# Patient Record
Sex: FEMALE | Race: WHITE | NOT HISPANIC OR LATINO | Employment: OTHER | URBAN - METROPOLITAN AREA
[De-identification: names, ages, dates, MRNs, and addresses within clinical notes are randomized per-mention and may not be internally consistent; named-entity substitution may affect disease eponyms.]

---

## 2017-01-05 ENCOUNTER — OFFICE VISIT (OUTPATIENT)
Dept: PULMONOLOGY | Facility: CLINIC | Age: 67
End: 2017-01-05
Payer: MEDICARE

## 2017-01-05 VITALS
DIASTOLIC BLOOD PRESSURE: 62 MMHG | OXYGEN SATURATION: 96 % | BODY MASS INDEX: 27.97 KG/M2 | WEIGHT: 148.13 LBS | HEIGHT: 61 IN | HEART RATE: 78 BPM | SYSTOLIC BLOOD PRESSURE: 130 MMHG

## 2017-01-05 DIAGNOSIS — J47.9 BRONCHIECTASIS WITHOUT COMPLICATION: Primary | ICD-10-CM

## 2017-01-05 PROCEDURE — 99999 PR PBB SHADOW E&M-EST. PATIENT-LVL III: CPT | Mod: PBBFAC,,, | Performed by: INTERNAL MEDICINE

## 2017-01-05 PROCEDURE — 1159F MED LIST DOCD IN RCRD: CPT | Mod: S$GLB,,, | Performed by: INTERNAL MEDICINE

## 2017-01-05 PROCEDURE — 99499 UNLISTED E&M SERVICE: CPT | Mod: S$PBB,,, | Performed by: INTERNAL MEDICINE

## 2017-01-05 PROCEDURE — 99214 OFFICE O/P EST MOD 30 MIN: CPT | Mod: S$GLB,,, | Performed by: INTERNAL MEDICINE

## 2017-01-05 PROCEDURE — 3078F DIAST BP <80 MM HG: CPT | Mod: S$GLB,,, | Performed by: INTERNAL MEDICINE

## 2017-01-05 PROCEDURE — 1160F RVW MEDS BY RX/DR IN RCRD: CPT | Mod: S$GLB,,, | Performed by: INTERNAL MEDICINE

## 2017-01-05 PROCEDURE — 1157F ADVNC CARE PLAN IN RCRD: CPT | Mod: S$GLB,,, | Performed by: INTERNAL MEDICINE

## 2017-01-05 PROCEDURE — 3075F SYST BP GE 130 - 139MM HG: CPT | Mod: S$GLB,,, | Performed by: INTERNAL MEDICINE

## 2017-01-05 RX ORDER — ALBUTEROL SULFATE 90 UG/1
2 AEROSOL, METERED RESPIRATORY (INHALATION) EVERY 6 HOURS PRN
Qty: 1 INHALER | Refills: 6 | Status: SHIPPED | OUTPATIENT
Start: 2017-01-05 | End: 2018-04-02 | Stop reason: ALTCHOICE

## 2017-01-05 NOTE — PROGRESS NOTES
"Subjective:       Patient ID: Jolly Matias is a 66 y.o. female.    Chief Complaint: Bronchiectasis; Cough; and Pulmonary Nodules    HPI Comments: 66 year old with a history of bronchiectasis (mild and only symptomatic once when had hemoptysis in August).  Doing well since.      Bronchiectasis   Associated symptoms include coughing. Pertinent negatives include no fatigue or fever.   Cough   Associated symptoms include wheezing (does not know where inhaler is.). Pertinent negatives include no fever, hemoptysis or weight loss.   Pulmonary Nodules   Associated symptoms include coughing. Pertinent negatives include no fatigue or fever.     Review of Systems   Constitutional: Negative for fever, weight loss, fatigue and night sweats.   HENT: Negative for trouble swallowing.    Respiratory: Positive for cough and wheezing (does not know where inhaler is.). Negative for hemoptysis, sputum production, choking, chest tightness, pleurisy, dyspnea on extertion and use of rescue inhaler.    Gastrointestinal: Positive for acid reflux.     Sinus congestion    Objective:       Vitals:    01/05/17 1053   BP: 130/62   Pulse: 78   SpO2: 96%   Weight: 67.2 kg (148 lb 2.4 oz)   Height: 5' 1" (1.549 m)     Physical Exam   Constitutional: She is oriented to person, place, and time. She appears well-developed and well-nourished.   Cardiovascular: Normal rate and regular rhythm.    Pulmonary/Chest: Normal expansion. She has no wheezes. She has no rales.   Musculoskeletal: Normal range of motion. She exhibits no edema.   Lymphadenopathy: No supraclavicular adenopathy is present.     She has no cervical adenopathy.   Neurological: She is alert and oriented to person, place, and time.   Psychiatric: She has a normal mood and affect.     Personal Diagnostic Review  CT of chest performed on 9/2016 without contrast revealed stable nodules (not concerned about increased conspicuity)..  No flowsheet data found.      Assessment:       1. " Bronchiectasis without complication        Outpatient Encounter Prescriptions as of 1/5/2017   Medication Sig Dispense Refill    albuterol (VENTOLIN HFA) 90 mcg/actuation inhaler Inhale 2 puffs into the lungs every 6 (six) hours as needed for Wheezing. 1 Inhaler 6    atorvastatin (LIPITOR) 20 MG tablet Take 1 tablet (20 mg total) by mouth once daily. 90 tablet 0    cyanocobalamin (VITAMIN B-12) 1000 MCG tablet Take 100 mcg by mouth once daily.      flaxseed oil 1,000 mg Cap Take 1 capsule by mouth once daily.        losartan (COZAAR) 50 MG tablet Take 1 tablet (50 mg total) by mouth once daily. 90 tablet 2    metformin (GLUCOPHAGE) 500 MG tablet Take 1 tablet (500 mg total) by mouth 2 (two) times daily with meals. 180 tablet 2    sertraline (ZOLOFT) 50 MG tablet Take 1 tablet (50 mg total) by mouth once daily. 90 tablet 2    spironolactone (ALDACTONE) 25 MG tablet Take 1 tablet (25 mg total) by mouth once daily. 90 tablet 2    vitamin D 1000 units Tab Take 185 mg by mouth once daily.      [DISCONTINUED] albuterol (VENTOLIN HFA) 90 mcg/actuation inhaler Inhale 2 puffs into the lungs every 6 (six) hours as needed for Wheezing. 1 Inhaler 6    beclomethasone (QVAR) 40 mcg/actuation Aero Inhale 1 puff into the lungs 2 (two) times daily. 1 each 11    GUAIFENESIN/PHENYLEPHRINE HCL (MUCINEX COLD ORAL) Take by mouth as needed.      [DISCONTINUED] spironolactone (ALDACTONE) 25 MG tablet TAKE 1 TABLET BY MOUTH EVERY DAY 90 tablet 0     No facility-administered encounter medications on file as of 1/5/2017.      No orders of the defined types were placed in this encounter.    Plan:       I have advised that with cough and wheezing we begin treating bronchospasm with inhaled corticosteroids.  Start qvar one puff twice daily for control.  Albuterol for rescue and prevention.  Plan of care reviewed with patient.  May follow up in 6 months.  No further follow up for pulmonary nodules.

## 2017-01-05 NOTE — MR AVS SNAPSHOT
[facility] - Pulmonary Services  [facility]  [place] [address]  Phone: [phone]                  Jolly HUBERT Matias   2017 10:30 AM   Office Visit    Description:  Female : 1950   Provider:  Daphne Jacinto MD   Department:  [facility] - Pulmonary Services           Reason for Visit     Bronchiectasis     Cough     Pulmonary Nodules           Diagnoses this Visit        Comments    Bronchiectasis without complication    -  Primary            To Do List           Future Appointments        Provider Department Dept Phone    2017 11:15 AM Jorgito Juarez MD [facility]-Colon [facility] Rectal Surg [phone]    1/10/2017 8:30 AM LAB, APPOINTMENT [place] [facility] [phone]    1/10/2017 9:00 AM Jolly Saavedra RN [facility] [phone]    2017 1:00 PM Angle Mohr MD [place] - Dermatology [phone]      Your Future Surgeries/Procedures     2017   Surgery with IBRAHIMA Philip MD   [facility]    [facility] [address] [phone]            2017   Surgery with Jorgito Juarez MD   [facility]    [facility] [id number] [phone]              Goals (5 Years of Data)     None       These Medications        Disp Refills Start End    beclomethasone (QVAR) 40 mcg/actuation Aero 1 each 11 2017     Inhale 1 puff into the lungs 2 (two) times daily. - Inhalation    Pharmacy: [facility] [id number] AT [facility] [address]  [place]  #: [phone]       albuterol (VENTOLIN HFA) 90 mcg/actuation inhaler 1 Inhaler 6 2017     Inhale 2 puffs into the lungs every 6 (six) hours as needed for Wheezing. - Inhalation    Pharmacy: [facility] - [address] AT [facility] [place] [address] [place] Ph #:  [phone]       Notes to Pharmacy: Hold until patient request to fill it.      [facility]     [facility] - 24/7 Assistance  Registered nurses in the [facility] provide clinical advisement, health education, appointment booking, and other advisory services.  Call for this free service at [phone].             Medications           Message regarding Medications     Verify the changes and/or additions to your medication regime listed below are the same as discussed with your clinician today.  If any of these changes or additions are incorrect, please notify your healthcare provider.        START taking these NEW medications        Refills    beclomethasone (QVAR) 40 mcg/actuation Aero 11    Sig: Inhale 1 puff into the lungs 2 (two) times daily.    Class: Normal    Route: Inhalation           Verify that the below list of medications is an accurate representation of the medications you are currently taking.  If none reported, the list may be blank. If incorrect, please contact your healthcare provider. Carry this list with you in case of emergency.           Current Medications     albuterol (VENTOLIN HFA) 90 mcg/actuation inhaler Inhale 2 puffs into the lungs every 6 (six) hours as needed for Wheezing.    atorvastatin (LIPITOR) 20 MG tablet Take 1 tablet (20 mg total) by mouth once daily.    cyanocobalamin (VITAMIN B-12) 1000 MCG tablet Take 100 mcg by mouth once daily.    flaxseed oil 1,000 mg Cap Take 1 capsule by mouth once daily.      losartan (COZAAR) 50 MG tablet Take 1 tablet (50 mg total) by mouth once daily.    metformin (GLUCOPHAGE) 500 MG tablet Take 1 tablet (500 mg total) by mouth 2 (two) times daily with meals.    sertraline (ZOLOFT) 50 MG tablet Take 1 tablet (50 mg total) by mouth once daily.    spironolactone (ALDACTONE) 25 MG tablet Take 1 tablet (25 mg total) by mouth once daily.    vitamin D 1000 units Tab Take 185 mg by mouth once daily.     "beclomethasone (QVAR) 40 mcg/actuation Aero Inhale 1 puff into the lungs 2 (two) times daily.    GUAIFENESIN/PHENYLEPHRINE HCL (MUCINEX COLD ORAL) Take by mouth as needed.           Clinical Reference Information           Vital Signs - Last Recorded  Most recent update: 1/5/2017 10:59 AM by Francia Worley LPN    BP Pulse Ht Wt LMP SpO2    130/62 78 5' 1" (1.549 m) 67.2 kg (148 lb 2.4 oz) (Within Years) 96%    BMI                27.99 kg/m2          Blood Pressure          Most Recent Value    BP  130/62      Allergies as of 1/5/2017     Hydrocodone      Immunizations Administered on Date of Encounter - 1/5/2017     None      "

## 2017-01-06 ENCOUNTER — OFFICE VISIT (OUTPATIENT)
Dept: SURGERY | Facility: CLINIC | Age: 67
End: 2017-01-06
Payer: MEDICARE

## 2017-01-06 VITALS
HEIGHT: 61 IN | HEART RATE: 75 BPM | SYSTOLIC BLOOD PRESSURE: 149 MMHG | DIASTOLIC BLOOD PRESSURE: 81 MMHG | WEIGHT: 149.5 LBS | BODY MASS INDEX: 28.22 KG/M2

## 2017-01-06 DIAGNOSIS — K57.32 DIVERTICULITIS OF LARGE INTESTINE WITHOUT PERFORATION OR ABSCESS WITHOUT BLEEDING: Primary | ICD-10-CM

## 2017-01-06 PROCEDURE — 1159F MED LIST DOCD IN RCRD: CPT | Mod: S$GLB,,, | Performed by: COLON & RECTAL SURGERY

## 2017-01-06 PROCEDURE — 1160F RVW MEDS BY RX/DR IN RCRD: CPT | Mod: S$GLB,,, | Performed by: COLON & RECTAL SURGERY

## 2017-01-06 PROCEDURE — 1157F ADVNC CARE PLAN IN RCRD: CPT | Mod: S$GLB,,, | Performed by: COLON & RECTAL SURGERY

## 2017-01-06 PROCEDURE — 99213 OFFICE O/P EST LOW 20 MIN: CPT | Mod: S$GLB,,, | Performed by: COLON & RECTAL SURGERY

## 2017-01-06 PROCEDURE — 1126F AMNT PAIN NOTED NONE PRSNT: CPT | Mod: S$GLB,,, | Performed by: COLON & RECTAL SURGERY

## 2017-01-06 PROCEDURE — 3079F DIAST BP 80-89 MM HG: CPT | Mod: S$GLB,,, | Performed by: COLON & RECTAL SURGERY

## 2017-01-06 PROCEDURE — 3077F SYST BP >= 140 MM HG: CPT | Mod: S$GLB,,, | Performed by: COLON & RECTAL SURGERY

## 2017-01-06 PROCEDURE — 99999 PR PBB SHADOW E&M-EST. PATIENT-LVL III: CPT | Mod: PBBFAC,,, | Performed by: COLON & RECTAL SURGERY

## 2017-01-06 RX ORDER — NEOMYCIN SULFATE 500 MG/1
TABLET ORAL
Qty: 6 TABLET | Refills: 0 | Status: ON HOLD | OUTPATIENT
Start: 2017-01-06 | End: 2017-01-20 | Stop reason: HOSPADM

## 2017-01-06 RX ORDER — NEOMYCIN SULFATE 500 MG/1
TABLET ORAL
Qty: 6 TABLET | Refills: 0 | Status: SHIPPED | OUTPATIENT
Start: 2017-01-06 | End: 2017-01-06 | Stop reason: CLARIF

## 2017-01-06 RX ORDER — METRONIDAZOLE 500 MG/1
TABLET ORAL
Qty: 3 TABLET | Refills: 0 | Status: ON HOLD | OUTPATIENT
Start: 2017-01-06 | End: 2017-01-20 | Stop reason: HOSPADM

## 2017-01-06 RX ORDER — METRONIDAZOLE 500 MG/1
TABLET ORAL
Qty: 3 TABLET | Refills: 0 | Status: SHIPPED | OUTPATIENT
Start: 2017-01-06 | End: 2017-01-06 | Stop reason: CLARIF

## 2017-01-06 NOTE — PROGRESS NOTES
Subjective:       Patient ID: Jolly Matias is a 66 y.o. female.    Chief Complaint: Pre-op Exam    HPI   Jolly Matias is a pleasant 66F who is here to schedule a sigmoid colectomy for her complicated diverticulitis.  She has had six episodes of diverticulitis since 2013, and her second this year (last admission in September 2016).  Her last colonoscopy was in 2010 which was normal. She desires discussion of surgical options for definitive management of her diverticulitis. The patient denies f/c/n/v, abdominal pain or problems with urination.       Review of Systems   Constitutional: Negative for fatigue, fever and unexpected weight change.   Respiratory: Negative for shortness of breath.    Cardiovascular: Negative for chest pain.   Gastrointestinal: Negative for abdominal pain, blood in stool, constipation, diarrhea, nausea and vomiting.       Objective:      Physical Exam   Constitutional: She is oriented to person, place, and time. She appears well-developed and well-nourished. No distress.   Eyes: Conjunctivae and EOM are normal.   Pulmonary/Chest: Effort normal. No respiratory distress.   Abdominal: Soft. She exhibits no distension. There is no tenderness.   Genitourinary: Rectum normal.   Musculoskeletal: Normal range of motion.   Neurological: She is alert and oriented to person, place, and time.   Skin: Skin is warm and dry.   Psychiatric: She has a normal mood and affect. Her behavior is normal.       Assessment:       1. Diverticulitis of large intestine without perforation or abscess without bleeding        Plan:       C-scope scheduled for 01/16/17  Lap Sigmoidectomy 01/17/16  Preop teaching done to include:    Mechanical bowel prep instruction sheet provided which includes instructions for prep and pre-op abx schedule, NPO after MN, and Hibiclens baths prior to surgery.     Details of planned surgery reviewed:  · Review of procedure  · Expected LOS.   · Preop process- application of RUSS hose and  SCD's, IV and IVF  · Different methods of post op pain control (IV and oral)   · Use of and importance of IS and other prophylaxis  · Expected early ambulation  · Slow advancement of diet  · Ostomy and ostomy care if indicated   · Pathology report generally takes 5 or more working days    Goals that need to be met before discharge:  · No fever  · Functional status at baseline   · Tolerating low fiber diet   · Positive bowel function  · Adequate pain control with oral meds  · Vital signs and labs stable    Home instructions begun during this preop visit:   · May shower (no tub bath),   · No heavy lifting, nothing greater then 5 pounds,  · Small frequent meals, low residue,   · Ostomy care if needed,   · Call for fever above 101, nausea/vomiting, no bm and any increase in pain     All questions answered to pt and family satisfaction.       I have personally taken the history and examined this patient and agree with the nurse practioner's note as stated above.

## 2017-01-10 ENCOUNTER — HOSPITAL ENCOUNTER (OUTPATIENT)
Dept: PREADMISSION TESTING | Facility: HOSPITAL | Age: 67
Discharge: HOME | End: 2017-01-10
Attending: ANESTHESIOLOGY
Payer: MEDICARE

## 2017-01-10 VITALS
OXYGEN SATURATION: 97 % | HEIGHT: 61 IN | TEMPERATURE: 98 F | RESPIRATION RATE: 18 BRPM | DIASTOLIC BLOOD PRESSURE: 77 MMHG | SYSTOLIC BLOOD PRESSURE: 131 MMHG | WEIGHT: 149.06 LBS | HEART RATE: 72 BPM | BODY MASS INDEX: 28.14 KG/M2

## 2017-01-10 NOTE — DISCHARGE INSTRUCTIONS
Your surgery has been scheduled for:__________________________________________    You should report to:  ____[facility], located on the [place] side of the first floor of the           [facility] ([phone])  ____[facility], located on the [facility] side of the            Second floor of the [facility] ([phone])  ____3rd Floor [facility] located on the [facility] side of the [facility] [phone]  Please Note   - Tell your doctor if you take Aspirin, products containing Aspirin, herbal medications  or blood thinners, such as Coumadin, Ticlid, or Plavix.  (Consult your provider regarding holding or stopping before surgery).  - Arrange for someone to drive you home following surgery.  You will not be allowed to leave the surgical facility alone or drive yourself home following sedation and anesthesia.  Before Surgery  - Stop taking all herbal medications 14days prior to surgery  - No Motrin/Advil (Ibuprofen) 7 days before surgery  - No Aleve (Naproxen) 7 days before surgery  - Stop Taking Asprin, products containing Asprin _____days before surgery  - Stop taking blood thinners_______days before surgery  - Refrain from drinking alcoholic beverages for 24hours before and after surgery  - Stop or limit smoking _________days before surgery  Night before Surgery  - DO NOT EAT OR DRINK ANYTHING AFTER MIDNIGHT, INCLUDING GUM, HARD CANDY, MINTS, OR CHEWING TOBACCO.  - Take a shower or bath (shower is recommended).  Bathe with Hibiclens soap or an antibacterial soap from the neck down.  If not supplied by your surgeon, hibiclens soap will need to be purchased over the counter in pharmacy.  Rinse soap off thoroughly.  - Shampoo your hair with your regular shampoo  The Day of Surgery  - Take another bath or shower with hibiclens or any antibacterial soap, to reduce the chance of infection.  - Take heart and blood  pressure medications with a small sip of water, as advised by the perioperative team.  - Do not take fluid pills  - You may brush your teeth and rinse your mouth, but do not swall any additional water.   - Do not apply perfumes, powder, body lotions or deodorant on the day of surgery.  - Nail polish should be removed.  - Do not wear makeup or moisturizer  - Wear comfortable clothes, such as a button front shirt and loose fitting pants.  - Leave all jewelry, including body piercings, and valuables at home.    - Bring any devices you will neeed after surgery such as crutches or canes.  - If you have sleep apnea, please bring your CPAP machine  In the event that your physical condition changes including the onset of a cold or respiratory illness, or if you have to delay or cancel your surgery, please notify your surgeon.Anesthesia: General Anesthesia  Youre due to have surgery. During surgery, youll be given medication called anesthesia. (It is also called anesthetic.) This will keep you comfortable and pain-free. Your surgeon will use general anesthesia. This sheet tells you more about it.    What Is General Anesthesia?  General anesthesia puts you into a state like deep sleep. It goes into the bloodstream (IV anesthetics), into the lungs (gas anesthetics), or both. You feel nothing during the procedure. You will not remember it. During the procedure, the anesthesia provider monitors you. He or she checks your heart rate and rhythm, blood pressure, and blood oxygen.  · IV Anesthetics  IV anesthetics are given through an IV line in your arm. Theyre often given first. This is so you are asleep before a gas anesthetic is started. Some kinds of IV anesthetics relieve pain. Others relax you. Your doctor will decide which kind is best in your case.  · Gas Anesthetics  Gas anesthetics are breathed into the lungs. They are often used to keep you asleep. They can be given through a facemask, an endotracheal tube, or a  laryngeal mask airway.  ¨ If you have a facemask, your anesthesia provider will most likely place it over your nose and mouth while youre still awake. Youll breathe oxygen through the mask as your IV anesthetic is started. Gas anesthetic may be added through the mask.  ¨ If you have an endotracheal tube or a laryngeal mask airway, it will be inserted into your throat after youre asleep.  Anesthesia Tools and Medications  You will likely have:  · IV anesthetics sent through an IV line into your bloodstream.  · Gas anesthetics breathed into your lungs, where they pass into your bloodstream.  · A pulse oximeter on the end of your finger. This measures your blood oxygen level.  · Electrocardiography leads (electrodes) on your chest. These record your heart rate and rhythm.  · A blood pressure cuff. This reads your blood pressure.  Risks and Possible Complications  General anesthesia has some risks. These include:  · Breathing problems  · Nausea and vomiting  · Sore throat or hoarseness  · Allergic reaction to the anesthetic  · Ongoing numbness (rare)  · Irregular heartbeat (rare)  · Cardiac arrest (rare)   Anesthesia Safety  · Follow all instructions you are given for how long not to eat or drink before your procedure.  · Be sure your doctor knows what medications you take. This includes over-the-counter medications, herbs, and supplements.  · Have an adult family member or friend drive you home after the procedure.  · For the first 24 hours after your surgery:  ¨ Do not drive or use heavy equipment.  ¨ Do not make important decisions or sign documents.  ¨ Avoid alcohol.  ¨ Have someone stay with you, if possible. They can watch for problems and help keep you safe.  © 0546-1113 [employer], [address] [place] [address], [place] [address]. All rights reserved. This information is not intended as a substitute for professional medical care. Always follow your healthcare professional's instructions.

## 2017-01-10 NOTE — IP AVS SNAPSHOT
[facility]  [address]  [facility] [address]  Phone: [phone]           Patient Discharge Instructions    Our goal is to set you up for success. This packet includes information on your condition, medications, and your home care. It will help you to care for yourself so you don't get sicker.     Please ask your nurse if you have any questions.        There are many details to remember when preparing for your surgery. Here is what you will need to do, please ask your nurse if there are more specific instructions and if you have any questions:    1. 24 hours before procedure Do not smoke or drink alcoholic beverages 24 hours prior to your procedure    2. Eating before procedure Do not eat or drink anything 8 hours before your procedure - this includes gum, mints, and candy.     3. Day of procedure Please remove all jewelry for the procedure. If you wear contact lenses, dentures, hearing aids or glasses, bring a container to put them in during your surgery and give to a family member for safekeeping.  If your doctor has scheduled you for an overnight stay, bring a small overnight bag with any personal items that you need.    4. After procedure Make arrangements in advance for transportation home by a responsible adult. It is not safe to drive a vehicle during the 24 hours following surgery.     PLEASE NOTE: You may be contacted the day before your surgery to confirm your surgery date and arrival time. The Surgery schedule has many variables which may affect the time of your surgery case. Family members should be available if your surgery time changes.                [facility]  Unless otherwise directed by your provider, please contact [facility], our nurse care line that is available for 24/7 assistance.     [phone] (toll-free)    Registered nurses in the [facility] provide clinical advisement, health education, appointment booking, and other  advisory services.                    ** Verify the list of medication(s) below is accurate and up to date. Carry this with you in case of emergency. If your medications have changed, please notify your healthcare provider.             Medication List      TAKE these medications        Additional Info                      albuterol 90 mcg/actuation inhaler   Commonly known as:  VENTOLIN HFA   Quantity:  1 Inhaler   Refills:  6   Dose:  2 puff   Comments:  Hold until patient request to fill it.    Instructions:  Inhale 2 puffs into the lungs every 6 (six) hours as needed for Wheezing.     Begin Date    AM    Noon    PM    Bedtime       atorvastatin 20 MG tablet   Commonly known as:  LIPITOR   Quantity:  90 tablet   Refills:  0   Dose:  20 mg    Instructions:  Take 1 tablet (20 mg total) by mouth once daily.     Begin Date    AM    Noon    PM    Bedtime       beclomethasone 40 mcg/actuation Aero   Commonly known as:  QVAR   Quantity:  1 each   Refills:  11   Dose:  1 puff    Instructions:  Inhale 1 puff into the lungs 2 (two) times daily.     Begin Date    AM    Noon    PM    Bedtime       flaxseed oil 1,000 mg Cap   Refills:  0   Dose:  1 capsule    Instructions:  Take 1 capsule by mouth once daily.     Begin Date    AM    Noon    PM    Bedtime       losartan 50 MG tablet   Commonly known as:  COZAAR   Quantity:  90 tablet   Refills:  2   Dose:  50 mg    Instructions:  Take 1 tablet (50 mg total) by mouth once daily.     Begin Date    AM    Noon    PM    Bedtime       metformin 500 MG tablet   Commonly known as:  GLUCOPHAGE   Quantity:  180 tablet   Refills:  2   Dose:  500 mg    Instructions:  Take 1 tablet (500 mg total) by mouth 2 (two) times daily with meals.     Begin Date    AM    Noon    PM    Bedtime       metronidazole 500 MG tablet   Commonly known as:  FLAGYL   Quantity:  3 tablet   Refills:  0    Instructions:  On the day before your surgery, take 1 tablet (500 mg) by mouth at 1pm, 2pm and 11pm.     Begin  Date    AM    Noon    PM    Bedtime       MUCINEX COLD ORAL   Refills:  0    Instructions:  Take by mouth as needed.     Begin Date    AM    Noon    PM    Bedtime       neomycin 500 mg Tab   Commonly known as:  MYCIFRADIN   Quantity:  6 tablet   Refills:  0    Instructions:  On the day before your surgery, take 2 tablets (1,000 mg) by mouth at 1pm, 2pm and 11pm.     Begin Date    AM    Noon    PM    Bedtime       sertraline 50 MG tablet   Commonly known as:  ZOLOFT   Quantity:  90 tablet   Refills:  2   Dose:  50 mg    Instructions:  Take 1 tablet (50 mg total) by mouth once daily.     Begin Date    AM    Noon    PM    Bedtime       spironolactone 25 MG tablet   Commonly known as:  ALDACTONE   Quantity:  90 tablet   Refills:  2   Dose:  25 mg    Instructions:  Take 1 tablet (25 mg total) by mouth once daily.     Begin Date    AM    Noon    PM    Bedtime       VITAMIN B-12 1000 MCG tablet   Refills:  0   Dose:  100 mcg   Generic drug:  cyanocobalamin    Instructions:  Take 100 mcg by mouth once daily.     Begin Date    AM    Noon    PM    Bedtime       vitamin D 1000 units Tab   Refills:  0   Dose:  185 mg    Instructions:  Take 185 mg by mouth once daily.     Begin Date    AM    Noon    PM    Bedtime                  Please bring to all follow up appointments:    1. A copy of your discharge instructions.  2. All medicines you are currently taking in their original bottles.  3. Identification and insurance card.    Please arrive 15 minutes ahead of scheduled appointment time.    Please call 24 hours in advance if you must reschedule your appointment and/or time.        Your Scheduled Appointments     Anurag 10, 2017  9:00 AM CST   Pre-Admit Testing Visit with Jolly Saavedra RN   [facility] ([facility])    [address] [facility] [address]   [phone]            Jan 18, 2017  1:00 PM CST   Procedure with MD [place] Jolly - Dermatology ([place])    [address]  [facility]  [place] [id number] [phone]              Your Future Surgeries/Procedures     Jan 16, 2017   Surgery with IBRAHIMA Philip MD   [facility] ([facility])    [address] [facility] [address] [phone]            Jan 17, 2017   Surgery with Jorgito Juarez MD   [facility] ([facility])    [address] [facility] [address] [phone]                  Discharge Instructions       Your surgery has been scheduled for:__________________________________________    You should report to:  ____[facility], located on the [place] side of the first floor of the           [facility] ([phone])  ____[facility], located on the [facility] side of the            Second floor of the [facility] ([phone])  ____3rd Floor [facility] located on the [facility] side of the [facility] [phone]  Please Note   - Tell your doctor if you take Aspirin, products containing Aspirin, herbal medications  or blood thinners, such as Coumadin, Ticlid, or Plavix.  (Consult your provider regarding holding or stopping before surgery).  - Arrange for someone to drive you home following surgery.  You will not be allowed to leave the surgical facility alone or drive yourself home following sedation and anesthesia.  Before Surgery  - Stop taking all herbal medications 14days prior to surgery  - No Motrin/Advil (Ibuprofen) 7 days before surgery  - No Aleve (Naproxen) 7 days before surgery  - Stop Taking Asprin, products containing Asprin _____days before surgery  - Stop taking blood thinners_______days before surgery  - Refrain from drinking alcoholic beverages for 24hours before and after surgery  - Stop or limit smoking _________days before surgery  Night before Surgery  - DO NOT EAT OR DRINK ANYTHING AFTER MIDNIGHT,  INCLUDING GUM, HARD CANDY, MINTS, OR CHEWING TOBACCO.  - Take a shower or bath (shower is recommended).  Bathe with Hibiclens soap or an antibacterial soap from the neck down.  If not supplied by your surgeon, hibiclens soap will need to be purchased over the counter in pharmacy.  Rinse soap off thoroughly.  - Shampoo your hair with your regular shampoo  The Day of Surgery  - Take another bath or shower with hibiclens or any antibacterial soap, to reduce the chance of infection.  - Take heart and blood pressure medications with a small sip of water, as advised by the perioperative team.  - Do not take fluid pills  - You may brush your teeth and rinse your mouth, but do not swall any additional water.   - Do not apply perfumes, powder, body lotions or deodorant on the day of surgery.  - Nail polish should be removed.  - Do not wear makeup or moisturizer  - Wear comfortable clothes, such as a button front shirt and loose fitting pants.  - Leave all jewelry, including body piercings, and valuables at home.    - Bring any devices you will neeed after surgery such as crutches or canes.  - If you have sleep apnea, please bring your CPAP machine  In the event that your physical condition changes including the onset of a cold or respiratory illness, or if you have to delay or cancel your surgery, please notify your surgeon.Anesthesia: General Anesthesia  Youre due to have surgery. During surgery, youll be given medication called anesthesia. (It is also called anesthetic.) This will keep you comfortable and pain-free. Your surgeon will use general anesthesia. This sheet tells you more about it.    What Is General Anesthesia?  General anesthesia puts you into a state like deep sleep. It goes into the bloodstream (IV anesthetics), into the lungs (gas anesthetics), or both. You feel nothing during the procedure. You will not remember it. During the procedure, the anesthesia provider monitors you. He or she checks your heart  rate and rhythm, blood pressure, and blood oxygen.  · IV Anesthetics  IV anesthetics are given through an IV line in your arm. Theyre often given first. This is so you are asleep before a gas anesthetic is started. Some kinds of IV anesthetics relieve pain. Others relax you. Your doctor will decide which kind is best in your case.  · Gas Anesthetics  Gas anesthetics are breathed into the lungs. They are often used to keep you asleep. They can be given through a facemask, an endotracheal tube, or a laryngeal mask airway.  ¨ If you have a facemask, your anesthesia provider will most likely place it over your nose and mouth while youre still awake. Youll breathe oxygen through the mask as your IV anesthetic is started. Gas anesthetic may be added through the mask.  ¨ If you have an endotracheal tube or a laryngeal mask airway, it will be inserted into your throat after youre asleep.  Anesthesia Tools and Medications  You will likely have:  · IV anesthetics sent through an IV line into your bloodstream.  · Gas anesthetics breathed into your lungs, where they pass into your bloodstream.  · A pulse oximeter on the end of your finger. This measures your blood oxygen level.  · Electrocardiography leads (electrodes) on your chest. These record your heart rate and rhythm.  · A blood pressure cuff. This reads your blood pressure.  Risks and Possible Complications  General anesthesia has some risks. These include:  · Breathing problems  · Nausea and vomiting  · Sore throat or hoarseness  · Allergic reaction to the anesthetic  · Ongoing numbness (rare)  · Irregular heartbeat (rare)  · Cardiac arrest (rare)   Anesthesia Safety  · Follow all instructions you are given for how long not to eat or drink before your procedure.  · Be sure your doctor knows what medications you take. This includes over-the-counter medications, herbs, and supplements.  · Have an adult family member or friend drive you home after the procedure.  · For  the first 24 hours after your surgery:  ¨ Do not drive or use heavy equipment.  ¨ Do not make important decisions or sign documents.  ¨ Avoid alcohol.  ¨ Have someone stay with you, if possible. They can watch for problems and help keep you safe.  © 6649-3430 [employer], [address] [place] [address]. All rights reserved. This information is not intended as a substitute for professional medical care. Always follow your healthcare professional's instructions.      Admission Information     Date & Time Provider Department CSN    1/10/2017  9:00 AM Dacia Morgan MD [facility] [id number]      Care Providers     Provider Role Specialty Primary office phone    Dacia Morgan MD Attending Provider Anesthesiology [phone]      Your Vitals Were     Last Period                   (Within Years)           Recent Lab Values        9/22/2010 10/2/2013 12/15/2014 11/25/2015 9/7/2016 11/22/2016            8:09 AM  8:20 AM  2:57 PM  8:40 AM  4:24 AM  8:55 AM      A1C 5.8 6.0 6.2 5.9 6.0 5.7      Comment for A1C at  4:24 AM on 9/7/2016:  According to ADA guidelines, hemoglobin A1C <7.0% represents  optimal control in non-pregnant diabetic patients.  Different  metrics may apply to specific populations.   Standards of Medical Care in Diabetes - 2016.  For the purpose of screening for the presence of diabetes:  <5.7%     Consistent with the absence of diabetes  5.7-6.4%  Consistent with increasing risk for diabetes   (prediabetes)  >or=6.5%  Consistent with diabetes  Currently no consensus exists for use of hemoglobin A1C  for diagnosis of diabetes for children.      Comment for A1C at  8:55 AM on 11/22/2016:  According to ADA guidelines, hemoglobin A1C <7.0% represents  optimal control in non-pregnant diabetic patients.  Different  metrics may apply to specific populations.   Standards of Medical Care in Diabetes - 2016.  For the purpose of screening for the presence of diabetes:  <5.7%      "Consistent with the absence of diabetes  5.7-6.4%  Consistent with increasing risk for diabetes   (prediabetes)  >or=6.5%  Consistent with diabetes  Currently no consensus exists for use of hemoglobin A1C  for diagnosis of diabetes for children.        Allergies as of 1/10/2017        Reactions    Hydrocodone Nausea Only    Also makes pt "very sleepy"      Advance Directives     An advance directive is a document which, in the event you are no longer able to make decisions for yourself, tells your healthcare team what kind of treatment you do or do not want to receive, or who you would like to make those decisions for you.  If you do not currently have an advance directive, [facility] encourages you to create one.  For more information call:  [phone] ([phone]), [phone] ([phone]),  or log on to [email or web address].        Language Assistance Services     ATTENTION: Language assistance services are available, free of charge. Please call [phone].      ATENCIÓN: Si habla español, tiene a lugo disposición servicios gratuitos de asistencia lingüística. Llame al [phone].     SUN Ý: N?u b?n nói Ti?ng Vi?t, có các d?ch v? h? tr? ngôn ng? mi?n phí dành cho b?n. G?i s? [phone].         [facility] complies with applicable Federal civil rights laws and does not discriminate on the basis of race, color, national origin, age, disability, or sex.        "

## 2017-01-16 ENCOUNTER — TELEPHONE (OUTPATIENT)
Dept: SURGERY | Facility: CLINIC | Age: 67
End: 2017-01-16

## 2017-01-16 ENCOUNTER — TELEPHONE (OUTPATIENT)
Dept: PULMONOLOGY | Facility: CLINIC | Age: 67
End: 2017-01-16

## 2017-01-16 ENCOUNTER — SURGERY (OUTPATIENT)
Age: 67
End: 2017-01-16

## 2017-01-16 ENCOUNTER — ANESTHESIA (OUTPATIENT)
Dept: ENDOSCOPY | Facility: HOSPITAL | Age: 67
DRG: 330 | End: 2017-01-16
Payer: MEDICARE

## 2017-01-16 ENCOUNTER — ANESTHESIA EVENT (OUTPATIENT)
Dept: ENDOSCOPY | Facility: HOSPITAL | Age: 67
DRG: 330 | End: 2017-01-16
Payer: MEDICARE

## 2017-01-16 VITALS — RESPIRATION RATE: 53 BRPM

## 2017-01-16 PROBLEM — Z13.9 SCREENING: Status: ACTIVE | Noted: 2017-01-16

## 2017-01-16 PROCEDURE — D9220A PRA ANESTHESIA: Mod: CRNA,,, | Performed by: NURSE ANESTHETIST, CERTIFIED REGISTERED

## 2017-01-16 PROCEDURE — D9220A PRA ANESTHESIA: Mod: ANES,,, | Performed by: ANESTHESIOLOGY

## 2017-01-16 RX ORDER — PROPOFOL 10 MG/ML
VIAL (ML) INTRAVENOUS CONTINUOUS PRN
Status: DISCONTINUED | OUTPATIENT
Start: 2017-01-16 | End: 2017-01-16

## 2017-01-16 RX ORDER — FLUTICASONE PROPIONATE 50 UG/1
1 POWDER, METERED RESPIRATORY (INHALATION) 2 TIMES DAILY
Qty: 1 EACH | Refills: 11 | Status: SHIPPED | OUTPATIENT
Start: 2017-01-16 | End: 2017-08-21

## 2017-01-16 RX ORDER — PROPOFOL 10 MG/ML
VIAL (ML) INTRAVENOUS
Status: DISCONTINUED | OUTPATIENT
Start: 2017-01-16 | End: 2017-01-16

## 2017-01-16 RX ORDER — LIDOCAINE HCL/PF 100 MG/5ML
SYRINGE (ML) INTRAVENOUS
Status: DISCONTINUED | OUTPATIENT
Start: 2017-01-16 | End: 2017-01-16

## 2017-01-16 RX ADMIN — PROPOFOL 150 MCG/KG/MIN: 10 INJECTION, EMULSION INTRAVENOUS at 03:01

## 2017-01-16 RX ADMIN — LIDOCAINE HYDROCHLORIDE 40 MG: 20 INJECTION, SOLUTION INTRAVENOUS at 03:01

## 2017-01-16 RX ADMIN — PROPOFOL 70 MG: 10 INJECTION, EMULSION INTRAVENOUS at 03:01

## 2017-01-16 NOTE — ANESTHESIA PREPROCEDURE EVALUATION
01/16/2017  Jolly Matias is a 66 y.o., female.    [facility] Anesthesia Evaluation         Review of Systems  Hematology/Oncology:         -- Cancer in past history: Oncology Comments: Basal cell CA    Cardiovascular:   Hypertension PVD hyperlipidemia    Pulmonary:   bronchiectasis   Hepatic/GI:   diverticulitis   Endocrine:   Diabetes    Psych:   anxiety          Physical Exam  General:  Well nourished    Airway/Jaw/Neck:  Airway Findings: Mouth Opening: Normal Tongue: Normal  General Airway Assessment: Adult  Mallampati: II  TM Distance: Normal, at least 6 cm      Dental:  Dental Findings: In tact        Mental Status:  Mental Status Findings:  Alert and Oriented, Cooperative         Anesthesia Plan  Type of Anesthesia, risks & benefits discussed:  Anesthesia Type:  general  Patient's Preference:   Intra-op Monitoring Plan:   Intra-op Monitoring Plan Comments:   Post Op Pain Control Plan:   Post Op Pain Control Plan Comments:   Induction:   IV  Beta Blocker:  Patient is not currently on a Beta-Blocker (No further documentation required).       Informed Consent: Patient understands risks and agrees with Anesthesia plan.  Questions answered. Anesthesia consent signed with patient.  ASA Score: 2     Day of Surgery Review of History & Physical:    H&P update referred to the surgeon.         Ready For Surgery From Anesthesia Perspective.

## 2017-01-16 NOTE — TELEPHONE ENCOUNTER
----- Message from Francia Worley LPN sent at 1/16/2017  3:49 PM CST -----  Contact: Jolly    tel;  [phone]   Yes, it looks like they cove brand.  ----- Message -----     From: Daphne Jacinto MD     Sent: 1/16/2017   3:12 PM       To: Francia Worley LPN    Flovent?  Daphne    ----- Message -----     From: Francia Worley LPN     Sent: 1/16/2017   3:00 PM       To: Daphne Jacinto MD    The best I can determine, Advair and Anoro are covered without a PA.  ----- Message -----     From: Sabina Laura     Sent: 1/13/2017   2:12 PM       To: [facility] Staff    You called in two perscriptions , for inhalers.    Only received the Ventolyn .    Will call the pharmacy to see  Why she only received one inhaler.   Asking you to check w/ the pharmacy why it was not given to her.   Pls call.

## 2017-01-16 NOTE — TRANSFER OF CARE
Anesthesia Transfer of Care Note    Patient: Jolly Matias    Procedure(s) Performed: Procedure(s) (LRB):  COLONOSCOPY (N/A)    Patient location: PACU    Anesthesia Type: general    Transport from OR: Transported from OR on 6-10 L/min O2 by face mask with adequate spontaneous ventilation    Post pain: adequate analgesia    Post assessment: no apparent anesthetic complications and tolerated procedure well    Post vital signs: stable    Level of consciousness: awake, alert and oriented    Nausea/Vomiting: no nausea/vomiting    Complications: none          Last vitals:   Visit Vitals    BP (!) 121/58 (BP Location: Left arm, Patient Position: Lying, BP Method: Automatic)    Pulse 69    Temp 36.4 °C (97.6 °F) (Oral)    Resp 16    LMP  (Within Years)    SpO2 100%    Breastfeeding No

## 2017-01-17 ENCOUNTER — ANESTHESIA (OUTPATIENT)
Dept: SURGERY | Facility: HOSPITAL | Age: 67
DRG: 330 | End: 2017-01-17
Payer: MEDICARE

## 2017-01-17 PROBLEM — K57.33 DIVERTICULITIS LARGE INTESTINE W/O PERFORATION OR ABSCESS W/BLEEDING: Status: ACTIVE | Noted: 2017-01-17

## 2017-01-17 PROCEDURE — 63600175 PHARM REV CODE 636 W HCPCS: Performed by: NURSE ANESTHETIST, CERTIFIED REGISTERED

## 2017-01-17 PROCEDURE — 25000003 PHARM REV CODE 250: Performed by: NURSE ANESTHETIST, CERTIFIED REGISTERED

## 2017-01-17 PROCEDURE — D9220A PRA ANESTHESIA: Mod: CRNA,,, | Performed by: NURSE ANESTHETIST, CERTIFIED REGISTERED

## 2017-01-17 PROCEDURE — 25000003 PHARM REV CODE 250: Performed by: NURSE PRACTITIONER

## 2017-01-17 PROCEDURE — 63600175 PHARM REV CODE 636 W HCPCS: Performed by: NURSE PRACTITIONER

## 2017-01-17 PROCEDURE — D9220A PRA ANESTHESIA: Mod: ANES,,, | Performed by: ANESTHESIOLOGY

## 2017-01-17 RX ORDER — LIDOCAINE HYDROCHLORIDE ANHYDROUS AND DEXTROSE MONOHYDRATE .8; 5 G/100ML; G/100ML
INJECTION, SOLUTION INTRAVENOUS CONTINUOUS PRN
Status: DISCONTINUED | OUTPATIENT
Start: 2017-01-17 | End: 2017-01-17

## 2017-01-17 RX ORDER — LIDOCAINE HCL/PF 100 MG/5ML
SYRINGE (ML) INTRAVENOUS
Status: DISCONTINUED | OUTPATIENT
Start: 2017-01-17 | End: 2017-01-17

## 2017-01-17 RX ORDER — MIDAZOLAM HYDROCHLORIDE 1 MG/ML
INJECTION, SOLUTION INTRAMUSCULAR; INTRAVENOUS
Status: DISCONTINUED | OUTPATIENT
Start: 2017-01-17 | End: 2017-01-17

## 2017-01-17 RX ORDER — FENTANYL CITRATE 50 UG/ML
INJECTION, SOLUTION INTRAMUSCULAR; INTRAVENOUS
Status: DISCONTINUED | OUTPATIENT
Start: 2017-01-17 | End: 2017-01-17

## 2017-01-17 RX ORDER — NEOSTIGMINE METHYLSULFATE 1 MG/ML
INJECTION, SOLUTION INTRAVENOUS
Status: DISCONTINUED | OUTPATIENT
Start: 2017-01-17 | End: 2017-01-17

## 2017-01-17 RX ORDER — ATROPINE SULFATE 0.4 MG/ML
INJECTION, SOLUTION ENDOTRACHEAL; INTRAMEDULLARY; INTRAMUSCULAR; INTRAVENOUS; SUBCUTANEOUS
Status: DISCONTINUED | OUTPATIENT
Start: 2017-01-17 | End: 2017-01-17

## 2017-01-17 RX ORDER — ROCURONIUM BROMIDE 10 MG/ML
INJECTION, SOLUTION INTRAVENOUS
Status: DISCONTINUED | OUTPATIENT
Start: 2017-01-17 | End: 2017-01-17

## 2017-01-17 RX ORDER — GLYCOPYRROLATE 0.2 MG/ML
INJECTION INTRAMUSCULAR; INTRAVENOUS
Status: DISCONTINUED | OUTPATIENT
Start: 2017-01-17 | End: 2017-01-17

## 2017-01-17 RX ORDER — DEXAMETHASONE SODIUM PHOSPHATE 4 MG/ML
INJECTION, SOLUTION INTRA-ARTICULAR; INTRALESIONAL; INTRAMUSCULAR; INTRAVENOUS; SOFT TISSUE
Status: DISCONTINUED | OUTPATIENT
Start: 2017-01-17 | End: 2017-01-17

## 2017-01-17 RX ORDER — EPINEPHRINE 0.1 MG/ML
INJECTION INTRAVENOUS
Status: DISCONTINUED | OUTPATIENT
Start: 2017-01-17 | End: 2017-01-17

## 2017-01-17 RX ORDER — PROPOFOL 10 MG/ML
VIAL (ML) INTRAVENOUS
Status: DISCONTINUED | OUTPATIENT
Start: 2017-01-17 | End: 2017-01-17

## 2017-01-17 RX ORDER — ESMOLOL HYDROCHLORIDE 10 MG/ML
INJECTION INTRAVENOUS
Status: DISCONTINUED | OUTPATIENT
Start: 2017-01-17 | End: 2017-01-17

## 2017-01-17 RX ORDER — ONDANSETRON 2 MG/ML
INJECTION INTRAMUSCULAR; INTRAVENOUS
Status: DISCONTINUED | OUTPATIENT
Start: 2017-01-17 | End: 2017-01-17

## 2017-01-17 RX ORDER — HYDROMORPHONE HYDROCHLORIDE 2 MG/ML
INJECTION, SOLUTION INTRAMUSCULAR; INTRAVENOUS; SUBCUTANEOUS
Status: DISCONTINUED | OUTPATIENT
Start: 2017-01-17 | End: 2017-01-17

## 2017-01-17 RX ADMIN — SODIUM CHLORIDE, SODIUM ACETATE ANHYDROUS, SODIUM GLUCONATE, POTASSIUM CHLORIDE, AND MAGNESIUM CHLORIDE: 526; 222; 502; 37; 30 INJECTION, SOLUTION INTRAVENOUS at 09:01

## 2017-01-17 RX ADMIN — METRONIDAZOLE 500 MG: 500 SOLUTION INTRAVENOUS at 07:01

## 2017-01-17 RX ADMIN — EPHEDRINE SULFATE 5 MG: 50 INJECTION, SOLUTION INTRAMUSCULAR; INTRAVENOUS; SUBCUTANEOUS at 10:01

## 2017-01-17 RX ADMIN — LIDOCAINE HYDROCHLORIDE 0.03 MG/KG/MIN: 8 INJECTION, SOLUTION INTRAVENOUS at 07:01

## 2017-01-17 RX ADMIN — ONDANSETRON 4 MG: 2 INJECTION INTRAMUSCULAR; INTRAVENOUS at 10:01

## 2017-01-17 RX ADMIN — IBUPROFEN 400 MG: 800 INJECTION INTRAVENOUS at 07:01

## 2017-01-17 RX ADMIN — ESMOLOL HYDROCHLORIDE 30 MG: 10 INJECTION INTRAVENOUS at 07:01

## 2017-01-17 RX ADMIN — CEFTRIAXONE 2 G: 2 INJECTION, SOLUTION INTRAVENOUS at 07:01

## 2017-01-17 RX ADMIN — HYDROMORPHONE HYDROCHLORIDE 0.6 MG: 2 INJECTION INTRAMUSCULAR; INTRAVENOUS; SUBCUTANEOUS at 10:01

## 2017-01-17 RX ADMIN — GLYCOPYRROLATE 0.4 MG: 0.2 INJECTION, SOLUTION INTRAMUSCULAR; INTRAVENOUS at 10:01

## 2017-01-17 RX ADMIN — ESMOLOL HYDROCHLORIDE 50 MG: 10 INJECTION INTRAVENOUS at 07:01

## 2017-01-17 RX ADMIN — DEXAMETHASONE SODIUM PHOSPHATE 8 MG: 4 INJECTION, SOLUTION INTRAMUSCULAR; INTRAVENOUS at 07:01

## 2017-01-17 RX ADMIN — EPHEDRINE SULFATE 10 MG: 50 INJECTION, SOLUTION INTRAMUSCULAR; INTRAVENOUS; SUBCUTANEOUS at 09:01

## 2017-01-17 RX ADMIN — PROPOFOL 150 MG: 10 INJECTION, EMULSION INTRAVENOUS at 07:01

## 2017-01-17 RX ADMIN — ATROPINE SULFATE 0.4 MG: 0.4 INJECTION, SOLUTION INTRAMUSCULAR; INTRAVENOUS; SUBCUTANEOUS at 08:01

## 2017-01-17 RX ADMIN — LIDOCAINE HYDROCHLORIDE 100 MG: 20 INJECTION, SOLUTION INTRAVENOUS at 07:01

## 2017-01-17 RX ADMIN — FENTANYL CITRATE 50 MCG: 50 INJECTION, SOLUTION INTRAMUSCULAR; INTRAVENOUS at 08:01

## 2017-01-17 RX ADMIN — GLYCOPYRROLATE 0.2 MG: 0.2 INJECTION, SOLUTION INTRAMUSCULAR; INTRAVENOUS at 08:01

## 2017-01-17 RX ADMIN — DEXMEDETOMIDINE HYDROCHLORIDE 0.5 MCG/KG/HR: 100 INJECTION, SOLUTION, CONCENTRATE INTRAVENOUS at 07:01

## 2017-01-17 RX ADMIN — FENTANYL CITRATE 50 MCG: 50 INJECTION, SOLUTION INTRAMUSCULAR; INTRAVENOUS at 09:01

## 2017-01-17 RX ADMIN — NEOSTIGMINE METHYLSULFATE 3 MG: 1 INJECTION INTRAVENOUS at 10:01

## 2017-01-17 RX ADMIN — SODIUM CHLORIDE, SODIUM ACETATE ANHYDROUS, SODIUM GLUCONATE, POTASSIUM CHLORIDE, AND MAGNESIUM CHLORIDE: 526; 222; 502; 37; 30 INJECTION, SOLUTION INTRAVENOUS at 07:01

## 2017-01-17 RX ADMIN — ROCURONIUM BROMIDE 50 MG: 10 INJECTION, SOLUTION INTRAVENOUS at 07:01

## 2017-01-17 RX ADMIN — EPINEPHRINE 25 MCG: 0.1 INJECTION, SOLUTION ENDOTRACHEAL; INTRACARDIAC; INTRAVENOUS at 08:01

## 2017-01-17 RX ADMIN — MIDAZOLAM HYDROCHLORIDE 2 MG: 1 INJECTION, SOLUTION INTRAMUSCULAR; INTRAVENOUS at 06:01

## 2017-01-17 RX ADMIN — SODIUM CHLORIDE, SODIUM ACETATE ANHYDROUS, SODIUM GLUCONATE, POTASSIUM CHLORIDE, AND MAGNESIUM CHLORIDE: 526; 222; 502; 37; 30 INJECTION, SOLUTION INTRAVENOUS at 08:01

## 2017-01-17 RX ADMIN — PROPOFOL 50 MG: 10 INJECTION, EMULSION INTRAVENOUS at 07:01

## 2017-01-17 RX ADMIN — HYDROMORPHONE HYDROCHLORIDE 0.4 MG: 2 INJECTION INTRAMUSCULAR; INTRAVENOUS; SUBCUTANEOUS at 11:01

## 2017-01-17 NOTE — ANESTHESIA POSTPROCEDURE EVALUATION
Anesthesia Post Evaluation    Patient: Jolly Matias    Procedure(s) Performed: Procedure(s) (LRB):  COLONOSCOPY (N/A)    Final Anesthesia Type: general  Patient location during evaluation: GI PACU  Patient participation: Yes- Able to Participate  Level of consciousness: awake and alert  Post-procedure vital signs: reviewed and stable  Pain management: adequate  Airway patency: patent  PONV status at discharge: No PONV  Anesthetic complications: no      Cardiovascular status: blood pressure returned to baseline  Respiratory status: unassisted  Hydration status: euvolemic  Follow-up not needed.        Visit Vitals    BP (!) 140/63    Pulse 67    Temp 36.4 °C (97.6 °F) (Oral)    Resp 18    LMP  (Within Years)    SpO2 99%    Breastfeeding No       Pain/Hyun Score: Pain Assessment Performed: Yes (1/17/2017  6:12 AM)  Presence of Pain: denies (1/17/2017  6:12 AM)  Pain Rating Prior to Med Admin: 0 (1/17/2017  6:35 AM)  Hyun Score: 10 (1/16/2017  4:29 PM)

## 2017-01-23 ENCOUNTER — TELEPHONE (OUTPATIENT)
Dept: ENDOSCOPY | Facility: HOSPITAL | Age: 67
End: 2017-01-23

## 2017-02-03 ENCOUNTER — OFFICE VISIT (OUTPATIENT)
Dept: SURGERY | Facility: CLINIC | Age: 67
End: 2017-02-03
Payer: MEDICARE

## 2017-02-03 VITALS
DIASTOLIC BLOOD PRESSURE: 73 MMHG | WEIGHT: 142.19 LBS | SYSTOLIC BLOOD PRESSURE: 143 MMHG | BODY MASS INDEX: 26.85 KG/M2 | HEIGHT: 61 IN | HEART RATE: 70 BPM

## 2017-02-03 DIAGNOSIS — Z09 POSTOP CHECK: Primary | ICD-10-CM

## 2017-02-03 PROCEDURE — 99999 PR PBB SHADOW E&M-EST. PATIENT-LVL III: CPT | Mod: PBBFAC,,, | Performed by: COLON & RECTAL SURGERY

## 2017-02-03 PROCEDURE — 99024 POSTOP FOLLOW-UP VISIT: CPT | Mod: S$GLB,,, | Performed by: COLON & RECTAL SURGERY

## 2017-02-07 NOTE — PROGRESS NOTES
"2 weeks s/p lap assisted sigmoid colectomy for diverticulosis - doing well - no complaints of abd pain, diarrhea or wound problems    Exam  Visit Vitals    BP (!) 143/73    Pulse 70    Ht 5' 1" (1.549 m)    Wt 64.5 kg (142 lb 3.2 oz)    LMP  (Within Years)    BMI 26.87 kg/m2     Wounds without infection.    A/P Advance diet and activity ad ricki.    F/u 4weeks if needed o/w prn.    "

## 2017-03-06 ENCOUNTER — LAB VISIT (OUTPATIENT)
Dept: LAB | Facility: HOSPITAL | Age: 67
End: 2017-03-06
Attending: FAMILY MEDICINE
Payer: MEDICARE

## 2017-03-06 ENCOUNTER — TELEPHONE (OUTPATIENT)
Dept: FAMILY MEDICINE | Facility: CLINIC | Age: 67
End: 2017-03-06

## 2017-03-06 DIAGNOSIS — R35.0 URINARY FREQUENCY: ICD-10-CM

## 2017-03-06 DIAGNOSIS — R35.0 URINARY FREQUENCY: Primary | ICD-10-CM

## 2017-03-06 LAB
BACTERIA #/AREA URNS AUTO: ABNORMAL /HPF
BILIRUB UR QL STRIP: ABNORMAL
CLARITY UR REFRACT.AUTO: ABNORMAL
COLOR UR AUTO: ABNORMAL
GLUCOSE UR QL STRIP: NEGATIVE
HGB UR QL STRIP: ABNORMAL
HYALINE CASTS UR QL AUTO: 0 /LPF
KETONES UR QL STRIP: NEGATIVE
LEUKOCYTE ESTERASE UR QL STRIP: ABNORMAL
MICROSCOPIC COMMENT: ABNORMAL
NITRITE UR QL STRIP: POSITIVE
PH UR STRIP: 7 [PH] (ref 5–8)
PROT UR QL STRIP: ABNORMAL
RBC #/AREA URNS AUTO: 34 /HPF (ref 0–4)
SP GR UR STRIP: 1.01 (ref 1–1.03)
SQUAMOUS #/AREA URNS AUTO: 1 /HPF
URN SPEC COLLECT METH UR: ABNORMAL
UROBILINOGEN UR STRIP-ACNC: 1 EU/DL
WBC #/AREA URNS AUTO: >100 /HPF (ref 0–5)
WBC CLUMPS UR QL AUTO: ABNORMAL

## 2017-03-06 PROCEDURE — 87077 CULTURE AEROBIC IDENTIFY: CPT

## 2017-03-06 PROCEDURE — 87088 URINE BACTERIA CULTURE: CPT

## 2017-03-06 PROCEDURE — 87186 SC STD MICRODIL/AGAR DIL: CPT

## 2017-03-06 PROCEDURE — 87086 URINE CULTURE/COLONY COUNT: CPT

## 2017-03-06 PROCEDURE — 81001 URINALYSIS AUTO W/SCOPE: CPT

## 2017-03-06 RX ORDER — NITROFURANTOIN (MACROCRYSTALS) 100 MG/1
100 CAPSULE ORAL EVERY 12 HOURS
Qty: 14 CAPSULE | Refills: 0 | Status: SHIPPED | OUTPATIENT
Start: 2017-03-06 | End: 2017-03-10 | Stop reason: ALTCHOICE

## 2017-03-06 NOTE — TELEPHONE ENCOUNTER
Patient denies low back pain, fever, or recent UTI. She is complaining of urinary frequency & pressure with urination. Patient is going to drop off a urine specimen at the [facility] today for urinalysis & culture. Please advise.

## 2017-03-06 NOTE — TELEPHONE ENCOUNTER
Ok -I will call in an antibiotic and we will be in touch with the results once the culture is back

## 2017-03-06 NOTE — TELEPHONE ENCOUNTER
----- Message from Cary Hedrick sent at 3/6/2017  2:19 PM CST -----  Contact: patient [phone]  Pt has a uti and is calling to ask if you will prescribe meds or if she needs to have a u/a done. Please call pt.

## 2017-03-09 ENCOUNTER — TELEPHONE (OUTPATIENT)
Dept: FAMILY MEDICINE | Facility: CLINIC | Age: 67
End: 2017-03-09

## 2017-03-09 LAB — BACTERIA UR CULT: NORMAL

## 2017-03-10 RX ORDER — SULFAMETHOXAZOLE AND TRIMETHOPRIM 800; 160 MG/1; MG/1
1 TABLET ORAL 2 TIMES DAILY
Qty: 20 TABLET | Refills: 0 | Status: SHIPPED | OUTPATIENT
Start: 2017-03-10 | End: 2017-03-20

## 2017-03-11 ENCOUNTER — PATIENT MESSAGE (OUTPATIENT)
Dept: FAMILY MEDICINE | Facility: CLINIC | Age: 67
End: 2017-03-11

## 2017-03-27 RX ORDER — LOSARTAN POTASSIUM 100 MG/1
TABLET ORAL
Qty: 90 TABLET | Refills: 1 | Status: SHIPPED | OUTPATIENT
Start: 2017-03-27 | End: 2017-08-21 | Stop reason: DRUGHIGH

## 2017-04-11 ENCOUNTER — PATIENT MESSAGE (OUTPATIENT)
Dept: RESEARCH | Facility: HOSPITAL | Age: 67
End: 2017-04-11

## 2017-05-11 ENCOUNTER — LAB VISIT (OUTPATIENT)
Dept: LAB | Facility: HOSPITAL | Age: 67
End: 2017-05-11
Attending: INTERNAL MEDICINE
Payer: MEDICARE

## 2017-05-11 DIAGNOSIS — I10 ESSENTIAL HYPERTENSION, MALIGNANT: Primary | ICD-10-CM

## 2017-05-11 DIAGNOSIS — E88.810 DYSMETABOLIC SYNDROME X: ICD-10-CM

## 2017-05-11 DIAGNOSIS — E78.2 MIXED HYPERLIPIDEMIA: ICD-10-CM

## 2017-05-11 DIAGNOSIS — E55.9 UNSPECIFIED VITAMIN D DEFICIENCY: ICD-10-CM

## 2017-05-11 LAB
25(OH)D3+25(OH)D2 SERPL-MCNC: 38 NG/ML
ALBUMIN SERPL BCP-MCNC: 4 G/DL
ALP SERPL-CCNC: 103 U/L
ALT SERPL W/O P-5'-P-CCNC: 18 U/L
ANION GAP SERPL CALC-SCNC: 9 MMOL/L
AST SERPL-CCNC: 17 U/L
BILIRUB SERPL-MCNC: 0.4 MG/DL
BUN SERPL-MCNC: 22 MG/DL
CALCIUM SERPL-MCNC: 9.8 MG/DL
CHLORIDE SERPL-SCNC: 107 MMOL/L
CHOLEST/HDLC SERPL: 3.3 {RATIO}
CO2 SERPL-SCNC: 25 MMOL/L
CREAT SERPL-MCNC: 0.9 MG/DL
EST. GFR  (AFRICAN AMERICAN): >60 ML/MIN/1.73 M^2
EST. GFR  (NON AFRICAN AMERICAN): >60 ML/MIN/1.73 M^2
ESTIMATED AVG GLUCOSE: 128 MG/DL
GLUCOSE SERPL-MCNC: 106 MG/DL
HBA1C MFR BLD HPLC: 6.1 %
HDL/CHOLESTEROL RATIO: 30.2 %
HDLC SERPL-MCNC: 162 MG/DL
HDLC SERPL-MCNC: 49 MG/DL
LDLC SERPL CALC-MCNC: 90 MG/DL
NONHDLC SERPL-MCNC: 113 MG/DL
POTASSIUM SERPL-SCNC: 4.5 MMOL/L
PROT SERPL-MCNC: 7.2 G/DL
SODIUM SERPL-SCNC: 141 MMOL/L
TRIGL SERPL-MCNC: 115 MG/DL
TSH SERPL DL<=0.005 MIU/L-ACNC: 2.07 UIU/ML

## 2017-05-11 PROCEDURE — 80053 COMPREHEN METABOLIC PANEL: CPT

## 2017-05-11 PROCEDURE — 83036 HEMOGLOBIN GLYCOSYLATED A1C: CPT

## 2017-05-11 PROCEDURE — 84443 ASSAY THYROID STIM HORMONE: CPT

## 2017-05-11 PROCEDURE — 82306 VITAMIN D 25 HYDROXY: CPT

## 2017-05-11 PROCEDURE — 80061 LIPID PANEL: CPT

## 2017-05-11 PROCEDURE — 36415 COLL VENOUS BLD VENIPUNCTURE: CPT | Mod: PO

## 2017-06-07 ENCOUNTER — PROCEDURE VISIT (OUTPATIENT)
Dept: DERMATOLOGY | Facility: CLINIC | Age: 67
End: 2017-06-07
Payer: MEDICARE

## 2017-06-07 DIAGNOSIS — C44.612 BCC (BASAL CELL CARCINOMA), SHOULDER, RIGHT: Primary | ICD-10-CM

## 2017-06-07 PROCEDURE — 12032 INTMD RPR S/A/T/EXT 2.6-7.5: CPT | Mod: S$GLB,,, | Performed by: DERMATOLOGY

## 2017-06-07 PROCEDURE — 11602 EXC TR-EXT MAL+MARG 1.1-2 CM: CPT | Mod: 51,S$GLB,, | Performed by: DERMATOLOGY

## 2017-06-07 PROCEDURE — 88305 TISSUE EXAM BY PATHOLOGIST: CPT | Performed by: PATHOLOGY

## 2017-06-07 PROCEDURE — 99499 UNLISTED E&M SERVICE: CPT | Mod: S$GLB,,, | Performed by: DERMATOLOGY

## 2017-06-07 NOTE — PROGRESS NOTES
PROCEDURE: Elliptical excision with intermediate layered repair in order to decrease dead space.    ANESTHETIC: 9.0 cc 1% Xylocaine with Epinephrine 1:100,000, buffered    SURGEON: Angle Mohr M.D.    ASSISTANTS: Rayna Morillo MA    PREOPERATIVE DIAGNOSIS:  Biopsy-proven Basal Cell Carcinoma    POSTOPERATIVE DIAGNOSIS:  Same as preoperative diagnosis    PATHOLOGIC DIAGNOSIS: Pending    LOCATION: right shoulder    INITIAL LESION SIZE: 1.0 cm    EXCISED DIAMETER: 1.8 cm    PREPARATION: The diagnosis, procedure, alternatives, benefits and risks, including but not limited to: infection, bleeding/bruising, drug reactions, pain, scar or cosmetic defect, local sensation disturbances, wound dehiscence (separation of wound edges after sutures removed) and/or recurrence of present condition were explained to the patient. The patient elected to proceed.  Patient's identity was verified using 2 patient identifiers and the side and site was verified.  Time out period with surgeon, assistant and patient in surgical suite was taken.    PROCEDURE: The location noted above was prepped, draped, and anesthetized in the usual sterile fashion per Felicity Sapp LPN. Lesional tissue was carefully marked with at least 4 mm margins of clinically normal skin in all directions. A fusiform elliptical excision was done with #15 blade carried down completely through the dermis into the deep subcutaneous tissues to the level of the non-muscle fascia, and dissection was carried out in that plane.  Electrocoagulation was used to obtain hemostasis. Blood loss was minimal. The wound was then approximated in a layered fashion with subcutaneous and intradermal sutures of 4.0 Monocryl, approximately 3 in number, and the wound was then superficially closed with simple interrupted sutures of 4.0 Prolene.    The patient tolerated the procedure well.    The area was cleaned and dressed appropriately and the patient was given wound care instructions,  as well as an appointment for follow-up evaluation.    LENGTH OF REPAIR: 5.0 cm

## 2017-06-13 ENCOUNTER — PATIENT MESSAGE (OUTPATIENT)
Dept: DERMATOLOGY | Facility: CLINIC | Age: 67
End: 2017-06-13

## 2017-07-11 ENCOUNTER — TELEPHONE (OUTPATIENT)
Dept: SURGERY | Facility: CLINIC | Age: 67
End: 2017-07-11

## 2017-07-11 NOTE — TELEPHONE ENCOUNTER
Returned call. States abdominal incision site from January 2017 has developed bruising and swelling. Denies pain, fever, nausea, vomiting. States umbilicus   Is no longer visible. I

## 2017-07-11 NOTE — TELEPHONE ENCOUNTER
----- Message from Sabina Jones sent at 7/11/2017  8:49 AM CDT -----  Contact: pt [phone]  Pt states that she has discoloration and swallow in the incision area. Please call

## 2017-07-28 ENCOUNTER — OFFICE VISIT (OUTPATIENT)
Dept: SURGERY | Facility: CLINIC | Age: 67
End: 2017-07-28
Payer: MEDICARE

## 2017-07-28 VITALS
HEART RATE: 75 BPM | HEIGHT: 61 IN | SYSTOLIC BLOOD PRESSURE: 140 MMHG | WEIGHT: 147.69 LBS | DIASTOLIC BLOOD PRESSURE: 73 MMHG | BODY MASS INDEX: 27.88 KG/M2

## 2017-07-28 DIAGNOSIS — K43.2 INCISIONAL HERNIA, WITHOUT OBSTRUCTION OR GANGRENE: Primary | ICD-10-CM

## 2017-07-28 DIAGNOSIS — K43.9 HERNIA OF ABDOMINAL WALL: Primary | ICD-10-CM

## 2017-07-28 PROCEDURE — 99999 PR PBB SHADOW E&M-EST. PATIENT-LVL III: CPT | Mod: PBBFAC,,, | Performed by: COLON & RECTAL SURGERY

## 2017-07-28 PROCEDURE — 1157F ADVNC CARE PLAN IN RCRD: CPT | Mod: S$GLB,,, | Performed by: COLON & RECTAL SURGERY

## 2017-07-28 PROCEDURE — 99212 OFFICE O/P EST SF 10 MIN: CPT | Mod: S$GLB,,, | Performed by: COLON & RECTAL SURGERY

## 2017-07-28 PROCEDURE — 1159F MED LIST DOCD IN RCRD: CPT | Mod: S$GLB,,, | Performed by: COLON & RECTAL SURGERY

## 2017-07-28 PROCEDURE — 1126F AMNT PAIN NOTED NONE PRSNT: CPT | Mod: S$GLB,,, | Performed by: COLON & RECTAL SURGERY

## 2017-07-29 NOTE — PROGRESS NOTES
Subjective:       Patient ID: Jolly Matias is a 67 y.o. female.    Chief Complaint: Follow-up    HPI   Jolly Matias is a pleasant 66F who presents s/p lap sigmoid colectomy for complicated diverticulitis on 1/17/17.  The patient recovered well post-operatively until approximately 1 month ago she noted periumbilical abdominal pain. The patient states that she was helping lift her elderly mother over the last several months and noted some initiation of the discomfort then. She denies f/c/n/v, generalized abdominal pain, obstructive symptoms or episodes of incarceration.       Review of Systems   Constitutional: Negative for fatigue, fever and unexpected weight change.   Respiratory: Negative for shortness of breath.    Cardiovascular: Negative for chest pain.   Gastrointestinal: Negative for abdominal pain, blood in stool, constipation, diarrhea, nausea and vomiting.       Objective:      Physical Exam   Constitutional: She is oriented to person, place, and time. She appears well-developed and well-nourished. No distress.   Eyes: Conjunctivae and EOM are normal.   Pulmonary/Chest: Effort normal. No respiratory distress.   Abdominal:   Soft, mild discomfort at periumbilical incision site, 2cm incisional hernia palpated, nd, no rebound or guarding   Genitourinary: Rectum normal.   Musculoskeletal: Normal range of motion.   Neurological: She is alert and oriented to person, place, and time.   Skin: Skin is warm and dry.   Psychiatric: She has a normal mood and affect. Her behavior is normal.       Assessment:   Incisional hernia  -patient desires to proceed with operative intervention given discomfort and possibility of worsening symptoms/increase in size of hernia    Plan:       -Referral placed to general surgery for incisional hernia repair    I have personally taken the history and examined this patient and agree with the resident's note as stated above.

## 2017-08-02 ENCOUNTER — OFFICE VISIT (OUTPATIENT)
Dept: SURGERY | Facility: CLINIC | Age: 67
End: 2017-08-02
Payer: MEDICARE

## 2017-08-02 VITALS
BODY MASS INDEX: 27.56 KG/M2 | TEMPERATURE: 98 F | SYSTOLIC BLOOD PRESSURE: 163 MMHG | HEIGHT: 61 IN | DIASTOLIC BLOOD PRESSURE: 84 MMHG | WEIGHT: 146 LBS | HEART RATE: 81 BPM

## 2017-08-02 DIAGNOSIS — Z01.818 PREOP EXAMINATION: ICD-10-CM

## 2017-08-02 DIAGNOSIS — I10 ESSENTIAL HYPERTENSION: ICD-10-CM

## 2017-08-02 DIAGNOSIS — K43.2 INCISIONAL HERNIA, WITHOUT OBSTRUCTION OR GANGRENE: Primary | ICD-10-CM

## 2017-08-02 PROCEDURE — 99999 PR PBB SHADOW E&M-EST. PATIENT-LVL III: CPT | Mod: PBBFAC,,, | Performed by: SURGERY

## 2017-08-02 PROCEDURE — 99214 OFFICE O/P EST MOD 30 MIN: CPT | Mod: S$GLB,,, | Performed by: SURGERY

## 2017-08-02 PROCEDURE — 1125F AMNT PAIN NOTED PAIN PRSNT: CPT | Mod: S$GLB,,, | Performed by: SURGERY

## 2017-08-02 PROCEDURE — 1159F MED LIST DOCD IN RCRD: CPT | Mod: S$GLB,,, | Performed by: SURGERY

## 2017-08-02 PROCEDURE — 1157F ADVNC CARE PLAN IN RCRD: CPT | Mod: S$GLB,,, | Performed by: SURGERY

## 2017-08-02 RX ORDER — LIDOCAINE HYDROCHLORIDE 10 MG/ML
1 INJECTION, SOLUTION EPIDURAL; INFILTRATION; INTRACAUDAL; PERINEURAL ONCE
Status: CANCELLED | OUTPATIENT
Start: 2017-08-02 | End: 2017-08-02

## 2017-08-02 NOTE — PROGRESS NOTES
"History & Physical    SUBJECTIVE:     History of Present Illness:  Jolly Matias is a pleasant 66F who presents s/p lap sigmoid colectomy for complicated diverticulitis on 1/17/17.  The patient recovered well post-operatively until approximately 1 month ago she noted periumbilical abdominal pain. The patient states that she was helping lift her elderly mother over the last several months and noted some initiation of the discomfort then. She denies f/c/n/v, generalized abdominal pain, obstructive symptoms or episodes of incarceration.  Interested in repair.      Chief Complaint   Patient presents with    Hernia     umbilical hernia       Review of patient's allergies indicates:   Allergen Reactions    Hydrocodone Nausea Only     Also makes pt "very sleepy"    Kiwi (actinidia chinensis)        Current Outpatient Prescriptions   Medication Sig Dispense Refill    albuterol (VENTOLIN HFA) 90 mcg/actuation inhaler Inhale 2 puffs into the lungs every 6 (six) hours as needed for Wheezing. 1 Inhaler 6    atorvastatin (LIPITOR) 20 MG tablet Take 1 tablet (20 mg total) by mouth once daily. (Patient taking differently: Take 20 mg by mouth every morning. ) 90 tablet 0    cyanocobalamin (VITAMIN B-12) 1000 MCG tablet Take 100 mcg by mouth once daily.      flaxseed oil 1,000 mg Cap Take 1 capsule by mouth once daily.        fluticasone (FLOVENT DISKUS) 50 mcg/actuation DsDv Inhale 1 Inch/kg/day into the lungs 2 (two) times daily. Rinse mouth after each use. 1 each 11    losartan (COZAAR) 100 MG tablet TAKE 1 TABLET BY MOUTH ONCE DAILY 90 tablet 1    losartan (COZAAR) 50 MG tablet Take 1 tablet (50 mg total) by mouth once daily. (Patient taking differently: Take 50 mg by mouth every morning. ) 90 tablet 2    metformin (GLUCOPHAGE) 500 MG tablet Take 1 tablet (500 mg total) by mouth 2 (two) times daily with meals. 180 tablet 2    oxycodone-acetaminophen (PERCOCET) 5-325 mg per tablet Take 1 tablet by mouth every 4 (four) " hours as needed. 90 tablet 0    sertraline (ZOLOFT) 50 MG tablet Take 1 tablet (50 mg total) by mouth once daily. (Patient taking differently: Take 50 mg by mouth every morning. ) 90 tablet 2    spironolactone (ALDACTONE) 25 MG tablet Take 1 tablet (25 mg total) by mouth once daily. (Patient taking differently: Take 25 mg by mouth every morning. ) 90 tablet 2    vitamin D 1000 units Tab Take 185 mg by mouth once daily.       No current facility-administered medications for this visit.        Past Medical History:   Diagnosis Date    Basal cell carcinoma 2011    left forehead    Basal cell carcinoma 2015    R temporal scalp, R upper forehead, L upper forehead    Basal cell carcinoma 2015    right mid ala    BCC (basal cell carcinoma) excised     right shoulder    Diabetes mellitus     Diverticulitis     HLD (hyperlipidemia)     Hypertension     Prediabetes 10/16/2013    Skin cancer      Past Surgical History:   Procedure Laterality Date    APPENDECTOMY      bilateral breast duct excision       SECTION      x2    COLONOSCOPY N/A 2017    Procedure: COLONOSCOPY;  Surgeon: IBRAHIMA Philip MD;  Location: [facility] ([address] [place] [address]);  Service: Endoscopy;  Laterality: N/A;    fulgaration of endometriosis x 2      HYSTERECTOMY      For endometriosis and DUB--age 42, ovaries in?    SKIN CANCER EXCISION      TONSILLECTOMY       Family History   Problem Relation Age of Onset    Skin cancer Father     Hypertension Father     Heart disease Father     Diabetes Father     Melanoma Father     Cancer Father     Skin cancer Mother     Hypertension Mother     Thyroid disease Mother     Dementia Mother     Hypertension Sister     Anxiety disorder Daughter     Breast cancer Maternal Aunt     Diabetes Maternal Uncle      Social History   Substance Use Topics    Smoking status: Never Smoker    Smokeless tobacco: Never Used    Alcohol use 0.0 oz/week     1 - 2 Glasses of wine per week  "     Comment: 3-4x /week        Review of Systems:  Review of Systems   Constitutional: Negative for activity change, appetite change, chills, diaphoresis, fatigue and fever.   HENT: Negative for congestion, postnasal drip, rhinorrhea, sinus pressure, sneezing, sore throat and tinnitus.    Eyes: Negative for photophobia, pain, discharge, redness, itching and visual disturbance.   Respiratory: Negative for apnea, cough, choking, chest tightness, shortness of breath, wheezing and stridor.    Cardiovascular: Negative for chest pain, palpitations and leg swelling.   Gastrointestinal: Positive for abdominal pain. Negative for abdominal distention, constipation, diarrhea, nausea and vomiting.   Musculoskeletal: Negative for arthralgias, back pain and joint swelling.   Skin: Negative for color change, pallor and rash.   Neurological: Negative for dizziness, facial asymmetry, light-headedness, numbness and headaches.   Hematological: Negative for adenopathy.   Psychiatric/Behavioral: Negative for agitation, behavioral problems, confusion and decreased concentration.       OBJECTIVE:     Vital Signs (Most Recent)  Temp: 98.3 °F (36.8 °C) (08/02/17 1517)  Pulse: 81 (08/02/17 1517)  BP: (!) 163/84 (08/02/17 1517)  5' 1" (1.549 m)  66.2 kg (146 lb)     Physical Exam:  Physical Exam   Constitutional: She is oriented to person, place, and time. She appears well-developed and well-nourished.   HENT:   Head: Normocephalic and atraumatic.   Right Ear: External ear normal.   Left Ear: External ear normal.   Eyes: Conjunctivae and EOM are normal. Right eye exhibits no discharge. Left eye exhibits no discharge. No scleral icterus.   Neck: Normal range of motion. Neck supple. No thyromegaly present.   Cardiovascular: Normal rate, regular rhythm and normal heart sounds.  Exam reveals no gallop and no friction rub.    No murmur heard.  Pulmonary/Chest: Effort normal and breath sounds normal. No respiratory distress. She has no wheezes. " She has no rales.   Abdominal: Soft. Bowel sounds are normal. She exhibits no distension and no mass. There is no tenderness. There is no rebound and no guarding. A hernia (small reducible incisional hernia at umbilicus.) is present.   Musculoskeletal: Normal range of motion. She exhibits no edema or tenderness.   Neurological: She is alert and oriented to person, place, and time.   Skin: Skin is warm and dry. No rash noted. No erythema. No pallor.   Psychiatric: She has a normal mood and affect. Her behavior is normal. Judgment and thought content normal.       ASSESSMENT/PLAN:     Incisional hernia    PLAN:Plan     To OR for robotic vs lap incisional hernia repair.  Consent obtained         Yg Haile MD

## 2017-08-02 NOTE — LETTER
August 2, 2017      Jorgito Juarez MD  [address] [facility] [address]           [facility] - General Surgery  [address]  [facility] [address]  Phone: [phone]          Patient: Jolly Matias   MR Number: [id number]   YOB: 1950   Date of Visit: 8/2/2017       Dear Dr. Jorgito Juarez:    Thank you for referring Jolly Matias to me for evaluation. Attached you will find relevant portions of my assessment and plan of care.    If you have questions, please do not hesitate to call me. I look forward to following Jolly Matias along with you.    Sincerely,    Yg Haile Jr., MD    Enclosure  CC:  No Recipients    If you would like to receive this communication electronically, please contact [email or web address] or [phone] to request more information on [employer] Link access.    For providers and/or their staff who would like to refer a patient to [facility], please contact us through our one-stop-shop provider referral line, [facility] , at [phone].    If you feel you have received this communication in error or would no longer like to receive these types of communications, please e-mail [email or web address]

## 2017-08-09 ENCOUNTER — HOSPITAL ENCOUNTER (OUTPATIENT)
Dept: CARDIOLOGY | Facility: CLINIC | Age: 67
Discharge: HOME | End: 2017-08-09
Payer: MEDICARE

## 2017-08-09 DIAGNOSIS — Z01.818 PREOP EXAMINATION: ICD-10-CM

## 2017-08-09 PROCEDURE — 93000 ELECTROCARDIOGRAM COMPLETE: CPT | Mod: S$GLB,,, | Performed by: INTERNAL MEDICINE

## 2017-08-21 ENCOUNTER — TELEPHONE (OUTPATIENT)
Dept: SURGERY | Facility: CLINIC | Age: 67
End: 2017-08-21

## 2017-08-21 ENCOUNTER — ANESTHESIA EVENT (OUTPATIENT)
Dept: SURGERY | Facility: HOSPITAL | Age: 67
DRG: 941 | End: 2017-08-21
Payer: MEDICARE

## 2017-08-21 NOTE — ANESTHESIA PREPROCEDURE EVALUATION
[facility]  Anesthesia Pre-Operative Evaluation         Patient Name: Jolly Matias  YOB: 1950  MRN: [id number]    SUBJECTIVE:     Pre-operative evaluation for Procedure(s) (LRB):  REPAIR-HERNIA-INCISIONAL-ROBOTIC-XI vs Lap (N/A)     08/21/2017    Jolly Matias is a 67 y.o. female w/ a significant PMHx of incisional hernia s/p lap sigmoid colectomy for complicated diverticulitis on 1/17/17, HLD, HTN (on arb and aldactone), DM II (not on insulin) and BCC who presents for the above procedure.      LDA: None documented.    Prev airway:   Present Prior to Hospital Arrival?: No; Placement Date: 01/17/17; Placement Time: 0713; Method of Intubation: Direct laryngoscopy; Inserted by: CRNA; Airway Device: Endotracheal Tube; Mask Ventilation: Easy; Intubated: Postinduction; Blade: [place]o #2; Airway Device Size: 7.0; Style: Cuffed; Cuff Inflation: Minimal occlusive pressure; Inflation Amount: 5; Placement Verified By: Auscultation, Capnometry; Grade: Grade II; Complicating Factors: Anterior larynx (SHORT EPIGLOTTIS); Intubation Findings: Positive EtCO2, Bilateral breath sounds, Atraumatic/Condition of teeth unchanged;  Depth of Insertion: 21; Securment: Lips; Complications: Soft tissue; Breath Sounds: Equal Bilateral; Insertion Attempts: 1; Removal Date: 01/17/17;  Removal Time: 1112    Drips: None documented.    Patient Active Problem List   Diagnosis    Prediabetes    HLD (hyperlipidemia)    Situational stress    Chronic bronchitis    Diverticulitis of large intestine    Anxiety    Abdominal pain    Bronchiectasis without complication    Pure hypercholesterolemia    Essential hypertension    Abnormal CT of the abdomen    Splenic artery aneurysm    Abdominal aortic atherosclerosis    History of basal cell carcinoma    Screening    Diverticulitis large intestine w/o perforation or abscess w/bleeding       Review of patient's allergies indicates:   Allergen Reactions     "Hydrocodone      Also makes pt "very sleepy"    Kiwi (actinidia chinensis) Swelling       Current Inpatient Medications:      No current facility-administered medications on file prior to encounter.      Current Outpatient Prescriptions on File Prior to Encounter   Medication Sig Dispense Refill    atorvastatin (LIPITOR) 20 MG tablet Take 1 tablet (20 mg total) by mouth once daily. 90 tablet 0    cyanocobalamin (VITAMIN B-12) 1000 MCG tablet Take 100 mcg by mouth once daily.      flaxseed oil 1,000 mg Cap Take 1 capsule by mouth once daily.        metformin (GLUCOPHAGE) 500 MG tablet Take 1 tablet (500 mg total) by mouth 2 (two) times daily with meals. 180 tablet 2    sertraline (ZOLOFT) 50 MG tablet Take 1 tablet (50 mg total) by mouth once daily. 90 tablet 2    spironolactone (ALDACTONE) 25 MG tablet Take 1 tablet (25 mg total) by mouth once daily. 90 tablet 2    vitamin D 1000 units Tab Take 185 mg by mouth once daily.      albuterol (VENTOLIN HFA) 90 mcg/actuation inhaler Inhale 2 puffs into the lungs every 6 (six) hours as needed for Wheezing. 1 Inhaler 6    losartan (COZAAR) 50 MG tablet Take 1 tablet (50 mg total) by mouth once daily. 90 tablet 2       Past Surgical History:   Procedure Laterality Date    APPENDECTOMY      bilateral breast duct excision       SECTION      x2    COLONOSCOPY N/A 2017    Procedure: COLONOSCOPY;  Surgeon: IBRAHIMA Philip MD;  Location: [address];  Service: Endoscopy;  Laterality: N/A;    fulgaration of endometriosis x 2      HYSTERECTOMY      For endometriosis and DUB--age 42, ovaries in?    SKIN CANCER EXCISION      TONSILLECTOMY         Social History     Social History    Marital status:      Spouse name: N/A    Number of children: N/A    Years of education: N/A     Occupational History    Not on file.     Social History Main Topics    Smoking status: Never Smoker    Smokeless tobacco: Never Used    Alcohol use 0.0 " oz/week     1 - 2 Glasses of wine per week      Comment: 3-4x /week    Drug use: No    Sexual activity: Yes     Partners: Male     Birth control/ protection: Post-menopausal     Other Topics Concern    Are You Pregnant Or Think You May Be? No    Breast-Feeding No     Social History Narrative    , looking after her mother, walking some       OBJECTIVE:     17 labs   CBC H/H   CMP wnl     Diagnostic Studies: No relevant studies.    EK17  Normal sinus rhythm  Normal ECG  When compared with ECG of 15-OCT-2015 13:30,  Minimal criteria for Anterior infarct are no longer Present  QT has shortened    2D ECHO:  No results found for this or any previous visit.      ASSESSMENT/PLAN:         Anesthesia Evaluation    I have reviewed the Patient Summary Reports.    I have reviewed the Nursing Notes.   I have reviewed the Medications.     Review of Systems  Anesthesia Hx:  History of prior surgery of interest to airway management or planning: Previous anesthesia: General 2017 with general anesthesia.  Procedure performed at an [facility] Facility. Airway issues documented on chart review include mask, easy, difficult direct laryngoscopy  Denies Family Hx of Anesthesia complications.   Denies Personal Hx of Anesthesia complications.   Hematology/Oncology:         -- Cancer in past history: Oncology Comments: Basal cell CA     Cardiovascular:   Hypertension PVD hyperlipidemia    Pulmonary:   bronchiectasis   Hepatic/GI:   Diverticulitis s/p sigmoidectomy 2017   Endocrine:   Diabetes    Psych:   anxiety          Physical Exam  General:  Well nourished    Airway/Jaw/Neck:  Airway Findings: Mouth Opening: Normal Tongue: Normal  General Airway Assessment: Adult  Mallampati: III  Improves to II with phonation.  TM Distance: Normal, at least 6 cm      Dental:  Dental Findings: In tact        Mental Status:  Mental Status Findings:  Alert and Oriented, Cooperative         Anesthesia Plan  Type of Anesthesia,  risks & benefits discussed:  Anesthesia Type:  general  Patient's Preference:   Intra-op Monitoring Plan: standard ASA monitors  Intra-op Monitoring Plan Comments:   Post Op Pain Control Plan: IV/PO Opioids PRN, multimodal analgesia and per primary service following discharge from PACU  Post Op Pain Control Plan Comments:   Induction:   IV  Beta Blocker:  Patient is not currently on a Beta-Blocker (No further documentation required).       Informed Consent: Patient understands risks and agrees with Anesthesia plan.  Questions answered. Anesthesia consent signed with patient.  ASA Score: 2     Day of Surgery Review of History & Physical: I have interviewed and examined the patient. I have reviewed the patient's H&P dated:  There are no significant changes.          Ready For Surgery From Anesthesia Perspective.

## 2017-08-22 ENCOUNTER — HOSPITAL ENCOUNTER (INPATIENT)
Facility: HOSPITAL | Age: 67
LOS: 2 days | Discharge: HOME | DRG: 941 | End: 2017-08-25
Attending: SURGERY | Admitting: SURGERY
Payer: MEDICARE

## 2017-08-22 ENCOUNTER — ANESTHESIA (OUTPATIENT)
Dept: SURGERY | Facility: HOSPITAL | Age: 67
DRG: 941 | End: 2017-08-22
Payer: MEDICARE

## 2017-08-22 ENCOUNTER — SURGERY (OUTPATIENT)
Age: 67
End: 2017-08-22

## 2017-08-22 DIAGNOSIS — K43.2 INCISIONAL HERNIA, WITHOUT OBSTRUCTION OR GANGRENE: ICD-10-CM

## 2017-08-22 LAB
POCT GLUCOSE: 134 MG/DL (ref 70–110)
POCT GLUCOSE: 144 MG/DL (ref 70–110)
POCT GLUCOSE: 146 MG/DL (ref 70–110)
POCT GLUCOSE: 193 MG/DL (ref 70–110)

## 2017-08-22 PROCEDURE — 27201423 OPTIME MED/SURG SUP & DEVICES STERILE SUPPLY: Performed by: SURGERY

## 2017-08-22 PROCEDURE — D9220A PRA ANESTHESIA: Mod: ,,, | Performed by: ANESTHESIOLOGY

## 2017-08-22 PROCEDURE — 63600175 PHARM REV CODE 636 W HCPCS

## 2017-08-22 PROCEDURE — 25000003 PHARM REV CODE 250: Performed by: ANESTHESIOLOGY

## 2017-08-22 PROCEDURE — 36000710: Performed by: SURGERY

## 2017-08-22 PROCEDURE — 71000033 HC RECOVERY, INTIAL HOUR: Performed by: SURGERY

## 2017-08-22 PROCEDURE — 11000001 HC ACUTE MED/SURG PRIVATE ROOM

## 2017-08-22 PROCEDURE — 63600175 PHARM REV CODE 636 W HCPCS: Performed by: ANESTHESIOLOGY

## 2017-08-22 PROCEDURE — 82962 GLUCOSE BLOOD TEST: CPT | Performed by: SURGERY

## 2017-08-22 PROCEDURE — 71000039 HC RECOVERY, EACH ADD'L HOUR: Performed by: SURGERY

## 2017-08-22 PROCEDURE — 25000003 PHARM REV CODE 250: Performed by: SURGERY

## 2017-08-22 PROCEDURE — 0WUF4JZ SUPPLEMENT ABDOMINAL WALL WITH SYNTHETIC SUBSTITUTE, PERCUTANEOUS ENDOSCOPIC APPROACH: ICD-10-PCS | Performed by: SURGERY

## 2017-08-22 PROCEDURE — 27000221 HC OXYGEN, UP TO 24 HOURS

## 2017-08-22 PROCEDURE — 8E0W4CZ ROBOTIC ASSISTED PROCEDURE OF TRUNK REGION, PERCUTANEOUS ENDOSCOPIC APPROACH: ICD-10-PCS | Performed by: SURGERY

## 2017-08-22 PROCEDURE — C1781 MESH (IMPLANTABLE): HCPCS | Performed by: SURGERY

## 2017-08-22 PROCEDURE — 37000009 HC ANESTHESIA EA ADD 15 MINS: Performed by: SURGERY

## 2017-08-22 PROCEDURE — 37000008 HC ANESTHESIA 1ST 15 MINUTES: Performed by: SURGERY

## 2017-08-22 PROCEDURE — 49654 PR LAP, INCISIONAL HERNIA REPAIR,REDUCIBLE: CPT | Mod: ,,, | Performed by: SURGERY

## 2017-08-22 PROCEDURE — 36000711: Performed by: SURGERY

## 2017-08-22 DEVICE — MESH VENTRALIGHT ST 4X6IN: Type: IMPLANTABLE DEVICE | Site: ABDOMEN | Status: FUNCTIONAL

## 2017-08-22 RX ORDER — MIDAZOLAM HYDROCHLORIDE 1 MG/ML
INJECTION, SOLUTION INTRAMUSCULAR; INTRAVENOUS
Status: DISCONTINUED | OUTPATIENT
Start: 2017-08-22 | End: 2017-08-22

## 2017-08-22 RX ORDER — LIDOCAINE HYDROCHLORIDE 10 MG/ML
1 INJECTION, SOLUTION EPIDURAL; INFILTRATION; INTRACAUDAL; PERINEURAL ONCE
Status: COMPLETED | OUTPATIENT
Start: 2017-08-22 | End: 2017-08-22

## 2017-08-22 RX ORDER — KETAMINE HYDROCHLORIDE 100 MG/ML
INJECTION, SOLUTION INTRAMUSCULAR; INTRAVENOUS
Status: DISCONTINUED | OUTPATIENT
Start: 2017-08-22 | End: 2017-08-22

## 2017-08-22 RX ORDER — LIDOCAINE HCL/PF 100 MG/5ML
SYRINGE (ML) INTRAVENOUS
Status: DISCONTINUED | OUTPATIENT
Start: 2017-08-22 | End: 2017-08-22

## 2017-08-22 RX ORDER — NALOXONE HCL 0.4 MG/ML
0.02 VIAL (ML) INJECTION
Status: DISCONTINUED | OUTPATIENT
Start: 2017-08-22 | End: 2017-08-24

## 2017-08-22 RX ORDER — GLUCAGON 1 MG
1 KIT INJECTION
Status: DISCONTINUED | OUTPATIENT
Start: 2017-08-22 | End: 2017-08-25 | Stop reason: HOSPADM

## 2017-08-22 RX ORDER — LIDOCAINE HYDROCHLORIDE 10 MG/ML
INJECTION, SOLUTION EPIDURAL; INFILTRATION; INTRACAUDAL; PERINEURAL
Status: DISCONTINUED | OUTPATIENT
Start: 2017-08-22 | End: 2017-08-22 | Stop reason: HOSPADM

## 2017-08-22 RX ORDER — ACETAMINOPHEN 325 MG/1
650 TABLET ORAL EVERY 8 HOURS PRN
Status: DISCONTINUED | OUTPATIENT
Start: 2017-08-22 | End: 2017-08-25 | Stop reason: HOSPADM

## 2017-08-22 RX ORDER — DIPHENHYDRAMINE HYDROCHLORIDE 50 MG/ML
25 INJECTION INTRAMUSCULAR; INTRAVENOUS EVERY 4 HOURS PRN
Status: DISCONTINUED | OUTPATIENT
Start: 2017-08-22 | End: 2017-08-25 | Stop reason: HOSPADM

## 2017-08-22 RX ORDER — GLYCOPYRROLATE 0.2 MG/ML
INJECTION INTRAMUSCULAR; INTRAVENOUS
Status: DISCONTINUED | OUTPATIENT
Start: 2017-08-22 | End: 2017-08-22

## 2017-08-22 RX ORDER — FENTANYL CITRATE 50 UG/ML
25 INJECTION, SOLUTION INTRAMUSCULAR; INTRAVENOUS EVERY 5 MIN PRN
Status: DISCONTINUED | OUTPATIENT
Start: 2017-08-22 | End: 2017-08-22

## 2017-08-22 RX ORDER — BUPIVACAINE HYDROCHLORIDE AND EPINEPHRINE 2.5; 5 MG/ML; UG/ML
INJECTION, SOLUTION EPIDURAL; INFILTRATION; INTRACAUDAL; PERINEURAL
Status: DISCONTINUED | OUTPATIENT
Start: 2017-08-22 | End: 2017-08-22 | Stop reason: HOSPADM

## 2017-08-22 RX ORDER — SODIUM CHLORIDE 9 MG/ML
INJECTION, SOLUTION INTRAVENOUS CONTINUOUS
Status: DISCONTINUED | OUTPATIENT
Start: 2017-08-22 | End: 2017-08-23

## 2017-08-22 RX ORDER — SODIUM CHLORIDE 0.9 % (FLUSH) 0.9 %
3 SYRINGE (ML) INJECTION EVERY 8 HOURS
Status: DISCONTINUED | OUTPATIENT
Start: 2017-08-22 | End: 2017-08-25 | Stop reason: HOSPADM

## 2017-08-22 RX ORDER — SERTRALINE HYDROCHLORIDE 50 MG/1
50 TABLET, FILM COATED ORAL DAILY
Status: DISCONTINUED | OUTPATIENT
Start: 2017-08-23 | End: 2017-08-25 | Stop reason: HOSPADM

## 2017-08-22 RX ORDER — ATORVASTATIN CALCIUM 20 MG/1
20 TABLET, FILM COATED ORAL DAILY
Status: DISCONTINUED | OUTPATIENT
Start: 2017-08-23 | End: 2017-08-25 | Stop reason: HOSPADM

## 2017-08-22 RX ORDER — ROCURONIUM BROMIDE 10 MG/ML
INJECTION, SOLUTION INTRAVENOUS
Status: DISCONTINUED | OUTPATIENT
Start: 2017-08-22 | End: 2017-08-22

## 2017-08-22 RX ORDER — ENOXAPARIN SODIUM 100 MG/ML
40 INJECTION SUBCUTANEOUS
Status: DISCONTINUED | OUTPATIENT
Start: 2017-08-23 | End: 2017-08-25 | Stop reason: HOSPADM

## 2017-08-22 RX ORDER — FENTANYL CITRATE 50 UG/ML
INJECTION, SOLUTION INTRAMUSCULAR; INTRAVENOUS
Status: DISCONTINUED | OUTPATIENT
Start: 2017-08-22 | End: 2017-08-22

## 2017-08-22 RX ORDER — ONDANSETRON 8 MG/1
8 TABLET, ORALLY DISINTEGRATING ORAL EVERY 8 HOURS PRN
Status: DISCONTINUED | OUTPATIENT
Start: 2017-08-22 | End: 2017-08-25 | Stop reason: HOSPADM

## 2017-08-22 RX ORDER — HYDROMORPHONE HCL IN 0.9% NACL 6 MG/30 ML
PATIENT CONTROLLED ANALGESIA SYRINGE INTRAVENOUS CONTINUOUS
Status: DISCONTINUED | OUTPATIENT
Start: 2017-08-22 | End: 2017-08-24

## 2017-08-22 RX ORDER — PROPOFOL 10 MG/ML
VIAL (ML) INTRAVENOUS
Status: DISCONTINUED | OUTPATIENT
Start: 2017-08-22 | End: 2017-08-22

## 2017-08-22 RX ORDER — BACITRACIN 50000 [IU]/1
INJECTION, POWDER, FOR SOLUTION INTRAMUSCULAR
Status: DISCONTINUED | OUTPATIENT
Start: 2017-08-22 | End: 2017-08-22 | Stop reason: HOSPADM

## 2017-08-22 RX ORDER — NEOSTIGMINE METHYLSULFATE 1 MG/ML
INJECTION, SOLUTION INTRAVENOUS
Status: DISCONTINUED | OUTPATIENT
Start: 2017-08-22 | End: 2017-08-22

## 2017-08-22 RX ORDER — LOSARTAN POTASSIUM 25 MG/1
50 TABLET ORAL DAILY
Status: DISCONTINUED | OUTPATIENT
Start: 2017-08-23 | End: 2017-08-25 | Stop reason: HOSPADM

## 2017-08-22 RX ORDER — INSULIN ASPART 100 [IU]/ML
0-5 INJECTION, SOLUTION INTRAVENOUS; SUBCUTANEOUS
Status: DISCONTINUED | OUTPATIENT
Start: 2017-08-22 | End: 2017-08-25 | Stop reason: HOSPADM

## 2017-08-22 RX ORDER — HYDROMORPHONE HCL IN 0.9% NACL 6 MG/30 ML
PATIENT CONTROLLED ANALGESIA SYRINGE INTRAVENOUS
Status: COMPLETED
Start: 2017-08-22 | End: 2017-08-22

## 2017-08-22 RX ORDER — SODIUM CHLORIDE 0.9 % (FLUSH) 0.9 %
3 SYRINGE (ML) INJECTION
Status: DISCONTINUED | OUTPATIENT
Start: 2017-08-22 | End: 2017-08-22

## 2017-08-22 RX ORDER — SODIUM CHLORIDE 9 MG/ML
INJECTION, SOLUTION INTRAVENOUS CONTINUOUS PRN
Status: DISCONTINUED | OUTPATIENT
Start: 2017-08-22 | End: 2017-08-22

## 2017-08-22 RX ORDER — IBUPROFEN 200 MG
16 TABLET ORAL
Status: DISCONTINUED | OUTPATIENT
Start: 2017-08-22 | End: 2017-08-25 | Stop reason: HOSPADM

## 2017-08-22 RX ORDER — IBUPROFEN 200 MG
24 TABLET ORAL
Status: DISCONTINUED | OUTPATIENT
Start: 2017-08-22 | End: 2017-08-25 | Stop reason: HOSPADM

## 2017-08-22 RX ORDER — DEXAMETHASONE SODIUM PHOSPHATE 4 MG/ML
INJECTION, SOLUTION INTRA-ARTICULAR; INTRALESIONAL; INTRAMUSCULAR; INTRAVENOUS; SOFT TISSUE
Status: DISCONTINUED | OUTPATIENT
Start: 2017-08-22 | End: 2017-08-22

## 2017-08-22 RX ORDER — ONDANSETRON 2 MG/ML
INJECTION INTRAMUSCULAR; INTRAVENOUS
Status: DISCONTINUED | OUTPATIENT
Start: 2017-08-22 | End: 2017-08-22

## 2017-08-22 RX ADMIN — BACITRACIN 50000 UNITS: 50000 INJECTION, POWDER, LYOPHILIZED, FOR SOLUTION INTRAMUSCULAR at 08:08

## 2017-08-22 RX ADMIN — EPHEDRINE SULFATE 10 MG: 50 INJECTION, SOLUTION INTRAMUSCULAR; INTRAVENOUS; SUBCUTANEOUS at 07:08

## 2017-08-22 RX ADMIN — ROCURONIUM BROMIDE 50 MG: 10 INJECTION, SOLUTION INTRAVENOUS at 07:08

## 2017-08-22 RX ADMIN — BUPIVACAINE HYDROCHLORIDE AND EPINEPHRINE BITARTRATE 20 ML: 2.5; .0091 INJECTION, SOLUTION EPIDURAL; INFILTRATION; INTRACAUDAL; PERINEURAL at 07:08

## 2017-08-22 RX ADMIN — SODIUM CHLORIDE, SODIUM GLUCONATE, SODIUM ACETATE, POTASSIUM CHLORIDE, MAGNESIUM CHLORIDE, SODIUM PHOSPHATE, DIBASIC, AND POTASSIUM PHOSPHATE: .53; .5; .37; .037; .03; .012; .00082 INJECTION, SOLUTION INTRAVENOUS at 08:08

## 2017-08-22 RX ADMIN — MIDAZOLAM HYDROCHLORIDE 2 MG: 1 INJECTION, SOLUTION INTRAMUSCULAR; INTRAVENOUS at 07:08

## 2017-08-22 RX ADMIN — KETAMINE HYDROCHLORIDE 20 MG: 100 INJECTION, SOLUTION, CONCENTRATE INTRAMUSCULAR; INTRAVENOUS at 08:08

## 2017-08-22 RX ADMIN — KETAMINE HYDROCHLORIDE 30 MG: 100 INJECTION, SOLUTION, CONCENTRATE INTRAMUSCULAR; INTRAVENOUS at 07:08

## 2017-08-22 RX ADMIN — SODIUM CHLORIDE: 0.9 INJECTION, SOLUTION INTRAVENOUS at 10:08

## 2017-08-22 RX ADMIN — FENTANYL CITRATE 100 MCG: 50 INJECTION, SOLUTION INTRAMUSCULAR; INTRAVENOUS at 07:08

## 2017-08-22 RX ADMIN — LIDOCAINE HYDROCHLORIDE 20 ML: 10 INJECTION, SOLUTION EPIDURAL; INFILTRATION; INTRACAUDAL; PERINEURAL at 07:08

## 2017-08-22 RX ADMIN — GLYCOPYRROLATE 0.6 MG: 0.2 INJECTION, SOLUTION INTRAMUSCULAR; INTRAVENOUS at 09:08

## 2017-08-22 RX ADMIN — DEXTROSE 2 G: 50 INJECTION, SOLUTION INTRAVENOUS at 07:08

## 2017-08-22 RX ADMIN — SODIUM CHLORIDE: 0.9 INJECTION, SOLUTION INTRAVENOUS at 06:08

## 2017-08-22 RX ADMIN — PROPOFOL 150 MG: 10 INJECTION, EMULSION INTRAVENOUS at 07:08

## 2017-08-22 RX ADMIN — ONDANSETRON 4 MG: 2 INJECTION INTRAMUSCULAR; INTRAVENOUS at 09:08

## 2017-08-22 RX ADMIN — Medication: at 10:08

## 2017-08-22 RX ADMIN — GLYCOPYRROLATE 0.6 MG: 0.2 INJECTION, SOLUTION INTRAMUSCULAR; INTRAVENOUS at 07:08

## 2017-08-22 RX ADMIN — LIDOCAINE HYDROCHLORIDE 10 MG: 10 INJECTION, SOLUTION EPIDURAL; INFILTRATION; INTRACAUDAL; PERINEURAL at 06:08

## 2017-08-22 RX ADMIN — KETAMINE HYDROCHLORIDE 10 MG: 100 INJECTION, SOLUTION, CONCENTRATE INTRAMUSCULAR; INTRAVENOUS at 08:08

## 2017-08-22 RX ADMIN — FENTANYL CITRATE 50 MCG: 50 INJECTION, SOLUTION INTRAMUSCULAR; INTRAVENOUS at 08:08

## 2017-08-22 RX ADMIN — LIDOCAINE HYDROCHLORIDE 80 MG: 20 INJECTION, SOLUTION INTRAVENOUS at 07:08

## 2017-08-22 RX ADMIN — DEXAMETHASONE SODIUM PHOSPHATE 8 MG: 4 INJECTION, SOLUTION INTRAMUSCULAR; INTRAVENOUS at 07:08

## 2017-08-22 RX ADMIN — PROPOFOL 50 MG: 10 INJECTION, EMULSION INTRAVENOUS at 08:08

## 2017-08-22 RX ADMIN — NEOSTIGMINE METHYLSULFATE 5 MG: 1 INJECTION INTRAVENOUS at 09:08

## 2017-08-22 RX ADMIN — FENTANYL CITRATE 50 MCG: 50 INJECTION, SOLUTION INTRAMUSCULAR; INTRAVENOUS at 07:08

## 2017-08-22 NOTE — INTERVAL H&P NOTE
The patient has been examined and the H&P has been reviewed:    I concur with the findings and no changes have occurred since H&P was written.    Anesthesia/Surgery risks, benefits and alternative options discussed and understood by patient/family.          Active Hospital Problems    Diagnosis  POA    Incisional hernia, without obstruction or gangrene [K43.2]  Yes      Resolved Hospital Problems    Diagnosis Date Resolved POA   No resolved problems to display.

## 2017-08-22 NOTE — OP NOTE
DATE OF PROCEDURE: 08/22/2017  SERVICE: General Surgery.   Surgeon(s) and Role:     * Cameron Perry MD - Resident - Assisting     * Yg Haile Jr., MD - Primary  PREOPERATIVE DIAGNOSIS: Incisional hernia  POSTOPERATIVE DIAGNOSIS: Same  PROCEDURE: Robotic repair of incisional hernia with mesh.   ANESTHESIA: General endotracheal and local.   DESCRIPTION OF PROCEDURE: The patient was taken to the Operating Room, placed under general anesthesia and prepped and draped in sterile fashion. At this   time, an incision was made in the left upper quadrant, midclavicular subcostal   after infiltrating with local anesthetic. This was 5 mm in nature. Using   Optiview trocar under direct visualization, intra-abdominal access was obtained   without difficulty and pneumoperitoneum was obtained. Further ports were then   placed including a left lower quadrant port 8mm port, a 8mm mid axillary port at the leel of the umbilicus and a right lower quadrant 12mm port under direct visualization after   infiltrating with local anesthetic. The original 5mm port was exchanged for an 8mm robotic port.  Once the ports were placed, we noticed   there were moderate adhesions to the anterior abdominal wall. We took these   down sharply where appropriate and bluntly where able to. We noticed the   incisional hernia present.  We docked the robot.  We then used electrocautery to take down the falciform ligament and to reduce the hernia. We continued to take down the falciform ligament to allow for 5 cm coverage of the hernia defect. We measured the hernia and it was approximately 4x2cm.  We then closed the defect with a 2-0 Stratafix permanent suture after reducing the pressure to 8mmHg.   We then proceeded with placement of a ~[id number] Ventralight ST mesh with Echo positioning device. This was rolled and placed into the abdominal cavity.  We made a small skin incision in the middle of the hernia defect after   infiltrating with local  anesthetic and using a suture passer to grasp the   catheter attached to the positioning device on the mesh and pulled this   anteriorly. We then insufflated the balloon on the mesh and then it spread   nicely over the anterior abdominal wall with the hernia centered on the mesh.   The mesh was then sewn into place with a 2-0 Stratafix suture in running fashion.  Once this was complete we removed the Echo positioning device through the 12 mm port in the right lower quadrant.  We inspected the mesh and it was in very good condition, nice and taut against the anterior abdominal wall with   circumferential coverage of the hernia defect. The 12 mm port was then removed   and using 0-Vicryl suture in figure of 8 fashion, we closed the fascia of this incision  and tied these down. The air was then evacuated and the ports were removed. The   skin of all 4 ports was closed with 4-0 Monocryl suture in subcuticular   fashion. Mastisol and Steri-Strips were placed. Mastisol and Steri-Strips were   also placed over the stab incision used for placement of the mesh. At this time, the patient was allowed to awake from general anesthesia and transferred to bed for transfer to Recovery and an abdominal binder was placed.   COMPLICATIONS: None.   SPONGE COUNT: Correct.   BLOOD LOSS: 15 mL.   FLUIDS: Per Anesthesia.   BLOOD GIVEN: None.   DRAINS: None.   SPECIMENS: None.   CONDITION OF PATIENT: Good.   I was present for the entire procedure.

## 2017-08-22 NOTE — H&P (VIEW-ONLY)
"History & Physical    SUBJECTIVE:     History of Present Illness:  Jolly Matias is a pleasant 66F who presents s/p lap sigmoid colectomy for complicated diverticulitis on 1/17/17.  The patient recovered well post-operatively until approximately 1 month ago she noted periumbilical abdominal pain. The patient states that she was helping lift her elderly mother over the last several months and noted some initiation of the discomfort then. She denies f/c/n/v, generalized abdominal pain, obstructive symptoms or episodes of incarceration.  Interested in repair.      Chief Complaint   Patient presents with    Hernia     umbilical hernia       Review of patient's allergies indicates:   Allergen Reactions    Hydrocodone Nausea Only     Also makes pt "very sleepy"    Kiwi (actinidia chinensis)        Current Outpatient Prescriptions   Medication Sig Dispense Refill    albuterol (VENTOLIN HFA) 90 mcg/actuation inhaler Inhale 2 puffs into the lungs every 6 (six) hours as needed for Wheezing. 1 Inhaler 6    atorvastatin (LIPITOR) 20 MG tablet Take 1 tablet (20 mg total) by mouth once daily. (Patient taking differently: Take 20 mg by mouth every morning. ) 90 tablet 0    cyanocobalamin (VITAMIN B-12) 1000 MCG tablet Take 100 mcg by mouth once daily.      flaxseed oil 1,000 mg Cap Take 1 capsule by mouth once daily.        fluticasone (FLOVENT DISKUS) 50 mcg/actuation DsDv Inhale 1 Inch/kg/day into the lungs 2 (two) times daily. Rinse mouth after each use. 1 each 11    losartan (COZAAR) 100 MG tablet TAKE 1 TABLET BY MOUTH ONCE DAILY 90 tablet 1    losartan (COZAAR) 50 MG tablet Take 1 tablet (50 mg total) by mouth once daily. (Patient taking differently: Take 50 mg by mouth every morning. ) 90 tablet 2    metformin (GLUCOPHAGE) 500 MG tablet Take 1 tablet (500 mg total) by mouth 2 (two) times daily with meals. 180 tablet 2    oxycodone-acetaminophen (PERCOCET) 5-325 mg per tablet Take 1 tablet by mouth every 4 (four) " hours as needed. 90 tablet 0    sertraline (ZOLOFT) 50 MG tablet Take 1 tablet (50 mg total) by mouth once daily. (Patient taking differently: Take 50 mg by mouth every morning. ) 90 tablet 2    spironolactone (ALDACTONE) 25 MG tablet Take 1 tablet (25 mg total) by mouth once daily. (Patient taking differently: Take 25 mg by mouth every morning. ) 90 tablet 2    vitamin D 1000 units Tab Take 185 mg by mouth once daily.       No current facility-administered medications for this visit.        Past Medical History:   Diagnosis Date    Basal cell carcinoma 2011    left forehead    Basal cell carcinoma 2015    R temporal scalp, R upper forehead, L upper forehead    Basal cell carcinoma 2015    right mid ala    BCC (basal cell carcinoma) excised     right shoulder    Diabetes mellitus     Diverticulitis     HLD (hyperlipidemia)     Hypertension     Prediabetes 10/16/2013    Skin cancer      Past Surgical History:   Procedure Laterality Date    APPENDECTOMY      bilateral breast duct excision       SECTION      x2    COLONOSCOPY N/A 2017    Procedure: COLONOSCOPY;  Surgeon: IBRAHIMA Philip MD;  Location: [facility] ([address] [place] [address]);  Service: Endoscopy;  Laterality: N/A;    fulgaration of endometriosis x 2      HYSTERECTOMY      For endometriosis and DUB--age 42, ovaries in?    SKIN CANCER EXCISION      TONSILLECTOMY       Family History   Problem Relation Age of Onset    Skin cancer Father     Hypertension Father     Heart disease Father     Diabetes Father     Melanoma Father     Cancer Father     Skin cancer Mother     Hypertension Mother     Thyroid disease Mother     Dementia Mother     Hypertension Sister     Anxiety disorder Daughter     Breast cancer Maternal Aunt     Diabetes Maternal Uncle      Social History   Substance Use Topics    Smoking status: Never Smoker    Smokeless tobacco: Never Used    Alcohol use 0.0 oz/week     1 - 2 Glasses of wine per week  "     Comment: 3-4x /week        Review of Systems:  Review of Systems   Constitutional: Negative for activity change, appetite change, chills, diaphoresis, fatigue and fever.   HENT: Negative for congestion, postnasal drip, rhinorrhea, sinus pressure, sneezing, sore throat and tinnitus.    Eyes: Negative for photophobia, pain, discharge, redness, itching and visual disturbance.   Respiratory: Negative for apnea, cough, choking, chest tightness, shortness of breath, wheezing and stridor.    Cardiovascular: Negative for chest pain, palpitations and leg swelling.   Gastrointestinal: Positive for abdominal pain. Negative for abdominal distention, constipation, diarrhea, nausea and vomiting.   Musculoskeletal: Negative for arthralgias, back pain and joint swelling.   Skin: Negative for color change, pallor and rash.   Neurological: Negative for dizziness, facial asymmetry, light-headedness, numbness and headaches.   Hematological: Negative for adenopathy.   Psychiatric/Behavioral: Negative for agitation, behavioral problems, confusion and decreased concentration.       OBJECTIVE:     Vital Signs (Most Recent)  Temp: 98.3 °F (36.8 °C) (08/02/17 1517)  Pulse: 81 (08/02/17 1517)  BP: (!) 163/84 (08/02/17 1517)  5' 1" (1.549 m)  66.2 kg (146 lb)     Physical Exam:  Physical Exam   Constitutional: She is oriented to person, place, and time. She appears well-developed and well-nourished.   HENT:   Head: Normocephalic and atraumatic.   Right Ear: External ear normal.   Left Ear: External ear normal.   Eyes: Conjunctivae and EOM are normal. Right eye exhibits no discharge. Left eye exhibits no discharge. No scleral icterus.   Neck: Normal range of motion. Neck supple. No thyromegaly present.   Cardiovascular: Normal rate, regular rhythm and normal heart sounds.  Exam reveals no gallop and no friction rub.    No murmur heard.  Pulmonary/Chest: Effort normal and breath sounds normal. No respiratory distress. She has no wheezes. " She has no rales.   Abdominal: Soft. Bowel sounds are normal. She exhibits no distension and no mass. There is no tenderness. There is no rebound and no guarding. A hernia (small reducible incisional hernia at umbilicus.) is present.   Musculoskeletal: Normal range of motion. She exhibits no edema or tenderness.   Neurological: She is alert and oriented to person, place, and time.   Skin: Skin is warm and dry. No rash noted. No erythema. No pallor.   Psychiatric: She has a normal mood and affect. Her behavior is normal. Judgment and thought content normal.       ASSESSMENT/PLAN:     Incisional hernia    PLAN:Plan     To OR for robotic vs lap incisional hernia repair.  Consent obtained         Yg Haile MD

## 2017-08-22 NOTE — BRIEF OP NOTE
[facility]  Brief Operative Note    SUMMARY     Surgery Date: 8/22/2017     Surgeon(s) and Role:     * Cameron Perry MD - Resident - Assisting     * Yg Haile Jr., MD - Primary        Pre-op Diagnosis:  Incisional hernia, without obstruction or gangrene [K43.2]    Post-op Diagnosis:  Post-Op Diagnosis Codes:     * Incisional hernia, without obstruction or gangrene [K43.2]    Procedure(s) (LRB):  REPAIR-HERNIA-INCISIONAL-ROBOTIC-XI (N/A)    Anesthesia: General    Description of Procedure: robotic incisional hernia repair with mesh      Estimated Blood Loss: * No values recorded between 8/22/2017  7:49 AM and 8/22/2017  9:56 AM *         Specimens:   Specimen (12h ago through future)    None

## 2017-08-22 NOTE — NURSING TRANSFER
Nursing Transfer Note      8/22/2017     Transfer To: 522    Transfer via bed    Transfer with PCA and IV    Transported by pct    Medicines sent: yes    Chart send with patient: Yes    Notified: spouse

## 2017-08-22 NOTE — ANESTHESIA POSTPROCEDURE EVALUATION
"Anesthesia Post Evaluation    Patient: Jolly Matias    Procedure(s) Performed: Procedure(s) (LRB):  REPAIR-HERNIA-INCISIONAL-ROBOTIC-XI (N/A)    Final Anesthesia Type: general  Patient location during evaluation: PACU  Patient participation: Yes- Able to Participate  Level of consciousness: awake and alert  Post-procedure vital signs: reviewed and stable  Pain management: adequate  Airway patency: patent  PONV status at discharge: No PONV  Anesthetic complications: no      Cardiovascular status: blood pressure returned to baseline  Respiratory status: unassisted and spontaneous ventilation  Hydration status: euvolemic  Follow-up not needed.        Visit Vitals  /64 (BP Location: Left arm, Patient Position: Lying)   Pulse 76   Temp 36.7 °C (98 °F) (Axillary)   Resp 16   Ht 5' 1" (1.549 m)   Wt 66.2 kg (146 lb)   LMP  (Within Years)   SpO2 97%   Breastfeeding? No   BMI 27.59 kg/m²       Pain/Hyun Score: Pain Assessment Performed: Yes (8/22/2017  1:00 PM)  Presence of Pain: denies (8/22/2017  1:00 PM)  Pain Rating Prior to Med Admin: 0 (8/22/2017 10:10 AM)  Hyun Score: 10 (8/22/2017  1:00 PM)      "

## 2017-08-22 NOTE — TRANSFER OF CARE
"Anesthesia Transfer of Care Note    Patient: Jolly Matias    Procedure(s) Performed: Procedure(s) (LRB):  REPAIR-HERNIA-INCISIONAL-ROBOTIC-XI (N/A)    Patient location: PACU    Anesthesia Type: general    Transport from OR: Transported from OR on 6-10 L/min O2 by face mask with adequate spontaneous ventilation    Post pain: adequate analgesia    Post assessment: no apparent anesthetic complications    Post vital signs: stable    Level of consciousness: responds to stimulation    Nausea/Vomiting: no nausea/vomiting    Complications: none    Transfer of care protocol was followed      Last vitals:   Visit Vitals  /66 (BP Location: Left arm, Patient Position: Lying)   Pulse 61   Temp 36.5 °C (97.7 °F) (Oral)   Resp 16   Ht 5' 1" (1.549 m)   Wt 66.2 kg (146 lb)   LMP  (Within Years)   SpO2 100%   Breastfeeding? No   BMI 27.59 kg/m²     "

## 2017-08-23 LAB
ANION GAP SERPL CALC-SCNC: 5 MMOL/L
BASOPHILS # BLD AUTO: 0 K/UL
BASOPHILS NFR BLD: 0 %
BUN SERPL-MCNC: 16 MG/DL
CALCIUM SERPL-MCNC: 8.6 MG/DL
CHLORIDE SERPL-SCNC: 110 MMOL/L
CO2 SERPL-SCNC: 24 MMOL/L
CREAT SERPL-MCNC: 0.8 MG/DL
DIFFERENTIAL METHOD: ABNORMAL
EOSINOPHIL # BLD AUTO: 0 K/UL
EOSINOPHIL NFR BLD: 0 %
ERYTHROCYTE [DISTWIDTH] IN BLOOD BY AUTOMATED COUNT: 13.9 %
EST. GFR  (AFRICAN AMERICAN): >60 ML/MIN/1.73 M^2
EST. GFR  (NON AFRICAN AMERICAN): >60 ML/MIN/1.73 M^2
GLUCOSE SERPL-MCNC: 132 MG/DL
HCT VFR BLD AUTO: 33 %
HGB BLD-MCNC: 11 G/DL
LYMPHOCYTES # BLD AUTO: 0.8 K/UL
LYMPHOCYTES NFR BLD: 7.2 %
MAGNESIUM SERPL-MCNC: 2 MG/DL
MCH RBC QN AUTO: 29.6 PG
MCHC RBC AUTO-ENTMCNC: 33.3 G/DL
MCV RBC AUTO: 89 FL
MONOCYTES # BLD AUTO: 0.7 K/UL
MONOCYTES NFR BLD: 6.7 %
NEUTROPHILS # BLD AUTO: 9.5 K/UL
NEUTROPHILS NFR BLD: 85.7 %
PHOSPHATE SERPL-MCNC: 3.8 MG/DL
PLATELET # BLD AUTO: 220 K/UL
PMV BLD AUTO: 9.7 FL
POCT GLUCOSE: 100 MG/DL (ref 70–110)
POCT GLUCOSE: 126 MG/DL (ref 70–110)
POTASSIUM SERPL-SCNC: 4.4 MMOL/L
RBC # BLD AUTO: 3.71 M/UL
SODIUM SERPL-SCNC: 139 MMOL/L
WBC # BLD AUTO: 11.03 K/UL

## 2017-08-23 PROCEDURE — 25000003 PHARM REV CODE 250: Performed by: SURGERY

## 2017-08-23 PROCEDURE — A4216 STERILE WATER/SALINE, 10 ML: HCPCS | Performed by: SURGERY

## 2017-08-23 PROCEDURE — 83735 ASSAY OF MAGNESIUM: CPT

## 2017-08-23 PROCEDURE — 36415 COLL VENOUS BLD VENIPUNCTURE: CPT

## 2017-08-23 PROCEDURE — 85025 COMPLETE CBC W/AUTO DIFF WBC: CPT

## 2017-08-23 PROCEDURE — 84100 ASSAY OF PHOSPHORUS: CPT

## 2017-08-23 PROCEDURE — 11000001 HC ACUTE MED/SURG PRIVATE ROOM

## 2017-08-23 PROCEDURE — 63600175 PHARM REV CODE 636 W HCPCS: Performed by: SURGERY

## 2017-08-23 PROCEDURE — 80048 BASIC METABOLIC PNL TOTAL CA: CPT

## 2017-08-23 RX ADMIN — SODIUM CHLORIDE, PRESERVATIVE FREE 3 ML: 5 INJECTION INTRAVENOUS at 08:08

## 2017-08-23 RX ADMIN — ONDANSETRON 8 MG: 8 TABLET, ORALLY DISINTEGRATING ORAL at 03:08

## 2017-08-23 RX ADMIN — ENOXAPARIN SODIUM 40 MG: 100 INJECTION SUBCUTANEOUS at 04:08

## 2017-08-23 RX ADMIN — SERTRALINE HYDROCHLORIDE 50 MG: 50 TABLET ORAL at 08:08

## 2017-08-23 RX ADMIN — SODIUM CHLORIDE, PRESERVATIVE FREE 3 ML: 5 INJECTION INTRAVENOUS at 01:08

## 2017-08-23 RX ADMIN — Medication: at 03:08

## 2017-08-23 RX ADMIN — LOSARTAN POTASSIUM 50 MG: 25 TABLET, FILM COATED ORAL at 08:08

## 2017-08-23 RX ADMIN — ATORVASTATIN CALCIUM 20 MG: 20 TABLET, FILM COATED ORAL at 08:08

## 2017-08-23 RX ADMIN — SPIRONOLACTONE 25 MG: 25 TABLET, FILM COATED ORAL at 08:08

## 2017-08-23 NOTE — PLAN OF CARE
Patient is a 67 year old female admitted from home and underwent Laparoscopic Incisional Hernia Repair with Mesh and YU 8/22/2017.  Patient is expected to discharge home with no needs +/- 8/24/2017.    Patient lives in a one story home with her , who will drive her home, care for her and obtain anything she needs after discharge.  Patient given [facility] with understanding verbalized.  Will continue to follow for needs.    PCP  Felicity Lee, DO  [address] [facility] [address] [facility] / [facility] [id number] [phone]      [facility] [employer] [address] [place] [address] [address] [place]  [address] AT [facility]  [address] [place] [address]  Phone: [phone] Fax: [phone]      Extended Emergency Contact Information  Primary Emergency Contact: Erasmo Matias  Address: [address]           [place] [address] [facility]  Home Phone: [phone]  Mobile Phone: [phone]  Relation: Spouse  Secondary Emergency Contact: Grace Matias   [facility]  Home Phone: [phone]  Mobile Phone: [phone]  Relation: Daughter       08/23/17 1139   Discharge Assessment   Assessment Type Discharge Planning Assessment   Confirmed/corrected address and phone number on facesheet? Yes   Assessment information obtained from? Patient   Expected Length of Stay (days) 3   Communicated expected length of stay with patient/caregiver yes   Prior to hospitilization cognitive status: Alert/Oriented   Prior to hospitalization functional status: Independent   Current cognitive status: Alert/Oriented   Current Functional Status: Independent   Lives With spouse   Able to Return to Prior Arrangements yes   Is patient able to care for self after discharge? Yes   Who are your caregiver(s) and their phone number(s)?    Patient's perception of discharge disposition home or selfcare   Equipment Currently Used at Home none   Do you have any problems affording any  of your prescribed medications? No   Is the patient taking medications as prescribed? yes   Does the patient have transportation home? Yes   Transportation Available family or friend will provide   Discharge Plan A Home with family   Discharge Plan B Home with family;Home Health   Patient/Family In Agreement With Plan yes

## 2017-08-23 NOTE — HOSPITAL COURSE
underwent robotic hernia repair on 8/22/17    This patient was admitted to the hospital following the above procedure. She tolerated this procedure with no apparent complications. She was transferred to the floor where she underwent a normal post operative recovery. She was advanced to a regular diet, and she tolerated this without any difficulties. Her pain was controlled with po medications, she was tolerating ambulation, and she had regular bowel and bladder function. She was discharged home in good condition, with follow up in 2 weeks.

## 2017-08-23 NOTE — ASSESSMENT & PLAN NOTE
Feels ok  Wean PCa today  continue sips of clears   likely advance diet for lunch  encourage out of bed and ambulation   encourage IS  DVT prophylaxis  GI prophylaxis

## 2017-08-23 NOTE — SUBJECTIVE & OBJECTIVE
"Interval History: some soreness, was able to ambulate, voiding well, no hungry    Medications:  Continuous Infusions:   sodium chloride 0.9% 125 mL/hr at 08/22/17 1009    hydromorphone in 0.9 % NaCl 6 mg/30 ml       Scheduled Meds:   atorvastatin  20 mg Oral Daily    enoxaparin  40 mg Subcutaneous Q24H    losartan  50 mg Oral Daily    sertraline  50 mg Oral Daily    sodium chloride 0.9%  3 mL Intravenous Q8H    spironolactone  25 mg Oral Daily     PRN Meds:acetaminophen, dextrose 50%, dextrose 50%, diphenhydrAMINE, glucagon (human recombinant), glucose, glucose, insulin aspart, naloxone, ondansetron, promethazine (PHENERGAN) IVPB     Review of patient's allergies indicates:   Allergen Reactions    Hydrocodone      Also makes pt "very sleepy"    Kiwi (actinidia chinensis) Swelling     Objective:     Vital Signs (Most Recent):  Temp: 96.3 °F (35.7 °C) (08/23/17 0800)  Pulse: 74 (08/23/17 0800)  Resp: 14 (08/23/17 0800)  BP: (!) 151/70 (08/23/17 0800)  SpO2: 97 % (08/23/17 0800) Vital Signs (24h Range):  Temp:  [96.3 °F (35.7 °C)-98.2 °F (36.8 °C)] 96.3 °F (35.7 °C)  Pulse:  [54-96] 74  Resp:  [12-18] 14  SpO2:  [91 %-100 %] 97 %  BP: (115-161)/(56-80) 151/70     Weight: 66.2 kg (146 lb)  Body mass index is 27.59 kg/m².    Intake/Output - Last 3 Shifts       08/21 0700 - 08/22 0659 08/22 0700 - 08/23 0659 08/23 0700 - 08/24 0659    P.O.  700     I.V. (mL/kg)  3265 (49.3)     Total Intake(mL/kg)  3965 (59.9)     Net   +3965             Urine Occurrence  4 x           Physical Exam   Constitutional: She is oriented to person, place, and time. She appears well-developed and well-nourished. No distress.   HENT:   Head: Normocephalic and atraumatic.   Eyes: Conjunctivae are normal. Right eye exhibits no discharge. Left eye exhibits no discharge. No scleral icterus.   Neck: Normal range of motion. Neck supple. No JVD present. No tracheal deviation present.   Cardiovascular: Normal rate and regular rhythm.  "   Pulmonary/Chest: Effort normal. No respiratory distress.   Abdominal: Soft. She exhibits no distension. There is tenderness (expected).   Incision c/d/i   Neurological: She is alert and oriented to person, place, and time.   Skin: Skin is warm and dry. No rash noted. She is not diaphoretic. No erythema.       Significant Labs:  CBC:   Recent Labs  Lab 08/23/17 0412   WBC 11.03   RBC 3.71*   HGB 11.0*   HCT 33.0*      MCV 89   MCH 29.6   MCHC 33.3     BMP:   Recent Labs  Lab 08/23/17 0412   *      K 4.4      CO2 24   BUN 16   CREATININE 0.8   CALCIUM 8.6*   MG 2.0       Significant Diagnostics:  none

## 2017-08-23 NOTE — PROGRESS NOTES
Patient Consulted by CTTS:     The following was discussed by the Tobacco Treatment Specialist:  ? Relevance of Quitting  ? Risk to Health  ? Long Term Risk  ? Risk for Others  ? Rewards of Quitting  ? Motivation Intervention to Quit          Pt has never smoked. No education needed.

## 2017-08-23 NOTE — PROGRESS NOTES
Pt transferred to room from pacu via stretcher. Pt voided per bedpan upon arrival. Spouse at bedside. PCA intact and infusing. IVF infusing. SCD's applied. Pt aware of CL diet and tray has been ordered for pt. ETCO2 on and patient on 2 liters O2 nasal cannula. Call light within reach. Will admit pt and resume care.

## 2017-08-23 NOTE — PROGRESS NOTES
"[facility]  General Surgery  Progress Note    Subjective:     History of Present Illness:  No notes on file    Post-Op Info:  Procedure(s) (LRB):  REPAIR-HERNIA-INCISIONAL-ROBOTIC-XI (N/A)   1 Day Post-Op     Interval History: some soreness, was able to ambulate, voiding well, no hungry    Medications:  Continuous Infusions:   sodium chloride 0.9% 125 mL/hr at 08/22/17 1009    hydromorphone in 0.9 % NaCl 6 mg/30 ml       Scheduled Meds:   atorvastatin  20 mg Oral Daily    enoxaparin  40 mg Subcutaneous Q24H    losartan  50 mg Oral Daily    sertraline  50 mg Oral Daily    sodium chloride 0.9%  3 mL Intravenous Q8H    spironolactone  25 mg Oral Daily     PRN Meds:acetaminophen, dextrose 50%, dextrose 50%, diphenhydrAMINE, glucagon (human recombinant), glucose, glucose, insulin aspart, naloxone, ondansetron, promethazine (PHENERGAN) IVPB     Review of patient's allergies indicates:   Allergen Reactions    Hydrocodone      Also makes pt "very sleepy"    Kiwi (actinidia chinensis) Swelling     Objective:     Vital Signs (Most Recent):  Temp: 96.3 °F (35.7 °C) (08/23/17 0800)  Pulse: 74 (08/23/17 0800)  Resp: 14 (08/23/17 0800)  BP: (!) 151/70 (08/23/17 0800)  SpO2: 97 % (08/23/17 0800) Vital Signs (24h Range):  Temp:  [96.3 °F (35.7 °C)-98.2 °F (36.8 °C)] 96.3 °F (35.7 °C)  Pulse:  [54-96] 74  Resp:  [12-18] 14  SpO2:  [91 %-100 %] 97 %  BP: (115-161)/(56-80) 151/70     Weight: 66.2 kg (146 lb)  Body mass index is 27.59 kg/m².    Intake/Output - Last 3 Shifts       08/21 0700 - 08/22 0659 08/22 0700 - 08/23 0659 08/23 0700 - 08/24 0659    P.O.  700     I.V. (mL/kg)  3265 (49.3)     Total Intake(mL/kg)  3965 (59.9)     Net   +3965             Urine Occurrence  4 x           Physical Exam   Constitutional: She is oriented to person, place, and time. She appears well-developed and well-nourished. No distress.   HENT:   Head: Normocephalic and atraumatic.   Eyes: Conjunctivae are normal. Right " eye exhibits no discharge. Left eye exhibits no discharge. No scleral icterus.   Neck: Normal range of motion. Neck supple. No JVD present. No tracheal deviation present.   Cardiovascular: Normal rate and regular rhythm.    Pulmonary/Chest: Effort normal. No respiratory distress.   Abdominal: Soft. She exhibits no distension. There is tenderness (expected).   Incision c/d/i   Neurological: She is alert and oriented to person, place, and time.   Skin: Skin is warm and dry. No rash noted. She is not diaphoretic. No erythema.       Significant Labs:  CBC:   Recent Labs  Lab 08/23/17 0412   WBC 11.03   RBC 3.71*   HGB 11.0*   HCT 33.0*      MCV 89   MCH 29.6   MCHC 33.3     BMP:   Recent Labs  Lab 08/23/17 0412   *      K 4.4      CO2 24   BUN 16   CREATININE 0.8   CALCIUM 8.6*   MG 2.0       Significant Diagnostics:  none    Assessment/Plan:     Incisional hernia, without obstruction or gangrene    Feels ok  Wean PCa today  continue sips of clears   likely advance diet for lunch  encourage out of bed and ambulation   encourage IS  DVT prophylaxis  GI prophylaxis              Cameron Perry MD  General Surgery  [facility]

## 2017-08-24 LAB
ANION GAP SERPL CALC-SCNC: 5 MMOL/L
BASOPHILS # BLD AUTO: 0.03 K/UL
BASOPHILS NFR BLD: 0.4 %
BUN SERPL-MCNC: 9 MG/DL
CALCIUM SERPL-MCNC: 9 MG/DL
CHLORIDE SERPL-SCNC: 108 MMOL/L
CO2 SERPL-SCNC: 27 MMOL/L
CREAT SERPL-MCNC: 0.7 MG/DL
DIFFERENTIAL METHOD: ABNORMAL
EOSINOPHIL # BLD AUTO: 0.1 K/UL
EOSINOPHIL NFR BLD: 1.5 %
ERYTHROCYTE [DISTWIDTH] IN BLOOD BY AUTOMATED COUNT: 13.6 %
EST. GFR  (AFRICAN AMERICAN): >60 ML/MIN/1.73 M^2
EST. GFR  (NON AFRICAN AMERICAN): >60 ML/MIN/1.73 M^2
GLUCOSE SERPL-MCNC: 132 MG/DL
HCT VFR BLD AUTO: 33.4 %
HGB BLD-MCNC: 11.1 G/DL
LYMPHOCYTES # BLD AUTO: 0.3 K/UL
LYMPHOCYTES NFR BLD: 4.5 %
MAGNESIUM SERPL-MCNC: 1.7 MG/DL
MCH RBC QN AUTO: 30.4 PG
MCHC RBC AUTO-ENTMCNC: 33.2 G/DL
MCV RBC AUTO: 92 FL
MONOCYTES # BLD AUTO: 0.6 K/UL
MONOCYTES NFR BLD: 7.6 %
NEUTROPHILS # BLD AUTO: 6.3 K/UL
NEUTROPHILS NFR BLD: 85.6 %
PHOSPHATE SERPL-MCNC: 1.9 MG/DL
PLATELET # BLD AUTO: 199 K/UL
PMV BLD AUTO: 9.1 FL
POCT GLUCOSE: 112 MG/DL (ref 70–110)
POCT GLUCOSE: 126 MG/DL (ref 70–110)
POTASSIUM SERPL-SCNC: 3.9 MMOL/L
RBC # BLD AUTO: 3.65 M/UL
SODIUM SERPL-SCNC: 140 MMOL/L
WBC # BLD AUTO: 7.35 K/UL

## 2017-08-24 PROCEDURE — 83735 ASSAY OF MAGNESIUM: CPT

## 2017-08-24 PROCEDURE — 80048 BASIC METABOLIC PNL TOTAL CA: CPT

## 2017-08-24 PROCEDURE — 63600175 PHARM REV CODE 636 W HCPCS: Performed by: SURGERY

## 2017-08-24 PROCEDURE — 85025 COMPLETE CBC W/AUTO DIFF WBC: CPT

## 2017-08-24 PROCEDURE — 36415 COLL VENOUS BLD VENIPUNCTURE: CPT

## 2017-08-24 PROCEDURE — 25000003 PHARM REV CODE 250: Performed by: SURGERY

## 2017-08-24 PROCEDURE — 25000003 PHARM REV CODE 250: Performed by: STUDENT IN AN ORGANIZED HEALTH CARE EDUCATION/TRAINING PROGRAM

## 2017-08-24 PROCEDURE — A4216 STERILE WATER/SALINE, 10 ML: HCPCS | Performed by: SURGERY

## 2017-08-24 PROCEDURE — 84100 ASSAY OF PHOSPHORUS: CPT

## 2017-08-24 PROCEDURE — 11000001 HC ACUTE MED/SURG PRIVATE ROOM

## 2017-08-24 RX ORDER — HYDRALAZINE HYDROCHLORIDE 10 MG/1
10 TABLET, FILM COATED ORAL ONCE
Status: COMPLETED | OUTPATIENT
Start: 2017-08-24 | End: 2017-08-24

## 2017-08-24 RX ORDER — OXYCODONE AND ACETAMINOPHEN 5; 325 MG/1; MG/1
1 TABLET ORAL EVERY 4 HOURS PRN
Status: DISCONTINUED | OUTPATIENT
Start: 2017-08-24 | End: 2017-08-25 | Stop reason: HOSPADM

## 2017-08-24 RX ADMIN — OXYCODONE HYDROCHLORIDE AND ACETAMINOPHEN 1 TABLET: 5; 325 TABLET ORAL at 11:08

## 2017-08-24 RX ADMIN — SERTRALINE HYDROCHLORIDE 50 MG: 50 TABLET ORAL at 09:08

## 2017-08-24 RX ADMIN — LOSARTAN POTASSIUM 50 MG: 25 TABLET, FILM COATED ORAL at 09:08

## 2017-08-24 RX ADMIN — OXYCODONE HYDROCHLORIDE AND ACETAMINOPHEN 1 TABLET: 5; 325 TABLET ORAL at 03:08

## 2017-08-24 RX ADMIN — SPIRONOLACTONE 25 MG: 25 TABLET, FILM COATED ORAL at 09:08

## 2017-08-24 RX ADMIN — SODIUM CHLORIDE, PRESERVATIVE FREE 3 ML: 5 INJECTION INTRAVENOUS at 10:08

## 2017-08-24 RX ADMIN — ATORVASTATIN CALCIUM 20 MG: 20 TABLET, FILM COATED ORAL at 09:08

## 2017-08-24 RX ADMIN — OXYCODONE HYDROCHLORIDE AND ACETAMINOPHEN 1 TABLET: 5; 325 TABLET ORAL at 07:08

## 2017-08-24 RX ADMIN — HYDRALAZINE HYDROCHLORIDE 10 MG: 10 TABLET, FILM COATED ORAL at 02:08

## 2017-08-24 RX ADMIN — SODIUM CHLORIDE, PRESERVATIVE FREE 3 ML: 5 INJECTION INTRAVENOUS at 03:08

## 2017-08-24 RX ADMIN — ENOXAPARIN SODIUM 40 MG: 100 INJECTION SUBCUTANEOUS at 05:08

## 2017-08-24 NOTE — PLAN OF CARE
Actual visit time was 12:40 PM/Visited patient. AAOX4. Discharge to home without needs has changed to tomorrow pending pain/nausea control.

## 2017-08-24 NOTE — PROGRESS NOTES
"[facility]  General Surgery  Progress Note    Subjective:     History of Present Illness:  No notes on file    Post-Op Info:  Procedure(s) (LRB):  REPAIR-HERNIA-INCISIONAL-ROBOTIC-XI (N/A)   2 Days Post-Op     Interval History: No acute events overnight. One episode of emesis yesterday evening. Pain well controlled with PCA.    Medications:  Continuous Infusions:   hydromorphone in 0.9 % NaCl 6 mg/30 ml       Scheduled Meds:   atorvastatin  20 mg Oral Daily    enoxaparin  40 mg Subcutaneous Q24H    losartan  50 mg Oral Daily    sertraline  50 mg Oral Daily    sodium chloride 0.9%  3 mL Intravenous Q8H    spironolactone  25 mg Oral Daily     PRN Meds:acetaminophen, dextrose 50%, dextrose 50%, diphenhydrAMINE, glucagon (human recombinant), glucose, glucose, insulin aspart, naloxone, ondansetron, promethazine (PHENERGAN) IVPB     Review of patient's allergies indicates:   Allergen Reactions    Hydrocodone      Also makes pt "very sleepy"    Kiwi (actinidia chinensis) Swelling     Objective:     Vital Signs (Most Recent):  Temp: 98.6 °F (37 °C) (08/24/17 0341)  Pulse: 81 (08/24/17 0341)  Resp: 16 (08/24/17 0341)  BP: (!) 157/73 (08/24/17 0341)  SpO2: 98 % (08/24/17 0341) Vital Signs (24h Range):  Temp:  [97.6 °F (36.4 °C)-98.6 °F (37 °C)] 98.6 °F (37 °C)  Pulse:  [79-86] 81  Resp:  [16-18] 16  SpO2:  [94 %-98 %] 98 %  BP: (137-162)/(66-76) 157/73     Weight: 66.2 kg (146 lb)  Body mass index is 27.59 kg/m².    Intake/Output - Last 3 Shifts       08/22 0700 - 08/23 0659 08/23 0700 - 08/24 0659 08/24 0700 - 08/25 0659    P.O. 700      I.V. (mL/kg) 3265 (49.3)      Total Intake(mL/kg) 3965 (59.9)      Net +3965               Urine Occurrence 4 x 3 x     Stool Occurrence  0 x     Emesis Occurrence  1 x           Physical Exam   Constitutional: She is oriented to person, place, and time. She appears well-developed and well-nourished. No distress.   HENT:   Head: Normocephalic and atraumatic. "   Eyes: Conjunctivae are normal. Right eye exhibits no discharge. Left eye exhibits no discharge. No scleral icterus.   Neck: Normal range of motion. Neck supple. No JVD present. No tracheal deviation present.   Cardiovascular: Normal rate and regular rhythm.    Pulmonary/Chest: Effort normal. No respiratory distress.   Abdominal: Soft. She exhibits no distension. There is tenderness (expected).   Incision c/d/i   Neurological: She is alert and oriented to person, place, and time.   Skin: Skin is warm and dry. No rash noted. She is not diaphoretic. No erythema.       Significant Labs:  CBC:   Recent Labs  Lab 08/23/17 0412   WBC 11.03   RBC 3.71*   HGB 11.0*   HCT 33.0*      MCV 89   MCH 29.6   MCHC 33.3     BMP:   Recent Labs  Lab 08/23/17 0412   *      K 4.4      CO2 24   BUN 16   CREATININE 0.8   CALCIUM 8.6*   MG 2.0     Assessment/Plan:     Incisional hernia, without obstruction or gangrene    Feels ok  Wean PCa today  Continue clears for breakfast, if tolerates will advance for lunch  encourage out of bed and ambulation   encourage IS  DVT prophylaxis  GI prophylaxis              Theodora Saldivar MD  General Surgery  [facility]

## 2017-08-24 NOTE — SUBJECTIVE & OBJECTIVE
"Interval History: No acute events overnight. One episode of emesis yesterday evening. Pain well controlled with PCA.    Medications:  Continuous Infusions:   hydromorphone in 0.9 % NaCl 6 mg/30 ml       Scheduled Meds:   atorvastatin  20 mg Oral Daily    enoxaparin  40 mg Subcutaneous Q24H    losartan  50 mg Oral Daily    sertraline  50 mg Oral Daily    sodium chloride 0.9%  3 mL Intravenous Q8H    spironolactone  25 mg Oral Daily     PRN Meds:acetaminophen, dextrose 50%, dextrose 50%, diphenhydrAMINE, glucagon (human recombinant), glucose, glucose, insulin aspart, naloxone, ondansetron, promethazine (PHENERGAN) IVPB     Review of patient's allergies indicates:   Allergen Reactions    Hydrocodone      Also makes pt "very sleepy"    Kiwi (actinidia chinensis) Swelling     Objective:     Vital Signs (Most Recent):  Temp: 98.6 °F (37 °C) (08/24/17 0341)  Pulse: 81 (08/24/17 0341)  Resp: 16 (08/24/17 0341)  BP: (!) 157/73 (08/24/17 0341)  SpO2: 98 % (08/24/17 0341) Vital Signs (24h Range):  Temp:  [97.6 °F (36.4 °C)-98.6 °F (37 °C)] 98.6 °F (37 °C)  Pulse:  [79-86] 81  Resp:  [16-18] 16  SpO2:  [94 %-98 %] 98 %  BP: (137-162)/(66-76) 157/73     Weight: 66.2 kg (146 lb)  Body mass index is 27.59 kg/m².    Intake/Output - Last 3 Shifts       08/22 0700 - 08/23 0659 08/23 0700 - 08/24 0659 08/24 0700 - 08/25 0659    P.O. 700      I.V. (mL/kg) 3265 (49.3)      Total Intake(mL/kg) 3965 (59.9)      Net +3965               Urine Occurrence 4 x 3 x     Stool Occurrence  0 x     Emesis Occurrence  1 x           Physical Exam   Constitutional: She is oriented to person, place, and time. She appears well-developed and well-nourished. No distress.   HENT:   Head: Normocephalic and atraumatic.   Eyes: Conjunctivae are normal. Right eye exhibits no discharge. Left eye exhibits no discharge. No scleral icterus.   Neck: Normal range of motion. Neck supple. No JVD present. No tracheal deviation present.   Cardiovascular: Normal " rate and regular rhythm.    Pulmonary/Chest: Effort normal. No respiratory distress.   Abdominal: Soft. She exhibits no distension. There is tenderness (expected).   Incision c/d/i   Neurological: She is alert and oriented to person, place, and time.   Skin: Skin is warm and dry. No rash noted. She is not diaphoretic. No erythema.       Significant Labs:  CBC:   Recent Labs  Lab 08/23/17 0412   WBC 11.03   RBC 3.71*   HGB 11.0*   HCT 33.0*      MCV 89   MCH 29.6   MCHC 33.3     BMP:   Recent Labs  Lab 08/23/17 0412   *      K 4.4      CO2 24   BUN 16   CREATININE 0.8   CALCIUM 8.6*   MG 2.0

## 2017-08-24 NOTE — PROGRESS NOTES
Dr. FARAZ Macdonald notified that pt BP is 175/80 with heart rate of 89. Pt tolerated 25% of breakfast and is working on lunch with no complaints. Patient reports belching and denies N & V at this time. She verbalized understanding and stated that she will enter so orders.

## 2017-08-24 NOTE — ASSESSMENT & PLAN NOTE
Feels ok  Wean PCa today  Continue clears for breakfast, if tolerates will advance for lunch  encourage out of bed and ambulation   encourage IS  DVT prophylaxis  GI prophylaxis

## 2017-08-24 NOTE — PROGRESS NOTES
Paged Dr. Saldivar to notify her that the patient is requesting a regular diet. She verbalized understanding and said it was okay to order a regular diet.

## 2017-08-25 VITALS
RESPIRATION RATE: 16 BRPM | OXYGEN SATURATION: 100 % | WEIGHT: 146 LBS | HEIGHT: 61 IN | HEART RATE: 89 BPM | SYSTOLIC BLOOD PRESSURE: 141 MMHG | TEMPERATURE: 97 F | DIASTOLIC BLOOD PRESSURE: 64 MMHG | BODY MASS INDEX: 27.56 KG/M2

## 2017-08-25 LAB — POCT GLUCOSE: 93 MG/DL (ref 70–110)

## 2017-08-25 PROCEDURE — 25000003 PHARM REV CODE 250: Performed by: SURGERY

## 2017-08-25 PROCEDURE — 25000003 PHARM REV CODE 250: Performed by: STUDENT IN AN ORGANIZED HEALTH CARE EDUCATION/TRAINING PROGRAM

## 2017-08-25 RX ORDER — SIMETHICONE 80 MG
1 TABLET,CHEWABLE ORAL 3 TIMES DAILY PRN
Status: DISCONTINUED | OUTPATIENT
Start: 2017-08-25 | End: 2017-08-25 | Stop reason: HOSPADM

## 2017-08-25 RX ORDER — OXYCODONE AND ACETAMINOPHEN 5; 325 MG/1; MG/1
1 TABLET ORAL EVERY 4 HOURS PRN
Qty: 31 TABLET | Refills: 0 | Status: SHIPPED | OUTPATIENT
Start: 2017-08-25 | End: 2018-01-22

## 2017-08-25 RX ADMIN — OXYCODONE HYDROCHLORIDE AND ACETAMINOPHEN 1 TABLET: 5; 325 TABLET ORAL at 04:08

## 2017-08-25 RX ADMIN — SIMETHICONE CHEW TAB 80 MG 80 MG: 80 TABLET ORAL at 12:08

## 2017-08-25 RX ADMIN — LOSARTAN POTASSIUM 50 MG: 25 TABLET, FILM COATED ORAL at 08:08

## 2017-08-25 RX ADMIN — SPIRONOLACTONE 25 MG: 25 TABLET, FILM COATED ORAL at 08:08

## 2017-08-25 RX ADMIN — ATORVASTATIN CALCIUM 20 MG: 20 TABLET, FILM COATED ORAL at 08:08

## 2017-08-25 RX ADMIN — OXYCODONE HYDROCHLORIDE AND ACETAMINOPHEN 1 TABLET: 5; 325 TABLET ORAL at 08:08

## 2017-08-25 RX ADMIN — SERTRALINE HYDROCHLORIDE 50 MG: 50 TABLET ORAL at 08:08

## 2017-08-25 RX ADMIN — OXYCODONE HYDROCHLORIDE AND ACETAMINOPHEN 1 TABLET: 5; 325 TABLET ORAL at 12:08

## 2017-08-25 NOTE — SUBJECTIVE & OBJECTIVE
"Interval History: no pain, no nausea, tolerating diet, did ambulate, passing flatus    Medications:  Continuous Infusions:   Scheduled Meds:   atorvastatin  20 mg Oral Daily    enoxaparin  40 mg Subcutaneous Q24H    losartan  50 mg Oral Daily    sertraline  50 mg Oral Daily    sodium chloride 0.9%  3 mL Intravenous Q8H    spironolactone  25 mg Oral Daily     PRN Meds:acetaminophen, dextrose 50%, dextrose 50%, diphenhydrAMINE, glucagon (human recombinant), glucose, glucose, insulin aspart, ondansetron, oxycodone-acetaminophen, promethazine (PHENERGAN) IVPB, simethicone     Review of patient's allergies indicates:   Allergen Reactions    Hydrocodone      Also makes pt "very sleepy"    Kiwi (actinidia chinensis) Swelling     Objective:     Vital Signs (Most Recent):  Temp: 98.5 °F (36.9 °C) (08/25/17 0831)  Pulse: 80 (08/25/17 0831)  Resp: 16 (08/25/17 0831)  BP: (!) 172/79 (08/25/17 0831)  SpO2: 95 % (08/25/17 0831) Vital Signs (24h Range):  Temp:  [97.6 °F (36.4 °C)-99.3 °F (37.4 °C)] 98.5 °F (36.9 °C)  Pulse:  [80-96] 80  Resp:  [16-18] 16  SpO2:  [93 %-97 %] 95 %  BP: (149-185)/(67-87) 172/79     Weight: 66.2 kg (146 lb)  Body mass index is 27.59 kg/m².    Intake/Output - Last 3 Shifts       08/23 0700 - 08/24 0659 08/24 0700 - 08/25 0659 08/25 0700 - 08/26 0659    P.O.  480     I.V. (mL/kg)  0 (0)     Total Intake(mL/kg)  480 (7.3)     Net   +480             Urine Occurrence 3 x 4 x     Stool Occurrence 0 x 0 x     Emesis Occurrence 1 x 0 x           Physical Exam   Constitutional: She is oriented to person, place, and time. She appears well-developed and well-nourished. No distress.   HENT:   Head: Normocephalic and atraumatic.   Eyes: Conjunctivae are normal. Right eye exhibits no discharge. Left eye exhibits no discharge. No scleral icterus.   Neck: Normal range of motion. Neck supple. No JVD present. No tracheal deviation present.   Cardiovascular: Normal rate and regular rhythm.    Pulmonary/Chest: " Effort normal. No respiratory distress.   Abdominal: Soft. She exhibits no distension.   Neurological: She is alert and oriented to person, place, and time.   Skin: Skin is warm and dry. No rash noted. She is not diaphoretic. No erythema.       Significant Labs:  CBC:   Recent Labs  Lab 08/24/17  0928   WBC 7.35   RBC 3.65*   HGB 11.1*   HCT 33.4*      MCV 92   MCH 30.4   MCHC 33.2     BMP:   Recent Labs  Lab 08/24/17  0928   *      K 3.9      CO2 27   BUN 9   CREATININE 0.7   CALCIUM 9.0   MG 1.7       Significant Diagnostics:  I have reviewed all pertinent imaging results/findings within the past 24 hours.

## 2017-08-25 NOTE — DISCHARGE SUMMARY
[facility]  General Surgery  Discharge Summary      Patient Name: Jolly Matias  MRN: [id number]  Admission Date: 8/22/2017  Hospital Length of Stay: 2 days  Discharge Date and Time:  08/25/2017 12:24 PM  Attending Physician: Yg Haile Jr.,*   Discharging Provider: Cameron Perry MD  Primary Care Provider: Felicity Lee DO    HPI:   No notes on file    Procedure(s) (LRB):  REPAIR-HERNIA-INCISIONAL-ROBOTIC-XI (N/A)      Indwelling Lines/Drains at time of discharge:   Lines/Drains/Airways          No matching active lines, drains, or airways        Hospital Course: underwent robotic hernia repair on 8/22/17    This patient was admitted to the hospital following the above procedure. She tolerated this procedure with no apparent complications. She was transferred to the floor where she underwent a normal post operative recovery. She was advanced to a regular diet, and she tolerated this without any difficulties. Her pain was controlled with po medications, she was tolerating ambulation, and she had regular bowel and bladder function. She was discharged home in good condition, with follow up in 2 weeks.       Consults:     Significant Diagnostic Studies: see epic      Pending Diagnostic Studies:     None        Final Active Diagnoses:    Diagnosis Date Noted POA    PRINCIPAL PROBLEM:  Incisional hernia, without obstruction or gangrene [K43.2] 08/22/2017 Yes      Problems Resolved During this Admission:    Diagnosis Date Noted Date Resolved POA      Discharged Condition: good    Disposition: Home or Self Care    Follow Up:  Follow-up Information     Yg Haile Jr, MD On 9/6/2017.    Specialties:  General Surgery, Bariatrics  Why:  Wound check/Appt: 9/6/17 at 1:00 PM.   Contact information:  [address]  [facility] [address]  [phone]                 Patient Instructions:     Diet general     Activity as tolerated     Lifting restrictions   Order Comments: Not more than 10  pounds for 6 weeks     Call MD for:  temperature >100.4     Call MD for:  persistent nausea and vomiting or diarrhea     Call MD for:  severe uncontrolled pain     Call MD for:  redness, tenderness, or signs of infection (pain, swelling, redness, odor or green/yellow discharge around incision site)     Call MD for:  difficulty breathing or increased cough     No dressing needed   Order Comments: Ok to shower, wash wound with soap and water, keep incision clean and dry at all times, no swimming, no tub bathing.   Steristrips will fall off on their own       Medications:  Reconciled Home Medications:   Current Discharge Medication List      START taking these medications    Details   oxycodone-acetaminophen (PERCOCET) 5-325 mg per tablet Take 1 tablet by mouth every 4 (four) hours as needed.  Qty: 31 tablet, Refills: 0         CONTINUE these medications which have NOT CHANGED    Details   atorvastatin (LIPITOR) 20 MG tablet Take 1 tablet (20 mg total) by mouth once daily.  Qty: 90 tablet, Refills: 0    Associated Diagnoses: Pure hypercholesterolemia      cyanocobalamin (VITAMIN B-12) 1000 MCG tablet Take 100 mcg by mouth once daily.      flaxseed oil 1,000 mg Cap Take 1 capsule by mouth once daily.        losartan (COZAAR) 50 MG tablet Take 1 tablet (50 mg total) by mouth once daily.  Qty: 90 tablet, Refills: 2    Associated Diagnoses: Essential hypertension      metformin (GLUCOPHAGE) 500 MG tablet Take 1 tablet (500 mg total) by mouth 2 (two) times daily with meals.  Qty: 180 tablet, Refills: 2    Associated Diagnoses: Prediabetes      sertraline (ZOLOFT) 50 MG tablet Take 1 tablet (50 mg total) by mouth once daily.  Qty: 90 tablet, Refills: 2    Associated Diagnoses: Generalized anxiety disorder      spironolactone (ALDACTONE) 25 MG tablet Take 1 tablet (25 mg total) by mouth once daily.  Qty: 90 tablet, Refills: 2    Associated Diagnoses: Essential hypertension      vitamin D 1000 units Tab Take 185 mg by mouth  once daily.      albuterol (VENTOLIN HFA) 90 mcg/actuation inhaler Inhale 2 puffs into the lungs every 6 (six) hours as needed for Wheezing.  Qty: 1 Inhaler, Refills: 6    Comments: Hold until patient request to fill it.           Time spent on the discharge of patient: 15 minutes    Cameron Perry MD  General Surgery  [facility]

## 2017-08-25 NOTE — NURSING
Pt's IV infiltrated and was removed.  Pt requested to not have a new IV started as she is tolerating po, and having good pain control with oral medications.  Dr. Burger notified and stated that it was ok to leave IV out.

## 2017-08-25 NOTE — PROGRESS NOTES
"[facility]  General Surgery  Progress Note    Subjective:     History of Present Illness:  No notes on file    Post-Op Info:  Procedure(s) (LRB):  REPAIR-HERNIA-INCISIONAL-ROBOTIC-XI (N/A)   3 Days Post-Op     Interval History: no pain, no nausea, tolerating diet, did ambulate, passing flatus    Medications:  Continuous Infusions:   Scheduled Meds:   atorvastatin  20 mg Oral Daily    enoxaparin  40 mg Subcutaneous Q24H    losartan  50 mg Oral Daily    sertraline  50 mg Oral Daily    sodium chloride 0.9%  3 mL Intravenous Q8H    spironolactone  25 mg Oral Daily     PRN Meds:acetaminophen, dextrose 50%, dextrose 50%, diphenhydrAMINE, glucagon (human recombinant), glucose, glucose, insulin aspart, ondansetron, oxycodone-acetaminophen, promethazine (PHENERGAN) IVPB, simethicone     Review of patient's allergies indicates:   Allergen Reactions    Hydrocodone      Also makes pt "very sleepy"    Kiwi (actinidia chinensis) Swelling     Objective:     Vital Signs (Most Recent):  Temp: 98.5 °F (36.9 °C) (08/25/17 0831)  Pulse: 80 (08/25/17 0831)  Resp: 16 (08/25/17 0831)  BP: (!) 172/79 (08/25/17 0831)  SpO2: 95 % (08/25/17 0831) Vital Signs (24h Range):  Temp:  [97.6 °F (36.4 °C)-99.3 °F (37.4 °C)] 98.5 °F (36.9 °C)  Pulse:  [80-96] 80  Resp:  [16-18] 16  SpO2:  [93 %-97 %] 95 %  BP: (149-185)/(67-87) 172/79     Weight: 66.2 kg (146 lb)  Body mass index is 27.59 kg/m².    Intake/Output - Last 3 Shifts       08/23 0700 - 08/24 0659 08/24 0700 - 08/25 0659 08/25 0700 - 08/26 0659    P.O.  480     I.V. (mL/kg)  0 (0)     Total Intake(mL/kg)  480 (7.3)     Net   +480             Urine Occurrence 3 x 4 x     Stool Occurrence 0 x 0 x     Emesis Occurrence 1 x 0 x           Physical Exam   Constitutional: She is oriented to person, place, and time. She appears well-developed and well-nourished. No distress.   HENT:   Head: Normocephalic and atraumatic.   Eyes: Conjunctivae are normal. Right eye exhibits " no discharge. Left eye exhibits no discharge. No scleral icterus.   Neck: Normal range of motion. Neck supple. No JVD present. No tracheal deviation present.   Cardiovascular: Normal rate and regular rhythm.    Pulmonary/Chest: Effort normal. No respiratory distress.   Abdominal: Soft. She exhibits no distension.   Neurological: She is alert and oriented to person, place, and time.   Skin: Skin is warm and dry. No rash noted. She is not diaphoretic. No erythema.       Significant Labs:  CBC:   Recent Labs  Lab 08/24/17  0928   WBC 7.35   RBC 3.65*   HGB 11.1*   HCT 33.4*      MCV 92   MCH 30.4   MCHC 33.2     BMP:   Recent Labs  Lab 08/24/17  0928   *      K 3.9      CO2 27   BUN 9   CREATININE 0.7   CALCIUM 9.0   MG 1.7       Significant Diagnostics:  I have reviewed all pertinent imaging results/findings within the past 24 hours.    Assessment/Plan:     Incisional hernia, without obstruction or gangrene    Feels ok  Regular diet  encourage out of bed and ambulation   encourage IS  DVT prophylaxis  GI prophylaxis  Home today            Cameron Perry MD  General Surgery  [facility]

## 2017-08-25 NOTE — ASSESSMENT & PLAN NOTE
Feels ok  Regular diet  encourage out of bed and ambulation   encourage IS  DVT prophylaxis  GI prophylaxis  Home today

## 2017-08-25 NOTE — PLAN OF CARE
08/25/17 1412   Final Note   Assessment Type Final Discharge Note   Discharge Disposition Home   Hospital Follow Up  Appt(s) scheduled? Yes   Right Care Referral Info   Post Acute Recommendation No Care

## 2017-08-29 ENCOUNTER — TELEPHONE (OUTPATIENT)
Dept: SURGERY | Facility: CLINIC | Age: 67
End: 2017-08-29

## 2017-08-29 ENCOUNTER — TELEPHONE (OUTPATIENT)
Dept: FAMILY MEDICINE | Facility: CLINIC | Age: 67
End: 2017-08-29

## 2017-08-29 ENCOUNTER — NURSE TRIAGE (OUTPATIENT)
Dept: ADMINISTRATIVE | Facility: CLINIC | Age: 67
End: 2017-08-29

## 2017-08-29 NOTE — TELEPHONE ENCOUNTER
Returned pt phone call-pt states she just woke up from a nap.  She states that pain is ~ 6, right now.  She has had a BM and is passing gas.    Pt not wanting to take much pain medication, so suggested pt take advil/motrin (stressed the importance of not taking any more tylenol) in between taking her pain medication.  She agrees with plan  Advised pt to call if pain doesn't get any better or if it worsens.  Pt verbalizes understanding.

## 2017-08-29 NOTE — TELEPHONE ENCOUNTER
Received the below TCC message   Kizzy Wong, RN   [facility] Staff 1 hour ago (1:45 PM)      C3 nurse attempted to contact patient. The following occurred:   C3 nurse attempted to contact Jolly Matias  for a TCC post hospital discharge follow up call. The patient is unable to conduct the call @ this time. The patient requested a callback.     The patient does not have a scheduled HOSFU appointment within 7-14 days post hospital discharge date 8\27\17. Message sent to Physician staff to assist with HOSFU appointment scheduling.   Respectfully,   Kizzy Wong RN   [facility],RN C[facility]     (Routing comment)

## 2017-08-29 NOTE — TELEPHONE ENCOUNTER
Dr. Lee I received the below TCC message, patient had surgery on 8/22/17 for a hernia repair, does patient need to see you for a hosp. f/u.  Please advise.

## 2017-08-29 NOTE — TELEPHONE ENCOUNTER
"    Reason for Disposition   [1] SEVERE post-op pain (e.g., excruciating, pain scale 8-10) AND [2] not controlled with pain medications    Answer Assessment - Initial Assessment Questions  1. SYMPTOM: "What's the main symptom you're concerned about?" (e.g., redness, pain, drainage)      pain  2. ONSET: "When did ________  start?"      Post op 8\23  3. SURGERY: "What surgery was performed?"      Incisional hernia repair abdomen  4. DATE of SURGERY: "When was surgery performed?"       8\23  5. INCISION SITE: "Where is the incision located?"       abdomen  6. REDNESS: "Is there any redness at the incision site?" If yes, ask: "How wide across is the redness?" (Inches, centimeters)       no  7. PAIN: "Is there any pain?" If so, ask: "How bad is it?"  (Scale 1-10; or mild, moderate, severe)      Yes, 8\10  8. BLEEDING: "Is there any bleeding?" If so, ask: "How much?" and "Where?"      no  9. DRAINAGE: "Is there any drainage from the incision site?" If yes, ask: "What color and how much?" (e.g., red, cloudy, pus; drops, teaspoon)      no  10. FEVER: "Do you have a fever?" If so, ask: "What is your temperature, how was it measured, and when did it start?"        no  11. OTHER SYMPTOMS: "Do you have any other symptoms?" (e.g., shaking chills, weakness, rash elsewhere on body)        no    Answer Assessment - Initial Assessment Questions  1. SYMPTOM: "What's the main symptom you're concerned about?" (e.g., pain, fever, vomiting)      pain  2. ONSET: "When did ________  start?"      8\23  3. SURGERY: "What surgery was performed?"      Incisional hernia repair  4. DATE of SURGERY: "When was surgery performed?"       8\23  5. ANESTHESIA: " What type of anesthesia did you have?" (e.g., general, spinal, epidural, local)      general  6. PAIN: "Is there any pain?" If so, ask: "How bad is it?"  (Scale 1-10; or mild, moderate, severe)      Yes 8\10  7. FEVER: "Do you have a fever?" If so, ask: "What is your temperature, how was it " "measured, and when did it start?"      no  8. VOMITING: "Is there any vomiting?" If yes, ask: "How many times?"      no  9. BLEEDING: "Is there any bleeding?" If so, ask: "How much?" and "Where?"      no  10. OTHER SYMPTOMS: "Do you have any other symptoms?" (e.g., drainage from wound, painful urination, constipation)        no    Protocols used: ST POST-OP SYMPTOMS AND [id number], ST POST-OP INCISION SYMPTOMS-A-AH      "

## 2017-08-29 NOTE — TELEPHONE ENCOUNTER
----- Message from Cyndi Yoon sent at 8/29/2017  1:39 PM CDT -----  Contact: Jessica w/[facility]  Kizzy is calling in regards to speaking with pt's . Per  pt has been experiencing a lot of pain since surgery on 08/21/17.. She's been taking oxycodone but still no relief. Pt last bowel movement was on 08/27/17. Per Kizzy would like the nurse to give the pt a call back at [phone].    Kizzy can be reached [phone] option 4 and ask for Kizzy Wong.     Thank you.

## 2017-08-29 NOTE — TELEPHONE ENCOUNTER
She does not need to be seen at this time unless she has a specific problem . She is due for a physical in December though with fill labs and HgA1c(due to prediabetes) -so please schedule her for that. thanks

## 2017-08-29 NOTE — TELEPHONE ENCOUNTER
Spoke with . Pt is asleep. He states patient is having 8\10 pain in abdomen. Denies any other symptoms. No BM since Sunday. Not passing Gas. Pain meds not giving relief. Taking every 4-6 hours. Unable to get comfortable. RN to call Surgeon's office, Dr. Yg Haile. High priority message also sent. Scheduling unable to get in touch with nurse, message left with above info. Nurse at office to call patient back.

## 2017-09-01 NOTE — PHYSICIAN QUERY
PT Name: Jolly Matias  MR #: [id number]     Physician Query Form - Documentation Clarification      CDS/: Angle Colvin RN  CCDS               Contact information: [email or web address]    This form is a permanent document in the medical record.     Query Date: September 1, 2017    By submitting this query, we are merely seeking further clarification of documentation. Please utilize your independent clinical judgment when addressing the question(s) below.    The Medical record reflects the following:    Supporting Clinical Findings Location in Medical Record     Reason for IP Medical Treatment:   IP surgery, Post-op Pain      Incisional hernia    PROCEDURE:    Robotic repair of incisional hernia with  mesh.     Inpatient Admission Order 8/23        OP report 8/22    PRINCIPAL PROBLEM: Incisional hernia, without obstruction or gangrene      some soreness, was able to ambulate, voiding well, no hungry     One episode of emesis yesterday evening. Pain well controlled with PCA    Discharge Summary       Surgery progress note 8/23       Surgery progress note 8/24                                                                         Doctor, Please specify diagnosis or diagnoses associated with above clinical findings.  Please specify the reason (diagnosis) for the inpatient admission on 8/23:    Provider Use Only       Diagnosis: __Uncontrolled pain______________________________

## 2017-09-06 ENCOUNTER — OFFICE VISIT (OUTPATIENT)
Dept: SURGERY | Facility: CLINIC | Age: 67
End: 2017-09-06
Payer: MEDICARE

## 2017-09-06 VITALS
HEIGHT: 61 IN | HEART RATE: 67 BPM | SYSTOLIC BLOOD PRESSURE: 150 MMHG | BODY MASS INDEX: 27 KG/M2 | TEMPERATURE: 99 F | WEIGHT: 143 LBS | DIASTOLIC BLOOD PRESSURE: 69 MMHG

## 2017-09-06 DIAGNOSIS — Z09 POSTOP CHECK: Primary | ICD-10-CM

## 2017-09-06 PROCEDURE — 99024 POSTOP FOLLOW-UP VISIT: CPT | Mod: S$GLB,,, | Performed by: SURGERY

## 2017-09-06 PROCEDURE — 99999 PR PBB SHADOW E&M-EST. PATIENT-LVL III: CPT | Mod: PBBFAC,,, | Performed by: SURGERY

## 2017-09-06 RX ORDER — LOSARTAN POTASSIUM 100 MG/1
TABLET ORAL
COMMUNITY
Start: 2017-08-01 | End: 2017-11-29 | Stop reason: SDUPTHER

## 2017-09-06 NOTE — PROGRESS NOTES
HPI:  The patient is status post-incisional hernia on 8/22/2017. She has 1/10 pain. She has decreased appetite. The patient denies nausea, vomiting, fever or difficulty urinating. The patient complains of difficulty sleeping and past episodes of diarrhea that have since resolved. She also states that she had been having nausea but no vomiting, this too has resolved at this visit. Her main concern for her visit today is difficulty sleeping, she states she lays down but is unable to get to sleep. No increase in pain at night. Of note, her mother passed away 2 months ago and between that and the surgery the patient states she has been more stressed lately.     PHYSICAL EXAM:  Vital signs reviewed and stable  Physical Exam   Constitutional: She is oriented to person, place, and time. She appears well-developed and well-nourished.   HENT:   Head: Normocephalic and atraumatic.   Pulmonary/Chest: Effort normal.   Abdominal: Soft. Bowel sounds are normal. She exhibits no distension. There is no tenderness. There is no guarding.   Incisions clean, dry, intact. Removed steri-strips today in clinic.   Musculoskeletal: Normal range of motion.   Neurological: She is alert and oriented to person, place, and time.   Skin: Skin is warm and dry.   Psychiatric: She has a normal mood and affect.       ASSESSMENT:    The patient is doing well after surgery.     PLAN:    Follow up no follow up needed.  Activity: light duty for 2 weeks  - recommended trying good sleep hygiene for difficulty sleeping including limiting screen time before bed, not going to bed until she feels tired, exercising regularly.        Dixie Wilcox, PGY-1  General Surgery  [phone]    I have personally taken the history and examined this patient and agree with the resident's note as stated above.        Yg Haile MD

## 2017-09-19 ENCOUNTER — TELEPHONE (OUTPATIENT)
Dept: FAMILY MEDICINE | Facility: CLINIC | Age: 67
End: 2017-09-19

## 2017-09-19 DIAGNOSIS — I10 ESSENTIAL HYPERTENSION: ICD-10-CM

## 2017-09-19 DIAGNOSIS — R73.09 ABNORMAL GLUCOSE: ICD-10-CM

## 2017-09-19 DIAGNOSIS — E78.5 HYPERLIPIDEMIA, UNSPECIFIED HYPERLIPIDEMIA TYPE: Primary | ICD-10-CM

## 2017-09-19 NOTE — TELEPHONE ENCOUNTER
----- Message from Mikki Richard sent at 9/19/2017 10:23 AM CDT -----  Doctor appointment and lab have been scheduled.  Please link lab orders to the lab appointment.  Date of doctor appointment:  01/22/18  Physical or EP:  epp  Date of lab appointment:  01/17/18  Comments:

## 2017-09-19 NOTE — TELEPHONE ENCOUNTER
Orders in and linked.    [employer] message sent to the patient requesting a urine specimen be done at the time of the lab appointment as well.

## 2017-10-19 ENCOUNTER — HOSPITAL ENCOUNTER (OUTPATIENT)
Dept: RADIOLOGY | Facility: HOSPITAL | Age: 67
Discharge: HOME | End: 2017-10-19
Attending: FAMILY MEDICINE
Payer: MEDICARE

## 2017-10-19 VITALS — HEIGHT: 61 IN | WEIGHT: 143 LBS | BODY MASS INDEX: 27 KG/M2

## 2017-10-19 DIAGNOSIS — Z12.31 SCREENING MAMMOGRAM, ENCOUNTER FOR: ICD-10-CM

## 2017-10-19 PROCEDURE — 77063 BREAST TOMOSYNTHESIS BI: CPT | Mod: 26,,, | Performed by: RADIOLOGY

## 2017-10-19 PROCEDURE — 77067 SCR MAMMO BI INCL CAD: CPT | Mod: TC

## 2017-10-19 PROCEDURE — 77067 SCR MAMMO BI INCL CAD: CPT | Mod: 26,,, | Performed by: RADIOLOGY

## 2017-11-10 ENCOUNTER — PATIENT MESSAGE (OUTPATIENT)
Dept: ADMINISTRATIVE | Facility: OTHER | Age: 67
End: 2017-11-10

## 2017-11-13 RX ORDER — LOSARTAN POTASSIUM 100 MG/1
TABLET ORAL
Qty: 90 TABLET | Refills: 0 | Status: SHIPPED | OUTPATIENT
Start: 2017-11-13 | End: 2017-11-29 | Stop reason: DRUGHIGH

## 2017-11-29 ENCOUNTER — OFFICE VISIT (OUTPATIENT)
Dept: FAMILY MEDICINE | Facility: CLINIC | Age: 67
End: 2017-11-29
Payer: MEDICARE

## 2017-11-29 VITALS
WEIGHT: 154.31 LBS | HEIGHT: 61 IN | HEART RATE: 72 BPM | TEMPERATURE: 98 F | DIASTOLIC BLOOD PRESSURE: 72 MMHG | SYSTOLIC BLOOD PRESSURE: 144 MMHG | BODY MASS INDEX: 29.13 KG/M2

## 2017-11-29 DIAGNOSIS — L03.319 CELLULITIS OF TRUNK, UNSPECIFIED SITE OF TRUNK: Primary | ICD-10-CM

## 2017-11-29 DIAGNOSIS — B37.2 CANDIDIASIS OF SKIN: ICD-10-CM

## 2017-11-29 DIAGNOSIS — L29.0 PRURITUS ANI: ICD-10-CM

## 2017-11-29 PROCEDURE — 99213 OFFICE O/P EST LOW 20 MIN: CPT | Mod: S$GLB,,, | Performed by: FAMILY MEDICINE

## 2017-11-29 PROCEDURE — 99999 PR PBB SHADOW E&M-EST. PATIENT-LVL IV: CPT | Mod: PBBFAC,,, | Performed by: FAMILY MEDICINE

## 2017-11-29 RX ORDER — MUPIROCIN 20 MG/G
OINTMENT TOPICAL 2 TIMES DAILY
Qty: 30 G | Refills: 1 | Status: SHIPPED | OUTPATIENT
Start: 2017-11-29 | End: 2017-12-09

## 2017-11-29 NOTE — PROGRESS NOTES
Subjective:     Patient ID: Jolly Matias is a 67 y.o. female.    Chief Complaint: Rash (under right arm) and Blister (under right arm)    HPI pt states she has a rash in rt axilla present for a week. It was very painful in the beginning.   NO other areas of rash have developed since this appeared.   She is also bothered with burning discomfort at her rectum with some itching for a few weeks. She has tried some prep -h       Review of Systems  per HPI  Objective:      Physical Exam   Constitutional: She appears well-developed and well-nourished.   Skin: Skin is warm and dry.   Red flat macule with some surrounding tiny pustules      Nursing note and vitals reviewed.      Assessment:     Jolly was seen today for rash and blister.    Diagnoses and all orders for this visit:    Cellulitis of trunk, unspecified site of trunk    mupirocin (BACTROBAN) 2 % ointment; Apply topically 2 (two) times daily. To affected area      Candidiasis of skin  Lotrimin ultra -  Dry underarms well after bathing    Pruritus ani  Literature on this and advice on treatment provided.   Recheck prn      -

## 2017-12-26 DIAGNOSIS — F41.1 GENERALIZED ANXIETY DISORDER: ICD-10-CM

## 2017-12-26 RX ORDER — SERTRALINE HYDROCHLORIDE 50 MG/1
TABLET, FILM COATED ORAL
Qty: 90 TABLET | Refills: 0 | Status: SHIPPED | OUTPATIENT
Start: 2017-12-26 | End: 2018-01-22 | Stop reason: DRUGHIGH

## 2018-01-05 ENCOUNTER — TELEPHONE (OUTPATIENT)
Dept: FAMILY MEDICINE | Facility: CLINIC | Age: 68
End: 2018-01-05

## 2018-01-05 ENCOUNTER — LAB VISIT (OUTPATIENT)
Dept: LAB | Facility: HOSPITAL | Age: 68
End: 2018-01-05
Attending: FAMILY MEDICINE
Payer: MEDICARE

## 2018-01-05 DIAGNOSIS — R30.0 DYSURIA: Primary | ICD-10-CM

## 2018-01-05 DIAGNOSIS — R30.0 DYSURIA: ICD-10-CM

## 2018-01-05 LAB
BILIRUB UR QL STRIP: NEGATIVE
CAOX CRY UR QL COMP ASSIST: NORMAL
CLARITY UR REFRACT.AUTO: ABNORMAL
COLOR UR AUTO: YELLOW
GLUCOSE UR QL STRIP: NEGATIVE
HGB UR QL STRIP: ABNORMAL
KETONES UR QL STRIP: NEGATIVE
LEUKOCYTE ESTERASE UR QL STRIP: NEGATIVE
MICROSCOPIC COMMENT: NORMAL
NITRITE UR QL STRIP: NEGATIVE
PH UR STRIP: 5 [PH] (ref 5–8)
PROT UR QL STRIP: NEGATIVE
RBC #/AREA URNS AUTO: 1 /HPF (ref 0–4)
SP GR UR STRIP: 1.02 (ref 1–1.03)
URN SPEC COLLECT METH UR: ABNORMAL
UROBILINOGEN UR STRIP-ACNC: NEGATIVE EU/DL
WBC #/AREA URNS AUTO: 2 /HPF (ref 0–5)

## 2018-01-05 PROCEDURE — 87086 URINE CULTURE/COLONY COUNT: CPT

## 2018-01-05 PROCEDURE — 81001 URINALYSIS AUTO W/SCOPE: CPT

## 2018-01-05 RX ORDER — CIPROFLOXACIN 500 MG/1
500 TABLET ORAL 2 TIMES DAILY
Qty: 14 TABLET | Refills: 0 | Status: SHIPPED | OUTPATIENT
Start: 2018-01-05 | End: 2018-01-12

## 2018-01-05 NOTE — TELEPHONE ENCOUNTER
If she drops off the urine we can treat presumptively with antibiotic until the culture report comes back.   rx sent

## 2018-01-05 NOTE — TELEPHONE ENCOUNTER
----- Message from Charity Hancock sent at 1/5/2018 10:49 AM CST -----  Contact: Self [phone]  Patient would like to get medical advice.  Symptoms (please be specific):  UTI   How long has patient had these symptoms:  Yesterday  Pharmacy name and phone #:  [facility] [phone]   Any drug allergies:    Comments:

## 2018-01-05 NOTE — TELEPHONE ENCOUNTER
Spoke with the patient, she is having urinary frequency, urinary urgency, and dysuria. She denies fever. States that she does have mild low back pain, but is not sure if the pain is related. Patient advised that she can drop a urine specimen off today at the [facility]. Please advise.

## 2018-01-07 LAB — BACTERIA UR CULT: NO GROWTH

## 2018-01-11 ENCOUNTER — PATIENT MESSAGE (OUTPATIENT)
Dept: FAMILY MEDICINE | Facility: CLINIC | Age: 68
End: 2018-01-11

## 2018-01-11 DIAGNOSIS — I10 ESSENTIAL HYPERTENSION: ICD-10-CM

## 2018-01-11 RX ORDER — SPIRONOLACTONE 25 MG/1
TABLET ORAL
Qty: 90 TABLET | Refills: 1 | Status: SHIPPED | OUTPATIENT
Start: 2018-01-11 | End: 2018-06-13 | Stop reason: SDUPTHER

## 2018-01-17 ENCOUNTER — LAB VISIT (OUTPATIENT)
Dept: LAB | Facility: HOSPITAL | Age: 68
End: 2018-01-17
Attending: FAMILY MEDICINE
Payer: MEDICARE

## 2018-01-17 DIAGNOSIS — I10 ESSENTIAL HYPERTENSION: ICD-10-CM

## 2018-01-17 DIAGNOSIS — R73.09 ABNORMAL GLUCOSE: ICD-10-CM

## 2018-01-17 DIAGNOSIS — E78.5 HYPERLIPIDEMIA, UNSPECIFIED HYPERLIPIDEMIA TYPE: ICD-10-CM

## 2018-01-17 LAB
ALBUMIN SERPL BCP-MCNC: 3.7 G/DL
ALP SERPL-CCNC: 107 U/L
ALT SERPL W/O P-5'-P-CCNC: 15 U/L
ANION GAP SERPL CALC-SCNC: 6 MMOL/L
AST SERPL-CCNC: 18 U/L
BASOPHILS # BLD AUTO: 0.07 K/UL
BASOPHILS NFR BLD: 1.2 %
BILIRUB SERPL-MCNC: 0.4 MG/DL
BUN SERPL-MCNC: 17 MG/DL
CALCIUM SERPL-MCNC: 9.6 MG/DL
CHLORIDE SERPL-SCNC: 107 MMOL/L
CHOLEST SERPL-MCNC: 163 MG/DL
CHOLEST/HDLC SERPL: 3.6 {RATIO}
CO2 SERPL-SCNC: 28 MMOL/L
CREAT SERPL-MCNC: 0.9 MG/DL
DIFFERENTIAL METHOD: ABNORMAL
EOSINOPHIL # BLD AUTO: 0.4 K/UL
EOSINOPHIL NFR BLD: 7.7 %
ERYTHROCYTE [DISTWIDTH] IN BLOOD BY AUTOMATED COUNT: 13.2 %
EST. GFR  (AFRICAN AMERICAN): >60 ML/MIN/1.73 M^2
EST. GFR  (NON AFRICAN AMERICAN): >60 ML/MIN/1.73 M^2
ESTIMATED AVG GLUCOSE: 123 MG/DL
GLUCOSE SERPL-MCNC: 113 MG/DL
HBA1C MFR BLD HPLC: 5.9 %
HCT VFR BLD AUTO: 39 %
HDLC SERPL-MCNC: 45 MG/DL
HDLC SERPL: 27.6 %
HGB BLD-MCNC: 12.5 G/DL
IMM GRANULOCYTES # BLD AUTO: 0.03 K/UL
IMM GRANULOCYTES NFR BLD AUTO: 0.5 %
LDLC SERPL CALC-MCNC: 89.6 MG/DL
LYMPHOCYTES # BLD AUTO: 0.9 K/UL
LYMPHOCYTES NFR BLD: 15.3 %
MCH RBC QN AUTO: 29.4 PG
MCHC RBC AUTO-ENTMCNC: 32.1 G/DL
MCV RBC AUTO: 92 FL
MONOCYTES # BLD AUTO: 0.6 K/UL
MONOCYTES NFR BLD: 9.8 %
NEUTROPHILS # BLD AUTO: 3.8 K/UL
NEUTROPHILS NFR BLD: 65.5 %
NONHDLC SERPL-MCNC: 118 MG/DL
NRBC BLD-RTO: 0 /100 WBC
PLATELET # BLD AUTO: 281 K/UL
PMV BLD AUTO: 10 FL
POTASSIUM SERPL-SCNC: 4.4 MMOL/L
PROT SERPL-MCNC: 7 G/DL
RBC # BLD AUTO: 4.25 M/UL
SODIUM SERPL-SCNC: 141 MMOL/L
TRIGL SERPL-MCNC: 142 MG/DL
TSH SERPL DL<=0.005 MIU/L-ACNC: 1.94 UIU/ML
WBC # BLD AUTO: 5.74 K/UL

## 2018-01-17 PROCEDURE — 85025 COMPLETE CBC W/AUTO DIFF WBC: CPT

## 2018-01-17 PROCEDURE — 83036 HEMOGLOBIN GLYCOSYLATED A1C: CPT

## 2018-01-17 PROCEDURE — 36415 COLL VENOUS BLD VENIPUNCTURE: CPT | Mod: PO

## 2018-01-17 PROCEDURE — 84443 ASSAY THYROID STIM HORMONE: CPT

## 2018-01-17 PROCEDURE — 80061 LIPID PANEL: CPT

## 2018-01-17 PROCEDURE — 80053 COMPREHEN METABOLIC PANEL: CPT

## 2018-01-22 ENCOUNTER — OFFICE VISIT (OUTPATIENT)
Dept: FAMILY MEDICINE | Facility: CLINIC | Age: 68
End: 2018-01-22
Payer: MEDICARE

## 2018-01-22 VITALS
BODY MASS INDEX: 30.01 KG/M2 | HEIGHT: 61 IN | SYSTOLIC BLOOD PRESSURE: 132 MMHG | HEART RATE: 77 BPM | DIASTOLIC BLOOD PRESSURE: 73 MMHG | TEMPERATURE: 98 F | WEIGHT: 158.94 LBS

## 2018-01-22 DIAGNOSIS — I70.0 ABDOMINAL AORTIC ATHEROSCLEROSIS: ICD-10-CM

## 2018-01-22 DIAGNOSIS — R73.03 PREDIABETES: ICD-10-CM

## 2018-01-22 DIAGNOSIS — F32.9 REACTIVE DEPRESSION: ICD-10-CM

## 2018-01-22 DIAGNOSIS — G47.33 OSA (OBSTRUCTIVE SLEEP APNEA): ICD-10-CM

## 2018-01-22 DIAGNOSIS — R13.19 ESOPHAGEAL DYSPHAGIA: ICD-10-CM

## 2018-01-22 DIAGNOSIS — J47.9 BRONCHIECTASIS WITHOUT COMPLICATION: ICD-10-CM

## 2018-01-22 DIAGNOSIS — I10 ESSENTIAL HYPERTENSION: ICD-10-CM

## 2018-01-22 DIAGNOSIS — I72.8 SPLENIC ARTERY ANEURYSM: ICD-10-CM

## 2018-01-22 DIAGNOSIS — Z85.828 HISTORY OF BASAL CELL CARCINOMA: ICD-10-CM

## 2018-01-22 DIAGNOSIS — Z00.00 ROUTINE GENERAL MEDICAL EXAMINATION AT A HEALTH CARE FACILITY: Primary | ICD-10-CM

## 2018-01-22 DIAGNOSIS — E78.00 PURE HYPERCHOLESTEROLEMIA: ICD-10-CM

## 2018-01-22 DIAGNOSIS — F41.9 ANXIETY: Chronic | ICD-10-CM

## 2018-01-22 PROBLEM — K57.33 DIVERTICULITIS LARGE INTESTINE W/O PERFORATION OR ABSCESS W/BLEEDING: Status: RESOLVED | Noted: 2017-01-17 | Resolved: 2018-01-22

## 2018-01-22 PROBLEM — K43.2 INCISIONAL HERNIA, WITHOUT OBSTRUCTION OR GANGRENE: Status: RESOLVED | Noted: 2017-08-22 | Resolved: 2018-01-22

## 2018-01-22 PROBLEM — Z13.9 SCREENING: Status: RESOLVED | Noted: 2017-01-16 | Resolved: 2018-01-22

## 2018-01-22 PROCEDURE — 99999 PR PBB SHADOW E&M-EST. PATIENT-LVL IV: CPT | Mod: PBBFAC,,, | Performed by: FAMILY MEDICINE

## 2018-01-22 PROCEDURE — 99499 UNLISTED E&M SERVICE: CPT | Mod: S$GLB,,, | Performed by: FAMILY MEDICINE

## 2018-01-22 PROCEDURE — 99397 PER PM REEVAL EST PAT 65+ YR: CPT | Mod: S$GLB,,, | Performed by: FAMILY MEDICINE

## 2018-01-22 RX ORDER — LOSARTAN POTASSIUM 50 MG/1
50 TABLET ORAL DAILY
Qty: 90 TABLET | Refills: 2 | Status: SHIPPED | OUTPATIENT
Start: 2018-01-22 | End: 2018-06-06 | Stop reason: SDUPTHER

## 2018-01-22 RX ORDER — METFORMIN HYDROCHLORIDE 500 MG/1
500 TABLET ORAL 2 TIMES DAILY WITH MEALS
Qty: 180 TABLET | Refills: 2 | Status: SHIPPED | OUTPATIENT
Start: 2018-01-22 | End: 2018-10-16 | Stop reason: SDUPTHER

## 2018-01-22 RX ORDER — ATORVASTATIN CALCIUM 20 MG/1
20 TABLET, FILM COATED ORAL DAILY
Qty: 90 TABLET | Refills: 2 | Status: SHIPPED | OUTPATIENT
Start: 2018-01-22 | End: 2018-12-20 | Stop reason: SDUPTHER

## 2018-01-22 RX ORDER — SERTRALINE HYDROCHLORIDE 100 MG/1
100 TABLET, FILM COATED ORAL DAILY
Qty: 90 TABLET | Refills: 2 | Status: SHIPPED | OUTPATIENT
Start: 2018-01-22 | End: 2018-10-16 | Stop reason: SDUPTHER

## 2018-01-22 NOTE — PROGRESS NOTES
Subjective:     Patient ID: Jolly Matias is a 67 y.o. female.    Chief Complaint: Annual Exam  feeling lost since the death of her mom. 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   She is feeling lost. Questioning why she is here and feeling sad. She denies feeling suicidal.   HPI  Review of Systems   Constitutional: Positive for fatigue (always tired). Negative for appetite change and fever.        Snores    HENT: Negative for hearing loss and sore throat.    Eyes: Negative.    Respiratory: Negative for cough, chest tightness, shortness of breath and wheezing.    Cardiovascular: Negative for chest pain (shooting pain across left chest- with coughing or swallowing hard), palpitations and leg swelling.   Gastrointestinal: Negative for abdominal pain, blood in stool, constipation, diarrhea, nausea and vomiting.        Left side chest pain when swallowing - very severe back in January 2017. -now only intermittent but still severe when it occurs. She denies GERD but she most likely has АНДРЕЙ   Endocrine: Negative.    Genitourinary: Negative for difficulty urinating, dysuria, frequency, hematuria, menstrual problem, pelvic pain and urgency.   Musculoskeletal: Negative for back pain.        Rt hip pain -mostly at night if she lays on it. Not so much when she is walking   Skin: Negative for pallor and rash.   Allergic/Immunologic: Negative.    Neurological: Negative for dizziness, syncope, light-headedness and headaches.   Hematological: Negative for adenopathy.   Psychiatric/Behavioral: Negative.  Negative for dysphoric mood and sleep disturbance.       Objective:      Physical Exam   Constitutional: She is oriented to person, place, and time. She appears well-developed and well-nourished.   HENT:   Head: Normocephalic and atraumatic.   Right Ear: External ear normal.   Left Ear: External ear normal.   Nose: Nose normal.   High riding tongue ,  small airway, unable to visualize pharynx   Eyes: Conjunctivae and EOM are normal. Pupils are equal, round, and reactive to light.   Neck: Normal range of motion. Neck supple. No JVD present. No thyromegaly present.   Wide neck   Cardiovascular: Normal rate, regular rhythm, normal heart sounds and intact distal pulses.    No murmur heard.  Pulses:       Dorsalis pedis pulses are 2+ on the right side, and 2+ on the left side.        Posterior tibial pulses are 2+ on the right side, and 2+ on the left side.   Pulmonary/Chest: Effort normal and breath sounds normal.   Abdominal: Soft. Bowel sounds are normal. She exhibits no mass. There is no tenderness.   Occasional LLQ -but short spots of pain - they dont last but a few seconds and not associated with diarrhea or blood in the stool or nausea    Musculoskeletal: Normal range of motion. She exhibits no edema.        Right foot: There is normal range of motion and no deformity.        Left foot: There is normal range of motion and no deformity.   Feet:   Right Foot:   Protective Sensation: 10 sites tested. 10 sites sensed.   Skin Integrity: Negative for skin breakdown or erythema.   Left Foot:   Protective Sensation: 10 sites tested. 10 sites sensed.   Skin Integrity: Negative for skin breakdown or erythema.   Lymphadenopathy:     She has no cervical adenopathy.   Neurological: She is alert and oriented to person, place, and time. She has normal reflexes.   Skin: Skin is warm and dry.   Psychiatric: Her behavior is normal. Judgment and thought content normal. Cognition and memory are normal. She exhibits a depressed mood. She expresses no suicidal plans.   Vitals reviewed.      Assessment:   The 10-year ASCVD risk score (Garland JULIO Jr., et al., 2013) is: 10.6%    Values used to calculate the score:      Age: 67 years      Sex: Female      Is Non- : No      Diabetic: No      Tobacco smoker: No      Systolic Blood Pressure: 132 mmHg      Is BP  treated: Yes      HDL Cholesterol: 45 mg/dL      Total Cholesterol: 163 mg/dL    Jolly was seen today for annual exam.    Diagnoses and all orders for this visit:    Routine general medical examination at a health care facility    Bronchiectasis without complication  Condition stable . Continue current medicine protocol.     Anxiety  Increase sertraline    History of basal cell carcinoma  follow with specialist for ongoing care of this disorder.     Abdominal aortic atherosclerosis  The current medical regimen is effective;  continue present plan and medications.    Splenic artery aneurysm    Essential hypertension  -     losartan (COZAAR) 50 MG tablet; Take 1 tablet (50 mg total) by mouth once daily.    Pure hypercholesterolemia  -     atorvastatin (LIPITOR) 20 MG tablet; Take 1 tablet (20 mg total) by mouth once daily.    Reactive depression  -   Increase   sertraline (ZOLOFT) 100 MG tablet; Take 1 tablet (100 mg total) by mouth once daily.  rtc in 2 months -recommend she seek counseling. Her symptoms are normal after a loss like this     Esophageal dysphagia  -     FL Esophagram Complete; Future    Prediabetes  -     metFORMIN (GLUCOPHAGE) 500 MG tablet; Take 1 tablet (500 mg total) by mouth 2 (two) times daily with meals.    АНДРЕЙ (obstructive sleep apnea)  -     Cancel: Polysomnogram (CPAP will be added if patient meets diagnostic criteria.); Future  -     Home Sleep Studies; Future    Obstructive sleep apnea (АНДРЕЙ),    with persistent symptoms of disruptive snoring, history of witnessed apneic pauses, un-refreshing frequent disrupted sleep, morning headaches,  and excessive daytime sleepiness, with exam findings of a crowded oral airway, with medical comorbidities of HTN,preDM2.

## 2018-01-22 NOTE — PATIENT INSTRUCTIONS
THERAPIST RECOMMENDATIONS:    Aquilino Escobedo , [address] [facility], [facility] [phone] ( HOLISTIC, good with teenagers)  Elisa Robi - [phone],  [address] [place], [place] [address]z[facility]  Faith Roy, PhD - family & play therapist, [phone]  Jhon Hernandez, Full time at [employer], marriage & family counsellor  Triston Bonner - couples therapist  Ioana Gonzalez - [facility]  Sylvester Roy on [address] Martell Estrella on [place]  Janelle Clouatre  [address]  [facility] - , Joanne Pruitt,  Camilla Corcoran Buster [phone]  Ronel Jiménez - [phone]  Digna Berman (ayurvedic) - [phone] (focus on families, trauma)  Carolina Aj [address] [facility], [phone]  Alexandre Lee [phone] (focus on addiction)  [facility] [phone](focus on addiction)  Urmila Woods [phone]  Lelia Bucio [phone]  Aquilino Escobedo , [address] [facility], [facility] [phone] ( HOLISTIC, good with teenagers)  [facility], [phone]  Marianna Beltran (sexual relationship therapist), [address] [facility], [place] [address], [phone]  Eliane Veliz  [place], [phone], [phone] at [facility], [address], [address] Ely Decker, Social workers, Art therapy,  bodywork therapy, Conscious breathing , Alternative healing, Massage therapists, [email or web address]      SUBSTANCE ABUSE CLINICS  [facility] [place] [facility] [place] [facility] location [phone]  [email or web address],  Vivien based, [address] [facility], [phone]   [facility], [address] [facility] [phone]  [facility] , [address] [facility], [phone], [phone]  [facility], [address] [facility], [address], [phone]  [facility], [address] [place] [address] Irving Kelley [phone]      Call insurance company about your  Coverage for the shingles vaccine  Where and when can you get this  (shingrex)

## 2018-01-30 ENCOUNTER — TELEPHONE (OUTPATIENT)
Dept: SLEEP MEDICINE | Facility: OTHER | Age: 68
End: 2018-01-30

## 2018-02-02 ENCOUNTER — TELEPHONE (OUTPATIENT)
Dept: SLEEP MEDICINE | Facility: OTHER | Age: 68
End: 2018-02-02

## 2018-02-05 ENCOUNTER — HOSPITAL ENCOUNTER (OUTPATIENT)
Dept: SLEEP MEDICINE | Facility: OTHER | Age: 68
End: 2018-02-05
Attending: FAMILY MEDICINE
Payer: MEDICARE

## 2018-02-05 DIAGNOSIS — G47.33 OSA (OBSTRUCTIVE SLEEP APNEA): ICD-10-CM

## 2018-02-05 DIAGNOSIS — G47.30 SLEEP APNEA, UNSPECIFIED TYPE: ICD-10-CM

## 2018-02-05 PROCEDURE — 95800 SLP STDY UNATTENDED: CPT | Mod: 26,,, | Performed by: PSYCHIATRY & NEUROLOGY

## 2018-02-05 PROCEDURE — 95800 SLP STDY UNATTENDED: CPT

## 2018-02-13 DIAGNOSIS — E78.00 PURE HYPERCHOLESTEROLEMIA: ICD-10-CM

## 2018-02-13 RX ORDER — ATORVASTATIN CALCIUM 20 MG/1
TABLET, FILM COATED ORAL
Qty: 90 TABLET | Refills: 1 | Status: SHIPPED | OUTPATIENT
Start: 2018-02-13 | End: 2018-03-19 | Stop reason: SDUPTHER

## 2018-02-14 RX ORDER — LOSARTAN POTASSIUM 100 MG/1
TABLET ORAL
Qty: 90 TABLET | Refills: 1 | Status: SHIPPED | OUTPATIENT
Start: 2018-02-14 | End: 2018-03-19 | Stop reason: ALTCHOICE

## 2018-03-19 ENCOUNTER — OFFICE VISIT (OUTPATIENT)
Dept: DERMATOLOGY | Facility: CLINIC | Age: 68
End: 2018-03-19
Payer: MEDICARE

## 2018-03-19 DIAGNOSIS — Z85.828 PERSONAL HISTORY OF SKIN CANCER: ICD-10-CM

## 2018-03-19 DIAGNOSIS — L82.1 SK (SEBORRHEIC KERATOSIS): ICD-10-CM

## 2018-03-19 DIAGNOSIS — L30.9 DERMATITIS: ICD-10-CM

## 2018-03-19 DIAGNOSIS — L90.5 SCAR: ICD-10-CM

## 2018-03-19 DIAGNOSIS — D48.5 NEOPLASM OF UNCERTAIN BEHAVIOR OF SKIN: Primary | ICD-10-CM

## 2018-03-19 DIAGNOSIS — L57.0 AK (ACTINIC KERATOSIS): ICD-10-CM

## 2018-03-19 DIAGNOSIS — L81.4 LENTIGO: ICD-10-CM

## 2018-03-19 PROCEDURE — 99999 PR PBB SHADOW E&M-EST. PATIENT-LVL III: CPT | Mod: PBBFAC,,, | Performed by: DERMATOLOGY

## 2018-03-19 PROCEDURE — 11100 PR BIOPSY OF SKIN LESION: CPT | Mod: 59,S$GLB,, | Performed by: DERMATOLOGY

## 2018-03-19 PROCEDURE — 99214 OFFICE O/P EST MOD 30 MIN: CPT | Mod: 25,S$GLB,, | Performed by: DERMATOLOGY

## 2018-03-19 PROCEDURE — 17003 DESTRUCT PREMALG LES 2-14: CPT | Mod: S$GLB,,, | Performed by: DERMATOLOGY

## 2018-03-19 PROCEDURE — 17000 DESTRUCT PREMALG LESION: CPT | Mod: S$GLB,,, | Performed by: DERMATOLOGY

## 2018-03-19 PROCEDURE — 88305 TISSUE EXAM BY PATHOLOGIST: CPT | Performed by: PATHOLOGY

## 2018-03-19 RX ORDER — KETOCONAZOLE 20 MG/G
CREAM TOPICAL
Qty: 45 G | Refills: 3 | Status: SHIPPED | OUTPATIENT
Start: 2018-03-19 | End: 2018-07-03

## 2018-03-19 NOTE — PROGRESS NOTES
Subjective:       Patient ID:  Jolly Matias is a 67 y.o. female who presents for   Chief Complaint   Patient presents with    Skin Check    Lesion    Rash     High risk pt with hx numerous BCC here to check fort he development of new lesions.  C/o lesion left post thigh x 1 year.  Denies pain but it is recurrently scaly.  Also has rash started under r axilla 2 month ago then moved to left axilla two weeks ago.  tx w neosporin which helps w discomfort.       Lesion     Rash         Review of Systems   Constitutional: Positive for fatigue. Negative for fever, chills and malaise.   Skin: Positive for rash and daily sunscreen use.   Hematologic/Lymphatic: Does not bruise/bleed easily.        Objective:    Physical Exam   Constitutional: She appears well-developed and well-nourished. No distress.   Neurological: She is alert and oriented to person, place, and time. She is not disoriented.   Psychiatric: She has a normal mood and affect.   Skin:   Areas Examined (abnormalities noted in diagram):   Scalp / Hair Palpated and Inspected  Head / Face Inspection Performed  Neck Inspection Performed  Chest / Axilla Inspection Performed  Abdomen Inspection Performed  Genitals / Buttocks / Groin Inspection Performed  Back Inspection Performed  RUE Inspected  LUE Inspection Performed  RLE Inspected  LLE Inspection Performed  Nails and Digits Inspection Performed                           Diagram Legend     Erythematous scaling macule/papule c/w actinic keratosis       Vascular papule c/w angioma      Pigmented verrucoid papule/plaque c/w seborrheic keratosis      Yellow umbilicated papule c/w sebaceous hyperplasia      Irregularly shaped tan macule c/w lentigo     1-2 mm smooth white papules consistent with Milia      Movable subcutaneous cyst with punctum c/w epidermal inclusion cyst      Subcutaneous movable cyst c/w pilar cyst      Firm pink to brown papule c/w dermatofibroma      Pedunculated fleshy papule(s) c/w skin  tag(s)      Evenly pigmented macule c/w junctional nevus     Mildly variegated pigmented, slightly irregular-bordered macule c/w mildly atypical nevus      Flesh colored to evenly pigmented papule c/w intradermal nevus       Pink pearly papule/plaque c/w basal cell carcinoma      Erythematous hyperkeratotic cursted plaque c/w SCC      Surgical scar with no sign of skin cancer recurrence      Open and closed comedones      Inflammatory papules and pustules      Verrucoid papule consistent consistent with wart     Erythematous eczematous patches and plaques     Dystrophic onycholytic nail with subungual debris c/w onychomycosis     Umbilicated papule    Erythematous-base heme-crusted tan verrucoid plaque consistent with inflamed seborrheic keratosis     Erythematous Silvery Scaling Plaque c/w Psoriasis     See annotation      Assessment / Plan:      Pathology Orders:     Normal Orders This Visit    Tissue Specimen To Pathology, Dermatology     Questions:    Directional Terms:  Other(comment)    Clinical information:  r/o bcc    Specific Site:  mid upper forehead        Neoplasm of uncertain behavior of skin  Shave biopsy procedure note:    Shave biopsy performed after verbal consent including risk of infection, scar, recurrence, need for additional treatment of site. Area prepped with alcohol, anesthetized with approximately 1.0cc of 1% lidocaine with epinephrine. Lesional tissue shaved with razor blade. Hemostasis achieved with application of aluminum chloride followed by hyfrecation. No complications. Dressing applied. Wound care explained.    If biopsy positive for malignancy, refer to Dr. Ac for Mohs surgery consultation.  -     Tissue Specimen To Pathology, Dermatology    AK (actinic keratosis)  Cryosurgery Procedure Note    Verbal consent from the patient is obtained and the patient is aware of the precancerous quality and need for treatment of these lesions. Liquid nitrogen cryosurgery is applied to the 2  actinic keratoses, as detailed in the physical exam, to produce a freeze injury. The patient is aware that blisters may form and is instructed on wound care with gentle cleansing and use of vaseline ointment to keep moist until healed. The patient is supplied a handout on cryosurgery and is instructed to call if lesions do not completely resolve.    Lentigo  This is a benign hyperpigmented sun induced lesion. Daily sun protection will reduce the number of new lesions. Treatment of these benign lesions are considered cosmetic.  The nature of sun-induced photo-aging and skin cancers is discussed.  Sun avoidance, protective clothing, and the use of 30-SPF sunscreens is advised. Observe closely for skin damage/changes, and call if such occurs.    SK (seborrheic keratosis)  These are benign inherited growths without a malignant potential. Reassurance given to patient. No treatment is necessary.     Dermatitis  -     ketoconazole (NIZORAL) 2 % cream; AAA bid under arm  Dispense: 45 g; Refill: 3    Personal history of skin cancer  Scar  Patient with a history of non melanoma skin cancer.   Total body skin examination performed today including at least 12 points as noted in physical examination. Suspicious lesions noted.             Follow-up for prn bx report.

## 2018-03-24 DIAGNOSIS — F41.1 GENERALIZED ANXIETY DISORDER: ICD-10-CM

## 2018-03-26 RX ORDER — SERTRALINE HYDROCHLORIDE 50 MG/1
TABLET, FILM COATED ORAL
Qty: 90 TABLET | Refills: 1 | Status: SHIPPED | OUTPATIENT
Start: 2018-03-26 | End: 2018-06-06 | Stop reason: SDUPTHER

## 2018-04-02 ENCOUNTER — OFFICE VISIT (OUTPATIENT)
Dept: URGENT CARE | Facility: CLINIC | Age: 68
End: 2018-04-02
Payer: MEDICARE

## 2018-04-02 VITALS
SYSTOLIC BLOOD PRESSURE: 150 MMHG | BODY MASS INDEX: 30.21 KG/M2 | DIASTOLIC BLOOD PRESSURE: 70 MMHG | OXYGEN SATURATION: 97 % | HEART RATE: 95 BPM | WEIGHT: 160 LBS | HEIGHT: 61 IN | TEMPERATURE: 99 F | RESPIRATION RATE: 16 BRPM

## 2018-04-02 DIAGNOSIS — J40 BRONCHITIS: Primary | ICD-10-CM

## 2018-04-02 PROCEDURE — 3077F SYST BP >= 140 MM HG: CPT | Mod: CPTII,S$GLB,, | Performed by: PHYSICIAN ASSISTANT

## 2018-04-02 PROCEDURE — 96372 THER/PROPH/DIAG INJ SC/IM: CPT | Mod: S$GLB,,, | Performed by: FAMILY MEDICINE

## 2018-04-02 PROCEDURE — 99214 OFFICE O/P EST MOD 30 MIN: CPT | Mod: 25,S$GLB,, | Performed by: PHYSICIAN ASSISTANT

## 2018-04-02 PROCEDURE — 3078F DIAST BP <80 MM HG: CPT | Mod: CPTII,S$GLB,, | Performed by: PHYSICIAN ASSISTANT

## 2018-04-02 RX ORDER — AZITHROMYCIN 250 MG/1
TABLET, FILM COATED ORAL
Qty: 6 TABLET | Refills: 0 | Status: SHIPPED | OUTPATIENT
Start: 2018-04-02 | End: 2018-06-06 | Stop reason: ALTCHOICE

## 2018-04-02 RX ORDER — PREDNISONE 20 MG/1
40 TABLET ORAL DAILY
Qty: 8 TABLET | Refills: 0 | Status: SHIPPED | OUTPATIENT
Start: 2018-04-02 | End: 2018-04-06

## 2018-04-02 RX ORDER — ALBUTEROL SULFATE 90 UG/1
2 AEROSOL, METERED RESPIRATORY (INHALATION) EVERY 6 HOURS PRN
Qty: 18 G | Refills: 0 | Status: SHIPPED | OUTPATIENT
Start: 2018-04-02 | End: 2019-08-20 | Stop reason: ALTCHOICE

## 2018-04-02 RX ORDER — BETAMETHASONE SODIUM PHOSPHATE AND BETAMETHASONE ACETATE 3; 3 MG/ML; MG/ML
6 INJECTION, SUSPENSION INTRA-ARTICULAR; INTRALESIONAL; INTRAMUSCULAR; SOFT TISSUE
Status: COMPLETED | OUTPATIENT
Start: 2018-04-02 | End: 2018-04-02

## 2018-04-02 RX ORDER — LOSARTAN POTASSIUM 100 MG/1
100 TABLET ORAL DAILY
COMMUNITY
Start: 2018-02-14 | End: 2019-02-06

## 2018-04-02 RX ADMIN — BETAMETHASONE SODIUM PHOSPHATE AND BETAMETHASONE ACETATE 6 MG: 3; 3 INJECTION, SUSPENSION INTRA-ARTICULAR; INTRALESIONAL; INTRAMUSCULAR; SOFT TISSUE at 03:04

## 2018-04-02 NOTE — PATIENT INSTRUCTIONS
-Take oral steroid once daily for 4 days - begin taking steroid tomorrow.  -Please take antibiotic to completion.  -Use inhaler as needed for cough/wheezing.  -Please follow up with your primary care provider or return to clinic for any new or worsening symptoms.    Please follow up with your primary care provider within 2-5 days if your signs and symptoms have not resolved or worsen.     If your condition worsens or fails to improve we recommend that you receive another evaluation at the emergency room immediately or contact your primary medical clinic to discuss your concerns.   You must understand that you have received an Urgent Care treatment only and that you may be released before all of your medical problems are known or treated. You, the patient, will arrange for follow up care as instructed.         Bronchitis, Antibiotic Treatment (Adult)    Bronchitis is an infection of the air passages (bronchial tubes) in your lungs. It often occurs when you have a cold. This illness is contagious during the first few days and is spread through the air by coughing and sneezing, or by direct contact (touching the sick person and then touching your own eyes, nose, or mouth).  Symptoms of bronchitis include cough with mucus (phlegm) and low-grade fever. Bronchitis usually lasts 7 to 14 days. Mild cases can be treated with simple home remedies. More severe infection is treated with an antibiotic.  Home care  Follow these guidelines when caring for yourself at home:  · If your symptoms are severe, rest at home for the first 2 to 3 days. When you go back to your usual activities, don't let yourself get too tired.  · Do not smoke. Also avoid being exposed to secondhand smoke.  · You may use over-the-counter medicines to control fever or pain, unless another medicine was prescribed. (Note: If you have chronic liver or kidney disease or have ever had a stomach ulcer or gastrointestinal bleeding, talk with your healthcare  provider before using these medicines. Also talk to your provider if you are taking medicine to prevent blood clots.) Aspirin should never be given to anyone younger than 18 years of age who is ill with a viral infection or fever. It may cause severe liver or brain damage.  · Your appetite may be poor, so a light diet is fine. Avoid dehydration by drinking 6 to 8 glasses of fluids per day (such as water, soft drinks, sports drinks, juices, tea, or soup). Extra fluids will help loosen secretions in the nose and lungs.  · Over-the-counter cough, cold, and sore-throat medicines will not shorten the length of the illness, but they may be helpful to reduce symptoms. (Note: Do not use decongestants if you have high blood pressure.)  · Finish all antibiotic medicine. Do this even if you are feeling better after only a few days.  Follow-up care  Follow up with your healthcare provider, or as advised. If you had an X-ray or ECG (electrocardiogram), a specialist will review it. You will be notified of any new findings that may affect your care.  Note: If you are age 65 or older, or if you have a chronic lung disease or condition that affects your immune system, or you smoke, talk to your healthcare provider about having pneumococcal vaccinations and a yearly influenza vaccination (flu shot).  When to seek medical advice  Call your healthcare provider right away if any of these occur:  · Fever of 100.4°F (38°C) or higher  · Coughing up increased amounts of colored sputum  · Weakness, drowsiness, headache, facial pain, ear pain, or a stiff neck  Call 911, or get immediate medical care  Contact emergency services right away if any of these occur.  · Coughing up blood  · Worsening weakness, drowsiness, headache, or stiff neck  · Trouble breathing, wheezing, or pain with breathing  Date Last Reviewed: 9/13/2015  © 8390-0750 [employer]. [address] [place] [address], [place] [address]. All rights reserved. This information  is not intended as a substitute for professional medical care. Always follow your healthcare professional's instructions.

## 2018-04-02 NOTE — PROGRESS NOTES
"Subjective:       Patient ID: Jolly Matias is a 67 y.o. female.    Vitals:  height is 5' 1" (1.549 m) and weight is 72.6 kg (160 lb). Her temperature is 98.8 °F (37.1 °C). Her blood pressure is 150/70 (abnormal) and her pulse is 95. Her respiration is 16 and oxygen saturation is 97%.     Chief Complaint: Cough and Sinus Problem    Cough   This is a new problem. The current episode started in the past 7 days. The problem has been unchanged. The problem occurs hourly. The cough is productive of sputum. Associated symptoms include a fever, headaches, nasal congestion, postnasal drip and wheezing. Pertinent negatives include no chest pain, chills, ear pain, eye redness, myalgias, rash, sore throat or shortness of breath. Nothing aggravates the symptoms. She has tried nothing for the symptoms. Her past medical history is significant for bronchitis and pneumonia.   Sinus Problem   This is a new problem. The current episode started more than 1 month ago. The problem is unchanged. Maximum temperature: 100.0. The fever has been present for 1 to 2 days. Associated symptoms include congestion, coughing, headaches and sneezing. Pertinent negatives include no chills, ear pain, hoarse voice, shortness of breath or sore throat. Past treatments include nothing.     Review of Systems   Constitution: Positive for fever. Negative for chills and malaise/fatigue.   HENT: Positive for congestion, postnasal drip and sneezing. Negative for ear pain, hoarse voice and sore throat.    Eyes: Negative for discharge and redness.   Cardiovascular: Negative for chest pain, dyspnea on exertion and leg swelling.   Respiratory: Positive for cough, sputum production and wheezing. Negative for shortness of breath.    Skin: Negative for color change and rash.   Musculoskeletal: Negative for myalgias.   Gastrointestinal: Negative for abdominal pain, diarrhea, nausea and vomiting.   Neurological: Positive for headaches.       Objective:      Physical " Exam   Constitutional: She is oriented to person, place, and time. She appears well-developed and well-nourished. She does not appear ill. No distress.   HENT:   Head: Normocephalic and atraumatic.   Right Ear: Hearing, tympanic membrane, external ear and ear canal normal.   Left Ear: Hearing, tympanic membrane, external ear and ear canal normal.   Nose: Mucosal edema present. Right sinus exhibits no maxillary sinus tenderness and no frontal sinus tenderness. Left sinus exhibits no maxillary sinus tenderness and no frontal sinus tenderness.   Mouth/Throat: Uvula is midline and oropharynx is clear and moist. No oropharyngeal exudate, posterior oropharyngeal edema or posterior oropharyngeal erythema.   Eyes: Conjunctivae, EOM and lids are normal. Right eye exhibits no discharge. Left eye exhibits no discharge.   Neck: Normal range of motion. Neck supple.   Cardiovascular: Normal rate, regular rhythm and normal heart sounds.  Exam reveals no gallop and no friction rub.    No murmur heard.  Pulmonary/Chest: Effort normal. No respiratory distress. She has wheezes (diffuse). She has rhonchi in the left upper field. She has no rales.   Musculoskeletal: Normal range of motion.   Lymphadenopathy:        Head (right side): No submandibular and no tonsillar adenopathy present.        Head (left side): No submandibular and no tonsillar adenopathy present.   Neurological: She is alert and oriented to person, place, and time.   Skin: Skin is warm and dry. No rash noted. She is not diaphoretic. No erythema. No pallor.   Psychiatric: She has a normal mood and affect. Her behavior is normal.   Nursing note and vitals reviewed.      Assessment:       1. Bronchitis        X-ray Chest Pa And Lateral    Result Date: 4/2/2018  EXAMINATION: XR CHEST PA AND LATERAL CLINICAL HISTORY: Cough TECHNIQUE: PA and lateral views of the chest were performed. COMPARISON: CT thorax 09/27/2016 and chest radiograph 07/29/2013. FINDINGS: No detrimental  change.  Cardiomediastinal silhouette is within normal limits for age and midline without evidence of failure.  Calcific atherosclerosis of the aortic arch.  Bibasilar platelike scarring versus atelectasis.  The lungs are otherwise symmetrically well expanded without large consolidation or definite new focal opacity.  No pleural effusion or pneumothorax.  No acute osseous process seen.     Bibasilar platelike scarring versus atelectasis without detrimental change or radiographic acute intrathoracic process seen.  Specifically, no focal consolidation. Electronically signed by: Tong Cole MD Date:    04/02/2018 Time:    15:31    Plan:       Discussed treatment options with patient. Patient expressed verbal understanding and agreement with treatment plan.     Bronchitis  -     X-Ray Chest PA And Lateral; Future; Expected date: 04/02/2018  -     albuterol 90 mcg/actuation inhaler; Inhale 2 puffs into the lungs every 6 (six) hours as needed for Wheezing. Rescue  Dispense: 18 g; Refill: 0  -     azithromycin (Z-PAULINE) 250 MG tablet; Take 2 tablets by mouth on day 1; Take 1 tablet by mouth on days 2-5  Dispense: 6 tablet; Refill: 0  -     betamethasone acetate-betamethasone sodium phosphate injection 6 mg; Inject 1 mL (6 mg total) into the muscle one time.  -     predniSONE (DELTASONE) 20 MG tablet; Take 2 tablets (40 mg total) by mouth once daily.  Dispense: 8 tablet; Refill: 0      Patient Instructions   -Take oral steroid once daily for 4 days - begin taking steroid tomorrow.  -Please take antibiotic to completion.  -Use inhaler as needed for cough/wheezing.  -Please follow up with your primary care provider or return to clinic for any new or worsening symptoms.    Please follow up with your primary care provider within 2-5 days if your signs and symptoms have not resolved or worsen.     If your condition worsens or fails to improve we recommend that you receive another evaluation at the emergency room immediately or  contact your primary medical clinic to discuss your concerns.   You must understand that you have received an Urgent Care treatment only and that you may be released before all of your medical problems are known or treated. You, the patient, will arrange for follow up care as instructed.         Bronchitis, Antibiotic Treatment (Adult)    Bronchitis is an infection of the air passages (bronchial tubes) in your lungs. It often occurs when you have a cold. This illness is contagious during the first few days and is spread through the air by coughing and sneezing, or by direct contact (touching the sick person and then touching your own eyes, nose, or mouth).  Symptoms of bronchitis include cough with mucus (phlegm) and low-grade fever. Bronchitis usually lasts 7 to 14 days. Mild cases can be treated with simple home remedies. More severe infection is treated with an antibiotic.  Home care  Follow these guidelines when caring for yourself at home:  · If your symptoms are severe, rest at home for the first 2 to 3 days. When you go back to your usual activities, don't let yourself get too tired.  · Do not smoke. Also avoid being exposed to secondhand smoke.  · You may use over-the-counter medicines to control fever or pain, unless another medicine was prescribed. (Note: If you have chronic liver or kidney disease or have ever had a stomach ulcer or gastrointestinal bleeding, talk with your healthcare provider before using these medicines. Also talk to your provider if you are taking medicine to prevent blood clots.) Aspirin should never be given to anyone younger than 18 years of age who is ill with a viral infection or fever. It may cause severe liver or brain damage.  · Your appetite may be poor, so a light diet is fine. Avoid dehydration by drinking 6 to 8 glasses of fluids per day (such as water, soft drinks, sports drinks, juices, tea, or soup). Extra fluids will help loosen secretions in the nose and  lungs.  · Over-the-counter cough, cold, and sore-throat medicines will not shorten the length of the illness, but they may be helpful to reduce symptoms. (Note: Do not use decongestants if you have high blood pressure.)  · Finish all antibiotic medicine. Do this even if you are feeling better after only a few days.  Follow-up care  Follow up with your healthcare provider, or as advised. If you had an X-ray or ECG (electrocardiogram), a specialist will review it. You will be notified of any new findings that may affect your care.  Note: If you are age 65 or older, or if you have a chronic lung disease or condition that affects your immune system, or you smoke, talk to your healthcare provider about having pneumococcal vaccinations and a yearly influenza vaccination (flu shot).  When to seek medical advice  Call your healthcare provider right away if any of these occur:  · Fever of 100.4°F (38°C) or higher  · Coughing up increased amounts of colored sputum  · Weakness, drowsiness, headache, facial pain, ear pain, or a stiff neck  Call 911, or get immediate medical care  Contact emergency services right away if any of these occur.  · Coughing up blood  · Worsening weakness, drowsiness, headache, or stiff neck  · Trouble breathing, wheezing, or pain with breathing  Date Last Reviewed: 9/13/2015  © 5698-4577 [employer]. [address] [place] [address], [place] [address]. All rights reserved. This information is not intended as a substitute for professional medical care. Always follow your healthcare professional's instructions.

## 2018-04-05 ENCOUNTER — TELEPHONE (OUTPATIENT)
Dept: URGENT CARE | Facility: CLINIC | Age: 68
End: 2018-04-05

## 2018-04-06 ENCOUNTER — LAB VISIT (OUTPATIENT)
Dept: LAB | Facility: HOSPITAL | Age: 68
End: 2018-04-06
Attending: FAMILY MEDICINE
Payer: MEDICARE

## 2018-04-06 DIAGNOSIS — E55.9 AVITAMINOSIS D: ICD-10-CM

## 2018-04-06 DIAGNOSIS — E78.2 MIXED HYPERLIPIDEMIA: Primary | ICD-10-CM

## 2018-04-06 DIAGNOSIS — I10 ESSENTIAL HYPERTENSION, MALIGNANT: ICD-10-CM

## 2018-04-06 DIAGNOSIS — E88.810 DYSMETABOLIC SYNDROME X: ICD-10-CM

## 2018-04-06 LAB
25(OH)D3+25(OH)D2 SERPL-MCNC: 32 NG/ML
ALBUMIN SERPL BCP-MCNC: 3.8 G/DL
ALP SERPL-CCNC: 99 U/L
ALT SERPL W/O P-5'-P-CCNC: 18 U/L
ANION GAP SERPL CALC-SCNC: 7 MMOL/L
AST SERPL-CCNC: 14 U/L
BILIRUB SERPL-MCNC: 0.3 MG/DL
BUN SERPL-MCNC: 20 MG/DL
CALCIUM SERPL-MCNC: 9.5 MG/DL
CHLORIDE SERPL-SCNC: 108 MMOL/L
CHOLEST SERPL-MCNC: 144 MG/DL
CHOLEST/HDLC SERPL: 2.5 {RATIO}
CO2 SERPL-SCNC: 26 MMOL/L
CREAT SERPL-MCNC: 0.9 MG/DL
EST. GFR  (AFRICAN AMERICAN): >60 ML/MIN/1.73 M^2
EST. GFR  (NON AFRICAN AMERICAN): >60 ML/MIN/1.73 M^2
ESTIMATED AVG GLUCOSE: 123 MG/DL
GLUCOSE SERPL-MCNC: 105 MG/DL
HBA1C MFR BLD HPLC: 5.9 %
HDLC SERPL-MCNC: 58 MG/DL
HDLC SERPL: 40.3 %
LDLC SERPL CALC-MCNC: 70.2 MG/DL
NONHDLC SERPL-MCNC: 86 MG/DL
POTASSIUM SERPL-SCNC: 4.3 MMOL/L
PROT SERPL-MCNC: 7 G/DL
SODIUM SERPL-SCNC: 141 MMOL/L
T4 FREE SERPL-MCNC: 0.82 NG/DL
TRIGL SERPL-MCNC: 79 MG/DL
TSH SERPL DL<=0.005 MIU/L-ACNC: 0.36 UIU/ML

## 2018-04-06 PROCEDURE — 83036 HEMOGLOBIN GLYCOSYLATED A1C: CPT

## 2018-04-06 PROCEDURE — 84443 ASSAY THYROID STIM HORMONE: CPT

## 2018-04-06 PROCEDURE — 36415 COLL VENOUS BLD VENIPUNCTURE: CPT | Mod: PO

## 2018-04-06 PROCEDURE — 80053 COMPREHEN METABOLIC PANEL: CPT

## 2018-04-06 PROCEDURE — 82306 VITAMIN D 25 HYDROXY: CPT

## 2018-04-06 PROCEDURE — 84439 ASSAY OF FREE THYROXINE: CPT

## 2018-04-06 PROCEDURE — 80061 LIPID PANEL: CPT

## 2018-04-06 NOTE — TELEPHONE ENCOUNTER
SPOKE WITH PT,SHE'S FEELING A LITTLE BETTER,SHE STILL HAS 1 MORE DAY OF MEDICATIONS LEFT. ADVISED PT TO SEE HER  OR RETURN HERE IF NEEDED.MIRA

## 2018-04-24 DIAGNOSIS — J40 BRONCHITIS: ICD-10-CM

## 2018-04-24 RX ORDER — ALBUTEROL SULFATE 90 UG/1
AEROSOL, METERED RESPIRATORY (INHALATION)
Qty: 18 G | Refills: 0 | OUTPATIENT
Start: 2018-04-24

## 2018-06-04 NOTE — PLAN OF CARE
Problem: Patient Care Overview  Goal: Plan of Care Review  Outcome: Ongoing (interventions implemented as appropriate)  Pt AAOx3, VSS, and has pain well controlled with prn meds.  Pt able to tolerate po, void and ambulate without difficulty.  Remains free from falls or injuries at this time, no s/s of distress.  Will continue to monitor.       Writer spoke to Charly Stroud about pt dressing looking better and looks like it only needs daily dressing change. PA stated can do dressing daily and prn for saturation

## 2018-06-06 ENCOUNTER — HOSPITAL ENCOUNTER (OUTPATIENT)
Dept: RADIOLOGY | Facility: HOSPITAL | Age: 68
Discharge: HOME | End: 2018-06-06
Attending: STUDENT IN AN ORGANIZED HEALTH CARE EDUCATION/TRAINING PROGRAM
Payer: MEDICARE

## 2018-06-06 ENCOUNTER — OFFICE VISIT (OUTPATIENT)
Dept: INTERNAL MEDICINE | Facility: CLINIC | Age: 68
End: 2018-06-06
Payer: MEDICARE

## 2018-06-06 VITALS
DIASTOLIC BLOOD PRESSURE: 80 MMHG | WEIGHT: 157.44 LBS | TEMPERATURE: 98 F | BODY MASS INDEX: 29.72 KG/M2 | HEIGHT: 61 IN | HEART RATE: 84 BPM | SYSTOLIC BLOOD PRESSURE: 128 MMHG

## 2018-06-06 DIAGNOSIS — M25.561 ACUTE PAIN OF RIGHT KNEE: ICD-10-CM

## 2018-06-06 DIAGNOSIS — M25.561 ACUTE PAIN OF RIGHT KNEE: Primary | ICD-10-CM

## 2018-06-06 PROCEDURE — 73562 X-RAY EXAM OF KNEE 3: CPT | Mod: 26,RT,, | Performed by: INTERNAL MEDICINE

## 2018-06-06 PROCEDURE — 3074F SYST BP LT 130 MM HG: CPT | Mod: CPTII,S$GLB,, | Performed by: INTERNAL MEDICINE

## 2018-06-06 PROCEDURE — 73562 X-RAY EXAM OF KNEE 3: CPT | Mod: TC,PO,RT

## 2018-06-06 PROCEDURE — 3079F DIAST BP 80-89 MM HG: CPT | Mod: CPTII,S$GLB,, | Performed by: INTERNAL MEDICINE

## 2018-06-06 PROCEDURE — 99999 PR PBB SHADOW E&M-EST. PATIENT-LVL III: CPT | Mod: PBBFAC,,,

## 2018-06-06 PROCEDURE — 99213 OFFICE O/P EST LOW 20 MIN: CPT | Mod: S$GLB,,, | Performed by: INTERNAL MEDICINE

## 2018-06-06 RX ORDER — DICLOFENAC SODIUM 50 MG/1
50 TABLET, DELAYED RELEASE ORAL 2 TIMES DAILY
Qty: 30 TABLET | Refills: 1 | Status: SHIPPED | OUTPATIENT
Start: 2018-06-06 | End: 2018-07-03 | Stop reason: HOSPADM

## 2018-06-06 NOTE — PROGRESS NOTES
HPI   Ms Matias is an 67 yo woman with history of diverticulitis here for right knee pain.     She reports having acute knee pain.  This all started 6/4/18.  She initially heard a click, just walking at home.  This noise stopped her.  She was unable to flex her knee.  She took 2 Alieve. She has also tried to elevate her leg. It has not improved.      She reported that the medial side of her R knee is tender to palpation.  At rest pain is 2/10.  Walking pain is 7/10.  Pain is not constant, and she reports some relief when not ambulating. When she holds her leg stiff without walking, she does not have pain.     Review of Systems   Constitutional: Negative for fever.   Eyes: Negative for visual disturbance.   Respiratory: Negative for shortness of breath.    Cardiovascular: Negative for chest pain.   Gastrointestinal: Negative for abdominal pain and denies diarrhea.  Genitourinary: Negative for difficulty urinating.   Musculoskeletal: Positive for gait problem and no joint swelling. Negative for arthralgias, back pain and myalgias.   Skin: Negative for rash.   Allergic/Immunologic: Negative for immunocompromised state.   Neurological: Negative for syncope.   Hematological: Does not bruise/bleed easily.       Objective:   Physical Exam   Constitutional: She is oriented to person, place, and time. She appears well-developed. No distress.   HENT:   Head: Normocephalic.   Eyes: Pupils are equal, round, and reactive to light. No scleral icterus.   Neck: No JVD present.   Cardiovascular: Normal rate.    Pulmonary/Chest: Breath sounds normal.   Abdominal: Soft.   Musculoskeletal: Normal range of motion. She exhibits edema and tenderness. She exhibits no deformity.   decreased with passive and active movement  Point tenderness to patella and to medial joint line  No significant swelling.  Pulses and sensation intact.   Neurological: She is alert and oriented to person, place, and time.   Psychiatric: She has a normal mood and  affect.   Vitals reviewed.      Assessment:       1. Acute pain of RIGHT knee                Plan:       1. Acute Pain of the Right Knee  Assessment  -most likely 2/2 to traumatic injury  -ddx include soft tissue injury (most likely a meniscal injury, but could be a MCL injury)  - No concern at this time for infection, septic joint.     Plan  - NSAIDs for pain control  - will provide an Rx for Diclofenac 50mg BID (30 tabs)  - Plain film Xray of right knee here in office  - Referral to Orthopedic surgery  - Defer advanced imaging such as MRI to orthopedics  - Rest, Ice, Elevation.      Case discussed with Dr. Reyes Irwin MD  Internal Medicine, PGY2  Pager [phone]

## 2018-06-11 ENCOUNTER — OFFICE VISIT (OUTPATIENT)
Dept: SPORTS MEDICINE | Facility: CLINIC | Age: 68
End: 2018-06-11
Payer: MEDICARE

## 2018-06-11 ENCOUNTER — HOSPITAL ENCOUNTER (OUTPATIENT)
Dept: RADIOLOGY | Facility: HOSPITAL | Age: 68
Discharge: HOME | End: 2018-06-11
Attending: ORTHOPAEDIC SURGERY
Payer: MEDICARE

## 2018-06-11 VITALS
WEIGHT: 153 LBS | HEART RATE: 84 BPM | HEIGHT: 61 IN | BODY MASS INDEX: 28.89 KG/M2 | DIASTOLIC BLOOD PRESSURE: 74 MMHG | SYSTOLIC BLOOD PRESSURE: 146 MMHG

## 2018-06-11 DIAGNOSIS — M25.561 RIGHT KNEE PAIN, UNSPECIFIED CHRONICITY: ICD-10-CM

## 2018-06-11 DIAGNOSIS — M25.561 RIGHT KNEE PAIN, UNSPECIFIED CHRONICITY: Primary | ICD-10-CM

## 2018-06-11 PROCEDURE — 73564 X-RAY EXAM KNEE 4 OR MORE: CPT | Mod: 26,RT,, | Performed by: RADIOLOGY

## 2018-06-11 PROCEDURE — 99204 OFFICE O/P NEW MOD 45 MIN: CPT | Mod: S$GLB,,, | Performed by: ORTHOPAEDIC SURGERY

## 2018-06-11 PROCEDURE — 73562 X-RAY EXAM OF KNEE 3: CPT | Mod: TC,59,FY,PO,LT

## 2018-06-11 PROCEDURE — 99999 PR PBB SHADOW E&M-EST. PATIENT-LVL III: CPT | Mod: PBBFAC,,, | Performed by: ORTHOPAEDIC SURGERY

## 2018-06-11 PROCEDURE — 3077F SYST BP >= 140 MM HG: CPT | Mod: CPTII,S$GLB,, | Performed by: ORTHOPAEDIC SURGERY

## 2018-06-11 PROCEDURE — 73562 X-RAY EXAM OF KNEE 3: CPT | Mod: 26,XS,LT, | Performed by: RADIOLOGY

## 2018-06-11 PROCEDURE — 3078F DIAST BP <80 MM HG: CPT | Mod: CPTII,S$GLB,, | Performed by: ORTHOPAEDIC SURGERY

## 2018-06-11 NOTE — LETTER
June 11, 2018      YOSI Chambers MD  [address] [facility] [place]  [place] [address]           [address] [address]  Phone: [phone]          Patient: Jolly Matias   MR Number: [id number]   YOB: 1950   Date of Visit: 6/11/2018       Dear Dr. YOSI Chambers:    Thank you for referring Jolly Matias to me for evaluation. Attached you will find relevant portions of my assessment and plan of care.    If you have questions, please do not hesitate to call me. I look forward to following Jolly Matias along with you.    Sincerely,    MARVA Arias MD    Enclosure  CC:  No Recipients    If you would like to receive this communication electronically, please contact [email or web address] or [phone] to request more information on [employer] Link access.    For providers and/or their staff who would like to refer a patient to [facility], please contact us through our one-stop-shop provider referral line, [facility], at [phone].    If you feel you have received this communication in error or would no longer like to receive these types of communications, please e-mail [email or web address]

## 2018-06-11 NOTE — PROGRESS NOTES
"CC: Right knee pain    68 y.o. Female who presents as a new patient to me. She is retired from [employer]. Complaint is right knee medially based mechanical symptoms x 1 year which she describes as intermittent painful "catching" and "locking". Denies history of discrete injury or trauma. Symptoms were tolerable until about 1 week ago when she states the knee got stuck in the approximately 20-30 degree flexed position. Pain and symptoms are predominately medially based, with an occasional pain deep posteriorly. Denies swelling or effusions. Worse with prolonged walking, deep flexion.  Better with rest. Treatment thus far has included rest, activity modifications, oral medications. Here today to discuss diagnosis and treatment options.       Negative for tobacco.   Borderline diabetic     Pain Score:   2    REVIEW OF SYSTEMS:   Constitution: Negative. Negative for chills, fever and night sweats.    Hematologic/Lymphatic: Negative for bleeding problem. Does not bruise/bleed easily.   Skin: Negative for dry skin, itching and rash.   Musculoskeletal: Negative for falls. Positive for right knee pain and  muscle weakness.     PAST MEDICAL HISTORY:   Past Medical History:   Diagnosis Date    Atypical ductal hyperplasia, breast     Basal cell carcinoma 2011    left forehead    Basal cell carcinoma 2015    R temporal scalp, R upper forehead, L upper forehead    Basal cell carcinoma 2015    right mid ala    BCC (basal cell carcinoma) excised     right shoulder    Diabetes mellitus     Diverticulitis     Fibrocystic breast     HLD (hyperlipidemia)     Hypertension     Prediabetes 10/16/2013    Skin cancer        PAST SURGICAL HISTORY:   Past Surgical History:   Procedure Laterality Date    APPENDECTOMY      bilateral breast duct excision      BREAST BIOPSY Right     BREAST BIOPSY Left      SECTION      x2    COLON SURGERY Left 2017    sigmoidectomy for diverticulitis    COLONOSCOPY N/A " 1/16/2017    Procedure: COLONOSCOPY;  Surgeon: IBRAHIMA Philip MD;  Location: [facility] ([address] [place] [address]);  Service: Endoscopy;  Laterality: N/A;    fulgaration of endometriosis x 2      HYSTERECTOMY      For endometriosis and DUB--age 42, ovaries in?    INCISIONAL HERNIA REPAIR  08/2017    OOPHORECTOMY      SKIN CANCER EXCISION      BCC forehead , temple, shoulder, chest    TONSILLECTOMY         FAMILY HISTORY:   Family History   Problem Relation Age of Onset    Skin cancer Father     Hypertension Father     Heart disease Father     Diabetes Father     Melanoma Father     Cancer Father     Skin cancer Mother     Hypertension Mother     Thyroid disease Mother     Dementia Mother     Hypertension Sister     Hyperlipidemia Sister     Anxiety disorder Daughter     Hypertension Son     Hyperlipidemia Son     Breast cancer Maternal Aunt     Diabetes Maternal Uncle        SOCIAL HISTORY:   Social History     Social History    Marital status:      Spouse name: N/A    Number of children: N/A    Years of education: N/A     Occupational History    Not on file.     Social History Main Topics    Smoking status: Never Smoker    Smokeless tobacco: Never Used    Alcohol use 0.0 oz/week     1 - 2 Glasses of wine per week      Comment: 3-4x /week    Drug use: No    Sexual activity: Yes     Partners: Male     Birth control/ protection: Post-menopausal     Other Topics Concern    Are You Pregnant Or Think You May Be? No    Breast-Feeding No     Social History Narrative    , walking some       MEDICATIONS:     Current Outpatient Prescriptions:     albuterol 90 mcg/actuation inhaler, Inhale 2 puffs into the lungs every 6 (six) hours as needed for Wheezing. Rescue, Disp: 18 g, Rfl: 0    atorvastatin (LIPITOR) 20 MG tablet, Take 1 tablet (20 mg total) by mouth once daily., Disp: 90 tablet, Rfl: 2    cyanocobalamin (VITAMIN B-12) 1000 MCG tablet, Take 100 mcg by mouth once daily., Disp: , Rfl:  "    diclofenac (VOLTAREN) 50 MG EC tablet, Take 1 tablet (50 mg total) by mouth 2 (two) times daily., Disp: 30 tablet, Rfl: 1    flaxseed oil 1,000 mg Cap, Take 1 capsule by mouth once daily.  , Disp: , Rfl:     ketoconazole (NIZORAL) 2 % cream, AAA bid under arm, Disp: 45 g, Rfl: 3    losartan (COZAAR) 100 MG tablet, 100 mg once daily. , Disp: , Rfl:     metFORMIN (GLUCOPHAGE) 500 MG tablet, Take 1 tablet (500 mg total) by mouth 2 (two) times daily with meals., Disp: 180 tablet, Rfl: 2    sertraline (ZOLOFT) 100 MG tablet, Take 1 tablet (100 mg total) by mouth once daily., Disp: 90 tablet, Rfl: 2    spironolactone (ALDACTONE) 25 MG tablet, TAKE 1 TABLET(25 MG) BY MOUTH EVERY DAY, Disp: 90 tablet, Rfl: 1    vitamin D 1000 units Tab, Take 185 mg by mouth once daily., Disp: , Rfl:     ALLERGIES:   Review of patient's allergies indicates:   Allergen Reactions    Hydrocodone      Also makes pt "very sleepy"    Kiwi (actinidia chinensis) Swelling        PHYSICAL EXAMINATION:  BP (!) 146/74   Pulse 84   Ht 5' 1" (1.549 m)   Wt 69.4 kg (153 lb)   LMP 01/01/1991   BMI 28.91 kg/m²   General: Well-developed well-nourished 68 y.o. femalein no acute distress   Cardiovascular: Regular rhythm by palpation of distal pulse, normal color and temperature, no concerning varicosities on symptomatic side   Lungs: No labored breathing or wheezing appreciated   Neuro: Alert and oriented ×3   Psychiatric: well oriented to person, place and time, demonstrates normal mood and affect   Skin: No rashes, lesions or ulcers, normal temperature, turgor, and texture on involved extremity    Ortho/SPM Exam  Focal exam of the right knee shows no swelling/effusion. Intact ext mechanism. Central patellar track. Neg patellar apprehension. Prominent medial joint line TTP. Positive McMurrays for pain and apprehension medially. Neg patellar grind. Lig stable. Neg pes. NVI. Mild pain with forced ext. Neg with forced flexion. "     IMAGING:  X-rays including standing, weight bearing AP and flexion bilateral knees, RIGHT knee lateral and sunrise views ordered and images reviewed by me show:    Well maintained joint spaces. Mild degenerative changes.     ASSESSMENT:      ICD-10-CM ICD-9-CM   1. Right knee pain, unspecified chronicity M25.561 719.46     Exam and history suggestive of unstable MMT    PLAN:    Findings were discussed with the patient. Prominent painful medially based mechanical sxs x 1 yr despite prior conservative tx. MRI is recommended to assess for possible MMT, internal derangement. RTC after study to discuss results and tx options.       Procedures

## 2018-06-13 ENCOUNTER — HOSPITAL ENCOUNTER (OUTPATIENT)
Dept: RADIOLOGY | Facility: HOSPITAL | Age: 68
Discharge: HOME | End: 2018-06-13
Attending: ORTHOPAEDIC SURGERY
Payer: MEDICARE

## 2018-06-13 DIAGNOSIS — M25.561 RIGHT KNEE PAIN, UNSPECIFIED CHRONICITY: ICD-10-CM

## 2018-06-13 DIAGNOSIS — I10 ESSENTIAL HYPERTENSION: ICD-10-CM

## 2018-06-13 PROCEDURE — 73721 MRI JNT OF LWR EXTRE W/O DYE: CPT | Mod: TC,RT

## 2018-06-13 PROCEDURE — 73721 MRI JNT OF LWR EXTRE W/O DYE: CPT | Mod: 26,RT,, | Performed by: RADIOLOGY

## 2018-06-13 NOTE — PROGRESS NOTES
"CC: Right knee pain    68 y.o. Female who is here today to review the MRI and discuss treatment options. She is retired from [employer]. No new complaints.     Previous History: Complaint is right knee medially based mechanical symptoms x 1 year which she describes as intermittent painful "catching" and "locking". Denies history of discrete injury or trauma. Symptoms were tolerable until about 1 week ago when she states the knee got stuck in the approximately 20-30 degree flexed position. Pain and symptoms are predominately medially based, with an occasional pain deep posteriorly. Denies swelling or effusions. Worse with prolonged walking, deep flexion.  Better with rest. Treatment thus far has included rest, activity modifications, oral medications.      Negative for tobacco.   Borderline diabetic     Pain Score:   5    REVIEW OF SYSTEMS:   Constitution: Negative. Negative for chills, fever and night sweats.    Hematologic/Lymphatic: Negative for bleeding problem. Does not bruise/bleed easily.   Skin: Negative for dry skin, itching and rash.   Musculoskeletal: Negative for falls. Positive for right knee pain and  muscle weakness.     PAST MEDICAL HISTORY:   Past Medical History:   Diagnosis Date    Atypical ductal hyperplasia, breast     Basal cell carcinoma 2011    left forehead    Basal cell carcinoma 2015    R temporal scalp, R upper forehead, L upper forehead    Basal cell carcinoma 2015    right mid ala    BCC (basal cell carcinoma) excised     right shoulder    Diabetes mellitus     Diverticulitis     Fibrocystic breast     HLD (hyperlipidemia)     Hypertension     Prediabetes 10/16/2013    Skin cancer        PAST SURGICAL HISTORY:   Past Surgical History:   Procedure Laterality Date    APPENDECTOMY      bilateral breast duct excision      BREAST BIOPSY Right     BREAST BIOPSY Left      SECTION      x2    COLON SURGERY Left 2017    sigmoidectomy for diverticulitis    " COLONOSCOPY N/A 1/16/2017    Procedure: COLONOSCOPY;  Surgeon: IBRAHIMA Philip MD;  [facility] ([address] [place] [address]);  Service: Endoscopy;  Laterality: N/A;    fulgaration of endometriosis x 2      HYSTERECTOMY      For endometriosis and DUB--age 42, ovaries in?    INCISIONAL HERNIA REPAIR  08/2017    OOPHORECTOMY      SKIN CANCER EXCISION      BCC forehead , temple, shoulder, chest    TONSILLECTOMY         FAMILY HISTORY:   Family History   Problem Relation Age of Onset    Skin cancer Father     Hypertension Father     Heart disease Father     Diabetes Father     Melanoma Father     Cancer Father     Skin cancer Mother     Hypertension Mother     Thyroid disease Mother     Dementia Mother     Hypertension Sister     Hyperlipidemia Sister     Anxiety disorder Daughter     Hypertension Son     Hyperlipidemia Son     Breast cancer Maternal Aunt     Diabetes Maternal Uncle        SOCIAL HISTORY:   Social History     Social History    Marital status:      Spouse name: N/A    Number of children: N/A    Years of education: N/A     Occupational History    Not on file.     Social History Main Topics    Smoking status: Never Smoker    Smokeless tobacco: Never Used    Alcohol use 0.0 oz/week     1 - 2 Glasses of wine per week      Comment: 3-4x /week    Drug use: No    Sexual activity: Yes     Partners: Male     Birth control/ protection: Post-menopausal     Other Topics Concern    Are You Pregnant Or Think You May Be? No    Breast-Feeding No     Social History Narrative    , walking some       MEDICATIONS:     Current Outpatient Prescriptions:     albuterol 90 mcg/actuation inhaler, Inhale 2 puffs into the lungs every 6 (six) hours as needed for Wheezing. Rescue, Disp: 18 g, Rfl: 0    atorvastatin (LIPITOR) 20 MG tablet, Take 1 tablet (20 mg total) by mouth once daily., Disp: 90 tablet, Rfl: 2    cyanocobalamin (VITAMIN B-12) 1000 MCG tablet, Take 100 mcg by mouth once daily.,  "Disp: , Rfl:     diclofenac (VOLTAREN) 50 MG EC tablet, Take 1 tablet (50 mg total) by mouth 2 (two) times daily., Disp: 30 tablet, Rfl: 1    flaxseed oil 1,000 mg Cap, Take 1 capsule by mouth once daily.  , Disp: , Rfl:     ketoconazole (NIZORAL) 2 % cream, AAA bid under arm, Disp: 45 g, Rfl: 3    losartan (COZAAR) 100 MG tablet, 100 mg once daily. , Disp: , Rfl:     metFORMIN (GLUCOPHAGE) 500 MG tablet, Take 1 tablet (500 mg total) by mouth 2 (two) times daily with meals., Disp: 180 tablet, Rfl: 2    sertraline (ZOLOFT) 100 MG tablet, Take 1 tablet (100 mg total) by mouth once daily., Disp: 90 tablet, Rfl: 2    spironolactone (ALDACTONE) 25 MG tablet, TAKE 1 TABLET(25 MG) BY MOUTH EVERY DAY, Disp: 90 tablet, Rfl: 1    vitamin D 1000 units Tab, Take 185 mg by mouth once daily., Disp: , Rfl:     ALLERGIES:   Review of patient's allergies indicates:   Allergen Reactions    Hydrocodone      Also makes pt "very sleepy"    Kiwi (actinidia chinensis) Swelling        PHYSICAL EXAMINATION:  /87   Pulse 78   Ht 5' 1" (1.549 m)   Wt 69.4 kg (153 lb)   LMP 01/01/1991   BMI 28.91 kg/m²   General: Well-developed well-nourished 68 y.o. femalein no acute distress   Cardiovascular: Regular rhythm by palpation of distal pulse, normal color and temperature, no concerning varicosities on symptomatic side   Lungs: No labored breathing or wheezing appreciated   Neuro: Alert and oriented ×3   Psychiatric: well oriented to person, place and time, demonstrates normal mood and affect   Skin: No rashes, lesions or ulcers, normal temperature, turgor, and texture on involved extremity    Ortho/SPM Exam  Focal exam of the right knee shows no swelling/effusion. Intact ext mechanism. Central patellar track. Neg patellar apprehension. Prominent medial>lateral joint line TTP.  Positive McMurrays for pain and apprehension medially. Neg patellar grind. Lig stable. Neg pes. NVI. Mild pain with forced ext. Neg with forced flexion. "     IMAGING:  X-rays including standing, weight bearing AP and flexion bilateral knees, RIGHT knee lateral and sunrise views ordered and images reviewed by me show:    Well maintained joint spaces. Mild degenerative changes.     MRI R Knee reviewed by me and discussed with the patient. Images show:    Tricompartment cartilage loss/ osteoarthritis, most prominent in the patellofemoral compartment, as above.    Complex anterior horn lateral meniscal tear and small nondisplaced medial meniscal tear.    Small partially ruptured Baker's cyst and small joint effusion.    No significant bone marrow stress reaction.    ASSESSMENT:      ICD-10-CM ICD-9-CM   1. Complex tear of lateral meniscus of left knee as current injury, initial encounter S83.272A 836.1   2. Complex tear of medial meniscus of left knee as current injury, initial encounter S83.232A 836.0   3. Chondromalacia of knee, left M94.262 717.7       PLAN:    -Findings and treatment options were discussed with the patient, both operative and non operative.  The patient has had pain and problems for at least a year. Mechanical symptoms are prominent.  She wishes to proceed with surgery.  The patient understands the inherent risk for continued or recurrent arthritic complaints associated with known chondromalacia/DJD  -We will proceed with a R Knee arthroscopic partial medial and lateral meniscectomies, chondroplasty, parapatellar releases as indicated   -Planned DOS for 7/6/2018  -Will RTC for preoperative appointment.  She will need preoperative medical clearance.  -All questions answered    Informed Consent:    The details of the surgical procedure were explained, including the location of probable incisions and a description of possible hardware and/or grafts to be used. We also discussed the potential benefit of Amniox tissue biologic augmentation with associated literature support, theoretical risks and benefits. Alternatives to both operative and non-operative  options with associated risks and benefits were discussed. The patient understands the likely convalescence after surgery and, in particular, the expected postop rehab and recovery course. The outlined risks and potential complications of the proposed procedure include but are not limited to: infection, poor wound healing, scarring, deformity, stiffness, swelling, continued or recurrent pain, instability, hardware or prosthetic failure if implanted, symptomatic hardware requiring removal, weakness, neurovascular injury, numbness, chronic regional pain disorder, tissue nonhealing/irreparability/retear, subsequent contralateral limb injury or pathology, chondral injury, arthritis, fracture, blood clot formation, inability to return to previous level of activity, anesthetic or regional block complication up to death, need for additional procedure as indicated intraoperatively, and potential need for further surgery.    The patient was also informed and understands that the risks of surgery are greater for patients with a current condition or history of heart disease, obesity, clotting disorders, recurrent infections, steroid use, current or past smoking, and factors such as sedentary lifestyle and noncompliance with medications, therapy or follow-up. The degree of the increased risk is hard to estimate with any degree of precision. If applicable, smoking cessation was discussed.     All questions were answered. The patient has verbalized understanding of these issues and wishes to proceed with the surgery as discussed.    Procedures

## 2018-06-14 ENCOUNTER — TELEPHONE (OUTPATIENT)
Dept: FAMILY MEDICINE | Facility: CLINIC | Age: 68
End: 2018-06-14

## 2018-06-14 ENCOUNTER — OFFICE VISIT (OUTPATIENT)
Dept: SPORTS MEDICINE | Facility: CLINIC | Age: 68
End: 2018-06-14
Payer: MEDICARE

## 2018-06-14 VITALS
SYSTOLIC BLOOD PRESSURE: 136 MMHG | WEIGHT: 153 LBS | DIASTOLIC BLOOD PRESSURE: 87 MMHG | HEIGHT: 61 IN | HEART RATE: 78 BPM | BODY MASS INDEX: 28.89 KG/M2

## 2018-06-14 DIAGNOSIS — S83.272A COMPLEX TEAR OF LATERAL MENISCUS OF LEFT KNEE AS CURRENT INJURY, INITIAL ENCOUNTER: Primary | ICD-10-CM

## 2018-06-14 DIAGNOSIS — M23.200 OLD TEAR OF LATERAL MENISCUS OF RIGHT KNEE, UNSPECIFIED TEAR TYPE: Primary | ICD-10-CM

## 2018-06-14 DIAGNOSIS — M94.262 CHONDROMALACIA OF KNEE, LEFT: ICD-10-CM

## 2018-06-14 DIAGNOSIS — I72.8 SPLENIC ARTERY ANEURYSM: Primary | ICD-10-CM

## 2018-06-14 DIAGNOSIS — M94.261 CHONDROMALACIA OF RIGHT KNEE: ICD-10-CM

## 2018-06-14 DIAGNOSIS — S83.232A COMPLEX TEAR OF MEDIAL MENISCUS OF LEFT KNEE AS CURRENT INJURY, INITIAL ENCOUNTER: ICD-10-CM

## 2018-06-14 DIAGNOSIS — R19.07 GENERALIZED ABDOMINAL MASS: ICD-10-CM

## 2018-06-14 PROCEDURE — 99214 OFFICE O/P EST MOD 30 MIN: CPT | Mod: S$GLB,,, | Performed by: ORTHOPAEDIC SURGERY

## 2018-06-14 PROCEDURE — 3079F DIAST BP 80-89 MM HG: CPT | Mod: CPTII,S$GLB,, | Performed by: ORTHOPAEDIC SURGERY

## 2018-06-14 PROCEDURE — 3075F SYST BP GE 130 - 139MM HG: CPT | Mod: CPTII,S$GLB,, | Performed by: ORTHOPAEDIC SURGERY

## 2018-06-14 PROCEDURE — 99999 PR PBB SHADOW E&M-EST. PATIENT-LVL III: CPT | Mod: PBBFAC,,, | Performed by: ORTHOPAEDIC SURGERY

## 2018-06-14 RX ORDER — SPIRONOLACTONE 25 MG/1
TABLET ORAL
Qty: 90 TABLET | Refills: 1 | Status: SHIPPED | OUTPATIENT
Start: 2018-06-14 | End: 2020-03-24 | Stop reason: SDUPTHER

## 2018-06-14 NOTE — TELEPHONE ENCOUNTER
----- Message from Lni Red sent at 6/14/2018  1:31 PM CDT -----  Good Afternoon,     The attached patient is shared by you and Dr. Arias.     The patient is scheduled for knee arthroscopic surgery on 7/6/2018.     Medical clearance is indicated prior to this. Would you be able to help the patient in that regard? I know she just saw your team in January. The anesthesia team will require documentation in the chart regarding her clearance status and perioperative medical recommendations.    Thank you for your help & let me know if I can assist in any way!    Lin Red MA, [facility]  Sports Medicine Assistant to Dr. Arias

## 2018-06-14 NOTE — TELEPHONE ENCOUNTER
Please notify pt that Ideally I would like to see her and recheck her blood pressure and review things with her before her surgery.  Also when she was in last in January she complained of some occasional chest pains and looking back in her records she is due for a follow up regarding the splenic artery aneurysm. I have ordered a ct scan Id like her to have done next week for that and then I'd like to see her to review that and do her pre-op clearance.

## 2018-06-15 ENCOUNTER — TELEPHONE (OUTPATIENT)
Dept: FAMILY MEDICINE | Facility: CLINIC | Age: 68
End: 2018-06-15

## 2018-06-15 NOTE — TELEPHONE ENCOUNTER
Patient informed as instructed. Verbalizes understanding. Transferred to referral coordinator for scheduling.

## 2018-06-15 NOTE — TELEPHONE ENCOUNTER
----- Message from Zaina Braxton sent at 6/15/2018  1:07 PM CDT -----  Contact: Patient [phone]  Patient wants to know why she needs a CAT scan.    Please call and advise.    Thank You

## 2018-06-19 ENCOUNTER — TELEPHONE (OUTPATIENT)
Dept: FAMILY MEDICINE | Facility: CLINIC | Age: 68
End: 2018-06-19

## 2018-06-19 NOTE — TELEPHONE ENCOUNTER
----- Message from Irene Childers sent at 6/19/2018 11:40 AM CDT -----  Contact: Self [phone]  Patient would like a call back concerning her ct scan. Patient states her insurance did not receive the paperwork in time and they did not accept the procedure. Patient would like to know what she needs to do next.

## 2018-06-19 NOTE — TELEPHONE ENCOUNTER
Patient advised that a message has been sent to the referral authorizations department by our referral coordinator to find out what is going on with the CT approval or denial. Advised the patient that once we find out she will be contacted.

## 2018-06-19 NOTE — TELEPHONE ENCOUNTER
Patient advised that [facility] has a 7-14 processing time frame for the tests & the CTA did not meet the qualifications to be expedited. Patient transferred to the referral coordinator for rescheduling.

## 2018-06-28 ENCOUNTER — OFFICE VISIT (OUTPATIENT)
Dept: SPORTS MEDICINE | Facility: CLINIC | Age: 68
End: 2018-06-28
Payer: MEDICARE

## 2018-06-28 VITALS
WEIGHT: 153 LBS | DIASTOLIC BLOOD PRESSURE: 75 MMHG | HEART RATE: 79 BPM | SYSTOLIC BLOOD PRESSURE: 132 MMHG | HEIGHT: 61 IN | BODY MASS INDEX: 28.89 KG/M2

## 2018-06-28 DIAGNOSIS — M23.200 OTHER OLD TEAR OF LATERAL MENISCUS OF RIGHT KNEE: ICD-10-CM

## 2018-06-28 DIAGNOSIS — M94.261 CHONDROMALACIA OF RIGHT KNEE: Primary | ICD-10-CM

## 2018-06-28 PROCEDURE — 99999 PR PBB SHADOW E&M-EST. PATIENT-LVL IV: CPT | Mod: PBBFAC,,, | Performed by: PHYSICIAN ASSISTANT

## 2018-06-28 PROCEDURE — 99499 UNLISTED E&M SERVICE: CPT | Mod: S$GLB,,, | Performed by: PHYSICIAN ASSISTANT

## 2018-06-28 RX ORDER — HYDROCODONE BITARTRATE AND ACETAMINOPHEN 5; 325 MG/1; MG/1
1 TABLET ORAL EVERY 6 HOURS PRN
Qty: 20 TABLET | Refills: 0 | Status: SHIPPED | OUTPATIENT
Start: 2018-06-28 | End: 2018-07-03

## 2018-06-28 RX ORDER — ONDANSETRON 4 MG/1
4 TABLET, FILM COATED ORAL EVERY 8 HOURS PRN
Qty: 30 TABLET | Refills: 0 | Status: SHIPPED | OUTPATIENT
Start: 2018-06-28 | End: 2018-07-05

## 2018-06-28 RX ORDER — ASPIRIN 325 MG
325 TABLET, DELAYED RELEASE (ENTERIC COATED) ORAL 2 TIMES DAILY
Qty: 28 TABLET | Refills: 0 | Status: SHIPPED | OUTPATIENT
Start: 2018-06-28 | End: 2019-08-20 | Stop reason: ALTCHOICE

## 2018-06-28 NOTE — H&P
Jolly Matias  is here for a completion of her perioperative paperwork. she  Is scheduled to undergo  Right Knee arthroscopic partial medial and lateral meniscectomies, chondroplasty, parapatellar releases as indicated, possible Amniox arthrocentesis on 2018.  She is a healthy individual and does need clearance for this procedure. Pending clearance by Dr. Lee. Appt is 2018.    Risks, indications and benefits of the surgical procedure were discussed with the patient. All questions with regard to surgery, rehab, expected return to functional activities, activities of daily living and recreational endeavors were answered to her satisfaction.    Patient was informed and understands the risks of surgery are greater for patients with a current condition or hx of heart disease, obesity, clotting disorders, recurrent infections, steroid use, current or past smoking, and factors such as sedentary lifestyle and noncompliance with medications, therapy or f/u. The degree of the increased risk is hard to estimate w/ any degree of precision.    Once no other questions were asked, a brief history and physical exam was then performed.    PAST MEDICAL HISTORY:   Past Medical History:   Diagnosis Date    Atypical ductal hyperplasia, breast     Basal cell carcinoma 2011    left forehead    Basal cell carcinoma 2015    R temporal scalp, R upper forehead, L upper forehead    Basal cell carcinoma 2015    right mid ala    BCC (basal cell carcinoma) excised     right shoulder    Diabetes mellitus     Diverticulitis     Fibrocystic breast     HLD (hyperlipidemia)     Hypertension     Prediabetes 10/16/2013    Skin cancer      PAST SURGICAL HISTORY:   Past Surgical History:   Procedure Laterality Date    APPENDECTOMY      bilateral breast duct excision      BREAST BIOPSY Right     BREAST BIOPSY Left      SECTION      x2    COLON SURGERY Left 2017    sigmoidectomy for diverticulitis     COLONOSCOPY N/A 1/16/2017    Procedure: COLONOSCOPY;  Surgeon: IBRAHIMA Philip MD;  Location: [facility] ([address] [place] [address]);  Service: Endoscopy;  Laterality: N/A;    fulgaration of endometriosis x 2      HYSTERECTOMY      For endometriosis and DUB--age 42, ovaries in?    INCISIONAL HERNIA REPAIR  08/2017    OOPHORECTOMY      SKIN CANCER EXCISION      BCC forehead , temple, shoulder, chest    TONSILLECTOMY       FAMILY HISTORY:   Family History   Problem Relation Age of Onset    Skin cancer Father     Hypertension Father     Heart disease Father     Diabetes Father     Melanoma Father     Cancer Father     Skin cancer Mother     Hypertension Mother     Thyroid disease Mother     Dementia Mother     Hypertension Sister     Hyperlipidemia Sister     Anxiety disorder Daughter     Hypertension Son     Hyperlipidemia Son     Breast cancer Maternal Aunt     Diabetes Maternal Uncle      SOCIAL HISTORY:   Social History     Social History    Marital status:      Spouse name: N/A    Number of children: N/A    Years of education: N/A     Occupational History    Not on file.     Social History Main Topics    Smoking status: Never Smoker    Smokeless tobacco: Never Used    Alcohol use 0.0 oz/week     1 - 2 Glasses of wine per week      Comment: 3-4x /week    Drug use: No    Sexual activity: Yes     Partners: Male     Birth control/ protection: Post-menopausal     Other Topics Concern    Are You Pregnant Or Think You May Be? No    Breast-Feeding No     Social History Narrative    , walking some       MEDICATIONS:   Current Outpatient Prescriptions:     atorvastatin (LIPITOR) 20 MG tablet, Take 1 tablet (20 mg total) by mouth once daily., Disp: 90 tablet, Rfl: 2    cyanocobalamin (VITAMIN B-12) 1000 MCG tablet, Take 100 mcg by mouth once daily., Disp: , Rfl:     flaxseed oil 1,000 mg Cap, Take 1 capsule by mouth once daily.  , Disp: , Rfl:     losartan (COZAAR) 100 MG tablet, 100 mg once  "daily. , Disp: , Rfl:     metFORMIN (GLUCOPHAGE) 500 MG tablet, Take 1 tablet (500 mg total) by mouth 2 (two) times daily with meals., Disp: 180 tablet, Rfl: 2    sertraline (ZOLOFT) 100 MG tablet, Take 1 tablet (100 mg total) by mouth once daily., Disp: 90 tablet, Rfl: 2    spironolactone (ALDACTONE) 25 MG tablet, TAKE 1 TABLET(25 MG) BY MOUTH EVERY DAY, Disp: 90 tablet, Rfl: 1    vitamin D 1000 units Tab, Take 185 mg by mouth once daily., Disp: , Rfl:     albuterol 90 mcg/actuation inhaler, Inhale 2 puffs into the lungs every 6 (six) hours as needed for Wheezing. Rescue, Disp: 18 g, Rfl: 0    diclofenac (VOLTAREN) 50 MG EC tablet, Take 1 tablet (50 mg total) by mouth 2 (two) times daily., Disp: 30 tablet, Rfl: 1    ketoconazole (NIZORAL) 2 % cream, AAA bid under arm, Disp: 45 g, Rfl: 3  ALLERGIES:   Review of patient's allergies indicates:   Allergen Reactions    Hydrocodone      Also makes pt "very sleepy"    Kiwi (actinidia chinensis) Swelling       Review of Systems   Constitution: Negative. Negative for chills, fever and night sweats.   HENT: Negative for congestion and headaches.    Eyes: Negative for blurred vision, left vision loss and right vision loss.   Cardiovascular: Negative for chest pain and syncope.   Respiratory: Negative for cough and shortness of breath.    Endocrine: Negative for polydipsia, polyphagia and polyuria.   Hematologic/Lymphatic: Negative for bleeding problem. Does not bruise/bleed easily.   Skin: Negative for dry skin, itching and rash.   Musculoskeletal: Negative for falls and muscle weakness.   Gastrointestinal: Negative for abdominal pain and bowel incontinence.   Genitourinary: Negative for bladder incontinence and nocturia.   Neurological: Negative for disturbances in coordination, loss of balance and seizures.   Psychiatric/Behavioral: Negative for depression. The patient does not have insomnia.    Allergic/Immunologic: Negative for hives and persistent infections. "     PHYSICAL EXAM:  GEN: A&Ox3, WD WN NAD  HEENT: WNL  CHEST: CTAB, no W/R/R  HEART: RRR, no M/R/G   ABD: Soft, NT ND, BS x4 QUADS  MS: Refer to previous note for detailed MS exam  NEURO: CN II-XII intact       The surgical consent was then reviewed with the patient, who agreed with all the contents of the consent form and it was signed.     PHYSICAL THERAPY:  She was also instructed regarding physical therapy and will begin on  POD #1-3 [facility].    POST OP CARE: Instructions were reviewed including care of the wound and dressing after surgery and when she can shower.     PAIN MANAGEMENT: Jolly GASPAR Carltontrang was instructed regarding the Polar ice unit that will be in place after surgery and her postoperative pain medications.     MEDICATION:  Norco 5/325 mg 1-2 q 4 hours PRN for pain (Patient stated that oxycodone would be too strong)  Zofran 4 mg q 8 hours PRN for nausea and vomiting.  Aspirin 325mg BID x 2 weeks for DVT prophylaxis starting on the evening after surgery.    Patient was instructed to purchase and take Colace to counter possible GI side effects of taking opiates.     DVT prophylaxis was discussed with the patient today including risk factors for developing DVTs and history of DVTs. The patient was asked if any specific recommendations were given from the doctor/s that did pre-operative surgical clearance.      If the patient was previously taking 81mg baby aspirin, they were told to not take it will using the above stated aspirin and to restart the 81mg aspirin after completion of the aspirin dose.      Patient was also told to buy over the counter Prilosec medication and take it once daily for GI protection as long as they are taking NSAIDs or Aspirin.     The patient was told that narcotic pain medications may make them drowsy and instructions were given to not sign legal documents, drive or operate heavy machinery, cars, or equipment while under the influence of narcotic medications.       As  there were no other questions to be asked, she was given my business card along with Dr. Arias's business card if she has any questions or concerns prior to surgery or in the postop period.

## 2018-06-30 ENCOUNTER — HOSPITAL ENCOUNTER (OUTPATIENT)
Dept: RADIOLOGY | Facility: HOSPITAL | Age: 68
Discharge: HOME | End: 2018-06-30
Attending: FAMILY MEDICINE
Payer: MEDICARE

## 2018-06-30 DIAGNOSIS — I72.8 SPLENIC ARTERY ANEURYSM: ICD-10-CM

## 2018-06-30 PROCEDURE — 25500020 PHARM REV CODE 255: Performed by: FAMILY MEDICINE

## 2018-06-30 PROCEDURE — 74175 CTA ABDOMEN W/CONTRAST: CPT | Mod: TC

## 2018-06-30 PROCEDURE — 74175 CTA ABDOMEN W/CONTRAST: CPT | Mod: 26,,, | Performed by: RADIOLOGY

## 2018-06-30 RX ADMIN — IOHEXOL 100 ML: 350 INJECTION, SOLUTION INTRAVENOUS at 01:06

## 2018-07-02 ENCOUNTER — TELEPHONE (OUTPATIENT)
Dept: FAMILY MEDICINE | Facility: CLINIC | Age: 68
End: 2018-07-02

## 2018-07-02 RX ORDER — DICLOFENAC SODIUM 50 MG/1
50 TABLET, DELAYED RELEASE ORAL 2 TIMES DAILY
Qty: 30 TABLET | Refills: 1 | OUTPATIENT
Start: 2018-07-02

## 2018-07-02 NOTE — TELEPHONE ENCOUNTER
----- Message from Zaina Braxton sent at 7/2/2018  8:14 AM CDT -----  [phone]  Prescription Request:     Name of medication: diclofenac (VOLTAREN) 50 MG EC tablet Qty 60    Reason for request: Refill    Pharmacy: [facility] [address] [place], [place] [address] [address] AT [facility] [address]    Please advise.    Thank You

## 2018-07-03 ENCOUNTER — LAB VISIT (OUTPATIENT)
Dept: LAB | Facility: HOSPITAL | Age: 68
End: 2018-07-03
Attending: FAMILY MEDICINE
Payer: MEDICARE

## 2018-07-03 ENCOUNTER — OFFICE VISIT (OUTPATIENT)
Dept: FAMILY MEDICINE | Facility: CLINIC | Age: 68
End: 2018-07-03
Payer: MEDICARE

## 2018-07-03 VITALS
BODY MASS INDEX: 29.21 KG/M2 | DIASTOLIC BLOOD PRESSURE: 68 MMHG | TEMPERATURE: 99 F | SYSTOLIC BLOOD PRESSURE: 128 MMHG | WEIGHT: 158.75 LBS | HEART RATE: 77 BPM | HEIGHT: 62 IN

## 2018-07-03 DIAGNOSIS — R73.03 PREDIABETES: ICD-10-CM

## 2018-07-03 DIAGNOSIS — I10 ESSENTIAL HYPERTENSION: ICD-10-CM

## 2018-07-03 DIAGNOSIS — Z01.811 PRE-OP CHEST EXAM: ICD-10-CM

## 2018-07-03 DIAGNOSIS — K43.9 HERNIA OF ABDOMINAL WALL: ICD-10-CM

## 2018-07-03 DIAGNOSIS — Z85.828 HISTORY OF BASAL CELL CARCINOMA: ICD-10-CM

## 2018-07-03 DIAGNOSIS — R73.09 ABNORMAL GLUCOSE: ICD-10-CM

## 2018-07-03 DIAGNOSIS — Z01.811 PRE-OP CHEST EXAM: Primary | ICD-10-CM

## 2018-07-03 DIAGNOSIS — S83.203D COMPLEX TEAR OF MENISCUS OF RIGHT KNEE AS CURRENT INJURY, UNSPECIFIED MENISCUS, SUBSEQUENT ENCOUNTER: ICD-10-CM

## 2018-07-03 DIAGNOSIS — G47.30 SLEEP APNEA, UNSPECIFIED TYPE: ICD-10-CM

## 2018-07-03 DIAGNOSIS — E78.00 PURE HYPERCHOLESTEROLEMIA: ICD-10-CM

## 2018-07-03 DIAGNOSIS — I70.0 ABDOMINAL AORTIC ATHEROSCLEROSIS: ICD-10-CM

## 2018-07-03 DIAGNOSIS — I72.8 SPLENIC ARTERY ANEURYSM: ICD-10-CM

## 2018-07-03 DIAGNOSIS — J47.9 BRONCHIECTASIS WITHOUT COMPLICATION: ICD-10-CM

## 2018-07-03 LAB
ALBUMIN SERPL BCP-MCNC: 4 G/DL
ALP SERPL-CCNC: 98 U/L
ALT SERPL W/O P-5'-P-CCNC: 16 U/L
ANION GAP SERPL CALC-SCNC: 9 MMOL/L
AST SERPL-CCNC: 18 U/L
BASOPHILS # BLD AUTO: 0.06 K/UL
BASOPHILS NFR BLD: 1 %
BILIRUB SERPL-MCNC: 0.3 MG/DL
BUN SERPL-MCNC: 18 MG/DL
CALCIUM SERPL-MCNC: 9.4 MG/DL
CHLORIDE SERPL-SCNC: 111 MMOL/L
CO2 SERPL-SCNC: 24 MMOL/L
CREAT SERPL-MCNC: 0.9 MG/DL
DIFFERENTIAL METHOD: ABNORMAL
EOSINOPHIL # BLD AUTO: 0.6 K/UL
EOSINOPHIL NFR BLD: 9.8 %
ERYTHROCYTE [DISTWIDTH] IN BLOOD BY AUTOMATED COUNT: 13.3 %
EST. GFR  (AFRICAN AMERICAN): >60 ML/MIN/1.73 M^2
EST. GFR  (NON AFRICAN AMERICAN): >60 ML/MIN/1.73 M^2
ESTIMATED AVG GLUCOSE: 126 MG/DL
GLUCOSE SERPL-MCNC: 90 MG/DL
HBA1C MFR BLD HPLC: 6 %
HCT VFR BLD AUTO: 35.2 %
HGB BLD-MCNC: 11.2 G/DL
IMM GRANULOCYTES # BLD AUTO: 0.02 K/UL
IMM GRANULOCYTES NFR BLD AUTO: 0.3 %
LYMPHOCYTES # BLD AUTO: 1 K/UL
LYMPHOCYTES NFR BLD: 15.9 %
MCH RBC QN AUTO: 30.3 PG
MCHC RBC AUTO-ENTMCNC: 31.8 G/DL
MCV RBC AUTO: 95 FL
MONOCYTES # BLD AUTO: 0.6 K/UL
MONOCYTES NFR BLD: 10.3 %
NEUTROPHILS # BLD AUTO: 3.8 K/UL
NEUTROPHILS NFR BLD: 62.7 %
NRBC BLD-RTO: 0 /100 WBC
PLATELET # BLD AUTO: 263 K/UL
PMV BLD AUTO: 10.6 FL
POTASSIUM SERPL-SCNC: 4.3 MMOL/L
PROT SERPL-MCNC: 6.6 G/DL
RBC # BLD AUTO: 3.7 M/UL
SODIUM SERPL-SCNC: 144 MMOL/L
TSH SERPL DL<=0.005 MIU/L-ACNC: 1.1 UIU/ML
WBC # BLD AUTO: 6.03 K/UL

## 2018-07-03 PROCEDURE — 80053 COMPREHEN METABOLIC PANEL: CPT

## 2018-07-03 PROCEDURE — 99999 PR PBB SHADOW E&M-EST. PATIENT-LVL III: CPT | Mod: PBBFAC,,, | Performed by: FAMILY MEDICINE

## 2018-07-03 PROCEDURE — 99213 OFFICE O/P EST LOW 20 MIN: CPT | Mod: S$GLB,,, | Performed by: FAMILY MEDICINE

## 2018-07-03 PROCEDURE — 36415 COLL VENOUS BLD VENIPUNCTURE: CPT | Mod: PO

## 2018-07-03 PROCEDURE — 84443 ASSAY THYROID STIM HORMONE: CPT

## 2018-07-03 PROCEDURE — 83036 HEMOGLOBIN GLYCOSYLATED A1C: CPT

## 2018-07-03 PROCEDURE — 85025 COMPLETE CBC W/AUTO DIFF WBC: CPT

## 2018-07-03 PROCEDURE — 3074F SYST BP LT 130 MM HG: CPT | Mod: CPTII,S$GLB,, | Performed by: FAMILY MEDICINE

## 2018-07-03 PROCEDURE — 3078F DIAST BP <80 MM HG: CPT | Mod: CPTII,S$GLB,, | Performed by: FAMILY MEDICINE

## 2018-07-03 NOTE — TELEPHONE ENCOUNTER
She has an appointment today and we can discuss it then. I dont like this med. It has a high risk for cardiovascular risks. It is not better than other nsaids like aleve  But she is scheduled for surgery this week and shouldn't be taking nsaids within a week of surgery so that is another reason why I didn't refill it

## 2018-07-03 NOTE — TELEPHONE ENCOUNTER
Patient was prescribed this by a resident in [place] for her right knee pain which the patient is now seeing sports medicine for. Please advise if the patient should try a different medication or contact sports medicine.

## 2018-07-03 NOTE — PROGRESS NOTES
Subjective:     Patient ID: Jolly Matias is a 68 y.o. female.    Chief Complaint: pre-op    HPI  Review of Systems   Constitutional: Negative for appetite change, fatigue and fever.   HENT: Negative for hearing loss and sore throat.    Eyes: Negative.    Respiratory: Negative for cough, chest tightness, shortness of breath and wheezing.    Cardiovascular: Negative for chest pain, palpitations and leg swelling.   Gastrointestinal: Negative for abdominal pain, blood in stool, constipation, diarrhea, nausea and vomiting.   Endocrine: Negative.    Genitourinary: Negative for difficulty urinating, dysuria, frequency, hematuria, menstrual problem, pelvic pain and urgency.   Musculoskeletal: Positive for arthralgias (knee pain for months). Negative for back pain.   Skin: Negative for pallor and rash.   Allergic/Immunologic: Negative.    Neurological: Negative for dizziness, syncope, light-headedness and headaches.   Hematological: Negative for adenopathy.   Psychiatric/Behavioral: Negative.  Negative for dysphoric mood and sleep disturbance.       Objective:      Physical Exam   Constitutional: She is oriented to person, place, and time. She appears well-developed and well-nourished.   HENT:   Head: Normocephalic and atraumatic.   Right Ear: External ear normal.   Left Ear: External ear normal.   Nose: Nose normal.   Mouth/Throat: Oropharynx is clear and moist.   Eyes: Conjunctivae and EOM are normal. Pupils are equal, round, and reactive to light.   Neck: Normal range of motion. Neck supple. No JVD present. No thyromegaly present.   Cardiovascular: Normal rate, regular rhythm, normal heart sounds and intact distal pulses.    No murmur heard.  Pulmonary/Chest: Effort normal and breath sounds normal.   Abdominal: Soft. Bowel sounds are normal. She exhibits no mass (sub cutaneous mass of upper abdomen above umbilicus). There is no tenderness. A hernia (abd wall hernia above umbilicus) is present.   Musculoskeletal: She  exhibits no edema.   Limited ROM of knee   Lymphadenopathy:     She has no cervical adenopathy.   Neurological: She is alert and oriented to person, place, and time. She has normal reflexes.   Skin: Skin is warm and dry.   Psychiatric: She has a normal mood and affect. Her behavior is normal. Judgment and thought content normal.   Vitals reviewed.      Assessment:     Jolly was seen today for pre-op exam and results.    Diagnoses and all orders for this visit:    Pre-op chest exam  -     CBC auto differential; Future    Complex tear of meniscus of right knee as current injury, unspecified meniscus, subsequent encounter    Essential hypertension  -     Comprehensive metabolic panel; Future  -     TSH; Future    Sleep apnea, unspecified type    Pure hypercholesterolemia  -     Comprehensive metabolic panel; Future    Abdominal aortic atherosclerosis    Prediabetes    Abnormal glucose  -     Hemoglobin A1c; Future    Hernia of abdominal wall    Splenic artery aneurysm    History of basal cell carcinoma    Bronchiectasis without complication    pt is medically cleared for her upcoming knee surgery and the anesthesia associated with that procedure

## 2018-07-05 ENCOUNTER — PATIENT MESSAGE (OUTPATIENT)
Dept: FAMILY MEDICINE | Facility: CLINIC | Age: 68
End: 2018-07-05

## 2018-07-05 DIAGNOSIS — I72.8 SPLENIC ARTERY ANEURYSM: Primary | ICD-10-CM

## 2018-07-06 ENCOUNTER — SURGERY (OUTPATIENT)
Age: 68
End: 2018-07-06

## 2018-07-06 ENCOUNTER — HOSPITAL ENCOUNTER (OUTPATIENT)
Facility: HOSPITAL | Age: 68
Discharge: HOME | End: 2018-07-06
Attending: ORTHOPAEDIC SURGERY | Admitting: ORTHOPAEDIC SURGERY
Payer: MEDICARE

## 2018-07-06 ENCOUNTER — ANESTHESIA EVENT (OUTPATIENT)
Dept: SURGERY | Facility: HOSPITAL | Age: 68
End: 2018-07-06
Payer: MEDICARE

## 2018-07-06 ENCOUNTER — ANESTHESIA (OUTPATIENT)
Dept: SURGERY | Facility: HOSPITAL | Age: 68
End: 2018-07-06
Payer: MEDICARE

## 2018-07-06 VITALS
BODY MASS INDEX: 29.64 KG/M2 | WEIGHT: 157 LBS | TEMPERATURE: 98 F | HEIGHT: 61 IN | DIASTOLIC BLOOD PRESSURE: 61 MMHG | HEART RATE: 66 BPM | OXYGEN SATURATION: 96 % | SYSTOLIC BLOOD PRESSURE: 140 MMHG | RESPIRATION RATE: 18 BRPM

## 2018-07-06 DIAGNOSIS — M23.200 OTHER OLD TEAR OF LATERAL MENISCUS OF RIGHT KNEE: ICD-10-CM

## 2018-07-06 DIAGNOSIS — M94.261 CHONDROMALACIA OF RIGHT KNEE: ICD-10-CM

## 2018-07-06 LAB
POCT GLUCOSE: 110 MG/DL (ref 70–110)
POCT GLUCOSE: 148 MG/DL (ref 70–110)
POCT GLUCOSE: 152 MG/DL (ref 70–110)

## 2018-07-06 PROCEDURE — 82962 GLUCOSE BLOOD TEST: CPT | Mod: 91 | Performed by: ORTHOPAEDIC SURGERY

## 2018-07-06 PROCEDURE — 63600175 PHARM REV CODE 636 W HCPCS

## 2018-07-06 PROCEDURE — 97116 GAIT TRAINING THERAPY: CPT

## 2018-07-06 PROCEDURE — 71000015 HC POSTOP RECOV 1ST HR: Performed by: ORTHOPAEDIC SURGERY

## 2018-07-06 PROCEDURE — 27200651 HC AIRWAY, LMA: Performed by: NURSE ANESTHETIST, CERTIFIED REGISTERED

## 2018-07-06 PROCEDURE — 25000003 PHARM REV CODE 250: Performed by: NURSE ANESTHETIST, CERTIFIED REGISTERED

## 2018-07-06 PROCEDURE — 36000710: Performed by: ORTHOPAEDIC SURGERY

## 2018-07-06 PROCEDURE — 63600175 PHARM REV CODE 636 W HCPCS: Performed by: NURSE ANESTHETIST, CERTIFIED REGISTERED

## 2018-07-06 PROCEDURE — 64447 NJX AA&/STRD FEMORAL NRV IMG: CPT | Performed by: ANESTHESIOLOGY

## 2018-07-06 PROCEDURE — 37000008 HC ANESTHESIA 1ST 15 MINUTES: Performed by: ORTHOPAEDIC SURGERY

## 2018-07-06 PROCEDURE — D9220A PRA ANESTHESIA: Mod: CRNA,,, | Performed by: NURSE ANESTHETIST, CERTIFIED REGISTERED

## 2018-07-06 PROCEDURE — 71000016 HC POSTOP RECOV ADDL HR: Performed by: ORTHOPAEDIC SURGERY

## 2018-07-06 PROCEDURE — 25000003 PHARM REV CODE 250: Performed by: ORTHOPAEDIC SURGERY

## 2018-07-06 PROCEDURE — 63600175 PHARM REV CODE 636 W HCPCS: Performed by: ANESTHESIOLOGY

## 2018-07-06 PROCEDURE — 37000009 HC ANESTHESIA EA ADD 15 MINS: Performed by: ORTHOPAEDIC SURGERY

## 2018-07-06 PROCEDURE — 36000711: Performed by: ORTHOPAEDIC SURGERY

## 2018-07-06 PROCEDURE — 97163 PT EVAL HIGH COMPLEX 45 MIN: CPT

## 2018-07-06 PROCEDURE — G8979 MOBILITY GOAL STATUS: HCPCS | Mod: CJ

## 2018-07-06 PROCEDURE — 27201423 OPTIME MED/SURG SUP & DEVICES STERILE SUPPLY: Performed by: ORTHOPAEDIC SURGERY

## 2018-07-06 PROCEDURE — 76942 ECHO GUIDE FOR BIOPSY: CPT | Mod: 26,,, | Performed by: ANESTHESIOLOGY

## 2018-07-06 PROCEDURE — 63600175 PHARM REV CODE 636 W HCPCS: Performed by: PHYSICIAN ASSISTANT

## 2018-07-06 PROCEDURE — 29880 ARTHRS KNE SRG MNISECTMY M&L: CPT | Mod: RT,,, | Performed by: ORTHOPAEDIC SURGERY

## 2018-07-06 PROCEDURE — 63600175 PHARM REV CODE 636 W HCPCS: Performed by: ORTHOPAEDIC SURGERY

## 2018-07-06 PROCEDURE — 29873 ARTHRS KNEE SURG W/LAT RLS: CPT | Mod: 51,RT,, | Performed by: ORTHOPAEDIC SURGERY

## 2018-07-06 PROCEDURE — 71000045 HC DOSC ROUTINE RECOVERY EA ADD'L HR: Performed by: ORTHOPAEDIC SURGERY

## 2018-07-06 PROCEDURE — D9220A PRA ANESTHESIA: Mod: ANES,,, | Performed by: ANESTHESIOLOGY

## 2018-07-06 PROCEDURE — 82962 GLUCOSE BLOOD TEST: CPT | Performed by: ORTHOPAEDIC SURGERY

## 2018-07-06 PROCEDURE — 71000044 HC DOSC ROUTINE RECOVERY FIRST HOUR: Performed by: ORTHOPAEDIC SURGERY

## 2018-07-06 PROCEDURE — G8978 MOBILITY CURRENT STATUS: HCPCS | Mod: CJ

## 2018-07-06 PROCEDURE — G8980 MOBILITY D/C STATUS: HCPCS | Mod: CJ

## 2018-07-06 RX ORDER — OXYCODONE AND ACETAMINOPHEN 5; 325 MG/1; MG/1
TABLET ORAL
Status: DISCONTINUED
Start: 2018-07-06 | End: 2018-07-06 | Stop reason: HOSPADM

## 2018-07-06 RX ORDER — CEFAZOLIN SODIUM 1 G/3ML
2 INJECTION, POWDER, FOR SOLUTION INTRAMUSCULAR; INTRAVENOUS
Status: DISCONTINUED | OUTPATIENT
Start: 2018-07-06 | End: 2018-07-06 | Stop reason: HOSPADM

## 2018-07-06 RX ORDER — ONDANSETRON 4 MG/1
4 TABLET, FILM COATED ORAL EVERY 8 HOURS PRN
Qty: 30 TABLET | Refills: 0 | Status: SHIPPED | OUTPATIENT
Start: 2018-07-06 | End: 2018-09-24

## 2018-07-06 RX ORDER — LIDOCAINE HCL/PF 100 MG/5ML
SYRINGE (ML) INTRAVENOUS
Status: DISCONTINUED | OUTPATIENT
Start: 2018-07-06 | End: 2018-07-06

## 2018-07-06 RX ORDER — DOCUSATE SODIUM 100 MG/1
100 CAPSULE, LIQUID FILLED ORAL 2 TIMES DAILY
Qty: 60 CAPSULE | Refills: 0 | Status: SHIPPED | OUTPATIENT
Start: 2018-07-06 | End: 2018-09-24

## 2018-07-06 RX ORDER — EPINEPHRINE 1 MG/ML
INJECTION INTRAMUSCULAR; INTRAVENOUS; SUBCUTANEOUS
Status: DISCONTINUED
Start: 2018-07-06 | End: 2018-07-06 | Stop reason: HOSPADM

## 2018-07-06 RX ORDER — BUPIVACAINE HYDROCHLORIDE AND EPINEPHRINE 5; 5 MG/ML; UG/ML
INJECTION, SOLUTION EPIDURAL; INTRACAUDAL; PERINEURAL
Status: DISCONTINUED
Start: 2018-07-06 | End: 2018-07-06 | Stop reason: WASHOUT

## 2018-07-06 RX ORDER — FENTANYL CITRATE 50 UG/ML
INJECTION, SOLUTION INTRAMUSCULAR; INTRAVENOUS
Status: DISCONTINUED | OUTPATIENT
Start: 2018-07-06 | End: 2018-07-06

## 2018-07-06 RX ORDER — FENTANYL CITRATE 50 UG/ML
INJECTION, SOLUTION INTRAMUSCULAR; INTRAVENOUS
Status: COMPLETED
Start: 2018-07-06 | End: 2018-07-06

## 2018-07-06 RX ORDER — FENTANYL CITRATE 50 UG/ML
25 INJECTION, SOLUTION INTRAMUSCULAR; INTRAVENOUS EVERY 5 MIN PRN
Status: COMPLETED | OUTPATIENT
Start: 2018-07-06 | End: 2018-07-06

## 2018-07-06 RX ORDER — SODIUM CHLORIDE 9 MG/ML
INJECTION, SOLUTION INTRAVENOUS CONTINUOUS PRN
Status: DISCONTINUED | OUTPATIENT
Start: 2018-07-06 | End: 2018-07-06

## 2018-07-06 RX ORDER — EPINEPHRINE 1 MG/ML
INJECTION INTRAMUSCULAR; INTRAVENOUS; SUBCUTANEOUS
Status: DISCONTINUED | OUTPATIENT
Start: 2018-07-06 | End: 2018-07-06 | Stop reason: HOSPADM

## 2018-07-06 RX ORDER — ONDANSETRON 2 MG/ML
INJECTION INTRAMUSCULAR; INTRAVENOUS
Status: DISCONTINUED | OUTPATIENT
Start: 2018-07-06 | End: 2018-07-06

## 2018-07-06 RX ORDER — OXYCODONE AND ACETAMINOPHEN 5; 325 MG/1; MG/1
1 TABLET ORAL EVERY 4 HOURS PRN
Status: DISCONTINUED | OUTPATIENT
Start: 2018-07-06 | End: 2018-07-06 | Stop reason: HOSPADM

## 2018-07-06 RX ORDER — PHENYLEPHRINE HYDROCHLORIDE 10 MG/ML
INJECTION INTRAVENOUS
Status: DISCONTINUED | OUTPATIENT
Start: 2018-07-06 | End: 2018-07-06

## 2018-07-06 RX ORDER — MIDAZOLAM HYDROCHLORIDE 1 MG/ML
INJECTION INTRAMUSCULAR; INTRAVENOUS
Status: DISCONTINUED | OUTPATIENT
Start: 2018-07-06 | End: 2018-07-06

## 2018-07-06 RX ORDER — PROPOFOL 10 MG/ML
VIAL (ML) INTRAVENOUS
Status: DISCONTINUED | OUTPATIENT
Start: 2018-07-06 | End: 2018-07-06

## 2018-07-06 RX ORDER — OXYCODONE AND ACETAMINOPHEN 5; 325 MG/1; MG/1
1 TABLET ORAL EVERY 4 HOURS PRN
Qty: 60 TABLET | Refills: 0 | Status: SHIPPED | OUTPATIENT
Start: 2018-07-06 | End: 2018-07-24

## 2018-07-06 RX ORDER — SODIUM CHLORIDE 0.9 % (FLUSH) 0.9 %
3 SYRINGE (ML) INJECTION
Status: DISCONTINUED | OUTPATIENT
Start: 2018-07-06 | End: 2018-07-06 | Stop reason: HOSPADM

## 2018-07-06 RX ADMIN — FENTANYL CITRATE 25 MCG: 50 INJECTION INTRAMUSCULAR; INTRAVENOUS at 01:07

## 2018-07-06 RX ADMIN — PHENYLEPHRINE HYDROCHLORIDE 100 MCG: 10 INJECTION INTRAVENOUS at 12:07

## 2018-07-06 RX ADMIN — PHENYLEPHRINE HYDROCHLORIDE 300 MCG: 10 INJECTION INTRAVENOUS at 10:07

## 2018-07-06 RX ADMIN — EPINEPHRINE 3 MG: 1 INJECTION INTRAMUSCULAR; INTRAVENOUS; SUBCUTANEOUS at 11:07

## 2018-07-06 RX ADMIN — OXYCODONE HYDROCHLORIDE AND ACETAMINOPHEN 1 TABLET: 5; 325 TABLET ORAL at 02:07

## 2018-07-06 RX ADMIN — FENTANYL CITRATE 50 MCG: 50 INJECTION, SOLUTION INTRAMUSCULAR; INTRAVENOUS at 10:07

## 2018-07-06 RX ADMIN — PHENYLEPHRINE HYDROCHLORIDE 200 MCG: 10 INJECTION INTRAVENOUS at 11:07

## 2018-07-06 RX ADMIN — PROPOFOL 20 MG: 10 INJECTION, EMULSION INTRAVENOUS at 11:07

## 2018-07-06 RX ADMIN — SODIUM CHLORIDE: 0.9 INJECTION, SOLUTION INTRAVENOUS at 10:07

## 2018-07-06 RX ADMIN — PROPOFOL 30 MG: 10 INJECTION, EMULSION INTRAVENOUS at 11:07

## 2018-07-06 RX ADMIN — LIDOCAINE HYDROCHLORIDE 100 MG: 20 INJECTION, SOLUTION INTRAVENOUS at 10:07

## 2018-07-06 RX ADMIN — MIDAZOLAM HYDROCHLORIDE 2 MG: 1 INJECTION, SOLUTION INTRAMUSCULAR; INTRAVENOUS at 10:07

## 2018-07-06 RX ADMIN — FENTANYL CITRATE 50 MCG: 50 INJECTION, SOLUTION INTRAMUSCULAR; INTRAVENOUS at 11:07

## 2018-07-06 RX ADMIN — SODIUM CHLORIDE, SODIUM GLUCONATE, SODIUM ACETATE, POTASSIUM CHLORIDE, MAGNESIUM CHLORIDE, SODIUM PHOSPHATE, DIBASIC, AND POTASSIUM PHOSPHATE: .53; .5; .37; .037; .03; .012; .00082 INJECTION, SOLUTION INTRAVENOUS at 11:07

## 2018-07-06 RX ADMIN — FENTANYL CITRATE 25 MCG: 50 INJECTION, SOLUTION INTRAMUSCULAR; INTRAVENOUS at 12:07

## 2018-07-06 RX ADMIN — CEFAZOLIN 2 G: 330 INJECTION, POWDER, FOR SOLUTION INTRAMUSCULAR; INTRAVENOUS at 10:07

## 2018-07-06 RX ADMIN — PROPOFOL 110 MG: 10 INJECTION, EMULSION INTRAVENOUS at 10:07

## 2018-07-06 RX ADMIN — FENTANYL CITRATE 25 MCG: 50 INJECTION, SOLUTION INTRAMUSCULAR; INTRAVENOUS at 11:07

## 2018-07-06 RX ADMIN — ONDANSETRON 4 MG: 2 INJECTION INTRAMUSCULAR; INTRAVENOUS at 11:07

## 2018-07-06 NOTE — H&P (VIEW-ONLY)
Jolly Matias  is here for a completion of her perioperative paperwork. she  Is scheduled to undergo  Right Knee arthroscopic partial medial and lateral meniscectomies, chondroplasty, parapatellar releases as indicated, possible Amniox arthrocentesis on 2018.  She is a healthy individual and does need clearance for this procedure. Pending clearance by Dr. Lee. Appt is 2018.    Risks, indications and benefits of the surgical procedure were discussed with the patient. All questions with regard to surgery, rehab, expected return to functional activities, activities of daily living and recreational endeavors were answered to her satisfaction.    Patient was informed and understands the risks of surgery are greater for patients with a current condition or hx of heart disease, obesity, clotting disorders, recurrent infections, steroid use, current or past smoking, and factors such as sedentary lifestyle and noncompliance with medications, therapy or f/u. The degree of the increased risk is hard to estimate w/ any degree of precision.    Once no other questions were asked, a brief history and physical exam was then performed.    PAST MEDICAL HISTORY:   Past Medical History:   Diagnosis Date    Atypical ductal hyperplasia, breast     Basal cell carcinoma 2011    left forehead    Basal cell carcinoma 2015    R temporal scalp, R upper forehead, L upper forehead    Basal cell carcinoma 2015    right mid ala    BCC (basal cell carcinoma) excised     right shoulder    Diabetes mellitus     Diverticulitis     Fibrocystic breast     HLD (hyperlipidemia)     Hypertension     Prediabetes 10/16/2013    Skin cancer      PAST SURGICAL HISTORY:   Past Surgical History:   Procedure Laterality Date    APPENDECTOMY      bilateral breast duct excision      BREAST BIOPSY Right     BREAST BIOPSY Left      SECTION      x2    COLON SURGERY Left 2017    sigmoidectomy for diverticulitis     COLONOSCOPY N/A 1/16/2017    Procedure: COLONOSCOPY;  Surgeon: IBRAHIMA Philip MD;  Location: [facility] ([address] [place] [address]);  Service: Endoscopy;  Laterality: N/A;    fulgaration of endometriosis x 2      HYSTERECTOMY      For endometriosis and DUB--age 42, ovaries in?    INCISIONAL HERNIA REPAIR  08/2017    OOPHORECTOMY      SKIN CANCER EXCISION      BCC forehead , temple, shoulder, chest    TONSILLECTOMY       FAMILY HISTORY:   Family History   Problem Relation Age of Onset    Skin cancer Father     Hypertension Father     Heart disease Father     Diabetes Father     Melanoma Father     Cancer Father     Skin cancer Mother     Hypertension Mother     Thyroid disease Mother     Dementia Mother     Hypertension Sister     Hyperlipidemia Sister     Anxiety disorder Daughter     Hypertension Son     Hyperlipidemia Son     Breast cancer Maternal Aunt     Diabetes Maternal Uncle      SOCIAL HISTORY:   Social History     Social History    Marital status:      Spouse name: N/A    Number of children: N/A    Years of education: N/A     Occupational History    Not on file.     Social History Main Topics    Smoking status: Never Smoker    Smokeless tobacco: Never Used    Alcohol use 0.0 oz/week     1 - 2 Glasses of wine per week      Comment: 3-4x /week    Drug use: No    Sexual activity: Yes     Partners: Male     Birth control/ protection: Post-menopausal     Other Topics Concern    Are You Pregnant Or Think You May Be? No    Breast-Feeding No     Social History Narrative    , walking some       MEDICATIONS:   Current Outpatient Prescriptions:     atorvastatin (LIPITOR) 20 MG tablet, Take 1 tablet (20 mg total) by mouth once daily., Disp: 90 tablet, Rfl: 2    cyanocobalamin (VITAMIN B-12) 1000 MCG tablet, Take 100 mcg by mouth once daily., Disp: , Rfl:     flaxseed oil 1,000 mg Cap, Take 1 capsule by mouth once daily.  , Disp: , Rfl:     losartan (COZAAR) 100 MG tablet, 100 mg once  "daily. , Disp: , Rfl:     metFORMIN (GLUCOPHAGE) 500 MG tablet, Take 1 tablet (500 mg total) by mouth 2 (two) times daily with meals., Disp: 180 tablet, Rfl: 2    sertraline (ZOLOFT) 100 MG tablet, Take 1 tablet (100 mg total) by mouth once daily., Disp: 90 tablet, Rfl: 2    spironolactone (ALDACTONE) 25 MG tablet, TAKE 1 TABLET(25 MG) BY MOUTH EVERY DAY, Disp: 90 tablet, Rfl: 1    vitamin D 1000 units Tab, Take 185 mg by mouth once daily., Disp: , Rfl:     albuterol 90 mcg/actuation inhaler, Inhale 2 puffs into the lungs every 6 (six) hours as needed for Wheezing. Rescue, Disp: 18 g, Rfl: 0    diclofenac (VOLTAREN) 50 MG EC tablet, Take 1 tablet (50 mg total) by mouth 2 (two) times daily., Disp: 30 tablet, Rfl: 1    ketoconazole (NIZORAL) 2 % cream, AAA bid under arm, Disp: 45 g, Rfl: 3  ALLERGIES:   Review of patient's allergies indicates:   Allergen Reactions    Hydrocodone      Also makes pt "very sleepy"    Kiwi (actinidia chinensis) Swelling       Review of Systems   Constitution: Negative. Negative for chills, fever and night sweats.   HENT: Negative for congestion and headaches.    Eyes: Negative for blurred vision, left vision loss and right vision loss.   Cardiovascular: Negative for chest pain and syncope.   Respiratory: Negative for cough and shortness of breath.    Endocrine: Negative for polydipsia, polyphagia and polyuria.   Hematologic/Lymphatic: Negative for bleeding problem. Does not bruise/bleed easily.   Skin: Negative for dry skin, itching and rash.   Musculoskeletal: Negative for falls and muscle weakness.   Gastrointestinal: Negative for abdominal pain and bowel incontinence.   Genitourinary: Negative for bladder incontinence and nocturia.   Neurological: Negative for disturbances in coordination, loss of balance and seizures.   Psychiatric/Behavioral: Negative for depression. The patient does not have insomnia.    Allergic/Immunologic: Negative for hives and persistent infections. "     PHYSICAL EXAM:  GEN: A&Ox3, WD WN NAD  HEENT: WNL  CHEST: CTAB, no W/R/R  HEART: RRR, no M/R/G   ABD: Soft, NT ND, BS x4 QUADS  MS: Refer to previous note for detailed MS exam  NEURO: CN II-XII intact       The surgical consent was then reviewed with the patient, who agreed with all the contents of the consent form and it was signed.     PHYSICAL THERAPY:  She was also instructed regarding physical therapy and will begin on  POD #1-3 [facility].    POST OP CARE: Instructions were reviewed including care of the wound and dressing after surgery and when she can shower.     PAIN MANAGEMENT: Jolly GASPAR Carltontrang was instructed regarding the Polar ice unit that will be in place after surgery and her postoperative pain medications.     MEDICATION:  Norco 5/325 mg 1-2 q 4 hours PRN for pain (Patient stated that oxycodone would be too strong)  Zofran 4 mg q 8 hours PRN for nausea and vomiting.  Aspirin 325mg BID x 2 weeks for DVT prophylaxis starting on the evening after surgery.    Patient was instructed to purchase and take Colace to counter possible GI side effects of taking opiates.     DVT prophylaxis was discussed with the patient today including risk factors for developing DVTs and history of DVTs. The patient was asked if any specific recommendations were given from the doctor/s that did pre-operative surgical clearance.      If the patient was previously taking 81mg baby aspirin, they were told to not take it will using the above stated aspirin and to restart the 81mg aspirin after completion of the aspirin dose.      Patient was also told to buy over the counter Prilosec medication and take it once daily for GI protection as long as they are taking NSAIDs or Aspirin.     The patient was told that narcotic pain medications may make them drowsy and instructions were given to not sign legal documents, drive or operate heavy machinery, cars, or equipment while under the influence of narcotic medications.       As  there were no other questions to be asked, she was given my business card along with Dr. Arias's business card if she has any questions or concerns prior to surgery or in the postop period.

## 2018-07-06 NOTE — TRANSFER OF CARE
"Anesthesia Transfer of Care Note    Patient: Jolly Matias    Procedure(s) Performed: Procedure(s) (LRB):  ARTHROSCOPY, KNEE, WITH MENISCECTOMY (partial lateral and medial menisectomy, chondraplasty (Right)  ARTHROSCOPY, KNEE, WITH DEBRIDEMENT (Right)  EXCISION, PLICA, KNEE, ARTHROSCOPIC, (Right)    Patient location: PACU    Anesthesia Type: general    Transport from OR: Transported from OR on 6-10 L/min O2 by face mask with adequate spontaneous ventilation    Post pain: adequate analgesia    Post assessment: no apparent anesthetic complications    Post vital signs: stable    Level of consciousness: sedated    Nausea/Vomiting: no nausea/vomiting    Complications: none    Transfer of care protocol was followed      Last vitals:   Visit Vitals  BP (!) 141/61 (BP Location: Left arm, Patient Position: Lying)   Pulse 67   Temp 36.6 °C (97.9 °F) (Skin)   Resp 12   Ht 5' 1" (1.549 m)   Wt 71.2 kg (157 lb)   LMP 01/01/1991   SpO2 100%   BMI 29.66 kg/m²     "

## 2018-07-06 NOTE — PT/OT/SLP EVAL
"Physical Therapy Evaluation  Gait training  Discharge Summary     Patient Name:  Jolly Matias   MRN:  [id number]    Recommendations:     Discharge Recommendations:  outpatient PT   Discharge Equipment Recommendations: walker, rolling   Barriers to discharge: None      Plan:     During this hospitalization, patient to be seen   to address the above listed problems via gait training, therapeutic activities  · Plan of Care Expires:  18   Plan of Care Reviewed with: patient, spouse    History:     Past Medical History:   Diagnosis Date    Atypical ductal hyperplasia, breast     Basal cell carcinoma 2011    left forehead    Basal cell carcinoma 2015    R temporal scalp, R upper forehead, L upper forehead    Basal cell carcinoma 2015    right mid ala    BCC (basal cell carcinoma) excised     right shoulder    Diabetes mellitus     Diverticulitis     Fibrocystic breast     HLD (hyperlipidemia)     Hypertension     Prediabetes 10/16/2013    Skin cancer        Past Surgical History:   Procedure Laterality Date    APPENDECTOMY      bilateral breast duct excision      BREAST BIOPSY Right     BREAST BIOPSY Left      SECTION      x2    COLON SURGERY Left 2017    sigmoidectomy for diverticulitis    COLONOSCOPY N/A 2017    Procedure: COLONOSCOPY;  Surgeon: IBRAHIMA Philip MD;  Location: [facility] ([address] [place] [address]);  Service: Endoscopy;  Laterality: N/A;    fulgaration of endometriosis x 2      HYSTERECTOMY      For endometriosis and DUB--age 42, ovaries in?    INCISIONAL HERNIA REPAIR  2017    OOPHORECTOMY      SKIN CANCER EXCISION      BCC forehead , temple, shoulder, chest    TONSILLECTOMY         Subjective     Communicated with RN prior to session.  Patient found supine in recovery room upon PT entry to room, agreeable to evaluation.      Chief Complaint: 8/10 pain R knee  Patient comments/goals: "I don't know what happened. I suddenly feel really hot. I feel " "weak."  Pain/Comfort:  · Pain Rating 1: 8/10 (R knee)  · Pain Addressed 1: Pre-medicate for activity, Reposition, Distraction  · Pain Rating Post-Intervention 1: 8/10      Living Environment:  Pt lives with her  in a 1 story home with threshold step to enter.  She was independent prior to admit with no DME.  RW at bedside for home use. Upon discharge, patient will have assistance from her .    Objective:     Patient found with: pulse ox (continuous), telemetry, blood pressure cuff, peripheral IV     General Precautions: Standard, fall   Orthopedic Precautions: (NWB RLE today (nerve block); WBAT beginning tomorrow)     Patient was limited with gait.  PT instructed patient in NWB today s/p nerve varghese and WBAT beginning tomorrow.  PT instructed patient and  in use of RW, car transfers, and threshold steps.  General education on household safety was performed. Gait was attempted 2x.  On both occasions, the patient c/o suddenly feeling hot, flushed and weak after ambulating 5-10 feet. 5 minute sitting rest break between gait trials did not improve tolerance of standing position.   Orthostatics were negative, patient was not hypotensive.  She was assisted back to bed with RN at bedside.     PHYSICAL EXAMINATION  Musculoskeletal System  Upper Extremities:   ROM: WF:  Strength: WF:  Lower Extremities:  ROM: WFL, R knee NT d/t post op dressings in place  Strength: RLE at least 3/5 (no over pressure applied d/t post op status    LLE WFL  Cardiopulmonary System:   Recent vitals:     Vitals:    07/06/18 1615   BP: (!) 152/70   Pulse: 68   Resp: 16   Temp:    Neuromuscular System:  - Sensation: grossly intact LE per patient report  Posture and gross symmetry: rounded shoulders    FUNCTIONAL MOBILITY ASSESSMENT:  Bed Mobility: performed with HOB elevated   - Rolling/Turning R: SBA  - Rolling/Turning L: SBA  - Supine > sit: SBA  - Sit > supine: SBA  - Scooting EOB: SBA     Transfers:  - Sit <> stand transfer: " min assistance, progressing to SBA with Rw after 2 trials   - Bed <> chair transfer: CGA with RW    Gait:   Gait x 10 feet, 5 feet, 5 feet with CGA using RW  - Patient demonstrated step to gait pattern.  - Patient c/o suddenly feeling hot, flushed and weak after ambulating 5-10 feet on 2nd and 3rd attempts.  Orthostatics were negative, patient was not hypotensive.    FUNCTIONAL OUTCOME MEASURES:   AM-PAC 6 CLICK MOBILITY  Total Score:20       Patient left supine in bed with all lines intact and RN and family  present.    GOALS:    Physical Therapy Goals     Not on file          Multidisciplinary Problems (Resolved)        Problem: Physical Therapy Goal    Goal Priority Disciplines Outcome Goal Variances Interventions   Physical Therapy Goal   (Resolved)     PT/OT, PT Outcome(s) achieved        1. Pt will verbalize and demonstrate proper hand placement on RW during transfers. - MET  2. Sit to stand transfer with RW and SBA - MET  3. Bed to chair transfer with RW and SBA - MET  4. Gait x 25 feet SBA with RW - not met ( available to assist)             Assessment:     Jolly Matias is a 68 y.o. female admitted with a medical diagnosis of Chondromalacia of right knee now s/p partial lateral and medial meniscectomy, chondroplasty, debridement and plica excision.  She was independent prior to admit with no assistive device.  She now presents with the following impairments/functional limitations:  weakness, gait instability, pain, decreased lower extremity function, impaired functional mobilty. She demonstrated proper use of RW during transfers and gait.  Gait was limited to 5 feet b/c sudden complaints of weakness and feeling flushed.  She is physically safe to ambulate with RW and her 's assistance except for sudden complaints of weakness and feeling flushed. Pt is safe to d/c home with RW and assistance from family once she is medically appropriate.       Mrs. Matias is safe to perform ambulation with RW  and RN assistance later in shift to see if she is tolerating OOB mobility and appropriate for d/c.     Recent Surgery: Procedure(s) (LRB):  ARTHROSCOPY, KNEE, WITH MENISCECTOMY (partial lateral and medial menisectomy, chondraplasty (Right)  ARTHROSCOPY, KNEE, WITH DEBRIDEMENT (Right)  EXCISION, PLICA, KNEE, ARTHROSCOPIC, (Right) Day of Surgery      Clinical Decision Making:   COMPLEXITY OF PT EXAMINATION:  HISTORY  - Comorbidities that affect the PT plan of care or the patient's ability to participate in/progress with therapy:  1. Nerve block post-op  - Personal Factors:   1. Status of current condition (immediately post op)  2. Patient's home situation (environment and family support). Threshold step to enter  EXAMINATION  - Body Systems:  1. Communication ability, affect, cognition, language, and learning style: the assessment of the ability to make needs known, consciousness, orientation, expected emotional /behavioral responses, and learning preferences  2. Neuromuscular system: a general assessment of gross coordinated movement (eg, balance, gait, locomotion, transfers, and transitions) and motor function (motor control and motor learning)  3. Musculoskeletal system: the assessment of gross symmetry, gross range of motion, gross strength, height, and weight  4. Cardiovascular/pulmonary system: the assessment of heart rate, respiratory rate, blood pressure, SpO2, and edema   - Activity or participation limitations:   Weight bearing restriction  CLINICAL PRESENTATION: Unstable/unpredictable characteristics  vital sign response   LEVEL OF COMPLEXITY: High Complexity - at least 3 or more personal factors or comorbidities that impact the plan of care; examination addressing 4 or more body structures and functions, activity limitations, and/or participation restrictions; clinical presentation with unstable or unpredictable characteristics    Time Tracking:     PT Received On: 07/06/18  PT Start Time: 1550     PT Stop  Time: 1619  PT Total Time (min): 29 min     Billable Minutes: Evaluation 15 and Gait Training 15      Camilla Mace, PT  07/06/2018

## 2018-07-06 NOTE — ANESTHESIA PREPROCEDURE EVALUATION
07/06/2018    Jolly Matias is a 68 y.o. female       Pre-op Assessment    I have reviewed the Patient Summary Reports.     I have reviewed the Nursing Notes.   I have reviewed the Medications.     Review of Systems  Anesthesia Hx:  History of prior surgery of interest to airway management or planning: Previous anesthesia: General 01/2017 with general anesthesia.  Procedure performed at an [facility]. Airway issues documented on chart review include mask, easy, difficult direct laryngoscopy  Denies Family Hx of Anesthesia complications.   Denies Personal Hx of Anesthesia complications.   Hematology/Oncology:         -- Cancer in past history: Oncology Comments: Basal cell CA     Cardiovascular:   Hypertension PVD hyperlipidemia    Pulmonary:   bronchiectasis   Hepatic/GI:   Diverticulitis s/p sigmoidectomy 01/2017   Endocrine:   Diabetes    Psych:   anxiety          Physical Exam  General:  Well nourished    Airway/Jaw/Neck:  Airway Findings: Mouth Opening: Normal Tongue: Normal  General Airway Assessment: Adult  Mallampati: III  Improves to II with phonation.  TM Distance: Normal, at least 6 cm      Dental:  Dental Findings: In tact   Chest/Lungs:  Chest/Lungs Clear    Heart/Vascular:  Heart Findings: Normal       Mental Status:  Mental Status Findings:  Alert and Oriented, Cooperative         Anesthesia Plan  Type of Anesthesia, risks & benefits discussed:  Anesthesia Type:  general  Patient's Preference:   Intra-op Monitoring Plan: standard ASA monitors  Intra-op Monitoring Plan Comments:   Post Op Pain Control Plan: IV/PO Opioids PRN, multimodal analgesia and per primary service following discharge from PACU  Post Op Pain Control Plan Comments:   Induction:   IV  Beta Blocker:  Patient is not currently on a Beta-Blocker (No further documentation required).       Informed Consent: Patient understands risks and agrees with Anesthesia plan.  Questions answered. Anesthesia consent signed with  patient.  ASA Score: 2     Day of Surgery Review of History & Physical: I have interviewed and examined the patient. I have reviewed the patient's H&P dated:  There are no significant changes.          Ready For Surgery From Anesthesia Perspective.

## 2018-07-06 NOTE — PLAN OF CARE
Patient and spouse states they are ready to be discharged. Instructions and prescription given to patient and family. Both verbalize understanding. Patient tolerating po liquids with no difficulty. Patient states pain is at a tolerable level for them. Anesthesia consent and surgical consent in chart upon patient's discharge from [facility].    Dr. Arias at bedside. Ok to dc patient.

## 2018-07-06 NOTE — ANESTHESIA PROCEDURE NOTES
Adductor Canal Single Injection Block    Patient location during procedure: pre-op   Block not for primary anesthetic.  Reason for block: at surgeon's request and post-op pain management   Post-op Pain Location: R knee pain  Start time: 7/6/2018 1:36 PM  Timeout: 7/6/2018 1:35 PM   End time: 7/6/2018 1:44 PM  Staffing  Anesthesiologist: KINGSLEY MAE  Resident/CRNA: DARRYN CHAVEZ  Performed: resident/CRNA   Preanesthetic Checklist  Completed: patient identified, site marked, surgical consent, pre-op evaluation, timeout performed, IV checked, risks and benefits discussed and monitors and equipment checked  Peripheral Block  Patient position: supine  Prep: ChloraPrep  Patient monitoring: heart rate, cardiac monitor, continuous pulse ox, continuous capnometry and frequent blood pressure checks  Block type: adductor canal  Laterality: right  Injection technique: single shot  Needle  Needle type: Stimuplex   Needle gauge: 21 G  Needle length: 4 in  Needle localization: anatomical landmarks and ultrasound guidance   -ultrasound image captured on disc.  Assessment  Injection assessment: negative aspiration, negative parasthesia and local visualized surrounding nerve  Paresthesia pain: none  Heart rate change: no  Slow fractionated injection: yes  Medications:  Bolus administered: 25 mL of 0.25 ropivacaine  Epinephrine added: 3.75 mcg/mL (1/300,000)  Additional Notes  VSS.  [facility] RN monitoring vitals throughout procedure.  Patient tolerated procedure well.

## 2018-07-06 NOTE — BRIEF OP NOTE
[facility]  Brief Operative Note     SUMMARY     Surgery Date: 7/6/2018     Surgeon(s) and Role:     * Oscar Julien MD - Resident - Assisting     * W Alden Arias MD - Primary        Pre-op Diagnosis:  Chondromalacia of right knee [M94.261]  Old tear of lateral meniscus of right knee, unspecified tear type [M23.200]    Post-op Diagnosis:  Post-Op Diagnosis Codes:     * Chondromalacia of right knee [M94.261]     * Old tear of lateral meniscus of right knee, unspecified tear type [M23.200]    Procedure(s) (LRB):  ARTHROSCOPY, KNEE, WITH MENISCECTOMY (partial lateral and medial menisectomy (Right)  ARTHROSCOPY, KNEE, WITH DEBRIDEMENT (Right)  EXCISION, PLICA, KNEE, ARTHROSCOPIC, (Right)    Anesthesia: General    Description of the findings of the procedure: see op note    Findings/Key Components: see op not    Estimated Blood Loss: * No values recorded between 7/6/2018 11:14 AM and 7/6/2018 12:36 PM *         Specimens:   Specimen (12h ago through future)    None          Discharge Note    SUMMARY     Admit Date: 7/6/2018    Discharge Date and Time:  07/06/2018 12:41 PM    Hospital Course:  The patient arrived to the [facility] on the second floor of [facility] for proper pre-operative management.  Upon completion of pre-operative preparation, the patient was taken back to the operative theatre.  A knee arthroscopy was performed without complication and the patient was transported to the post anesthesia care unit in stable condition.     Strauss: nil    Drain: nil    Pain Management:     - [facility]: yes    After appropriate recovery from the anaesthetic agents used during the surgery the patient was discharged home without complications      Final Diagnosis: Post-Op Diagnosis Codes:     * Chondromalacia of right knee [M94.261]     * Old tear of lateral meniscus of right knee, unspecified tear type [M23.200]    Disposition: Home or Self Care    Follow Up/Patient  Instructions:     Medications:  Reconciled Home Medications:      Medication List      START taking these medications    docusate sodium 100 MG capsule  Commonly known as:  COLACE  Take 1 capsule (100 mg total) by mouth 2 (two) times daily. Available OTC as well     * ondansetron 4 MG tablet  Commonly known as:  ZOFRAN  Take 1 tablet (4 mg total) by mouth every 8 (eight) hours as needed for Nausea.     oxyCODONE-acetaminophen 5-325 mg per tablet  Commonly known as:  PERCOCET  Take 1 tablet by mouth every 4 (four) hours as needed for Pain.        * This list has 1 medication(s) that are the same as other medications prescribed for you. Read the directions carefully, and ask your doctor or other care provider to review them with you.            CHANGE how you take these medications    metFORMIN 500 MG tablet  Commonly known as:  GLUCOPHAGE  Take 1 tablet (500 mg total) by mouth 2 (two) times daily with meals.  What changed:  additional instructions     spironolactone 25 MG tablet  Commonly known as:  ALDACTONE  TAKE 1 TABLET(25 MG) BY MOUTH EVERY DAY  What changed:  See the new instructions.        CONTINUE taking these medications    albuterol 90 mcg/actuation inhaler  Inhale 2 puffs into the lungs every 6 (six) hours as needed for Wheezing. Rescue     aspirin 325 MG EC tablet  Commonly known as:  ECOTRIN  Take 1 tablet (325 mg total) by mouth 2 (two) times daily. for 20 days     atorvastatin 20 MG tablet  Commonly known as:  LIPITOR  Take 1 tablet (20 mg total) by mouth once daily.     flaxseed oil 1,000 mg Cap  Take 1 capsule by mouth once daily.     losartan 100 MG tablet  Commonly known as:  COZAAR  100 mg once daily.     sertraline 100 MG tablet  Commonly known as:  ZOLOFT  Take 1 tablet (100 mg total) by mouth once daily.     VITAMIN B-12 1000 MCG tablet  Generic drug:  cyanocobalamin  Take 100 mcg by mouth once daily.     vitamin D 1000 units Tab  Take 185 mg by mouth once daily.        ASK your doctor about  "these medications    * ondansetron 4 MG tablet  Commonly known as:  ZOFRAN  Take 1 tablet (4 mg total) by mouth every 8 (eight) hours as needed.  Ask about: Should I take this medication?        * This list has 1 medication(s) that are the same as other medications prescribed for you. Read the directions carefully, and ask your doctor or other care provider to review them with you.                Discharge Procedure Orders  CRUTCHES FOR HOME USE   Order Specific Question Answer Comments   Type: Axillary    Height: 5' 1" (1.549 m)    Weight: 71.2 kg (157 lb)    Length of need (1-99 months): 3      Diet general     Call MD for:  temperature >100.4     Call MD for:  persistent nausea and vomiting     Call MD for:  severe uncontrolled pain     Call MD for:  difficulty breathing, headache or visual disturbances     Call MD for:  redness, tenderness, or signs of infection (pain, swelling, redness, odor or green/yellow discharge around incision site)     Call MD for:  hives     Call MD for:  persistent dizziness or light-headedness     Call MD for:  extreme fatigue       Follow-up Information     Kirit Arias MD In 2 weeks.    Specialties:  Sports Medicine, Orthopedic Surgery  Why:  For wound re-check  Contact information:  [address] [facility]  [address]ST FLO[address] [id number] [phone]                 "

## 2018-07-06 NOTE — DISCHARGE INSTRUCTIONS
Managing Post-Op Pain at Home: Medicines    Pain after an operation (post-op pain) is common and expected. These guidelines can help you stay as comfortable as possible.  Taking pain medicines  · Take medicines on time. Do not take more than prescribed.  · Take only the medicines that your healthcare provider tells you to take.  · Take pain medicines with some food to avoid an upset stomach.  · Dont drink alcohol while using pain medicines.  · Do not drive while taking opioid pain medicines.   Types of pain medicines  Non-opioid  · Over-the-counter (such as acetaminophen and ibuprofen) or prescription  · All relieve mild to moderate pain and some reduce swelling  · Possible side effects include stomach upset and bleeding, high doses may cause kidney or liver problems  · Check with your healthcare provider before taking over-the-counter pain medicines in addition to your prescribed pain medicine  Opioid  · Always a prescription  · Relieve moderate to severe pain  · Possible side effects include stomach upset, nausea, and itching  · May cause constipation (to help prevent this, eat high-fiber foods and drink plenty of water)  · Your healthcare provider may recommend a stool softener  When to call your healthcare provider  Call your healthcare provider or seek immediate attention if you notice any of these symptoms:  · Nausea, vomiting, diarrhea, lasting constipation, or stomach cramps  · Breathing problems or a fast heart rate  · Feeling very tired, sluggish, or dizzy  · Skin rash   Date Last Reviewed: 12/1/2016  © 3365-4991 [employer]. [address] [place] [address], [place] [address]. All rights reserved. This information is not intended as a substitute for professional medical care. Always follow your healthcare professional's instructions.      Recovery After Procedural Sedation (Adult)  You have been given medicine by vein to make you sleep during your surgery. This may have included both a pain  medicine and sleeping medicine. Most of the effects have worn off. But you may still have some drowsiness for the next 6 to 8 hours.  Home care  Follow these guidelines when you get home:  · For the next 8 hours, you should be watched by a responsible adult. This person should make sure your condition is not getting worse.  · Don't drink any alcohol for the next 24 hours.  · Don't drive, operate dangerous machinery, or make important business or personal decisions during the next 24 hours.  Note: Your healthcare provider may tell you not to take any medicine by mouth for pain or sleep in the next 4 hours. These medicines may react with the medicines you were given in the hospital. This could cause a much stronger response than usual.  Follow-up care  Follow up with your healthcare provider if you are not alert and back to your usual level of activity within 12 hours.  When to seek medical advice  Call your healthcare provider right away if any of these occur:  · Drowsiness gets worse  · Weakness or dizziness gets worse  · Repeated vomiting  · You can't be awakened   Date Last Reviewed: 10/18/2016  © 3285-9952 [employer]. [address] [place] [address]. All rights reserved. This information is not intended as a substitute for professional medical care. Always follow your healthcare professional's instructions.    PATIENT INSTRUCTIONS  POST-ANESTHESIA    IMMEDIATELY FOLLOWING SURGERY:  Do not drive or operate machinery for the first twenty four hours after surgery.  Do not make any important decisions for twenty four hours after surgery or while taking narcotic pain medications or sedatives.  If you develop intractable nausea and vomiting or a severe headache please notify your doctor immediately.    FOLLOW-UP:  Please make an appointment with your surgeon as instructed. You do not need to follow up with anesthesia unless specifically instructed to do so.    WOUND CARE INSTRUCTIONS (if  applicable):  Keep a dry clean dressing on the anesthesia/puncture wound site if there is drainage.  Once the wound has quit draining you may leave it open to air.  Generally you should leave the bandage intact for twenty four hours unless there is drainage.  If the epidural site drains for more than 36-48 hours please call the anesthesia department.    QUESTIONS?:  Please feel free to call your physician or the hospital  if you have any questions, and they will be happy to assist you.       [facility] Anesthesia Department  [address] [facility]  [phone]

## 2018-07-06 NOTE — PLAN OF CARE
Problem: Physical Therapy Goal  Goal: Physical Therapy Goal  Outcome: Outcome(s) achieved Date Met: 07/06/18  Initial eval completed.  Results, POC, and therapy recommendations discussed with patient, Rn and family.       Complete evaluation documentation to follow.  Pt demonstrated proper use of RW with transfers and gait.  Gait distances was limited by c/o sudden weakness and feeling flushed. BP taken and patient is not hypotensive.  She is physically safe to d/c home with RW when her tolerance of upright activity improves.  RN at bedside.     Camilla Mace, PT  7/6/2018  [phone] (pager)

## 2018-07-06 NOTE — INTERVAL H&P NOTE
The patient has been examined and the H&P has been reviewed:    I concur with the findings and no changes have occurred since H&P was written.    Anesthesia/Surgery risks, benefits and alternative options discussed and understood by patient/family.          Active Hospital Problems    Diagnosis  POA    Chondromalacia of right knee [M94.261]  Yes      Resolved Hospital Problems    Diagnosis Date Resolved POA   No resolved problems to display.

## 2018-07-06 NOTE — PROGRESS NOTES
Patient complaining of 10/10 pain. IV pain medication sedating patient but patient still moaning complaining of 10/10 pain. Dr. Julien notified. Ok to consult block team for post op block.

## 2018-07-06 NOTE — PROGRESS NOTES
Pt was working w/ PT and started to feel flushed and weak, sat pt down and pt not hypotensive. Pt started to feel better after sitting down. Informed Dr. Arias, will continue to monitor.

## 2018-07-09 ENCOUNTER — CLINICAL SUPPORT (OUTPATIENT)
Dept: REHABILITATION | Facility: HOSPITAL | Age: 68
End: 2018-07-09
Payer: MEDICARE

## 2018-07-09 DIAGNOSIS — M25.661 DECREASED ROM OF RIGHT KNEE: ICD-10-CM

## 2018-07-09 DIAGNOSIS — R53.1 DECREASED STRENGTH: ICD-10-CM

## 2018-07-09 DIAGNOSIS — Z74.09 DECREASED MOBILITY AND ENDURANCE: ICD-10-CM

## 2018-07-09 PROCEDURE — G8978 MOBILITY CURRENT STATUS: HCPCS | Mod: CL,PN

## 2018-07-09 PROCEDURE — 97110 THERAPEUTIC EXERCISES: CPT | Mod: PN

## 2018-07-09 PROCEDURE — 97161 PT EVAL LOW COMPLEX 20 MIN: CPT | Mod: PN

## 2018-07-09 PROCEDURE — G8979 MOBILITY GOAL STATUS: HCPCS | Mod: CK,PN

## 2018-07-09 NOTE — PLAN OF CARE
TIME RECORD    Date: 7/9/2018    Start Time:  1:10  Stop Time:  2:00    PROCEDURES:    TIMED  Procedure Min.   TE 10                     UNTIMED  Procedure Min.   IE 40         Total Timed Minutes:  10  Total Timed Units:  1 TE  Total Untimed Units:  1 LCE  Charges Billed/# of units:  1 TE + 1 LCE    [facility]    PHYSICAL THERAPY EVALUATION  Onset Date: 7/6/18  Medical Diagnosis:   M94.261 (ICD-10-CM) - Chondromalacia of right knee   M23.200 (ICD-10-CM) - Other old tear of lateral meniscus of right knee     Treatment Diagnosis:   1. Decreased ROM of right knee     2. Decreased strength     3. Decreased mobility and endurance       Physician: Bree Parr PA-C  Past Medical History:   Diagnosis Date    Atypical ductal hyperplasia, breast     Basal cell carcinoma 4/2011    left forehead    Basal cell carcinoma 4/2015    R temporal scalp, R upper forehead, L upper forehead    Basal cell carcinoma 8/2015    right mid ala    BCC (basal cell carcinoma) excised     right shoulder    Diabetes mellitus     Diverticulitis     Fibrocystic breast     HLD (hyperlipidemia)     Hypertension     Prediabetes 10/16/2013    Skin cancer      Precautions: DM, skin cancer, HTN  Prior Therapy: None  Medications: Jolly Matias has a current medication list which includes the following prescription(s): albuterol, aspirin, atorvastatin, cyanocobalamin, docusate sodium, flaxseed oil, losartan, metformin, ondansetron, oxycodone-acetaminophen, sertraline, spironolactone, and vitamin d.  Nutrition:  Overweight  History of Present Illness: See subjective below  Prior Level of Function: Independent  Social History: retired   Place of Residence (Steps/Adaptations): [place], lives with   Functional Deficits Leading to Referral/Nature of Injury: walking, stairs, squatting, standing  Patient Therapy Goals: To be able to walk without a limp.    Subjective     Jolly HUBERT Vincenttrang states she  has been having R knee issues for about a year with catching and buckling with it worsening about 2 weeks ago that was significantly limiting her. Pt had surgery last Friday, and has been slowly been putting more weight through RLE. Pt reports no issues with low back, L knee, or B ankles. Pt has not touched bandages yet.    Pain:  Location: knee   Description: Aching and Sharp  Activities Which Increase Pain: Standing, Bending, Walking, Flexing, Lifting and Getting out of bed/chair  Activities Which Decrease Pain: pain medication and rest  Pain Scale: 5/10 at best 5/10 now  9/10 at worst    Objective     Posture: increased kyphosis, froward trunk lean with RW, decreased RLE weight bearing, decreased R knee extension  Palpation: +TTP at medial and lateral femoral condyles  Sensation:light touch intact  DTRs: NT  Range of Motion/Strength:   * = R knee pain with testing  Hip  Right   Left  Pain/Dysfunction with Movement    AROM PROM MMT AROM PROM MMT    Flexion WFL WFL NT WFL WFL 4/5 Able to perform SLR   Extension WFL WFL NT WFL WFL NT    Abduction WFL WFL 3+/5 WFL WFL 4-/5 Tested in supine   Adduction WFL WFL 4-/5 WFL WFL 4/5 Tested in supine   Internal rotation WFL WFL NT WFL WFL NT    External rotation WFL WFL NT WFL WFL NT       Knee  Right   Left  Pain/Dysfunction with Movement    AROM PROM MMT AROM PROM MMT    Flexion 52 72 NT WFL WFL 4/5    Extension 7 5 NT WFL WFL 4/5      Ankle  Right   Left  Pain/Dysfunction with Movement    AROM PROM MMT AROM PROM MMT    Plantarflexion WFL WFL 4+/5 WFL WFL 5/5    Dorsiflexion WFL WFL 4+/5 WFL WFL 5/5    Inversion WFL WFL NT WFL WFL NT    Eversion WFL WFL NT WFL WFL NT      Flexibility:   Gait: Without AD  Analysis: SPV, decreased conner, decreased l step length, decreased R stance time, decreased R knee extension/flexion throughout gait.  Bed Mobility:Independent  Transfers: Independent  Special Tests: NT    CMS Impairment/Limitation/Restriction for FOTO KNEE  Survey    Therapist reviewed FOTO scores for Jolly Matias on 7/9/2018.   FOTO documents entered into [employer] - see Media section.    Limitation Score: 72%  Category: Mobility    Current : CL = least 60% but < 80% impaired, limited or restricted  Goal: CK = at least 40% but < 60% impaired, limited or restricted       TREATMENT     Time In: 1:50  Time Out: 2:00    PT Evaluation Completed? Yes  Discussed Plan of Care with patient: Yes    Jolly received 10 minutes of therapeutic exercise & instruction including:  DATE 7/9/2018   VISIT 1/?   POC 9/9/18    G CODE 1/10   FOTO 1/5   Cap Visit  Cap Total 113.20  113.20   Bike    MHP    MT    Stretches:    HS stretch    IT band stretch    Supine:    Quad sets 10x5'' R  Towel under knee   SLR 5x R w/ Min A   SAQ Next    Knee flexion stretch Next    Heel slides 5x R   SL hip abd Next    Clams    Hip add Next    Bridges Next        Prone:    Quad stretch    HS curls        Seated:    LAQ Next    Marching Next        Standing:    Heel raises    Calf stretch on fitter     Steamboats Next    TKEs    SLS    Tandem walking    Cybex HS curls    Cybex LAQ    Leg Press    CP    INITIALS VICK     Written Home Exercises Provided: Yes  Jolly demo good understanding of the education provided. Patient demo good return demo of skill of exercises.    See EMR in Patient Instructions for HEP given: Yes      Assessment       Initial Assessment (Pertinent finding, problem list and factors affecting outcome):   Pt is a 67 yo female dx with Chondromalacia of R knee and Other old tear of lateral meniscus of right kneepresenting to PT at [facility]. Pt currently presents with R knee pain, decreased right knee ROM, decreased BLE strength, poor posture, impaired balance and gait, and functional deficits with ADLs and mobility. Pt would benefit from skilled PT consisting of gait training, muscular skeletal stretching/strengthening, manual therapy, neuro muscular  re-education, and modalities prn to address limitations and increase functional mobility.      History  Co-morbidities and personal factors that may impact the plan of care Co-morbidities:   Arthritis, depression, GI disease, HTN,      Personal Factors:   no deficits     moderate   Examination  Body Structures and Functions, activity limitations and participation restrictions that may impact the plan of care Body Regions:   back  lower extremities  trunk    Body Systems:    gross symmetry  ROM  strength  gross coordinated movement  balance  gait  transfers  transitions  motor control    Participation Restrictions:   Community walking, ADLs    Activity limitations:   Learning and applying knowledge  no deficits    General Tasks and Commands  no deficits    Communication  no deficits    Mobility  lifting and carrying objects  walking    Self care  dressing    Domestic Life  shopping  cooking  doing house work (cleaning house, washing dishes, laundry)  assisting others    Interactions/Relationships  no deficits    Life Areas  no deficits    Community and Social Life  no deficits         high   Clinical Presentation stable and uncomplicated low   Decision Making/ Complexity Score: low       Rehab Potiential: excellent  Spiritual/Cultural Needs: None  Barriers to Rehab: None  Short Term Goals: 4 weeks  1. Pt will be independent with HEP  2. Pt will improve RLE strength to at least 75% of L LE strength as measured via Micro[employer]et handheld dynamometer in order to improve functional mobility  3. Pt will improve R knee AROM to at least 5-100 in order to improve gait    Long Term Goals: 8 weeks  1. Pt will be independent with updated HEP  2. Pt will improve R LE strength to at least 90% of L LE strength as measured via MMT in order to improve functional mobility  3. Pt will improve R knee AROM to at least 0-125 in order to improve gait and ability to perform ADLs  4. Pt will improve FOTO knee survey score to </= 46% limited in  order to demo improved functional mobility.  5. Pt will be able to negotiate stairs and ambulate all surfaces independently without AD for increased functional mobility.    Plan     Certification Period: 7/9/18 to 9/9/18  Recommended Treatment Plan: 2 times per week for 8 weeks: Cervical/Lumbar Traction, Electrical Stimulation IFC/Russian, Gait Training, Manual Therapy, Moist Heat/ Ice, Neuromuscular Re-ed, Orthotic Management and Training, Patient Education, Self Care, Therapeutic Activites and Therapeutic Exercise  Other Recommendations: modalities prn, ASTYM prn, kinesiotape prn, Functional Dry Needling prn       Perry Byers, PT  7/9/2018      I CERTIFY THE NEED FOR THESE SERVICES FURNISHED UNDER THIS PLAN OF TREATMENT AND WHILE UNDER MY CARE    Physician's comments: ________________________________________________________________________________________________________________________________________________      Physician's Name: ___________________________________

## 2018-07-09 NOTE — OP NOTE
[facility]   OPERATIVE REPORT   ORTHOPAEDIC SURGERY   PROVIDER: DR. ROXIE GILLILAND    PATIENT INFORMATION   Jolly Matias 68 y.o. female 1950   MRN: [id number]   LOCATION: [facility]     DATE OF PROCEDURE: 7/6/2018     PREOPERATIVE DIAGNOSES:   Right  1. knee medial and lateral meniscus tears   2. knee chondromalacia     POSTOPERATIVE DIAGNOSES:   Right  1. knee medial and lateral meniscus tears   2. knee chondromalacia, tricompartmental, diffuse grade II-III  3. knee synovitis  4. knee adhesions  5. knee loose bodies, multiple cartilage fragments     PROCEDURE PERFORMED:   Right  1. knee arthroscopic medial and lateral partial meniscectomy (CPT 28396)  2. knee arthroscopic chondroplasty (CPT 05662)  3. knee arthroscopic partial synovectomy/debridement (CPT 34705)         4. knee arthroscopic lateral release/peripatellar release package (CPT 64514)  5. knee arthroscopic loose body removal (CPT 76733)    Surgeon(s) and Role:     * Oscar Julien MD - Resident - Assisting     * MARVA Gilliland MD - Primary    ANESTHESIA: General with local anesthetic injection    ESTIMATED BLOOD LOSS: 15 cc    IMPLANTS: * No implants in log *     SPECIMENS:   Specimen (12h ago through future)    None          COMPLICATIONS: None.     INTRAOPERATIVE COUNTS: Correct.     PROPHYLACTIC IV ANTIBIOTICS: Given per [facility] Protocol.    INDICATIONS FOR PROCEDURE:  The patient presented complaining of chronic right knee pain.  Imaging has confirmed medial and lateral meniscus pathology, along with chondromalacia of the knee.  Primary complaints, on history and exam, are meniscal based with prominent mechanical symptoms.  The patient has failed a course of conservative treatment.  Given the extent and duration of these complaints, the patient has been indicated for arthroscopic intervention to include meniscus treatment as indicated, chondroplasty, debridement and parapatellar releases.  Details of such were reviewed  preoperatively to include the expected postoperative rehabilitation and recovery course.  Outlined risks and potential complications of surgery include but are not limited to: infection, stiffness, progression of arthritis, tissue re-tearing, neurovascular injury, blood clot formation, potential need further surgery. The patient has elected to proceed.    PROCEDURE IN DETAIL:  The patient was identified in preop holding. Permit was obtained for the above procedure. IV antibiotic was initiated for prophylaxis and the patient was brought to the operating room.       After Anesthesia was administered, a Time Out was verbalized with all OR staff agreeing to the patient and procedure.      The right knee and leg were prepped and draped in the usual sterile fashion.      Diagnostic arthroscopy was undertaken after establishing routine anteromedial and anterolateral scope portals.      Significant Findings:  1. Patellofemoral compartment - diffuse grade II-III arthrosis patella and small area central trochlea grade II wear, diffuse anterior interval and suprapatellar recess synovitis with adhesion, very tight lateral pericapsular and retinacular tissue.  2. Notch - Normal ACL and PCL  3. Medial Compartment - Meniscus, partial degenerative posterior root tear extending to the red-white junction. Cartilage, diffuse grade II-III medial femoral condyle, small area grade II tibial plateau.    4. Lateral compartment - Meniscus, complex degenerative tearing of the anterior horn lateral meniscus. Tearing extending to the anterior body.  Cartilage, diffuse grade II lateral femoral condyle and lateral tibial plateau.    5. Loose bodies - several small chondral loose bodies were encountered in the posterolateral and suprapatellar recesses.  6. Other - moderate diffuse synovitis and scar    Detailed description:     1. Meniscectomy: Partial medial and lateral meniscectomies were carried out from the posterior horn to posterior body  with a combination of biters and candice medially and from the anterior horn to anterior body laterally.  Trimming was completed to stable, contoured edges.     2. Releases: Peripatellar releases were performed with combination of cautery and thermal ablation, releasing thickened capsular and retinacular tissue anterolaterally and performing a partial synovectomy.  A partial lateral release was performed working from the superior pole to the inferior pole of the patella, maintaining hemostasis throughout. The extensive anterior compartment synovitis was ablated with cautery and removed with the shaver.   3. Chondroplasty and loose bodies: Debridement was carried out at the medial femoral condyle, medial tibial plateau, lateral femoral condyle, lateral tibial plateau, patella and trochlea to smooth and stabilize the cartilage with a non-aggressive shaver.  Several small chondral loose bodies were also removed with the shaver.    Photos were taken.     The dressing was placed after local was administered subcutaneously and the patient was awakened and transferred to the recovery room in satisfactory condition.  There were no apparent complications.     POSTOPERATIVE PLAN: Patient may weight bear as tolerates on a walker. Full range of motion to tolerance. Will start physical therapy right away.  mg BID x 2 weeks for DVT prophylaxis.

## 2018-07-09 NOTE — ANESTHESIA POSTPROCEDURE EVALUATION
"Anesthesia Post Evaluation    Patient: Jolly Matias    Procedure(s) Performed: Procedure(s) (LRB):  ARTHROSCOPY, KNEE, WITH MENISCECTOMY (partial lateral and medial menisectomy, chondraplasty (Right)  ARTHROSCOPY, KNEE, WITH DEBRIDEMENT (Right)  EXCISION, PLICA, KNEE, ARTHROSCOPIC, (Right)    Final Anesthesia Type: general  Patient location during evaluation: PACU  Level of consciousness: awake and alert  Post-procedure vital signs: reviewed and stable  Pain management: adequate  Airway patency: patent  PONV status at discharge: No PONV  Anesthetic complications: no      Cardiovascular status: blood pressure returned to baseline  Respiratory status: unassisted and spontaneous ventilation  Hydration status: euvolemic  Follow-up not needed.        Visit Vitals  BP (!) 140/61   Pulse 66   Temp 36.8 °C (98.3 °F) (Skin)   Resp 18   Ht 5' 1" (1.549 m)   Wt 71.2 kg (157 lb)   LMP 01/01/1991   SpO2 96%   BMI 29.66 kg/m²       Pain/Hyun Score: No Data Recorded      "

## 2018-07-10 PROBLEM — M25.661 DECREASED ROM OF RIGHT KNEE: Status: ACTIVE | Noted: 2018-07-10

## 2018-07-10 PROBLEM — Z74.09 DECREASED MOBILITY AND ENDURANCE: Status: ACTIVE | Noted: 2018-07-10

## 2018-07-10 PROBLEM — R53.1 DECREASED STRENGTH: Status: ACTIVE | Noted: 2018-07-10

## 2018-07-12 ENCOUNTER — CLINICAL SUPPORT (OUTPATIENT)
Dept: REHABILITATION | Facility: HOSPITAL | Age: 68
End: 2018-07-12
Payer: MEDICARE

## 2018-07-12 DIAGNOSIS — M25.661 DECREASED ROM OF RIGHT KNEE: ICD-10-CM

## 2018-07-12 DIAGNOSIS — Z74.09 DECREASED MOBILITY AND ENDURANCE: ICD-10-CM

## 2018-07-12 DIAGNOSIS — R53.1 DECREASED STRENGTH: ICD-10-CM

## 2018-07-12 PROCEDURE — 97140 MANUAL THERAPY 1/> REGIONS: CPT | Mod: PN

## 2018-07-12 PROCEDURE — 97110 THERAPEUTIC EXERCISES: CPT | Mod: PN

## 2018-07-12 NOTE — PROGRESS NOTES
"DAILY TREATMENT NOTE    DATE: 7/12/2018    Start Time:  2:00  Stop Time:  2:55    PROCEDURES:    TIMED  Procedure Min.   TE  20   TE sup 25   MT 10             UNTIMED  Procedure Min.   CP          Total Timed Minutes:  55  Total Timed Units:  2  Total Untimed Units:    Charges Billed/# of units:  TE-2      Progress/Current Status    Subjective:     Patient ID: Jolly Matias is a 68 y.o. female.  Diagnosis:   1. Decreased ROM of right knee     2. Decreased strength     3. Decreased mobility and endurance       Pain: 6 /10      Objective:   Treatment initiated with Sci fit B LE active warm up and joint nutrition 5 minutes f/b  STM/MFR to Left quad , IT band, HS and gastroc TE per log 1:1 with PTA  additional time  and superivised  with tech        DATE 7/12/18 7/9/2018   VISIT 2/? 1/?   POC 9/9/18      G CODE 2/10 1/10   FOTO 2/5 1/5   Cap Visit  Cap Total 57.78  170.98 113.20  113.20   Bike      MHP      MT 10'     Stretches:      HS stretch 3x30"      IT band stretch      Supine:      Quad sets 5"x10 R w/towel under knee 10x5'' R  Towel under knee   SLR 2x10 R 5x R w/ Min A   SAQ 2x10 R Next    Knee flexion stretch  Next    Heel slides 10"x10 R w/straps 5x R   SL hip abd 2x10 B Next    Clams      Iso Hip add+ Kegles 5"x10 w/ball Next    Bridges 2x10 Next           Prone:      Quad stretch      HS curls             Seated:      LAQ 2x10 B Next    Marching 2x10 B Next           Standing:      Heel raises      Calf stretch on fitter      Steamboats 2x10 B 3 way Next    TKEs      SLS      Tandem walking      Cybex HS curls      Cybex LAQ      Leg Press      CP      INITIALS MB 1/6 VICK          Assessment:     Tolerated 1st follow up well.Reduced pain following interventions.    Patient Education/Response:   Jolly verbalized understanding.      Plans and Goals:         Rehab Potiential: excellent  Spiritual/Cultural Needs: None  Barriers to Rehab: None  Short Term Goals: 4 weeks  1. Pt will be independent with " HEP  2. Pt will improve RLE strength to at least 75% of L LE strength as measured via MicroFet handheld dynamometer in order to improve functional mobility  3. Pt will improve R knee AROM to at least 5-100 in order to improve gait     Long Term Goals: 8 weeks  1. Pt will be independent with updated HEP  2. Pt will improve R LE strength to at least 90% of L LE strength as measured via MMT in order to improve functional mobility  3. Pt will improve R knee AROM to at least 0-125 in order to improve gait and ability to perform ADLs  4. Pt will improve FOTO knee survey score to </= 46% limited in order to demo improved functional mobility.  5. Pt will be able to negotiate stairs and ambulate all surfaces independently without AD for increased functional mobility.       Certification Period: 7/9/18 to 9/9/18  Recommended Treatment Plan: 2 times per week for 8 weeks: Cervical/Lumbar Traction, Electrical Stimulation IFC/Russian, Gait Training, Manual Therapy, Moist Heat/ Ice, Neuromuscular Re-ed, Orthotic Management and Training, Patient Education, Self Care, Therapeutic Activites and Therapeutic Exercise  Other Recommendations: modalities prn, ASTYM prn, kinesiotape prn, Functional Dry Needling prn

## 2018-07-13 ENCOUNTER — TELEPHONE (OUTPATIENT)
Dept: FAMILY MEDICINE | Facility: CLINIC | Age: 68
End: 2018-07-13

## 2018-07-13 NOTE — TELEPHONE ENCOUNTER
Patient advised per [facility] message sent to her by Dr. Lee on 07/05/18. Patient will keep the scheduled appointment.      Patient is requesting a handicap tag form since she will be doing PT thru September & is having trouble walking since her surgery. Please advise.

## 2018-07-13 NOTE — TELEPHONE ENCOUNTER
----- Message from Jeremie Nance sent at 7/13/2018  1:29 PM CDT -----  Contact: Patient [phone]  Patient calling would like to speak with  regarding appt 7/27/18 [facility] VASCULAR SURGERY, stating is not aware of this appt. Provider: Ihsan Pandey MD, requesting a call back.    Please call an advise  Thank you

## 2018-07-13 NOTE — TELEPHONE ENCOUNTER
I had left her a voice message about this. Maybe she didn't get that.  I had suggested that she see a vascular medicine specialist for a consult about the splenic artery aneurysm.

## 2018-07-17 ENCOUNTER — CLINICAL SUPPORT (OUTPATIENT)
Dept: REHABILITATION | Facility: HOSPITAL | Age: 68
End: 2018-07-17
Payer: MEDICARE

## 2018-07-17 DIAGNOSIS — Z74.09 DECREASED MOBILITY AND ENDURANCE: ICD-10-CM

## 2018-07-17 DIAGNOSIS — M25.661 DECREASED ROM OF RIGHT KNEE: ICD-10-CM

## 2018-07-17 DIAGNOSIS — R53.1 DECREASED STRENGTH: ICD-10-CM

## 2018-07-17 PROCEDURE — 97140 MANUAL THERAPY 1/> REGIONS: CPT | Mod: PN

## 2018-07-17 PROCEDURE — 97110 THERAPEUTIC EXERCISES: CPT | Mod: PN

## 2018-07-17 NOTE — PROGRESS NOTES
"DAILY TREATMENT NOTE    DATE: 7/17/2018    Start Time:  300  Stop Time:   400    PROCEDURES:    TIMED  Procedure Min.   TE  15   TE sup 25   MT 15             UNTIMED  Procedure Min.   CP          Total Timed Minutes:  30  Total Timed Units:  2  Total Untimed Units:    Charges Billed/# of units: 2   TE-1, MT-1      Progress/Current Status    Subjective:     Patient ID: Jolly Matias is a 68 y.o. female.  Diagnosis:   1. Decreased ROM of right knee     2. Decreased strength     3. Decreased mobility and endurance       Pain: 4 /10  Reports HEP compliance.      Objective:   Treatment initiated with Sci fit B LE active warm up and joint nutrition 5 minutes f/b  STM/MFR to Left quad , IT band, HS and gastroc TE per log 1:1 with PTA  additional time  and superivised  with tech        DATE 7/17/18 7/12/18 7/9/2018   VISIT 3/? 2/? 1/?   POC 9/9/18       G CODE 3/10 2/10 1/10   FOTO 3/5 2/5 1/5   Cap Visit  Cap Total 57.78  228.76   57.78  170.98 113.20  113.20   Bike       MHP       MT 15' 10'     Stretches:       HS stretch 3x30" B 3x30"      IT band stretch       Supine:       Quad sets 5"x10 w/towel under ankle 5"x10 R w/towel under knee 10x5'' R  Towel under knee   SLR 2x12 R 2x10 R 5x R w/ Min A   SAQ 2x12 R 2x10 R Next    Knee flexion stretch   Next    Heel slides 10"x10  10"x10 R w/straps 5x R   SL hip abd 2x12 B 2x10 B Next    Clams       Iso Hip add+ Kegles 5"x10 w/ball 5"x10 w/ball Next    Bridges  2x10 Next            Prone:       Quad stretch       HS curls               Seated:       LAQ 2x12 B 2x10 B Next    Marching 2x12 B 2x10 B Next            Standing:       Heel raises       Calf stretch on fitter       Steamboats 3x10 B 3 way 2x10 B 3 way Next    TKEs       SLS       Tandem walking       Cybex HS curls       Cybex LAQ       Leg Press       CP       INITIALS MB 2/6 MB 1/6 VICK          Assessment:     Tolerated 2st follow up well.Reduced pain following interventions. Increased rapes and or resistance per " log    Patient Education/Response:   Jolly verbalized understanding.      Plans and Goals:         Rehab Potiential: excellent  Spiritual/Cultural Needs: None  Barriers to Rehab: None  Short Term Goals: 4 weeks  1. Pt will be independent with HEP  2. Pt will improve RLE strength to at least 75% of L LE strength as measured via MicroFet handheld dynamometer in order to improve functional mobility  3. Pt will improve R knee AROM to at least 5-100 in order to improve gait     Long Term Goals: 8 weeks  1. Pt will be independent with updated HEP  2. Pt will improve R LE strength to at least 90% of L LE strength as measured via MMT in order to improve functional mobility  3. Pt will improve R knee AROM to at least 0-125 in order to improve gait and ability to perform ADLs  4. Pt will improve FOTO knee survey score to </= 46% limited in order to demo improved functional mobility.  5. Pt will be able to negotiate stairs and ambulate all surfaces independently without AD for increased functional mobility.       Certification Period: 7/9/18 to 9/9/18  Recommended Treatment Plan: 2 times per week for 8 weeks: Cervical/Lumbar Traction, Electrical Stimulation IFC/Russian, Gait Training, Manual Therapy, Moist Heat/ Ice, Neuromuscular Re-ed, Orthotic Management and Training, Patient Education, Self Care, Therapeutic Activites and Therapeutic Exercise  Other Recommendations: modalities prn, ASTYM prn, kinesiotape prn, Functional Dry Needling prn

## 2018-07-19 ENCOUNTER — CLINICAL SUPPORT (OUTPATIENT)
Dept: REHABILITATION | Facility: HOSPITAL | Age: 68
End: 2018-07-19
Payer: MEDICARE

## 2018-07-19 DIAGNOSIS — M25.661 DECREASED ROM OF RIGHT KNEE: ICD-10-CM

## 2018-07-19 DIAGNOSIS — Z74.09 DECREASED MOBILITY AND ENDURANCE: ICD-10-CM

## 2018-07-19 DIAGNOSIS — R53.1 DECREASED STRENGTH: ICD-10-CM

## 2018-07-19 PROCEDURE — 97110 THERAPEUTIC EXERCISES: CPT | Mod: PN

## 2018-07-19 PROCEDURE — 97140 MANUAL THERAPY 1/> REGIONS: CPT | Mod: PN

## 2018-07-19 NOTE — PROGRESS NOTES
"DAILY TREATMENT NOTE    DATE: 7/19/2018    Start Time:  100  Stop Time:   200    PROCEDURES:    TIMED  Procedure Min.   TE  45   TE sup    MT 15   GT 5'         UNTIMED  Procedure Min.   CP          Total Timed Minutes:  60  Total Timed Units:  4  Total Untimed Units:    Charges Billed/# of units: 4   TE-3, MT-1      Progress/Current Status    Subjective:     Patient ID: Jolly Matias is a 68 y.o. female.  Diagnosis:   1. Decreased ROM of right knee     2. Decreased strength     3. Decreased mobility and endurance       Pain: 2 /10  Reports HEP compliance.      Objective:   Treatment initiated with Sci fit B LE active warm up and joint nutrition 5 minutes f/b  STM/MFR to Left quad , IT band, HS and gastroc TE per log 1:1 with PTA . Gait training 5 minutes with SPC L hand 300'.        DATE 7/19/18 7/17/18 7/12/18 7/9/2018   VISIT 4 3/? 2/? 1/?   POC 9/9/18        G CODE 4/10 3/10 2/10 1/10   FOTO 4/5 3/5 2/5 1/5   Cap Visit  Cap Total 118.42   57.78  228.76   57.78  170.98 113.20  113.20   Bike        MHP        MT 15' 15' 10'     Stretches:        HS stretch 3x30" B 3x30" B 3x30"      IT band stretch        Supine:        Quad sets 5"x10 w/towel under ankle 5"x10 w/towel under ankle 5"x10 R w/towel under knee 10x5'' R  Towel under knee   SLR 2x15 B 2x12 R 2x10 R 5x R w/ Min A   SAQ  2x12 R 2x10 R Next    Knee flexion stretch    Next    Heel slides 10"x10  10"x10  10"x10 R w/straps 5x R   SL hip abd 2x12 B 2x12 B 2x10 B Next    Clams        Iso Hip add+ Kegles 5"x10 w/ball 5"x10 w/ball 5"x10 w/ball Next    Bridges 2x10 B  2x10 Next             Prone:        Quad stretch        HS curls 2x10 R       Hip Ext  2x10 B       Seated:        LAQ 2x15 B 1.5# 2x12 B 2x10 B Next    Marching 2x15 B 1.5# 2x12 B 2x10 B Next             Standing:        Heel raises 2x10        Calf stretch on fitter        Steamboats 2x10 B 3 way 3x10 B 3 way 2x10 B 3 way Next    Step ups 2x10 L1 No UE sup      Step downs       TKEs        SLS    "     Tandem walking        Cybex HS curls        Cybex LAQ        Leg Press        CP        INITIALS MB 3/6 MB 2/6 MB 1/6 VICK          Assessment:     Tolerated 3st follow up well. Reduced pain following interventions. Inconsistent SPC sequencing but good step length, swing clearance and = weight bearing. Progressed to standing activities.  Increased rapes and or resistance per log.    Patient Education/Response:   Jolly verbalized understanding.      Plans and Goals:         Rehab Potiential: excellent  Spiritual/Cultural Needs: None  Barriers to Rehab: None  Short Term Goals: 4 weeks  1. Pt will be independent with HEP  2. Pt will improve RLE strength to at least 75% of L LE strength as measured via MicroFet handheld dynamometer in order to improve functional mobility  3. Pt will improve R knee AROM to at least 5-100 in order to improve gait     Long Term Goals: 8 weeks  1. Pt will be independent with updated HEP  2. Pt will improve R LE strength to at least 90% of L LE strength as measured via MMT in order to improve functional mobility  3. Pt will improve R knee AROM to at least 0-125 in order to improve gait and ability to perform ADLs  4. Pt will improve FOTO knee survey score to </= 46% limited in order to demo improved functional mobility.  5. Pt will be able to negotiate stairs and ambulate all surfaces independently without AD for increased functional mobility.       Certification Period: 7/9/18 to 9/9/18  Recommended Treatment Plan: 2 times per week for 8 weeks: Cervical/Lumbar Traction, Electrical Stimulation IFC/Russian, Gait Training, Manual Therapy, Moist Heat/ Ice, Neuromuscular Re-ed, Orthotic Management and Training, Patient Education, Self Care, Therapeutic Activites and Therapeutic Exercise  Other Recommendations: modalities prn, ASTYM prn, kinesiotape prn, Functional Dry Needling prn

## 2018-07-23 ENCOUNTER — CLINICAL SUPPORT (OUTPATIENT)
Dept: REHABILITATION | Facility: HOSPITAL | Age: 68
End: 2018-07-23
Payer: MEDICARE

## 2018-07-23 DIAGNOSIS — Z74.09 DECREASED MOBILITY AND ENDURANCE: ICD-10-CM

## 2018-07-23 DIAGNOSIS — R53.1 DECREASED STRENGTH: ICD-10-CM

## 2018-07-23 DIAGNOSIS — M25.661 DECREASED ROM OF RIGHT KNEE: ICD-10-CM

## 2018-07-23 PROCEDURE — 97140 MANUAL THERAPY 1/> REGIONS: CPT | Mod: PN

## 2018-07-23 PROCEDURE — 97110 THERAPEUTIC EXERCISES: CPT | Mod: PN

## 2018-07-23 NOTE — PROGRESS NOTES
"DAILY TREATMENT NOTE    DATE: 7/23/2018    Start Time:  3:00  Stop Time:   4:00    PROCEDURES:    TIMED  Procedure Min.   TE  45   TE sup    MT 10   GT          UNTIMED  Procedure Min.   CP    Bike  5     Total Timed Minutes:  60  Total Timed Units:  4  Total Untimed Units:    Charges Billed/# of units: 4   TE-3, MT-1      Progress/Current Status    Subjective:     Patient ID: Jolly Matias is a 68 y.o. female.  Diagnosis:   1. Decreased ROM of right knee     2. Decreased strength     3. Decreased mobility and endurance       Pain: 2 /10  Pt reported that she was sore after last visit, but is doing well today. She says that she avoids pain for the exercises at home, but is doing them.    Objective:   Treatment initiated with bike x 5 mins for increased blood flow to BLE and increase R knee ROM. Pt then performed TE x 45 mins per log below including 5 mins of gait of 160' x 2 w/ SPC at SPV. Pt then received MT x 10 mins consisting of passive R knee extension and flexion stretch and patellar mobs in all directions at Grade I-III.    DATE 7/23/18 7/19/18 7/17/18 7/12/18 7/9/2018   VISIT 5 4 3/? 2/? 1/?   POC 9/9/18         G CODE 5/10 4/10 3/10 2/10 1/10   FOTO 5/5 4/5 3/5 2/5 1/5   Cap Visit  Cap Total 118.42  465.26 118.42  346.84 57.78  228.76   57.78  170.98 113.20  113.20   Bike         MHP         MT 10' 15' 15' 10'     Stretches:         HS stretch 3x30'' R 3x30" B 3x30" B 3x30"      IT band stretch         Supine:         Quad sets 2x10 5'' holds  Towel under ankle 5"x10 w/towel under ankle 5"x10 w/towel under ankle 5"x10 R w/towel under knee 10x5'' R  Towel under knee   SLR 2x15 B 2x15 B 2x12 R 2x10 R 5x R w/ Min A   SAQ   2x12 R 2x10 R Next    Knee flexion stretch     Next    Heel slides w/ strap 8x10'' 10"x10  10"x10  10"x10 R w/straps 5x R   Heel slides 2x10 R       SL hip abd 2x15 B 2x12 B 2x12 B 2x10 B Next    Clams         Iso Hip add+ Kegles NT 5"x10 w/ball 5"x10 w/ball 5"x10 w/ball Next    Bridges " 2x15 B 2x10 B  2x10 Next              Prone:         Quad stretch         HS curls oot 2x10 R       Hip Ext  oot 2x10 B       Seated:         LAQ oot 2x15 B 1.5# 2x12 B 2x10 B Next    Marching oot 2x15 B 1.5# 2x12 B 2x10 B Next              Standing:         Heel raises oot 2x10        Calf stretch on fitter         Steamboats oot 2x10 B 3 way 3x10 B 3 way 2x10 B 3 way Next    Step ups oot 2x10 L1 No UE sup      Step downs        TKEs         SLS         Tandem walking         Cybex HS curls         Cybex LAQ         Leg Press Next         Gait  See note       CP         INITIALS VICK MB 3/6 MB 2/6 MB 1/6 VICK        Assessment:     Pt progressing well in gait, R knee ROM, and mobility. Pt cued in gait to increase R knee flexion, increase conner, and cued to not over-think stepping with cane with good response. Cont to progress MT and TE. Pt will bring SPC next visit.    Patient Education/Response:     Jolly verbalized understanding.      Plans and Goals:     Cont per POC, progress per pt tolerance.    Rehab Potiential: excellent  Spiritual/Cultural Needs: None  Barriers to Rehab: None  Short Term Goals: 4 weeks  1. Pt will be independent with HEP  2. Pt will improve RLE strength to at least 75% of L LE strength as measured via MicroFet handheld dynamometer in order to improve functional mobility  3. Pt will improve R knee AROM to at least 5-100 in order to improve gait     Long Term Goals: 8 weeks  1. Pt will be independent with updated HEP  2. Pt will improve R LE strength to at least 90% of L LE strength as measured via MMT in order to improve functional mobility  3. Pt will improve R knee AROM to at least 0-125 in order to improve gait and ability to perform ADLs  4. Pt will improve FOTO knee survey score to </= 46% limited in order to demo improved functional mobility.  5. Pt will be able to negotiate stairs and ambulate all surfaces independently without AD for increased functional  mobility.       Certification Period: 7/9/18 to 9/9/18  Recommended Treatment Plan: 2 times per week for 8 weeks: Cervical/Lumbar Traction, Electrical Stimulation IFC/Russian, Gait Training, Manual Therapy, Moist Heat/ Ice, Neuromuscular Re-ed, Orthotic Management and Training, Patient Education, Self Care, Therapeutic Activites and Therapeutic Exercise  Other Recommendations: modalities prn, ASTYM prn, kinesiotape prn, Functional Dry Needling prn

## 2018-07-23 NOTE — PROGRESS NOTES
S:Jolly Matias presents for post-operative evaluation.     DATE OF PROCEDURE: 7/6/2018  PROCEDURE PERFORMED:   Right  1. knee arthroscopic medial and lateral partial meniscectomy   2. knee arthroscopic chondroplasty   3. knee arthroscopic partial synovectomy/debridement          4. knee arthroscopic lateral release/peripatellar release package   5. knee arthroscopic loose body removal     PF: diffuse grade II-III arthrosis patella and small area central trochlea grade II wear  Medial: diffuse grade II-III medial femoral condyle, small area grade II tibial plateau  Lateral: diffuse grade II lateral femoral condyle and lateral tibial plateau    Jolly Matias reports to be doing very well 2wk s/p the above mentioned procedure. Denies fevers, chills, night sweats, chest pain, difficulty breathing, calf pain or tenderness. Going to PT 2xWeek at the [facility], seeing good progress daily.  She does have some residual soreness.  Off pain medication.    O: RLE - The incisions are healing well.  No signs of infection. No significant pain or unusual tenderness. No significant effusion. Full passive extension. 5 degree lag. Flexion to 110-115 with some tightness.  Good patellar mobility.    A/P: Arthroscopic pictures were reviewed with the patient. The patient has underlying degenerative disease. This was discussed. Discussed starting Celebrex scheduled for now which may help with some swelling and residual discomfort.  May benefit from Visco injections in the future.  Plan to follow the rehab plan as previously outlined.  Focus on quadriceps strengthening and range-of-motion exercises.  RTC in 4 weeks.  All questions answered.

## 2018-07-24 ENCOUNTER — OFFICE VISIT (OUTPATIENT)
Dept: SPORTS MEDICINE | Facility: CLINIC | Age: 68
End: 2018-07-24
Payer: MEDICARE

## 2018-07-24 VITALS
BODY MASS INDEX: 29.27 KG/M2 | DIASTOLIC BLOOD PRESSURE: 78 MMHG | SYSTOLIC BLOOD PRESSURE: 150 MMHG | HEIGHT: 61 IN | HEART RATE: 80 BPM | WEIGHT: 155 LBS

## 2018-07-24 DIAGNOSIS — Z47.89 SURGICAL AFTERCARE, MUSCULOSKELETAL SYSTEM: Primary | ICD-10-CM

## 2018-07-24 PROCEDURE — 99024 POSTOP FOLLOW-UP VISIT: CPT | Mod: S$GLB,,, | Performed by: ORTHOPAEDIC SURGERY

## 2018-07-24 PROCEDURE — 99999 PR PBB SHADOW E&M-EST. PATIENT-LVL III: CPT | Mod: PBBFAC,,, | Performed by: ORTHOPAEDIC SURGERY

## 2018-07-24 RX ORDER — CELECOXIB 200 MG/1
200 CAPSULE ORAL DAILY
Qty: 30 CAPSULE | Refills: 2 | Status: SHIPPED | OUTPATIENT
Start: 2018-07-24 | End: 2018-10-19 | Stop reason: SDUPTHER

## 2018-07-26 ENCOUNTER — CLINICAL SUPPORT (OUTPATIENT)
Dept: REHABILITATION | Facility: HOSPITAL | Age: 68
End: 2018-07-26
Payer: MEDICARE

## 2018-07-26 DIAGNOSIS — M25.661 DECREASED ROM OF RIGHT KNEE: ICD-10-CM

## 2018-07-26 DIAGNOSIS — Z74.09 DECREASED MOBILITY AND ENDURANCE: ICD-10-CM

## 2018-07-26 DIAGNOSIS — R53.1 DECREASED STRENGTH: ICD-10-CM

## 2018-07-26 PROCEDURE — 97110 THERAPEUTIC EXERCISES: CPT | Mod: PN

## 2018-07-26 PROCEDURE — 97140 MANUAL THERAPY 1/> REGIONS: CPT | Mod: PN

## 2018-07-26 NOTE — PROGRESS NOTES
"DAILY TREATMENT NOTE    DATE: 7/26/2018    Start Time:  1:00  Stop Time:   2:00    PROCEDURES:    TIMED  Procedure Min.   TE  45   TE sup    MT 10   GT          UNTIMED  Procedure Min.   CP 5'   Bike  5     Total Timed Minutes:  60  Total Timed Units:  4  Total Untimed Units:    Charges Billed/# of units: 4   TE-3, MT-1      Progress/Current Status    Subjective:     Patient ID: Jolly Matias is a 68 y.o. female.  Diagnosis:   1. Decreased ROM of right knee     2. Decreased strength     3. Decreased mobility and endurance       Pain: 2 /10  Pt reported that she was sore after last visit, but is doing well today. She says that she avoids pain for the exercises at home, but is doing them.    Objective:   Treatment initiated with bike x 5 mins for increased blood flow to BLE and increase R knee ROM. Pt then performed TE x 45 mins per log below including 5 mins of gait of 160' x 2 w/ SPC with VC for increased  Right MS swing limb  Knee flexion . Pt then received MT x 10 mins consisting of passive R knee extension and flexion stretch and patellar mobs in all directions at Grade I-III.    DATE 7/26/18 7/23/18 7/19/18 7/17/18 7/12/18 7/9/2018   VISIT 6 5 4 3/? 2/? 1/?   POC 9/9/18          G CODE 6/10 5/10 4/10 3/10 2/10 1/10   FOTO 6/10 5/5 4/5 3/5 2/5 1/5   Cap Visit  Cap Total 118.42  583.68 118.42  465.26 118.42  346.84 57.78  228.76   57.78  170.98 113.20  113.20   Bike          MHP          MT 10' 10' 15' 15' 10'     Stretches:          HS stretch 3x30" 3x30'' R 3x30" B 3x30" B 3x30"      IT band stretch          Supine:          Quad sets 2x10 5"   2x10 5'' holds  Towel under ankle 5"x10 w/towel under ankle 5"x10 w/towel under ankle 5"x10 R w/towel under knee 10x5'' R  Towel under knee   SLR 2x15 2x15 B 2x15 B 2x12 R 2x10 R 5x R w/ Min A   SAQ    2x12 R 2x10 R Next    Knee flexion stretch      Next    Heel slides w/ strap 10"x10 8x10'' 10"x10  10"x10  10"x10 R w/straps 5x R   Heel slides 2x10 R 2x10 R       SL hip " "abd 2x15 B 2# 2x15 B 2x12 B 2x12 B 2x10 B Next    Clams          Iso Hip add+ Kegles  NT 5"x10 w/ball 5"x10 w/ball 5"x10 w/ball Next    Bridges 2x15  2x15 B 2x10 B  2x10 Next               Prone:          Quad stretch          HS curls 2x15 2# oot 2x10 R       Hip Ext  2x15 oot 2x10 B       Seated:          LAQ 2x10 2# B oot 2x15 B 1.5# 2x12 B 2x10 B Next    Marching 2x10 2# B oot 2x15 B 1.5# 2x12 B 2x10 B Next               Standing:          Heel raises  oot 2x10        Calf stretch on fitter          Steamboats 2x10 B 3way oot 2x10 B 3 way 3x10 B 3 way 2x10 B 3 way Next    Step ups oot oot 2x10 L1 No UE sup      Step downs         TKEs          SLS          Tandem walking          Cybex HS curls          Cybex LAQ          Leg Press oot Next         Gait  See note See note       CP          INITIALS MB 1/6 VICK MB 3/6 MB 2/6 MB 1/6 VICK        Assessment:     Pt progressing well in gait, R knee ROM, and mobility. Pt cued in gait to increase R knee flexion, increase conner, and cued to not over-think stepping with cane with good response.    Patient Education/Response:     Jolly verbalized understanding.      Plans and Goals:     Cont per POC, progress per pt tolerance.    Rehab Potiential: excellent  Spiritual/Cultural Needs: None  Barriers to Rehab: None  Short Term Goals: 4 weeks  1. Pt will be independent with HEP  2. Pt will improve RLE strength to at least 75% of L LE strength as measured via MicroFet handheld dynamometer in order to improve functional mobility  3. Pt will improve R knee AROM to at least 5-100 in order to improve gait     Long Term Goals: 8 weeks  1. Pt will be independent with updated HEP  2. Pt will improve R LE strength to at least 90% of L LE strength as measured via MMT in order to improve functional mobility  3. Pt will improve R knee AROM to at least 0-125 in order to improve gait and ability to perform ADLs  4. Pt will improve FOTO knee survey score to </= 46% limited in order to " demo improved functional mobility.  5. Pt will be able to negotiate stairs and ambulate all surfaces independently without AD for increased functional mobility.       Certification Period: 7/9/18 to 9/9/18  Recommended Treatment Plan: 2 times per week for 8 weeks: Cervical/Lumbar Traction, Electrical Stimulation IFC/Russian, Gait Training, Manual Therapy, Moist Heat/ Ice, Neuromuscular Re-ed, Orthotic Management and Training, Patient Education, Self Care, Therapeutic Activites and Therapeutic Exercise  Other Recommendations: modalities prn, ASTYM prn, kinesiotape prn, Functional Dry Needling prn

## 2018-07-30 ENCOUNTER — CLINICAL SUPPORT (OUTPATIENT)
Dept: REHABILITATION | Facility: HOSPITAL | Age: 68
End: 2018-07-30
Payer: MEDICARE

## 2018-07-30 DIAGNOSIS — Z74.09 DECREASED MOBILITY AND ENDURANCE: ICD-10-CM

## 2018-07-30 DIAGNOSIS — M25.661 DECREASED ROM OF RIGHT KNEE: ICD-10-CM

## 2018-07-30 DIAGNOSIS — R53.1 DECREASED STRENGTH: ICD-10-CM

## 2018-07-30 PROCEDURE — 97110 THERAPEUTIC EXERCISES: CPT | Mod: PN

## 2018-07-30 NOTE — PROGRESS NOTES
"DAILY TREATMENT NOTE    DATE: 7/30/2018    Start Time:  1:00 pm   Stop Time:  2:08 pm     PROCEDURES:    TIMED  Procedure Min.   TE supervised 30' NC   TE 28'                  UNTIMED  Procedure Min.   CP 10'          Total Timed Minutes:  28'  Total Timed Units:  2  Total Untimed Units:  1  Charges Billed/# of units:  2 TE      Progress/Current Status    Subjective:     Patient ID: Jolly Matias is a 68 y.o. female.  Diagnosis:   1. Decreased ROM of right knee     2. Decreased strength     3. Decreased mobility and endurance       Pain: 0 /10  Pt stated that she has been ambulating around her home without SPC.     Objective:     Treatment initiated with bike x 5 mins for increased blood flow to BLE and increase R knee ROM supervised. Pt performed TE x 25' supervised and 1:1 with PT x 28' including ambulating one lap around gym without SPC.     DATE 7/30/18 7/26/18 7/23/18 7/19/18 7/17/18 7/12/18 7/9/2018   VISIT 7 6 5 4 3/? 2/? 1/?   POC 9/9/18           G CODE 7/10 6/10 5/10 4/10 3/10 2/10 1/10   FOTO 7/10 6/10 5/5 4/5 3/5 2/5 1/5   Cap Visit  Cap Total 60.64  644.32 118.42  583.68 118.42  465.26 118.42  346.84 57.78  228.76   57.78  170.98 113.20  113.20   Bike           MHP           MT prn 10' 10' 15' 15' 10'     Stretches:           HS stretch 3x30"  3x30" 3x30'' R 3x30" B 3x30" B 3x30"      IT band stretch           Supine:           Quad sets 2x10 5"  2x10 5"   2x10 5'' holds  Towel under ankle 5"x10 w/towel under ankle 5"x10 w/towel under ankle 5"x10 R w/towel under knee 10x5'' R  Towel under knee   SLR 1# 2x10  2x15 2x15 B 2x15 B 2x12 R 2x10 R 5x R w/ Min A   SAQ     2x12 R 2x10 R Next    Knee flexion stretch       Next    Heel slides w/ strap 10"x10 10"x10 8x10'' 10"x10  10"x10  10"x10 R w/straps 5x R   Heel slides 2x10 R 2x10 R 2x10 R       SL hip abd 2x15 B 2# 2x15 B 2# 2x15 B 2x12 B 2x12 B 2x10 B Next    Clams           Iso Hip add+ Kegles   NT 5"x10 w/ball 5"x10 w/ball 5"x10 w/ball Next    Bridges " "2x15 2x15  2x15 B 2x10 B  2x10 Next                Prone:           Quad stretch           HS curls 2x15 2# 2x15 2# oot 2x10 R       Hip Ext  - 2x15 oot 2x10 B       Seated:           LAQ  2x10 2# B oot 2x15 B 1.5# 2x12 B 2x10 B Next    Marching - 2x10 2# B oot 2x15 B 1.5# 2x12 B 2x10 B Next                Standing:           Heel raises   oot 2x10        Calf stretch on fitter           Steamboats RTC 2x10 B  2x10 B 3way oot 2x10 B 3 way 3x10 B 3 way 2x10 B 3 way Next    Step ups Blue 2x10 R oot oot 2x10 L1 No UE sup      Step downs          TKEs PTC 2x103" hold          SLS           Tandem walking           Cybex HS curls           Cybex LAQ           Leg Press  oot Next         Gait  note See note See note       CP           INITIALS CK  MB 1/6 VICK MB 3/6 MB 2/6 MB 1/6 VICK       Assessment:     Pt tolerated all therex  including increased resistance and additional therex noted per log above without any c/o increased pain in (R) knee during or after therapy session. Pt was able to ambulate safely in gym without SPC, with steady gait and no LOB episodes. Pt did received VC from therapist to flex her (R) knee during swing phase when ambulating. Pt demo/verbalized understanding. Pt tolerated therapy session without any adverse reactions.     Patient Education/Response:     Continue with HEP    Plans and Goals:     Cont per POC, progress per pt tolerance. Practice ambulating outside on uneven surfaces without SPC next visit depending on weather.     Rehab Potiential: excellent  Spiritual/Cultural Needs: None  Barriers to Rehab: None  Short Term Goals: 4 weeks  1. Pt will be independent with HEP  2. Pt will improve RLE strength to at least 75% of L LE strength as measured via MicroFet handheld dynamometer in order to improve functional mobility  3. Pt will improve R knee AROM to at least 5-100 in order to improve gait     Long Term Goals: 8 weeks  1. Pt will be independent with updated HEP  2. Pt will improve R LE " strength to at least 90% of L LE strength as measured via MMT in order to improve functional mobility  3. Pt will improve R knee AROM to at least 0-125 in order to improve gait and ability to perform ADLs  4. Pt will improve FOTO knee survey score to </= 46% limited in order to demo improved functional mobility.  5. Pt will be able to negotiate stairs and ambulate all surfaces independently without AD for increased functional mobility.       Certification Period: 7/9/18 to 9/9/18  Recommended Treatment Plan: 2 times per week for 8 weeks: Cervical/Lumbar Traction, Electrical Stimulation IFC/Russian, Gait Training, Manual Therapy, Moist Heat/ Ice, Neuromuscular Re-ed, Orthotic Management and Training, Patient Education, Self Care, Therapeutic Activites and Therapeutic Exercise  Other Recommendations: modalities prn, ASTYM prn, kinesiotape prn, Functional Dry Needling prn

## 2018-08-02 ENCOUNTER — CLINICAL SUPPORT (OUTPATIENT)
Dept: REHABILITATION | Facility: HOSPITAL | Age: 68
End: 2018-08-02
Payer: MEDICARE

## 2018-08-02 DIAGNOSIS — M25.661 DECREASED ROM OF RIGHT KNEE: ICD-10-CM

## 2018-08-02 DIAGNOSIS — R53.1 DECREASED STRENGTH: ICD-10-CM

## 2018-08-02 DIAGNOSIS — Z74.09 DECREASED MOBILITY AND ENDURANCE: ICD-10-CM

## 2018-08-02 PROCEDURE — 97116 GAIT TRAINING THERAPY: CPT | Mod: PN

## 2018-08-02 PROCEDURE — 97110 THERAPEUTIC EXERCISES: CPT | Mod: PN

## 2018-08-02 NOTE — PROGRESS NOTES
"  Physical Therapy Daily Treatment Note     Name: Jolly Matias  Clinic Number: [id number]    Therapy Diagnosis:   Encounter Diagnoses   Name Primary?    Decreased ROM of right knee     Decreased strength     Decreased mobility and endurance      Physician: Bree Parr PA-C    Visit Date: 8/2/2018  Physician Orders: PT eval and treat  Medical Diagnosis: Chondromalacia of right knee,Other old tear of lateral meniscus of right knee   Evaluation Date: 07/09/2018  Authorization Period Expiration: 09/09/2018  Plan of Care Certification Period: 7/9/18 to 9/9/18  Visit #/Visits authorized: 8/12     Time In: 13:00  Time Out: 14:10  Total Billable Time: 50 minutes (TE-3, GT-1)    Precautions:DM, skin cancer, HTN    Subjective     Pt reports: Pt relays that she had some increased soreness yesterday after visiting niece in hospital the day prior.  Reports that knee is feeling better today with minimal pain  She was compliant with home exercise program.  Response to previous treatment: no adverse reaction  Functional change: none    Pain: 1/10  Location: right knee      Objective     Jolly received therapeutic exercises to develop strength, endurance and ROM for 40 minutes with PTA 1:1 and 10 minute of supervised therex including:  See log    DATE 8/2/18 7/30/18 7/26/18 7/23/18   VISIT 8 7 6 5   POC 9/9/18          G CODE 8/10 7/10 6/10 5/10   FOTO 8/10 7/10 6/10 5/5   Cap Visit  Cap [place] 120.94  765.26 60.64  644.32 118.42  583.68 118.42  465.26   Bike          MHP          MT  prn 10' 10'   Stretches:          HS stretch 3x30" 3x30"  3x30" 3x30'' R   IT band stretch          Supine:          Quad sets 2x10 5" 2x10 5"  2x10 5"    2x10 5'' holds  Towel under ankle   SLR 1#2x10 1# 2x10  2x15 2x15 B   SAQ 1#2x10         Knee flexion stretch          Heel slides w/ strap 10"x10 10"x10 10"x10 8x10''   Heel slides 2x10R 2x10 R 2x10 R 2x10 R   SL hip abd 2x15 B 2# 2x15 B 2# 2x15 B 2# 2x15 B   Clams          Iso Hip add+ " "Kegles      NT   Bridges 2x15 2x15 2x15  2x15 B              Prone:          Quad stretch          HS curls 2x15 2# 2x15 2# 2x15 2# oot   Hip Ext  2x15 - 2x15 oot   Seated:          LAQ 2x10 2#B   2x10 2# B oot   Marching 2x10 2#B - 2x10 2# B oot              Standing:          Heel raises      oot   Calf stretch on fitter          Steamboats RTC 2x10 B RTC 2x10 B  2x10 B 3way oot   Step ups  Blue 2x10 R oot oot   Step downs          TKEs PTC  2x103"hold PTC 2x103" hold       SLS          Tandem walking          Cybex HS curls          Cybex LAQ          Leg Press 2.5 plates 2x15   oot Next    Gait   note See note See note   CP 10'         INITIALS SR CK  MB 1/6 VICK        Jolly participated in gait training to improve functional mobility and safety for 10  minutes, including: gait training without AD x 4 laps with rest breaks in between focus on knee flexion with swing phase    Jolly received cold pack for 10 minutes to R knee.      Home Exercises Provided and Patient Education Provided     Education provided:   - cont HEP regularly    Written Home Exercises Provided: Patient instructed to cont prior HEP.  Exercises were reviewed and Jolly was able to demonstrate them prior to the end of the session.  Jolly demonstrated good  understanding of the education provided.     See EMR under Patient Instructions for exercises provided prior visit.    Assessment     Pt tolerates all therapy without difficulties, improved ambulation quality with gait training    Jolly is progressing well towards her goals.   Pt prognosis is Excellent.     Pt will continue to benefit from skilled outpatient physical therapy to address the deficits listed in the problem list box on initial evaluation, provide pt/family education and to maximize pt's level of independence in the home and community environment.     Pt's spiritual, cultural and educational needs considered and pt agreeable to plan of care and goals.    Anticipated " barriers to physical therapy: none    Goals:     Short Term Goals: 4 weeks  1. Pt will be independent with HEP  2. Pt will improve RLE strength to at least 75% of L LE strength as measured via MicroFet handheld dynamometer in order to improve functional mobility  3. Pt will improve R knee AROM to at least 5-100 in order to improve gait     Long Term Goals: 8 weeks  1. Pt will be independent with updated HEP  2. Pt will improve R LE strength to at least 90% of L LE strength as measured via MMT in order to improve functional mobility  3. Pt will improve R knee AROM to at least 0-125 in order to improve gait and ability to perform ADLs  4. Pt will improve FOTO knee survey score to </= 46% limited in order to demo improved functional mobility.  5. Pt will be able to negotiate stairs and ambulate all surfaces independently without AD for increased functional mobility.    Plan     Cont POC as tolerates to progress towards established goals    Beverly Tay, PTA

## 2018-08-05 DIAGNOSIS — I10 ESSENTIAL HYPERTENSION: ICD-10-CM

## 2018-08-06 ENCOUNTER — CLINICAL SUPPORT (OUTPATIENT)
Dept: REHABILITATION | Facility: HOSPITAL | Age: 68
End: 2018-08-06
Payer: MEDICARE

## 2018-08-06 DIAGNOSIS — R53.1 DECREASED STRENGTH: ICD-10-CM

## 2018-08-06 DIAGNOSIS — M25.661 DECREASED ROM OF RIGHT KNEE: ICD-10-CM

## 2018-08-06 DIAGNOSIS — Z74.09 DECREASED MOBILITY AND ENDURANCE: ICD-10-CM

## 2018-08-06 PROCEDURE — 97110 THERAPEUTIC EXERCISES: CPT | Mod: PN

## 2018-08-06 RX ORDER — SPIRONOLACTONE 25 MG/1
TABLET ORAL
Qty: 90 TABLET | Refills: 1 | Status: SHIPPED | OUTPATIENT
Start: 2018-08-06 | End: 2019-03-14 | Stop reason: SDUPTHER

## 2018-08-06 NOTE — PROGRESS NOTES
"  Physical Therapy Daily Treatment Note     Name: Jolly Matias  Clinic Number: [id number]    Therapy Diagnosis:   Encounter Diagnoses   Name Primary?    Decreased ROM of right knee     Decreased strength     Decreased mobility and endurance      Physician: Bree Parr PA-C    Visit Date: 8/6/2018  Physician Orders: PT eval and treat  Medical Diagnosis: Chondromalacia of right knee,Other old tear of lateral meniscus of right knee   Evaluation Date: 07/09/2018  Authorization Period Expiration: 09/09/2018  Plan of Care Certification Period: 7/9/18 to 9/9/18  Visit #/Visits authorized: 9/12     Time In: 1:00  Time Out: 2:00  Total Billable Time: 10 minutes (TE-1)    Precautions:DM, skin cancer, HTN    Subjective     Pt reports:pt reports some increased R knee pain due to walking for prolonged periods over the weekend.  She was compliant with home exercise program.  Response to previous treatment: no adverse reaction  Functional change: none    Pain: 2/10  Location: right knee      Objective     Jolly received therapeutic exercises to develop strength, endurance and ROM for 10 minutes with PT 1:1 and 50 minute of supervised therex including:  See log    DATE 8/6/18 8/2/18 7/30/18 7/26/18 7/23/18   VISIT 9 8 7 6 5   POC 9/9/18           G CODE 9/10 8/10 7/10 6/10 5/10   FOTO 9/10 8/10 7/10 6/10 5/5   Cap Visit  Cap [place] 30.32  795.38 120.94  765.26 60.64  644.32 118.42  583.68 118.42  465.26   Bike           MHP           MT   prn 10' 10'   Stretches:           HS stretch 3x30'' B 3x30" 3x30"  3x30" 3x30'' R   IT band stretch           Supine:           Quad sets 10x10'' R  Towel under ankle 2x10 5" 2x10 5"  2x10 5"    2x10 5'' holds  Towel under ankle   SLR 1# 1#2x10 1# 2x10  2x15 2x15 B   SAQ NT 1#2x10         Knee flexion stretch           Heel slides w/ strap 10x10'' 10"x10 10"x10 10"x10 8x10''   Heel slides 2x10 1# R 2x10R 2x10 R 2x10 R 2x10 R   SL hip abd 2x15 2# B 2x15 B 2# 2x15 B 2# 2x15 B 2# 2x15 B " "  Clams           Iso Hip add+ Kegles       NT   Bridges 2x15 2x15 2x15 2x15  2x15 B               Prone:           Quad stretch           HS curls 2x15 2# R 2x15 2# 2x15 2# 2x15 2# oot   Hip Ext  NT 2x15 - 2x15 oot   Seated:           LAQ NT 2x10 2#B   2x10 2# B oot   Marching NT 2x10 2#B - 2x10 2# B oot               Standing:           Heel raises       oot   Calf stretch on fitter           Steamboats 2x10 RTC B RTC 2x10 B RTC 2x10 B  2x10 B 3way oot   Step ups 2x10 Blue R  Blue 2x10 R oot oot   Step downs           TKEs PTC 2x10  3'' holds PTC  2x103"hold PTC 2x103" hold       SLS 2x30'' B          Tandem walking           Cybex HS curls           Cybex LAQ           Leg Press 2x10 2x10 DL  3.5 plates 2.5 plates 2x15   oot Next    Gait    note See note See note   CP NT 10'         INITIALS VICK SR CK  MB 1/6 VICK        Jolly participated in gait training to improve functional mobility and safety for 0 mins.  Jolly received cold pack for 0 minutes to R knee.      Home Exercises Provided and Patient Education Provided     Education provided:   - cont HEP regularly    Written Home Exercises Provided: Patient instructed to cont prior HEP.  Exercises were reviewed and Jolly was able to demonstrate them prior to the end of the session.  Jolly demonstrated good  understanding of the education provided.     See EMR under Patient Instructions for exercises provided prior visit.    Assessment     Pt progress well and is on track with good range of motion and strength. Progress gait without AD next visit; pt told to no longer use SPC at home unless needed.    Jolly is progressing well towards her goals.   Pt prognosis is Excellent.     Pt will continue to benefit from skilled outpatient physical therapy to address the deficits listed in the problem list box on initial evaluation, provide pt/family education and to maximize pt's level of independence in the home and community environment.     Pt's spiritual, " cultural and educational needs considered and pt agreeable to plan of care and goals.    Anticipated barriers to physical therapy: none    Goals:     Short Term Goals: 4 weeks  1. Pt will be independent with HEP  2. Pt will improve RLE strength to at least 75% of L LE strength as measured via MicroFet handheld dynamometer in order to improve functional mobility  3. Pt will improve R knee AROM to at least 5-100 in order to improve gait     Long Term Goals: 8 weeks  1. Pt will be independent with updated HEP  2. Pt will improve R LE strength to at least 90% of L LE strength as measured via MMT in order to improve functional mobility  3. Pt will improve R knee AROM to at least 0-125 in order to improve gait and ability to perform ADLs  4. Pt will improve FOTO knee survey score to </= 46% limited in order to demo improved functional mobility.  5. Pt will be able to negotiate stairs and ambulate all surfaces independently without AD for increased functional mobility.    Plan     Cont POC as tolerates to progress towards established goals    Perry Byers, PT

## 2018-08-09 ENCOUNTER — CLINICAL SUPPORT (OUTPATIENT)
Dept: REHABILITATION | Facility: HOSPITAL | Age: 68
End: 2018-08-09
Payer: MEDICARE

## 2018-08-09 DIAGNOSIS — Z74.09 DECREASED MOBILITY AND ENDURANCE: ICD-10-CM

## 2018-08-09 DIAGNOSIS — R53.1 DECREASED STRENGTH: ICD-10-CM

## 2018-08-09 DIAGNOSIS — M25.661 DECREASED ROM OF RIGHT KNEE: ICD-10-CM

## 2018-08-09 PROCEDURE — 97110 THERAPEUTIC EXERCISES: CPT | Mod: PN

## 2018-08-09 NOTE — PROGRESS NOTES
"  Physical Therapy Daily Treatment Note     Name: Jolly Matias  Clinic Number: [id number]    Therapy Diagnosis:   Encounter Diagnoses   Name Primary?    Decreased ROM of right knee     Decreased strength     Decreased mobility and endurance      Physician: Bree Parr PA-C    Visit Date: 8/9/2018  Physician Orders: PT eval and treat  Medical Diagnosis: Chondromalacia of right knee,Other old tear of lateral meniscus of right knee   Evaluation Date: 07/09/2018  Authorization Period Expiration: 09/09/2018  Plan of Care Certification Period: 7/9/18 to 9/9/18  Visit #/Visits authorized: 9/12     Time In: 1:00  Time Out: 2:00  Total Billable Time: 30 minutes (TE-2)    Precautions:DM, skin cancer, HTN    Subjective     Pt reports:pt reports some increased R knee pain due to walking for prolonged periods over the weekend.  She was compliant with home exercise program.  Response to previous treatment: no adverse reaction  Functional change: none    Pain: 0/10  Location: right knee      Objective     Jolly received therapeutic exercises to develop strength, endurance and ROM for 10 minutes with PT 1:1 and 50 minute of supervised therex including:  See log    DATE 8/9/18 8/6/18 8/2/18 7/30/18 7/26/18 7/23/18   VISIT 10 9 8 7 6 5   POC 9/9/18            G CODE 9/10 9/10 8/10 7/10 6/10 5/10   FOTO 10 DONE 9/10 8/10 7/10 6/10 5/5   Cap Visit  Cap [place] 60.64  856.02 30.32  795.38 120.94  765.26 60.64  644.32 118.42  583.68 118.42  465.26   Bike            MHP            MT    prn 10' 10'   Stretches:            HS stretch 3x30" B 3x30'' B 3x30" 3x30"  3x30" 3x30'' R   IT band stretch            Supine:            Quad sets 10x10" R 10x10'' R  Towel under ankle 2x10 5" 2x10 5"  2x10 5"    2x10 5'' holds  Towel under ankle   SLR 1# 2 x 15 1# 1#2x10 1# 2x10  2x15 2x15 B   SAQ  NT 1#2x10         Knee flexion stretch            Heel slides w/ strap 10"x10 10x10'' 10"x10 10"x10 10"x10 8x10''   Heel slides 1x10 1# R 2x10 1# R " "2x10R 2x10 R 2x10 R 2x10 R   SL hip abd 2x15 2# B 2x15 2# B 2x15 B 2# 2x15 B 2# 2x15 B 2# 2x15 B   Clams            Iso Hip add+ Kegles        NT   Bridges 2x15 2x15 2x15 2x15 2x15  2x15 B                Prone:            Quad stretch            HS curls 2x15 DL 2x15 2# R 2x15 2# 2x15 2# 2x15 2# oot   Hip Ext   NT 2x15 - 2x15 oot   Seated: NT           LAQ NT NT 2x10 2#B   2x10 2# B oot   Marching  NT 2x10 2#B - 2x10 2# B oot                Standing:            Heel raises        oot   Calf stretch on fitter            Steamboats 2x15 RTC B 2x10 RTC B RTC 2x10 B RTC 2x10 B  2x10 B 3way oot   Step ups  2x10 Blue R  Blue 2x10 R oot oot   Step downs            TKEs PTC 2x12  3" hold PTC 2x10  3'' holds PTC  2x103"hold PTC 2x103" hold       SLS 3x30" B 2x30'' B          Tandem walking            Cybex HS curls            Cybex LAQ            Leg Press 2x15 DL  4.5 plates 2x10 2x10 DL  3.5 plates 2.5 plates 2x15   oot Next    Gait     note See note See note   CP  NT 10'         INITIALS MB 1/6 VICK SR CK  MB 1/6 VICK        Jolly participated in gait training to improve functional mobility and safety for 0 mins.  Jolly received cold pack for 0 minutes to R knee.      Home Exercises Provided and Patient Education Provided     Education provided:   - cont HEP regularly    Written Home Exercises Provided: Patient instructed to cont prior HEP.  Exercises were reviewed and Jolly was able to demonstrate them prior to the end of the session.  Jloly demonstrated good  understanding of the education provided.     See EMR under Patient Instructions for exercises provided prior visit.    Assessment     Pt progress well and is on track with good range of motion and strength. Progressed to gait w/o AD predominately excepting longer community distance.    Jolly is progressing well towards her goals.   Pt prognosis is Excellent.     Pt will continue to benefit from skilled outpatient physical therapy to address the deficits " listed in the problem list box on initial evaluation, provide pt/family education and to maximize pt's level of independence in the home and community environment.     Pt's spiritual, cultural and educational needs considered and pt agreeable to plan of care and goals.    Anticipated barriers to physical therapy: none    Goals:     Short Term Goals: 4 weeks  1. Pt will be independent with HEP  2. Pt will improve RLE strength to at least 75% of L LE strength as measured via MicroFet handheld dynamometer in order to improve functional mobility  3. Pt will improve R knee AROM to at least 5-100 in order to improve gait     Long Term Goals: 8 weeks  1. Pt will be independent with updated HEP  2. Pt will improve R LE strength to at least 90% of L LE strength as measured via MMT in order to improve functional mobility  3. Pt will improve R knee AROM to at least 0-125 in order to improve gait and ability to perform ADLs  4. Pt will improve FOTO knee survey score to </= 46% limited in order to demo improved functional mobility.  5. Pt will be able to negotiate stairs and ambulate all surfaces independently without AD for increased functional mobility.    Plan     Cont POC as tolerates to progress towards established goals    Aquilino Medeiros, PTA

## 2018-08-13 ENCOUNTER — CLINICAL SUPPORT (OUTPATIENT)
Dept: REHABILITATION | Facility: HOSPITAL | Age: 68
End: 2018-08-13
Payer: MEDICARE

## 2018-08-13 DIAGNOSIS — R53.1 DECREASED STRENGTH: ICD-10-CM

## 2018-08-13 DIAGNOSIS — Z74.09 DECREASED MOBILITY AND ENDURANCE: ICD-10-CM

## 2018-08-13 DIAGNOSIS — M25.661 DECREASED ROM OF RIGHT KNEE: ICD-10-CM

## 2018-08-13 PROCEDURE — 97110 THERAPEUTIC EXERCISES: CPT | Mod: PN

## 2018-08-13 NOTE — PROGRESS NOTES
"  Physical Therapy Daily Treatment Note     Name: Jolly Matias  Clinic Number: [id number]    Therapy Diagnosis:   Encounter Diagnoses   Name Primary?    Decreased ROM of right knee     Decreased strength     Decreased mobility and endurance      Physician: Bree Parr PA-C    Visit Date: 8/13/2018  Physician Orders: PT eval and treat  Medical Diagnosis: Chondromalacia of right knee,Other old tear of lateral meniscus of right knee   Evaluation Date: 07/09/2018  Authorization Period Expiration: 09/09/2018  Plan of Care Certification Period: 7/9/18 to 9/9/18  Visit #/Visits authorized:11/12     Time In: 1:00  Time Out: 2:00  Total Billable Time: 30 minutes (TE-2)    Precautions:DM, skin cancer, HTN    Subjective     Pt reports:Pt reports some R knee aching that she will use ice for at times.  She was compliant with home exercise program.  Response to previous treatment: no adverse reaction  Functional change: none    Pain: 0/10  Location: right knee      Objective     Jolly received therapeutic exercises to develop strength, endurance and ROM for 30 minutes supervised and then 30 mins 1:1 with PT:  See log    DATE 8/13/18 8/9/18 8/6/18 8/2/18 7/30/18 7/26/18 7/23/18   VISIT 11 10 9 8 7 6 5   POC 9/9/18             G CODE 10/10 10/10 9/10 8/10 7/10 6/10 5/10   FOTO 1/10 10 DONE 9/10 8/10 7/10 6/10 5/5   Cap Visit  Cap [place] 60.64  916.66 60.64  856.02 30.32  795.38 120.94  765.26 60.64  644.32 118.42  583.68 118.42  465.26   Bike 5'            MHP             MT     prn 10' 10'   Stretches:             HS stretch 3x30'' B 3x30" B 3x30'' B 3x30" 3x30"  3x30" 3x30'' R   IT band stretch             Supine:             Quad sets 10x10'' R 10x10" R 10x10'' R  Towel under ankle 2x10 5" 2x10 5"  2x10 5"    2x10 5'' holds  Towel under ankle   SLR 1# 2x15 1# 2 x 15 1# 1#2x10 1# 2x10  2x15 2x15 B   SAQ   NT 1#2x10         Knee flexion stretch             Heel slides w/ strap 10x10'' 10"x10 10x10'' 10"x10 10"x10 10"x10 " "8x10''   Heel slides 10x 1# R 1x10 1# R 2x10 1# R 2x10R 2x10 R 2x10 R 2x10 R   SL hip abd 2x15 2# B 2x15 2# B 2x15 2# B 2x15 B 2# 2x15 B 2# 2x15 B 2# 2x15 B   Clams             Iso Hip add+ Kegles         NT   Bridges 2x15 2x15 2x15 2x15 2x15 2x15  2x15 B                 Prone:             Quad stretch             HS curls 2x15 R  2# 2x15 DL 2x15 2# R 2x15 2# 2x15 2# 2x15 2# oot   Hip Ext    NT 2x15 - 2x15 oot   Seated:  NT           LAQ  NT NT 2x10 2#B   2x10 2# B oot   Marching   NT 2x10 2#B - 2x10 2# B oot                 Standing:             Heel raises         oot   Calf stretch on fitter             Steamboats NT 2x15 RTC B 2x10 RTC B RTC 2x10 B RTC 2x10 B  2x10 B 3way oot   Step ups   2x10 Blue R  Blue 2x10 R oot oot   Step downs             TKEs NT PTC 2x12  3" hold PTC 2x10  3'' holds PTC  2x103"hold PTC 2x103" hold       SLS NT 3x30" B 2x30'' B          Tandem walking             Cybex HS curls             Cybex LAQ             Leg Press 2x15 DL  4.5 plates 2x15 DL  4.5 plates 2x10 2x10 DL  3.5 plates 2.5 plates 2x15   oot Next    Gait  10'    note See note See note   CP   NT 10'         INITIALS VICK MB 1/6 VICK SR CK  MB 1/6 VICK      Range of Motion/Strength:   * = R knee pain with testing  Hip   Right     Left   Pain/Dysfunction with Movement     AROM PROM MMT AROM PROM MMT     Flexion WFL WFL 4/5 WFL WFL 4+/5    Extension WFL WFL 4-/5 WFL WFL 4-/5     Abduction WFL WFL 4-/5 WFL WFL 4/5    Adduction WFL WFL 4+/5 WFL WFL 4/5    Internal rotation WFL WFL 4/5 WFL WFL 4+/5     External rotation WFL WFL 4/5 WFL WFL NT        Knee   Right     Left   Pain/Dysfunction with Movement     AROM PROM MMT AROM PROM MMT     Flexion 135 137 5/5 142 143 5/5     Extension 0 0 5/5 0 0 5/5       Ankle   Right     Left   Pain/Dysfunction with Movement     AROM PROM MMT AROM PROM MMT     Plantarflexion WFL WFL 5/5 WFL WFL 5/5     Dorsiflexion WFL WFL 5/5 WFL WFL 5/5     Inversion WFL WFL NT WFL WFL NT     Eversion WFL WFL NT " WFL WFL NT         Jolly participated in gait training to improve functional mobility and safety for 10 mins including walking outside on unlevel ground, stairs negotiation x 6 with 1 HR at Mod I, curb negotiation independently, and grass walking independently.   Jolly received cold pack for 0 minutes to R knee.      Home Exercises Provided and Patient Education Provided     Education provided:   - cont HEP regularly    Written Home Exercises Provided: Patient instructed to cont prior HEP.  Exercises were reviewed and Jolly was able to demonstrate them prior to the end of the session.  Jolly demonstrated good  understanding of the education provided.     See EMR under Patient Instructions for exercises provided prior visit.    Assessment     Pt informed to only use SPV when necessary or unsafe conditions; pt mod I and independent in all mobility. Pt progressing well in gait, ROM, and strength. Pt will be discharged in next 2 visits.    Jolly is progressing well towards her goals.   Pt prognosis is Excellent.     Pt will continue to benefit from skilled outpatient physical therapy to address the deficits listed in the problem list box on initial evaluation, provide pt/family education and to maximize pt's level of independence in the home and community environment.     Pt's spiritual, cultural and educational needs considered and pt agreeable to plan of care and goals.    Anticipated barriers to physical therapy: none    Goals:     Short Term Goals: 4 weeks  1. Pt will be independent with HEP - met  2. Pt will improve RLE strength to at least 75% of L LE strength as measured via MicroFet handheld dynamometer in order to improve functional mobility - met  3. Pt will improve R knee AROM to at least 5-100 in order to improve gait - met     Long Term Goals: 8 weeks  1. Pt will be independent with updated HEP -  met  2. Pt will improve R LE strength to at least 90% of L LE strength as measured via MMT in order  to improve functional mobility - partially met  3. Pt will improve R knee AROM to at least 0-125 in order to improve gait and ability to perform ADLs - Met  4. Pt will improve FOTO knee survey score to </= 46% limited in order to demo improved functional mobility.  5. Pt will be able to negotiate stairs and ambulate all surfaces independently without AD for increased functional mobility. - met    Plan     Cont POC as tolerates to progress towards established goals    Perry Byers, PT

## 2018-08-15 NOTE — PROGRESS NOTES
S:Jolly Matias presents for post-operative evaluation.     DATE OF PROCEDURE: 7/6/2018  PROCEDURE PERFORMED:   Right  1. knee arthroscopic medial and lateral partial meniscectomy   2. knee arthroscopic chondroplasty   3. knee arthroscopic partial synovectomy/debridement          4. knee arthroscopic lateral release/peripatellar release package   5. knee arthroscopic loose body removal     PF: diffuse grade II-III arthrosis patella and small area central trochlea grade II wear  Medial: diffuse grade II-III medial femoral condyle, small area grade II tibial plateau  Lateral: diffuse grade II lateral femoral condyle and lateral tibial plateau    Jolly Matias reports to be doing very well 6wk s/p the above mentioned procedure. Going to PT 2xWeek at the [place] location. Seeing good progress daily and has her last week of PT next week.  She has no pain    O: RLE - The incisions are well healed.  No tenderness to palpation.  No effusion. Full active extension, flexion 130°.  Ligamentously stable.    A/P: Arthroscopic pictures were again reviewed with the patient. The patient has underlying degenerative disease. This was discussed. May benefit from Visco injections in the future.  Plan to follow the rehab plan as previously outlined.  Focus on quadriceps strengthening and range-of-motion exercises with HEP.  Patient would benefit from low impact cardio exercise.  Follow up as needed at this point

## 2018-08-16 ENCOUNTER — OFFICE VISIT (OUTPATIENT)
Dept: SPORTS MEDICINE | Facility: CLINIC | Age: 68
End: 2018-08-16
Payer: MEDICARE

## 2018-08-16 ENCOUNTER — CLINICAL SUPPORT (OUTPATIENT)
Dept: REHABILITATION | Facility: HOSPITAL | Age: 68
End: 2018-08-16
Payer: MEDICARE

## 2018-08-16 VITALS
SYSTOLIC BLOOD PRESSURE: 153 MMHG | WEIGHT: 155 LBS | DIASTOLIC BLOOD PRESSURE: 89 MMHG | BODY MASS INDEX: 29.27 KG/M2 | HEIGHT: 61 IN | HEART RATE: 71 BPM

## 2018-08-16 DIAGNOSIS — M25.661 DECREASED ROM OF RIGHT KNEE: ICD-10-CM

## 2018-08-16 DIAGNOSIS — R53.1 DECREASED STRENGTH: ICD-10-CM

## 2018-08-16 DIAGNOSIS — Z74.09 DECREASED MOBILITY AND ENDURANCE: ICD-10-CM

## 2018-08-16 DIAGNOSIS — Z47.89 SURGICAL AFTERCARE, MUSCULOSKELETAL SYSTEM: Primary | ICD-10-CM

## 2018-08-16 PROCEDURE — 97110 THERAPEUTIC EXERCISES: CPT | Mod: PN

## 2018-08-16 PROCEDURE — 99999 PR PBB SHADOW E&M-EST. PATIENT-LVL III: CPT | Mod: PBBFAC,,, | Performed by: ORTHOPAEDIC SURGERY

## 2018-08-16 PROCEDURE — 99024 POSTOP FOLLOW-UP VISIT: CPT | Mod: S$GLB,,, | Performed by: ORTHOPAEDIC SURGERY

## 2018-08-16 NOTE — PROGRESS NOTES
"  Physical Therapy Daily Treatment Note     Name: Jolly Matias  Clinic Number: [id number]    Therapy Diagnosis:   Encounter Diagnoses   Name Primary?    Decreased ROM of right knee     Decreased strength     Decreased mobility and endurance      Physician: Bree Parr PA-C    Visit Date: 8/16/2018  Physician Orders: PT eval and treat  Medical Diagnosis: Chondromalacia of right knee,Other old tear of lateral meniscus of right knee   Evaluation Date: 07/09/2018  Authorization Period Expiration: 09/09/2018  Plan of Care Certification Period: 7/9/18 to 9/9/18  Visit #/Visits authorized:11/12     Time In: 1:00  Time Out: 2:00  Total Billable Time: 60 minutes (TE-4)    Precautions:DM, skin cancer, HTN    Subjective     Pt reports:Pt reports some R knee aching that she will use ice for at times.  She was compliant with home exercise program.  Response to previous treatment: no adverse reaction  Functional change: none    Pain: 0/10  Location: right knee      Objective     Jolly received therapeutic exercises to develop strength, endurance and ROM for 30 minutes supervised and then 55 mins 1:1 with PTA:  See log    DATE 8/16/18 8/13/18 8/9/18 8/6/18 8/2/18 7/30/18 7/26/18 7/23/18   VISIT 12 11 10 9 8 7 6 5   POC 9/9/18              G CODE 11/20 10/10 10/10 9/10 8/10 7/10 6/10 5/10   FOTO 2/10 1/10 10 DONE 9/10 8/10 7/10 6/10 5/5   Cap Visit  Cap [place] 121.28  1037.94 60.64  916.66 60.64  856.02 30.32  795.38 120.94  765.26 60.64  644.32 118.42  583.68 118.42  465.26   Bike 5' 5'            MHP              MT      prn 10' 10'   Stretches:              HS stretch 3x30" B 3x30'' B 3x30" B 3x30'' B 3x30" 3x30"  3x30" 3x30'' R   IT band stretch              Supine:              Quad sets  10x10'' R 10x10" R 10x10'' R  Towel under ankle 2x10 5" 2x10 5"  2x10 5"    2x10 5'' holds  Towel under ankle   SLR  1# 2x15 1# 2 x 15 1# 1#2x10 1# 2x10  2x15 2x15 B   SAQ    NT 1#2x10         Knee flexion stretch              Heel " "slides w/ strap  10x10'' 10"x10 10x10'' 10"x10 10"x10 10"x10 8x10''   Heel slides  10x 1# R 1x10 1# R 2x10 1# R 2x10R 2x10 R 2x10 R 2x10 R   SL hip abd  2x15 2# B 2x15 2# B 2x15 2# B 2x15 B 2# 2x15 B 2# 2x15 B 2# 2x15 B   Clams              Iso Hip add+ Kegles          NT   Bridges 2x15 2x15 2x15 2x15 2x15 2x15 2x15  2x15 B                  Prone:              Quad stretch              HS curls See below 2x15 R  2# 2x15 DL 2x15 2# R 2x15 2# 2x15 2# 2x15 2# oot   Hip Ext     NT 2x15 - 2x15 oot   Seated:   NT           LAQ   NT NT 2x10 2#B   2x10 2# B oot   Marching    NT 2x10 2#B - 2x10 2# B oot                  Standing:              Heel raises 3x10 no UE sup         oot   Calf stretch on fitter 3x30"              Steamboats 2x15 B GTC NT 2x15 RTC B 2x10 RTC B RTC 2x10 B RTC 2x10 B  2x10 B 3way oot   Step ups 2x15 Blue    2x10 Blue R  Blue 2x10 R oot oot   Step downs              TKEs PTC 2x15  3" hold NT PTC 2x12  3" hold PTC 2x10  3'' holds PTC  2x103"hold PTC 2x103" hold       SLS See below NT 3x30" B 2x30'' B          Tandem walking 3 laps             Cybex HS curls 2x15 3.5#             Cybex LAQ 2x15 3.0 #             SLS w/contralateral foot on step w/dynamic UE 2x10 B   3.3 lb  OH chop  Chest press          Leg Press 2x15 DL  5.0 2x15 DL  4.5 plates 2x15 DL  4.5 plates 2x10 2x10 DL  3.5 plates 2.5 plates 2x15   oot Next    Gait  See note 15' 10'    note See note See note   CP    NT 10'         INITIALS MB 1/6 VICK MB 1/6 VICK SR CK  MB 1/6 VICK              Jolly participated in gait training to improve functional mobility and safety for 15 mins including  Side stepping, tandem, fwd/bkwd with self catch and toss ball.  Jolly received cold pack for 0 minutes to R knee.      Home Exercises Provided and Patient Education Provided     Education provided:   - cont HEP regularly    Written Home Exercises Provided: Patient instructed to cont prior HEP.  Exercises were reviewed and oJlly was able to demonstrate " them prior to the end of the session.  Jolly demonstrated good  understanding of the education provided.     See EMR under Patient Instructions for exercises provided prior visit.    Assessment     Pt informed to only use SPV when necessary or unsafe conditions; pt mod I and independent in all mobility. Pt progressing well with balance and strengthening. Anticipated DC next week per PT POC    Jolly is progressing well towards her goals.   Pt prognosis is Excellent.     Pt will continue to benefit from skilled outpatient physical therapy to address the deficits listed in the problem list box on initial evaluation, provide pt/family education and to maximize pt's level of independence in the home and community environment.     Pt's spiritual, cultural and educational needs considered and pt agreeable to plan of care and goals.    Anticipated barriers to physical therapy: none    Goals:     Short Term Goals: 4 weeks  1. Pt will be independent with HEP - met  2. Pt will improve RLE strength to at least 75% of L LE strength as measured via MicroFet handheld dynamometer in order to improve functional mobility - met  3. Pt will improve R knee AROM to at least 5-100 in order to improve gait - met     Long Term Goals: 8 weeks  1. Pt will be independent with updated HEP -  met  2. Pt will improve R LE strength to at least 90% of L LE strength as measured via MMT in order to improve functional mobility - partially met  3. Pt will improve R knee AROM to at least 0-125 in order to improve gait and ability to perform ADLs - Met  4. Pt will improve FOTO knee survey score to </= 46% limited in order to demo improved functional mobility.  5. Pt will be able to negotiate stairs and ambulate all surfaces independently without AD for increased functional mobility. - met    Plan     Cont POC as tolerates to progress towards established goals    Aquilino Medeiros, PTA

## 2018-08-20 ENCOUNTER — CLINICAL SUPPORT (OUTPATIENT)
Dept: REHABILITATION | Facility: HOSPITAL | Age: 68
End: 2018-08-20
Payer: MEDICARE

## 2018-08-20 DIAGNOSIS — M25.661 DECREASED ROM OF RIGHT KNEE: ICD-10-CM

## 2018-08-20 DIAGNOSIS — Z74.09 DECREASED MOBILITY AND ENDURANCE: ICD-10-CM

## 2018-08-20 DIAGNOSIS — R53.1 DECREASED STRENGTH: ICD-10-CM

## 2018-08-20 PROCEDURE — 97110 THERAPEUTIC EXERCISES: CPT | Mod: PN

## 2018-08-20 PROCEDURE — G8979 MOBILITY GOAL STATUS: HCPCS | Mod: CK,PN

## 2018-08-20 PROCEDURE — G8980 MOBILITY D/C STATUS: HCPCS | Mod: CK,PN

## 2018-08-20 NOTE — Clinical Note
Bree Shelton PA-C,Thank you for your referral of Jolly Matias (MRN:[id number], :1950) to [facility] and [facility]. Pt met all goals, pt discharged from PT today.Thank you,Perry Byers, PT, DPT

## 2018-08-20 NOTE — PROGRESS NOTES
"  Physical Therapy Daily Treatment Note     Name: Jolly Matias  Clinic Number: [id number]    Therapy Diagnosis:   Encounter Diagnoses   Name Primary?    Decreased ROM of right knee     Decreased strength     Decreased mobility and endurance      Physician: Bree Parr PA-C    Visit Date: 8/20/2018  Physician Orders: PT eval and treat  Medical Diagnosis: Chondromalacia of right knee,Other old tear of lateral meniscus of right knee   Evaluation Date: 07/09/2018  Authorization Period Expiration: 09/09/2018  Plan of Care Certification Period: 7/9/18 to 9/9/18  Visit #/Visits authorized:11/12     Time In: 1:00  Time Out: 2:00  Total Billable Time: 30 minutes (TE-2)    Precautions:DM, skin cancer, HTN    Subjective     Pt reports:Pt reports that her knee is feeling well, no issues  She was compliant with home exercise program.  Response to previous treatment: no adverse reaction  Functional change: none    Pain: 0/10  Location: right knee      Objective     Jolly received therapeutic exercises to develop strength, endurance and ROM for 30 minutes supervised and then 30 mins 1:1 with PT:  See log    DATE 8/20/18 8/16/18 8/13/18 8/9/18 8/6/18 8/2/18 7/30/18 7/26/18 7/23/18   VISIT 13 12 11 10 9 8 7 6 5   POC 9/9/18               G CODE 12/20 11/20 10/10 10/10 9/10 8/10 7/10 6/10 5/10   FOTO 3/10 2/10 1/10 10 DONE 9/10 8/10 7/10 6/10 5/5   Cap Visit  Cap Tota 60.64  1098.58 121.28  1037.94 60.64  916.66 60.64  856.02 30.32  795.38 120.94  765.26 60.64  644.32 118.42  583.68 118.42  465.26   Bike 5' 5' 5'            MHP               MT       prn 10' 10'   Stretches:               HS stretch 3x10'' B 3x30" B 3x30'' B 3x30" B 3x30'' B 3x30" 3x30"  3x30" 3x30'' R   IT band stretch               Supine:               Quad sets NT  10x10'' R 10x10" R 10x10'' R  Towel under ankle 2x10 5" 2x10 5"  2x10 5"    2x10 5'' holds  Towel under ankle   SLR 1# 2x15 B  1# 2x15 1# 2 x 15 1# 1#2x10 1# 2x10  2x15 2x15 B   SAQ     NT " "1#2x10         Knee flexion stretch               Heel slides w/ strap NT  10x10'' 10"x10 10x10'' 10"x10 10"x10 10"x10 8x10''   Heel slides NT  10x 1# R 1x10 1# R 2x10 1# R 2x10R 2x10 R 2x10 R 2x10 R   SL hip abd 2x15 2# B  2x15 2# B 2x15 2# B 2x15 2# B 2x15 B 2# 2x15 B 2# 2x15 B 2# 2x15 B   Clams               Iso Hip add+ Kegles           NT   Bridges 2x15 2x15 2x15 2x15 2x15 2x15 2x15 2x15  2x15 B                   Prone:               Quad stretch               HS curls  See below 2x15 R  2# 2x15 DL 2x15 2# R 2x15 2# 2x15 2# 2x15 2# oot   Hip Ext      NT 2x15 - 2x15 oot   Seated:    NT           LAQ    NT NT 2x10 2#B   2x10 2# B oot   Marching     NT 2x10 2#B - 2x10 2# B oot                   Standing:               Heel raises 2x15 DL 3x10 no UE sup         oot   Calf stretch on fitter 3x30'' DL 3x30"              Steamboats 2x15 GTC 2x15 B GTC NT 2x15 RTC B 2x10 RTC B RTC 2x10 B RTC 2x10 B  2x10 B 3way oot   Step ups 2x15 Blue 2x15 Blue    2x10 Blue R  Blue 2x10 R oot oot   Step downs               TKEs PTC 2x15  3'' holds PTC 2x15  3" hold NT PTC 2x12  3" hold PTC 2x10  3'' holds PTC  2x103"hold PTC 2x103" hold       SLS -- See below NT 3x30" B 2x30'' B          Tandem walking -- 3 laps             Cybex HS curls 2x15 3.5# 2x15 3.5#             Cybex LAQ 2x15 10# 2x15 3.0 #             SLS w/contralateral foot on step w/dynamic UE -- 2x10 B   3.3 lb  OH chop  Chest press          Leg Press 2x15 DL  4.5 plates 2x15 DL  5.0 2x15 DL  4.5 plates 2x15 DL  4.5 plates 2x10 2x10 DL  3.5 plates 2.5 plates 2x15   oot Next    Gait  -- See note 15' 10'    note See note See note   CP     NT 10'         INITIALS VICK MB 1/6 VICK LUZ 1/6 VICK SR CK  MB 1/6 VICK      Home Exercises Provided and Patient Education Provided     Education provided:   - cont HEP regularly    Written Home Exercises Provided: Patient instructed to cont prior HEP.  Exercises were reviewed and Jolly was able to demonstrate them prior to the end of the " session.  Jolly demonstrated good  understanding of the education provided.     See EMR under Patient Instructions for exercises provided prior visit.    Outpatient Therapy Discharge Summary     Name: Jolly Matias  Clinic Number: [id number]    Therapy Diagnosis:   Encounter Diagnoses   Name Primary?    Decreased ROM of right knee     Decreased strength     Decreased mobility and endurance      Physician: Bree Parr PA-C    Physician Orders: Eval and treat  Medical Diagnosis:   M94.261 (ICD-10-CM) - Chondromalacia of right knee   M23.200 (ICD-10-CM) - Other old tear of lateral meniscus of right knee   Evaluation Date: 7/9/18    D/C:  Range of Motion/Strength:   * = R knee pain with testing  Hip   Right     Left   Pain/Dysfunction with Movement     AROM PROM MMT AROM PROM MMT     Flexion WFL WFL 4+/5 WFL WFL 4+/5    Extension WFL WFL NT WFL WFL NT Good bridge   Abduction WFL WFL 5/5 WFL WFL 4+/5    Adduction WFL WFL 5/5 WFL WFL 5/5    Internal rotation WFL WFL NT WFL WFL NT     External rotation WFL WFL NT WFL WFL NT        Knee   Right     Left   Pain/Dysfunction with Movement     AROM PROM MMT AROM PROM MMT     Flexion 140 NT 5/5 143 NT 5/5     Extension 0 0 4+/5 0 NT 4+/5        Ankle   Right     Left   Pain/Dysfunction with Movement     AROM PROM MMT AROM PROM MMT     Plantarflexion WFL WFL 5/5 WFL WFL 5/5     Dorsiflexion WFL WFL 5/5 WFL WFL 5/5     Inversion WFL WFL NT WFL WFL NT     Eversion WFL WFL NT WFL WFL NT         Date of Last visit: 8/20/18  Total Visits Received: 13      Assessment    Goals:     Short Term Goals: 4 weeks  1. Pt will be independent with HEP - met  2. Pt will improve RLE strength to at least 75% of L LE strength as measured via Tripologyet handheld dynamometer in order to improve functional mobility - met  3. Pt will improve R knee AROM to at least 5-100 in order to improve gait - met     Long Term Goals: 8 weeks  1. Pt will be independent with updated HEP -  met  2. Pt will  improve R LE strength to at least 90% of L LE strength as measured via MMT in order to improve functional mobility - partially met  3. Pt will improve R knee AROM to at least 0-125 in order to improve gait and ability to perform ADLs - Met  4. Pt will improve FOTO knee survey score to </= 46% limited in order to demo improved functional mobility. - nearly met (49%)  5. Pt will be able to negotiate stairs and ambulate all surfaces independently without AD for increased functional mobility. - met      Discharge reason: Patient has met all of his/her goals and Patient has reached the maximum rehab potential for the present time    Plan   This patient is discharged from Physical Therapy          Perry Byers, PT

## 2018-08-26 ENCOUNTER — PATIENT MESSAGE (OUTPATIENT)
Dept: DERMATOLOGY | Facility: CLINIC | Age: 68
End: 2018-08-26

## 2018-09-05 ENCOUNTER — DOCUMENTATION ONLY (OUTPATIENT)
Dept: REHABILITATION | Facility: HOSPITAL | Age: 68
End: 2018-09-05

## 2018-09-05 DIAGNOSIS — M25.661 DECREASED ROM OF RIGHT KNEE: ICD-10-CM

## 2018-09-05 DIAGNOSIS — Z74.09 DECREASED MOBILITY AND ENDURANCE: ICD-10-CM

## 2018-09-05 DIAGNOSIS — R53.1 DECREASED STRENGTH: ICD-10-CM

## 2018-09-05 NOTE — PROGRESS NOTES
Face to Face PTA Conference performed with  Beverly Tay PTA, Alba Nava PTA, Emory Lui PTA Aquilino Medeiros PTA regarding patient's current status, overall progress, and plan of care    Perry Byers, PT    Face to face meeting completed with Perry Byers PT regarding current status and progress of   Jolly O Zewe .  Emory Lui PTA     Face to face meeting completed with Perry Byers PT regarding current status and progress of   Jolly O Zewe .  Beverly Tay, CLARY    Face to face meeting completed with Perry Byers PT regarding current status and progress of   Jolly O Zewe .  Alba Nava PTA     Face to face meeting completed with Perry Byers  PT regarding current status and progress of   Jolly O Zewe .  Aquilino Medeiros, PTA

## 2018-09-19 ENCOUNTER — OFFICE VISIT (OUTPATIENT)
Dept: VASCULAR SURGERY | Facility: CLINIC | Age: 68
End: 2018-09-19
Attending: SURGERY
Payer: MEDICARE

## 2018-09-19 ENCOUNTER — PATIENT MESSAGE (OUTPATIENT)
Dept: DERMATOLOGY | Facility: CLINIC | Age: 68
End: 2018-09-19

## 2018-09-19 VITALS
SYSTOLIC BLOOD PRESSURE: 157 MMHG | WEIGHT: 154.31 LBS | TEMPERATURE: 99 F | HEART RATE: 79 BPM | DIASTOLIC BLOOD PRESSURE: 76 MMHG | HEIGHT: 61 IN | BODY MASS INDEX: 29.13 KG/M2

## 2018-09-19 DIAGNOSIS — I72.8 SPLENIC ARTERY ANEURYSM: Primary | ICD-10-CM

## 2018-09-19 PROCEDURE — 99999 PR PBB SHADOW E&M-EST. PATIENT-LVL III: CPT | Mod: PBBFAC,,, | Performed by: SURGERY

## 2018-09-19 PROCEDURE — 3077F SYST BP >= 140 MM HG: CPT | Mod: CPTII,,, | Performed by: SURGERY

## 2018-09-19 PROCEDURE — 99213 OFFICE O/P EST LOW 20 MIN: CPT | Mod: PBBFAC | Performed by: SURGERY

## 2018-09-19 PROCEDURE — 1101F PT FALLS ASSESS-DOCD LE1/YR: CPT | Mod: CPTII,,, | Performed by: SURGERY

## 2018-09-19 PROCEDURE — 3078F DIAST BP <80 MM HG: CPT | Mod: CPTII,,, | Performed by: SURGERY

## 2018-09-19 PROCEDURE — 99204 OFFICE O/P NEW MOD 45 MIN: CPT | Mod: S$PBB,,, | Performed by: SURGERY

## 2018-09-19 NOTE — LETTER
September 19, 2018      Felicity Lee, DO  [address] [facility]  [address]  [facility] [address]           [facility]  [address]  [place] [address]  Phone: [phone]  Fax: [phone]          Patient: Jolly Matias   MR Number: [id number]   YOB: 1950   Date of Visit: 9/19/2018       Dear Dr. Felicity Lee:    Thank you for referring Jolly Matias to me for evaluation. Attached you will find relevant portions of my assessment and plan of care.    If you have questions, please do not hesitate to call me. I look forward to following Jolly Matias along with you.    Sincerely,    Ihsan Pandey MD    Enclosure  CC:  No Recipients    If you would like to receive this communication electronically, please contact [email or web address] or [phone] to request more information on [employer] Link access.    For providers and/or their staff who would like to refer a patient to [facility], please contact us through our one-stop-shop provider referral line, [facility], at [phone].    If you feel you have received this communication in error or would no longer like to receive these types of communications, please e-mail [email or web address]

## 2018-09-19 NOTE — PROGRESS NOTES
Jolly HUBERT Matias  2018    HPI:  Patient is a 68 y.o. female who has a past medical history of diabetes mellitus, diverticulitis, HLD (hyperlipidemia), Hypertension, and Skin cancer is here today for evaluation of pulsatile abdominal mass with concern for possible AAA or other arterial aneurysm.  Patient with no complaints at this time, and has never noticed any pulsatile abdominal masses.     CTA from 2018 was unremarkable.    She has no history of MI/stroke.  Tobacco use: non-smoker    Past Medical History:   Diagnosis Date    Atypical ductal hyperplasia, breast     Basal cell carcinoma 2011    left forehead    Basal cell carcinoma 2015    R temporal scalp, R upper forehead, L upper forehead    Basal cell carcinoma 2015    right mid ala    BCC (basal cell carcinoma) excised     right shoulder    Diabetes mellitus     Diverticulitis     Fibrocystic breast     HLD (hyperlipidemia)     Hypertension     Prediabetes 10/16/2013    Skin cancer      Past Surgical History:   Procedure Laterality Date    APPENDECTOMY      ARTHROSCOPY, KNEE, WITH DEBRIDEMENT Right 2018    Performed by [facility] at [facility] OR 1ST FLR    ARTHROSCOPY, KNEE, WITH MENISCECTOMY (partial lateral and medial menisectomy, chondraplasty Right 2018    Performed by [facility] at [facility] OR 1ST FLR    bilateral breast duct excision      BREAST BIOPSY Right     BREAST BIOPSY Left      SECTION      x2    COLON SURGERY Left 2017    sigmoidectomy for diverticulitis    COLONOSCOPY N/A 2017    Procedure: COLONOSCOPY;  Surgeon: IBRAHIMA Philip MD;  Location: [facility] (4TH FLR);  Service: Endoscopy;  Laterality: N/A;    COLONOSCOPY N/A 2017    Performed by IBRAHIMA Philip MD at [facility] (4TH FLR)    ESOPHAGOGASTRODUODENOSCOPY (EGD) N/A 2016    Performed by Skip Irwin MD at [facility] (4TH FLR)    EXCISION, PLICA, KNEE, ARTHROSCOPIC, Right 2018    [facility]    fulgaration of endometriosis x 2      HYSTERECTOMY      For endometriosis and DUB--age 42, ovaries in?    INCISIONAL HERNIA REPAIR  08/2017    KNEE ARTHROSCOPY W/ DEBRIDEMENT Right 7/6/2018    Procedure: ARTHROSCOPY, KNEE, WITH DEBRIDEMENT;  Surgeon: MARVA Arias MD;  Location: [facility] OR [address] [place] [address];  Service: Orthopedics;  Laterality: Right;    KNEE ARTHROSCOPY W/ MENISCECTOMY Right 7/6/2018    Procedure: ARTHROSCOPY, KNEE, WITH MENISCECTOMY (partial lateral and medial menisectomy, chondraplasty;  Surgeon: MARVA Arias MD;  Location: [facility] OR [address] [place] [address];  Service: Orthopedics;  Laterality: Right;    KNEE ARTHROSCOPY W/ PLICA EXCISION Right 7/6/2018    Procedure: EXCISION, PLICA, KNEE, ARTHROSCOPIC,;  Surgeon: MARVA Arias MD;  Location: [facility] OR [address] [place] [address];  Service: Orthopedics;  Laterality: Right;    KNEE SURGERY      OOPHORECTOMY      REPAIR-HERNIA-INCISIONAL-ROBOTIC-XI N/A 8/22/2017    Performed by Yg Haile Jr., MD at [facility] OR 2ND FLR    SIGMOIDECTOMY-LAPAROSCOPIC N/A 1/17/2017    [facility]    SIGMOIDOSCOPY-FLEXIBLE  1/17/2017    [facility]    SKIN CANCER EXCISION      BCC forehead , temple, shoulder, chest    TONSILLECTOMY       Family History   Problem Relation Age of Onset    Skin cancer Father     Hypertension Father     Heart disease Father     Diabetes Father     Melanoma Father     Cancer Father     Skin cancer Mother     Hypertension Mother     Thyroid disease Mother     Dementia Mother     Hypertension Sister     Hyperlipidemia Sister     Anxiety disorder Daughter     Hypertension Son     Hyperlipidemia Son     Breast cancer Maternal Aunt     Diabetes Maternal Uncle      Social History     Socioeconomic History    Marital status:      Spouse name: Not on file    Number of children: Not on file    Years of education: Not  on file    Highest education level: Not on file   Social Needs    Financial resource strain: Not on file    Food insecurity - worry: Not on file    Food insecurity - inability: Not on file    Transportation needs - medical: Not on file    Transportation needs - non-medical: Not on file   Occupational History    Not on file   Tobacco Use    Smoking status: Never Smoker    Smokeless tobacco: Never Used   Substance and Sexual Activity    Alcohol use: Yes     Alcohol/week: 0.0 oz     Types: 1 - 2 Glasses of wine per week     Comment: 3-4x /week    Drug use: No    Sexual activity: Yes     Partners: Male     Birth control/protection: Post-menopausal   Other Topics Concern    Are you pregnant or think you may be? No    Breast-feeding No   Social History Narrative    , walking some     Current Outpatient Medications on File Prior to Visit   Medication Sig    albuterol 90 mcg/actuation inhaler Inhale 2 puffs into the lungs every 6 (six) hours as needed for Wheezing. Rescue    atorvastatin (LIPITOR) 20 MG tablet Take 1 tablet (20 mg total) by mouth once daily.    cyanocobalamin (VITAMIN B-12) 1000 MCG tablet Take 100 mcg by mouth once daily.    docusate sodium (COLACE) 100 MG capsule Take 1 capsule (100 mg total) by mouth 2 (two) times daily.    flaxseed oil 1,000 mg Cap Take 1 capsule by mouth once daily.      losartan (COZAAR) 100 MG tablet 100 mg once daily.     metFORMIN (GLUCOPHAGE) 500 MG tablet Take 1 tablet (500 mg total) by mouth 2 (two) times daily with meals. (Patient taking differently: Take 500 mg by mouth 2 (two) times daily with meals. Hold the night before and the day of surgery)    ondansetron (ZOFRAN) 4 MG tablet Take 1 tablet (4 mg total) by mouth every 8 (eight) hours as needed for Nausea.    sertraline (ZOLOFT) 100 MG tablet Take 1 tablet (100 mg total) by mouth once daily.    spironolactone (ALDACTONE) 25 MG tablet TAKE 1 TABLET(25 MG) BY MOUTH EVERY DAY (Patient taking  differently: TAKE 1 TABLET(25 MG) BY MOUTH EVERY DAY  Hold the day of surgery)    spironolactone (ALDACTONE) 25 MG tablet TAKE 1 TABLET(25 MG) BY MOUTH EVERY DAY    vitamin D 1000 units Tab Take 185 mg by mouth once daily.    aspirin (ECOTRIN) 325 MG EC tablet Take 1 tablet (325 mg total) by mouth 2 (two) times daily. for 20 days     No current facility-administered medications on file prior to visit.        REVIEW OF SYSTEMS:  General: negative; ENT: negative; Allergy and Immunology: negative; Hematological and Lymphatic: Negative; Endocrine: negative; Respiratory: no cough, shortness of breath, or wheezing; Cardiovascular: no chest pain or dyspnea on exertion; Gastrointestinal: no abdominal pain/back, change in bowel habits, or bloody stools; Genito-Urinary: no dysuria, trouble voiding, or hematuria; Musculoskeletal: negative  Neurological: no TIA or stroke symptoms    PHYSICAL EXAM:   Right Arm BP - Sittin/76 (18 1424)  Left Arm BP - Sittin/84 (18 1424)  Pulse: 79  Temp: 99.4 °F (37.4 °C)      General appearance:  Alert, well-appearing, and in no distress.  Oriented to person, place, and time   Neurological: Normal speech, no focal findings noted; CN II - XII grossly intact           Musculoskeletal: Digits/nail without cyanosis/clubbing.  Normal muscle strength/tone.                 Neck: Supple, no significant adenopathy; thyroid is not enlarged                  No carotid bruit can be auscultated                Chest:  Clear to auscultation, no wheezes, rales or rhonchi, symmetric air entry     No use of accessory muscles             Cardiac: Normal rate and regular rhythm, S1 and S2 normal; PMI non-displaced          Abdomen: Soft, nontender, nondistended, no masses or organomegaly     No rebound tenderness noted; bowel sounds normal     Pulsatile aortic mass is not palpable.     No groin adenopathy      Extremities:   2+ femoral pulses bilaterally     2+ Popliteal pulses; 2+ pedal  "pulses palpable.     No pedal edema     No ulcerations    LAB RESULTS:  Lab Results   Component Value Date    K 4.3 07/03/2018    K 4.3 04/06/2018    K 4.4 01/17/2018    CREATININE 0.9 07/03/2018    CREATININE 0.9 04/06/2018    CREATININE 0.9 01/17/2018     Lab Results   Component Value Date    WBC 6.03 07/03/2018    WBC 5.74 01/17/2018    WBC 7.35 08/24/2017    HCT 35.2 (L) 07/03/2018    HCT 39.0 01/17/2018    HCT 33.4 (L) 08/24/2017     07/03/2018     01/17/2018     08/24/2017     Lab Results   Component Value Date    HGBA1C 6.0 (H) 07/03/2018    HGBA1C 5.9 (H) 04/06/2018    HGBA1C 5.9 (H) 01/17/2018     IMAGING:  CTA impression (6/30/2018):  "  COMPARISON:  09/27/2016    FINDINGS:  Lower chest: The lung bases, heart and pericardium show no abnormalities.    Vascular: The abdominal aorta tapers normally.  A mild degree of calcified atherosclerosis affects the abdominal aorta and its branches.  Incidental note is made of a retro aortic left renal vein.  There is no aneurysm or stenosis.    The liver, spleen, gallbladder, pancreas, adrenal glands, kidneys, stomach, visualized large and small intestines show no abnormalities.  There is no abdominal free fluid, free air or lymphadenopathy.    Osseous and soft tissue structures: No suspicious lytic or blastic lesions.  Soft tissues normal.      Impression       This exam is essentially normal with no evidence of aneurysm.      Electronically signed by: Nuria Gomez MD  Date: 06/30/2018  Time: 15:40   "    IMP/PLAN:  68 y.o. female with a history as noted above, referred for evaluation of a splenic artery aneurysm which I do not appreciate on CT. She has some vascular congestion in the hilum of the spleen but no discernable aneurysm.  Her aorta is normal in the images available.     1) continue statin  2) RTC prnafisa Pandey MD  Vascular & Endovascular Surgery     "

## 2018-09-24 ENCOUNTER — OFFICE VISIT (OUTPATIENT)
Dept: DERMATOLOGY | Facility: CLINIC | Age: 68
End: 2018-09-24
Payer: MEDICARE

## 2018-09-24 DIAGNOSIS — L30.4 INTERTRIGO: ICD-10-CM

## 2018-09-24 DIAGNOSIS — F41.1 GENERALIZED ANXIETY DISORDER: ICD-10-CM

## 2018-09-24 DIAGNOSIS — L73.9 FOLLICULITIS: Primary | ICD-10-CM

## 2018-09-24 PROCEDURE — 99999 PR PBB SHADOW E&M-EST. PATIENT-LVL II: CPT | Mod: PBBFAC,,, | Performed by: DERMATOLOGY

## 2018-09-24 PROCEDURE — 1101F PT FALLS ASSESS-DOCD LE1/YR: CPT | Mod: CPTII,,, | Performed by: DERMATOLOGY

## 2018-09-24 PROCEDURE — 99212 OFFICE O/P EST SF 10 MIN: CPT | Mod: PBBFAC,PO | Performed by: DERMATOLOGY

## 2018-09-24 PROCEDURE — 99214 OFFICE O/P EST MOD 30 MIN: CPT | Mod: S$PBB,,, | Performed by: DERMATOLOGY

## 2018-09-24 RX ORDER — NYSTATIN 100000 [USP'U]/G
POWDER TOPICAL 2 TIMES DAILY
Qty: 120 G | Refills: 2 | Status: SHIPPED | OUTPATIENT
Start: 2018-09-24 | End: 2021-02-03

## 2018-09-24 RX ORDER — FLUOCINONIDE TOPICAL SOLUTION USP, 0.05% 0.5 MG/ML
SOLUTION TOPICAL
Qty: 60 ML | Refills: 3 | Status: SHIPPED | OUTPATIENT
Start: 2018-09-24 | End: 2020-09-08

## 2018-09-24 NOTE — PROGRESS NOTES
Subjective:       Patient ID:  Jolly Matias is a 68 y.o. female who presents for   Chief Complaint   Patient presents with    Rash     Pt present today for rash in the groin, underarms, back of head and back for one month. Started on scalp and back.  Raised itchy lesions. Some bleeding. Tx with CeraVe moisturizing cream and Aveeno lotion. Pt stated the rash was very itchy scaling and dry. Redness has itching has cleared up. Then had circular area under right arm.        Rash  - Initial  Affected locations: left axilla, right axilla, scalp, groin and back  Duration: 1 month  Signs / symptoms: itching and scaling  Aggravated by: nothing  Relieving factors/Treatments tried: OTC moisturizer  Improvement on treatment: mild        Review of Systems   Constitutional: Negative for fever, chills, weight loss, weight gain, fatigue, night sweats and malaise.   Skin: Positive for itching, rash and activity-related sunscreen use. Negative for daily sunscreen use and recent sunburn.   Hematologic/Lymphatic: Does not bruise/bleed easily.        Objective:    Physical Exam   Constitutional: She appears well-developed and well-nourished. No distress.   Neurological: She is alert and oriented to person, place, and time. She is not disoriented.   Psychiatric: She has a normal mood and affect.   Skin:   Areas Examined (abnormalities noted in diagram):   Scalp / Hair Palpated and Inspected  Head / Face Inspection Performed  Chest / Axilla Inspection Performed  Abdomen Inspection Performed  Genitals / Buttocks / Groin Inspection Performed  Back Inspection Performed  Nails and Digits Inspection Performed                   Diagram Legend     Erythematous scaling macule/papule c/w actinic keratosis       Vascular papule c/w angioma      Pigmented verrucoid papule/plaque c/w seborrheic keratosis      Yellow umbilicated papule c/w sebaceous hyperplasia      Irregularly shaped tan macule c/w lentigo     1-2 mm smooth white papules  consistent with Milia      Movable subcutaneous cyst with punctum c/w epidermal inclusion cyst      Subcutaneous movable cyst c/w pilar cyst      Firm pink to brown papule c/w dermatofibroma      Pedunculated fleshy papule(s) c/w skin tag(s)      Evenly pigmented macule c/w junctional nevus     Mildly variegated pigmented, slightly irregular-bordered macule c/w mildly atypical nevus      Flesh colored to evenly pigmented papule c/w intradermal nevus       Pink pearly papule/plaque c/w basal cell carcinoma      Erythematous hyperkeratotic cursted plaque c/w SCC      Surgical scar with no sign of skin cancer recurrence      Open and closed comedones      Inflammatory papules and pustules      Verrucoid papule consistent consistent with wart     Erythematous eczematous patches and plaques     Dystrophic onycholytic nail with subungual debris c/w onychomycosis     Umbilicated papule    Erythematous-base heme-crusted tan verrucoid plaque consistent with inflamed seborrheic keratosis     Erythematous Silvery Scaling Plaque c/w Psoriasis     See annotation      Assessment / Plan:        Folliculitis  Oracea 40 mg po qd x 10 days  -     fluocinonide (LIDEX) 0.05 % external solution; AAA scalp qday - bid prn pruritus  Dispense: 60 mL; Refill: 3    Intertrigo  Recommend white vinegar: water 1:1 compresses 2x/day followed by cool blow dry and then application of prescription medication.     Cool blow dry after showering. Once clear, use rx  powder for maintenance.    -     nystatin (MYCOSTATIN) powder; Apply topically 2 (two) times daily. aaa qd- bid prn flare groin, under arm, under breasts  Dispense: 120 g; Refill: 2             Follow-up in about 6 months (around 3/24/2019) for for skin check .

## 2018-09-25 RX ORDER — SERTRALINE HYDROCHLORIDE 50 MG/1
TABLET, FILM COATED ORAL
Qty: 90 TABLET | Refills: 0 | Status: SHIPPED | OUTPATIENT
Start: 2018-09-25 | End: 2018-10-24

## 2018-10-15 ENCOUNTER — LAB VISIT (OUTPATIENT)
Dept: LAB | Facility: HOSPITAL | Age: 68
End: 2018-10-15
Attending: FAMILY MEDICINE
Payer: MEDICARE

## 2018-10-15 ENCOUNTER — TELEPHONE (OUTPATIENT)
Dept: FAMILY MEDICINE | Facility: CLINIC | Age: 68
End: 2018-10-15

## 2018-10-15 DIAGNOSIS — R30.0 DYSURIA: ICD-10-CM

## 2018-10-15 DIAGNOSIS — R30.0 DYSURIA: Primary | ICD-10-CM

## 2018-10-15 DIAGNOSIS — Z12.39 SCREENING BREAST EXAMINATION: ICD-10-CM

## 2018-10-15 LAB
BACTERIA #/AREA URNS AUTO: ABNORMAL /HPF
BILIRUB UR QL STRIP: NEGATIVE
CLARITY UR REFRACT.AUTO: CLEAR
COLOR UR AUTO: ABNORMAL
GLUCOSE UR QL STRIP: NEGATIVE
HGB UR QL STRIP: ABNORMAL
HYALINE CASTS UR QL AUTO: 0 /LPF
KETONES UR QL STRIP: NEGATIVE
LEUKOCYTE ESTERASE UR QL STRIP: ABNORMAL
MICROSCOPIC COMMENT: ABNORMAL
NITRITE UR QL STRIP: NEGATIVE
PH UR STRIP: 5 [PH] (ref 5–8)
PROT UR QL STRIP: ABNORMAL
RBC #/AREA URNS AUTO: 66 /HPF (ref 0–4)
SP GR UR STRIP: 1.02 (ref 1–1.03)
URN SPEC COLLECT METH UR: ABNORMAL
UROBILINOGEN UR STRIP-ACNC: NEGATIVE EU/DL
WBC #/AREA URNS AUTO: >100 /HPF (ref 0–5)
WBC CLUMPS UR QL AUTO: ABNORMAL

## 2018-10-15 PROCEDURE — 87077 CULTURE AEROBIC IDENTIFY: CPT

## 2018-10-15 PROCEDURE — 81001 URINALYSIS AUTO W/SCOPE: CPT

## 2018-10-15 PROCEDURE — 87186 SC STD MICRODIL/AGAR DIL: CPT

## 2018-10-15 PROCEDURE — 87088 URINE BACTERIA CULTURE: CPT

## 2018-10-15 PROCEDURE — 87086 URINE CULTURE/COLONY COUNT: CPT

## 2018-10-15 RX ORDER — CIPROFLOXACIN 250 MG/1
250 TABLET, FILM COATED ORAL 2 TIMES DAILY
Qty: 14 TABLET | Refills: 0 | Status: SHIPPED | OUTPATIENT
Start: 2018-10-15 | End: 2019-06-05

## 2018-10-15 NOTE — TELEPHONE ENCOUNTER
"----- Message from Flavia Hays sent at 10/15/2018 11:16 AM CDT -----  Contact: [phone]  Patient would like to get medical advice.    Symptoms (please be specific):  Discomfort when Urinating, Frequent urinating     How long has patient had these symptoms:  2 days    Pharmacy name and phone # (DON'T enter "on file" or "in chart"):  [facility] [address] [place] [address] [address] [place] [facility] [address] [phone] (Phone)  [phone] (Fax)    Any drug allergies:  yes    Would you prefer a response via [employer]?:  No    Comments:    "

## 2018-10-15 NOTE — TELEPHONE ENCOUNTER
Spoke with patient advised her of Dr. Rocha message below, attempted to schedule patient for EPP.  There was no available EPP slots. Spoke with Yessica our supervisor states patient would need to contact our office back on Friday which is when the new schedule comes out for January.  Patient advised agrees to contact our office back to schedule.      Patient with anemia in the setting of ESRD. Hemoglobin near target range. Monitor hemoglobin

## 2018-10-15 NOTE — TELEPHONE ENCOUNTER
Patient called today complaining of urinary frequency & dysuria. Denies low back pain, fever, or blood in urine. Symptoms started over the weekend. Patient will go to the [facility] lab today to drop off a urine specimen.

## 2018-10-16 DIAGNOSIS — F32.9 REACTIVE DEPRESSION: ICD-10-CM

## 2018-10-16 DIAGNOSIS — R73.03 PREDIABETES: ICD-10-CM

## 2018-10-16 RX ORDER — METFORMIN HYDROCHLORIDE 500 MG/1
TABLET ORAL
Qty: 180 TABLET | Refills: 2 | Status: SHIPPED | OUTPATIENT
Start: 2018-10-16 | End: 2019-11-04 | Stop reason: SDUPTHER

## 2018-10-16 RX ORDER — SERTRALINE HYDROCHLORIDE 100 MG/1
TABLET, FILM COATED ORAL
Qty: 90 TABLET | Refills: 2 | Status: SHIPPED | OUTPATIENT
Start: 2018-10-16 | End: 2019-11-30 | Stop reason: SDUPTHER

## 2018-10-17 ENCOUNTER — TELEPHONE (OUTPATIENT)
Dept: FAMILY MEDICINE | Facility: CLINIC | Age: 68
End: 2018-10-17

## 2018-10-17 RX ORDER — AMOXICILLIN AND CLAVULANATE POTASSIUM 875; 125 MG/1; MG/1
1 TABLET, FILM COATED ORAL 2 TIMES DAILY
Qty: 14 TABLET | Refills: 0 | Status: SHIPPED | OUTPATIENT
Start: 2018-10-17 | End: 2018-10-24

## 2018-10-17 NOTE — TELEPHONE ENCOUNTER
----- Message from Cary Calles sent at 10/17/2018 12:30 PM CDT -----  Contact: self/[phone]  Pt called in regards to she receive ciprofloxacin HCl (CIPRO) 250 MG tablet 14 tablet 0 10/15/2018   Take 1 tablet (250 mg total) by mouth 2 (two) times daily. On Monday and it is not working. She wanted to know what's the next step.      Please advise

## 2018-10-18 LAB — BACTERIA UR CULT: NORMAL

## 2018-10-19 RX ORDER — CELECOXIB 200 MG/1
CAPSULE ORAL
Qty: 30 CAPSULE | Refills: 0 | Status: SHIPPED | OUTPATIENT
Start: 2018-10-19 | End: 2018-11-20 | Stop reason: SDUPTHER

## 2018-10-19 NOTE — TELEPHONE ENCOUNTER
Patient was advised that the culture came back sensitive to the the Augmentin. She states that she is starting to feel better. Mammogram order entered while on the phone & call transferred to schedule.

## 2018-10-23 ENCOUNTER — HOSPITAL ENCOUNTER (OUTPATIENT)
Dept: RADIOLOGY | Facility: HOSPITAL | Age: 68
Discharge: HOME | End: 2018-10-23
Attending: FAMILY MEDICINE
Payer: MEDICARE

## 2018-10-23 DIAGNOSIS — Z12.39 SCREENING BREAST EXAMINATION: ICD-10-CM

## 2018-10-23 PROCEDURE — 77063 BREAST TOMOSYNTHESIS BI: CPT | Mod: 26,,, | Performed by: RADIOLOGY

## 2018-10-23 PROCEDURE — 77067 SCR MAMMO BI INCL CAD: CPT | Mod: 26,,, | Performed by: RADIOLOGY

## 2018-10-23 PROCEDURE — 77063 BREAST TOMOSYNTHESIS BI: CPT | Mod: TC

## 2018-10-24 ENCOUNTER — OFFICE VISIT (OUTPATIENT)
Dept: FAMILY MEDICINE | Facility: CLINIC | Age: 68
End: 2018-10-24
Payer: MEDICARE

## 2018-10-24 VITALS
BODY MASS INDEX: 28.61 KG/M2 | HEART RATE: 76 BPM | HEIGHT: 62 IN | WEIGHT: 155.44 LBS | SYSTOLIC BLOOD PRESSURE: 124 MMHG | DIASTOLIC BLOOD PRESSURE: 70 MMHG | TEMPERATURE: 98 F

## 2018-10-24 DIAGNOSIS — L30.9 DERMATITIS: ICD-10-CM

## 2018-10-24 DIAGNOSIS — K43.9 VENTRAL HERNIA WITHOUT OBSTRUCTION OR GANGRENE: Primary | ICD-10-CM

## 2018-10-24 PROCEDURE — 99214 OFFICE O/P EST MOD 30 MIN: CPT | Mod: S$PBB,,, | Performed by: FAMILY MEDICINE

## 2018-10-24 PROCEDURE — 99999 PR PBB SHADOW E&M-EST. PATIENT-LVL III: CPT | Mod: PBBFAC,,, | Performed by: FAMILY MEDICINE

## 2018-10-24 PROCEDURE — 1101F PT FALLS ASSESS-DOCD LE1/YR: CPT | Mod: CPTII,,, | Performed by: FAMILY MEDICINE

## 2018-10-24 PROCEDURE — 99213 OFFICE O/P EST LOW 20 MIN: CPT | Mod: PBBFAC,PO | Performed by: FAMILY MEDICINE

## 2018-10-24 PROCEDURE — 3078F DIAST BP <80 MM HG: CPT | Mod: CPTII,,, | Performed by: FAMILY MEDICINE

## 2018-10-24 PROCEDURE — 3074F SYST BP LT 130 MM HG: CPT | Mod: CPTII,,, | Performed by: FAMILY MEDICINE

## 2018-10-24 RX ORDER — PERMETHRIN 50 MG/G
CREAM TOPICAL ONCE
Qty: 60 G | Refills: 0 | Status: SHIPPED | OUTPATIENT
Start: 2018-10-24 | End: 2018-10-24

## 2018-10-24 NOTE — PROGRESS NOTES
"Subjective:     Patient ID: Jolly Matias is a 68 y.o. female.    Chief Complaint: Hernia and Rash (both sides of back)    HPI pt presents for pain in the anterior abdomen above where she had her prior abdominal hernia repair last 8/2017.   She is having some mild pain with bending over and doing activities. She is moving her bowels and has no significant nausea or vomiting. No fever, no blood in her stools.   Also she is bothered with an itchy rash on her  Trunk about the bra line and about her upper arms posteriorly for the past month.   Seems to be there off and on during the day but rather persistently. No new soap or detergent, etc,.  Review of Systems  per HPI  Objective:      Physical Exam   Constitutional: She is oriented to person, place, and time. She appears well-developed and well-nourished.   Cardiovascular: Normal rate, regular rhythm, normal heart sounds and intact distal pulses.   Pulmonary/Chest: Effort normal and breath sounds normal.   Abdominal: Soft. Bowel sounds are normal. She exhibits mass. There is tenderness. A hernia is present.   Moderately large ventral hernia above the umbilicus and more to the right of the midline consistent with a ventral hernia.   Neurological: She is alert and oriented to person, place, and time.   Skin: Skin is warm and dry.   Small red papules about the bra line and near the underarms and upper arms - some with small "burrows"   No vesicles. ? None in webs of hands.    Nursing note and vitals reviewed.      Assessment:     Jolly was seen today for hernia and rash.    Diagnoses and all orders for this visit:    Ventral hernia without obstruction or gangrene  -     Ambulatory consult to General Surgery    Dermatitis  -     permethrin (ELIMITE) 5 % cream; Apply topically once. for 1 dose    notify me if not better.   "

## 2018-10-30 ENCOUNTER — HOSPITAL ENCOUNTER (OUTPATIENT)
Dept: RADIOLOGY | Facility: HOSPITAL | Age: 68
Discharge: HOME | End: 2018-10-30
Attending: FAMILY MEDICINE
Payer: MEDICARE

## 2018-10-30 ENCOUNTER — TELEPHONE (OUTPATIENT)
Dept: FAMILY MEDICINE | Facility: CLINIC | Age: 68
End: 2018-10-30

## 2018-10-30 DIAGNOSIS — Z91.89 AT HIGH RISK FOR BREAST CANCER: Primary | ICD-10-CM

## 2018-10-30 DIAGNOSIS — Z87.898 HX OF ABNORMAL MAMMOGRAM: ICD-10-CM

## 2018-10-30 PROCEDURE — 76642 ULTRASOUND BREAST LIMITED: CPT | Mod: 26,LT,, | Performed by: RADIOLOGY

## 2018-10-30 PROCEDURE — 76642 ULTRASOUND BREAST LIMITED: CPT | Mod: TC,PO,LT

## 2018-10-31 NOTE — TELEPHONE ENCOUNTER
Please notify Berenice that [facility] has a program for pts that are at high risk for breast cancer based on family history and the density of their breast tissue,.  Her reports do put her into the high risk category so I would recommend she consider being a part of that program. It involves seeing a breast surgeon for an exam and they usually recommend a breast MRI .  Would she like me to put yoko referral?

## 2018-10-31 NOTE — TELEPHONE ENCOUNTER
Patient has been informed, and verbalized understanding. She would like for the referral to be entered.

## 2018-11-07 ENCOUNTER — OFFICE VISIT (OUTPATIENT)
Dept: SURGERY | Facility: CLINIC | Age: 68
End: 2018-11-07
Payer: MEDICARE

## 2018-11-07 VITALS
BODY MASS INDEX: 29.41 KG/M2 | SYSTOLIC BLOOD PRESSURE: 162 MMHG | TEMPERATURE: 98 F | DIASTOLIC BLOOD PRESSURE: 74 MMHG | HEIGHT: 61 IN | HEART RATE: 76 BPM | WEIGHT: 155.75 LBS

## 2018-11-07 DIAGNOSIS — R10.9 ABDOMINAL PAIN, UNSPECIFIED ABDOMINAL LOCATION: Primary | ICD-10-CM

## 2018-11-07 PROCEDURE — 3078F DIAST BP <80 MM HG: CPT | Mod: CPTII,S$GLB,, | Performed by: SURGERY

## 2018-11-07 PROCEDURE — 3077F SYST BP >= 140 MM HG: CPT | Mod: CPTII,S$GLB,, | Performed by: SURGERY

## 2018-11-07 PROCEDURE — 99213 OFFICE O/P EST LOW 20 MIN: CPT | Mod: S$GLB,,, | Performed by: SURGERY

## 2018-11-07 PROCEDURE — 3288F FALL RISK ASSESSMENT DOCD: CPT | Mod: CPTII,S$GLB,, | Performed by: SURGERY

## 2018-11-07 PROCEDURE — 1100F PTFALLS ASSESS-DOCD GE2>/YR: CPT | Mod: CPTII,S$GLB,, | Performed by: SURGERY

## 2018-11-07 PROCEDURE — 99999 PR PBB SHADOW E&M-EST. PATIENT-LVL III: CPT | Mod: PBBFAC,,, | Performed by: SURGERY

## 2018-11-07 RX ORDER — PERMETHRIN 50 MG/G
CREAM TOPICAL
Refills: 0 | Status: ON HOLD | COMMUNITY
Start: 2018-10-24 | End: 2020-10-29 | Stop reason: CLARIF

## 2018-11-07 NOTE — LETTER
November 7, 2018      Felicity Lee, DO  [address]  [address]  [facility] [address]           [facility] - General Surgery  [address]  [place] [address]  Phone: [phone]          Patient: Jolly Matias   MR Number: [id number]   YOB: 1950   Date of Visit: 11/7/2018       Dear Dr. Felicity Lee:    Thank you for referring Jolly Matias to me for evaluation. Attached you will find relevant portions of my assessment and plan of care.    If you have questions, please do not hesitate to call me. I look forward to following Jolly Matias along with you.    Sincerely,    Yg Haile Jr., MD    Enclosure  CC:  No Recipients    If you would like to receive this communication electronically, please contact [email or web address] or [phone] to request more information on [facility] access.    For providers and/or their staff who would like to refer a patient to [facility], please contact us through our one-stop-shop provider referral line, [facility], at [phone].    If you feel you have received this communication in error or would no longer like to receive these types of communications, please e-mail [email or web address]

## 2018-11-07 NOTE — PROGRESS NOTES
"History & Physical    SUBJECTIVE:     Chief Complaint; Uncomfortable sensation above previous ventral hernia repair    History of Present Illness:  Patient is a 68 y.o. female who underwent Robotic Ventral Hernia Repair with Mesh in 08/2017. She tolerated the procedure well and noted she did well after surgery. However, a couple of months ago she began to notice an uncomfortable sensation above her umbilicus. She denied pain to the area, but states that the uncomfortable sensation has become more prominent over time. The sensation is most pronounced when bending over, and she feels there is an associated lump to the area that is getting bigger over time. She also has noticed her bowel movements are less formed over the past month, although she denies nausea, vomiting, fevers, chills, or bloody bowel movements. Also denies any recent events that could have led to hernia occurrence, including recent staining, coughing episodes, etc. Is not on any ASA or blood thinners.       Review of patient's allergies indicates:   Allergen Reactions    Hydrocodone      Also makes pt "very sleepy"    Kiwi (actinidia chinensis) Swelling       Current Outpatient Medications   Medication Sig Dispense Refill    albuterol 90 mcg/actuation inhaler Inhale 2 puffs into the lungs every 6 (six) hours as needed for Wheezing. Rescue 18 g 0    atorvastatin (LIPITOR) 20 MG tablet Take 1 tablet (20 mg total) by mouth once daily. 90 tablet 2    celecoxib (CELEBREX) 200 MG capsule TAKE ONE CAPSULE BY MOUTH ONCE DAILY 30 capsule 0    ciprofloxacin HCl (CIPRO) 250 MG tablet Take 1 tablet (250 mg total) by mouth 2 (two) times daily. 14 tablet 0    cyanocobalamin (VITAMIN B-12) 1000 MCG tablet Take 100 mcg by mouth once daily.      flaxseed oil 1,000 mg Cap Take 1 capsule by mouth once daily.        fluocinonide (LIDEX) 0.05 % external solution AAA scalp qday - bid prn pruritus 60 mL 3    losartan (COZAAR) 100 MG tablet 100 mg once daily.    "    metFORMIN (GLUCOPHAGE) 500 MG tablet TAKE 1 TABLET(500 MG) BY MOUTH TWICE DAILY WITH MEALS 180 tablet 2    nystatin (MYCOSTATIN) powder Apply topically 2 (two) times daily. aaa qd- bid prn flare groin, under arm, under breasts 120 g 2    permethrin (ELIMITE) 5 % cream ADA EXT AA 1 TIME FOR 1 DOSE  0    sertraline (ZOLOFT) 100 MG tablet TAKE 1 TABLET(100 MG) BY MOUTH EVERY DAY 90 tablet 2    spironolactone (ALDACTONE) 25 MG tablet TAKE 1 TABLET(25 MG) BY MOUTH EVERY DAY (Patient taking differently: TAKE 1 TABLET(25 MG) BY MOUTH EVERY DAY  Hold the day of surgery) 90 tablet 1    spironolactone (ALDACTONE) 25 MG tablet TAKE 1 TABLET(25 MG) BY MOUTH EVERY DAY 90 tablet 1    vitamin D 1000 units Tab Take 185 mg by mouth once daily.      aspirin (ECOTRIN) 325 MG EC tablet Take 1 tablet (325 mg total) by mouth 2 (two) times daily. for 20 days 28 tablet 0    FLUZONE HIGH-DOSE 2018-19, PF, 180 mcg/0.5 mL vaccine ADMINISTER 0.5ML IN THE MUSCLE AS DIRECTED  0     No current facility-administered medications for this visit.        Past Medical History:   Diagnosis Date    Atypical ductal hyperplasia, breast     Basal cell carcinoma 4/2011    left forehead    Basal cell carcinoma 4/2015    R temporal scalp, R upper forehead, L upper forehead    Basal cell carcinoma 8/2015    right mid ala    BCC (basal cell carcinoma) excised     right shoulder    Diabetes mellitus     Diverticulitis     Fibrocystic breast     HLD (hyperlipidemia)     Hypertension     Prediabetes 10/16/2013    Skin cancer      Past Surgical History:   Procedure Laterality Date    APPENDECTOMY      ARTHROSCOPY, KNEE, WITH DEBRIDEMENT Right 7/6/2018    [facility] 1ST FLR    ARTHROSCOPY, KNEE, WITH MENISCECTOMY (partial lateral and medial menisectomy, chondraplasty Right 7/6/2018    [facility] at [facility] 1ST FLR    bilateral breast duct excision      BREAST BIOPSY Right      Excisional bx, benign    BREAST BIOPSY Left     Excisional bx, benign     SECTION      x2    COLON SURGERY Left 2017    sigmoidectomy for diverticulitis    COLONOSCOPY N/A 2017    Procedure: COLONOSCOPY;  Surgeon: IBRAHIMA Philip MD;  Location: [facility] (4TH FLR);  Service: Endoscopy;  Laterality: N/A;    COLONOSCOPY N/A 2017    Performed by IBRAHIMA Philip MD at [facility] (4TH FLR)    ESOPHAGOGASTRODUODENOSCOPY (EGD) N/A 2016    Performed by Skip Irwin MD at [facility] (4TH FLR)    EXCISION, PLICA, KNEE, ARTHROSCOPIC, Right 2018    [facility] at [facility] OR [address] [place] [address]    fulgaration of endometriosis x 2      HYSTERECTOMY      For endometriosis and DUB--age 42, ovaries in?    INCISIONAL HERNIA REPAIR  2017    KNEE ARTHROSCOPY W/ DEBRIDEMENT Right 2018    Procedure: ARTHROSCOPY, KNEE, WITH DEBRIDEMENT;  Surgeon: MARVA Arias MD;  Location: [facility] OR [address] [place] [address];  Service: Orthopedics;  Laterality: Right;    KNEE ARTHROSCOPY W/ MENISCECTOMY Right 2018    Procedure: ARTHROSCOPY, KNEE, WITH MENISCECTOMY (partial lateral and medial menisectomy, chondraplasty;  Surgeon: MARVA Arias MD;  Location: [facility] OR [address] [place] [address];  Service: Orthopedics;  Laterality: Right;    KNEE ARTHROSCOPY W/ PLICA EXCISION Right 2018    Procedure: EXCISION, PLICA, KNEE, ARTHROSCOPIC,;  Surgeon: MARVA Arias MD;  Location: [facility] OR [address] [place] [address];  Service: Orthopedics;  Laterality: Right;    KNEE SURGERY      OOPHORECTOMY      REPAIR-HERNIA-INCISIONAL-ROBOTIC-XI N/A 2017    Performed by Yg Haile Jr., MD at [facility] OR 2ND FLR    SIGMOIDECTOMY-LAPAROSCOPIC N/A 2017    Performed by Jorgito Juarez MD at [facility] OR 2ND [facility]    SIGMOIDOSCOPY-FLEXIBLE  2017    Performed by [facility]org[facility] MD at [facility] OR [address] [place] [address]    SKIN CANCER EXCISION      BCC forehead , temple, shoulder, chest    TONSILLECTOMY       Family History   Problem Relation Age of  "Onset    Skin cancer Father     Hypertension Father     Heart disease Father     Diabetes Father     Melanoma Father     Cancer Father     Skin cancer Mother     Hypertension Mother     Thyroid disease Mother     Dementia Mother     Hypertension Sister     Hyperlipidemia Sister     Anxiety disorder Daughter     Hypertension Son     Hyperlipidemia Son     Breast cancer Maternal Aunt     Diabetes Maternal Uncle      Social History     Tobacco Use    Smoking status: Never Smoker    Smokeless tobacco: Never Used   Substance Use Topics    Alcohol use: Yes     Alcohol/week: 0.0 oz     Types: 1 - 2 Glasses of wine per week     Comment: 3-4x /week    Drug use: No        Review of Systems:  Review of Systems   Constitutional: Negative for chills, fatigue, fever and unexpected weight change.   Respiratory: Negative for cough and shortness of breath.    Cardiovascular: Negative for chest pain and palpitations.   Gastrointestinal: Negative for abdominal distention, abdominal pain, blood in stool, constipation, diarrhea, nausea and vomiting.   Musculoskeletal: Negative for arthralgias and myalgias.   Skin: Negative for color change and rash.   Neurological: Negative for seizures and headaches.   Hematological: Does not bruise/bleed easily.   Psychiatric/Behavioral: Negative for confusion. The patient is not nervous/anxious.        OBJECTIVE:     Vital Signs (Most Recent)  Temp: 97.9 °F (36.6 °C) (11/07/18 1126)  Pulse: 76 (11/07/18 1126)  BP: (!) 162/74 (11/07/18 1126)  5' 1" (1.549 m)  70.7 kg (155 lb 12.1 oz)     Physical Exam:  Physical Exam   Constitutional: She is oriented to person, place, and time. She appears well-developed and well-nourished. No distress.   HENT:   Head: Normocephalic and atraumatic.   Eyes: Conjunctivae are normal. Pupils are equal, round, and reactive to light.   Neck: Normal range of motion. Neck supple.   Cardiovascular: Normal rate and regular rhythm.   Pulmonary/Chest: Effort " normal. No respiratory distress.   Abdominal: Soft. Bowel sounds are normal. She exhibits no distension. No hernia (no palpable hernia on exam).   Musculoskeletal: Normal range of motion. She exhibits no edema.   Neurological: She is alert and oriented to person, place, and time.   Skin: Skin is warm and dry.   Psychiatric: She has a normal mood and affect. Her behavior is normal. Judgment normal.       Laboratory  Labs Reviewed.    Diagnostic Results:  Diagnostics Reviewed.    ASSESSMENT/PLAN:     Ms. Jolly Matias is a 69 yo female who presents for evaluation of possible recurrent ventral hernia.     PLAN:  -Will order CT abdomen/pelvis with oral contrast to better characterize abdominal wall; hernia not well characterized/not palpated on physical exam.  -Pending results will communicate with patient further plan.          Domonique Llanos MD  General/Bariatric Surgery-[facility]    I have personally taken the history and examined this patient and agree with the resident's note as stated above.         Yg Haile MD

## 2018-11-14 ENCOUNTER — HOSPITAL ENCOUNTER (OUTPATIENT)
Dept: RADIOLOGY | Facility: HOSPITAL | Age: 68
Discharge: HOME | End: 2018-11-14
Attending: SURGERY
Payer: MEDICARE

## 2018-11-14 DIAGNOSIS — R10.9 ABDOMINAL PAIN, UNSPECIFIED ABDOMINAL LOCATION: ICD-10-CM

## 2018-11-14 PROCEDURE — 74176 CT ABD & PELVIS W/O CONTRAST: CPT | Mod: TC

## 2018-11-14 PROCEDURE — 74176 CT ABD & PELVIS W/O CONTRAST: CPT | Mod: 26,,, | Performed by: RADIOLOGY

## 2018-11-20 RX ORDER — CELECOXIB 200 MG/1
CAPSULE ORAL
Qty: 30 CAPSULE | Refills: 0 | Status: SHIPPED | OUTPATIENT
Start: 2018-11-20 | End: 2018-12-21 | Stop reason: SDUPTHER

## 2018-11-26 ENCOUNTER — TELEPHONE (OUTPATIENT)
Dept: DERMATOLOGY | Facility: CLINIC | Age: 68
End: 2018-11-26

## 2018-11-26 NOTE — TELEPHONE ENCOUNTER
----- Message from Beverly Roblero sent at 11/26/2018  9:46 AM CST -----  Contact: Self  Ms. Matias called to speak with someone regarding her symptoms for the rash under her armpits.      She was first seen on 09/24/18 by Dr. Canchola for the same reason and it doesn't seem to be clearing.  There were no available appointments with Dr. Mohr until 02/09/18 and she requested to see Dr. Mohr only.    Ms. Matias mentioned that she is starting to see some hives/welts in this area as well associated with the irritation.    She also mentioned that she noticed that what looks to be a small irritated bump near a crack at the L corner of her mouth.  She's unsure if it's a fever blister.     Please advise.    Ms. Matias can be reached at [phone]) regarding this message.    Thanks.

## 2018-11-29 ENCOUNTER — OFFICE VISIT (OUTPATIENT)
Dept: DERMATOLOGY | Facility: CLINIC | Age: 68
End: 2018-11-29
Payer: MEDICARE

## 2018-11-29 ENCOUNTER — TELEPHONE (OUTPATIENT)
Dept: SURGERY | Facility: CLINIC | Age: 68
End: 2018-11-29

## 2018-11-29 DIAGNOSIS — B00.9 HERPES SIMPLEX: ICD-10-CM

## 2018-11-29 DIAGNOSIS — K13.0 LIP DISEASE: ICD-10-CM

## 2018-11-29 DIAGNOSIS — L30.9 DERMATITIS: Primary | ICD-10-CM

## 2018-11-29 PROCEDURE — 99999 PR PBB SHADOW E&M-EST. PATIENT-LVL II: CPT | Mod: PBBFAC,,, | Performed by: DERMATOLOGY

## 2018-11-29 PROCEDURE — 99213 OFFICE O/P EST LOW 20 MIN: CPT | Mod: S$GLB,,, | Performed by: DERMATOLOGY

## 2018-11-29 PROCEDURE — 1101F PT FALLS ASSESS-DOCD LE1/YR: CPT | Mod: CPTII,S$GLB,, | Performed by: DERMATOLOGY

## 2018-11-29 RX ORDER — TRIAMCINOLONE ACETONIDE 1 MG/G
CREAM TOPICAL
Qty: 60 G | Refills: 1 | Status: ON HOLD | OUTPATIENT
Start: 2018-11-29 | End: 2020-10-29 | Stop reason: CLARIF

## 2018-11-29 RX ORDER — KETOCONAZOLE 20 MG/G
CREAM TOPICAL
Qty: 30 G | Refills: 3 | Status: ON HOLD | OUTPATIENT
Start: 2018-11-29 | End: 2020-10-29 | Stop reason: CLARIF

## 2018-11-29 RX ORDER — VALACYCLOVIR HYDROCHLORIDE 1 G/1
2 TABLET, FILM COATED ORAL EVERY 12 HOURS
Qty: 12 TABLET | Refills: 5 | Status: SHIPPED | OUTPATIENT
Start: 2018-11-29 | End: 2020-09-08

## 2018-11-29 NOTE — TELEPHONE ENCOUNTER
----- Message from Yg Haile Jr., MD sent at 11/21/2018  7:24 PM CST -----  May be a small recurrence.  Can I see her back to discuss.

## 2018-11-29 NOTE — PROGRESS NOTES
Subjective:       Patient ID:  Jolly Matias is a 68 y.o. female who presents for   Chief Complaint   Patient presents with    Rash     Pt c/o itchy rash on back x a few months. Tx with OTC eczema calming cream. Minimal improvement. Also saw pcp in October and was tx for scabies.,  Used elimite x 2. No known scabies exposures. Pt does admit to recent significant stress. No one at home itching.  )  Also has painful rash on left corner of  Mouth for 5 days.  Has remote hx of fever blisters. None recently.            Review of Systems   Constitutional: Negative for fever and chills.   Skin: Positive for itching, rash and dry skin.   Psychiatric/Behavioral: Positive for anxiety and high stress.        Objective:    Physical Exam   Constitutional: She appears well-developed and well-nourished. No distress.   Neurological: She is alert and oriented to person, place, and time. She is not disoriented.   Psychiatric: She has a normal mood and affect.   Skin:   Areas Examined (abnormalities noted in diagram):   Head / Face Inspection Performed  Neck Inspection Performed  Chest / Axilla Inspection Performed  Back Inspection Performed  RUE Inspected  LUE Inspection Performed  Nails and Digits Inspection Performed                   Diagram Legend     Erythematous scaling macule/papule c/w actinic keratosis       Vascular papule c/w angioma      Pigmented verrucoid papule/plaque c/w seborrheic keratosis      Yellow umbilicated papule c/w sebaceous hyperplasia      Irregularly shaped tan macule c/w lentigo     1-2 mm smooth white papules consistent with Milia      Movable subcutaneous cyst with punctum c/w epidermal inclusion cyst      Subcutaneous movable cyst c/w pilar cyst      Firm pink to brown papule c/w dermatofibroma      Pedunculated fleshy papule(s) c/w skin tag(s)      Evenly pigmented macule c/w junctional nevus     Mildly variegated pigmented, slightly irregular-bordered macule c/w mildly atypical nevus      Flesh  colored to evenly pigmented papule c/w intradermal nevus       Pink pearly papule/plaque c/w basal cell carcinoma      Erythematous hyperkeratotic cursted plaque c/w SCC      Surgical scar with no sign of skin cancer recurrence      Open and closed comedones      Inflammatory papules and pustules      Verrucoid papule consistent consistent with wart     Erythematous eczematous patches and plaques     Dystrophic onycholytic nail with subungual debris c/w onychomycosis     Umbilicated papule    Erythematous-base heme-crusted tan verrucoid plaque consistent with inflamed seborrheic keratosis     Erythematous Silvery Scaling Plaque c/w Psoriasis     See annotation      Assessment / Plan:        Dermatitis  -     triamcinolone acetonide 0.1% (KENALOG) 0.1 % cream; AAA bid  Dispense: 60 g; Refill: 1  Discussed with patient good skin care regimen including avoiding fragranced products and very hot showers.  Recommended dove sensitive skin bar soap or cerave hydrating cleanser or bar.  Recommend Cerave cream  For moisturization daily -2x daily.   Scabies prep neg today     Lip disease/angular chelitis  -     ketoconazole (NIZORAL) 2 % cream; AAA in corners of mouth qhs  Dispense: 30 g; Refill: 3    Herpes simplex  -     valACYclovir (VALTREX) 1000 MG tablet; Take 2 tablets (2,000 mg total) by mouth every 12 (twelve) hours. For 1-2 days at first sign of flare  Dispense: 12 tablet; Refill: 5             Follow-up in about 3 months (around 2/28/2019) for skin check, sooner if rash does not resolve.

## 2018-12-12 ENCOUNTER — OFFICE VISIT (OUTPATIENT)
Dept: SURGERY | Facility: CLINIC | Age: 68
End: 2018-12-12
Payer: MEDICARE

## 2018-12-12 VITALS
WEIGHT: 158.81 LBS | HEIGHT: 61 IN | BODY MASS INDEX: 29.98 KG/M2 | DIASTOLIC BLOOD PRESSURE: 77 MMHG | SYSTOLIC BLOOD PRESSURE: 169 MMHG | TEMPERATURE: 98 F | HEART RATE: 80 BPM

## 2018-12-12 DIAGNOSIS — K43.9 EPIGASTRIC HERNIA: Primary | ICD-10-CM

## 2018-12-12 PROCEDURE — 1101F PT FALLS ASSESS-DOCD LE1/YR: CPT | Mod: CPTII,S$GLB,, | Performed by: SURGERY

## 2018-12-12 PROCEDURE — 99212 OFFICE O/P EST SF 10 MIN: CPT | Mod: S$GLB,,, | Performed by: SURGERY

## 2018-12-12 PROCEDURE — 99999 PR PBB SHADOW E&M-EST. PATIENT-LVL III: CPT | Mod: PBBFAC,,, | Performed by: SURGERY

## 2018-12-12 PROCEDURE — 3078F DIAST BP <80 MM HG: CPT | Mod: CPTII,S$GLB,, | Performed by: SURGERY

## 2018-12-12 PROCEDURE — 3077F SYST BP >= 140 MM HG: CPT | Mod: CPTII,S$GLB,, | Performed by: SURGERY

## 2018-12-12 NOTE — PROGRESS NOTES
HPI:  The patient is status post-robotic hernia repair in 2017.  Overall doing well but noticed a bulge above her previous repair.  CT shows an epigastric hernia.  No pain but mild discomfort.    PHYSICAL EXAM:  Physical Exam    In NAD  Small bulge in upper midline.  Non tender      ASSESSMENT:    Small epigastric hernia     PLAN:    Pt would like to wait on surgery as this is not bothering her  Will call if patient would like to proceed with surgery.  Would proceed with open repair of epigastric hernia.          Yg Haile MD

## 2018-12-20 DIAGNOSIS — E78.00 PURE HYPERCHOLESTEROLEMIA: ICD-10-CM

## 2018-12-20 RX ORDER — ATORVASTATIN CALCIUM 20 MG/1
TABLET, FILM COATED ORAL
Qty: 90 TABLET | Refills: 1 | Status: SHIPPED | OUTPATIENT
Start: 2018-12-20 | End: 2019-08-02 | Stop reason: SDUPTHER

## 2018-12-21 DIAGNOSIS — F41.1 GENERALIZED ANXIETY DISORDER: ICD-10-CM

## 2018-12-21 RX ORDER — CELECOXIB 200 MG/1
CAPSULE ORAL
Qty: 30 CAPSULE | Refills: 0 | Status: SHIPPED | OUTPATIENT
Start: 2018-12-21 | End: 2019-04-09 | Stop reason: SDUPTHER

## 2018-12-21 RX ORDER — SERTRALINE HYDROCHLORIDE 50 MG/1
TABLET, FILM COATED ORAL
Qty: 90 TABLET | Refills: 1 | Status: SHIPPED | OUTPATIENT
Start: 2018-12-21 | End: 2019-08-20 | Stop reason: SDUPTHER

## 2019-01-30 NOTE — PROGRESS NOTES
Subjective:       Patient ID: Jolly Matias is a 68 y.o. female.    Chief Complaint: Pre-op Exam    Patient is a 68 y.o.female who presents today for preop for cataract surgery with dr. salcedo.      She recently changed from losartan to olmesartan.    Denies CP/nausea/MI last 3 months, denies heart failure, denies arrhythmias, denies valvular heart disease.    RCRI criteria: denies IDDM, denies CAD, denies CVA, denies chronic renal insufficiency, denies heart failure, denies high risk surgery    Functional status: good    Bleeding risk - history of bleeding with prior surgeries - none, family history of bleeding disorders - none    History of prior anesthetic complications none      Review of Systems   Constitutional: Negative for appetite change, chills, diaphoresis, fatigue and fever.   HENT: Negative for congestion, dental problem, ear discharge, ear pain, hearing loss, postnasal drip, sinus pressure and sore throat.    Eyes: Negative for discharge, redness and itching.   Respiratory: Negative for cough, chest tightness, shortness of breath and wheezing.    Cardiovascular: Negative for chest pain, palpitations and leg swelling.   Gastrointestinal: Negative for abdominal pain, constipation, diarrhea, nausea and vomiting.   Endocrine: Negative for cold intolerance and heat intolerance.   Genitourinary: Negative for difficulty urinating, frequency, hematuria and urgency.   Musculoskeletal: Negative for arthralgias, back pain, gait problem, myalgias and neck pain.   Skin: Negative for color change and rash.   Neurological: Negative for dizziness, syncope and headaches.   Hematological: Negative for adenopathy.   Psychiatric/Behavioral: Negative for behavioral problems and sleep disturbance. The patient is not nervous/anxious.        Objective:      Physical Exam   Constitutional: She is oriented to person, place, and time. She appears well-developed and well-nourished. No distress.   HENT:   Head: Normocephalic  and atraumatic.   Right Ear: External ear normal.   Left Ear: External ear normal.   Nose: Nose normal.   Mouth/Throat: Oropharynx is clear and moist. No oropharyngeal exudate.   Eyes: Conjunctivae and EOM are normal. Pupils are equal, round, and reactive to light. Right eye exhibits no discharge. Left eye exhibits no discharge. No scleral icterus.   Neck: Normal range of motion. Neck supple. No JVD present. No thyromegaly present.   Cardiovascular: Normal rate, regular rhythm, normal heart sounds and intact distal pulses. Exam reveals no gallop and no friction rub.   No murmur heard.  Pulses:       Dorsalis pedis pulses are 2+ on the right side, and 2+ on the left side.        Posterior tibial pulses are 2+ on the right side, and 2+ on the left side.   Pulmonary/Chest: Effort normal and breath sounds normal. No stridor. No respiratory distress. She has no wheezes. She has no rales. She exhibits no tenderness.   Abdominal: Soft. Bowel sounds are normal. She exhibits no distension. There is no tenderness. There is no rebound.   Musculoskeletal: Normal range of motion. She exhibits no edema or tenderness.        Right foot: There is normal range of motion and no deformity.        Left foot: There is normal range of motion and no deformity.   Feet:   Right Foot:   Protective Sensation: 3 sites tested. 3 sites sensed.   Skin Integrity: Negative for ulcer, blister, skin breakdown, erythema, warmth, callus or dry skin.   Left Foot:   Protective Sensation: 3 sites tested. 3 sites sensed.   Skin Integrity: Negative for ulcer, blister, skin breakdown, erythema, warmth, callus or dry skin.   Lymphadenopathy:     She has no cervical adenopathy.   Neurological: She is alert and oriented to person, place, and time. No cranial nerve deficit.   Skin: Skin is warm and dry. No rash noted. She is not diaphoretic. No erythema.   Psychiatric: She has a normal mood and affect. Her behavior is normal.   Nursing note and vitals  reviewed.      Assessment and Plan:       1. Pre-op evaluation  - Hemoglobin A1c; Future  - CBC auto differential; Future  - Comprehensive metabolic panel; Future  - EKG 12-lead\  - No contraindications to anesthesia or surgery at this time.    2. Type 2 diabetes mellitus without complication, unspecified whether long term insulin use  - on meformin  - Hemoglobin A1c; Future  - CBC auto differential; Future  - Comprehensive metabolic panel; Future  - EKG 12-lead    3. Prediabetes  - on metformin  - Hemoglobin A1c; Future  - CBC auto differential; Future  - Comprehensive metabolic panel; Future  - EKG 12-lead    4. Essential hypertension  - stable on meds  - Hemoglobin A1c; Future  - CBC auto differential; Future  - Comprehensive metabolic panel; Future  - EKG 12-lead    5. Pure hypercholesterolemia  - on statin  - Hemoglobin A1c; Future  - CBC auto differential; Future  - Comprehensive metabolic panel; Future  - EKG 12-lead          No Follow-up on file.

## 2019-02-06 ENCOUNTER — LAB VISIT (OUTPATIENT)
Dept: LAB | Facility: HOSPITAL | Age: 69
End: 2019-02-06
Attending: INTERNAL MEDICINE
Payer: MEDICARE

## 2019-02-06 ENCOUNTER — OFFICE VISIT (OUTPATIENT)
Dept: INTERNAL MEDICINE | Facility: CLINIC | Age: 69
End: 2019-02-06
Payer: MEDICARE

## 2019-02-06 VITALS
RESPIRATION RATE: 16 BRPM | TEMPERATURE: 98 F | HEART RATE: 80 BPM | WEIGHT: 157.44 LBS | DIASTOLIC BLOOD PRESSURE: 78 MMHG | SYSTOLIC BLOOD PRESSURE: 118 MMHG | HEIGHT: 61 IN | BODY MASS INDEX: 29.72 KG/M2

## 2019-02-06 DIAGNOSIS — R73.03 PREDIABETES: ICD-10-CM

## 2019-02-06 DIAGNOSIS — Z01.818 PRE-OP EVALUATION: Primary | ICD-10-CM

## 2019-02-06 DIAGNOSIS — E78.00 PURE HYPERCHOLESTEROLEMIA: ICD-10-CM

## 2019-02-06 DIAGNOSIS — E11.9 TYPE 2 DIABETES MELLITUS WITHOUT COMPLICATION, UNSPECIFIED WHETHER LONG TERM INSULIN USE: ICD-10-CM

## 2019-02-06 DIAGNOSIS — Z01.818 PRE-OP EVALUATION: ICD-10-CM

## 2019-02-06 DIAGNOSIS — I10 ESSENTIAL HYPERTENSION: ICD-10-CM

## 2019-02-06 LAB
ALBUMIN SERPL BCP-MCNC: 4 G/DL
ALP SERPL-CCNC: 107 U/L
ALT SERPL W/O P-5'-P-CCNC: 10 U/L
ANION GAP SERPL CALC-SCNC: 8 MMOL/L
AST SERPL-CCNC: 13 U/L
BASOPHILS # BLD AUTO: 0.07 K/UL
BASOPHILS NFR BLD: 1.2 %
BILIRUB SERPL-MCNC: 0.4 MG/DL
BUN SERPL-MCNC: 20 MG/DL
CALCIUM SERPL-MCNC: 10.1 MG/DL
CHLORIDE SERPL-SCNC: 101 MMOL/L
CO2 SERPL-SCNC: 26 MMOL/L
CREAT SERPL-MCNC: 0.9 MG/DL
DIFFERENTIAL METHOD: ABNORMAL
EOSINOPHIL # BLD AUTO: 0.4 K/UL
EOSINOPHIL NFR BLD: 7.8 %
ERYTHROCYTE [DISTWIDTH] IN BLOOD BY AUTOMATED COUNT: 13.1 %
EST. GFR  (AFRICAN AMERICAN): >60 ML/MIN/1.73 M^2
EST. GFR  (NON AFRICAN AMERICAN): >60 ML/MIN/1.73 M^2
ESTIMATED AVG GLUCOSE: 128 MG/DL
GLUCOSE SERPL-MCNC: 106 MG/DL
HBA1C MFR BLD HPLC: 6.1 %
HCT VFR BLD AUTO: 37.6 %
HGB BLD-MCNC: 11.9 G/DL
IMM GRANULOCYTES # BLD AUTO: 0.02 K/UL
IMM GRANULOCYTES NFR BLD AUTO: 0.4 %
LYMPHOCYTES # BLD AUTO: 0.9 K/UL
LYMPHOCYTES NFR BLD: 15.1 %
MCH RBC QN AUTO: 29.3 PG
MCHC RBC AUTO-ENTMCNC: 31.6 G/DL
MCV RBC AUTO: 93 FL
MONOCYTES # BLD AUTO: 0.5 K/UL
MONOCYTES NFR BLD: 8.5 %
NEUTROPHILS # BLD AUTO: 3.8 K/UL
NEUTROPHILS NFR BLD: 67 %
NRBC BLD-RTO: 0 /100 WBC
PLATELET # BLD AUTO: 259 K/UL
PMV BLD AUTO: 10.4 FL
POTASSIUM SERPL-SCNC: 4.3 MMOL/L
PROT SERPL-MCNC: 7.1 G/DL
RBC # BLD AUTO: 4.06 M/UL
SODIUM SERPL-SCNC: 135 MMOL/L
WBC # BLD AUTO: 5.64 K/UL

## 2019-02-06 PROCEDURE — 36415 COLL VENOUS BLD VENIPUNCTURE: CPT | Mod: PO

## 2019-02-06 PROCEDURE — 3074F PR MOST RECENT SYSTOLIC BLOOD PRESSURE < 130 MM HG: ICD-10-PCS | Mod: CPTII,S$GLB,, | Performed by: INTERNAL MEDICINE

## 2019-02-06 PROCEDURE — 83036 HEMOGLOBIN GLYCOSYLATED A1C: CPT

## 2019-02-06 PROCEDURE — 99999 PR PBB SHADOW E&M-EST. PATIENT-LVL III: CPT | Mod: PBBFAC,,, | Performed by: INTERNAL MEDICINE

## 2019-02-06 PROCEDURE — 3078F DIAST BP <80 MM HG: CPT | Mod: CPTII,S$GLB,, | Performed by: INTERNAL MEDICINE

## 2019-02-06 PROCEDURE — 99499 UNLISTED E&M SERVICE: CPT | Mod: S$GLB,,, | Performed by: INTERNAL MEDICINE

## 2019-02-06 PROCEDURE — 3044F HG A1C LEVEL LT 7.0%: CPT | Mod: CPTII,S$GLB,, | Performed by: INTERNAL MEDICINE

## 2019-02-06 PROCEDURE — 3044F PR MOST RECENT HEMOGLOBIN A1C LEVEL <7.0%: ICD-10-PCS | Mod: CPTII,S$GLB,, | Performed by: INTERNAL MEDICINE

## 2019-02-06 PROCEDURE — 85025 COMPLETE CBC W/AUTO DIFF WBC: CPT

## 2019-02-06 PROCEDURE — 93000 ELECTROCARDIOGRAM COMPLETE: CPT | Mod: S$GLB,,, | Performed by: INTERNAL MEDICINE

## 2019-02-06 PROCEDURE — 93000 EKG 12-LEAD: ICD-10-PCS | Mod: S$GLB,,, | Performed by: INTERNAL MEDICINE

## 2019-02-06 PROCEDURE — 99214 PR OFFICE/OUTPT VISIT, EST, LEVL IV, 30-39 MIN: ICD-10-PCS | Mod: S$GLB,,, | Performed by: INTERNAL MEDICINE

## 2019-02-06 PROCEDURE — 99499 RISK ADDL DX/OHS AUDIT: ICD-10-PCS | Mod: S$GLB,,, | Performed by: INTERNAL MEDICINE

## 2019-02-06 PROCEDURE — 99999 PR PBB SHADOW E&M-EST. PATIENT-LVL III: ICD-10-PCS | Mod: PBBFAC,,, | Performed by: INTERNAL MEDICINE

## 2019-02-06 PROCEDURE — 3074F SYST BP LT 130 MM HG: CPT | Mod: CPTII,S$GLB,, | Performed by: INTERNAL MEDICINE

## 2019-02-06 PROCEDURE — 99214 OFFICE O/P EST MOD 30 MIN: CPT | Mod: S$GLB,,, | Performed by: INTERNAL MEDICINE

## 2019-02-06 PROCEDURE — 1101F PT FALLS ASSESS-DOCD LE1/YR: CPT | Mod: CPTII,S$GLB,, | Performed by: INTERNAL MEDICINE

## 2019-02-06 PROCEDURE — 80053 COMPREHEN METABOLIC PANEL: CPT

## 2019-02-06 PROCEDURE — 1101F PR PT FALLS ASSESS DOC 0-1 FALLS W/OUT INJ PAST YR: ICD-10-PCS | Mod: CPTII,S$GLB,, | Performed by: INTERNAL MEDICINE

## 2019-02-06 PROCEDURE — 3078F PR MOST RECENT DIASTOLIC BLOOD PRESSURE < 80 MM HG: ICD-10-PCS | Mod: CPTII,S$GLB,, | Performed by: INTERNAL MEDICINE

## 2019-02-06 RX ORDER — OLMESARTAN MEDOXOMIL 40 MG/1
40 TABLET ORAL DAILY
COMMUNITY
End: 2020-02-19 | Stop reason: SDUPTHER

## 2019-02-07 ENCOUNTER — TELEPHONE (OUTPATIENT)
Dept: INTERNAL MEDICINE | Facility: CLINIC | Age: 69
End: 2019-02-07

## 2019-02-07 NOTE — TELEPHONE ENCOUNTER
Notify pt that labs, ekg are stable. Ok to clear for surgery. Ok to fax ekg, note and labs to dr. salcedo along with her preop form from dr. salcedo.

## 2019-03-12 ENCOUNTER — LAB VISIT (OUTPATIENT)
Dept: LAB | Facility: HOSPITAL | Age: 69
End: 2019-03-12
Attending: INTERNAL MEDICINE
Payer: MEDICARE

## 2019-03-12 DIAGNOSIS — I10 ESSENTIAL HYPERTENSION, MALIGNANT: ICD-10-CM

## 2019-03-12 DIAGNOSIS — E78.2 MIXED HYPERLIPIDEMIA: Primary | ICD-10-CM

## 2019-03-12 DIAGNOSIS — R94.6 NONSPECIFIC ABNORMAL RESULTS OF THYROID FUNCTION STUDY: ICD-10-CM

## 2019-03-12 DIAGNOSIS — E55.9 AVITAMINOSIS D: ICD-10-CM

## 2019-03-12 LAB
25(OH)D3+25(OH)D2 SERPL-MCNC: 36 NG/ML
ALBUMIN SERPL BCP-MCNC: 3.9 G/DL
ALP SERPL-CCNC: 118 U/L
ALT SERPL W/O P-5'-P-CCNC: 12 U/L
ANION GAP SERPL CALC-SCNC: 7 MMOL/L
AST SERPL-CCNC: 15 U/L
BILIRUB SERPL-MCNC: 0.4 MG/DL
BUN SERPL-MCNC: 24 MG/DL
CALCIUM SERPL-MCNC: 9.9 MG/DL
CHLORIDE SERPL-SCNC: 109 MMOL/L
CHOLEST SERPL-MCNC: 143 MG/DL
CHOLEST/HDLC SERPL: 3.2 {RATIO}
CO2 SERPL-SCNC: 26 MMOL/L
CREAT SERPL-MCNC: 1 MG/DL
EST. GFR  (AFRICAN AMERICAN): >60 ML/MIN/1.73 M^2
EST. GFR  (NON AFRICAN AMERICAN): 57.6 ML/MIN/1.73 M^2
GLUCOSE SERPL-MCNC: 100 MG/DL
HDLC SERPL-MCNC: 45 MG/DL
HDLC SERPL: 31.5 %
LDLC SERPL CALC-MCNC: 80.6 MG/DL
NONHDLC SERPL-MCNC: 98 MG/DL
POTASSIUM SERPL-SCNC: 4.2 MMOL/L
PROT SERPL-MCNC: 6.6 G/DL
SODIUM SERPL-SCNC: 142 MMOL/L
T3 SERPL-MCNC: 65 NG/DL
T4 SERPL-MCNC: 6 UG/DL
TRIGL SERPL-MCNC: 87 MG/DL
TSH SERPL DL<=0.005 MIU/L-ACNC: 2.57 UIU/ML

## 2019-03-12 PROCEDURE — 36415 COLL VENOUS BLD VENIPUNCTURE: CPT | Mod: PO

## 2019-03-12 PROCEDURE — 84443 ASSAY THYROID STIM HORMONE: CPT

## 2019-03-12 PROCEDURE — 84436 ASSAY OF TOTAL THYROXINE: CPT

## 2019-03-12 PROCEDURE — 80053 COMPREHEN METABOLIC PANEL: CPT

## 2019-03-12 PROCEDURE — 80061 LIPID PANEL: CPT

## 2019-03-12 PROCEDURE — 84480 ASSAY TRIIODOTHYRONINE (T3): CPT

## 2019-03-12 PROCEDURE — 82306 VITAMIN D 25 HYDROXY: CPT

## 2019-03-14 DIAGNOSIS — I10 ESSENTIAL HYPERTENSION: ICD-10-CM

## 2019-03-18 RX ORDER — SPIRONOLACTONE 25 MG/1
TABLET ORAL
Qty: 90 TABLET | Refills: 0 | Status: SHIPPED | OUTPATIENT
Start: 2019-03-18 | End: 2019-08-20 | Stop reason: SDUPTHER

## 2019-04-09 RX ORDER — CELECOXIB 200 MG/1
CAPSULE ORAL
Qty: 30 CAPSULE | Refills: 0 | Status: SHIPPED | OUTPATIENT
Start: 2019-04-09 | End: 2020-09-08

## 2019-06-03 NOTE — PROGRESS NOTES
Subjective:       Patient ID: Jolly Matias is a 69 y.o. female.    Chief Complaint: Cough    Patient is a 69 y.o.female who presents today for cough      Notes dry cough and minimal shortness of breath with exertion. Some wheezing. No sputum production. No fever or chills. Some sinus congestion; no ear pain. No otc meds.     Bilateral hip pain; chronic in nature  Review of Systems   Constitutional: Negative for appetite change, chills, diaphoresis, fatigue and fever.   HENT: Negative for congestion, dental problem, ear discharge, ear pain, hearing loss, postnasal drip, sinus pressure and sore throat.    Eyes: Negative for discharge, redness and itching.   Respiratory: Negative for cough, chest tightness, shortness of breath and wheezing.    Cardiovascular: Negative for chest pain, palpitations and leg swelling.   Gastrointestinal: Negative for abdominal pain, constipation, diarrhea, nausea and vomiting.   Endocrine: Negative for cold intolerance and heat intolerance.   Genitourinary: Negative for difficulty urinating, frequency, hematuria and urgency.   Musculoskeletal: Negative for arthralgias, back pain, gait problem, myalgias and neck pain.   Skin: Negative for color change and rash.   Neurological: Negative for dizziness, syncope and headaches.   Hematological: Negative for adenopathy.   Psychiatric/Behavioral: Negative for behavioral problems and sleep disturbance. The patient is not nervous/anxious.        Objective:      Physical Exam   Constitutional: She is oriented to person, place, and time. She appears well-developed and well-nourished. No distress.   HENT:   Head: Normocephalic and atraumatic.   Right Ear: External ear normal.   Left Ear: External ear normal.   Nose: Nose normal.   Mouth/Throat: Oropharynx is clear and moist. No oropharyngeal exudate.   Eyes: Pupils are equal, round, and reactive to light. Conjunctivae and EOM are normal. Right eye exhibits no discharge. Left eye exhibits no  discharge. No scleral icterus.   Neck: Normal range of motion. Neck supple. No JVD present. No thyromegaly present.   Cardiovascular: Normal rate, regular rhythm, normal heart sounds and intact distal pulses. Exam reveals no gallop and no friction rub.   No murmur heard.  Pulmonary/Chest: Effort normal and breath sounds normal. No stridor. No respiratory distress. She has no wheezes. She has no rales. She exhibits no tenderness.   Abdominal: Soft. Bowel sounds are normal. She exhibits no distension. There is no tenderness. There is no rebound.   Musculoskeletal: Normal range of motion. She exhibits no edema or tenderness.   Lymphadenopathy:     She has no cervical adenopathy.   Neurological: She is alert and oriented to person, place, and time. No cranial nerve deficit.   Skin: Skin is warm and dry. No rash noted. She is not diaphoretic. No erythema.   Psychiatric: She has a normal mood and affect. Her behavior is normal.   Nursing note and vitals reviewed.      Assessment and Plan:       1. Allergic rhinitis due to pollen, unspecified seasonality    - levocetirizine (XYZAL) 5 MG tablet; Take 1 tablet (5 mg total) by mouth every evening.  Dispense: 30 tablet; Refill: 11  - azelastine (ASTELIN) 137 mcg (0.1 %) nasal spray; 1 spray (137 mcg total) by Nasal route 2 (two) times daily.  Dispense: 30 mL; Refill: 0    2. Cough  - if no improvement in one week, will do pft  - X-Ray Chest PA And Lateral; Future    3. Bilateral hip pain  - Ambulatory Referral to Orthopedics  - X-Ray Hip 3 or 4 views Bilateral; Future          No follow-ups on file.

## 2019-06-05 ENCOUNTER — OFFICE VISIT (OUTPATIENT)
Dept: INTERNAL MEDICINE | Facility: CLINIC | Age: 69
End: 2019-06-05
Payer: MEDICARE

## 2019-06-05 ENCOUNTER — HOSPITAL ENCOUNTER (OUTPATIENT)
Dept: RADIOLOGY | Facility: HOSPITAL | Age: 69
Discharge: HOME | End: 2019-06-05
Attending: INTERNAL MEDICINE
Payer: MEDICARE

## 2019-06-05 VITALS
DIASTOLIC BLOOD PRESSURE: 85 MMHG | OXYGEN SATURATION: 99 % | HEART RATE: 81 BPM | SYSTOLIC BLOOD PRESSURE: 139 MMHG | HEIGHT: 61 IN | BODY MASS INDEX: 29.59 KG/M2 | RESPIRATION RATE: 16 BRPM | TEMPERATURE: 98 F | WEIGHT: 156.75 LBS

## 2019-06-05 DIAGNOSIS — M25.552 BILATERAL HIP PAIN: ICD-10-CM

## 2019-06-05 DIAGNOSIS — M25.551 BILATERAL HIP PAIN: ICD-10-CM

## 2019-06-05 DIAGNOSIS — R05.9 COUGH: ICD-10-CM

## 2019-06-05 DIAGNOSIS — J30.1 ALLERGIC RHINITIS DUE TO POLLEN, UNSPECIFIED SEASONALITY: Primary | ICD-10-CM

## 2019-06-05 PROCEDURE — 71046 X-RAY EXAM CHEST 2 VIEWS: CPT | Mod: TC,PO

## 2019-06-05 PROCEDURE — 3075F SYST BP GE 130 - 139MM HG: CPT | Mod: CPTII,S$GLB,, | Performed by: INTERNAL MEDICINE

## 2019-06-05 PROCEDURE — 99214 PR OFFICE/OUTPT VISIT, EST, LEVL IV, 30-39 MIN: ICD-10-PCS | Mod: S$GLB,,, | Performed by: INTERNAL MEDICINE

## 2019-06-05 PROCEDURE — 3079F PR MOST RECENT DIASTOLIC BLOOD PRESSURE 80-89 MM HG: ICD-10-PCS | Mod: CPTII,S$GLB,, | Performed by: INTERNAL MEDICINE

## 2019-06-05 PROCEDURE — 71046 X-RAY EXAM CHEST 2 VIEWS: CPT | Mod: 26,,, | Performed by: RADIOLOGY

## 2019-06-05 PROCEDURE — 1101F PR PT FALLS ASSESS DOC 0-1 FALLS W/OUT INJ PAST YR: ICD-10-PCS | Mod: CPTII,S$GLB,, | Performed by: INTERNAL MEDICINE

## 2019-06-05 PROCEDURE — 73521 X-RAY EXAM HIPS BI 2 VIEWS: CPT | Mod: TC,PO

## 2019-06-05 PROCEDURE — 1101F PT FALLS ASSESS-DOCD LE1/YR: CPT | Mod: CPTII,S$GLB,, | Performed by: INTERNAL MEDICINE

## 2019-06-05 PROCEDURE — 71046 XR CHEST PA AND LATERAL: ICD-10-PCS | Mod: 26,,, | Performed by: RADIOLOGY

## 2019-06-05 PROCEDURE — 3075F PR MOST RECENT SYSTOLIC BLOOD PRESS GE 130-139MM HG: ICD-10-PCS | Mod: CPTII,S$GLB,, | Performed by: INTERNAL MEDICINE

## 2019-06-05 PROCEDURE — 99999 PR PBB SHADOW E&M-EST. PATIENT-LVL IV: CPT | Mod: PBBFAC,,, | Performed by: INTERNAL MEDICINE

## 2019-06-05 PROCEDURE — 73521 XR HIPS BILATERAL 2 VIEW INCL AP PELVIS: ICD-10-PCS | Mod: 26,,, | Performed by: RADIOLOGY

## 2019-06-05 PROCEDURE — 99214 OFFICE O/P EST MOD 30 MIN: CPT | Mod: S$GLB,,, | Performed by: INTERNAL MEDICINE

## 2019-06-05 PROCEDURE — 73521 X-RAY EXAM HIPS BI 2 VIEWS: CPT | Mod: 26,,, | Performed by: RADIOLOGY

## 2019-06-05 PROCEDURE — 3079F DIAST BP 80-89 MM HG: CPT | Mod: CPTII,S$GLB,, | Performed by: INTERNAL MEDICINE

## 2019-06-05 PROCEDURE — 99999 PR PBB SHADOW E&M-EST. PATIENT-LVL IV: ICD-10-PCS | Mod: PBBFAC,,, | Performed by: INTERNAL MEDICINE

## 2019-06-05 RX ORDER — LEVOCETIRIZINE DIHYDROCHLORIDE 5 MG/1
5 TABLET, FILM COATED ORAL NIGHTLY
Qty: 30 TABLET | Refills: 11 | Status: SHIPPED | OUTPATIENT
Start: 2019-06-05 | End: 2020-09-08 | Stop reason: SDUPTHER

## 2019-06-05 RX ORDER — AZELASTINE 1 MG/ML
1 SPRAY, METERED NASAL 2 TIMES DAILY
Qty: 30 ML | Refills: 0 | Status: SHIPPED | OUTPATIENT
Start: 2019-06-05 | End: 2020-09-08 | Stop reason: SDUPTHER

## 2019-06-13 ENCOUNTER — OFFICE VISIT (OUTPATIENT)
Dept: ORTHOPEDICS | Facility: CLINIC | Age: 69
End: 2019-06-13
Payer: MEDICARE

## 2019-06-13 DIAGNOSIS — M70.62 TROCHANTERIC BURSITIS OF BOTH HIPS: Primary | ICD-10-CM

## 2019-06-13 DIAGNOSIS — M70.61 TROCHANTERIC BURSITIS OF BOTH HIPS: Primary | ICD-10-CM

## 2019-06-13 PROCEDURE — 99999 PR PBB SHADOW E&M-EST. PATIENT-LVL II: CPT | Mod: PBBFAC,,, | Performed by: ORTHOPAEDIC SURGERY

## 2019-06-13 PROCEDURE — 1101F PT FALLS ASSESS-DOCD LE1/YR: CPT | Mod: CPTII,S$GLB,, | Performed by: ORTHOPAEDIC SURGERY

## 2019-06-13 PROCEDURE — 99203 OFFICE O/P NEW LOW 30 MIN: CPT | Mod: 25,S$GLB,, | Performed by: ORTHOPAEDIC SURGERY

## 2019-06-13 PROCEDURE — 20610 DRAIN/INJ JOINT/BURSA W/O US: CPT | Mod: 50,S$GLB,, | Performed by: ORTHOPAEDIC SURGERY

## 2019-06-13 PROCEDURE — 99203 PR OFFICE/OUTPT VISIT, NEW, LEVL III, 30-44 MIN: ICD-10-PCS | Mod: 25,S$GLB,, | Performed by: ORTHOPAEDIC SURGERY

## 2019-06-13 PROCEDURE — 20610 LARGE JOINT ASPIRATION/INJECTION: L GREATER TROCHANTERIC BURSA: ICD-10-PCS | Mod: 50,S$GLB,, | Performed by: ORTHOPAEDIC SURGERY

## 2019-06-13 PROCEDURE — 99999 PR PBB SHADOW E&M-EST. PATIENT-LVL II: ICD-10-PCS | Mod: PBBFAC,,, | Performed by: ORTHOPAEDIC SURGERY

## 2019-06-13 PROCEDURE — 1101F PR PT FALLS ASSESS DOC 0-1 FALLS W/OUT INJ PAST YR: ICD-10-PCS | Mod: CPTII,S$GLB,, | Performed by: ORTHOPAEDIC SURGERY

## 2019-06-13 RX ORDER — TRIAMCINOLONE ACETONIDE 40 MG/ML
40 INJECTION, SUSPENSION INTRA-ARTICULAR; INTRAMUSCULAR
Status: DISCONTINUED | OUTPATIENT
Start: 2019-06-13 | End: 2019-06-13 | Stop reason: HOSPADM

## 2019-06-13 RX ADMIN — TRIAMCINOLONE ACETONIDE 40 MG: 40 INJECTION, SUSPENSION INTRA-ARTICULAR; INTRAMUSCULAR at 02:06

## 2019-06-13 NOTE — PROCEDURES
Large Joint Aspiration/Injection: R greater trochanteric bursa  Date/Time: 6/13/2019 2:41 PM  Performed by: [facility] Jr., MD  Authorized by: [facility] MD     Consent Done?:  Yes (Verbal)  Indications:  Pain  Procedure site marked: Yes    Timeout: Prior to procedure the correct patient, procedure, and site was verified      Location:  Hip  Site:  R greater trochanteric bursa  Prep: Patient was prepped and draped in usual sterile fashion    Ultrasonic Guidance for needle placement: No  Needle size:  22 G  Approach:  Lateral  Medications:  40 mg triamcinolone acetonide 40 mg/mL  Patient tolerance:  Patient tolerated the procedure well with no immediate complications

## 2019-06-13 NOTE — PROGRESS NOTES
HPI:    Patient is here today for a chief complaint of bilateral hip pain.   Patient states that after walking a short distance she starts to have pain over both hips.  The pain radiates down the lateral aspect of the thigh but does not go past the knee.  She also gets the pain when she rolls onto the hips at night in bed.    Duration: 6 months  Intensity: moderate  Character of pain: sharp  Location: She reports that the pain is positive  For lateral hip.    Past Medical History:   Diagnosis Date    Atypical ductal hyperplasia, breast     Basal cell carcinoma 4/2011    left forehead    Basal cell carcinoma 4/2015    R temporal scalp, R upper forehead, L upper forehead    Basal cell carcinoma 8/2015    right mid ala    BCC (basal cell carcinoma) excised     right shoulder    Diabetes mellitus     Diverticulitis     Fibrocystic breast     HLD (hyperlipidemia)     Hypertension     Prediabetes 10/16/2013    Skin cancer           Current Outpatient Medications:     atorvastatin (LIPITOR) 20 MG tablet, TAKE 1 TABLET(20 MG) BY MOUTH EVERY DAY, Disp: 90 tablet, Rfl: 1    azelastine (ASTELIN) 137 mcg (0.1 %) nasal spray, 1 spray (137 mcg total) by Nasal route 2 (two) times daily., Disp: 30 mL, Rfl: 0    celecoxib (CELEBREX) 200 MG capsule, TAKE ONE CAPSULE BY MOUTH ONCE DAILY, Disp: 30 capsule, Rfl: 0    cyanocobalamin (VITAMIN B-12) 1000 MCG tablet, Take 100 mcg by mouth once daily., Disp: , Rfl:     flaxseed oil 1,000 mg Cap, Take 1 capsule by mouth once daily.  , Disp: , Rfl:     FLUZONE HIGH-DOSE 2018-19, PF, 180 mcg/0.5 mL vaccine, ADMINISTER 0.5ML IN THE MUSCLE AS DIRECTED, Disp: , Rfl: 0    levocetirizine (XYZAL) 5 MG tablet, Take 1 tablet (5 mg total) by mouth every evening., Disp: 30 tablet, Rfl: 11    metFORMIN (GLUCOPHAGE) 500 MG tablet, TAKE 1 TABLET(500 MG) BY MOUTH TWICE DAILY WITH MEALS, Disp: 180 tablet, Rfl: 2    nystatin (MYCOSTATIN) powder, Apply topically 2 (two) times daily.  "aaa qd- bid prn flare groin, under arm, under breasts, Disp: 120 g, Rfl: 2    olmesartan (BENICAR) 40 MG tablet, Take 40 mg by mouth once daily., Disp: , Rfl:     sertraline (ZOLOFT) 100 MG tablet, TAKE 1 TABLET(100 MG) BY MOUTH EVERY DAY, Disp: 90 tablet, Rfl: 2    spironolactone (ALDACTONE) 25 MG tablet, TAKE 1 TABLET(25 MG) BY MOUTH EVERY DAY (Patient taking differently: TAKE 1 TABLET(25 MG) BY MOUTH EVERY DAY  Hold the day of surgery), Disp: 90 tablet, Rfl: 1    valACYclovir (VALTREX) 1000 MG tablet, Take 2 tablets (2,000 mg total) by mouth every 12 (twelve) hours. For 1-2 days at first sign of flare, Disp: 12 tablet, Rfl: 5    vitamin D 1000 units Tab, Take 185 mg by mouth once daily., Disp: , Rfl:     albuterol 90 mcg/actuation inhaler, Inhale 2 puffs into the lungs every 6 (six) hours as needed for Wheezing. Rescue, Disp: 18 g, Rfl: 0    aspirin (ECOTRIN) 325 MG EC tablet, Take 1 tablet (325 mg total) by mouth 2 (two) times daily. for 20 days, Disp: 28 tablet, Rfl: 0    fluocinonide (LIDEX) 0.05 % external solution, AAA scalp qday - bid prn pruritus, Disp: 60 mL, Rfl: 3    ketoconazole (NIZORAL) 2 % cream, AAA in corners of mouth qhs, Disp: 30 g, Rfl: 3    permethrin (ELIMITE) 5 % cream, ADA EXT AA 1 TIME FOR 1 DOSE, Disp: , Rfl: 0    sertraline (ZOLOFT) 50 MG tablet, TAKE 1 TABLET(50 MG) BY MOUTH EVERY DAY, Disp: 90 tablet, Rfl: 1    spironolactone (ALDACTONE) 25 MG tablet, TAKE 1 TABLET(25 MG) BY MOUTH EVERY DAY, Disp: 90 tablet, Rfl: 0    triamcinolone acetonide 0.1% (KENALOG) 0.1 % cream, AAA bid, Disp: 60 g, Rfl: 1     Review of patient's allergies indicates:   Allergen Reactions    Hydrocodone      Also makes pt "very sleepy"    Kiwi (actinidia chinensis) Swelling        ROS  Constitutional: Negative for fever, Negative for weight loss  HENT Negative for congestion  Cardiovascular: Negative chest pain  Respiratory: Negative Shortness of breath  Heme: Negative excessive " bleeding  Skin:NegativeItching, Negative breakdown  Musculoskeletal:Negative for back pain, Positive for joint pain, Positive muscle pain, Negative muscle weakness  Neurological: Negative for numbness and paresthesias   Psychiatric/Behavioral: Negative altered mental status, Negative for depression    Physical Exam:   [facility] 01/01/1991   General appearance: This is a well-developed, Well nourished female No obvious acute distress.  Psychiatric: normal mood and affect;  pleasant and conversant; judgment and thought content normal  Gait is coordinated.   Cardiovascular: There are no swelling or varicosities present.   Respiratory: normal respiratory effort   Lymphatic: no adenopathy   Neurologic: alert and oriented to person, place, and time   Deep Tendon Reflexes are normal;  Coordination and Balance: Normal   Musculoskeletal   Neck    ROM shows normal flexion and extension and lateral rotation    Palpation: Non-tender    Stability is normal    Strength is normal    Skin is normal without masses and lesions    Sensation is intact to light touch   Back    ROM showsnormal flexion, extension    and rotation    Palpation shows no masses    Stability is normal    Strength to flexion and extension well maintained    Core strength is diminished    Skin shows no rashes or cafe au lait spots;     Sensation is intact to light touch   Hip  Tenderness over bilateral greater trochanters.  Supple range of motion of both hips.  No groin pain.  Sensation motor strength intact to lower extremities.      Radiograph   Osteoarthritis: mild    Impression:  Normal hip exam.  Positive for greater trochanteric bursitis.    Plan:  Inject both hips.  If patient continues to have problems, I have recommended Physical Medicine in Sports Medicine.  She may want to try some stretching and other exercise programs.  She is currently taking a nonsteroidal anti-inflammatory Celebrex.

## 2019-06-13 NOTE — LETTER
June 13, 2019      Debbie Venegas, DO  [address] [facility] [address]           [facility] - Orthopedics  [address]  [place] [address]  Phone: [phone]          Patient: Jolly Matias   MR Number: [id number]   YOB: 1950   Date of Visit: 6/13/2019       Dear Dr. Debbie Venegas:    Thank you for referring Jolly Matias to me for evaluation. Attached you will find relevant portions of my assessment and plan of care.    If you have questions, please do not hesitate to call me. I look forward to following Jolly Matias along with you.    Sincerely,    [facility]., MD    Enclosure  CC:  No Recipients    If you would like to receive this communication electronically, please contact [email or web address] or [phone] to request more information on [employer] Link access.    For providers and/or their staff who would like to refer a patient to [facility], please contact us through our one-stop-shop provider referral line, [facility], at [phone].    If you feel you have received this communication in error or would no longer like to receive these types of communications, please e-mail [email or web address]

## 2019-06-13 NOTE — PROCEDURES
Large Joint Aspiration/Injection: L greater trochanteric bursa  Date/Time: 6/13/2019 2:42 PM  Performed by: [facility]., MD  Authorized by: [facility] MD     Consent Done?:  Yes (Verbal)  Indications:  Pain  Procedure site marked: Yes    Timeout: Prior to procedure the correct patient, procedure, and site was verified      Location:  Hip  Site:  L greater trochanteric bursa  Prep: Patient was prepped and draped in usual sterile fashion    Ultrasonic Guidance for needle placement: No  Needle size:  22 G  Medications:  40 mg triamcinolone acetonide 40 mg/mL  Patient tolerance:  Patient tolerated the procedure well with no immediate complications

## 2019-07-26 DIAGNOSIS — E78.00 PURE HYPERCHOLESTEROLEMIA: ICD-10-CM

## 2019-07-29 RX ORDER — ATORVASTATIN CALCIUM 20 MG/1
TABLET, FILM COATED ORAL
Qty: 90 TABLET | Refills: 0 | OUTPATIENT
Start: 2019-07-29

## 2019-08-02 ENCOUNTER — TELEPHONE (OUTPATIENT)
Dept: INTERNAL MEDICINE | Facility: CLINIC | Age: 69
End: 2019-08-02

## 2019-08-02 DIAGNOSIS — E78.00 PURE HYPERCHOLESTEROLEMIA: ICD-10-CM

## 2019-08-02 RX ORDER — ATORVASTATIN CALCIUM 20 MG/1
TABLET, FILM COATED ORAL
Qty: 90 TABLET | Refills: 1 | Status: SHIPPED | OUTPATIENT
Start: 2019-08-02 | End: 2020-02-24 | Stop reason: SDUPTHER

## 2019-08-02 NOTE — TELEPHONE ENCOUNTER
----- Message from Melania Hogue sent at 8/2/2019 11:30 AM CDT -----  Contact: [phone]  Type: Rx    Name of medication(s): atorvastatin (LIPITOR) 20 MG tablet    Is this a refill? New rx? Refill     Pharmacy Name, Phone, & Location:[id number] - [place] - [address] AT [facility] [address]   [phone] (Phone)  [phone] (Fax)    Comments:please advise, thanks

## 2019-08-06 ENCOUNTER — TELEPHONE (OUTPATIENT)
Dept: INTERNAL MEDICINE | Facility: CLINIC | Age: 69
End: 2019-08-06

## 2019-08-13 ENCOUNTER — OFFICE VISIT (OUTPATIENT)
Dept: INTERNAL MEDICINE | Facility: CLINIC | Age: 69
End: 2019-08-13
Payer: MEDICARE

## 2019-08-13 ENCOUNTER — LAB VISIT (OUTPATIENT)
Dept: LAB | Facility: HOSPITAL | Age: 69
End: 2019-08-13
Attending: INTERNAL MEDICINE
Payer: MEDICARE

## 2019-08-13 VITALS
BODY MASS INDEX: 28.84 KG/M2 | HEART RATE: 74 BPM | SYSTOLIC BLOOD PRESSURE: 118 MMHG | TEMPERATURE: 99 F | DIASTOLIC BLOOD PRESSURE: 64 MMHG | HEIGHT: 61 IN | RESPIRATION RATE: 14 BRPM | WEIGHT: 152.75 LBS

## 2019-08-13 DIAGNOSIS — Z01.818 PREOP EXAM FOR INTERNAL MEDICINE: ICD-10-CM

## 2019-08-13 DIAGNOSIS — Z01.818 PREOP EXAM FOR INTERNAL MEDICINE: Primary | ICD-10-CM

## 2019-08-13 LAB
ANION GAP SERPL CALC-SCNC: 11 MMOL/L (ref 8–16)
BUN SERPL-MCNC: 22 MG/DL (ref 8–23)
CALCIUM SERPL-MCNC: 10.3 MG/DL (ref 8.7–10.5)
CHLORIDE SERPL-SCNC: 106 MMOL/L (ref 95–110)
CO2 SERPL-SCNC: 25 MMOL/L (ref 23–29)
CREAT SERPL-MCNC: 0.9 MG/DL (ref 0.5–1.4)
EST. GFR  (AFRICAN AMERICAN): >60 ML/MIN/1.73 M^2
EST. GFR  (NON AFRICAN AMERICAN): >60 ML/MIN/1.73 M^2
GLUCOSE SERPL-MCNC: 93 MG/DL (ref 70–110)
POTASSIUM SERPL-SCNC: 4.9 MMOL/L (ref 3.5–5.1)
SODIUM SERPL-SCNC: 142 MMOL/L (ref 136–145)

## 2019-08-13 PROCEDURE — 93005 EKG 12-LEAD: ICD-10-PCS | Mod: S$GLB,,, | Performed by: INTERNAL MEDICINE

## 2019-08-13 PROCEDURE — 99999 PR PBB SHADOW E&M-EST. PATIENT-LVL III: ICD-10-PCS | Mod: PBBFAC,,, | Performed by: INTERNAL MEDICINE

## 2019-08-13 PROCEDURE — 1101F PT FALLS ASSESS-DOCD LE1/YR: CPT | Mod: CPTII,S$GLB,, | Performed by: INTERNAL MEDICINE

## 2019-08-13 PROCEDURE — 80048 BASIC METABOLIC PNL TOTAL CA: CPT

## 2019-08-13 PROCEDURE — 99499 RISK ADDL DX/OHS AUDIT: ICD-10-PCS | Mod: S$GLB,,, | Performed by: INTERNAL MEDICINE

## 2019-08-13 PROCEDURE — 3078F PR MOST RECENT DIASTOLIC BLOOD PRESSURE < 80 MM HG: ICD-10-PCS | Mod: CPTII,S$GLB,, | Performed by: INTERNAL MEDICINE

## 2019-08-13 PROCEDURE — 93010 EKG 12-LEAD: ICD-10-PCS | Mod: S$GLB,,, | Performed by: INTERNAL MEDICINE

## 2019-08-13 PROCEDURE — 99999 PR PBB SHADOW E&M-EST. PATIENT-LVL III: CPT | Mod: PBBFAC,,, | Performed by: INTERNAL MEDICINE

## 2019-08-13 PROCEDURE — 93010 ELECTROCARDIOGRAM REPORT: CPT | Mod: S$GLB,,, | Performed by: INTERNAL MEDICINE

## 2019-08-13 PROCEDURE — 3074F SYST BP LT 130 MM HG: CPT | Mod: CPTII,S$GLB,, | Performed by: INTERNAL MEDICINE

## 2019-08-13 PROCEDURE — 93005 ELECTROCARDIOGRAM TRACING: CPT | Mod: S$GLB,,, | Performed by: INTERNAL MEDICINE

## 2019-08-13 PROCEDURE — 99499 UNLISTED E&M SERVICE: CPT | Mod: S$GLB,,, | Performed by: INTERNAL MEDICINE

## 2019-08-13 PROCEDURE — 99214 OFFICE O/P EST MOD 30 MIN: CPT | Mod: S$GLB,,, | Performed by: INTERNAL MEDICINE

## 2019-08-13 PROCEDURE — 3074F PR MOST RECENT SYSTOLIC BLOOD PRESSURE < 130 MM HG: ICD-10-PCS | Mod: CPTII,S$GLB,, | Performed by: INTERNAL MEDICINE

## 2019-08-13 PROCEDURE — 36415 COLL VENOUS BLD VENIPUNCTURE: CPT | Mod: PO

## 2019-08-13 PROCEDURE — 3078F DIAST BP <80 MM HG: CPT | Mod: CPTII,S$GLB,, | Performed by: INTERNAL MEDICINE

## 2019-08-13 PROCEDURE — 99214 PR OFFICE/OUTPT VISIT, EST, LEVL IV, 30-39 MIN: ICD-10-PCS | Mod: S$GLB,,, | Performed by: INTERNAL MEDICINE

## 2019-08-13 PROCEDURE — 1101F PR PT FALLS ASSESS DOC 0-1 FALLS W/OUT INJ PAST YR: ICD-10-PCS | Mod: CPTII,S$GLB,, | Performed by: INTERNAL MEDICINE

## 2019-08-13 NOTE — TELEPHONE ENCOUNTER
----- Message from Janay Morillo sent at 8/13/2019  9:02 AM CDT -----  Contact: Patient [phone]  Type: Returning a call    Who left a message?Melva Mayes LPN    When did the practice call?08/06/19    Comments:Pt also states that she is calling in regards to her pre-op paperwork. Please call back and advise.      Thanks

## 2019-08-13 NOTE — TELEPHONE ENCOUNTER
----- Message from Gibran Esparza sent at 8/12/2019  3:31 PM CDT -----  Contact: Self [phone]  Patient is calling call to follow up on forms that was drop off for Pre-opt clearance.

## 2019-08-13 NOTE — PROGRESS NOTES
Subjective:       Patient ID: Jolly Matias is a 69 y.o. female.    Chief Complaint: Pre-op Exam    HPI    69 y.o. female here for pre-op evaluation of cataract surgery by Dr. Nichole planned for 8/14/19.         Surgical Risk Assessment     Active cardiac issues:  Active decompensated heart failure? No   Unstable angina?  No   Significant uncontrolled arrhythmias? No   Severe valvular heart disease-Aortic or Mitral Stenosis? No   Recent MI or coronary revascularization < 30 days? No     Clinical risk factors predicting perioperative major adverse cardiac events per RCRI  High risk surgery (suprainguinal vascular, intraperitoneal, or intrathoracic surgery)? No   History of CAD/ischemic heart disease? No   History of cerebrovascular disease (CVA or TIA)? No   History of compensated heart failure? No   Type 2 diabetes requiring insulin? No   Serum Creatinine > 2? No   Total cardiac risk factors 0     0 predictors = 0.4%, 1 predictor = 0.9%, 2 predictors = 6.6%, ?3 predictors = >11%    According to the revised cardiac risk index, the risk of beverly-procedural major cardiac complications (cardiac death, nonfatal MI, nonfatal cardiac arrest, postoperative cardiogenic pulmonary edema, complete heart block) is: 0.4%     If patient has a low risk of MACE (<1%), proceed to surgery. If the patient is at elevated risk of MACE, then determine functional capacity (pt reported activity or DASI model).     If the patient has moderate, good, or excellent functional capacity (?4 METs), then proceed to surgery without further evaluation. If patient has poor or unknown functional capacity, will further testing impact decision making or perioperative care? If yes, then pharmacological stress testing is appropriate. In those patients with unknown functional capacity, exercise stress testing may be reasonable to perform.     Patient's functional mets: >4 METs - walking 30-45 minutes and swimming a few times a week    < 4 METs -unable  "to walk > 2 blocks on level ground without stopping due to symptoms  - eating, dressing, toileting, walking indoors, light housework. POOR   > 4 METs -climbing > 1 flight of stairs without stopping  -walking up hill > 1-2 blocks  -scrubbing floors  -moving furniture  - golf, bowling, dancing or tennis  -running short distance MODERATE to EXCELLENT     OR   [email or web address]    Review of Systems   Constitutional: Negative for chills and fever.   HENT: Negative for trouble swallowing.    Eyes: Negative for visual disturbance.   Respiratory: Negative for chest tightness and shortness of breath.    Cardiovascular: Negative for chest pain, palpitations and leg swelling.   Gastrointestinal: Negative for abdominal pain and blood in stool.   Genitourinary: Negative for difficulty urinating and dysuria.   Skin: Negative for color change.   Allergic/Immunologic: Negative for immunocompromised state.   Neurological: Negative for syncope and weakness.       Objective:        Vitals:    08/13/19 1059   BP: 118/64   Pulse: 74   Resp: 14   Temp: 98.8 °F (37.1 °C)   TempSrc: Oral   Weight: 69.3 kg (152 lb 12.5 oz)   Height: 5' 1" (1.549 m)       Body mass index is 28.87 kg/m².    Physical Exam   Constitutional: She is oriented to person, place, and time. She appears well-developed and well-nourished. No distress.   HENT:   Head: Normocephalic and atraumatic.   Nose: Nose normal.   Eyes: Conjunctivae and EOM are normal. Right eye exhibits no discharge. Left eye exhibits no discharge.   Neck: Normal range of motion. Neck supple.   Cardiovascular: Normal rate, regular rhythm, normal heart sounds and intact distal pulses.   Pulmonary/Chest: Effort normal and breath sounds normal.   Abdominal: Soft. Bowel sounds are normal.   Musculoskeletal: She exhibits no edema.   Neurological: She is alert and oriented to person, place, and time.   Skin: Skin is warm and dry. She is not diaphoretic. No erythema. "   Psychiatric: She has a normal mood and affect. Her behavior is normal. Thought content normal.       Assessment:     1. Preop exam for internal medicine           Plan:         1. Preop exam for internal medicine  - No contraindications to anesthesia or surgery at this time. Proceed to planned surgery.  - EKG 12-lead; Future  - Basic metabolic panel; Future           Patient note was created using MModal Dictation.  Any errors in syntax or even information may not have been identified and edited on initial review prior to signing this note.

## 2019-08-13 NOTE — TELEPHONE ENCOUNTER
Spoke to pt and informed of apt needed for pre-op. She verbalized understanding and agreed to apt today.

## 2019-08-14 ENCOUNTER — TELEPHONE (OUTPATIENT)
Dept: INTERNAL MEDICINE | Facility: CLINIC | Age: 69
End: 2019-08-14

## 2019-08-14 NOTE — TELEPHONE ENCOUNTER
----- Message from Lin De Paz MD sent at 8/14/2019  7:07 AM CDT -----  Please inform patient of test results:   Kidney function is in good range. Clear for surgery.

## 2019-08-20 ENCOUNTER — OFFICE VISIT (OUTPATIENT)
Dept: PODIATRY | Facility: CLINIC | Age: 69
End: 2019-08-20
Payer: MEDICARE

## 2019-08-20 ENCOUNTER — HOSPITAL ENCOUNTER (OUTPATIENT)
Dept: RADIOLOGY | Facility: HOSPITAL | Age: 69
Discharge: HOME | End: 2019-08-20
Attending: PODIATRIST
Payer: MEDICARE

## 2019-08-20 VITALS
RESPIRATION RATE: 18 BRPM | DIASTOLIC BLOOD PRESSURE: 68 MMHG | HEART RATE: 69 BPM | BODY MASS INDEX: 29.13 KG/M2 | WEIGHT: 154.31 LBS | SYSTOLIC BLOOD PRESSURE: 113 MMHG | HEIGHT: 61 IN

## 2019-08-20 DIAGNOSIS — M20.61 TOE DEFORMITY, ACQUIRED, RIGHT: Primary | ICD-10-CM

## 2019-08-20 DIAGNOSIS — M20.61 TOE DEFORMITY, ACQUIRED, RIGHT: ICD-10-CM

## 2019-08-20 PROCEDURE — 73630 X-RAY EXAM OF FOOT: CPT | Mod: 26,RT,, | Performed by: RADIOLOGY

## 2019-08-20 PROCEDURE — 99999 PR PBB SHADOW E&M-EST. PATIENT-LVL III: ICD-10-PCS | Mod: PBBFAC,,, | Performed by: PODIATRIST

## 2019-08-20 PROCEDURE — 3078F PR MOST RECENT DIASTOLIC BLOOD PRESSURE < 80 MM HG: ICD-10-PCS | Mod: CPTII,S$GLB,, | Performed by: PODIATRIST

## 2019-08-20 PROCEDURE — 99203 PR OFFICE/OUTPT VISIT, NEW, LEVL III, 30-44 MIN: ICD-10-PCS | Mod: S$GLB,,, | Performed by: PODIATRIST

## 2019-08-20 PROCEDURE — 99203 OFFICE O/P NEW LOW 30 MIN: CPT | Mod: S$GLB,,, | Performed by: PODIATRIST

## 2019-08-20 PROCEDURE — 1101F PR PT FALLS ASSESS DOC 0-1 FALLS W/OUT INJ PAST YR: ICD-10-PCS | Mod: CPTII,S$GLB,, | Performed by: PODIATRIST

## 2019-08-20 PROCEDURE — 99499 RISK ADDL DX/OHS AUDIT: ICD-10-PCS | Mod: S$GLB,,, | Performed by: PODIATRIST

## 2019-08-20 PROCEDURE — 3074F SYST BP LT 130 MM HG: CPT | Mod: CPTII,S$GLB,, | Performed by: PODIATRIST

## 2019-08-20 PROCEDURE — 73630 X-RAY EXAM OF FOOT: CPT | Mod: TC,RT

## 2019-08-20 PROCEDURE — 99499 UNLISTED E&M SERVICE: CPT | Mod: S$GLB,,, | Performed by: PODIATRIST

## 2019-08-20 PROCEDURE — 3074F PR MOST RECENT SYSTOLIC BLOOD PRESSURE < 130 MM HG: ICD-10-PCS | Mod: CPTII,S$GLB,, | Performed by: PODIATRIST

## 2019-08-20 PROCEDURE — 1101F PT FALLS ASSESS-DOCD LE1/YR: CPT | Mod: CPTII,S$GLB,, | Performed by: PODIATRIST

## 2019-08-20 PROCEDURE — 99999 PR PBB SHADOW E&M-EST. PATIENT-LVL III: CPT | Mod: PBBFAC,,, | Performed by: PODIATRIST

## 2019-08-20 PROCEDURE — 73630 XR FOOT COMPLETE 3 VIEW RIGHT: ICD-10-PCS | Mod: 26,RT,, | Performed by: RADIOLOGY

## 2019-08-20 PROCEDURE — 3078F DIAST BP <80 MM HG: CPT | Mod: CPTII,S$GLB,, | Performed by: PODIATRIST

## 2019-08-20 RX ORDER — OFLOXACIN 3 MG/ML
SOLUTION/ DROPS OPHTHALMIC
Refills: 0 | COMMUNITY
Start: 2019-08-08 | End: 2020-09-08

## 2019-08-20 RX ORDER — DUREZOL 0.5 MG/ML
EMULSION OPHTHALMIC
Refills: 1 | COMMUNITY
Start: 2019-08-08 | End: 2020-09-08

## 2019-08-20 RX ORDER — NEPAFENAC 3 MG/ML
SUSPENSION/ DROPS OPHTHALMIC
Refills: 2 | Status: ON HOLD | COMMUNITY
Start: 2019-08-11 | End: 2020-10-29 | Stop reason: CLARIF

## 2019-08-23 ENCOUNTER — OFFICE VISIT (OUTPATIENT)
Dept: DERMATOLOGY | Facility: CLINIC | Age: 69
End: 2019-08-23
Payer: MEDICARE

## 2019-08-23 DIAGNOSIS — L57.0 AK (ACTINIC KERATOSIS): ICD-10-CM

## 2019-08-23 DIAGNOSIS — L30.9 DERMATITIS: Primary | ICD-10-CM

## 2019-08-23 PROCEDURE — 17000 PR DESTRUCTION(LASER SURGERY,CRYOSURGERY,CHEMOSURGERY),PREMALIGNANT LESIONS,FIRST LESION: ICD-10-PCS | Mod: S$GLB,,, | Performed by: DERMATOLOGY

## 2019-08-23 PROCEDURE — 99213 OFFICE O/P EST LOW 20 MIN: CPT | Mod: 25,S$GLB,, | Performed by: DERMATOLOGY

## 2019-08-23 PROCEDURE — 1101F PT FALLS ASSESS-DOCD LE1/YR: CPT | Mod: CPTII,S$GLB,, | Performed by: DERMATOLOGY

## 2019-08-23 PROCEDURE — 99213 PR OFFICE/OUTPT VISIT, EST, LEVL III, 20-29 MIN: ICD-10-PCS | Mod: 25,S$GLB,, | Performed by: DERMATOLOGY

## 2019-08-23 PROCEDURE — 17003 DESTRUCT PREMALG LES 2-14: CPT | Mod: S$GLB,,, | Performed by: DERMATOLOGY

## 2019-08-23 PROCEDURE — 99999 PR PBB SHADOW E&M-EST. PATIENT-LVL II: ICD-10-PCS | Mod: PBBFAC,,, | Performed by: DERMATOLOGY

## 2019-08-23 PROCEDURE — 99999 PR PBB SHADOW E&M-EST. PATIENT-LVL II: CPT | Mod: PBBFAC,,, | Performed by: DERMATOLOGY

## 2019-08-23 PROCEDURE — 17003 DESTRUCTION, PREMALIGNANT LESIONS; SECOND THROUGH 14 LESIONS: ICD-10-PCS | Mod: S$GLB,,, | Performed by: DERMATOLOGY

## 2019-08-23 PROCEDURE — 1101F PR PT FALLS ASSESS DOC 0-1 FALLS W/OUT INJ PAST YR: ICD-10-PCS | Mod: CPTII,S$GLB,, | Performed by: DERMATOLOGY

## 2019-08-23 PROCEDURE — 17000 DESTRUCT PREMALG LESION: CPT | Mod: S$GLB,,, | Performed by: DERMATOLOGY

## 2019-08-23 RX ORDER — TRIAMCINOLONE ACETONIDE 1 MG/G
CREAM TOPICAL 2 TIMES DAILY
Qty: 80 G | Refills: 1 | Status: ON HOLD | OUTPATIENT
Start: 2019-08-23 | End: 2020-10-29 | Stop reason: CLARIF

## 2019-08-23 NOTE — PROGRESS NOTES
"  Subjective:       Patient ID:  Jolly Matias is a 69 y.o. female who presents for   Chief Complaint   Patient presents with    Rash     Pt c/o itchy rash on both arms x  1 months. tx with benadryl OTC cream and cortisone 10 cream. Tx helped with itch. She still has scratches on her upper arms and legs from where rash was.  In the past has seen Dr Mohr for hx of skin cancer as well as "dermatitis" for which she was given TAC cream.    Skin care notes: - she uses Dove soap, unsure what type. She takes "scalding" hot baths for long time periods often.  Does not moisturize afterward.  Denies using any perfumes or fragranced sprays on her skin. No new medications. She reports these itching attacks happen every 2-3 years.      Review of Systems   Constitutional: Negative for fever, chills, fatigue and malaise.   Respiratory: Negative for cough.    Skin: Positive for itching and rash.   Hematologic/Lymphatic: Negative for adenopathy.   Allergic/Immunologic: Positive for environmental allergies.        Objective:    Physical Exam   Constitutional: She appears well-developed and well-nourished. No distress.   Neurological: She is alert and oriented to person, place, and time. She is not disoriented.   Psychiatric: She has a normal mood and affect.   Skin:   Areas Examined (abnormalities noted in diagram):   Head / Face Inspection Performed  Neck Inspection Performed  Chest / Axilla Inspection Performed  Back Inspection Performed  RUE Inspected  LUE Inspection Performed                  Diagram Legend     Erythematous scaling macule/papule c/w actinic keratosis       Vascular papule c/w angioma      Pigmented verrucoid papule/plaque c/w seborrheic keratosis      Yellow umbilicated papule c/w sebaceous hyperplasia      Irregularly shaped tan macule c/w lentigo     1-2 mm smooth white papules consistent with Milia      Movable subcutaneous cyst with punctum c/w epidermal inclusion cyst      Subcutaneous movable cyst c/w " "pilar cyst      Firm pink to brown papule c/w dermatofibroma      Pedunculated fleshy papule(s) c/w skin tag(s)      Evenly pigmented macule c/w junctional nevus     Mildly variegated pigmented, slightly irregular-bordered macule c/w mildly atypical nevus      Flesh colored to evenly pigmented papule c/w intradermal nevus       Pink pearly papule/plaque c/w basal cell carcinoma      Erythematous hyperkeratotic cursted plaque c/w SCC      Surgical scar with no sign of skin cancer recurrence      Open and closed comedones      Inflammatory papules and pustules      Verrucoid papule consistent consistent with wart     Erythematous eczematous patches and plaques     Dystrophic onycholytic nail with subungual debris c/w onychomycosis     Umbilicated papule    Erythematous-base heme-crusted tan verrucoid plaque consistent with inflamed seborrheic keratosis     Erythematous Silvery Scaling Plaque c/w Psoriasis     See annotation      Assessment / Plan:        Dermatitis - right now only excoriations visible. Pt reports "bumps" come up. Unclear if prurigo, or neurodermatitis, or another skin process.    Good skin care regimen discussed including limiting to one bath or shower/day, using lukewarm water with mild soap and moisturizing cream to skin 1 - 2x/day. Brochure was provided and reviewed with patient.  I advised changing to a fragrance free bar soap or body wash such as Dove, and fragrance free detergent such as Tide Free & Clear, for sensitive skin.      Keep nails trimmed short  F/u if rash returns so can better evaluate    -     triamcinolone acetonide 0.1% (KENALOG) 0.1 % cream; Apply topically 2 (two) times daily. For rash x 2 weeks then stop  Dispense: 80 g; Refill: 1    AK (actinic keratosis)  Cryosurgery Procedure Note    Verbal consent from the patient is obtained including, but not limited to, risk of hypopigmentation/hyperpigmentation, scar, recurrence of lesion. The patient is aware of the precancerous " quality and need for treatment of these lesions. Liquid nitrogen cryosurgery is applied to the 2 actinic keratoses, as detailed in the physical exam, to produce a freeze injury. The patient is aware that blisters may form and is instructed on wound care with gentle cleansing and use of vaseline ointment to keep moist until healed. The patient is supplied a handout on cryosurgery and is instructed to call if lesions do not completely resolve.             Follow up for for TBSE - JGD.   no

## 2019-08-23 NOTE — PROGRESS NOTES
Subjective:      Patient ID: Jolly Matias is a 69 y.o. female.    Chief Complaint: PCP (Lin De Paz MD 8/13/19); Diabetic Foot Exam; and Foot Problem (RT foot 4th toe )    Jolly is a 69 y.o. female who presents to the clinic upon referral from Dr. Ahn  for evaluation and treatment of diabetic feet. Jolly has a past medical history of Atypical ductal hyperplasia, breast, Basal cell carcinoma (4/2011), Basal cell carcinoma (4/2015), Basal cell carcinoma (8/2015), BCC (basal cell carcinoma) (excised ), Diabetes mellitus, Diverticulitis, Fibrocystic breast, HLD (hyperlipidemia), Hypertension, Prediabetes (10/16/2013), and Skin cancer. Patient relates no major problem with feet. Only complaints today consist of yearly comprehensive diabetic  foot examination and 4th toe deformity .    PCP: Debbie Venegas,     Date Last Seen by PCP:   Chief Complaint   Patient presents with    PCP     Lin De Paz MD 8/13/19    Diabetic Foot Exam    Foot Problem     RT foot 4th toe          Current shoe gear: Casual shoes    Hemoglobin A1C   Date Value Ref Range Status   02/06/2019 6.1 (H) 4.0 - 5.6 % Final     Comment:     ADA Screening Guidelines:  5.7-6.4%  Consistent with prediabetes  >or=6.5%  Consistent with diabetes  High levels of fetal hemoglobin interfere with the HbA1C  assay. Heterozygous hemoglobin variants (HbS, HgC, etc)do  not significantly interfere with this assay.   However, presence of multiple variants may affect accuracy.     07/03/2018 6.0 (H) 4.0 - 5.6 % Final     Comment:     ADA Screening Guidelines:  5.7-6.4%  Consistent with prediabetes  >or=6.5%  Consistent with diabetes  High levels of fetal hemoglobin interfere with the HbA1C  assay. Heterozygous hemoglobin variants (HbS, HgC, etc)do  not significantly interfere with this assay.   However, presence of multiple variants may affect accuracy.     04/06/2018 5.9 (H) 4.0 - 5.6 % Final     Comment:     According to ADA guidelines,  hemoglobin A1c <7.0% represents  optimal control in non-pregnant diabetic patients. Different  metrics may apply to specific patient populations.   Standards of Medical Care in Diabetes-2016.  For the purpose of screening for the presence of diabetes:  <5.7%     Consistent with the absence of diabetes  5.7-6.4%  Consistent with increasing risk for diabetes   (prediabetes)  >or=6.5%  Consistent with diabetes  Currently, no consensus exists for use of hemoglobin A1c  for diagnosis of diabetes for children.  This Hemoglobin A1c assay has significant interference with fetal   hemoglobin   (HbF). The results are invalid for patients with abnormal amounts of   HbF,   including those with known Hereditary Persistence   of Fetal Hemoglobin. Heterozygous hemoglobin variants (HbAS, HbAC,   HbAD, HbAE, HbA2) do not significantly interfere with this assay;   however, presence of multiple variants in a sample may impact the %   interference.             Review of Systems   Constitution: Negative for chills, decreased appetite and fever.   Cardiovascular: Negative for leg swelling.   Musculoskeletal: Negative for arthritis, joint pain, joint swelling and myalgias.   Gastrointestinal: Negative for nausea and vomiting.   Neurological: Negative for loss of balance, numbness and paresthesias.               Patient Active Problem List   Diagnosis    Prediabetes    Chronic bronchitis    Anxiety    Bronchiectasis without complication    Pure hypercholesterolemia    Essential hypertension    Splenic artery aneurysm    Abdominal aortic atherosclerosis    History of basal cell carcinoma    Sleep apnea    Chondromalacia of right knee    Decreased ROM of right knee    Decreased strength    Decreased mobility and endurance       Current Outpatient Medications on File Prior to Visit   Medication Sig Dispense Refill    atorvastatin (LIPITOR) 20 MG tablet TAKE 1 TABLET(20 MG) BY MOUTH EVERY DAY 90 tablet 1    azelastine (ASTELIN)  137 mcg (0.1 %) nasal spray 1 spray (137 mcg total) by Nasal route 2 (two) times daily. 30 mL 0    celecoxib (CELEBREX) 200 MG capsule TAKE ONE CAPSULE BY MOUTH ONCE DAILY 30 capsule 0    cyanocobalamin (VITAMIN B-12) 1000 MCG tablet Take 100 mcg by mouth once daily.      DUREZOL 0.05 % Drop ophthalmic solution   1    flaxseed oil 1,000 mg Cap Take 1 capsule by mouth once daily.        fluocinonide (LIDEX) 0.05 % external solution AAA scalp qday - bid prn pruritus 60 mL 3    FLUZONE HIGH-DOSE 2018-19, PF, 180 mcg/0.5 mL vaccine ADMINISTER 0.5ML IN THE MUSCLE AS DIRECTED  0    ILEVRO 0.3 % DrpS   2    ketoconazole (NIZORAL) 2 % cream AAA in corners of mouth qhs 30 g 3    levocetirizine (XYZAL) 5 MG tablet Take 1 tablet (5 mg total) by mouth every evening. 30 tablet 11    metFORMIN (GLUCOPHAGE) 500 MG tablet TAKE 1 TABLET(500 MG) BY MOUTH TWICE DAILY WITH MEALS 180 tablet 2    nystatin (MYCOSTATIN) powder Apply topically 2 (two) times daily. aaa qd- bid prn flare groin, under arm, under breasts 120 g 2    ofloxacin (OCUFLOX) 0.3 % ophthalmic solution INT 1 GTT INTO OU QID  0    olmesartan (BENICAR) 40 MG tablet Take 40 mg by mouth once daily.      permethrin (ELIMITE) 5 % cream ADA EXT AA 1 TIME FOR 1 DOSE  0    sertraline (ZOLOFT) 100 MG tablet TAKE 1 TABLET(100 MG) BY MOUTH EVERY DAY 90 tablet 2    spironolactone (ALDACTONE) 25 MG tablet TAKE 1 TABLET(25 MG) BY MOUTH EVERY DAY (Patient taking differently: TAKE 1 TABLET(25 MG) BY MOUTH EVERY DAY  Hold the day of surgery) 90 tablet 1    triamcinolone acetonide 0.1% (KENALOG) 0.1 % cream AAA bid 60 g 1    vitamin D 1000 units Tab Take 185 mg by mouth once daily.      valACYclovir (VALTREX) 1000 MG tablet Take 2 tablets (2,000 mg total) by mouth every 12 (twelve) hours. For 1-2 days at first sign of flare 12 tablet 5     No current facility-administered medications on file prior to visit.        Review of patient's allergies indicates:   Allergen  "Reactions    Hydrocodone      Also makes pt "very sleepy"    Kiwi (actinidia chinensis) Swelling       Past Surgical History:   Procedure Laterality Date    APPENDECTOMY      ARTHROSCOPY, KNEE, WITH DEBRIDEMENT Right 2018    Performed [facility] at [facility]ST [facility]LR    ARTHROSCOPY, KNEE, WITH MENISCECTOMY (partial lateral and medial menisectomy, chondraplasty Right 2018    [facility] at [facility]    bilateral breast duct excision      BREAST BIOPSY Right     Excisional bx, benign    BREAST BIOPSY Left     Excisional bx, benign     SECTION      x2    COLON SURGERY Left 2017    sigmoidectomy for diverticulitis    COLONOSCOPY N/A 2017    Performed by IBRAHIMA Philip MD at [facility] END[facility] (4TH FLR)    ESOPHAGOGASTRODUODENOSCOPY (EGD) N/A 2016    Performed by Skip Irwin MD at [facility] END[facility] (4TH FLR)    EXCISION, PLICA, KNEE, ARTHROSCOPIC, Right 2018    Per[facility] at [facility] OR 1ST FLR    fulgaration of endometriosis x 2      HYSTERECTOMY      For endometriosis and DUB--age 42, ovaries in?    INCISIONAL HERNIA REPAIR  2017    KNEE SURGERY      OOPHORECTOMY      REPAIR-HERNIA-INCISIONAL-ROBOTIC-XI N/A 2017    Performed by Yg Haile Jr., [facility] at [facility] OR 2ND FLR    SIGMOIDECTOMY-LAPAROSCOPIC N/A 2017    Performed by Jorgito Juarez [facility] at [facility] OR 2ND FLR    SIGMOIDOSCOPY-FLEXIBLE  2017    Per[facility] at [facility] OR 2ND FLR    SKIN CANCER EXCISION      BCC forehead , temple, shoulder, chest    TONSILLECTOMY         Family History   Problem Relation Age of Onset    Skin cancer Father     Hypertension Father     Heart disease Father     Diabetes Father     Melanoma Father     Cancer Father     Skin cancer Mother     Hypertension Mother     Thyroid disease Mother     Dementia Mother     Hypertension Sister     Hyperlipidemia Sister     Anxiety disorder " "Daughter     Hypertension Son     Hyperlipidemia Son     Breast cancer Maternal Aunt     Diabetes Maternal Uncle        Social History     Socioeconomic History    Marital status:      Spouse name: Not on file    Number of children: Not on file    Years of education: Not on file    Highest education level: Not on file   Occupational History    Not on file   Social Needs    Financial resource strain: Not on file    Food insecurity:     Worry: Not on file     Inability: Not on file    Transportation needs:     Medical: Not on file     Non-medical: Not on file   Tobacco Use    Smoking status: Never Smoker    Smokeless tobacco: Never Used   Substance and Sexual Activity    Alcohol use: Yes     Alcohol/week: 0.0 oz     Types: 1 - 2 Glasses of wine per week     Comment: 3-4x /week    Drug use: No    Sexual activity: Yes     Partners: Male     Birth control/protection: Post-menopausal   Lifestyle    Physical activity:     Days per week: Not on file     Minutes per session: Not on file    Stress: Not on file   Relationships    Social connections:     Talks on phone: Not on file     Gets together: Not on file     Attends Restorationist service: Not on file     Active member of club or organization: Not on file     Attends meetings of clubs or organizations: Not on file     Relationship status: Not on file   Other Topics Concern    Are you pregnant or think you may be? No    Breast-feeding No   Social History Narrative    , walking some           Objective:       Vitals:    08/20/19 1418   BP: 113/68   Pulse: 69   Resp: 18   Weight: 70 kg (154 lb 5.2 oz)   Height: 5' 1" (1.549 m)   PainSc: 0-No pain        Physical Exam   Constitutional: She appears well-developed and well-nourished.  Non-toxic appearance. She does not have a sickly appearance. No distress.   alert and oriented x 3.    Cardiovascular:   Pulses:       Dorsalis pedis pulses are 2+ on the right side, and 2+ on the left side.        " Posterior tibial pulses are 2+ on the right side, and 2+ on the left side.    Capillary refill time is within normal limits. Digital hair present.    Pulmonary/Chest: No respiratory distress.   Musculoskeletal: She exhibits no deformity.        Right ankle: No tenderness. No lateral malleolus, no medial malleolus, no AITFL, no CF ligament and no posterior TFL tenderness found. Achilles tendon exhibits no pain, no defect and normal Youngblood's test results.        Left ankle: No tenderness. No lateral malleolus, no medial malleolus, no AITFL, no CF ligament and no posterior TFL tenderness found. Achilles tendon exhibits no pain, no defect and normal Youngblood's test results.        Right foot: There is no tenderness and no bony tenderness.        Left foot: There is no tenderness and no bony tenderness.   Adequate joint range of motion without pain, limitation, nor crepitation Bilateral feet and ankle joints. Muscle strength is 5/5 in all groups bilaterally.         reducible IPJ digital contracture 2-4 b/l      Feet:   Right Foot:   Protective Sensation: 5 sites tested. 5 sites sensed.   Left Foot:   Protective Sensation: 5 sites tested. 5 sites sensed.   Lymphadenopathy:   No lymphatic streaking     Neurological: She displays no atrophy. No sensory deficit.   Light touch present     Skin: Skin is warm, dry and intact. No rash noted. She is not diaphoretic. No cyanosis. No pallor. Nails show no clubbing.   Skin is of normal turgor.   Normal temperature gradient.  Examination of the skin reveals no evidence of significant rashes, open lesions, suspicious appearing nevi or other concerning lesions.        Toenails 1-5 bilaterally are neatly trimmed; of normal color and thickness     Psychiatric: Her mood appears not anxious. Her affect is not inappropriate. Her speech is not slurred. She is not combative. She is communicative. She is attentive.   Nursing note and vitals reviewed.            Assessment:       Encounter  Diagnosis   Name Primary?    Toe deformity, acquired, right Yes         Plan:       Jolly was seen today for pcp, diabetic foot exam and foot problem.    Diagnoses and all orders for this visit:    Toe deformity, acquired, right  -     X-Ray Foot Complete Right; Future      I counseled the patient on her conditions, their implications and medical management.        - Shoe inspection. Diabetic Foot Education. Patient reminded of the importance of good nutrition and blood sugar control to help prevent podiatric complications of diabetes. Patient instructed on proper foot hygeine. We discussed wearing proper shoe gear, daily foot inspections, never walking without protective shoe gear, caution putting sharp instruments to feet     - Discussed DM foot care:  Wear comfortable, proper fitting shoes. Wash feet daily. Dry well. After drying, apply moisturizer to feet (no lotion to webspaces). Inspect feet daily for skin breaks, blisters, swelling, or redness. Wear cotton socks (preferably white)  Change socks every day. Do NOT walk barefoot. Do NOT use heating pads or warm/hot water soaks     - Discussed importance of daily moisturizer to the feet such as Gold bonds diabetic foot cream    - Patient is low risk for developing lower extremity issues secondary to diabetes. I recommend continued yearly diabetic foot examinations.     - Patients PCP can perform yearly foot checks . Currently  patient has no pedal manifestations of DM    - Patients with out pedal manifestations of DM, do not qualify for nail/callus trimming     - Discussed surgical and conservative management of HT deformity. Conservatively we did discuss padding, and shoe modifications such as softer shoes with wide toe boxes. Surgically we briefly discussed pre and post operative expectations. The patient elects for sx  management at this time     - Radiographics independently reviewed in detail .    - RTC in  PRN

## 2019-09-06 ENCOUNTER — LAB VISIT (OUTPATIENT)
Dept: LAB | Facility: HOSPITAL | Age: 69
End: 2019-09-06
Attending: INTERNAL MEDICINE
Payer: MEDICARE

## 2019-09-06 DIAGNOSIS — E78.2 MIXED HYPERLIPIDEMIA: ICD-10-CM

## 2019-09-06 DIAGNOSIS — I10 ESSENTIAL HYPERTENSION, MALIGNANT: Primary | ICD-10-CM

## 2019-09-06 DIAGNOSIS — E55.9 VITAMIN D DEFICIENCY: ICD-10-CM

## 2019-09-06 LAB
25(OH)D3+25(OH)D2 SERPL-MCNC: 39 NG/ML (ref 30–96)
ALBUMIN SERPL BCP-MCNC: 4.1 G/DL (ref 3.5–5.2)
ALP SERPL-CCNC: 109 U/L (ref 55–135)
ALT SERPL W/O P-5'-P-CCNC: 9 U/L (ref 10–44)
ANION GAP SERPL CALC-SCNC: 9 MMOL/L (ref 8–16)
AST SERPL-CCNC: 14 U/L (ref 10–40)
BILIRUB SERPL-MCNC: 0.4 MG/DL (ref 0.1–1)
BUN SERPL-MCNC: 24 MG/DL (ref 8–23)
CALCIUM SERPL-MCNC: 10.2 MG/DL (ref 8.7–10.5)
CHLORIDE SERPL-SCNC: 107 MMOL/L (ref 95–110)
CHOLEST SERPL-MCNC: 156 MG/DL (ref 120–199)
CHOLEST/HDLC SERPL: 2.9 {RATIO} (ref 2–5)
CO2 SERPL-SCNC: 24 MMOL/L (ref 23–29)
CREAT SERPL-MCNC: 1.1 MG/DL (ref 0.5–1.4)
EST. GFR  (AFRICAN AMERICAN): 58.8 ML/MIN/1.73 M^2
EST. GFR  (NON AFRICAN AMERICAN): 51 ML/MIN/1.73 M^2
GLUCOSE SERPL-MCNC: 95 MG/DL (ref 70–110)
HDLC SERPL-MCNC: 54 MG/DL (ref 40–75)
HDLC SERPL: 34.6 % (ref 20–50)
LDLC SERPL CALC-MCNC: 83 MG/DL (ref 63–159)
NONHDLC SERPL-MCNC: 102 MG/DL
POTASSIUM SERPL-SCNC: 4.8 MMOL/L (ref 3.5–5.1)
PROT SERPL-MCNC: 7.2 G/DL (ref 6–8.4)
SODIUM SERPL-SCNC: 140 MMOL/L (ref 136–145)
TRIGL SERPL-MCNC: 95 MG/DL (ref 30–150)
TSH SERPL DL<=0.005 MIU/L-ACNC: 2.49 UIU/ML (ref 0.4–4)

## 2019-09-06 PROCEDURE — 36415 COLL VENOUS BLD VENIPUNCTURE: CPT | Mod: PO

## 2019-09-06 PROCEDURE — 82306 VITAMIN D 25 HYDROXY: CPT

## 2019-09-06 PROCEDURE — 84443 ASSAY THYROID STIM HORMONE: CPT

## 2019-09-06 PROCEDURE — 80061 LIPID PANEL: CPT

## 2019-09-06 PROCEDURE — 80053 COMPREHEN METABOLIC PANEL: CPT

## 2019-09-30 ENCOUNTER — PATIENT OUTREACH (OUTPATIENT)
Dept: ADMINISTRATIVE | Facility: OTHER | Age: 69
End: 2019-09-30

## 2019-09-30 DIAGNOSIS — E11.9 DIABETES MELLITUS WITHOUT COMPLICATION: Primary | ICD-10-CM

## 2019-10-02 ENCOUNTER — OFFICE VISIT (OUTPATIENT)
Dept: PODIATRY | Facility: CLINIC | Age: 69
End: 2019-10-02
Payer: MEDICARE

## 2019-10-02 VITALS
DIASTOLIC BLOOD PRESSURE: 69 MMHG | HEART RATE: 81 BPM | SYSTOLIC BLOOD PRESSURE: 134 MMHG | WEIGHT: 156.5 LBS | HEIGHT: 61 IN | BODY MASS INDEX: 29.55 KG/M2

## 2019-10-02 DIAGNOSIS — M20.61 TOE DEFORMITY, ACQUIRED, RIGHT: Primary | ICD-10-CM

## 2019-10-02 PROCEDURE — 3075F PR MOST RECENT SYSTOLIC BLOOD PRESS GE 130-139MM HG: ICD-10-PCS | Mod: CPTII,S$GLB,, | Performed by: PODIATRIST

## 2019-10-02 PROCEDURE — 99214 OFFICE O/P EST MOD 30 MIN: CPT | Mod: S$GLB,,, | Performed by: PODIATRIST

## 2019-10-02 PROCEDURE — 3078F DIAST BP <80 MM HG: CPT | Mod: CPTII,S$GLB,, | Performed by: PODIATRIST

## 2019-10-02 PROCEDURE — 1101F PR PT FALLS ASSESS DOC 0-1 FALLS W/OUT INJ PAST YR: ICD-10-PCS | Mod: CPTII,S$GLB,, | Performed by: PODIATRIST

## 2019-10-02 PROCEDURE — 99214 PR OFFICE/OUTPT VISIT, EST, LEVL IV, 30-39 MIN: ICD-10-PCS | Mod: S$GLB,,, | Performed by: PODIATRIST

## 2019-10-02 PROCEDURE — 99999 PR PBB SHADOW E&M-EST. PATIENT-LVL III: CPT | Mod: PBBFAC,,, | Performed by: PODIATRIST

## 2019-10-02 PROCEDURE — 3078F PR MOST RECENT DIASTOLIC BLOOD PRESSURE < 80 MM HG: ICD-10-PCS | Mod: CPTII,S$GLB,, | Performed by: PODIATRIST

## 2019-10-02 PROCEDURE — 1101F PT FALLS ASSESS-DOCD LE1/YR: CPT | Mod: CPTII,S$GLB,, | Performed by: PODIATRIST

## 2019-10-02 PROCEDURE — 99999 PR PBB SHADOW E&M-EST. PATIENT-LVL III: ICD-10-PCS | Mod: PBBFAC,,, | Performed by: PODIATRIST

## 2019-10-02 PROCEDURE — 3075F SYST BP GE 130 - 139MM HG: CPT | Mod: CPTII,S$GLB,, | Performed by: PODIATRIST

## 2019-10-03 NOTE — PROGRESS NOTES
Subjective:      Patient ID: Jolly Matias is a 69 y.o. female.    Chief Complaint: Foot Pain (rt foot)    Jolly is a 69 y.o. female who presents to the clinic upon referral from Dr. Ahn  for evaluation and treatment of diabetic feet. Jolly has a past medical history of Atypical ductal hyperplasia, breast, Basal cell carcinoma (4/2011), Basal cell carcinoma (4/2015), Basal cell carcinoma (8/2015), BCC (basal cell carcinoma) (excised ), Diabetes mellitus, Diverticulitis, Fibrocystic breast, HLD (hyperlipidemia), Hypertension, Prediabetes (10/16/2013), and Skin cancer. Patient relates no major problem with feet. Only complaints today consist of yearly comprehensive diabetic  foot examination and 4th toe deformity .  She is here to discuss 4th toe deformity right foot.  She has attempted conservative measures including shoe gear changes and digital padding.    PCP: Debbie Venegas, DO    Date Last Seen by PCP:   Chief Complaint   Patient presents with    Foot Pain     rt foot         Current shoe gear: Casual shoes    Hemoglobin A1C   Date Value Ref Range Status   02/06/2019 6.1 (H) 4.0 - 5.6 % Final     Comment:     ADA Screening Guidelines:  5.7-6.4%  Consistent with prediabetes  >or=6.5%  Consistent with diabetes  High levels of fetal hemoglobin interfere with the HbA1C  assay. Heterozygous hemoglobin variants (HbS, HgC, etc)do  not significantly interfere with this assay.   However, presence of multiple variants may affect accuracy.     07/03/2018 6.0 (H) 4.0 - 5.6 % Final     Comment:     ADA Screening Guidelines:  5.7-6.4%  Consistent with prediabetes  >or=6.5%  Consistent with diabetes  High levels of fetal hemoglobin interfere with the HbA1C  assay. Heterozygous hemoglobin variants (HbS, HgC, etc)do  not significantly interfere with this assay.   However, presence of multiple variants may affect accuracy.     04/06/2018 5.9 (H) 4.0 - 5.6 % Final     Comment:     According to ADA guidelines,  hemoglobin A1c <7.0% represents  optimal control in non-pregnant diabetic patients. Different  metrics may apply to specific patient populations.   Standards of Medical Care in Diabetes-2016.  For the purpose of screening for the presence of diabetes:  <5.7%     Consistent with the absence of diabetes  5.7-6.4%  Consistent with increasing risk for diabetes   (prediabetes)  >or=6.5%  Consistent with diabetes  Currently, no consensus exists for use of hemoglobin A1c  for diagnosis of diabetes for children.  This Hemoglobin A1c assay has significant interference with fetal   hemoglobin   (HbF). The results are invalid for patients with abnormal amounts of   HbF,   including those with known Hereditary Persistence   of Fetal Hemoglobin. Heterozygous hemoglobin variants (HbAS, HbAC,   HbAD, HbAE, HbA2) do not significantly interfere with this assay;   however, presence of multiple variants in a sample may impact the %   interference.             Review of Systems   Constitution: Negative for chills, decreased appetite and fever.   Cardiovascular: Negative for leg swelling.   Musculoskeletal: Negative for arthritis, joint pain, joint swelling and myalgias.   Gastrointestinal: Negative for nausea and vomiting.   Neurological: Negative for loss of balance, numbness and paresthesias.               Patient Active Problem List   Diagnosis    Prediabetes    Chronic bronchitis    Anxiety    Bronchiectasis without complication    Pure hypercholesterolemia    Essential hypertension    Splenic artery aneurysm    Abdominal aortic atherosclerosis    History of basal cell carcinoma    Sleep apnea    Chondromalacia of right knee    Decreased ROM of right knee    Decreased strength    Decreased mobility and endurance       Current Outpatient Medications on File Prior to Visit   Medication Sig Dispense Refill    atorvastatin (LIPITOR) 20 MG tablet TAKE 1 TABLET(20 MG) BY MOUTH EVERY DAY 90 tablet 1    azelastine (ASTELIN)  137 mcg (0.1 %) nasal spray 1 spray (137 mcg total) by Nasal route 2 (two) times daily. 30 mL 0    celecoxib (CELEBREX) 200 MG capsule TAKE ONE CAPSULE BY MOUTH ONCE DAILY 30 capsule 0    cyanocobalamin (VITAMIN B-12) 1000 MCG tablet Take 100 mcg by mouth once daily.      DUREZOL 0.05 % Drop ophthalmic solution   1    flaxseed oil 1,000 mg Cap Take 1 capsule by mouth once daily.        fluocinonide (LIDEX) 0.05 % external solution AAA scalp qday - bid prn pruritus 60 mL 3    FLUZONE HIGH-DOSE 2018-19, PF, 180 mcg/0.5 mL vaccine ADMINISTER 0.5ML IN THE MUSCLE AS DIRECTED  0    ILEVRO 0.3 % DrpS   2    ketoconazole (NIZORAL) 2 % cream AAA in corners of mouth qhs 30 g 3    levocetirizine (XYZAL) 5 MG tablet Take 1 tablet (5 mg total) by mouth every evening. 30 tablet 11    metFORMIN (GLUCOPHAGE) 500 MG tablet TAKE 1 TABLET(500 MG) BY MOUTH TWICE DAILY WITH MEALS 180 tablet 2    nystatin (MYCOSTATIN) powder Apply topically 2 (two) times daily. aaa qd- bid prn flare groin, under arm, under breasts 120 g 2    ofloxacin (OCUFLOX) 0.3 % ophthalmic solution INT 1 GTT INTO OU QID  0    olmesartan (BENICAR) 40 MG tablet Take 40 mg by mouth once daily.      permethrin (ELIMITE) 5 % cream ADA EXT AA 1 TIME FOR 1 DOSE  0    sertraline (ZOLOFT) 100 MG tablet TAKE 1 TABLET(100 MG) BY MOUTH EVERY DAY 90 tablet 2    spironolactone (ALDACTONE) 25 MG tablet TAKE 1 TABLET(25 MG) BY MOUTH EVERY DAY (Patient taking differently: TAKE 1 TABLET(25 MG) BY MOUTH EVERY DAY  Hold the day of surgery) 90 tablet 1    triamcinolone acetonide 0.1% (KENALOG) 0.1 % cream AAA bid 60 g 1    triamcinolone acetonide 0.1% (KENALOG) 0.1 % cream Apply topically 2 (two) times daily. For rash x 2 weeks then stop 80 g 1    valACYclovir (VALTREX) 1000 MG tablet Take 2 tablets (2,000 mg total) by mouth every 12 (twelve) hours. For 1-2 days at first sign of flare 12 tablet 5    vitamin D 1000 units Tab Take 185 mg by mouth once daily.       No  "current facility-administered medications on file prior to visit.        Review of patient's allergies indicates:   Allergen Reactions    Hydrocodone      Also makes pt "very sleepy"    Kiwi (actinidia chinensis) Swelling       Past Surgical History:   Procedure Laterality Date    APPENDECTOMY      bilateral breast duct excision      BREAST BIOPSY Right     Excisional bx, benign    BREAST BIOPSY Left     Excisional bx, benign     SECTION      x2    COLON SURGERY Left 2017    sigmoidectomy for diverticulitis    COLONOSCOPY N/A 2017    Procedure: COLONOSCOPY;  Surgeon: IBRAHIMA Philip MD;  Location: [facility] (4TH FLR);  Service: Endoscopy;  Laterality: N/A;    fulgaration of endometriosis x 2      HYSTERECTOMY      For endometriosis and DUB--age 42, ovaries in?    INCISIONAL HERNIA REPAIR  2017    KNEE ARTHROSCOPY W/ DEBRIDEMENT Right 2018    Procedure: ARTHROSCOPY, KNEE, WITH DEBRIDEMENT;  Surgeon: MARVA Arias MD;  Location: [facility] OR 1ST FLR;  Service: Orthopedics;  Laterality: Right;    KNEE ARTHROSCOPY W/ MENISCECTOMY Right 2018    Procedure: ARTHROSCOPY, KNEE, WITH MENISCECTOMY (partial lateral and medial menisectomy, chondraplasty;  Surgeon: MARVA Arias MD;  Location: [facility] OR 1ST FLR;  Service: Orthopedics;  Laterality: Right;    KNEE ARTHROSCOPY W/ PLICA EXCISION Right 2018    Procedure: EXCISION, PLICA, KNEE, ARTHROSCOPIC,;  Surgeon: MARVA Arias MD;  [facility];  Service: Orthopedics;  Laterality: Right;    KNEE SURGERY      OOPHORECTOMY      SKIN CANCER EXCISION      BCC forehead , temple, shoulder, chest    TONSILLECTOMY         Family History   Problem Relation Age of Onset    Skin cancer Father     Hypertension Father     Heart disease Father     Diabetes Father     Melanoma Father     Cancer Father     Skin cancer Mother     Hypertension Mother     Thyroid disease Mother     Dementia Mother     Hypertension " "Sister     Hyperlipidemia Sister     Anxiety disorder Daughter     Hypertension Son     Hyperlipidemia Son     Breast cancer Maternal Aunt     Diabetes Maternal Uncle        Social History     Socioeconomic History    Marital status:      Spouse name: Not on file    Number of children: Not on file    Years of education: Not on file    Highest education level: Not on file   Occupational History    Not on file   Social Needs    Financial resource strain: Not on file    Food insecurity:     Worry: Not on file     Inability: Not on file    Transportation needs:     Medical: Not on file     Non-medical: Not on file   Tobacco Use    Smoking status: Never Smoker    Smokeless tobacco: Never Used   Substance and Sexual Activity    Alcohol use: Yes     Alcohol/week: 0.0 standard drinks     Types: 1 - 2 Glasses of wine per week     Comment: 3-4x /week    Drug use: No    Sexual activity: Yes     Partners: Male     Birth control/protection: Post-menopausal   Lifestyle    Physical activity:     Days per week: Not on file     Minutes per session: Not on file    Stress: Not on file   Relationships    Social connections:     Talks on phone: Not on file     Gets together: Not on file     Attends Buddhism service: Not on file     Active member of club or organization: Not on file     Attends meetings of clubs or organizations: Not on file     Relationship status: Not on file   Other Topics Concern    Are you pregnant or think you may be? No    Breast-feeding No   Social History Narrative    , walking some           Objective:       Vitals:    10/02/19 1037   BP: 134/69   Pulse: 81   Weight: 71 kg (156 lb 8.4 oz)   Height: 5' 1" (1.549 m)   PainSc: 0-No pain        Physical Exam   Constitutional: She appears well-developed and well-nourished.  Non-toxic appearance. She does not have a sickly appearance. No distress.   alert and oriented x 3.    Cardiovascular:   Pulses:       Dorsalis pedis pulses " are 2+ on the right side, and 2+ on the left side.        Posterior tibial pulses are 2+ on the right side, and 2+ on the left side.    Capillary refill time is within normal limits. Digital hair present.    Pulmonary/Chest: No respiratory distress.   Musculoskeletal: She exhibits no deformity.        Right ankle: No tenderness. No lateral malleolus, no medial malleolus, no AITFL, no CF ligament and no posterior TFL tenderness found. Achilles tendon exhibits no pain, no defect and normal Youngblood's test results.        Left ankle: No tenderness. No lateral malleolus, no medial malleolus, no AITFL, no CF ligament and no posterior TFL tenderness found. Achilles tendon exhibits no pain, no defect and normal Youngblood's test results.        Right foot: There is no tenderness and no bony tenderness.        Left foot: There is no tenderness and no bony tenderness.   Adequate joint range of motion without pain, limitation, nor crepitation Bilateral feet and ankle joints. Muscle strength is 5/5 in all groups bilaterally.         reducible IPJ digital contracture 2-4 b/l      Feet:   Right Foot:   Protective Sensation: 5 sites tested. 5 sites sensed.   Left Foot:   Protective Sensation: 5 sites tested. 5 sites sensed.   Lymphadenopathy:   No lymphatic streaking     Neurological: She displays no atrophy. No sensory deficit.   Light touch present     Skin: Skin is warm, dry and intact. No rash noted. She is not diaphoretic. No cyanosis. No pallor. Nails show no clubbing.   Skin is of normal turgor.   Normal temperature gradient.  Examination of the skin reveals no evidence of significant rashes, open lesions, suspicious appearing nevi or other concerning lesions.        Toenails 1-5 bilaterally are neatly trimmed; of normal color and thickness     Psychiatric: Her mood appears not anxious. Her affect is not inappropriate. Her speech is not slurred. She is not combative. She is communicative. She is attentive.   Nursing note and  vitals reviewed.            Assessment:       Encounter Diagnosis   Name Primary?    Toe deformity, acquired, right Yes         Plan:       Jolly was seen today for foot pain.    Diagnoses and all orders for this visit:    Toe deformity, acquired, right      I counseled the patient on her conditions, their implications and medical management.    Follow up for an office de rotational hammertoe/arthroplasty procedure with tenotomy.  This will be performed to the right 4th toe.    - Shoe inspection. Diabetic Foot Education. Patient reminded of the importance of good nutrition and blood sugar control to help prevent podiatric complications of diabetes. Patient instructed on proper foot hygeine. We discussed wearing proper shoe gear, daily foot inspections, never walking without protective shoe gear, caution putting sharp instruments to feet     - Discussed DM foot care:  Wear comfortable, proper fitting shoes. Wash feet daily. Dry well. After drying, apply moisturizer to feet (no lotion to webspaces). Inspect feet daily for skin breaks, blisters, swelling, or redness. Wear cotton socks (preferably white)  Change socks every day. Do NOT walk barefoot. Do NOT use heating pads or warm/hot water soaks     - Discussed importance of daily moisturizer to the feet such as Gold bonds diabetic foot cream    - Patient is low risk for developing lower extremity issues secondary to diabetes. I recommend continued yearly diabetic foot examinations.     - Patients PCP can perform yearly foot checks . Currently  patient has no pedal manifestations of DM    - Patients with out pedal manifestations of DM, do not qualify for nail/callus trimming     - Discussed surgical and conservative management of HT deformity. Conservatively we did discuss padding, and shoe modifications such as softer shoes with wide toe boxes. Surgically we briefly discussed pre and post operative expectations. The patient elects for sx  management at this time      - Radiographics independently reviewed in detail .    - RTC in  PRN

## 2019-10-06 DIAGNOSIS — F32.9 REACTIVE DEPRESSION: ICD-10-CM

## 2019-10-08 RX ORDER — SERTRALINE HYDROCHLORIDE 100 MG/1
TABLET, FILM COATED ORAL
Qty: 90 TABLET | Refills: 0 | OUTPATIENT
Start: 2019-10-08

## 2019-10-22 ENCOUNTER — PROCEDURE VISIT (OUTPATIENT)
Dept: PODIATRY | Facility: CLINIC | Age: 69
End: 2019-10-22
Payer: MEDICARE

## 2019-10-22 ENCOUNTER — TELEPHONE (OUTPATIENT)
Dept: INTERNAL MEDICINE | Facility: CLINIC | Age: 69
End: 2019-10-22

## 2019-10-22 VITALS
HEART RATE: 74 BPM | BODY MASS INDEX: 30.39 KG/M2 | HEIGHT: 61 IN | DIASTOLIC BLOOD PRESSURE: 80 MMHG | SYSTOLIC BLOOD PRESSURE: 151 MMHG | WEIGHT: 160.94 LBS

## 2019-10-22 DIAGNOSIS — M20.41 HAMMER TOE OF RIGHT FOOT: ICD-10-CM

## 2019-10-22 DIAGNOSIS — Z12.31 VISIT FOR SCREENING MAMMOGRAM: Primary | ICD-10-CM

## 2019-10-22 DIAGNOSIS — M20.61 TOE DEFORMITY, ACQUIRED, RIGHT: Primary | ICD-10-CM

## 2019-10-22 PROCEDURE — 28280 PR FUSION OF TOES: ICD-10-PCS | Mod: 52,RT,S$GLB, | Performed by: PODIATRIST

## 2019-10-22 PROCEDURE — 28232 INCISION OF TOE TENDON: CPT | Mod: 51,T8,S$GLB, | Performed by: PODIATRIST

## 2019-10-22 PROCEDURE — 28232 PR INCISION FLEX TOE TENDON: ICD-10-PCS | Mod: 51,T8,S$GLB, | Performed by: PODIATRIST

## 2019-10-22 PROCEDURE — 28280 FUSION OF TOES: CPT | Mod: 52,RT,S$GLB, | Performed by: PODIATRIST

## 2019-10-22 RX ORDER — CEPHALEXIN 250 MG/1
250 CAPSULE ORAL EVERY 12 HOURS
Qty: 10 CAPSULE | Refills: 0 | Status: SHIPPED | OUTPATIENT
Start: 2019-10-22 | End: 2020-09-08

## 2019-10-22 RX ORDER — ACETAMINOPHEN AND CODEINE PHOSPHATE 300; 30 MG/1; MG/1
1 TABLET ORAL EVERY 8 HOURS PRN
Qty: 30 TABLET | Refills: 0 | Status: SHIPPED | OUTPATIENT
Start: 2019-10-22 | End: 2019-11-01

## 2019-10-22 NOTE — TELEPHONE ENCOUNTER
----- Message from Jeremie Nance sent at 10/22/2019  3:34 PM CDT -----  Contact: Patient [phone]  Type: Orders Request    What orders/ testing are being requested? Mammo Gram    Is there a future appointment scheduled for the patient with PCP? No     When?    Comments:10/23/18 last testing     Please call an advise  Thank you

## 2019-10-27 ENCOUNTER — PATIENT OUTREACH (OUTPATIENT)
Dept: ADMINISTRATIVE | Facility: OTHER | Age: 69
End: 2019-10-27

## 2019-10-27 NOTE — PROCEDURES
Attention was directed to the right 4th digit where 4cc of 1% lidocaine plain was injected in a digital block fashion, the foot was then prepped and draped using asceptic technique, anesthesia was confirmed, a #15 blade was inserted into the central/plantar aspect of the joint contracture and the flexor tendon was released, the digit was straightened manually and reduction was deemed adequate, tincture of benzoin and steri-strips were applied to splint the digit in a corrected position and closed with 4-0 nylon suture and dry sterile dressing applied to the foot. Capillary fill time was less than 3 seconds.  Next, attention was directed to the interspace of the right 3rd and 4th digit where an ellipse of skin was removed from the lateral aspect of the 3rd digit the medial aspect of the 4th digit as well as the webspace.  Exostectomy as were performed and the toes were syndactylized with 4 Monocryl in for nylon.  Patient tolerated this well no complications.  Postoperative instructions discussed.  Follow-up 1 week.

## 2019-10-28 DIAGNOSIS — R73.03 PREDIABETES: ICD-10-CM

## 2019-10-28 RX ORDER — METFORMIN HYDROCHLORIDE 500 MG/1
TABLET ORAL
Qty: 180 TABLET | Refills: 0 | OUTPATIENT
Start: 2019-10-28

## 2019-10-31 ENCOUNTER — OFFICE VISIT (OUTPATIENT)
Dept: PODIATRY | Facility: CLINIC | Age: 69
End: 2019-10-31
Payer: MEDICARE

## 2019-10-31 VITALS
HEIGHT: 61 IN | DIASTOLIC BLOOD PRESSURE: 72 MMHG | BODY MASS INDEX: 30.41 KG/M2 | SYSTOLIC BLOOD PRESSURE: 118 MMHG | HEART RATE: 77 BPM

## 2019-10-31 DIAGNOSIS — M20.41 HAMMER TOE OF RIGHT FOOT: ICD-10-CM

## 2019-10-31 DIAGNOSIS — M20.61 TOE DEFORMITY, ACQUIRED, RIGHT: Primary | ICD-10-CM

## 2019-10-31 PROCEDURE — 99999 PR PBB SHADOW E&M-EST. PATIENT-LVL III: CPT | Mod: PBBFAC,,, | Performed by: PODIATRIST

## 2019-10-31 PROCEDURE — 99024 PR POST-OP FOLLOW-UP VISIT: ICD-10-PCS | Mod: S$GLB,,, | Performed by: PODIATRIST

## 2019-10-31 PROCEDURE — 99024 POSTOP FOLLOW-UP VISIT: CPT | Mod: S$GLB,,, | Performed by: PODIATRIST

## 2019-10-31 PROCEDURE — 99999 PR PBB SHADOW E&M-EST. PATIENT-LVL III: ICD-10-PCS | Mod: PBBFAC,,, | Performed by: PODIATRIST

## 2019-11-01 ENCOUNTER — HOSPITAL ENCOUNTER (OUTPATIENT)
Dept: RADIOLOGY | Facility: HOSPITAL | Age: 69
Discharge: HOME | End: 2019-11-01
Attending: INTERNAL MEDICINE
Payer: MEDICARE

## 2019-11-01 DIAGNOSIS — Z12.31 VISIT FOR SCREENING MAMMOGRAM: ICD-10-CM

## 2019-11-01 PROCEDURE — 77063 MAMMO DIGITAL SCREENING BILAT WITH TOMOSYNTHESIS_CAD: ICD-10-PCS | Mod: 26,,, | Performed by: RADIOLOGY

## 2019-11-01 PROCEDURE — 77067 SCR MAMMO BI INCL CAD: CPT | Mod: TC

## 2019-11-01 PROCEDURE — 77067 MAMMO DIGITAL SCREENING BILAT WITH TOMOSYNTHESIS_CAD: ICD-10-PCS | Mod: 26,,, | Performed by: RADIOLOGY

## 2019-11-01 PROCEDURE — 77067 SCR MAMMO BI INCL CAD: CPT | Mod: 26,,, | Performed by: RADIOLOGY

## 2019-11-01 PROCEDURE — 77063 BREAST TOMOSYNTHESIS BI: CPT | Mod: 26,,, | Performed by: RADIOLOGY

## 2019-11-03 NOTE — PROGRESS NOTES
One-week status post tenotomy and partial syndactylization right foot. She is doing well with minimal discomfort.  Continue to keep the area clean and dry with bandages intact.  Utilize surgical shoe.  Follow up in 1 week for suture removal.

## 2019-11-04 DIAGNOSIS — R73.03 PREDIABETES: ICD-10-CM

## 2019-11-04 RX ORDER — METFORMIN HYDROCHLORIDE 500 MG/1
TABLET ORAL
Qty: 180 TABLET | Refills: 0 | Status: SHIPPED | OUTPATIENT
Start: 2019-11-04 | End: 2020-02-01

## 2019-11-11 ENCOUNTER — OFFICE VISIT (OUTPATIENT)
Dept: PODIATRY | Facility: CLINIC | Age: 69
End: 2019-11-11
Payer: MEDICARE

## 2019-11-11 VITALS
HEIGHT: 61 IN | SYSTOLIC BLOOD PRESSURE: 143 MMHG | HEART RATE: 82 BPM | WEIGHT: 158 LBS | DIASTOLIC BLOOD PRESSURE: 80 MMHG | BODY MASS INDEX: 29.83 KG/M2

## 2019-11-11 DIAGNOSIS — M20.61 TOE DEFORMITY, ACQUIRED, RIGHT: Primary | ICD-10-CM

## 2019-11-11 PROCEDURE — 99024 POSTOP FOLLOW-UP VISIT: CPT | Mod: S$GLB,,, | Performed by: PODIATRIST

## 2019-11-11 PROCEDURE — 99024 PR POST-OP FOLLOW-UP VISIT: ICD-10-PCS | Mod: S$GLB,,, | Performed by: PODIATRIST

## 2019-11-11 PROCEDURE — 99999 PR PBB SHADOW E&M-EST. PATIENT-LVL III: CPT | Mod: PBBFAC,,, | Performed by: PODIATRIST

## 2019-11-11 PROCEDURE — 99999 PR PBB SHADOW E&M-EST. PATIENT-LVL III: ICD-10-PCS | Mod: PBBFAC,,, | Performed by: PODIATRIST

## 2019-11-16 NOTE — PROGRESS NOTES
Four weeks status post tenotomy and partial syndactylization right foot. She is doing well with minimal discomfort.  Continue to keep the area clean and dry with bandages intact.  Utilize surgical shoe. Sutures removed under asceptic conditions, dry sterile bandage applied. Patient is cleared to begin light bathing of operative site.  No oils, lotions, or ointments to incision for two weeks

## 2019-11-30 DIAGNOSIS — F32.9 REACTIVE DEPRESSION: ICD-10-CM

## 2019-12-01 RX ORDER — SERTRALINE HYDROCHLORIDE 100 MG/1
TABLET, FILM COATED ORAL
Qty: 90 TABLET | Refills: 0 | Status: SHIPPED | OUTPATIENT
Start: 2019-12-01 | End: 2020-02-27

## 2019-12-06 ENCOUNTER — OFFICE VISIT (OUTPATIENT)
Dept: SPORTS MEDICINE | Facility: CLINIC | Age: 69
End: 2019-12-06
Payer: MEDICARE

## 2019-12-06 VITALS
HEIGHT: 61 IN | TEMPERATURE: 98 F | SYSTOLIC BLOOD PRESSURE: 121 MMHG | DIASTOLIC BLOOD PRESSURE: 73 MMHG | BODY MASS INDEX: 29.83 KG/M2 | WEIGHT: 158 LBS | HEART RATE: 80 BPM

## 2019-12-06 DIAGNOSIS — M99.02 SOMATIC DYSFUNCTION OF THORACIC REGION: ICD-10-CM

## 2019-12-06 DIAGNOSIS — M25.552 LATERAL PAIN OF LEFT HIP: ICD-10-CM

## 2019-12-06 DIAGNOSIS — M99.04 SACRAL REGION SOMATIC DYSFUNCTION: ICD-10-CM

## 2019-12-06 DIAGNOSIS — M21.6X1 PRONATION OF BOTH FEET: ICD-10-CM

## 2019-12-06 DIAGNOSIS — M79.10 MYALGIA: ICD-10-CM

## 2019-12-06 DIAGNOSIS — M25.551 LATERAL PAIN OF RIGHT HIP: Primary | ICD-10-CM

## 2019-12-06 DIAGNOSIS — M70.62 GREATER TROCHANTERIC BURSITIS OF BOTH HIPS: ICD-10-CM

## 2019-12-06 DIAGNOSIS — M99.05 SOMATIC DYSFUNCTION OF PELVIC REGION: ICD-10-CM

## 2019-12-06 DIAGNOSIS — M99.03 SOMATIC DYSFUNCTION OF LUMBAR REGION: ICD-10-CM

## 2019-12-06 DIAGNOSIS — M70.61 GREATER TROCHANTERIC BURSITIS OF BOTH HIPS: ICD-10-CM

## 2019-12-06 DIAGNOSIS — M99.06 SOMATIC DYSFUNCTION OF LOWER EXTREMITY: ICD-10-CM

## 2019-12-06 DIAGNOSIS — M21.6X2 PRONATION OF BOTH FEET: ICD-10-CM

## 2019-12-06 PROCEDURE — 3074F PR MOST RECENT SYSTOLIC BLOOD PRESSURE < 130 MM HG: ICD-10-PCS | Mod: CPTII,S$GLB,, | Performed by: NEUROMUSCULOSKELETAL MEDICINE & OMM

## 2019-12-06 PROCEDURE — 99214 OFFICE O/P EST MOD 30 MIN: CPT | Mod: 25,S$GLB,, | Performed by: NEUROMUSCULOSKELETAL MEDICINE & OMM

## 2019-12-06 PROCEDURE — 1125F AMNT PAIN NOTED PAIN PRSNT: CPT | Mod: S$GLB,,, | Performed by: NEUROMUSCULOSKELETAL MEDICINE & OMM

## 2019-12-06 PROCEDURE — 99214 PR OFFICE/OUTPT VISIT, EST, LEVL IV, 30-39 MIN: ICD-10-PCS | Mod: 25,S$GLB,, | Performed by: NEUROMUSCULOSKELETAL MEDICINE & OMM

## 2019-12-06 PROCEDURE — 1159F PR MEDICATION LIST DOCUMENTED IN MEDICAL RECORD: ICD-10-PCS | Mod: S$GLB,,, | Performed by: NEUROMUSCULOSKELETAL MEDICINE & OMM

## 2019-12-06 PROCEDURE — 98927 OSTEOPATH MANJ 5-6 REGIONS: CPT | Mod: S$GLB,,, | Performed by: NEUROMUSCULOSKELETAL MEDICINE & OMM

## 2019-12-06 PROCEDURE — 1159F MED LIST DOCD IN RCRD: CPT | Mod: S$GLB,,, | Performed by: NEUROMUSCULOSKELETAL MEDICINE & OMM

## 2019-12-06 PROCEDURE — 1101F PR PT FALLS ASSESS DOC 0-1 FALLS W/OUT INJ PAST YR: ICD-10-PCS | Mod: CPTII,S$GLB,, | Performed by: NEUROMUSCULOSKELETAL MEDICINE & OMM

## 2019-12-06 PROCEDURE — 99999 PR PBB SHADOW E&M-EST. PATIENT-LVL III: CPT | Mod: PBBFAC,,, | Performed by: NEUROMUSCULOSKELETAL MEDICINE & OMM

## 2019-12-06 PROCEDURE — 1125F PR PAIN SEVERITY QUANTIFIED, PAIN PRESENT: ICD-10-PCS | Mod: S$GLB,,, | Performed by: NEUROMUSCULOSKELETAL MEDICINE & OMM

## 2019-12-06 PROCEDURE — 3074F SYST BP LT 130 MM HG: CPT | Mod: CPTII,S$GLB,, | Performed by: NEUROMUSCULOSKELETAL MEDICINE & OMM

## 2019-12-06 PROCEDURE — 3078F PR MOST RECENT DIASTOLIC BLOOD PRESSURE < 80 MM HG: ICD-10-PCS | Mod: CPTII,S$GLB,, | Performed by: NEUROMUSCULOSKELETAL MEDICINE & OMM

## 2019-12-06 PROCEDURE — 98927 PR OSTEOPATHIC MANIP,5-6 BODY REGN: ICD-10-PCS | Mod: S$GLB,,, | Performed by: NEUROMUSCULOSKELETAL MEDICINE & OMM

## 2019-12-06 PROCEDURE — 1101F PT FALLS ASSESS-DOCD LE1/YR: CPT | Mod: CPTII,S$GLB,, | Performed by: NEUROMUSCULOSKELETAL MEDICINE & OMM

## 2019-12-06 PROCEDURE — 99999 PR PBB SHADOW E&M-EST. PATIENT-LVL III: ICD-10-PCS | Mod: PBBFAC,,, | Performed by: NEUROMUSCULOSKELETAL MEDICINE & OMM

## 2019-12-06 PROCEDURE — 3078F DIAST BP <80 MM HG: CPT | Mod: CPTII,S$GLB,, | Performed by: NEUROMUSCULOSKELETAL MEDICINE & OMM

## 2019-12-06 PROCEDURE — 97110 THERAPEUTIC EXERCISES: CPT | Mod: GP,S$GLB,, | Performed by: NEUROMUSCULOSKELETAL MEDICINE & OMM

## 2019-12-06 PROCEDURE — 97110 PR THERAPEUTIC EXERCISES: ICD-10-PCS | Mod: GP,S$GLB,, | Performed by: NEUROMUSCULOSKELETAL MEDICINE & OMM

## 2019-12-06 NOTE — LETTER
December 6, 2019      [facility]  [address]  [facility] [address]           [facility]  [address]  [facility] [address]  Phone: [phone]          Patient: Jolly Matias   MR Number: [id number]   YOB: 1950   Date of Visit: 12/6/2019       Dear Dr. [facility].:    Thank you for referring Jolly Matias to me for evaluation. Attached you will find relevant portions of my assessment and plan of care.    If you have questions, please do not hesitate to call me. I look forward to following Jolly Matias along with you.    Sincerely,    Camilla Knowles,     Enclosure  CC:  No Recipients    If you would like to receive this communication electronically, please contact [email or web address] or [phone] to request more information on [employer] access.    For providers and/or their staff who would like to refer a patient to [facility], please contact us through our one-stop-shop provider referral line, [facility], [place], at [phone].    If you feel you have received this communication in error or would no longer like to receive these types of communications, please e-mail [email or web address]

## 2019-12-06 NOTE — PROGRESS NOTES
Subjective:     Jolly Matias     Chief Complaint   Patient presents with    Pain       HPI    Jolly is a 69 y.o. female coming in today for bilateral hip pain that began 1 year(s) ago, referred by John Ochsner, MD. Pt was seem by John Ochsner, MD on 6/13/19 and received bilateral greater trochanter bursitis CSI. Pt states there was 4 months of pain relief following the CSI. Pt states the pain the last few weeks have been the worst its ever been. Pt. describes the pain as a 5/10 dull pain that does not radiate. There was not a fall/injury/ or trauma associated with the onset of symptoms. The pain is better with heat, CSI and worse with walking, standing. Pt. Denies any other musculoskeletal complaints at this time. Patient notes that she was taking Celebrex 200 mg daily, however she stopped due to the side effect of drowsiness.  She has not had any formal physical therapy for her lateral hip pain.     Joint instability? no  Mechanical locking/clicking? no   Affecting ADL's? yes  Affecting sleep? yes    Occupation: retired    Review of Systems   Constitutional: Negative for chills and fever.   HENT: Negative for hearing loss and tinnitus.    Eyes: Negative for blurred vision and photophobia.   Respiratory: Negative for cough and shortness of breath.    Cardiovascular: Negative for chest pain and leg swelling.   Gastrointestinal: Negative for abdominal pain, heartburn, nausea and vomiting.   Genitourinary: Negative for dysuria and hematuria.   Musculoskeletal: Positive for joint pain. Negative for back pain, falls, myalgias and neck pain.   Skin: Negative for rash.   Neurological: Negative for dizziness, tingling, focal weakness, weakness and headaches.   Endo/Heme/Allergies: Positive for environmental allergies. Does not bruise/bleed easily.   Psychiatric/Behavioral: Negative for depression. The patient is not nervous/anxious.        PAST MEDICAL HISTORY:   Past Medical History:   Diagnosis Date    Atypical  ductal hyperplasia, breast     Basal cell carcinoma 2011    left forehead    Basal cell carcinoma 2015    R temporal scalp, R upper forehead, L upper forehead    Basal cell carcinoma 2015    right mid ala    BCC (basal cell carcinoma) excised     right shoulder    Diabetes mellitus     Diverticulitis     Fibrocystic breast     HLD (hyperlipidemia)     Hypertension     Prediabetes 10/16/2013    Skin cancer      PAST SURGICAL HISTORY:   Past Surgical History:   Procedure Laterality Date    APPENDECTOMY      bilateral breast duct excision      BREAST BIOPSY Right     Excisional bx, benign    BREAST BIOPSY Left     Excisional bx, benign     SECTION      x2    COLON SURGERY Left 2017    sigmoidectomy for diverticulitis    COLONOSCOPY N/A 2017    Procedure: COLONOSCOPY;  Surgeon: IBRAHIMA Philip MD;  Location: [facility] (4TH FLR);  Service: Endoscopy;  Laterality: N/A;    fulgaration of endometriosis x 2      HYSTERECTOMY      For endometriosis and DUB--age 42, ovaries in?    INCISIONAL HERNIA REPAIR  2017    KNEE ARTHROSCOPY W/ DEBRIDEMENT Right 2018    Procedure: ARTHROSCOPY, KNEE, WITH DEBRIDEMENT;  Surgeon: MARVA Arias MD;  Location: [facility] OR 1ST FLR;  Service: Orthopedics;  Laterality: Right;    KNEE ARTHROSCOPY W/ MENISCECTOMY Right 2018    Procedure: ARTHROSCOPY, KNEE, WITH MENISCECTOMY (partial lateral and medial menisectomy, chondraplasty;  Surgeon: MARVA Arias MD;  Location: [facility] OR 1ST FLR;  Service: Orthopedics;  Laterality: Right;    KNEE ARTHROSCOPY W/ PLICA EXCISION Right 2018    Procedure: EXCISION, PLICA, KNEE, ARTHROSCOPIC,;  Surgeon: MARVA Arias MD;  [facility];  Service: Orthopedics;  Laterality: Right;    KNEE SURGERY      OOPHORECTOMY      SKIN CANCER EXCISION      BCC forehead , temple, shoulder, chest    TONSILLECTOMY       FAMILY HISTORY:   Family History   Problem Relation Age of Onset     Skin cancer Father     Hypertension Father     Heart disease Father     Diabetes Father     Melanoma Father     Cancer Father     Skin cancer Mother     Hypertension Mother     Thyroid disease Mother     Dementia Mother     Hypertension Sister     Hyperlipidemia Sister     Anxiety disorder Daughter     Hypertension Son     Hyperlipidemia Son     Breast cancer Maternal Aunt     Diabetes Maternal Uncle      SOCIAL HISTORY:   Social History     Socioeconomic History    Marital status:      Spouse name: Not on file    Number of children: Not on file    Years of education: Not on file    Highest education level: Not on file   Occupational History    Not on file   Social Needs    Financial resource strain: Not on file    Food insecurity:     Worry: Not on file     Inability: Not on file    Transportation needs:     Medical: Not on file     Non-medical: Not on file   Tobacco Use    Smoking status: Never Smoker    Smokeless tobacco: Never Used   Substance and Sexual Activity    Alcohol use: Yes     Alcohol/week: 0.0 standard drinks     Types: 1 - 2 Glasses of wine per week     Comment: 3-4x /week    Drug use: No    Sexual activity: Yes     Partners: Male     Birth control/protection: Post-menopausal   Lifestyle    Physical activity:     Days per week: Not on file     Minutes per session: Not on file    Stress: Not on file   Relationships    Social connections:     Talks on phone: Not on file     Gets together: Not on file     Attends Alevism service: Not on file     Active member of club or organization: Not on file     Attends meetings of clubs or organizations: Not on file     Relationship status: Not on file   Other Topics Concern    Are you pregnant or think you may be? No    Breast-feeding No   Social History Narrative    , walking some       MEDICATIONS:   Current Outpatient Medications:     atorvastatin (LIPITOR) 20 MG tablet, TAKE 1 TABLET(20 MG) BY MOUTH EVERY DAY,  Disp: 90 tablet, Rfl: 1    azelastine (ASTELIN) 137 mcg (0.1 %) nasal spray, 1 spray (137 mcg total) by Nasal route 2 (two) times daily., Disp: 30 mL, Rfl: 0    celecoxib (CELEBREX) 200 MG capsule, TAKE ONE CAPSULE BY MOUTH ONCE DAILY, Disp: 30 capsule, Rfl: 0    cephALEXin (KEFLEX) 250 MG capsule, Take 1 capsule (250 mg total) by mouth every 12 (twelve) hours., Disp: 10 capsule, Rfl: 0    cyanocobalamin (VITAMIN B-12) 1000 MCG tablet, Take 100 mcg by mouth once daily., Disp: , Rfl:     DUREZOL 0.05 % Drop ophthalmic solution, , Disp: , Rfl: 1    flaxseed oil 1,000 mg Cap, Take 1 capsule by mouth once daily.  , Disp: , Rfl:     fluocinonide (LIDEX) 0.05 % external solution, AAA scalp qday - bid prn pruritus, Disp: 60 mL, Rfl: 3    FLUZONE HIGH-DOSE 2018-19, PF, 180 mcg/0.5 mL vaccine, ADMINISTER 0.5ML IN THE MUSCLE AS DIRECTED, Disp: , Rfl: 0    FLUZONE HIGH-DOSE 2019-20, PF, 180 mcg/0.5 mL Syrg, ADMINISTER 0.5ML IN THE MUSCLE AS DIRECTED, Disp: , Rfl: 0    ketoconazole (NIZORAL) 2 % cream, AAA in corners of mouth qhs, Disp: 30 g, Rfl: 3    levocetirizine (XYZAL) 5 MG tablet, Take 1 tablet (5 mg total) by mouth every evening., Disp: 30 tablet, Rfl: 11    metFORMIN (GLUCOPHAGE) 500 MG tablet, TAKE 1 TABLET(500 MG) BY MOUTH TWICE DAILY WITH MEALS, Disp: 180 tablet, Rfl: 0    nystatin (MYCOSTATIN) powder, Apply topically 2 (two) times daily. aaa qd- bid prn flare groin, under arm, under breasts, Disp: 120 g, Rfl: 2    olmesartan (BENICAR) 40 MG tablet, Take 40 mg by mouth once daily., Disp: , Rfl:     sertraline (ZOLOFT) 100 MG tablet, TAKE 1 TABLET(100 MG) BY MOUTH EVERY DAY, Disp: 90 tablet, Rfl: 0    spironolactone (ALDACTONE) 25 MG tablet, TAKE 1 TABLET(25 MG) BY MOUTH EVERY DAY (Patient taking differently: TAKE 1 TABLET(25 MG) BY MOUTH EVERY DAY  Hold the day of surgery), Disp: 90 tablet, Rfl: 1    vitamin D 1000 units Tab, Take 185 mg by mouth once daily., Disp: , Rfl:     ILEVRO 0.3 % DrpS, ,  "Disp: , Rfl: 2    ofloxacin (OCUFLOX) 0.3 % ophthalmic solution, INT 1 GTT INTO OU QID, Disp: , Rfl: 0    permethrin (ELIMITE) 5 % cream, ADA EXT AA 1 TIME FOR 1 DOSE, Disp: , Rfl: 0    triamcinolone acetonide 0.1% (KENALOG) 0.1 % cream, AAA bid (Patient not taking: Reported on 12/6/2019), Disp: 60 g, Rfl: 1    triamcinolone acetonide 0.1% (KENALOG) 0.1 % cream, Apply topically 2 (two) times daily. For rash x 2 weeks then stop (Patient not taking: Reported on 12/6/2019), Disp: 80 g, Rfl: 1    valACYclovir (VALTREX) 1000 MG tablet, Take 2 tablets (2,000 mg total) by mouth every 12 (twelve) hours. For 1-2 days at first sign of flare (Patient not taking: Reported on 12/6/2019), Disp: 12 tablet, Rfl: 5  ALLERGIES:   Review of patient's allergies indicates:   Allergen Reactions    Hydrocodone      Also makes pt "very sleepy"    Kiwi (actinidia chinensis) Swelling     Office note from Dr. O[facility]ner on 6/13/19 reviewed:  Bilateral greater trochanteric bursa corticosteroid injections received for greater trochanteric bursitis of both hips.     Objective:     VITAL SIGNS: /73   Pulse 80   Temp 97.8 °F (36.6 °C) (Oral)   Ht 5' 1" (1.549 m)   Wt 71.7 kg (158 lb)   LMP 01/01/1991   BMI 29.85 kg/m²    General    Nursing note and vitals reviewed.  Constitutional: She is oriented to person, place, and time. She appears well-developed and well-nourished.   HENT:   Head: Normocephalic and atraumatic.   no nasal discharge, no external ear redness or discharge   Eyes:   EOM is full and smooth  No eye redness or discharge   Neck: Neck supple. No tracheal deviation present.   Cardiovascular: Normal rate.    2+ Radial pulse bilaterally  2+ Dorsalis Pedis pulse bilaterally  No LE edema appreciated   Pulmonary/Chest: Effort normal. No respiratory distress.   Abdominal: She exhibits no distension.   No rigidity   Neurological: She is alert and oriented to person, place, and time. She exhibits normal muscle tone. " Coordination normal.   See details below   Psychiatric: She has a normal mood and affect. Her behavior is normal.               MUSCULOSKELETAL EXAM  HIP: bilateral HIP    Observation:    There is no edema, erythema, or ecchymosis in the lumbosacral region.   There is no Trendelenburg sign on either side  No obvious pelvic obliquity while standing.    No thoracolumbar scoliosis observed.    No midline skin abnormalities.  No atrophy noted in the lower limbs.  Poor bilateral pelvic stability with bilateral hip hiking compensatory pattern noted with single leg raise  Over ankle supination and mild pes planus noted with gait    ROM (* = with pain):  Passive hip flexion to 120° on left and 120° on right  Passive hip internal rotation to 45° on left and 45° on right  Passive hip external rotation to 45° on left and 45° on right   Passive hip abduction to 45° on left and 45° on right    Tenderness To Palpation:  No tenderness at the ASIS, AIIS, PSIS, PIIS, iliac crest, pubic bones, ischial tuberosity.  No tenderness throughout the lumbar spine, iliolumbar region, or posterior pelvis.  No tenderness throughout the sacrum or piriformis  + tenderness over the greater trochanteric bursa  + tenderness at the glut attachments on the greater trochanter  + tenderness over proximal IT band   No tenderness over proximal hip flexor musculature.    Strength Testing (* = with pain):  Hip flexion - 5/5 on left and 5/5 on right  Hip extension - 5/5 on left and 5/5 on right  Hip adduction - 5/5 on left and 5/5 on right  Hip abduction - 5/5 on left and 5/5 on right  Knee flexion - 5/5 on left and 5/5 on right  Knee extension - 5/5 on left and 5/5 on right  Glutaeus medius - 4/5 on left and 4/5 on right with compensatory lumbar rotation    Special Tests:  Standing Trendelenburg test - negative    Seated straight leg raise - negative  Supine straight leg raise - negative  Hamstring flexibility symmetric    SIMEON - negative  FADIR -  negative  Scour test - negative  No pain with posterior hip capsule compression    ASIS compression test - negative  SI drawer test - negative   Thigh thrust test - negative     Piriformis test (Bonnet's) - negative  Mundo test - positive bilaterally  Andrade compression test - negative    Fulcrum test - negative    Leg lengths symmetric.     Neurovascular Exam:  2+ femoral, DP, and PT pulses BL.  No skin changes, no abnormal hair distribution.  Sensation intact to light touch throughout the obturator and medial/lateral/posterior femoral cutaneous nerves.  2+/4 reflexes at L4 and S1 dermatomes  Capillary refill intact to <2 seconds in all lower limb digits.    TART (Tissue texture abnormality, Asymmetry,  Restriction of motion and/or Tenderness) changes:     Thoracic Spine   T1 Neutral   T2 Neutral   T3 Neutral   T4 Neutral   T5 Neutral   T6 Neutral   T7 Neutral   T8 Neutral   T9 Neutral   T10 Neutral   T11 NS-left,R-right   T12 NS-left,R-right     Rib cage: Neutral     Lumbar Spine   L1 NS-left,R-right   L2 NS-left,R-right   L3 Neutral   L4 Neutral   L5 FRS LEFT       Pelvis:  · Innominate:Right anterior rotation  · Pubic bone:Right inferior pubic shear    Sacrum:Right on Left sacral torsion    Lower extremity: Bilateral proximal IT band Herniated trigger point (HTP) fascial distortions, left anterior talus    Key   F= Flexed   E = Extended   R = Rotated   S = Sidebent   TTA = tissue texture abnormality     IMAGIN. X-ray ordered due to bilateral hip. (AP pelvis and frogleg lateral  bilateral views) taken on 19.   2. X-ray images were reviewed personally by me and then directly with patient.  3. FINDINGS:Mild DJD.  No fracture or dislocation.  No bone destruction identified  4. IMPRESSION:  Mild bilateral hip DJD.    Assessment:      Encounter Diagnoses   Name Primary?    Lateral pain of right hip Yes    Lateral pain of left hip     Greater trochanteric bursitis of both hips     Myalgia     Somatic  dysfunction of thoracic region     Somatic dysfunction of lumbar region     Sacral region somatic dysfunction     Somatic dysfunction of pelvic region     Somatic dysfunction of lower extremity           Plan:   1.  Bilateral lateral hip pain with associated bilateral greater trochanteric bursitis likely secondary to poor pelvic stability, weak gluteal musculature, biomechanical restrictions of the lower kinetic chain and lumbar spine, and compensatory firing patterns.  Mild pes planus with ankle over pronation also likely contributing to biomechanical restrictions of the lower kinetic chain.  - OMT performed today to address associated biomechanical restrictions and HEP started.   - Recommend OTC NSAIDs and Ice up to 20 minutes at a time prn for pain control  - OTC medial arch support recommended for bilateral pes planus and overpronation. Contact information for [employer] store given.   - Consider repeating bilateral greater trochanteric bursa corticosteroid injections if pain persists  -  X-ray images of bilateral hip taken on 6/5/19 (AP pelvis and frogleg lateral  bilateral views) showed mild bilateral hip DJD.. Images were personally reviewed with patient.    2. OMT 5-6 regions. Oral consent obtained.  Reviewed benefits and potential side effects, including bruising at site of treatment and increased soreness for the next 24-48 hours. Pt. Instructed to increased water intake by 1 L today and tomorrow to help with any residual soreness.   - OMT indicated today due to signs and symptoms as well as local and remote somatic dysfunction findings and their related neurokinetic, lymphatic, fascial and/or arteriovenous body connections.   - OMT techniques used: Myofascial Release, Muscle Energy, Fascial Distortion Model and Articulatory   - Treatment was tolerated well. Improvement noted in segmental mobility post-treatment in dysfunctional regions. There were no adverse events and no complications  immediately following treatment.     3. Pt. Given the following HEP:  A) Quadratus lumborum self-stretch on all fours: hold stretch for 30 seconds, repeating 2-3 times on each side. Do stretch twice daily. Hand-out also given.   B)  Pelvic clock exercises given to do from the 6-12 o'clock positions:10-15 reps, twice daily. Hand out of exercise also given.   C) Clam shell exercises bilaterally: hold leg in abducted and externally rotated position for 5-10 seconds, repeat 5-10 times  D) IT band rolling: recommend using rolling stick or foam roller to roll out IT band tension bilaterally, 1-2 times a day.     70050 HOME EXERCISE PROGRAM (HEP):  The patient was taught a homegoing physical therapy regimen as described above. The patient demonstrated understanding of the exercises and proper technique of their execution. This interaction took 15 minutes.     4. Follow-up in 2 weeks for reevaluation    5. Patient agreeable to today's plan and all questions were answered    This note is dictated using the M*Modal Fluency Direct word recognition program. There are word recognition mistakes that are occasionally missed on review.

## 2019-12-19 ENCOUNTER — CLINICAL SUPPORT (OUTPATIENT)
Dept: OPHTHALMOLOGY | Facility: CLINIC | Age: 69
End: 2019-12-19
Payer: MEDICARE

## 2019-12-19 ENCOUNTER — OFFICE VISIT (OUTPATIENT)
Dept: OPHTHALMOLOGY | Facility: CLINIC | Age: 69
End: 2019-12-19
Payer: MEDICARE

## 2019-12-19 DIAGNOSIS — E11.9 TYPE 2 DIABETES MELLITUS WITHOUT RETINOPATHY: ICD-10-CM

## 2019-12-19 DIAGNOSIS — Z96.1 BILATERAL PSEUDOPHAKIA: ICD-10-CM

## 2019-12-19 DIAGNOSIS — H02.413 MECHANICAL PTOSIS, BILATERAL: Primary | ICD-10-CM

## 2019-12-19 PROCEDURE — 92285 EXTERNAL PHOTOGRAPHY - OU - BOTH EYES: ICD-10-PCS | Mod: S$GLB,,, | Performed by: OPHTHALMOLOGY

## 2019-12-19 PROCEDURE — 92002 PR EYE EXAM, NEW PATIENT,INTERMED: ICD-10-PCS | Mod: S$GLB,,, | Performed by: OPHTHALMOLOGY

## 2019-12-19 PROCEDURE — 92083 GOLDMANN PERIMETRY - OU - EXTENDED - BOTH EYES: ICD-10-PCS | Mod: S$GLB,,, | Performed by: OPHTHALMOLOGY

## 2019-12-19 PROCEDURE — 99999 PR PBB SHADOW E&M-EST. PATIENT-LVL II: CPT | Mod: PBBFAC,,, | Performed by: OPHTHALMOLOGY

## 2019-12-19 PROCEDURE — 92285 EXTERNAL OCULAR PHOTOGRAPHY: CPT | Mod: S$GLB,,, | Performed by: OPHTHALMOLOGY

## 2019-12-19 PROCEDURE — 92002 INTRM OPH EXAM NEW PATIENT: CPT | Mod: S$GLB,,, | Performed by: OPHTHALMOLOGY

## 2019-12-19 PROCEDURE — 99999 PR PBB SHADOW E&M-EST. PATIENT-LVL II: ICD-10-PCS | Mod: PBBFAC,,, | Performed by: OPHTHALMOLOGY

## 2019-12-19 PROCEDURE — 92083 EXTENDED VISUAL FIELD XM: CPT | Mod: S$GLB,,, | Performed by: OPHTHALMOLOGY

## 2019-12-19 NOTE — PROGRESS NOTES
HPI     Patient here for ptosis.    Referred by: Sister works at  [employer]    How long have eyelid(s) been droopy? Several years    Any h/o wearing hard CLs? No RGP wear    Do eyelids feel heavy? Yes    Does the height of the eyelid(s) fluctuate throughout the day? Yes    Do eyelid(s) interfere with daily activities such as driving, reading,   working? Patient has trouble when reading        Last edited by Kizzy Celestin on 12/19/2019  1:48 PM. (History)            Assessment /Plan     For exam results, see Encounter Report.    Mechanical ptosis, bilateral  -     External Photography - OU - Both Eyes  -     Goldmann Perimetry - OU - Extended - Both Eyes    Bilateral pseudophakia    Type 2 diabetes mellitus without retinopathy      Patient is a pleasant 69-year-old female here for evaluation of bilateral upper eyelid heaviness worse on the left than the right.  This has been progressive over several years and affects activities of daily living.  The patient finds that she has difficulty especially when reading.    On exam she has bilateral upper eyelid mechanical ptosis worse on the left than the right.  She has blepharitis secondary to skin touching her eyelashes.    Plan for bilateral upper eyelid mechanical ptosis repair in the minor procedure room with Valium.    Informed consent obtained after extensive risks/benefits/alternatives were discussed with the patient including but not limited to pain, bleeding, infection, ocular injury, loss of the eye, asymmetry, need for revision in future, scarring.  Alternatives such as waiting were discussed.  All questions were answered.      Hold ASA, NSAIDS, and fish oil 5 to 7 days prior to procedure.    Return for surgery    Return to Dr. Nichole for routine eye care.

## 2019-12-20 ENCOUNTER — OFFICE VISIT (OUTPATIENT)
Dept: SPORTS MEDICINE | Facility: CLINIC | Age: 69
End: 2019-12-20
Payer: MEDICARE

## 2019-12-20 ENCOUNTER — PATIENT OUTREACH (OUTPATIENT)
Dept: ADMINISTRATIVE | Facility: OTHER | Age: 69
End: 2019-12-20

## 2019-12-20 VITALS
HEIGHT: 61 IN | WEIGHT: 158 LBS | HEART RATE: 88 BPM | SYSTOLIC BLOOD PRESSURE: 129 MMHG | DIASTOLIC BLOOD PRESSURE: 68 MMHG | BODY MASS INDEX: 29.83 KG/M2

## 2019-12-20 DIAGNOSIS — M70.62 GREATER TROCHANTERIC BURSITIS OF BOTH HIPS: Primary | ICD-10-CM

## 2019-12-20 DIAGNOSIS — M99.05 SOMATIC DYSFUNCTION OF PELVIC REGION: ICD-10-CM

## 2019-12-20 DIAGNOSIS — M99.04 SACRAL REGION SOMATIC DYSFUNCTION: ICD-10-CM

## 2019-12-20 DIAGNOSIS — M25.551 LATERAL PAIN OF RIGHT HIP: ICD-10-CM

## 2019-12-20 DIAGNOSIS — M70.61 GREATER TROCHANTERIC BURSITIS OF BOTH HIPS: Primary | ICD-10-CM

## 2019-12-20 DIAGNOSIS — M25.552 LATERAL PAIN OF LEFT HIP: ICD-10-CM

## 2019-12-20 DIAGNOSIS — M79.10 MYALGIA: ICD-10-CM

## 2019-12-20 DIAGNOSIS — M99.03 SOMATIC DYSFUNCTION OF LUMBAR REGION: ICD-10-CM

## 2019-12-20 PROCEDURE — 99214 OFFICE O/P EST MOD 30 MIN: CPT | Mod: 25,S$GLB,, | Performed by: NEUROMUSCULOSKELETAL MEDICINE & OMM

## 2019-12-20 PROCEDURE — 3074F SYST BP LT 130 MM HG: CPT | Mod: CPTII,S$GLB,, | Performed by: NEUROMUSCULOSKELETAL MEDICINE & OMM

## 2019-12-20 PROCEDURE — 3074F PR MOST RECENT SYSTOLIC BLOOD PRESSURE < 130 MM HG: ICD-10-PCS | Mod: CPTII,S$GLB,, | Performed by: NEUROMUSCULOSKELETAL MEDICINE & OMM

## 2019-12-20 PROCEDURE — 1159F MED LIST DOCD IN RCRD: CPT | Mod: S$GLB,,, | Performed by: NEUROMUSCULOSKELETAL MEDICINE & OMM

## 2019-12-20 PROCEDURE — 20610 PR DRAIN/INJECT LARGE JOINT/BURSA: ICD-10-PCS | Mod: LT,S$GLB,, | Performed by: NEUROMUSCULOSKELETAL MEDICINE & OMM

## 2019-12-20 PROCEDURE — 3078F PR MOST RECENT DIASTOLIC BLOOD PRESSURE < 80 MM HG: ICD-10-PCS | Mod: CPTII,S$GLB,, | Performed by: NEUROMUSCULOSKELETAL MEDICINE & OMM

## 2019-12-20 PROCEDURE — 99214 PR OFFICE/OUTPT VISIT, EST, LEVL IV, 30-39 MIN: ICD-10-PCS | Mod: 25,S$GLB,, | Performed by: NEUROMUSCULOSKELETAL MEDICINE & OMM

## 2019-12-20 PROCEDURE — 1101F PT FALLS ASSESS-DOCD LE1/YR: CPT | Mod: CPTII,S$GLB,, | Performed by: NEUROMUSCULOSKELETAL MEDICINE & OMM

## 2019-12-20 PROCEDURE — 1125F PR PAIN SEVERITY QUANTIFIED, PAIN PRESENT: ICD-10-PCS | Mod: S$GLB,,, | Performed by: NEUROMUSCULOSKELETAL MEDICINE & OMM

## 2019-12-20 PROCEDURE — 99999 PR PBB SHADOW E&M-EST. PATIENT-LVL III: ICD-10-PCS | Mod: PBBFAC,,, | Performed by: NEUROMUSCULOSKELETAL MEDICINE & OMM

## 2019-12-20 PROCEDURE — 3078F DIAST BP <80 MM HG: CPT | Mod: CPTII,S$GLB,, | Performed by: NEUROMUSCULOSKELETAL MEDICINE & OMM

## 2019-12-20 PROCEDURE — 1101F PR PT FALLS ASSESS DOC 0-1 FALLS W/OUT INJ PAST YR: ICD-10-PCS | Mod: CPTII,S$GLB,, | Performed by: NEUROMUSCULOSKELETAL MEDICINE & OMM

## 2019-12-20 PROCEDURE — 98926 OSTEOPATH MANJ 3-4 REGIONS: CPT | Mod: S$GLB,,, | Performed by: NEUROMUSCULOSKELETAL MEDICINE & OMM

## 2019-12-20 PROCEDURE — 1125F AMNT PAIN NOTED PAIN PRSNT: CPT | Mod: S$GLB,,, | Performed by: NEUROMUSCULOSKELETAL MEDICINE & OMM

## 2019-12-20 PROCEDURE — 99999 PR PBB SHADOW E&M-EST. PATIENT-LVL III: CPT | Mod: PBBFAC,,, | Performed by: NEUROMUSCULOSKELETAL MEDICINE & OMM

## 2019-12-20 PROCEDURE — 98926 PR OSTEOPATHIC MANIP,3-4 BODY REGN: ICD-10-PCS | Mod: S$GLB,,, | Performed by: NEUROMUSCULOSKELETAL MEDICINE & OMM

## 2019-12-20 PROCEDURE — 1159F PR MEDICATION LIST DOCUMENTED IN MEDICAL RECORD: ICD-10-PCS | Mod: S$GLB,,, | Performed by: NEUROMUSCULOSKELETAL MEDICINE & OMM

## 2019-12-20 PROCEDURE — 20610 DRAIN/INJ JOINT/BURSA W/O US: CPT | Mod: LT,S$GLB,, | Performed by: NEUROMUSCULOSKELETAL MEDICINE & OMM

## 2019-12-20 RX ORDER — TRIAMCINOLONE ACETONIDE 40 MG/ML
40 INJECTION, SUSPENSION INTRA-ARTICULAR; INTRAMUSCULAR
Status: COMPLETED | OUTPATIENT
Start: 2019-12-20 | End: 2019-12-20

## 2019-12-20 RX ORDER — TRIAMCINOLONE ACETONIDE 40 MG/ML
40 INJECTION, SUSPENSION INTRA-ARTICULAR; INTRAMUSCULAR
Status: CANCELLED | OUTPATIENT
Start: 2019-12-20 | End: 2019-12-20

## 2019-12-20 RX ADMIN — TRIAMCINOLONE ACETONIDE 40 MG: 40 INJECTION, SUSPENSION INTRA-ARTICULAR; INTRAMUSCULAR at 11:12

## 2019-12-20 NOTE — PROGRESS NOTES
Subjective:     Jolly Matias     Chief Complaint   Patient presents with    Right Hip - Pain    Left Hip - Pain       HPI    Jolly is a 69 y.o. female coming in today for bilateral (L>R) pain. Since last visit the pain has Deteriorated. Pt reports she has not done the HEP as prescribed due to family in the hospital. Pt state there was some relief from the OMT after the last visit. The pain is better with heat, CSI and worse with walking, standing. Pt. describes the pain as a 6/10 dull pain that does not radiate. There has not been any new a fall/injury/ or traumas since last visit.  Pt. denies any new musculoskeletal complaints at this time.     Office note from 12/6/19 reviewed    Review of Systems   Constitutional: Negative for chills and fever.   HENT: Negative for hearing loss and tinnitus.    Eyes: Negative for blurred vision and photophobia.   Respiratory: Negative for cough and shortness of breath.    Cardiovascular: Negative for chest pain and leg swelling.   Gastrointestinal: Negative for abdominal pain, heartburn, nausea and vomiting.   Genitourinary: Negative for dysuria and hematuria.   Musculoskeletal: Positive for joint pain. Negative for back pain, falls, myalgias and neck pain.   Skin: Negative for rash.   Neurological: Negative for dizziness, tingling, focal weakness, weakness and headaches.   Endo/Heme/Allergies: Positive for environmental allergies. Does not bruise/bleed easily.   Psychiatric/Behavioral: Negative for depression. The patient is not nervous/anxious.        PAST MEDICAL HISTORY:   Past Medical History:   Diagnosis Date    Atypical ductal hyperplasia, breast     Basal cell carcinoma 4/2011    left forehead    Basal cell carcinoma 4/2015    R temporal scalp, R upper forehead, L upper forehead    Basal cell carcinoma 8/2015    right mid ala    BCC (basal cell carcinoma) excised     right shoulder    Diabetes mellitus     Diverticulitis     Fibrocystic breast      HLD (hyperlipidemia)     Hypertension     Prediabetes 10/16/2013    Skin cancer      PAST SURGICAL HISTORY:   Past Surgical History:   Procedure Laterality Date    APPENDECTOMY      bilateral breast duct excision      BREAST BIOPSY Right     Excisional bx, benign    BREAST BIOPSY Left     Excisional bx, benign     SECTION      x2    COLON SURGERY Left 2017    sigmoidectomy for diverticulitis    COLONOSCOPY N/A 2017    Procedure: COLONOSCOPY;  Surgeon: IBRAHIMA Philip MD;  Location: [facility] (4TH FLR);  Service: Endoscopy;  Laterality: N/A;    fulgaration of endometriosis x 2      HYSTERECTOMY      For endometriosis and DUB--age 42, ovaries in?    INCISIONAL HERNIA REPAIR  2017    KNEE ARTHROSCOPY W/ DEBRIDEMENT Right 2018    Procedure: ARTHROSCOPY, KNEE, WITH DEBRIDEMENT;  Surgeon: MARVA Arias MD;  Location: [facility] OR 1ST FLR;  Service: Orthopedics;  Laterality: Right;    KNEE ARTHROSCOPY W/ MENISCECTOMY Right 2018    Procedure: ARTHROSCOPY, KNEE, WITH MENISCECTOMY (partial lateral and medial menisectomy, chondraplasty;  Surgeon: MARVA Arias MD;  Location: [facility] OR 1ST FLR;  Service: Orthopedics;  Laterality: Right;    KNEE ARTHROSCOPY W/ PLICA EXCISION Right 2018    Procedure: EXCISION, PLICA, KNEE, ARTHROSCOPIC,;  Surgeon: MARVA Arias MD;  Location: [facility] OR 1ST FLR;  Service: Orthopedics;  Laterality: Right;    KNEE SURGERY      OOPHORECTOMY      SKIN CANCER EXCISION      BCC forehead , temple, shoulder, chest    TONSILLECTOMY       FAMILY HISTORY:   Family History   Problem Relation Age of Onset    Skin cancer Father     Hypertension Father     Heart disease Father     Diabetes Father     Melanoma Father     Cancer Father     Skin cancer Mother     Hypertension Mother     Thyroid disease Mother     Dementia Mother     Hypertension Sister     Hyperlipidemia Sister     Anxiety disorder Daughter     Hypertension Son     Hyperlipidemia  Son     Breast cancer Maternal Aunt     Diabetes Maternal Uncle      SOCIAL HISTORY:   Social History     Socioeconomic History    Marital status:      Spouse name: Not on file    Number of children: Not on file    Years of education: Not on file    Highest education level: Not on file   Occupational History    Not on file   Social Needs    Financial resource strain: Not on file    Food insecurity:     Worry: Not on file     Inability: Not on file    Transportation needs:     Medical: Not on file     Non-medical: Not on file   Tobacco Use    Smoking status: Never Smoker    Smokeless tobacco: Never Used   Substance and Sexual Activity    Alcohol use: Yes     Alcohol/week: 0.0 standard drinks     Types: 1 - 2 Glasses of wine per week     Comment: 3-4x /week    Drug use: No    Sexual activity: Yes     Partners: Male     Birth control/protection: Post-menopausal   Lifestyle    Physical activity:     Days per week: Not on file     Minutes per session: Not on file    Stress: Not on file   Relationships    Social connections:     Talks on phone: Not on file     Gets together: Not on file     Attends Worship service: Not on file     Active member of club or organization: Not on file     Attends meetings of clubs or organizations: Not on file     Relationship status: Not on file   Other Topics Concern    Are you pregnant or think you may be? No    Breast-feeding No   Social History Narrative    , walking some       MEDICATIONS:   Current Outpatient Medications:     atorvastatin (LIPITOR) 20 MG tablet, TAKE 1 TABLET(20 MG) BY MOUTH EVERY DAY, Disp: 90 tablet, Rfl: 1    azelastine (ASTELIN) 137 mcg (0.1 %) nasal spray, 1 spray (137 mcg total) by Nasal route 2 (two) times daily., Disp: 30 mL, Rfl: 0    celecoxib (CELEBREX) 200 MG capsule, TAKE ONE CAPSULE BY MOUTH ONCE DAILY, Disp: 30 capsule, Rfl: 0    cephALEXin (KEFLEX) 250 MG capsule, Take 1 capsule (250 mg total) by mouth every 12  (twelve) hours., Disp: 10 capsule, Rfl: 0    cyanocobalamin (VITAMIN B-12) 1000 MCG tablet, Take 100 mcg by mouth once daily., Disp: , Rfl:     DUREZOL 0.05 % Drop ophthalmic solution, , Disp: , Rfl: 1    flaxseed oil 1,000 mg Cap, Take 1 capsule by mouth once daily.  , Disp: , Rfl:     fluocinonide (LIDEX) 0.05 % external solution, AAA scalp qday - bid prn pruritus, Disp: 60 mL, Rfl: 3    ILEVRO 0.3 % DrpS, , Disp: , Rfl: 2    ketoconazole (NIZORAL) 2 % cream, AAA in corners of mouth qhs, Disp: 30 g, Rfl: 3    levocetirizine (XYZAL) 5 MG tablet, Take 1 tablet (5 mg total) by mouth every evening., Disp: 30 tablet, Rfl: 11    metFORMIN (GLUCOPHAGE) 500 MG tablet, TAKE 1 TABLET(500 MG) BY MOUTH TWICE DAILY WITH MEALS, Disp: 180 tablet, Rfl: 0    nystatin (MYCOSTATIN) powder, Apply topically 2 (two) times daily. aaa qd- bid prn flare groin, under arm, under breasts, Disp: 120 g, Rfl: 2    ofloxacin (OCUFLOX) 0.3 % ophthalmic solution, INT 1 GTT INTO OU QID, Disp: , Rfl: 0    olmesartan (BENICAR) 40 MG tablet, Take 40 mg by mouth once daily., Disp: , Rfl:     permethrin (ELIMITE) 5 % cream, ADA EXT AA 1 TIME FOR 1 DOSE, Disp: , Rfl: 0    sertraline (ZOLOFT) 100 MG tablet, TAKE 1 TABLET(100 MG) BY MOUTH EVERY DAY, Disp: 90 tablet, Rfl: 0    spironolactone (ALDACTONE) 25 MG tablet, TAKE 1 TABLET(25 MG) BY MOUTH EVERY DAY (Patient taking differently: TAKE 1 TABLET(25 MG) BY MOUTH EVERY DAY  Hold the day of surgery), Disp: 90 tablet, Rfl: 1    triamcinolone acetonide 0.1% (KENALOG) 0.1 % cream, AAA bid, Disp: 60 g, Rfl: 1    triamcinolone acetonide 0.1% (KENALOG) 0.1 % cream, Apply topically 2 (two) times daily. For rash x 2 weeks then stop, Disp: 80 g, Rfl: 1    valACYclovir (VALTREX) 1000 MG tablet, Take 2 tablets (2,000 mg total) by mouth every 12 (twelve) hours. For 1-2 days at first sign of flare, Disp: 12 tablet, Rfl: 5    vitamin D 1000 units Tab, Take 185 mg by mouth once daily., Disp: , Rfl:      "FLUZONE HIGH-DOSE , PF, 180 mcg/0.5 mL vaccine, ADMINISTER 0.5ML IN THE MUSCLE AS DIRECTED, Disp: , Rfl: 0    FLUZONE HIGH-DOSE , PF, 180 mcg/0.5 mL Syrg, ADMINISTER 0.5ML IN THE MUSCLE AS DIRECTED, Disp: , Rfl: 0  No current facility-administered medications for this visit.   ALLERGIES:   Review of patient's allergies indicates:   Allergen Reactions    Hydrocodone      Also makes pt "very sleepy"    Kiwi (actinidia chinensis) Swelling     Office note from Dr. [facility] on 19 reviewed:  Bilateral greater trochanteric bursa corticosteroid injections received for greater trochanteric bursitis of both hips.   IMAGIN. X-ray ordered due to bilateral hip. (AP pelvis and frogleg lateral  bilateral views) taken on 19.   2. X-ray images were reviewed personally by me and then directly with patient.  3. FINDINGS:Mild DJD.  No fracture or dislocation.  No bone destruction identified  4. IMPRESSION:  Mild bilateral hip DJD.  Objective:     VITAL SIGNS: /68   Pulse 88   Ht 5' 1" (1.549 m)   Wt 71.7 kg (158 lb)   LMP 1991   BMI 29.85 kg/m²    General    Nursing note and vitals reviewed.  Constitutional: She is oriented to person, place, and time. She appears well-developed and well-nourished.   HENT:   Head: Normocephalic and atraumatic.   no nasal discharge, no external ear redness or discharge   Eyes:   EOM is full and smooth  No eye redness or discharge   Neck: Neck supple. No tracheal deviation present.   Cardiovascular: Normal rate.    2+ Radial pulse bilaterally  2+ Dorsalis Pedis pulse bilaterally  No LE edema appreciated   Pulmonary/Chest: Effort normal. No respiratory distress.   Abdominal: She exhibits no distension.   No rigidity   Neurological: She is alert and oriented to person, place, and time. She exhibits normal muscle tone. Coordination normal.   See details below   Psychiatric: She has a normal mood and affect. Her behavior is normal.               MUSCULOSKELETAL " EXAM  HIP: bilateral HIP    Observation:    There is no edema, erythema, or ecchymosis in the lumbosacral region.   There is no Trendelenburg sign on either side  No obvious pelvic obliquity while standing.    No thoracolumbar scoliosis observed.    No midline skin abnormalities.  No atrophy noted in the lower limbs.  Poor bilateral pelvic stability with bilateral hip hiking compensatory pattern noted with single leg raise (left worse than right)  Over ankle supination and mild pes planus noted with gait    ROM (* = with pain):  Passive hip flexion to 120° on left and 120° on right  Passive hip internal rotation to 45° on left and 45° on right  Passive hip external rotation to 45° on left and 45° on right   Passive hip abduction to 45° on left and 45° on right    Tenderness To Palpation:  No tenderness at the ASIS, AIIS, PSIS, PIIS, iliac crest, pubic bones, ischial tuberosity.  No tenderness throughout the lumbar spine, iliolumbar region, or posterior pelvis.  No tenderness throughout the sacrum or piriformis  + tenderness over the greater trochanteric bursa on the left  + tenderness at the left glut attachments on the greater trochanter  + gluteus akira tenderness on left  No tenderness over proximal IT band   No tenderness over proximal hip flexor musculature.    Strength Testing (* = with pain):  Hip flexion - 5/5 on left and 5/5 on right  Hip extension - 5/5 on left and 5/5 on right  Hip adduction - 5/5 on left and 5/5 on right  Hip abduction - 5/5 on left and 5/5 on right  Knee flexion - 5/5 on left and 5/5 on right  Knee extension - 5/5 on left and 5/5 on right  Glutaeus medius - 4/5 on left and 4/5 on right with compensatory lumbar rotation    Special Tests:  Standing Trendelenburg test - slight right shift with left single leg raise    Seated straight leg raise - negative  Supine straight leg raise - negative  Hamstring flexibility symmetric    SIMEON - negative  FADIR - positive for left lateral hip  pain  Scour test - negative  No pain with posterior hip capsule compression    ASIS compression test - negative  SI drawer test - negative   Thigh thrust test - negative     Piriformis test (Bonnet's) - negative  Mundo test - positive bilaterally  Andrade compression test - negative    Leg lengths symmetric.     Neurovascular Exam:  2+ femoral, DP, and PT pulses BL.  No skin changes, no abnormal hair distribution.  Sensation intact to light touch throughout the obturator and medial/lateral/posterior femoral cutaneous nerves.  2+/4 reflexes at L4 and S1 dermatomes  Capillary refill intact to <2 seconds in all lower limb digits.    TART (Tissue texture abnormality, Asymmetry,  Restriction of motion and/or Tenderness) changes:     Lumbar Spine   L1 Neutral   L2 Neutral   L3 Neutral   L4 FRS RIGHT   L5 FRS RIGHT       Pelvis:  · Innominate:Right anterior rotation  · Pubic bone:Right inferior pubic shear    Sacrum:Left on Right sacral torsion, Left bullseye Herniated trigger point (HTP) fascial distortions    Key   F= Flexed   E = Extended   R = Rotated   S = Sidebent   TTA = tissue texture abnormality           Large Joint Aspiration/Injection and   Greater trochanteric bursa and gluteus medius  Left    Performed by: CHILO HERNANDEZ  Authorized by: CHILO HERNANDEZ   Consent Done?: Yes (Verbal)  Indications: Pain  Site marked: The procedure site was marked   Timeout: Prior to procedure the correct patient, procedure, and site was verified     Location: Greater trochanteric bursa, Left  Prep: Patient was prepped with Chlorhexidine and alcohol.  Skin anesthetic: Ethyl Chloride spray was used prior to skin puncture.  Ultrasonic Guidance for needle placement: yes  Procedure: After skin anesthetic was applied, the 22G, 2.5 needle was used to inject the greater trochanteric bursa with a 3 cc mixture of 1 cc of 40 mg/ml triamcinolone acetonide and 2 cc of 0.25% Bupivacaine Hydrochloride was injected into the bursa.  Needle  size: 22 G, 2.5  Approach: Lateral  Medications: 40 mg triamcinolone acetonide 40 mg/mL  Patient tolerance: Patient tolerated the procedure well with no immediate complications    Ultrasound guidance was used for needle localization. Images were saved and stored for documentation. Dynamic visualization of the 20g x 2.5 needles was continuous throughout the procedures.    Triamcinolone:  NDC: [id number]  LOT: [id number]  EXP: 07/2021  Assessment:      Encounter Diagnoses   Name Primary?    Lateral pain of right hip     Lateral pain of left hip     Greater trochanteric bursitis of both hips Yes    Myalgia     Somatic dysfunction of lumbar region     Sacral region somatic dysfunction     Somatic dysfunction of pelvic region           Plan:   1.  Bilateral lateral hip pain with associated bilateral greater trochanteric bursitis likely secondary to poor pelvic stability, weak gluteal musculature, biomechanical restrictions of the lower kinetic chain and lumbar spine, and compensatory firing patterns. Right lateral hip pain has improved, but left lateral hip pain has deteriorated since last visit. Mild pes planus with ankle over pronation also likely contributing to biomechanical restrictions of the lower kinetic chain.  - Patient received an ultrasound guided corticosteroid injection of the left greater trochanteric bursa today (see details above).   - Referral to outpatient PT ([facility]) for increased pelvic stability, neuromuscular retraining, and IT band stretching  - OMT performed again today to address associated biomechanical restrictions and HEP reviewed  - Recommend OTC NSAIDs and Ice up to 20 minutes at a time prn for pain control  - OTC medial arch support recommended for bilateral pes planus and overpronation.   -  X-ray images of bilateral hip taken on 6/5/19 (AP pelvis and frogleg lateral  bilateral views) showed mild bilateral hip DJD. Images were personally reviewed with patient.    2. OMT  3-4 regions. Oral consent obtained.  Reviewed benefits and potential side effects, including bruising at site of treatment and increased soreness for the next 24-48 hours. Pt. Instructed to increased water intake by 1 L today and tomorrow to help with any residual soreness.   - OMT indicated today due to signs and symptoms as well as local and remote somatic dysfunction findings and their related neurokinetic, lymphatic, fascial and/or arteriovenous body connections.   - OMT techniques used: Myofascial Release, Muscle Energy and Fascial Distortion Model   - Treatment was tolerated well. Improvement noted in segmental mobility post-treatment in dysfunctional regions. There were no adverse events and no complications immediately following treatment.     3.  Reviewed with patient the following HEP:  Continue:  A) Quadratus lumborum self-stretch on all fours: hold stretch for 30 seconds, repeating 2-3 times on each side. Do stretch twice daily. Hand-out also given.   B)  Pelvic clock exercises given to do from the 6-12 o'clock positions:10-15 reps, twice daily. Hand out of exercise also given.   C) Clam shell exercises bilaterally: hold leg in abducted and externally rotated position for 5-10 seconds, repeat 5-10 times  D) IT band rolling: recommend using rolling stick or foam roller to roll out IT band tension bilaterally, 1-2 times a day.     The patient was taught a homegoing physical therapy regimen as described above. The patient demonstrated understanding of the exercises and proper technique of their execution.     4. Follow-up upon completion of PT if pain persist or deteriorates    5. Patient agreeable to today's plan and all questions were answered    This note is dictated using the M*Modal Fluency Direct word recognition program. There are word recognition mistakes that are occasionally missed on review.

## 2020-01-09 ENCOUNTER — TELEPHONE (OUTPATIENT)
Dept: ORTHOPEDICS | Facility: CLINIC | Age: 70
End: 2020-01-09

## 2020-01-09 NOTE — TELEPHONE ENCOUNTER
----- Message from Jean Paul Gibbons sent at 1/9/2020 11:01 AM CST -----  Contact: pt   Please call pt at [phone]    Patient is requesting physical therapy orders    Thank you

## 2020-01-09 NOTE — TELEPHONE ENCOUNTER
Spoke with pt who states she has not received a call to schedule physical therapy since referral was placed on 12/20. Provided pt with the phone number for PT central scheduling and advised her to give us a call back if she has any more troubles.     Melva Brandon  Clinical Assistant to Dr. Camilla Knowles

## 2020-01-19 ENCOUNTER — PATIENT OUTREACH (OUTPATIENT)
Dept: ADMINISTRATIVE | Facility: OTHER | Age: 70
End: 2020-01-19

## 2020-01-20 ENCOUNTER — OFFICE VISIT (OUTPATIENT)
Dept: PODIATRY | Facility: CLINIC | Age: 70
End: 2020-01-20
Payer: MEDICARE

## 2020-01-20 VITALS
HEART RATE: 67 BPM | HEIGHT: 61 IN | WEIGHT: 158 LBS | SYSTOLIC BLOOD PRESSURE: 127 MMHG | DIASTOLIC BLOOD PRESSURE: 72 MMHG | BODY MASS INDEX: 29.83 KG/M2

## 2020-01-20 DIAGNOSIS — M20.61 TOE DEFORMITY, ACQUIRED, RIGHT: Primary | ICD-10-CM

## 2020-01-20 DIAGNOSIS — M20.41 HAMMER TOE OF RIGHT FOOT: ICD-10-CM

## 2020-01-20 PROCEDURE — 99213 PR OFFICE/OUTPT VISIT, EST, LEVL III, 20-29 MIN: ICD-10-PCS | Mod: 24,S$GLB,, | Performed by: PODIATRIST

## 2020-01-20 PROCEDURE — 1159F MED LIST DOCD IN RCRD: CPT | Mod: S$GLB,,, | Performed by: PODIATRIST

## 2020-01-20 PROCEDURE — 99999 PR PBB SHADOW E&M-EST. PATIENT-LVL III: CPT | Mod: PBBFAC,,, | Performed by: PODIATRIST

## 2020-01-20 PROCEDURE — 1126F PR PAIN SEVERITY QUANTIFIED, NO PAIN PRESENT: ICD-10-PCS | Mod: S$GLB,,, | Performed by: PODIATRIST

## 2020-01-20 PROCEDURE — 1159F PR MEDICATION LIST DOCUMENTED IN MEDICAL RECORD: ICD-10-PCS | Mod: S$GLB,,, | Performed by: PODIATRIST

## 2020-01-20 PROCEDURE — 1101F PT FALLS ASSESS-DOCD LE1/YR: CPT | Mod: CPTII,S$GLB,, | Performed by: PODIATRIST

## 2020-01-20 PROCEDURE — 1101F PR PT FALLS ASSESS DOC 0-1 FALLS W/OUT INJ PAST YR: ICD-10-PCS | Mod: CPTII,S$GLB,, | Performed by: PODIATRIST

## 2020-01-20 PROCEDURE — 3078F PR MOST RECENT DIASTOLIC BLOOD PRESSURE < 80 MM HG: ICD-10-PCS | Mod: CPTII,S$GLB,, | Performed by: PODIATRIST

## 2020-01-20 PROCEDURE — 3074F SYST BP LT 130 MM HG: CPT | Mod: CPTII,S$GLB,, | Performed by: PODIATRIST

## 2020-01-20 PROCEDURE — 3078F DIAST BP <80 MM HG: CPT | Mod: CPTII,S$GLB,, | Performed by: PODIATRIST

## 2020-01-20 PROCEDURE — 99999 PR PBB SHADOW E&M-EST. PATIENT-LVL III: ICD-10-PCS | Mod: PBBFAC,,, | Performed by: PODIATRIST

## 2020-01-20 PROCEDURE — 1126F AMNT PAIN NOTED NONE PRSNT: CPT | Mod: S$GLB,,, | Performed by: PODIATRIST

## 2020-01-20 PROCEDURE — 99213 OFFICE O/P EST LOW 20 MIN: CPT | Mod: 24,S$GLB,, | Performed by: PODIATRIST

## 2020-01-20 PROCEDURE — 3074F PR MOST RECENT SYSTOLIC BLOOD PRESSURE < 130 MM HG: ICD-10-PCS | Mod: CPTII,S$GLB,, | Performed by: PODIATRIST

## 2020-01-20 NOTE — PROGRESS NOTES
Subjective:      Patient ID: Jolly Matias is a 69 y.o. female.    Chief Complaint: Follow-up and Toe Pain (right foot)    Jolly is a 69 y.o. female who presents to the clinic upon referral from Dr. Angelia pollack. provider found  for evaluation and treatment of diabetic feet. Jolly has a past medical history of Atypical ductal hyperplasia, breast, Basal cell carcinoma (4/2011), Basal cell carcinoma (4/2015), Basal cell carcinoma (8/2015), BCC (basal cell carcinoma) (excised ), Diabetes mellitus, Diverticulitis, Fibrocystic breast, HLD (hyperlipidemia), Hypertension, Prediabetes (10/16/2013), and Skin cancer. Patient relates no major problem with feet.  She is doing well after her right foot 4th toe procedure consisting of tenotomy and partial syndactylization.    PCP: Debbie Venegas, DO    Date Last Seen by PCP:   Chief Complaint   Patient presents with    Follow-up    Toe Pain     right foot         Current shoe gear: Casual shoes    Hemoglobin A1C   Date Value Ref Range Status   02/06/2019 6.1 (H) 4.0 - 5.6 % Final     Comment:     ADA Screening Guidelines:  5.7-6.4%  Consistent with prediabetes  >or=6.5%  Consistent with diabetes  High levels of fetal hemoglobin interfere with the HbA1C  assay. Heterozygous hemoglobin variants (HbS, HgC, etc)do  not significantly interfere with this assay.   However, presence of multiple variants may affect accuracy.     07/03/2018 6.0 (H) 4.0 - 5.6 % Final     Comment:     ADA Screening Guidelines:  5.7-6.4%  Consistent with prediabetes  >or=6.5%  Consistent with diabetes  High levels of fetal hemoglobin interfere with the HbA1C  assay. Heterozygous hemoglobin variants (HbS, HgC, etc)do  not significantly interfere with this assay.   However, presence of multiple variants may affect accuracy.     04/06/2018 5.9 (H) 4.0 - 5.6 % Final     Comment:     According to ADA guidelines, hemoglobin A1c <7.0% represents  optimal control in non-pregnant diabetic patients.  Different  metrics may apply to specific patient populations.   Standards of Medical Care in Diabetes-2016.  For the purpose of screening for the presence of diabetes:  <5.7%     Consistent with the absence of diabetes  5.7-6.4%  Consistent with increasing risk for diabetes   (prediabetes)  >or=6.5%  Consistent with diabetes  Currently, no consensus exists for use of hemoglobin A1c  for diagnosis of diabetes for children.  This Hemoglobin A1c assay has significant interference with fetal   hemoglobin   (HbF). The results are invalid for patients with abnormal amounts of   HbF,   including those with known Hereditary Persistence   of Fetal Hemoglobin. Heterozygous hemoglobin variants (HbAS, HbAC,   HbAD, HbAE, HbA2) do not significantly interfere with this assay;   however, presence of multiple variants in a sample may impact the %   interference.             Review of Systems   Constitution: Negative for chills, decreased appetite and fever.   Cardiovascular: Negative for leg swelling.   Musculoskeletal: Negative for arthritis, joint pain, joint swelling and myalgias.   Gastrointestinal: Negative for nausea and vomiting.   Neurological: Negative for loss of balance, numbness and paresthesias.               Patient Active Problem List   Diagnosis    Prediabetes    Chronic bronchitis    Anxiety    Bronchiectasis without complication    Pure hypercholesterolemia    Essential hypertension    Splenic artery aneurysm    Abdominal aortic atherosclerosis    History of basal cell carcinoma    Sleep apnea    Chondromalacia of right knee    Decreased ROM of right knee    Decreased strength    Decreased mobility and endurance       Current Outpatient Medications on File Prior to Visit   Medication Sig Dispense Refill    atorvastatin (LIPITOR) 20 MG tablet TAKE 1 TABLET(20 MG) BY MOUTH EVERY DAY 90 tablet 1    azelastine (ASTELIN) 137 mcg (0.1 %) nasal spray 1 spray (137 mcg total) by Nasal route 2 (two) times  daily. 30 mL 0    celecoxib (CELEBREX) 200 MG capsule TAKE ONE CAPSULE BY MOUTH ONCE DAILY 30 capsule 0    cephALEXin (KEFLEX) 250 MG capsule Take 1 capsule (250 mg total) by mouth every 12 (twelve) hours. 10 capsule 0    cyanocobalamin (VITAMIN B-12) 1000 MCG tablet Take 100 mcg by mouth once daily.      DUREZOL 0.05 % Drop ophthalmic solution   1    flaxseed oil 1,000 mg Cap Take 1 capsule by mouth once daily.        fluocinonide (LIDEX) 0.05 % external solution AAA scalp qday - bid prn pruritus 60 mL 3    FLUZONE HIGH-DOSE 2018-19, PF, 180 mcg/0.5 mL vaccine ADMINISTER 0.5ML IN THE MUSCLE AS DIRECTED  0    FLUZONE HIGH-DOSE 2019-20, PF, 180 mcg/0.5 mL Syrg ADMINISTER 0.5ML IN THE MUSCLE AS DIRECTED  0    ILEVRO 0.3 % DrpS   2    ketoconazole (NIZORAL) 2 % cream AAA in corners of mouth qhs 30 g 3    levocetirizine (XYZAL) 5 MG tablet Take 1 tablet (5 mg total) by mouth every evening. 30 tablet 11    metFORMIN (GLUCOPHAGE) 500 MG tablet TAKE 1 TABLET(500 MG) BY MOUTH TWICE DAILY WITH MEALS 180 tablet 0    nystatin (MYCOSTATIN) powder Apply topically 2 (two) times daily. aaa qd- bid prn flare groin, under arm, under breasts 120 g 2    ofloxacin (OCUFLOX) 0.3 % ophthalmic solution INT 1 GTT INTO OU QID  0    olmesartan (BENICAR) 40 MG tablet Take 40 mg by mouth once daily.      permethrin (ELIMITE) 5 % cream ADA EXT AA 1 TIME FOR 1 DOSE  0    sertraline (ZOLOFT) 100 MG tablet TAKE 1 TABLET(100 MG) BY MOUTH EVERY DAY 90 tablet 0    spironolactone (ALDACTONE) 25 MG tablet TAKE 1 TABLET(25 MG) BY MOUTH EVERY DAY (Patient taking differently: TAKE 1 TABLET(25 MG) BY MOUTH EVERY DAY  Hold the day of surgery) 90 tablet 1    triamcinolone acetonide 0.1% (KENALOG) 0.1 % cream AAA bid 60 g 1    triamcinolone acetonide 0.1% (KENALOG) 0.1 % cream Apply topically 2 (two) times daily. For rash x 2 weeks then stop 80 g 1    valACYclovir (VALTREX) 1000 MG tablet Take 2 tablets (2,000 mg total) by mouth every 12  "(twelve) hours. For 1-2 days at first sign of flare 12 tablet 5    vitamin D 1000 units Tab Take 185 mg by mouth once daily.       No current facility-administered medications on file prior to visit.        Review of patient's allergies indicates:   Allergen Reactions    Hydrocodone      Also makes pt "very sleepy"    Kiwi (actinidia chinensis) Swelling       Past Surgical History:   Procedure Laterality Date    APPENDECTOMY      bilateral breast duct excision      BREAST BIOPSY Right     Excisional bx, benign    BREAST BIOPSY Left     Excisional bx, benign     SECTION      x2    COLON SURGERY Left 2017    sigmoidectomy for diverticulitis    COLONOSCOPY N/A 2017    Procedure: COLONOSCOPY;  Surgeon: IBRAHIMA Philip MD;  Location: [facility] (4TH FLR);  Service: Endoscopy;  Laterality: N/A;    fulgaration of endometriosis x 2      HYSTERECTOMY      For endometriosis and DUB--age 42, ovaries in?    INCISIONAL HERNIA REPAIR  2017    KNEE ARTHROSCOPY W/ DEBRIDEMENT Right 2018    Procedure: ARTHROSCOPY, KNEE, WITH DEBRIDEMENT;  Surgeon: MARVA Arias MD;  Location: [facility] OR [address] [place] [address];  Service: Orthopedics;  Laterality: Right;    KNEE ARTHROSCOPY W/ MENISCECTOMY Right 2018    Procedure: ARTHROSCOPY, KNEE, WITH MENISCECTOMY (partial lateral and medial menisectomy, chondraplasty;  Surgeon: MARVA Arias MD;  [facility];  Service: Orthopedics;  Laterality: Right;    KNEE ARTHROSCOPY W/ PLICA EXCISION Right 2018    Procedure: EXCISION, PLICA, KNEE, ARTHROSCOPIC,;  Surgeon: MARVA Arias MD;  Location: [facility] OR [address] [place] [address];  Service: Orthopedics;  Laterality: Right;    KNEE SURGERY      OOPHORECTOMY      SKIN CANCER EXCISION      BCC forehead , temple, shoulder, chest    TONSILLECTOMY         Family History   Problem Relation Age of Onset    Skin cancer Father     Hypertension Father     Heart disease Father     Diabetes Father     Melanoma Father     " "Cancer Father     Skin cancer Mother     Hypertension Mother     Thyroid disease Mother     Dementia Mother     Hypertension Sister     Hyperlipidemia Sister     Anxiety disorder Daughter     Hypertension Son     Hyperlipidemia Son     Breast cancer Maternal Aunt     Diabetes Maternal Uncle        Social History     Socioeconomic History    Marital status:      Spouse name: Not on file    Number of children: Not on file    Years of education: Not on file    Highest education level: Not on file   Occupational History    Not on file   Social Needs    Financial resource strain: Not on file    Food insecurity:     Worry: Not on file     Inability: Not on file    Transportation needs:     Medical: Not on file     Non-medical: Not on file   Tobacco Use    Smoking status: Never Smoker    Smokeless tobacco: Never Used   Substance and Sexual Activity    Alcohol use: Yes     Alcohol/week: 0.0 standard drinks     Types: 1 - 2 Glasses of wine per week     Comment: 3-4x /week    Drug use: No    Sexual activity: Yes     Partners: Male     Birth control/protection: Post-menopausal   Lifestyle    Physical activity:     Days per week: Not on file     Minutes per session: Not on file    Stress: Not on file   Relationships    Social connections:     Talks on phone: Not on file     Gets together: Not on file     Attends [employer] service: Not on file     Active member of club or organization: Not on file     Attends meetings of clubs or organizations: Not on file     Relationship status: Not on file   Other Topics Concern    Are you pregnant or think you may be? No    Breast-feeding No   Social History Narrative    , walking some           Objective:       Vitals:    01/20/20 1454   BP: 127/72   Pulse: 67   Weight: 71.7 kg (158 lb)   Height: 5' 1" (1.549 m)   PainSc: 0-No pain        Physical Exam   Constitutional: She appears well-developed and well-nourished.  Non-toxic appearance. She does " not have a sickly appearance. No distress.   alert and oriented x 3.    Cardiovascular:   Pulses:       Dorsalis pedis pulses are 2+ on the right side, and 2+ on the left side.        Posterior tibial pulses are 2+ on the right side, and 2+ on the left side.    Capillary refill time is within normal limits. Digital hair present.    Pulmonary/Chest: No respiratory distress.   Musculoskeletal: She exhibits no deformity.        Right ankle: No tenderness. No lateral malleolus, no medial malleolus, no AITFL, no CF ligament and no posterior TFL tenderness found. Achilles tendon exhibits no pain, no defect and normal Youngblood's test results.        Left ankle: No tenderness. No lateral malleolus, no medial malleolus, no AITFL, no CF ligament and no posterior TFL tenderness found. Achilles tendon exhibits no pain, no defect and normal Youngblood's test results.        Right foot: There is no tenderness and no bony tenderness.        Left foot: There is no tenderness and no bony tenderness.   Adequate joint range of motion without pain, limitation, nor crepitation Bilateral feet and ankle joints. Muscle strength is 5/5 in all groups bilaterally.         reducible IPJ digital contracture 2-4 b/l      Feet:   Right Foot:   Protective Sensation: 5 sites tested. 5 sites sensed.   Left Foot:   Protective Sensation: 5 sites tested. 5 sites sensed.   Lymphadenopathy:   No lymphatic streaking     Neurological: She displays no atrophy. No sensory deficit.   Light touch present     Skin: Skin is warm, dry and intact. No rash noted. She is not diaphoretic. No cyanosis. No pallor. Nails show no clubbing.   Skin is of normal turgor.   Normal temperature gradient.  Examination of the skin reveals no evidence of significant rashes, open lesions, suspicious appearing nevi or other concerning lesions.   Incision well healed right 3rd and 4th interspace with some separation at the base at the syndactylization site however no infection or open  lesions noted.     Toenails 1-5 bilaterally are neatly trimmed; of normal color and thickness     Psychiatric: Her mood appears not anxious. Her affect is not inappropriate. Her speech is not slurred. She is not combative. She is communicative. She is attentive.   Nursing note and vitals reviewed.            Assessment:       Encounter Diagnoses   Name Primary?    Toe deformity, acquired, right - Right Foot Yes    Hammer toe of right foot          Plan:       Jolly was seen today for follow-up and toe pain.    Diagnoses and all orders for this visit:    Toe deformity, acquired, right - Right Foot    Hammer toe of right foot      I counseled the patient on her conditions, their implications and medical management.    Follow up for an office de rotational hammertoe/arthroplasty procedure with tenotomy.  This will be performed to the right 4th toe.    - Shoe inspection. Diabetic Foot Education. Patient reminded of the importance of good nutrition and blood sugar control to help prevent podiatric complications of diabetes. Patient instructed on proper foot hygeine. We discussed wearing proper shoe gear, daily foot inspections, never walking without protective shoe gear, caution putting sharp instruments to feet     - Discussed DM foot care:  Wear comfortable, proper fitting shoes. Wash feet daily. Dry well. After drying, apply moisturizer to feet (no lotion to webspaces). Inspect feet daily for skin breaks, blisters, swelling, or redness. Wear cotton socks (preferably white)  Change socks every day. Do NOT walk barefoot. Do NOT use heating pads or warm/hot water soaks     Follow-up in 12 months.

## 2020-01-22 ENCOUNTER — CLINICAL SUPPORT (OUTPATIENT)
Dept: REHABILITATION | Facility: HOSPITAL | Age: 70
End: 2020-01-22
Attending: NEUROMUSCULOSKELETAL MEDICINE & OMM
Payer: MEDICARE

## 2020-01-22 DIAGNOSIS — M25.552 PAIN OF BOTH HIP JOINTS: Primary | ICD-10-CM

## 2020-01-22 DIAGNOSIS — R53.1 DECREASED STRENGTH: ICD-10-CM

## 2020-01-22 DIAGNOSIS — M25.551 PAIN OF BOTH HIP JOINTS: Primary | ICD-10-CM

## 2020-01-22 PROBLEM — M25.661 DECREASED ROM OF RIGHT KNEE: Status: RESOLVED | Noted: 2018-07-10 | Resolved: 2020-01-22

## 2020-01-22 PROBLEM — Z74.09 DECREASED MOBILITY AND ENDURANCE: Status: RESOLVED | Noted: 2018-07-10 | Resolved: 2020-01-22

## 2020-01-22 PROCEDURE — 97110 THERAPEUTIC EXERCISES: CPT | Mod: PN

## 2020-01-22 PROCEDURE — 97161 PT EVAL LOW COMPLEX 20 MIN: CPT | Mod: PN

## 2020-01-22 PROCEDURE — 97530 THERAPEUTIC ACTIVITIES: CPT | Mod: PN

## 2020-01-22 NOTE — PATIENT INSTRUCTIONS
Posture - Sitting    Sit upright, head facing forward. Scoot your tailbone all the way to the back of your chair. Lean slightly forward and place a rolled up towel at your waist. Lean back so shoulder blades lightly touch the back of the chair. Keep shoulders relaxed, and avoid rounded back. Keep hips level with knees. Avoid crossing legs for long periods.       Glute Squeeze, supine    Lie face up and squeeze your rear end. Do not tighten your abdominals or hold your breath. Hold 5 seconds. Relax.  Repeat 10 times per set. Do 2 sets per session. Do 1 sessions per day.     Pelvic Tilt    Flatten back by tightening stomach muscles and buttocks. Do not hold your breath.  Hold 5 seconds.  Repeat 10 times per set. Do 2 sets per session. Do 2 sessions per day.     Copyright © [employer]. All rights reserved.

## 2020-01-27 PROBLEM — R53.1 DECREASED STRENGTH: Status: ACTIVE | Noted: 2020-01-27

## 2020-01-27 PROBLEM — M25.551 PAIN OF BOTH HIP JOINTS: Status: ACTIVE | Noted: 2020-01-27

## 2020-01-27 PROBLEM — M25.552 PAIN OF BOTH HIP JOINTS: Status: ACTIVE | Noted: 2020-01-27

## 2020-01-27 NOTE — PLAN OF CARE
[facility]  Physical Therapy Initial Evaluation    Name: Jolly Matias  Clinic Number: [id number]    Therapy Diagnosis:   Encounter Diagnoses   Name Primary?    Pain of both hip joints Yes    Decreased strength      Physician: Camilla Knowles DO    Physician Orders: PT Eval and Treat, Therapeutic Exercise  Medical Diagnosis from Referral: Greater trochanteric bursitis of both hips  Evaluation Date: 2020  Authorization Period Expiration: 2020  Plan of Care Expiration: 2020 to 3/20/2020  Visit # / Visits authorized:   FOTO: 1/10    Time In: 1510  Time Out: 1553  Total Billable Time: 43 minutes (LCE-1, TE-1, TA-1)    Precautions: Standard and HTN    Subjective     Date of onset: 2019    History of current condition - Jolly reports: standing worse. Modified activities if she knows she is going to be standing for a while. Pain while standing with cooking and washing dishes, used to walk 3x/week for 30-45 minutes but is unable to do so due to hip pain. Does not have pain upon rising in the am.     Medical History:   Past Medical History:   Diagnosis Date    Atypical ductal hyperplasia, breast     Basal cell carcinoma 2011    left forehead    Basal cell carcinoma 2015    R temporal scalp, R upper forehead, L upper forehead    Basal cell carcinoma 2015    right mid ala    BCC (basal cell carcinoma) excised     right shoulder    Diabetes mellitus     Diverticulitis     Fibrocystic breast     HLD (hyperlipidemia)     Hypertension     Prediabetes 10/16/2013    Skin cancer        Surgical History:   Jolly Matias  has a past surgical history that includes  section; Hysterectomy; bilateral breast duct excision; fulgaration of endometriosis x 2; Appendectomy; Tonsillectomy; Skin cancer excision; Colonoscopy (N/A, 2017); Colon surgery (Left, 2017); Oophorectomy; Incisional hernia repair (2017); Knee arthroscopy w/ meniscectomy  "(Right, 7/6/2018); Knee arthroscopy w/ debridement (Right, 7/6/2018); Knee arthroscopy w/ plica excision (Right, 7/6/2018); Knee surgery; Breast biopsy (Right); and Breast biopsy (Left).    Medications:   Jolly has a current medication list which includes the following prescription(s): atorvastatin, azelastine, celecoxib, cephalexin, cyanocobalamin, durezol, flaxseed oil, fluocinonide, fluzone high-dose 2018-19 (pf), fluzone high-dose 2019-20 (pf), ilevro, ketoconazole, levocetirizine, metformin, nystatin, ofloxacin, olmesartan, permethrin, sertraline, spironolactone, triamcinolone acetonide 0.1%, triamcinolone acetonide 0.1%, valacyclovir, and vitamin d.    Allergies:   Review of patient's allergies indicates:   Allergen Reactions    Hydrocodone      Also makes pt "very sleepy"    Kiwi (actinidia chinensis) Swelling        Imaging, xray 6/5/2019: mild DJD    Prior Therapy: Yes   Social History: lives alone  Occupation: retired  Prior Level of Function: independent with ADLs, standing and walking  Current Level of Function: pain and modified independent with ADLs, standing and walking    Pain:  Current 3/10, worst 8/10, best 0/10   Location: bilateral hip   Description: Aching and Shooting  Aggravating Factors: Standing, Night Time and Getting out of bed/chair  Easing Factors: ice, lying down, rest and aleve    Pts goals: decrease severity of pain, return to performing daily activities normally, return to walking for exercise    Objective     Posture: standing: R shoulder drop, R iliac crest and greater trochanter higher than L  Palpation: tenderness to B greater trochanter, and along B iliotibial bands      Range of Motion/Strength:       Thoracolumbar Pain/Dysfunction with Movement   Flexion Min loss; Pulling in B HS   Extension Min loss   Right side bending Min loss   Left side bending Min loss   Right rotation No loss   Left rotation No loss       Hip Right Left Pain/Dysfunction with Movement   AROM    " "  flexion WFL   WFL    extension   WFL   WFL    abduction   WFL   WFL    adduction   WFL   WFL    Internal rotation   WFL   WFL    External rotation   WFL   WFL          L/E MMT Right Left Pain/Dysfunction with Movement   Hip Flexion 4-/5 4-/5    Hip Extension 3+/5 3+/5    Hip Abduction 4-/5 4-/5 Slight pain in B lateral hips   Hip Adduction 4/5 4/5    Hip IR 4/5 4/5    Hip ER 4-/5 4-/5 Slight pain in B lateral hips   Knee Flexion 4+/5 4+/5    Knee Extension 4+/5 4+/5    Ankle DF 5/5 5/5          Flexibility:   Hamstring: <10 degrees B at 90/90  Hip flexor: moderately decreased B    Special Tests:   SLR: negative B  Modified Mundo: positive for pain and tightness B    Gait Analysis:Without AD Deviations: narrow base of support, decreased conner, increased           CMS Impairment/Limitation/Restriction for FOTO Hip Survey    Therapist reviewed FOTO scores for Jolly HUBERT Matias on 1/22/2020.   FOTO documents entered into [employer] - see Media section.    Limitation Score: 53%         TREATMENT     Treatment Time In: 1530  Treatment Time Out: 1553  Total Treatment time separate from Evaluation: 23 minutes         - THERAPEUTIC EXERCISES to develop  strength, endurance, posture and core stabilization for 20 minutes including glute set 30x5", posterior pelvic tilt 30x5".       - dynamic, functional THERAPEUTIC ACTIVITIES to improve performance for 3 minutes including education on proper sitting technique to minimize B hip pain, correct form for entering/exiting car to minimize pelvic rotation, ambulating with smaller steps to minimize pelvic rotation.    Home Exercises and Patient Education Provided    Education provided:   - role of therapy    Written Home Exercises Provided: yes.  Exercises were reviewed and Jolly was able to demonstrate them prior to the end of the session.  Jolly demonstrated good  understanding of the education provided.     See EMR under Patient Instructions for exercises provided " 1/22/2020.    Assessment     Jolly is a 69 y.o. female referred to outpatient Physical Therapy with a medical diagnosis of Greater trochanteric bursitis of both hips. Pt presents to PT with pain, decreased strength and flexibility, poor posture, impaired balance and gait, and functional deficits with sitting, standing, and walking.      Pt prognosis is Good.   Pt will benefit from skilled outpatient Physical Therapy to address the deficits stated above and in the chart below, provide pt/family education, and to maximize pt's level of independence.     Plan of care discussed with patient: Yes  Pt's spiritual, cultural and educational needs considered and pt agreeable to plan of care and goals as stated below:     Anticipated Barriers for therapy: coping style      Medical Necessity is demonstrated by the following  History  Co-morbidities and personal factors that may impact the plan of care Co-morbidities:   -Arthritis, Depression, High Blood Pressure, Prior Surgery    Personal Factors:   coping style     high   Examination  Body Structures and Functions, activity limitations and participation restrictions that may impact the plan of care Body Regions:   head  neck  back  lower extremities  upper extremities  trunk    Body Systems:    gross symmetry  ROM  strength  gross coordinated movement  balance  gait  transfers    Participation Restrictions:   Walking for exercise    Activity limitations:   Learning and applying knowledge  no deficits    General Tasks and Commands  no deficits    Communication  no deficits    Mobility  lifting and carrying objects  walking    Self care  no deficits    Domestic Life  shopping  cooking  doing house work (cleaning house, washing dishes, laundry)    Interactions/Relationships  no deficits    Life Areas  no deficits    Community and Social Life  community life         high   Clinical Presentation stable and uncomplicated low   Decision Making/ Complexity Score: low          Goals:    Short Term Goals (4 Weeks):  1. Pt will be compliant with HEP to supplement PT in restoring pain free function.  2. Pt will improve impaired LE MMTs to >/= 4/5 to improve dynamic hip support for functional tasks.  3. Pt will report >/= 50% improvement in symptoms to improve quality of life      Long Term Goals (8 Weeks):  1. Pt will improve FOTO score to </= 41% limited to decrease perceived limitation with mobility.   2. Pt will improve impaired LE MMTs to >/= 4+/5 to improve dynamic hip support for functional tasks.  3. Pt will perform TUG in </= 10 seconds to improve walking speed for functional community ambulation  4. Pt will report </= 3/10 pain at worst to improve quality of life        Plan     Plan of care Certification: 1/22/2020 to 3/20/2020    Outpatient Physical Therapy 2 times weekly for 8 weeks to include the following interventions: Gait Training, Manual Therapy, Moist Heat/ Ice, Neuromuscular Re-ed, Patient Education and Therapeutic Exercise.     Yuliana Morse, PT, DPT

## 2020-01-28 ENCOUNTER — CLINICAL SUPPORT (OUTPATIENT)
Dept: REHABILITATION | Facility: HOSPITAL | Age: 70
End: 2020-01-28
Attending: NEUROMUSCULOSKELETAL MEDICINE & OMM
Payer: MEDICARE

## 2020-01-28 DIAGNOSIS — M25.551 PAIN OF BOTH HIP JOINTS: ICD-10-CM

## 2020-01-28 DIAGNOSIS — M25.552 PAIN OF BOTH HIP JOINTS: ICD-10-CM

## 2020-01-28 DIAGNOSIS — R53.1 DECREASED STRENGTH: ICD-10-CM

## 2020-01-28 PROCEDURE — 97110 THERAPEUTIC EXERCISES: CPT | Mod: PN

## 2020-01-28 PROCEDURE — 97140 MANUAL THERAPY 1/> REGIONS: CPT | Mod: PN

## 2020-01-28 NOTE — PROGRESS NOTES
"                            Physical Therapy Daily Treatment Note     Name: Jolly Matias  Clinic Number: [id number]    Therapy Diagnosis:   Encounter Diagnoses   Name Primary?    Pain of both hip joints     Decreased strength      Physician: Camilla Knowles DO    Visit Date: 1/28/2020    Physician Orders: PT Eval and Treat, Therapeutic Exercise  Medical Diagnosis from Referral: Greater trochanteric bursitis of both hips  Evaluation Date: 1/22/2020  Authorization Period Expiration: 03/20/2020  Plan of Care Expiration: 1/22/2020 to 3/20/2020  Visit # / Visits authorized: 2/ 12  FOTO: 2/10    Time In: 1000  Time Out: 1053  Total Billable Time: 53 minutes (MT-2, TE-2)    Precautions: Standard and HTN    Subjective     Pt reports: pain in B hip upon waking this am L>R.  She was compliant with home exercise program.  Response to previous treatment: no adverse reaction  Functional change: no change    Pain: 4/10  Location: bilateral hip      Objective     Jolly received therapeutic exercises to develop strength, endurance, flexibility and core stabilization for 33 minutes including:    Piriformis stretch: 3x30" B  glute set 20x5"  posterior pelvic tilt 20x5"  Hip ADduction isometric 20x5"  Hip ABduction isometric 20x5"  Low trunk rotation x20  Straight leg raise with transverse abdominus activation 2x10 B    Jolly received the following manual therapy techniques: Myofacial release (MFR) and Soft tissue Mobilization (STM) were applied to the: B hips for 20 minutes, including:    MFR to B iliotibial band  STM/MFR/trigger point release to B piriformis, gluteus medius, and gluteus minimus      Home Exercises Provided and Patient Education Provided     Education provided:   - can add piriformis stretch to HEP    Written Home Exercises Provided: Patient instructed to cont prior HEP.  Exercises were reviewed and Mechelle able to demonstrate them prior to the end of the session.  Jolly demonstrated good  understanding " of the education provided.     See EMR under Patient Instructions for exercises provided 1/22/2020.      Assessment     Patient with difficulty performing a transverse abdominus contraction. She required max verbal and tactile cuing with fair contraction. She reported less tenderness in B hips following manual therapy.    Jolly is progressing well towards her goals.   Pt prognosis is Good.     Pt will continue to benefit from skilled outpatient physical therapy to address the deficits listed in the problem list box on initial evaluation, provide pt/family education and to maximize pt's level of independence in the home and community environment.     Pt's spiritual, cultural and educational needs considered and pt agreeable to plan of care and goals.    Anticipated barriers to physical therapy: coping style    Goals:     Short Term Goals (4 Weeks):  1. Pt will be compliant with HEP to supplement PT in restoring pain free function. PROGRESSING, NOT MET 1/28/2020  2. Pt will improve impaired LE MMTs to >/= 4/5 to improve dynamic hip support for functional tasks. PROGRESSING, NOT MET 1/28/2020  3. Pt will report >/= 50% improvement in symptoms to improve quality of life PROGRESSING, NOT MET 1/28/2020        Long Term Goals (8 Weeks):  1. Pt will improve FOTO score to </= 41% limited to decrease perceived limitation with mobility. PROGRESSING, NOT MET 1/28/2020  2. Pt will improve impaired LE MMTs to >/= 4+/5 to improve dynamic hip support for functional tasks. PROGRESSING, NOT MET 1/28/2020  3. Pt will perform TUG in </= 10 seconds to improve walking speed for functional community ambulation PROGRESSING, NOT MET 1/28/2020  4. Pt will report </= 3/10 pain at worst to improve quality of life PROGRESSING, NOT MET 1/28/2020    Plan     Continue plan of care    Yuliana Morse, PT, DPT

## 2020-01-28 NOTE — PATIENT INSTRUCTIONS
Piriformis Stretch          Cross foot on opposite knee and pull leg towards opposite shoulder until a gentle stretch is felt across the buttock.  Do not stretch aggressively.     Hold 30 seconds.     Repeat 3 times per session. Do 2 sessions per day.         © 6851-9161 [employer] All Rights Reserved

## 2020-01-30 ENCOUNTER — CLINICAL SUPPORT (OUTPATIENT)
Dept: REHABILITATION | Facility: HOSPITAL | Age: 70
End: 2020-01-30
Attending: NEUROMUSCULOSKELETAL MEDICINE & OMM
Payer: MEDICARE

## 2020-01-30 DIAGNOSIS — M25.552 PAIN OF BOTH HIP JOINTS: ICD-10-CM

## 2020-01-30 DIAGNOSIS — R53.1 DECREASED STRENGTH: ICD-10-CM

## 2020-01-30 DIAGNOSIS — M25.551 PAIN OF BOTH HIP JOINTS: ICD-10-CM

## 2020-01-30 PROCEDURE — 97140 MANUAL THERAPY 1/> REGIONS: CPT | Mod: PN

## 2020-01-30 PROCEDURE — 97110 THERAPEUTIC EXERCISES: CPT | Mod: PN

## 2020-01-30 NOTE — PROGRESS NOTES
"                            Physical Therapy Daily Treatment Note     Name: Jolly Matias  Clinic Number: [id number]    Therapy Diagnosis:   Encounter Diagnoses   Name Primary?    Pain of both hip joints     Decreased strength      Physician: Camilla Knowles DO    Visit Date: 1/30/2020    Physician Orders: PT Eval and Treat, Therapeutic Exercise  Medical Diagnosis from Referral: Greater trochanteric bursitis of both hips  Evaluation Date: 1/22/2020  Authorization Period Expiration: 03/20/2020  Plan of Care Expiration: 1/22/2020 to 3/20/2020  Visit # / Visits authorized: 3/ 12  FOTO: 3/10    Time In: 1010  Time Out: 1100  Total Billable Time: 50 minutes (MT-1, TE-2)     Precautions: Standard and HTN    Subjective     Pt reports: pain in B hip upon waking this am L>R.  She was compliant with home exercise program.  Response to previous treatment: no adverse reaction  Functional change: no change    Pain: 4/10  Location: bilateral hip      Objective     Jolly received therapeutic exercises to develop strength, endurance, flexibility and core stabilization for 35 minutes including:    Piriformis stretch: 3x30" B  glute set 20x5"  posterior pelvic tilt 20x5"  Hip ADduction isometric 20x5"  Hip ABduction isometric 20x5"  Low trunk rotation x20  Straight leg raise with transverse abdominus activation 2x10 B    Jolly received the following manual therapy techniques: Myofacial release (MFR) and Soft tissue Mobilization (STM) were applied to the: B hips for 15 minutes, including:    ASTYM to B iliotibial band  STM/MFR/trigger point release to B piriformis, gluteus medius, and gluteus minimus      Home Exercises Provided and Patient Education Provided     Education provided:   - may be sore following ASTYM    Written Home Exercises Provided: Patient instructed to cont prior HEP.  Exercises were reviewed and Seuns able to demonstrate them prior to the end of the session.  Jolly demonstrated good  understanding of " the education provided.     See EMR under Patient Instructions for exercises provided 1/22/2020.      Assessment     Patient with increased tissue texture in B iliotibial band and continued tenderness in B gluteus medius and minimus, and piriformis. She may benefit from functional dry needling to decrease pain and tenderness in hip musculature.    Jolly is progressing well towards her goals.   Pt prognosis is Good.     Pt will continue to benefit from skilled outpatient physical therapy to address the deficits listed in the problem list box on initial evaluation, provide pt/family education and to maximize pt's level of independence in the home and community environment.     Pt's spiritual, cultural and educational needs considered and pt agreeable to plan of care and goals.    Anticipated barriers to physical therapy: coping style    Goals:     Short Term Goals (4 Weeks):  1. Pt will be compliant with HEP to supplement PT in restoring pain free function. PROGRESSING, NOT MET 1/30/2020  2. Pt will improve impaired LE MMTs to >/= 4/5 to improve dynamic hip support for functional tasks. PROGRESSING, NOT MET 1/30/2020  3. Pt will report >/= 50% improvement in symptoms to improve quality of life PROGRESSING, NOT MET 1/30/2020        Long Term Goals (8 Weeks):  1. Pt will improve FOTO score to </= 41% limited to decrease perceived limitation with mobility. PROGRESSING, NOT MET 1/30/2020  2. Pt will improve impaired LE MMTs to >/= 4+/5 to improve dynamic hip support for functional tasks. PROGRESSING, NOT MET 1/30/2020  3. Pt will perform TUG in </= 10 seconds to improve walking speed for functional community ambulation PROGRESSING, NOT MET 1/30/2020  4. Pt will report </= 3/10 pain at worst to improve quality of life PROGRESSING, NOT MET 1/30/2020    Plan     Trial functional dry needling next visit    Yuliana Morse, PT, DPT

## 2020-02-01 DIAGNOSIS — R73.03 PREDIABETES: ICD-10-CM

## 2020-02-01 RX ORDER — METFORMIN HYDROCHLORIDE 500 MG/1
TABLET ORAL
Qty: 180 TABLET | Refills: 0 | Status: SHIPPED | OUTPATIENT
Start: 2020-02-01 | End: 2020-05-09

## 2020-02-04 ENCOUNTER — CLINICAL SUPPORT (OUTPATIENT)
Dept: REHABILITATION | Facility: HOSPITAL | Age: 70
End: 2020-02-04
Attending: NEUROMUSCULOSKELETAL MEDICINE & OMM
Payer: MEDICARE

## 2020-02-04 DIAGNOSIS — M25.551 PAIN OF BOTH HIP JOINTS: ICD-10-CM

## 2020-02-04 DIAGNOSIS — R53.1 DECREASED STRENGTH: ICD-10-CM

## 2020-02-04 DIAGNOSIS — M25.552 PAIN OF BOTH HIP JOINTS: ICD-10-CM

## 2020-02-04 PROCEDURE — 97110 THERAPEUTIC EXERCISES: CPT | Mod: PN

## 2020-02-04 NOTE — PROGRESS NOTES
"                            Physical Therapy Daily Treatment Note     Name: Jolly Matias  Clinic Number: [id number]    Therapy Diagnosis:   Encounter Diagnoses   Name Primary?    Pain of both hip joints     Decreased strength      Physician: Camilla Knowles DO    Visit Date: 2/4/2020    Physician Orders: PT Eval and Treat, Therapeutic Exercise  Medical Diagnosis from Referral: Greater trochanteric bursitis of both hips  Evaluation Date: 1/22/2020  Authorization Period Expiration: 03/20/2020  Plan of Care Expiration: 1/22/2020 to 3/20/2020  Visit # / Visits authorized: 4/ 12  FOTO: 4/10 NEXT    Time In: 1001  Time Out: 1050  Total Billable Time: 49 minutes (TE-3)     Precautions: Standard and HTN    Subjective     Pt reports: no pain in back or hips, but pain in B lateral thigh and shins  She was compliant with home exercise program.  Response to previous treatment: no adverse reaction  Functional change: no change    Pain: 4/10  Location: bilateral thigh and shin    Objective     Jolly received therapeutic exercises to develop strength, endurance, flexibility and core stabilization for 49 minutes including:    Pelvic muscle energy technique (MET) for L anteriorly rotated innominate  Piriformis stretch: 3x30" B  glute set 20x5"  posterior pelvic tilt 20x5"  Hip ADduction isometric 20x5"  Hip ABduction isometric 20x5"  Low trunk rotation x20  Straight leg raise with transverse abdominus activation 3x10 B  Partial sit up/crunch 2x10  Clams 2x10  Prone Hip extension knee bent 2x10  Leg press 4.0 3x10        Home Exercises Provided and Patient Education Provided       Written Home Exercises Provided: Patient instructed to cont prior HEP.  Exercises were reviewed and Seuns able to demonstrate them prior to the end of the session.  Jolly demonstrated good  understanding of the education provided.     See EMR under Patient Instructions for exercises provided 1/22/2020.      Assessment     Patient tolerated " progression of exercises well with no complaints of increased pain or discomfort. She required verbal cuing to avoid knee hyperextension with leg press. Improved performance with transverse abdominus activation noted with decreased doming of abdomen with contraction. Deferred functional dry needling today due to no complaint of pain in B hips.    Jolly is progressing well towards her goals.   Pt prognosis is Good.     Pt will continue to benefit from skilled outpatient physical therapy to address the deficits listed in the problem list box on initial evaluation, provide pt/family education and to maximize pt's level of independence in the home and community environment.     Pt's spiritual, cultural and educational needs considered and pt agreeable to plan of care and goals.    Anticipated barriers to physical therapy: coping style    Goals:     Short Term Goals (4 Weeks):  1. Pt will be compliant with HEP to supplement PT in restoring pain free function. PROGRESSING, NOT MET 2/4/2020  2. Pt will improve impaired LE MMTs to >/= 4/5 to improve dynamic hip support for functional tasks. PROGRESSING, NOT MET 2/4/2020  3. Pt will report >/= 50% improvement in symptoms to improve quality of life PROGRESSING, NOT MET 2/4/2020        Long Term Goals (8 Weeks):  1. Pt will improve FOTO score to </= 41% limited to decrease perceived limitation with mobility. PROGRESSING, NOT MET 2/4/2020  2. Pt will improve impaired LE MMTs to >/= 4+/5 to improve dynamic hip support for functional tasks. PROGRESSING, NOT MET 2/4/2020  3. Pt will perform TUG in </= 10 seconds to improve walking speed for functional community ambulation PROGRESSING, NOT MET 2/4/2020  4. Pt will report </= 3/10 pain at worst to improve quality of life PROGRESSING, NOT MET 2/4/2020    Plan     Progress core and hip strengthening next visit    Yuliana Morse, PT, DPT

## 2020-02-06 ENCOUNTER — CLINICAL SUPPORT (OUTPATIENT)
Dept: REHABILITATION | Facility: HOSPITAL | Age: 70
End: 2020-02-06
Attending: NEUROMUSCULOSKELETAL MEDICINE & OMM
Payer: MEDICARE

## 2020-02-06 DIAGNOSIS — R53.1 DECREASED STRENGTH: ICD-10-CM

## 2020-02-06 DIAGNOSIS — M25.551 PAIN OF BOTH HIP JOINTS: ICD-10-CM

## 2020-02-06 DIAGNOSIS — M25.552 PAIN OF BOTH HIP JOINTS: ICD-10-CM

## 2020-02-06 PROCEDURE — 97110 THERAPEUTIC EXERCISES: CPT | Mod: PN,CQ

## 2020-02-06 NOTE — PROGRESS NOTES
"                            Physical Therapy Daily Treatment Note     Name: Jolly Matias  Clinic Number: [id number]    Therapy Diagnosis:   No diagnosis found.  Physician: Camilla Knowles DO    Visit Date: 2/6/2020    Physician Orders: PT Eval and Treat, Therapeutic Exercise  Medical Diagnosis from Referral: Greater trochanteric bursitis of both hips  Evaluation Date: 1/22/2020  Authorization Period Expiration: 03/20/2020  Plan of Care Expiration: 1/22/2020 to 3/20/2020  Visit # / Visits authorized: 5/ 12  FOTO: 5/10 COMPLETED    Time In: 11:00  Time Out: 1055  Total Billable Time: 55 minutes (TE-4)     Precautions: Standard and HTN    Subjective     Pt reports: no pain in back or hips, but pain in B lateral thigh and shins  She was compliant with home exercise program.  Response to previous treatment: no adverse reaction  Functional change: no change    Pain: 4/10  Location: bilateral thigh and shin    Objective     Jolly received therapeutic exercises to develop strength, endurance, flexibility and core stabilization for 55 minutes including:    Pelvic muscle energy technique (MET) for L anteriorly rotated innominate 5x5" contraction  Piriformis stretch: 3x30" B  glute set 20x5"  posterior pelvic tilt 20x5"  Hip ADduction isometric 20x5"  Hip ABduction isometric 20x5"  Low trunk rotation x20  Straight leg raise with transverse abdominus activation 3x10 B  Partial sit up/crunch 2x10  Clams 2x10 YTB  Prone Hip extension knee bent 2x10  Prone hip extension straight knee  Leg press 5.0 3x10 DL        Home Exercises Provided and Patient Education Provided       Written Home Exercises Provided: Patient instructed to cont prior HEP.  Exercises were reviewed and Jollykelechi able to demonstrate them prior to the end of the session.  Jolly demonstrated good  understanding of the education provided.     See EMR under Patient Instructions for exercises provided 1/22/2020.      Assessment     Patient tolerated progression " of exercises well pain free. Neede VC and tactile cues to isolate abdominal and reduce neck strain during crunches. Foto completed    Jolly is progressing well towards her goals.   Pt prognosis is Good.     Pt will continue to benefit from skilled outpatient physical therapy to address the deficits listed in the problem list box on initial evaluation, provide pt/family education and to maximize pt's level of independence in the home and community environment.     Pt's spiritual, cultural and educational needs considered and pt agreeable to plan of care and goals.    Anticipated barriers to physical therapy: coping style    Goals:     Short Term Goals (4 Weeks):  1. Pt will be compliant with HEP to supplement PT in restoring pain free function. PROGRESSING, NOT MET 2/6/2020  2. Pt will improve impaired LE MMTs to >/= 4/5 to improve dynamic hip support for functional tasks. PROGRESSING, NOT MET 2/6/2020  3. Pt will report >/= 50% improvement in symptoms to improve quality of life PROGRESSING, NOT MET 2/6/2020        Long Term Goals (8 Weeks):  1. Pt will improve FOTO score to </= 41% limited to decrease perceived limitation with mobility. PROGRESSING, NOT MET 2/6/2020  2. Pt will improve impaired LE MMTs to >/= 4+/5 to improve dynamic hip support for functional tasks. PROGRESSING, NOT MET 2/6/2020  3. Pt will perform TUG in </= 10 seconds to improve walking speed for functional community ambulation PROGRESSING, NOT MET 2/6/2020  4. Pt will report </= 3/10 pain at worst to improve quality of life PROGRESSING, NOT MET 2/6/2020    Plan     Progress core and hip strengthening next visit    Aquilino Medeiros, PTA, DPT

## 2020-02-11 ENCOUNTER — CLINICAL SUPPORT (OUTPATIENT)
Dept: REHABILITATION | Facility: HOSPITAL | Age: 70
End: 2020-02-11
Attending: NEUROMUSCULOSKELETAL MEDICINE & OMM
Payer: MEDICARE

## 2020-02-11 DIAGNOSIS — M25.551 PAIN OF BOTH HIP JOINTS: ICD-10-CM

## 2020-02-11 DIAGNOSIS — R53.1 DECREASED STRENGTH: ICD-10-CM

## 2020-02-11 DIAGNOSIS — M25.552 PAIN OF BOTH HIP JOINTS: ICD-10-CM

## 2020-02-11 PROCEDURE — 97110 THERAPEUTIC EXERCISES: CPT | Mod: PN

## 2020-02-11 NOTE — PROGRESS NOTES
"                            Physical Therapy Daily Treatment Note     Name: Jolly Matias  Clinic Number: [id number]    Therapy Diagnosis:   Encounter Diagnoses   Name Primary?    Pain of both hip joints     Decreased strength      Physician: Camilla Knowles DO    Visit Date: 2/11/2020    Physician Orders: PT Eval and Treat, Therapeutic Exercise  Medical Diagnosis from Referral: Greater trochanteric bursitis of both hips  Evaluation Date: 1/22/2020  Authorization Period Expiration: 03/20/2020  Plan of Care Expiration: 1/22/2020 to 3/20/2020  Visit # / Visits authorized: 6/ 12  FOTO: 6/10    Time In: 1000  Time Out: 1053  Total Billable Time: 53 minutes (TE-4)     Precautions: Standard and HTN    Subjective     Pt reports: she felt pretty good until yesterday morning. She woke with pain in her B posterior hips and after she "got moving" the pain decreased. She did climb into a tree house with her grand-daughter on Sunday.    She was compliant with home exercise program.  Response to previous treatment: no adverse reaction  Functional change: no change    Pain: 4/10  Location: bilateral thigh and shin    Objective     Jolly received therapeutic exercises to develop strength, endurance, flexibility and core stabilization for 50 minutes including:    Pelvic muscle energy technique (MET) for L anteriorly rotated innominate 3x7" contraction  Hamstring stretch: 3x30" B  Piriformis stretch: 3x30" B  Bridges: 2x15 2" hold  posterior pelvic tilt 20x10"  Hip ADduction isometric 20x5"  Hip ABduction isometric 20x5"  Low trunk rotation x20  Straight leg raise with transverse abdominus activation 3x10 B  Partial sit up/crunch 2x10  Clams 2x10 YTB 2" hold  Prone Hip extension knee bent 3x10  Prone hip extension straight knee  Leg press 5.0 3x10 DL    Jolly received manual therapy for 3 minutes including:    L hip distraction    Home Exercises Provided and Patient Education Provided       Written Home Exercises Provided: " Patient instructed to cont prior HEP.  Exercises were reviewed and Jollywas able to demonstrate them prior to the end of the session.  Jolly demonstrated good  understanding of the education provided.     See EMR under Patient Instructions for exercises provided 1/22/2020.      Assessment     Patient tolerated progression of exercises without pain. She required verbal cuing for good hip alignment with bridges. Good pelvic and LE alignement in supine following MET and hip distraction.    Jolly is progressing well towards her goals.   Pt prognosis is Good.     Pt will continue to benefit from skilled outpatient physical therapy to address the deficits listed in the problem list box on initial evaluation, provide pt/family education and to maximize pt's level of independence in the home and community environment.     Pt's spiritual, cultural and educational needs considered and pt agreeable to plan of care and goals.    Anticipated barriers to physical therapy: coping style    Goals:     Short Term Goals (4 Weeks):  1. Pt will be compliant with HEP to supplement PT in restoring pain free function. PROGRESSING, NOT MET 2/11/2020  2. Pt will improve impaired LE MMTs to >/= 4/5 to improve dynamic hip support for functional tasks. PROGRESSING, NOT MET 2/11/2020  3. Pt will report >/= 50% improvement in symptoms to improve quality of life PROGRESSING, NOT MET 2/11/2020        Long Term Goals (8 Weeks):  1. Pt will improve FOTO score to </= 41% limited to decrease perceived limitation with mobility. PROGRESSING, NOT MET 2/11/2020  2. Pt will improve impaired LE MMTs to >/= 4+/5 to improve dynamic hip support for functional tasks. PROGRESSING, NOT MET 2/11/2020  3. Pt will perform TUG in </= 10 seconds to improve walking speed for functional community ambulation PROGRESSING, NOT MET 2/11/2020  4. Pt will report </= 3/10 pain at worst to improve quality of life PROGRESSING, NOT MET 2/11/2020    Plan     Progress core and  hip strengthening next visit    Yuliana Morse, PT, DPT

## 2020-02-13 ENCOUNTER — CLINICAL SUPPORT (OUTPATIENT)
Dept: REHABILITATION | Facility: HOSPITAL | Age: 70
End: 2020-02-13
Attending: NEUROMUSCULOSKELETAL MEDICINE & OMM
Payer: MEDICARE

## 2020-02-13 DIAGNOSIS — R53.1 DECREASED STRENGTH: ICD-10-CM

## 2020-02-13 DIAGNOSIS — M25.551 PAIN OF BOTH HIP JOINTS: ICD-10-CM

## 2020-02-13 DIAGNOSIS — M25.552 PAIN OF BOTH HIP JOINTS: ICD-10-CM

## 2020-02-13 PROCEDURE — 97110 THERAPEUTIC EXERCISES: CPT | Mod: PN

## 2020-02-13 NOTE — PROGRESS NOTES
"                            Physical Therapy Daily Treatment Note     Name: Jolly Matias  Clinic Number: [id number]    Therapy Diagnosis:   Encounter Diagnoses   Name Primary?    Pain of both hip joints     Decreased strength      Physician: Camilla Knowles DO    Visit Date: 2/13/2020    Physician Orders: PT Eval and Treat, Therapeutic Exercise  Medical Diagnosis from Referral: Greater trochanteric bursitis of both hips  Evaluation Date: 1/22/2020  Authorization Period Expiration: 03/20/2020  Plan of Care Expiration: 1/22/2020 to 3/20/2020  Visit # / Visits authorized: 7/12  FOTO: 7/10    Time In: 10:05  Time Out: 11:00  Total Billable Time: 55 minutes (TE-4)     Precautions: Standard and HTN    Subjective     Pt reports: doing well for the past couple of days, and even tried some walking recently. She was able to sleep on her side for the first time in a while without pain.  She was compliant with home exercise program.  Response to previous treatment: no adverse reaction  Functional change: no change    Pain: 0/10  Location: bilateral thigh and shin    Objective   O: 3+/5 glut min, med, max strength; negative B P/SLR Test, >/=75-90% lumbar AROM except flexion at 50% secondary to tight HS; negative SI provacative testing; good hip PROM, past lateral hip pain with SL sleeping at night    Jolly received therapeutic exercises to develop strength, endurance, flexibility and core stabilization for 55 minutes including: assessment    Pelvic muscle energy technique (MET) for L anteriorly rotated innominate 3x7" contraction - not done today  Hamstring stretch: 3x30" B  Piriformis stretch: 3x30" B - not done today  Bridges: 2x15 5" hold  posterior pelvic tilt 20x10" - not done today  Hip ADduction isometric 20x5" - not done today  SL hip abd: next  Hip ABduction isometric 15x10" - d/c if able to do SL hip abd  Low trunk rotation x20 - not done today  Straight leg raise with transverse abdominus activation 3x10 " B  Partial sit up/crunch 2x10 - not done today  Reverse crunches: 2x8  Clams 2x8 RTB  Prone Hip extension knee bent 3x10 B  Prone hip extension straight knee: 2x10 B  Leg press 5.0 3x10 DL    Jolly received manual therapy for 3 minutes including: Not done today  L hip distraction    Home Exercises Provided and Patient Education Provided     Written Home Exercises Provided: Patient instructed to cont prior HEP.  Exercises were reviewed and Jollywas able to demonstrate them prior to the end of the session.  Jolly demonstrated good  understanding of the education provided.     See EMR under Patient Instructions for exercises provided 1/22/2020.    Assessment     Pt present with glut med strain that appears to be getting better from the start of the week. Consider progressing hip abductor strengthening exercises, assess response.    Jolly is progressing well towards her goals.   Pt prognosis is Good.     Pt will continue to benefit from skilled outpatient physical therapy to address the deficits listed in the problem list box on initial evaluation, provide pt/family education and to maximize pt's level of independence in the home and community environment.   Pt's spiritual, cultural and educational needs considered and pt agreeable to plan of care and goals.    Anticipated barriers to physical therapy: coping style    Goals:   Short Term Goals (4 Weeks):  1. Pt will be compliant with HEP to supplement PT in restoring pain free function. PROGRESSING, NOT MET 2/13/2020  2. Pt will improve impaired LE MMTs to >/= 4/5 to improve dynamic hip support for functional tasks. PROGRESSING, NOT MET 2/13/2020  3. Pt will report >/= 50% improvement in symptoms to improve quality of life PROGRESSING, NOT MET 2/13/2020        Long Term Goals (8 Weeks):  1. Pt will improve FOTO score to </= 41% limited to decrease perceived limitation with mobility. PROGRESSING, NOT MET 2/13/2020  2. Pt will improve impaired LE MMTs to >/= 4+/5  to improve dynamic hip support for functional tasks. PROGRESSING, NOT MET 2/13/2020  3. Pt will perform TUG in </= 10 seconds to improve walking speed for functional community ambulation PROGRESSING, NOT MET 2/13/2020  4. Pt will report </= 3/10 pain at worst to improve quality of life PROGRESSING, NOT MET 2/13/2020    Plan     Progress core and hip strengthening next visit    Perry Byers, PT, DPT

## 2020-02-18 ENCOUNTER — CLINICAL SUPPORT (OUTPATIENT)
Dept: REHABILITATION | Facility: HOSPITAL | Age: 70
End: 2020-02-18
Attending: NEUROMUSCULOSKELETAL MEDICINE & OMM
Payer: MEDICARE

## 2020-02-18 DIAGNOSIS — M25.552 PAIN OF BOTH HIP JOINTS: ICD-10-CM

## 2020-02-18 DIAGNOSIS — M25.551 PAIN OF BOTH HIP JOINTS: ICD-10-CM

## 2020-02-18 DIAGNOSIS — R53.1 DECREASED STRENGTH: ICD-10-CM

## 2020-02-18 PROCEDURE — 97110 THERAPEUTIC EXERCISES: CPT | Mod: PN

## 2020-02-18 NOTE — PROGRESS NOTES
"                            Physical Therapy Daily Treatment Note     Name: Jolly Matias  Clinic Number: [id number]    Therapy Diagnosis:   Encounter Diagnoses   Name Primary?    Pain of both hip joints     Decreased strength      Physician: Camilla Knowles DO    Visit Date: 2/18/2020    Physician Orders: PT Eval and Treat, Therapeutic Exercise  Medical Diagnosis from Referral: Greater trochanteric bursitis of both hips  Evaluation Date: 1/22/2020  Authorization Period Expiration: 03/20/2020  Plan of Care Expiration: 1/22/2020 to 3/20/2020  Visit # / Visits authorized: 8/12  FOTO: 8/10    Time In: 10:05  Time Out: 10:45  Total Billable Time: 25 minutes (TE-2)     Precautions: Standard and HTN    Subjective     Pt reports: she had a little flare up last Sunday and yesterday but is ok today and back to baseline with no low back pain. She is unsure what she did, and reports pain was around 5/10 in her right buttock wrapping anteriorly a little bit.  She was compliant with home exercise program.  Response to previous treatment: no adverse reaction  Functional change: no change    Pain: 0/10  Location: bilateral thigh and shin    Objective   O: 3+/5 glut min, med, max strength; negative B P/SLR Test, >/=75-90% lumbar AROM except flexion at 50% secondary to tight HS; negative SI provacative testing; good hip PROM, past lateral hip pain with SL sleeping at night    Jolly received therapeutic exercises to develop strength, endurance, flexibility and core stabilization for 25 minutes including:     Pelvic muscle energy technique (MET) for L anteriorly rotated innominate 3x7" contraction - not done today  L hip ER stretch in hooklying: 3x20'' L  Bridges: 2x15 5" hold  SL hip abd: 3x10 B (slight discomfort with laying on left side)  Straight leg raise with transverse abdominus activation 3x10 B 1#  Reverse crunches: 2x10  Clams 2x10 RTB  Reverse clams: 2x10 L  Lumbo-thoracic rotations: 20x5'' B  Prone hip extension " straight knee: 3x10 B  Leg press 5.0 2x10 DL, S=5  Standing hip abd: 2x15 B    Jolly received manual therapy for 0 minutes including: Not done today  L hip distraction    Home Exercises Provided and Patient Education Provided   Written Home Exercises Provided: Patient instructed to cont prior HEP.  Exercises were reviewed and Mechelle able to demonstrate them prior to the end of the session.  Jolly demonstrated good  understanding of the education provided.     See EMR under Patient Instructions for exercises provided 1/22/2020.    Assessment     Pt appears to be progressing despite flare up, and focusing on glut ER strengthening. Pain with left side lying and and +TTP just proximal to left greater trochanter.    Jolly is progressing well towards her goals.   Pt prognosis is Good.     Pt will continue to benefit from skilled outpatient physical therapy to address the deficits listed in the problem list box on initial evaluation, provide pt/family education and to maximize pt's level of independence in the home and community environment.   Pt's spiritual, cultural and educational needs considered and pt agreeable to plan of care and goals.    Anticipated barriers to physical therapy: coping style    Goals:   Short Term Goals (4 Weeks):  1. Pt will be compliant with HEP to supplement PT in restoring pain free function. PROGRESSING, NOT MET 2/18/2020  2. Pt will improve impaired LE MMTs to >/= 4/5 to improve dynamic hip support for functional tasks. PROGRESSING, NOT MET 2/18/2020  3. Pt will report >/= 50% improvement in symptoms to improve quality of life PROGRESSING, NOT MET 2/18/2020        Long Term Goals (8 Weeks):  1. Pt will improve FOTO score to </= 41% limited to decrease perceived limitation with mobility. PROGRESSING, NOT MET 2/18/2020  2. Pt will improve impaired LE MMTs to >/= 4+/5 to improve dynamic hip support for functional tasks. PROGRESSING, NOT MET 2/18/2020  3. Pt will perform TUG in </= 10  seconds to improve walking speed for functional community ambulation PROGRESSING, NOT MET 2/18/2020  4. Pt will report </= 3/10 pain at worst to improve quality of life PROGRESSING, NOT MET 2/18/2020    Plan     Continue per POC.    Perry Byers, PT, DPT

## 2020-02-19 ENCOUNTER — TELEPHONE (OUTPATIENT)
Dept: INTERNAL MEDICINE | Facility: CLINIC | Age: 70
End: 2020-02-19

## 2020-02-19 RX ORDER — OLMESARTAN MEDOXOMIL 40 MG/1
40 TABLET ORAL DAILY
Qty: 90 TABLET | Refills: 3 | Status: SHIPPED | OUTPATIENT
Start: 2020-02-19 | End: 2021-03-15 | Stop reason: SDUPTHER

## 2020-02-19 NOTE — TELEPHONE ENCOUNTER
----- Message from Socorro Lucia sent at 2/19/2020  9:39 AM CST -----  Contact: Patient [phone]  Requesting an RX refill or new RX.  Is this a refill or new RX:  refill  RX name and strength: olmesartan (BENICAR) 40 MG tablet  Directions (copy/paste from chart):    Is this a 30 day or 90 day RX:    Local pharmacy or mail order pharmacy:  local  Pharmacy name and phone # (copy/paste from chart):   [facility] - [place], [place] - [address] [place] [address] [phone] (Phone)  [phone] (Fax)    Comments:  Needs asap today

## 2020-02-20 ENCOUNTER — CLINICAL SUPPORT (OUTPATIENT)
Dept: REHABILITATION | Facility: HOSPITAL | Age: 70
End: 2020-02-20
Attending: NEUROMUSCULOSKELETAL MEDICINE & OMM
Payer: MEDICARE

## 2020-02-20 DIAGNOSIS — M25.552 PAIN OF BOTH HIP JOINTS: ICD-10-CM

## 2020-02-20 DIAGNOSIS — R53.1 DECREASED STRENGTH: ICD-10-CM

## 2020-02-20 DIAGNOSIS — M25.551 PAIN OF BOTH HIP JOINTS: ICD-10-CM

## 2020-02-20 PROCEDURE — 97110 THERAPEUTIC EXERCISES: CPT | Mod: PN

## 2020-02-20 NOTE — PROGRESS NOTES
"                            Physical Therapy Daily Treatment Note     Name: Jolly Matias  Clinic Number: [id number]    Therapy Diagnosis:   Encounter Diagnoses   Name Primary?    Pain of both hip joints     Decreased strength      Physician: Camilla Knowles DO    Visit Date: 2/20/2020    Physician Orders: PT Eval and Treat, Therapeutic Exercise  Medical Diagnosis from Referral: Greater trochanteric bursitis of both hips  Evaluation Date: 1/22/2020  Authorization Period Expiration: 03/20/2020  Plan of Care Expiration: 1/22/2020 to 3/20/2020  Visit # / Visits authorized: 9/12  FOTO: 9/10    Time In: 1000  Time Out: 1100  Total Billable Time: 30 minutes (TE-2)     Precautions: Standard and HTN    Subjective     Pt reports: Had a little pain during the night when she rolled over to her Left side, but it had subsided by the morning.   She was compliant with home exercise program.  Response to previous treatment: no adverse reaction  Functional change: no change    Pain: 0/10  Location: bilateral thigh and shin    Objective     Jolly received therapeutic exercises to develop strength, endurance, flexibility and core stabilization for 30 minutes including:     Pelvic muscle energy technique (MET) for L anteriorly rotated innominate 3x7" contraction - not done today  L hip ER stretch in hooklying: 3x20'' L  Bridges: 2x15 5" hold  SL hip abd: 3x10 B   Straight leg raise with transverse abdominus activation 3x10 B 1#  Reverse crunches: 2x10  Clams 2x10 RTB  Reverse clams: 2x10 L  Lumbo-thoracic rotations: 20x5'' B  Prone hip extension straight knee: 3x10 B- OOT  Leg press 5.0 2x10 DL, S=5 - OOT  Standing hip abd: 2x15 B - OOT  DKTC 3x10, 3" hold     Jolly received manual therapy for 3 minutes including: Not done today  L hip distraction    Home Exercises Provided and Patient Education Provided   Written Home Exercises Provided: Patient instructed to cont prior HEP.  Exercises were reviewed and Mechelle able to " demonstrate them prior to the end of the session.  Jolly demonstrated good  understanding of the education provided.     See EMR under Patient Instructions for exercises provided 1/22/2020.    Assessment     Pt was able to tolerate all exercises without any c/o increased pain or discomfort. Pt needed min verbal and tactile cuing for reverse clamshells to not lift up her knee. Pt verbalized and demonstrated understanding. Pt progressed with DKTC.     Jolly is progressing well towards her goals.   Pt prognosis is Good.     Pt will continue to benefit from skilled outpatient physical therapy to address the deficits listed in the problem list box on initial evaluation, provide pt/family education and to maximize pt's level of independence in the home and community environment.   Pt's spiritual, cultural and educational needs considered and pt agreeable to plan of care and goals.    Anticipated barriers to physical therapy: coping style    Goals:   Short Term Goals (4 Weeks):  1. Pt will be compliant with HEP to supplement PT in restoring pain free function. PROGRESSING, NOT MET 2/20/2020  2. Pt will improve impaired LE MMTs to >/= 4/5 to improve dynamic hip support for functional tasks. PROGRESSING, NOT MET 2/20/2020  3. Pt will report >/= 50% improvement in symptoms to improve quality of life PROGRESSING, NOT MET 2/20/2020        Long Term Goals (8 Weeks):  1. Pt will improve FOTO score to </= 41% limited to decrease perceived limitation with mobility. PROGRESSING, NOT MET 2/20/2020  2. Pt will improve impaired LE MMTs to >/= 4+/5 to improve dynamic hip support for functional tasks. PROGRESSING, NOT MET 2/20/2020  3. Pt will perform TUG in </= 10 seconds to improve walking speed for functional community ambulation PROGRESSING, NOT MET 2/20/2020  4. Pt will report </= 3/10 pain at worst to improve quality of life PROGRESSING, NOT MET 2/20/2020    Plan     Continue per POC.    Dc Gallegos, PT, DPT

## 2020-02-24 ENCOUNTER — TELEPHONE (OUTPATIENT)
Dept: INTERNAL MEDICINE | Facility: CLINIC | Age: 70
End: 2020-02-24

## 2020-02-24 DIAGNOSIS — E11.9 TYPE 2 DIABETES MELLITUS WITHOUT COMPLICATION, UNSPECIFIED WHETHER LONG TERM INSULIN USE: Primary | ICD-10-CM

## 2020-02-24 DIAGNOSIS — I10 ESSENTIAL HYPERTENSION: ICD-10-CM

## 2020-02-24 DIAGNOSIS — E78.00 PURE HYPERCHOLESTEROLEMIA: ICD-10-CM

## 2020-02-24 RX ORDER — ATORVASTATIN CALCIUM 20 MG/1
TABLET, FILM COATED ORAL
Qty: 90 TABLET | Refills: 1 | Status: SHIPPED | OUTPATIENT
Start: 2020-02-24 | End: 2020-06-02 | Stop reason: SDUPTHER

## 2020-02-24 NOTE — TELEPHONE ENCOUNTER
----- Message from Hien Alonso sent at 2/24/2020 12:22 PM CST -----  Contact: self   Requesting an RX refill or new RX.  Is this a refill or new RX:  Refill  RX name and strength: atorvastatin (LIPITOR) 20 MG tablet  Directions (copy/paste from chart):  TAKE 1 TABLET(20 MG) BY MOUTH EVERY DAY  Is this a 30 day or 90 day RX: 90  Local pharmacy or mail order pharmacy:  Local   Pharmacy name and phone # (copy/paste from chart):[facility]  [phone]       Comments:  Patient stated she has been waiting on this for a little over  A week now. She's out

## 2020-02-27 DIAGNOSIS — F32.9 REACTIVE DEPRESSION: ICD-10-CM

## 2020-02-27 RX ORDER — SERTRALINE HYDROCHLORIDE 100 MG/1
TABLET, FILM COATED ORAL
Qty: 90 TABLET | Refills: 0 | Status: SHIPPED | OUTPATIENT
Start: 2020-02-27 | End: 2020-05-27

## 2020-03-03 ENCOUNTER — CLINICAL SUPPORT (OUTPATIENT)
Dept: REHABILITATION | Facility: HOSPITAL | Age: 70
End: 2020-03-03
Attending: NEUROMUSCULOSKELETAL MEDICINE & OMM
Payer: MEDICARE

## 2020-03-03 DIAGNOSIS — M25.551 PAIN OF BOTH HIP JOINTS: ICD-10-CM

## 2020-03-03 DIAGNOSIS — R53.1 DECREASED STRENGTH: ICD-10-CM

## 2020-03-03 DIAGNOSIS — M25.552 PAIN OF BOTH HIP JOINTS: ICD-10-CM

## 2020-03-03 PROCEDURE — 97110 THERAPEUTIC EXERCISES: CPT | Mod: PN,CQ

## 2020-03-03 PROCEDURE — 97110 THERAPEUTIC EXERCISES: CPT | Mod: PN

## 2020-03-03 NOTE — PROGRESS NOTES
"                            Physical Therapy Daily Treatment Note     Name: Jolly Matias  Clinic Number: [id number]    Therapy Diagnosis:   Encounter Diagnoses   Name Primary?    Pain of both hip joints     Decreased strength      Physician: Camilla Knowles DO    Visit Date: 3/3/2020    Physician Orders: PT Eval and Treat, Therapeutic Exercise  Medical Diagnosis from Referral: Greater trochanteric bursitis of both hips  Evaluation Date: 1/22/2020  Authorization Period Expiration: 03/20/2020  Plan of Care Expiration: 1/22/2020 to 3/20/2020  Visit # / Visits authorized: 10/12  FOTO: 1/10  DONE    Time In: 10:00 AM   Time Out: 11:00 AM   Total Billable Time: 45 minutes (TE-3)     Precautions: Standard and HTN    Subjective     Pt reports: pt agreeable to PT session.   She was compliant with home exercise program.  Response to previous treatment: no adverse reaction  Functional change: none stated     Pain: 0/10  Location: bilateral thigh and shin    Objective     Pt objective measurements assessed by Yuliana Morse DPT, prior to treatment.     Jolly received therapeutic exercises to develop strength, endurance, flexibility and core stabilization for 45 minutes including:     -L hip ER stretch in hooklying:  3x20'' L  -Bridges:     2x10 5" hold  -Straight leg raise with transverse     abdominus activation   3x10 B 1#  -Reverse crunches:    2x10  -Clams     2x10 RTB  -Reverse clams:    2x10 L  -Lumbo-thoracic rotations:   20x5'' B  -DKTC     2x10, 3" hold w/ peanut  -wall squats    2x10   -hip extension    3-5 sec hold, 2x10 B VC's for postural corrections  -crate lift actvities    X12, 7lb crate lift (floor to waist to table)    NOT PERFORMED:   -SL hip abd:     3x10 B   -Prone hip extension straight knee:  3x10 B- OOT  -Leg press     5.0 2x10 DL, S=5 - OOT  -Standing hip abd:    2x15 B - OOT    Jolly received manual therapy for 3 minutes including: Not done today  L hip distraction    Home Exercises Provided and " Patient Education Provided   Written Home Exercises Provided: Patient instructed to cont prior HEP.  Exercises were reviewed and Mechelle able to demonstrate them prior to the end of the session.  Jolly demonstrated good  understanding of the education provided.     See EMR under Patient Instructions for exercises provided 1/22/2020.    Assessment     Pt able to complete session with no reports of lower back/ LE pain noted. Added therex to focus on gluteal musculature strengthening and proper lifting body mechanics today, pt able to complete with verbal instruction on technique and safety.     Jolly is progressing well towards her goals.   Pt prognosis is Good.     Pt will continue to benefit from skilled outpatient physical therapy to address the deficits listed in the problem list box on initial evaluation, provide pt/family education and to maximize pt's level of independence in the home and community environment.   Pt's spiritual, cultural and educational needs considered and pt agreeable to plan of care and goals.    Anticipated barriers to physical therapy: coping style    Goals:   Short Term Goals (4 Weeks):  1. Pt will be compliant with HEP to supplement PT in restoring pain free function. PROGRESSING, NOT MET 3/3/2020  2. Pt will improve impaired LE MMTs to >/= 4/5 to improve dynamic hip support for functional tasks. PROGRESSING, NOT MET 3/3/2020  3. Pt will report >/= 50% improvement in symptoms to improve quality of life PROGRESSING, NOT MET 3/3/2020        Long Term Goals (8 Weeks):  1. Pt will improve FOTO score to </= 41% limited to decrease perceived limitation with mobility. PROGRESSING, NOT MET 3/3/2020  2. Pt will improve impaired LE MMTs to >/= 4+/5 to improve dynamic hip support for functional tasks. PROGRESSING, NOT MET 3/3/2020  3. Pt will perform TUG in </= 10 seconds to improve walking speed for functional community ambulation PROGRESSING, NOT MET 3/3/2020  4. Pt will report </= 3/10  pain at worst to improve quality of life PROGRESSING, NOT MET 3/3/2020    Plan     Continue to advance PT as per POC,   Progress as appropriate.     Emory Lui, PTA

## 2020-03-03 NOTE — PROGRESS NOTES
Physical Therapy Daily Treatment Note     Name: Jolly Matias  Clinic Number: [id number]    Therapy Diagnosis:   Encounter Diagnoses   Name Primary?    Pain of both hip joints     Decreased strength      Physician: Camilla Knowles DO    Visit Date: 3/3/2020    Physician Orders: PT Eval and Treat, Therapeutic Exercise  Medical Diagnosis from Referral: Greater trochanteric bursitis of both hips  Evaluation Date: 1/22/2020  Authorization Period Expiration: 03/20/2020  Plan of Care Expiration: 1/22/2020 to 3/20/2020  Visit # / Visits authorized: 10/12  FOTO: 10/10 done    Time In: 1008  Time Out: 1017  Total Billable Time: 9 minutes (TE-1)     Precautions: Standard and HTN    Subjective     Pt reports: Her pain is 70-80% improved since beginning PT. Less pain with rolling in bed  She was compliant with home exercise program.  Response to previous treatment: no adverse reaction  Functional change: no change    Pain: 0/10  Location: bilateral thigh and shin    Objective       Posture: standing: R shoulder drop, R iliac crest and greater trochanter higher than L  Palpation: tenderness to B greater trochanter, and along B iliotibial bands        Range of Motion/Strength:         Thoracolumbar Pain/Dysfunction with Movement   Flexion Min loss; Pulling in B HS   Extension Min loss   Right side bending Min loss   Left side bending Min loss   Right rotation No loss   Left rotation No loss         L/E MMT Right Left Pain/Dysfunction with Movement   Hip Flexion 4+/5 4/5     Hip Extension 4/5 4-/5     Hip Abduction 4+/5 4/5    Hip Adduction 4/5 4/5     Hip IR 4/5 4/5     Hip ER 4+/5 4/5    Knee Flexion 4+/5 4+/5     Knee Extension 4+/5 4+/5     Ankle DF 5/5 5/5              Flexibility:   Hamstring: <5 degrees B at 90/90  Hip flexor: minimally decreased B     Special Tests:   SLR: negative B  Modified Mundo: positive for pain and tightness B     Gait Analysis:Without AD Deviations: narrow base of  support, decreased conner, increased               CMS Impairment/Limitation/Restriction for FOTO Hip Survey     Therapist reviewed FOTO scores for Jolly Matias on 3/3/2020.   FOTO documents entered into [employer] - see Media section.     Limitation Score: 42%               Home Exercises Provided and Patient Education Provided   Written Home Exercises Provided: Patient instructed to cont prior HEP.  Exercises were reviewed and Jollywas able to demonstrate them prior to the end of the session.  Jolly demonstrated good  understanding of the education provided.     See EMR under Patient Instructions for exercises provided 1/22/2020.    Assessment     Patient demonstrated improved LE strength and flexibility this date. She continues with weakness in B LE and would benefit from continued skilled physical therapy to address deficits.     Jolly is progressing well towards her goals.   Pt prognosis is Good.     Pt will continue to benefit from skilled outpatient physical therapy to address the deficits listed in the problem list box on initial evaluation, provide pt/family education and to maximize pt's level of independence in the home and community environment.   Pt's spiritual, cultural and educational needs considered and pt agreeable to plan of care and goals.    Anticipated barriers to physical therapy: coping style    Goals:   Short Term Goals (4 Weeks):  1. Pt will be compliant with HEP to supplement PT in restoring pain free function. PROGRESSING, NOT MET 3/3/2020  2. Pt will improve impaired LE MMTs to >/= 4/5 to improve dynamic hip support for functional tasks. PROGRESSING, NOT MET 3/3/2020  3. Pt will report >/= 50% improvement in symptoms to improve quality of life MET 3/3/2020      Long Term Goals (8 Weeks):  1. Pt will improve FOTO score to </= 41% limited to decrease perceived limitation with mobility. PROGRESSING, NOT MET 3/3/2020  2. Pt will improve impaired LE MMTs to >/= 4+/5 to improve dynamic hip  support for functional tasks. PROGRESSING, NOT MET 3/3/2020  3. Pt will perform TUG in </= 10 seconds to improve walking speed for functional community ambulation PROGRESSING, NOT MET 3/3/2020  4. Pt will report </= 3/10 pain at worst to improve quality of life PROGRESSING, NOT MET 3/3/2020    Plan     Progress LE strengthening    Yuliana Morse, PT, DPT

## 2020-03-05 ENCOUNTER — CLINICAL SUPPORT (OUTPATIENT)
Dept: REHABILITATION | Facility: HOSPITAL | Age: 70
End: 2020-03-05
Attending: NEUROMUSCULOSKELETAL MEDICINE & OMM
Payer: MEDICARE

## 2020-03-05 DIAGNOSIS — R53.1 DECREASED STRENGTH: ICD-10-CM

## 2020-03-05 DIAGNOSIS — M25.552 PAIN OF BOTH HIP JOINTS: ICD-10-CM

## 2020-03-05 DIAGNOSIS — M25.551 PAIN OF BOTH HIP JOINTS: ICD-10-CM

## 2020-03-05 PROCEDURE — 97110 THERAPEUTIC EXERCISES: CPT | Mod: PN

## 2020-03-05 NOTE — PROGRESS NOTES
"                            Physical Therapy Daily Treatment Note     Name: Jolly Matias  Clinic Number: [id number]    Therapy Diagnosis:   Encounter Diagnoses   Name Primary?    Pain of both hip joints     Decreased strength      Physician: Camilla Knowles DO    Visit Date: 3/5/2020    Physician Orders: PT Eval and Treat, Therapeutic Exercise  Medical Diagnosis from Referral: Greater trochanteric bursitis of both hips  Evaluation Date: 1/22/2020  Authorization Period Expiration: 03/20/2020  Plan of Care Expiration: 1/22/2020 to 3/20/2020  Visit # / Visits authorized: 11/12  FOTO: 2/10    Time In: 1002  Time Out: 1100   Total Billable Time: 58 minutes (TE-4)     Precautions: Standard and HTN    Subjective     Pt reports: patient doing well this am.   She was compliant with home exercise program.  Response to previous treatment: no adverse reaction  Functional change: none stated     Pain: 0/10  Location: bilateral thigh and shin    Objective      Jolly received therapeutic exercises to develop strength, endurance, flexibility and core stabilization for 58 minutes including:     -pelvic muscle energy technique for R anteriorly rotated innominate  -L hip ER stretch in hooklying:  3x20'' L  -Bridges:     2x12 5" hold  -Straight leg raise with transverse     abdominus activation   2x12 B 1#  -crunches:     3x10  -Clams     2x12 RTB  -Prone hip extension straight knee:  3x10 B  -Lumbo-thoracic rotations:   20x5'' B  -DKTC     2x10, 3" hold w/ peanut  -wall squats    3x10 with ball  -standing hip extension  3-5 sec hold, 2x10 B VC's for postural corrections  -Standing hip abd:    3x10 red theraband  -crate lift actvities    X12, 7lb crate lift (floor to waist to table)  -Leg press     5.0 3x10 DL, S=5    NOT PERFORMED:   -SL hip abd:     3x10 B   -Reverse clams:    2x10 L         Home Exercises Provided and Patient Education Provided   Written Home Exercises Provided: yes  Exercises were reviewed and Seuns able " to demonstrate them prior to the end of the session.  Jolly demonstrated good  understanding of the education provided.     See EMR under Patient Instructions for exercises provided 3/5/2020    Assessment     Patient required verbal cuing for upright posture with standing exercises. She is progressing well with her strengthening exercises and tolerating standing exercises without complaint of pain or discomfort.    Jolly is progressing well towards her goals.   Pt prognosis is Good.     Pt will continue to benefit from skilled outpatient physical therapy to address the deficits listed in the problem list box on initial evaluation, provide pt/family education and to maximize pt's level of independence in the home and community environment.   Pt's spiritual, cultural and educational needs considered and pt agreeable to plan of care and goals.    Anticipated barriers to physical therapy: coping style    Goals:   Short Term Goals (4 Weeks):  1. Pt will be compliant with HEP to supplement PT in restoring pain free function. PROGRESSING, NOT MET 3/5/2020  2. Pt will improve impaired LE MMTs to >/= 4/5 to improve dynamic hip support for functional tasks. PROGRESSING, NOT MET 3/5/2020  3. Pt will report >/= 50% improvement in symptoms to improve quality of life PROGRESSING, NOT MET 3/5/2020        Long Term Goals (8 Weeks):  1. Pt will improve FOTO score to </= 41% limited to decrease perceived limitation with mobility. PROGRESSING, NOT MET 3/5/2020  2. Pt will improve impaired LE MMTs to >/= 4+/5 to improve dynamic hip support for functional tasks. PROGRESSING, NOT MET 3/5/2020  3. Pt will perform TUG in </= 10 seconds to improve walking speed for functional community ambulation PROGRESSING, NOT MET 3/5/2020  4. Pt will report </= 3/10 pain at worst to improve quality of life PROGRESSING, NOT MET 3/5/2020    Plan     Progress core strengthening next     Yuliana Morse, PT

## 2020-03-09 ENCOUNTER — TELEPHONE (OUTPATIENT)
Dept: OPHTHALMOLOGY | Facility: CLINIC | Age: 70
End: 2020-03-09

## 2020-03-09 ENCOUNTER — OFFICE VISIT (OUTPATIENT)
Dept: INTERNAL MEDICINE | Facility: CLINIC | Age: 70
End: 2020-03-09
Payer: MEDICARE

## 2020-03-09 ENCOUNTER — LAB VISIT (OUTPATIENT)
Dept: LAB | Facility: HOSPITAL | Age: 70
End: 2020-03-09
Attending: FAMILY MEDICINE
Payer: MEDICARE

## 2020-03-09 VITALS
DIASTOLIC BLOOD PRESSURE: 70 MMHG | BODY MASS INDEX: 29.51 KG/M2 | HEART RATE: 78 BPM | RESPIRATION RATE: 14 BRPM | WEIGHT: 156.31 LBS | SYSTOLIC BLOOD PRESSURE: 130 MMHG | TEMPERATURE: 98 F | HEIGHT: 61 IN

## 2020-03-09 DIAGNOSIS — J47.9 BRONCHIECTASIS WITHOUT COMPLICATION: ICD-10-CM

## 2020-03-09 DIAGNOSIS — J42 CHRONIC BRONCHITIS, UNSPECIFIED CHRONIC BRONCHITIS TYPE: ICD-10-CM

## 2020-03-09 DIAGNOSIS — E11.9 TYPE 2 DIABETES MELLITUS WITHOUT RETINOPATHY: ICD-10-CM

## 2020-03-09 DIAGNOSIS — I72.8 SPLENIC ARTERY ANEURYSM: ICD-10-CM

## 2020-03-09 DIAGNOSIS — H02.413 MECHANICAL PTOSIS OF EYELID OF BOTH EYES: ICD-10-CM

## 2020-03-09 DIAGNOSIS — Z01.818 PRE-OP EXAM: Primary | ICD-10-CM

## 2020-03-09 DIAGNOSIS — I70.0 ABDOMINAL AORTIC ATHEROSCLEROSIS: ICD-10-CM

## 2020-03-09 DIAGNOSIS — E78.00 PURE HYPERCHOLESTEROLEMIA: ICD-10-CM

## 2020-03-09 DIAGNOSIS — Z01.818 PRE-OP EXAM: ICD-10-CM

## 2020-03-09 LAB
ALBUMIN SERPL BCP-MCNC: 3.9 G/DL (ref 3.5–5.2)
ALP SERPL-CCNC: 108 U/L (ref 55–135)
ALT SERPL W/O P-5'-P-CCNC: 13 U/L (ref 10–44)
ANION GAP SERPL CALC-SCNC: 10 MMOL/L (ref 8–16)
AST SERPL-CCNC: 15 U/L (ref 10–40)
BASOPHILS # BLD AUTO: 0.08 K/UL (ref 0–0.2)
BASOPHILS NFR BLD: 1.3 % (ref 0–1.9)
BILIRUB SERPL-MCNC: 0.4 MG/DL (ref 0.1–1)
BUN SERPL-MCNC: 18 MG/DL (ref 8–23)
CALCIUM SERPL-MCNC: 9.3 MG/DL (ref 8.7–10.5)
CHLORIDE SERPL-SCNC: 109 MMOL/L (ref 95–110)
CO2 SERPL-SCNC: 24 MMOL/L (ref 23–29)
CREAT SERPL-MCNC: 1 MG/DL (ref 0.5–1.4)
DIFFERENTIAL METHOD: ABNORMAL
EOSINOPHIL # BLD AUTO: 0.4 K/UL (ref 0–0.5)
EOSINOPHIL NFR BLD: 6.9 % (ref 0–8)
ERYTHROCYTE [DISTWIDTH] IN BLOOD BY AUTOMATED COUNT: 14.1 % (ref 11.5–14.5)
EST. GFR  (AFRICAN AMERICAN): >60 ML/MIN/1.73 M^2
EST. GFR  (NON AFRICAN AMERICAN): 57.2 ML/MIN/1.73 M^2
GLUCOSE SERPL-MCNC: 98 MG/DL (ref 70–110)
HCT VFR BLD AUTO: 37.7 % (ref 37–48.5)
HGB BLD-MCNC: 11.5 G/DL (ref 12–16)
IMM GRANULOCYTES # BLD AUTO: 0.05 K/UL (ref 0–0.04)
IMM GRANULOCYTES NFR BLD AUTO: 0.8 % (ref 0–0.5)
LYMPHOCYTES # BLD AUTO: 0.9 K/UL (ref 1–4.8)
LYMPHOCYTES NFR BLD: 13.9 % (ref 18–48)
MCH RBC QN AUTO: 29.4 PG (ref 27–31)
MCHC RBC AUTO-ENTMCNC: 30.5 G/DL (ref 32–36)
MCV RBC AUTO: 96 FL (ref 82–98)
MONOCYTES # BLD AUTO: 0.5 K/UL (ref 0.3–1)
MONOCYTES NFR BLD: 8.4 % (ref 4–15)
NEUTROPHILS # BLD AUTO: 4.4 K/UL (ref 1.8–7.7)
NEUTROPHILS NFR BLD: 68.7 % (ref 38–73)
NRBC BLD-RTO: 0 /100 WBC
PLATELET # BLD AUTO: 272 K/UL (ref 150–350)
PMV BLD AUTO: 10.8 FL (ref 9.2–12.9)
POTASSIUM SERPL-SCNC: 5 MMOL/L (ref 3.5–5.1)
PROT SERPL-MCNC: 6.8 G/DL (ref 6–8.4)
RBC # BLD AUTO: 3.91 M/UL (ref 4–5.4)
SODIUM SERPL-SCNC: 143 MMOL/L (ref 136–145)
WBC # BLD AUTO: 6.4 K/UL (ref 3.9–12.7)

## 2020-03-09 PROCEDURE — 99499 UNLISTED E&M SERVICE: CPT | Mod: S$GLB,,, | Performed by: FAMILY MEDICINE

## 2020-03-09 PROCEDURE — 93005 ELECTROCARDIOGRAM TRACING: CPT | Mod: S$GLB,,, | Performed by: FAMILY MEDICINE

## 2020-03-09 PROCEDURE — 1159F PR MEDICATION LIST DOCUMENTED IN MEDICAL RECORD: ICD-10-PCS | Mod: S$GLB,,, | Performed by: FAMILY MEDICINE

## 2020-03-09 PROCEDURE — 1101F PT FALLS ASSESS-DOCD LE1/YR: CPT | Mod: CPTII,S$GLB,, | Performed by: FAMILY MEDICINE

## 2020-03-09 PROCEDURE — 85025 COMPLETE CBC W/AUTO DIFF WBC: CPT

## 2020-03-09 PROCEDURE — 80053 COMPREHEN METABOLIC PANEL: CPT

## 2020-03-09 PROCEDURE — 1159F MED LIST DOCD IN RCRD: CPT | Mod: S$GLB,,, | Performed by: FAMILY MEDICINE

## 2020-03-09 PROCEDURE — 3078F PR MOST RECENT DIASTOLIC BLOOD PRESSURE < 80 MM HG: ICD-10-PCS | Mod: CPTII,S$GLB,, | Performed by: FAMILY MEDICINE

## 2020-03-09 PROCEDURE — 93010 EKG 12-LEAD: ICD-10-PCS | Mod: S$GLB,,, | Performed by: INTERNAL MEDICINE

## 2020-03-09 PROCEDURE — 99999 PR PBB SHADOW E&M-EST. PATIENT-LVL III: ICD-10-PCS | Mod: PBBFAC,,, | Performed by: FAMILY MEDICINE

## 2020-03-09 PROCEDURE — 3075F SYST BP GE 130 - 139MM HG: CPT | Mod: CPTII,S$GLB,, | Performed by: FAMILY MEDICINE

## 2020-03-09 PROCEDURE — 1126F PR PAIN SEVERITY QUANTIFIED, NO PAIN PRESENT: ICD-10-PCS | Mod: S$GLB,,, | Performed by: FAMILY MEDICINE

## 2020-03-09 PROCEDURE — 93010 ELECTROCARDIOGRAM REPORT: CPT | Mod: S$GLB,,, | Performed by: INTERNAL MEDICINE

## 2020-03-09 PROCEDURE — 1101F PR PT FALLS ASSESS DOC 0-1 FALLS W/OUT INJ PAST YR: ICD-10-PCS | Mod: CPTII,S$GLB,, | Performed by: FAMILY MEDICINE

## 2020-03-09 PROCEDURE — 99214 PR OFFICE/OUTPT VISIT, EST, LEVL IV, 30-39 MIN: ICD-10-PCS | Mod: S$GLB,,, | Performed by: FAMILY MEDICINE

## 2020-03-09 PROCEDURE — 3075F PR MOST RECENT SYSTOLIC BLOOD PRESS GE 130-139MM HG: ICD-10-PCS | Mod: CPTII,S$GLB,, | Performed by: FAMILY MEDICINE

## 2020-03-09 PROCEDURE — 1126F AMNT PAIN NOTED NONE PRSNT: CPT | Mod: S$GLB,,, | Performed by: FAMILY MEDICINE

## 2020-03-09 PROCEDURE — 36415 COLL VENOUS BLD VENIPUNCTURE: CPT | Mod: PO

## 2020-03-09 PROCEDURE — 99214 OFFICE O/P EST MOD 30 MIN: CPT | Mod: S$GLB,,, | Performed by: FAMILY MEDICINE

## 2020-03-09 PROCEDURE — 3078F DIAST BP <80 MM HG: CPT | Mod: CPTII,S$GLB,, | Performed by: FAMILY MEDICINE

## 2020-03-09 PROCEDURE — 99499 RISK ADDL DX/OHS AUDIT: ICD-10-PCS | Mod: S$GLB,,, | Performed by: FAMILY MEDICINE

## 2020-03-09 PROCEDURE — 93005 EKG 12-LEAD: ICD-10-PCS | Mod: S$GLB,,, | Performed by: FAMILY MEDICINE

## 2020-03-09 PROCEDURE — 99999 PR PBB SHADOW E&M-EST. PATIENT-LVL III: CPT | Mod: PBBFAC,,, | Performed by: FAMILY MEDICINE

## 2020-03-09 RX ORDER — DIAZEPAM 5 MG/1
TABLET ORAL
COMMUNITY
Start: 2020-03-03 | End: 2020-09-08

## 2020-03-09 NOTE — PROGRESS NOTES
Subjective:       Patient ID: Jolly Matias is a 69 y.o. female.    Chief Complaint: Pre-op Exam (Eyelid sx; both eyes)    HPI 69-year-old white female patient of Dr. 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  She she is currently without complaint.  Review of Systems   Constitutional: Negative for appetite change, chills, fatigue and fever.   HENT: Negative for congestion, ear pain, hearing loss, postnasal drip, rhinorrhea, sinus pressure, sore throat and tinnitus.    Eyes: Negative for redness, itching and visual disturbance.   Respiratory: Negative for cough, chest tightness and shortness of breath.    Cardiovascular: Negative for chest pain and palpitations.   Gastrointestinal: Negative for abdominal pain, constipation, diarrhea, nausea and vomiting.   Genitourinary: Negative for decreased urine volume, difficulty urinating, dysuria, frequency, hematuria and urgency.   Musculoskeletal: Negative for back pain, myalgias, neck pain and neck stiffness.   Skin: Negative for rash.   Neurological: Negative for dizziness, light-headedness and headaches.   Psychiatric/Behavioral: Negative.        Objective:      Physical Exam   Constitutional: She is oriented to person, place, and time. She appears well-developed and well-nourished. No distress.   HENT:   Head: Normocephalic and atraumatic.   Right Ear: External ear normal.   Left Ear: External ear normal.   Nose: Nose normal.   Mouth/Throat: Oropharynx is clear and moist. No oropharyngeal exudate.   Eyes: Pupils are equal, round, and reactive to light. Conjunctivae and EOM are normal. Right eye exhibits no discharge. Left eye exhibits no discharge. No scleral icterus.   Neck: Normal range of motion. Neck supple. No  JVD present. No tracheal deviation present. No thyromegaly present.   Cardiovascular: Normal rate, regular rhythm, normal heart sounds and intact distal pulses. Exam reveals no gallop and no friction rub.   No murmur heard.  Pulmonary/Chest: Effort normal and breath sounds normal. No stridor. No respiratory distress. She has no wheezes. She has no rales.   Abdominal: Soft. Bowel sounds are normal. She exhibits no distension and no mass. There is no tenderness. There is no rebound and no guarding.   Musculoskeletal: Normal range of motion. She exhibits no edema or tenderness.   Lymphadenopathy:     She has no cervical adenopathy.   Neurological: She is alert and oriented to person, place, and time.   Skin: Skin is warm and dry. No rash noted. She is not diaphoretic. No erythema. No pallor.   Psychiatric: She has a normal mood and affect. Her behavior is normal. Judgment and thought content normal.   Nursing note and vitals reviewed.    EKG:  Normal sinus rhythm ventricular rate 74 beats per minute.    Component      Latest Ref Rng & Units 3/9/2020   WBC      3.90 - 12.70 K/uL 6.40   RBC      4.00 - 5.40 M/uL 3.91 (L)   Hemoglobin      12.0 - 16.0 g/dL 11.5 (L)   Hematocrit      37.0 - 48.5 % 37.7   MCV      82 - 98 fL 96   MCH      27.0 - 31.0 pg 29.4   MCHC      32.0 - 36.0 g/dL 30.5 (L)   RDW      11.5 - 14.5 % 14.1   Platelets      150 - 350 K/uL 272   MPV      9.2 - 12.9 fL 10.8   Immature Granulocytes      0.0 - 0.5 % 0.8 (H)   Gran # (ANC)      1.8 - 7.7 K/uL 4.4   Immature Grans (Abs)      0.00 - 0.04 K/uL 0.05 (H)   Lymph #      1.0 - 4.8 K/uL 0.9 (L)   Mono #      0.3 - 1.0 K/uL 0.5   Eos #      0.0 - 0.5 K/uL 0.4   Baso #      0.00 - 0.20 K/uL 0.08   nRBC      0 /100 WBC 0   Gran%      38.0 - 73.0 % 68.7   Lymph%      18.0 - 48.0 % 13.9 (L)   Mono%      4.0 - 15.0 % 8.4   Eosinophil%      0.0 - 8.0 % 6.9   Basophil%      0.0 - 1.9 % 1.3   Differential Method       Automated   Sodium      136 - 145 mmol/L  143   Potassium      3.5 - 5.1 mmol/L 5.0   Chloride      95 - 110 mmol/L 109   CO2      23 - 29 mmol/L 24   Glucose      70 - 110 mg/dL 98   BUN, Bld      8 - 23 mg/dL 18   Creatinine      0.5 - 1.4 mg/dL 1.0   Calcium      8.7 - 10.5 mg/dL 9.3   PROTEIN TOTAL      6.0 - 8.4 g/dL 6.8   Albumin      3.5 - 5.2 g/dL 3.9   BILIRUBIN TOTAL      0.1 - 1.0 mg/dL 0.4   Alkaline Phosphatase      55 - 135 U/L 108   AST      10 - 40 U/L 15   ALT      10 - 44 U/L 13   Anion Gap      8 - 16 mmol/L 10   eGFR if African American      >60 mL/min/1.73 m:2 >60.0   eGFR if non African American      >60 mL/min/1.73 m:2 57.2 (A)     Assessment:       1. Pre-op exam    2. 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 Type 2 diabetes mellitus without retinopathy    4. Splenic artery aneurysm    5. Chronic bronchitis, unspecified chronic bronchitis type    6. Bronchiectasis without complication    7. Abdominal aortic atherosclerosis    8. Pure hypercholesterolemia        Plan:       1.  EKG now.  2.  CBC and CMP.  3.  Diabetes is well controlled on metformin 500 mg daily.  4.  Splenic artery aneurysm was noted on CT scan and is currently stable.  5.  Bronchiectasis is stable.  6.  Abdominal atherosclerosis is stable.  Continue aspirin daily.  7.  Hypercholesterolemia is well controlled on Lipitor.  8.  The patient is okay to proceed with surgery as scheduled.  9.  Return to clinic as needed or as scheduled for follow-up with PCP.

## 2020-03-09 NOTE — PATIENT INSTRUCTIONS
Supine Hamstring Stretch        Lie on back, legs bent and feet flat. Grasp behind one leg and slowly try to straighten knee. Hold 30 seconds.   Repeat 3 times per session. Do 2 sessions per day.      **can use a towel to hold leg up**     Copyright © I. All rights reserved.      Piriformis Stretch          Cross foot on opposite knee and pull leg towards opposite shoulder until a gentle stretch is felt across the buttock.  Do not stretch aggressively.     Hold 30 seconds.     Repeat 3 times per session. Do 2 sessions per day.         © 4900-2797 [employer] All Rights Reserved        Pelvic Tilt    Flatten back by tightening stomach muscles and buttocks. Do not hold your breath.  Hold 5 seconds.  Repeat 10 times per set. Do 2 sets per session. Do 2 sessions per day.     Copyright © [employer]. All rights reserved.      Bridging    Flatten back against floor then slowly raise buttocks from floor, keeping stomach tight. Hold 5 seconds.  Repeat 10 times per set. Do 3 sets per session. Do 2 sessions per day.     Copyright © [employer]. All rights reserved.        HIP: Flexion / KNEE: Extension, Straight Leg Raise        Raise leg, keeping knee straight. Perform slowly. 10 reps per set, 3 sets per session, 1 session per day    Copyright © [employer]. All rights reserved.        Abduction: Side Leg Lift (Eccentric) - Side-Lying      Lie on side. Lift top leg slightly higher than shoulder level. Keep top leg straight with body, toes pointing forward. Slowly lower for 3-5 seconds. 10 reps per set, 3 sets per session, 1 session per day     [email or web address]://[email or web address]     Copyright © I. All rights reserved.      Crunch          Begin on back with knees bent.  Bring shoulders off floor, while pushing belly button towards spine.  Keep head neutral.     Repeat 10 times per set. Do 3 sets per session. Do 2 sessions per day.     Copyright © 3318-5461 [employer]     PARTIAL CURL UP OBLIQUES          While lying on the floor with your knees bent,  slowly raise up and to the side so that your shoulder blade clears off the floor.     Keep your hands clasped behind your head the entire time.     Repeat 10 times per set. Do 3 sets per session. Do 2 sessions per day.     Copyright © 8893-6314 [employer]      PRONE HIP EXTENSION        While lying face down with your knee straight, slowly raise up leg off the ground. Maintain a straight knee the entire time.     Repeat 10 times per set. Do 3 sets per session. Do 2 sessions per day.     Copyright © 7180-1636 [employer]      Strengthening: Hip Abduction - Resisted        With tubing around ankles, extend leg out from side.  Repeat 10 times per set. Do 3 sets per session. Do 1 sessions per day.        [email or web address]://Retellity.exer.[email or web address]/634     Copyright © [employer]. All rights reserved.     Strengthening: Hip Extension - Resisted        With tubing around ankles, pull leg straight back.  Repeat 10 times per set. Do 3 sets per session. Do 1 sessions per day.        [email or web address]://Retellity.exer.[email or web address]     Copyright © [employer]. All rights reserved.

## 2020-03-12 ENCOUNTER — CLINICAL SUPPORT (OUTPATIENT)
Dept: REHABILITATION | Facility: HOSPITAL | Age: 70
End: 2020-03-12
Attending: NEUROMUSCULOSKELETAL MEDICINE & OMM
Payer: MEDICARE

## 2020-03-12 DIAGNOSIS — R53.1 DECREASED STRENGTH: ICD-10-CM

## 2020-03-12 DIAGNOSIS — M25.552 PAIN OF BOTH HIP JOINTS: ICD-10-CM

## 2020-03-12 DIAGNOSIS — M25.551 PAIN OF BOTH HIP JOINTS: ICD-10-CM

## 2020-03-12 PROCEDURE — 97110 THERAPEUTIC EXERCISES: CPT | Mod: PN

## 2020-03-12 NOTE — PROGRESS NOTES
"                            Physical Therapy Daily Treatment Note     Name: Jolly Matias  Clinic Number: [id number]    Therapy Diagnosis:   Encounter Diagnoses   Name Primary?    Pain of both hip joints     Decreased strength      Physician: Camilla Knowles DO    Visit Date: 3/12/2020    Physician Orders: PT Eval and Treat, Therapeutic Exercise  Medical Diagnosis from Referral: Greater trochanteric bursitis of both hips  Evaluation Date: 1/22/2020  Authorization Period Expiration: 03/20/2020  Plan of Care Expiration: 1/22/2020 to 3/20/2020  Visit # / Visits authorized: 12/12  FOTO: 2/10    Time In: 2:05  Time Out: 3:00   Total Billable Time: 55 minutes (TE-4)     Precautions: Standard and HTN    Subjective     Pt reports: some muscle soreness the next day after her last PT session. Was workout soreness and not back pain. Denies any shin or thigh pain.  She was compliant with home exercise program.  Response to previous treatment: no adverse reaction  Functional change: none stated     Pain: 1/10  Location: B low back    Objective      Jolly received therapeutic exercises to develop strength, endurance, flexibility and core stabilization for 55 minutes including:     -Pelvic muscle energy technique for L anteriorly rotated innominate  -L hip ER stretch in hooklying:  3x20'' L  -Bridges:     2x12 5" hold  -Straight leg raise with transverse     abdominus activation   2x12 B 1#  -Crunches:     3x10  -Clams     2x12 RTB  -Prone hip extension straight knee:  2x10 1# B  -Lumbo-thoracic rotations:   20x5'' B  -DKTC     25x, 3" hold w/ green peanut  -Wall squats    3x10 with ball - not done today  -Standing hip extension  3-5 sec hold, 2x10 B VC's for postural corrections - not done today  -Standing hip abd:    3x10 red theraband - not done today  -Crate lift actvities    X12, 7.5 lb crate lift (floor to waist to table)  -Leg press     5.5 pl 3x10 DL, S=5    NOT PERFORMED:   -SL hip abd:     3x10 B   -Reverse clams: "    2x10 L     Home Exercises Provided and Patient Education Provided   Written Home Exercises Provided: yes  Exercises were reviewed and Jollywas able to demonstrate them prior to the end of the session.  Jolly demonstrated good  understanding of the education provided.     See EMR under Patient Instructions for exercises provided 3/5/2020    Assessment   No discomfort with any activities except box lifts at end of session that was relieved with normal walking around gym likely some joint pain. Pt nearing d/c and will cancel next visit due to eye surgery coming up on the same day; plans to come in for last scheduled visit.    Jolly is progressing well towards her goals.   Pt prognosis is Good.     Pt will continue to benefit from skilled outpatient physical therapy to address the deficits listed in the problem list box on initial evaluation, provide pt/family education and to maximize pt's level of independence in the home and community environment.   Pt's spiritual, cultural and educational needs considered and pt agreeable to plan of care and goals.    Anticipated barriers to physical therapy: coping style    Goals:   Short Term Goals (4 Weeks):  1. Pt will be compliant with HEP to supplement PT in restoring pain free function. PROGRESSING, NOT MET 3/12/2020  2. Pt will improve impaired LE MMTs to >/= 4/5 to improve dynamic hip support for functional tasks. PROGRESSING, NOT MET 3/12/2020  3. Pt will report >/= 50% improvement in symptoms to improve quality of life PROGRESSING, NOT MET 3/12/2020        Long Term Goals (8 Weeks):  1. Pt will improve FOTO score to </= 41% limited to decrease perceived limitation with mobility. PROGRESSING, NOT MET 3/12/2020  2. Pt will improve impaired LE MMTs to >/= 4+/5 to improve dynamic hip support for functional tasks. PROGRESSING, NOT MET 3/12/2020  3. Pt will perform TUG in </= 10 seconds to improve walking speed for functional community ambulation PROGRESSING, NOT MET  3/12/2020  4. Pt will report </= 3/10 pain at worst to improve quality of life PROGRESSING, NOT MET 3/12/2020    Plan     Progress core strengthening next     Perry Byers, PT

## 2020-03-13 ENCOUNTER — TELEPHONE (OUTPATIENT)
Dept: OPHTHALMOLOGY | Facility: CLINIC | Age: 70
End: 2020-03-13

## 2020-03-16 ENCOUNTER — TELEPHONE (OUTPATIENT)
Dept: OPHTHALMOLOGY | Facility: CLINIC | Age: 70
End: 2020-03-16

## 2020-03-16 NOTE — TELEPHONE ENCOUNTER
----- Message from Tiffani Villavicencio sent at 3/16/2020  7:58 AM CDT -----  Contact: pt  Pt requesting to speak with someone in regards to Procedure    Please call and advise    Phone [phone]

## 2020-03-16 NOTE — TELEPHONE ENCOUNTER
Pt was calling to cancel her procedure due to everything that is currently going on with COVID-19. She states she would rather just be safe instead of risking exposure.

## 2020-03-20 ENCOUNTER — TELEPHONE (OUTPATIENT)
Dept: INTERNAL MEDICINE | Facility: CLINIC | Age: 70
End: 2020-03-20

## 2020-03-20 NOTE — TELEPHONE ENCOUNTER
----- Message from César Almodovar MD sent at 3/10/2020  7:13 AM CDT -----  Labs are overall normal.  Please inform the patient.  Thank you.

## 2020-03-24 DIAGNOSIS — I10 ESSENTIAL HYPERTENSION: ICD-10-CM

## 2020-03-24 RX ORDER — SPIRONOLACTONE 25 MG/1
TABLET ORAL
Qty: 90 TABLET | Refills: 1 | Status: SHIPPED | OUTPATIENT
Start: 2020-03-24 | End: 2020-08-04

## 2020-03-24 NOTE — TELEPHONE ENCOUNTER
----- Message from Hien Alonso sent at 3/24/2020  2:01 PM CDT -----  Contact: Self   [phone]  Requesting an RX refill or new RX.  Is this a refill or new RX:  Refill  RX name and strength: spironolactone (ALDACTONE) 25 MG tablet  Directions (copy/paste from chart):  TAKE 1 TABLET(25 MG) BY MOUTH   Is this a 30 day or 90 day RX:  90  Local pharmacy or mail order pharmacy:  local  Pharmacy name and phone # (copy/paste from chart): [facility]   [phone] (Phone)  [phone] (Fax)      Comments:  Requested this for over two weeks per patient,. Need today.

## 2020-04-09 ENCOUNTER — TELEPHONE (OUTPATIENT)
Dept: REHABILITATION | Facility: HOSPITAL | Age: 70
End: 2020-04-09

## 2020-04-09 DIAGNOSIS — M25.552 PAIN OF BOTH HIP JOINTS: ICD-10-CM

## 2020-04-09 DIAGNOSIS — R53.1 DECREASED STRENGTH: ICD-10-CM

## 2020-04-09 DIAGNOSIS — M25.551 PAIN OF BOTH HIP JOINTS: ICD-10-CM

## 2020-04-09 NOTE — TELEPHONE ENCOUNTER
Postponed Appointments    Patient: Jolly Matias  Date: 4/9/2020  Diagnosis:   1. Pain of both hip joints     2. Decreased strength       MRN: [id number]    Spoke with patient due to therapy following updates regarding COVID-19 closely and taking every precaution to ensure the safety of our patients, staff and community. Stated to patient that we will be offering virtual methods of providing care and patient verbalized agreement to participate in virtual care. Stated to patient that someone will be in touch with her on Monday to get her visit set up. Patient verbalized understanding.      4/9/2020  Yuliana Morse, PT

## 2020-05-07 ENCOUNTER — DOCUMENTATION ONLY (OUTPATIENT)
Dept: REHABILITATION | Facility: HOSPITAL | Age: 70
End: 2020-05-07

## 2020-05-07 DIAGNOSIS — M25.552 PAIN OF BOTH HIP JOINTS: ICD-10-CM

## 2020-05-07 DIAGNOSIS — R53.1 DECREASED STRENGTH: ICD-10-CM

## 2020-05-07 DIAGNOSIS — M25.551 PAIN OF BOTH HIP JOINTS: ICD-10-CM

## 2020-05-09 DIAGNOSIS — R73.03 PREDIABETES: ICD-10-CM

## 2020-05-09 RX ORDER — METFORMIN HYDROCHLORIDE 500 MG/1
TABLET ORAL
Qty: 180 TABLET | Refills: 1 | Status: SHIPPED | OUTPATIENT
Start: 2020-05-09 | End: 2020-11-17 | Stop reason: SDUPTHER

## 2020-05-27 DIAGNOSIS — F32.9 REACTIVE DEPRESSION: ICD-10-CM

## 2020-05-27 RX ORDER — SERTRALINE HYDROCHLORIDE 100 MG/1
TABLET, FILM COATED ORAL
Qty: 90 TABLET | Refills: 0 | Status: SHIPPED | OUTPATIENT
Start: 2020-05-27 | End: 2020-09-11

## 2020-06-08 ENCOUNTER — TELEPHONE (OUTPATIENT)
Dept: OPHTHALMOLOGY | Facility: CLINIC | Age: 70
End: 2020-06-08

## 2020-06-08 NOTE — TELEPHONE ENCOUNTER
----- Message from Yulisa Pickens sent at 6/8/2020 11:17 AM CDT -----  Contact: Jolly Stephenson wanted to speak with you about rescheduling her surgery. Pt contact number is [phone].

## 2020-06-24 ENCOUNTER — PATIENT OUTREACH (OUTPATIENT)
Dept: ADMINISTRATIVE | Facility: OTHER | Age: 70
End: 2020-06-24

## 2020-06-25 ENCOUNTER — OFFICE VISIT (OUTPATIENT)
Dept: DERMATOLOGY | Facility: CLINIC | Age: 70
End: 2020-06-25
Payer: MEDICARE

## 2020-06-25 DIAGNOSIS — D18.01 CHERRY ANGIOMA: ICD-10-CM

## 2020-06-25 DIAGNOSIS — D22.9 NEVUS: ICD-10-CM

## 2020-06-25 DIAGNOSIS — L24.9 IRRITANT CONTACT DERMATITIS, UNSPECIFIED TRIGGER: ICD-10-CM

## 2020-06-25 DIAGNOSIS — Z85.828 PERSONAL HISTORY OF SKIN CANCER: ICD-10-CM

## 2020-06-25 DIAGNOSIS — L81.4 LENTIGO: ICD-10-CM

## 2020-06-25 DIAGNOSIS — L82.1 SK (SEBORRHEIC KERATOSIS): ICD-10-CM

## 2020-06-25 DIAGNOSIS — L90.5 SCAR: ICD-10-CM

## 2020-06-25 DIAGNOSIS — L57.0 AK (ACTINIC KERATOSIS): Primary | ICD-10-CM

## 2020-06-25 PROCEDURE — 1126F PR PAIN SEVERITY QUANTIFIED, NO PAIN PRESENT: ICD-10-PCS | Mod: S$GLB,,, | Performed by: DERMATOLOGY

## 2020-06-25 PROCEDURE — 99213 OFFICE O/P EST LOW 20 MIN: CPT | Mod: 25,S$GLB,, | Performed by: DERMATOLOGY

## 2020-06-25 PROCEDURE — 17003 DESTRUCTION, PREMALIGNANT LESIONS; SECOND THROUGH 14 LESIONS: ICD-10-PCS | Mod: S$GLB,,, | Performed by: DERMATOLOGY

## 2020-06-25 PROCEDURE — 99213 PR OFFICE/OUTPT VISIT, EST, LEVL III, 20-29 MIN: ICD-10-PCS | Mod: 25,S$GLB,, | Performed by: DERMATOLOGY

## 2020-06-25 PROCEDURE — 17003 DESTRUCT PREMALG LES 2-14: CPT | Mod: S$GLB,,, | Performed by: DERMATOLOGY

## 2020-06-25 PROCEDURE — 17000 DESTRUCT PREMALG LESION: CPT | Mod: S$GLB,,, | Performed by: DERMATOLOGY

## 2020-06-25 PROCEDURE — 1101F PT FALLS ASSESS-DOCD LE1/YR: CPT | Mod: CPTII,S$GLB,, | Performed by: DERMATOLOGY

## 2020-06-25 PROCEDURE — 99999 PR PBB SHADOW E&M-EST. PATIENT-LVL III: CPT | Mod: PBBFAC,,, | Performed by: DERMATOLOGY

## 2020-06-25 PROCEDURE — 1101F PR PT FALLS ASSESS DOC 0-1 FALLS W/OUT INJ PAST YR: ICD-10-PCS | Mod: CPTII,S$GLB,, | Performed by: DERMATOLOGY

## 2020-06-25 PROCEDURE — 1126F AMNT PAIN NOTED NONE PRSNT: CPT | Mod: S$GLB,,, | Performed by: DERMATOLOGY

## 2020-06-25 PROCEDURE — 17000 PR DESTRUCTION(LASER SURGERY,CRYOSURGERY,CHEMOSURGERY),PREMALIGNANT LESIONS,FIRST LESION: ICD-10-PCS | Mod: S$GLB,,, | Performed by: DERMATOLOGY

## 2020-06-25 PROCEDURE — 99999 PR PBB SHADOW E&M-EST. PATIENT-LVL III: ICD-10-PCS | Mod: PBBFAC,,, | Performed by: DERMATOLOGY

## 2020-06-25 PROCEDURE — 1159F MED LIST DOCD IN RCRD: CPT | Mod: S$GLB,,, | Performed by: DERMATOLOGY

## 2020-06-25 PROCEDURE — 1159F PR MEDICATION LIST DOCUMENTED IN MEDICAL RECORD: ICD-10-PCS | Mod: S$GLB,,, | Performed by: DERMATOLOGY

## 2020-06-25 RX ORDER — TRIAMCINOLONE ACETONIDE 1 MG/G
CREAM TOPICAL
Qty: 45 G | Refills: 1 | Status: SHIPPED | OUTPATIENT
Start: 2020-06-25 | End: 2020-09-08

## 2020-06-25 NOTE — PROGRESS NOTES
Subjective:       Patient ID:  Jolly Matias is a 70 y.o. female who presents for   Chief Complaint   Patient presents with    Rash     Pt c/o itchy red rash on neck x 1 month. Tx with otc cortisone cream. Tx helps but rash recurs.   Pt c/o dark colored lesion on back x several years. No pain or prev tx.      Review of Systems   Skin: Positive for itching and rash. Negative for tendency to form keloidal scars.   Hematologic/Lymphatic: Does not bruise/bleed easily.        Objective:    Physical Exam   Constitutional: She appears well-developed and well-nourished. No distress.   Neurological: She is alert and oriented to person, place, and time. She is not disoriented.   Psychiatric: She has a normal mood and affect.   Skin:   Areas Examined (abnormalities noted in diagram):   Scalp / Hair Palpated and Inspected  Head / Face Inspection Performed  Neck Inspection Performed  Chest / Axilla Inspection Performed  Back Inspection Performed  RUE Inspected  LUE Inspection Performed                   Diagram Legend     Erythematous scaling macule/papule c/w actinic keratosis       Vascular papule c/w angioma      Pigmented verrucoid papule/plaque c/w seborrheic keratosis      Yellow umbilicated papule c/w sebaceous hyperplasia      Irregularly shaped tan macule c/w lentigo     1-2 mm smooth white papules consistent with Milia      Movable subcutaneous cyst with punctum c/w epidermal inclusion cyst      Subcutaneous movable cyst c/w pilar cyst      Firm pink to brown papule c/w dermatofibroma      Pedunculated fleshy papule(s) c/w skin tag(s)      Evenly pigmented macule c/w junctional nevus     Mildly variegated pigmented, slightly irregular-bordered macule c/w mildly atypical nevus      Flesh colored to evenly pigmented papule c/w intradermal nevus       Pink pearly papule/plaque c/w basal cell carcinoma      Erythematous hyperkeratotic cursted plaque c/w SCC      Surgical scar with no sign of skin cancer recurrence       Open and closed comedones      Inflammatory papules and pustules      Verrucoid papule consistent consistent with wart     Erythematous eczematous patches and plaques     Dystrophic onycholytic nail with subungual debris c/w onychomycosis     Umbilicated papule    Erythematous-base heme-crusted tan verrucoid plaque consistent with inflamed seborrheic keratosis     Erythematous Silvery Scaling Plaque c/w Psoriasis     See annotation      Assessment / Plan:        AK (actinic keratosis)  Cryosurgery Procedure Note    Verbal consent from the patient is obtained including, but not limited to, risk of hypopigmentation/hyperpigmentation, scar, recurrence of lesion. The patient is aware of the precancerous quality and need for treatment of these lesions. Liquid nitrogen cryosurgery is applied to the 2 actinic keratoses, as detailed in the physical exam, to produce a freeze injury. The patient is aware that blisters may form and is instructed on wound care with gentle cleansing and use of vaseline ointment to keep moist until healed. The patient is supplied a handout on cryosurgery and is instructed to call if lesions do not completely resolve.    Lentigo  This is a benign hyperpigmented sun induced lesion. Daily sun protection will reduce the number of new lesions. Treatment of these benign lesions are considered cosmetic.  The nature of sun-induced photo-aging and skin cancers is discussed.  Sun avoidance, protective clothing, and the use of 30-SPF sunscreens is advised. Observe closely for skin damage/changes, and call if such occurs.    Cherry angioma  These are benign vascular lesions that are inherited.  Treatment is not necessary.    Irritant contact dermatitis, unspecified trigger  Avoid perfumes or other topicals on this area  -     triamcinolone acetonide 0.1% (KENALOG) 0.1 % cream; AAA bid; not more than 2 weeks straight in same location, avoid use on face and groin  Dispense: 45 g; Refill: 1    Nevus  Discussed  ABCDE's of nevi.  Monitor for new mole or moles that are becoming bigger, darker, irritated, or developing irregular borders. Brochure provided.    SK (seborrheic keratosis)  These are benign inherited growths without a malignant potential. Reassurance given to patient. No treatment is necessary.     Personal history of skin cancer  Scar  Area(s) of previous NMSC evaluated with no signs of recurrence.    Upper body skin examination performed today including at least 6 points as noted in physical examination. No lesions suspicious for malignancy noted.             Follow up in about 6 months (around 12/25/2020). recheck left cheek and r upper forehead ak for resolution

## 2020-07-08 ENCOUNTER — TELEPHONE (OUTPATIENT)
Dept: SPORTS MEDICINE | Facility: CLINIC | Age: 70
End: 2020-07-08

## 2020-07-08 ENCOUNTER — PATIENT OUTREACH (OUTPATIENT)
Dept: ADMINISTRATIVE | Facility: OTHER | Age: 70
End: 2020-07-08

## 2020-07-08 NOTE — TELEPHONE ENCOUNTER
Contacted Patient regarding medication request. I explained that we have not seen her since August 2018 and would not be able to refill her antiinflammatory medication without another evaluation. I recommended she take OTC aleve for her pain and that if it gets worse she can make an appointment to be re-evaluated for the knee again. She agreed and expressed satisfaction with the plan.    Fer Cherry Ms, OTC,   Clinical Assistant to Dr. Arias

## 2020-07-08 NOTE — TELEPHONE ENCOUNTER
----- Message from Dexter Lee MA sent at 7/8/2020  9:37 AM CDT -----  Contact: pt    ----- Message -----  From: Jean Paul Gibbons  Sent: 7/8/2020   8:44 AM CDT  To: [place] [facility] Staff    Please call pt at [phone]    Refill request (medication unknown per pt) (for muscle pain)    Use [id number] - [place] - [address] AT [facility] [address] [phone] (Phone)  [phone] (Fax)    Thank you

## 2020-07-09 ENCOUNTER — OFFICE VISIT (OUTPATIENT)
Dept: SPORTS MEDICINE | Facility: CLINIC | Age: 70
End: 2020-07-09
Payer: MEDICARE

## 2020-07-09 VITALS
DIASTOLIC BLOOD PRESSURE: 70 MMHG | HEART RATE: 89 BPM | BODY MASS INDEX: 29.45 KG/M2 | WEIGHT: 156 LBS | HEIGHT: 61 IN | SYSTOLIC BLOOD PRESSURE: 141 MMHG

## 2020-07-09 DIAGNOSIS — M25.552 LATERAL PAIN OF LEFT HIP: Primary | ICD-10-CM

## 2020-07-09 DIAGNOSIS — M70.62 GREATER TROCHANTERIC BURSITIS OF LEFT HIP: ICD-10-CM

## 2020-07-09 DIAGNOSIS — M25.551 LATERAL PAIN OF RIGHT HIP: ICD-10-CM

## 2020-07-09 PROCEDURE — 99999 PR PBB SHADOW E&M-EST. PATIENT-LVL V: ICD-10-PCS | Mod: PBBFAC,,, | Performed by: NEUROMUSCULOSKELETAL MEDICINE & OMM

## 2020-07-09 PROCEDURE — 20611 PR DRAIN/ASP/INJECT MAJOR JOINT/BURSA W/US GUIDANCE: ICD-10-PCS | Mod: LT,S$GLB,, | Performed by: NEUROMUSCULOSKELETAL MEDICINE & OMM

## 2020-07-09 PROCEDURE — 3078F DIAST BP <80 MM HG: CPT | Mod: CPTII,S$GLB,, | Performed by: NEUROMUSCULOSKELETAL MEDICINE & OMM

## 2020-07-09 PROCEDURE — 3077F SYST BP >= 140 MM HG: CPT | Mod: CPTII,S$GLB,, | Performed by: NEUROMUSCULOSKELETAL MEDICINE & OMM

## 2020-07-09 PROCEDURE — 1159F MED LIST DOCD IN RCRD: CPT | Mod: S$GLB,,, | Performed by: NEUROMUSCULOSKELETAL MEDICINE & OMM

## 2020-07-09 PROCEDURE — 3008F BODY MASS INDEX DOCD: CPT | Mod: CPTII,S$GLB,, | Performed by: NEUROMUSCULOSKELETAL MEDICINE & OMM

## 2020-07-09 PROCEDURE — 99214 OFFICE O/P EST MOD 30 MIN: CPT | Mod: 25,S$GLB,, | Performed by: NEUROMUSCULOSKELETAL MEDICINE & OMM

## 2020-07-09 PROCEDURE — 3078F PR MOST RECENT DIASTOLIC BLOOD PRESSURE < 80 MM HG: ICD-10-PCS | Mod: CPTII,S$GLB,, | Performed by: NEUROMUSCULOSKELETAL MEDICINE & OMM

## 2020-07-09 PROCEDURE — 1101F PR PT FALLS ASSESS DOC 0-1 FALLS W/OUT INJ PAST YR: ICD-10-PCS | Mod: CPTII,S$GLB,, | Performed by: NEUROMUSCULOSKELETAL MEDICINE & OMM

## 2020-07-09 PROCEDURE — 1125F AMNT PAIN NOTED PAIN PRSNT: CPT | Mod: S$GLB,,, | Performed by: NEUROMUSCULOSKELETAL MEDICINE & OMM

## 2020-07-09 PROCEDURE — 1125F PR PAIN SEVERITY QUANTIFIED, PAIN PRESENT: ICD-10-PCS | Mod: S$GLB,,, | Performed by: NEUROMUSCULOSKELETAL MEDICINE & OMM

## 2020-07-09 PROCEDURE — 1101F PT FALLS ASSESS-DOCD LE1/YR: CPT | Mod: CPTII,S$GLB,, | Performed by: NEUROMUSCULOSKELETAL MEDICINE & OMM

## 2020-07-09 PROCEDURE — 99214 PR OFFICE/OUTPT VISIT, EST, LEVL IV, 30-39 MIN: ICD-10-PCS | Mod: 25,S$GLB,, | Performed by: NEUROMUSCULOSKELETAL MEDICINE & OMM

## 2020-07-09 PROCEDURE — 3077F PR MOST RECENT SYSTOLIC BLOOD PRESSURE >= 140 MM HG: ICD-10-PCS | Mod: CPTII,S$GLB,, | Performed by: NEUROMUSCULOSKELETAL MEDICINE & OMM

## 2020-07-09 PROCEDURE — 1159F PR MEDICATION LIST DOCUMENTED IN MEDICAL RECORD: ICD-10-PCS | Mod: S$GLB,,, | Performed by: NEUROMUSCULOSKELETAL MEDICINE & OMM

## 2020-07-09 PROCEDURE — 3008F PR BODY MASS INDEX (BMI) DOCUMENTED: ICD-10-PCS | Mod: CPTII,S$GLB,, | Performed by: NEUROMUSCULOSKELETAL MEDICINE & OMM

## 2020-07-09 PROCEDURE — 99999 PR PBB SHADOW E&M-EST. PATIENT-LVL V: CPT | Mod: PBBFAC,,, | Performed by: NEUROMUSCULOSKELETAL MEDICINE & OMM

## 2020-07-09 PROCEDURE — 20611 DRAIN/INJ JOINT/BURSA W/US: CPT | Mod: LT,S$GLB,, | Performed by: NEUROMUSCULOSKELETAL MEDICINE & OMM

## 2020-07-09 RX ORDER — TRIAMCINOLONE ACETONIDE 40 MG/ML
40 INJECTION, SUSPENSION INTRA-ARTICULAR; INTRAMUSCULAR
Status: COMPLETED | OUTPATIENT
Start: 2020-07-09 | End: 2020-07-09

## 2020-07-09 RX ADMIN — TRIAMCINOLONE ACETONIDE 40 MG: 40 INJECTION, SUSPENSION INTRA-ARTICULAR; INTRAMUSCULAR at 02:07

## 2020-07-09 NOTE — PROGRESS NOTES
Subjective:     Jolly Matias     Chief Complaint   Patient presents with    Left Hip - Pain    Right Hip - Pain       HPI    Jolly is a 70 y.o. female coming in today for bilateral (L>R) pain. Since last visit the pain has Deteriorated. Pt was attending physical therapy with good relief but had to d/c due to covid. She did her HEP on her own but the pain has come back worse than ever. The pain is better with PT, heat, CSI and worse with walking, standing. Pt notes significant trouble sleeping at night due to her pain. Pt did have good relief with L GTB CSI in 2019. Pt. describes the pain as a 6/10 dull pain that does not radiate. There has not been any new a fall/injury/ or traumas since last visit.  Pt. denies any new musculoskeletal complaints at this time.     Office note from 19 reviewed    Review of Systems   Constitutional: Negative for chills and fever.   Musculoskeletal: Positive for myalgias. Negative for back pain, falls, joint pain and neck pain.   Neurological: Negative for dizziness, tingling, focal weakness, weakness and headaches.       PAST MEDICAL HISTORY:   Past Medical History:   Diagnosis Date    Atypical ductal hyperplasia, breast     Basal cell carcinoma 2011    left forehead    Basal cell carcinoma 2015    R temporal scalp, R upper forehead, L upper forehead    Basal cell carcinoma 2015    right mid ala    BCC (basal cell carcinoma) excised     right shoulder    Diabetes mellitus     Diverticulitis     Fibrocystic breast     HLD (hyperlipidemia)     Hypertension     Prediabetes 10/16/2013    Skin cancer      PAST SURGICAL HISTORY:   Past Surgical History:   Procedure Laterality Date    APPENDECTOMY      bilateral breast duct excision      BREAST BIOPSY Right     Excisional bx, benign    BREAST BIOPSY Left     Excisional bx, benign     SECTION      x2    COLON SURGERY Left 2017    sigmoidectomy for diverticulitis    COLONOSCOPY N/A  1/16/2017    Procedure: COLONOSCOPY;  Surgeon: IBRAHIMA Philip MD;  Location: [facility] ENDO (4TH FLR);  Service: Endoscopy;  Laterality: N/A;    fulgaration of endometriosis x 2      HYSTERECTOMY      For endometriosis and DUB--age 42, ovaries in?    INCISIONAL HERNIA REPAIR  08/2017    KNEE ARTHROSCOPY W/ DEBRIDEMENT Right 7/6/2018    Procedure: ARTHROSCOPY, KNEE, WITH DEBRIDEMENT;  Surgeon: MARVA Arias MD;  Location: [facility] OR 1ST FLR;  Service: Orthopedics;  Laterality: Right;    KNEE ARTHROSCOPY W/ MENISCECTOMY Right 7/6/2018    Procedure: ARTHROSCOPY, KNEE, WITH MENISCECTOMY (partial lateral and medial menisectomy, chondraplasty;  Surgeon: MARVA Arias MD;  Location: [facility] OR 1ST FLR;  Service: Orthopedics;  Laterality: Right;    KNEE ARTHROSCOPY W/ PLICA EXCISION Right 7/6/2018    Procedure: EXCISION, PLICA, KNEE, ARTHROSCOPIC,;  Surgeon: MARVA Arias MD;  [facility];  Service: Orthopedics;  Laterality: Right;    KNEE SURGERY      OOPHORECTOMY      SKIN CANCER EXCISION      BCC forehead , temple, shoulder, chest    TONSILLECTOMY       FAMILY HISTORY:   Family History   Problem Relation Age of Onset    Skin cancer Father     Hypertension Father     Heart disease Father     Diabetes Father     Melanoma Father     Cancer Father     Skin cancer Mother     Hypertension Mother     Thyroid disease Mother     Dementia Mother     Hypertension Sister     Hyperlipidemia Sister     Anxiety disorder Daughter     Hypertension Son     Hyperlipidemia Son     Breast cancer Maternal Aunt     Diabetes Maternal Uncle      SOCIAL HISTORY:   Social History     Socioeconomic History    Marital status:      Spouse name: Not on file    Number of children: Not on file    Years of education: Not on file    Highest education level: Not on file   Occupational History    Not on file   Social Needs    Financial resource strain: Not on file    Food insecurity     Worry: Not on  file     Inability: Not on file    Transportation needs     Medical: Not on file     Non-medical: Not on file   Tobacco Use    Smoking status: Never Smoker    Smokeless tobacco: Never Used   Substance and Sexual Activity    Alcohol use: Yes     Alcohol/week: 0.0 standard drinks     Types: 1 - 2 Glasses of wine per week     Comment: 3-4x /week    Drug use: No    Sexual activity: Yes     Partners: Male     Birth control/protection: Post-menopausal   Lifestyle    Physical activity     Days per week: Not on file     Minutes per session: Not on file    Stress: Not on file   Relationships    Social connections     Talks on phone: Not on file     Gets together: Not on file     Attends Yazidi service: Not on file     Active member of club or organization: Not on file     Attends meetings of clubs or organizations: Not on file     Relationship status: Not on file   Other Topics Concern    Are you pregnant or think you may be? No    Breast-feeding No   Social History Narrative    , walking some       MEDICATIONS:   Current Outpatient Medications:     atorvastatin (LIPITOR) 20 MG tablet, TAKE 1 TABLET BY MOUTH EVERY DAY, Disp: 90 tablet, Rfl: 1    celecoxib (CELEBREX) 200 MG capsule, TAKE ONE CAPSULE BY MOUTH ONCE DAILY, Disp: 30 capsule, Rfl: 0    cephALEXin (KEFLEX) 250 MG capsule, Take 1 capsule (250 mg total) by mouth every 12 (twelve) hours., Disp: 10 capsule, Rfl: 0    cyanocobalamin (VITAMIN B-12) 1000 MCG tablet, Take 100 mcg by mouth once daily., Disp: , Rfl:     diazePAM (VALIUM) 5 MG tablet, BRING   TO  PROCEDURE  ON  3/16/2020 .  TAKE  WHEN  ADVISED, Disp: , Rfl:     DUREZOL 0.05 % Drop ophthalmic solution, , Disp: , Rfl: 1    flaxseed oil 1,000 mg Cap, Take 1 capsule by mouth once daily.  , Disp: , Rfl:     FLUZONE HIGH-DOSE 2018-19, PF, 180 mcg/0.5 mL vaccine, ADMINISTER 0.5ML IN THE MUSCLE AS DIRECTED, Disp: , Rfl: 0    FLUZONE HIGH-DOSE 2019-20, PF, 180 mcg/0.5 mL Syrg, ADMINISTER  0.5ML IN THE MUSCLE AS DIRECTED, Disp: , Rfl: 0    ILEVRO 0.3 % DrpS, , Disp: , Rfl: 2    ketoconazole (NIZORAL) 2 % cream, AAA in corners of mouth qhs, Disp: 30 g, Rfl: 3    metFORMIN (GLUCOPHAGE) 500 MG tablet, TAKE 1 TABLET(500 MG) BY MOUTH TWICE DAILY WITH MEALS, Disp: 180 tablet, Rfl: 1    nystatin (MYCOSTATIN) powder, Apply topically 2 (two) times daily. aaa qd- bid prn flare groin, under arm, under breasts, Disp: 120 g, Rfl: 2    ofloxacin (OCUFLOX) 0.3 % ophthalmic solution, INT 1 GTT INTO OU QID, Disp: , Rfl: 0    olmesartan (BENICAR) 40 MG tablet, Take 1 tablet (40 mg total) by mouth once daily., Disp: 90 tablet, Rfl: 3    permethrin (ELIMITE) 5 % cream, ADA EXT AA 1 TIME FOR 1 DOSE, Disp: , Rfl: 0    sertraline (ZOLOFT) 100 MG tablet, TAKE 1 TABLET(100 MG) BY MOUTH EVERY DAY, Disp: 90 tablet, Rfl: 0    spironolactone (ALDACTONE) 25 MG tablet, TAKE 1 TABLET(25 MG) BY MOUTH EVERY DAY, Disp: 90 tablet, Rfl: 1    triamcinolone acetonide 0.1% (KENALOG) 0.1 % cream, AAA bid, Disp: 60 g, Rfl: 1    triamcinolone acetonide 0.1% (KENALOG) 0.1 % cream, Apply topically 2 (two) times daily. For rash x 2 weeks then stop, Disp: 80 g, Rfl: 1    triamcinolone acetonide 0.1% (KENALOG) 0.1 % cream, AAA bid; not more than 2 weeks straight in same location, avoid use on face and groin, Disp: 45 g, Rfl: 1    valACYclovir (VALTREX) 1000 MG tablet, Take 2 tablets (2,000 mg total) by mouth every 12 (twelve) hours. For 1-2 days at first sign of flare, Disp: 12 tablet, Rfl: 5    vitamin D 1000 units Tab, Take 185 mg by mouth once daily., Disp: , Rfl:     azelastine (ASTELIN) 137 mcg (0.1 %) nasal spray, 1 spray (137 mcg total) by Nasal route 2 (two) times daily., Disp: 30 mL, Rfl: 0    fluocinonide (LIDEX) 0.05 % external solution, AAA scalp qday - bid prn pruritus (Patient not taking: Reported on 6/25/2020), Disp: 60 mL, Rfl: 3    levocetirizine (XYZAL) 5 MG tablet, Take 1 tablet (5 mg total) by mouth every  "evening., Disp: 30 tablet, Rfl: 11    Current Facility-Administered Medications:     triamcinolone acetonide injection 40 mg, 40 mg, INTRABURSAL, 1 time in Clinic/HOD, Camilla Knowles DO  ALLERGIES:   Review of patient's allergies indicates:   Allergen Reactions    Hydrocodone      Also makes pt "very sleepy"    Kiwi (actinidia chinensis) Swelling     Office note from Dr. [facility] on 19 reviewed:  Bilateral greater trochanteric bursa corticosteroid injections received for greater trochanteric bursitis of both hips.   IMAGIN. X-ray ordered due to bilateral hip. (AP pelvis and frogleg lateral  bilateral views) taken on 19.   2. X-ray images were reviewed personally by me and then directly with patient.  3. FINDINGS:Mild DJD.  No fracture or dislocation.  No bone destruction identified  4. IMPRESSION:  Mild bilateral hip DJD.  Objective:     VITAL SIGNS: BP (!) 141/70   Pulse 89   Ht 5' 1" (1.549 m)   Wt 70.8 kg (156 lb)   LMP 1991   BMI 29.48 kg/m²    General    Vitals reviewed.  Constitutional: She is oriented to person, place, and time. She appears well-developed and well-nourished.   Neurological: She is alert and oriented to person, place, and time.   Psychiatric: She has a normal mood and affect. Her behavior is normal.               MUSCULOSKELETAL EXAM  HIP: bilateral HIP    Observation:    There is no edema, erythema, or ecchymosis in the lumbosacral region.   There is no Trendelenburg sign on either side  No obvious pelvic obliquity while standing.    No thoracolumbar scoliosis observed.    No midline skin abnormalities.  No atrophy noted in the lower limbs.  Poor bilateral pelvic stability with bilateral hip hiking compensatory pattern noted with single leg raise (left worse than right)  Over ankle supination and mild pes planus noted with gait    ROM (* = with pain):  Passive hip flexion to 120° on left and 120° on right  Passive hip internal rotation to 45° on left and 45° on " right  Passive hip external rotation to 45° on left* and 45° on right   Passive hip abduction to 45° on left and 45° on right    Tenderness To Palpation:  No tenderness at the ASIS, AIIS, PSIS, PIIS, iliac crest, pubic bones, ischial tuberosity.  No tenderness throughout the lumbar spine, iliolumbar region, or posterior pelvis.  No tenderness throughout the sacrum or piriformis  + tenderness over the greater trochanteric bursa on the left  + tenderness at the left glut attachments at the greater trochanter  No tenderness over proximal IT band   No tenderness over proximal hip flexor musculature.    Strength Testing (* = with pain):  Hip flexion - 5/5 on left and 5/5 on right  Hip extension - 5/5 on left and 5/5 on right  Hip adduction - 5/5 on left and 5/5 on right  Hip abduction - 5/5 on left and 5/5 on right  Knee flexion - 5/5 on left and 5/5 on right  Knee extension - 5/5 on left and 5/5 on right  Glutaeus medius - 4/5 on left and 4/5 on right with compensatory lumbar rotation    Special Tests:  Standing Trendelenburg test - slight right shift with left single leg raise    Seated straight leg raise - negative  Supine straight leg raise - negative  Hamstring flexibility symmetric    SIMEON - positive for left lateral hip pain  FADIR - positive for left lateral hip pain  Scour test - negative  No pain with posterior hip capsule compression    ASIS compression test - negative  SI drawer test - negative   Thigh thrust test - negative     Piriformis test (Bonnet's) - negative  Mundo test - positive bilaterally  Andrade compression test - negative    Leg lengths symmetric.     Neurovascular Exam:  2+ femoral, DP, and PT pulses BL.  No skin changes, no abnormal hair distribution.  Sensation intact to light touch throughout the obturator and medial/lateral/posterior femoral cutaneous nerves.  2+/4 reflexes at L4 and S1 dermatomes  Capillary refill intact to <2 seconds in all lower limb digits.    Key   F= Flexed   E =  Extended   R = Rotated   S = Sidebent   TTA = tissue texture abnormality     Large Joint Aspiration/Injection and   Greater trochanteric bursa,  left    Performed by: CHILO HERNANDEZ  Authorized by: CHILO HERNANDEZ   Consent Done?: Yes (Verbal)  Indications: Pain  Site marked: The procedure site was marked   Timeout: Prior to procedure the correct patient, procedure, and site was verified     Location: Greater trochanteric bursa, left  Prep: Patient was prepped with Chlorhexidine and alcohol.  Skin anesthetic: Ethyl Chloride spray was used prior to skin puncture.  Ultrasonic Guidance for needle placement: yes  Procedure: After skin anesthetic was applied, the 22G, 2.5 needle was used to inject the greater trochanteric bursa with a 3 cc mixture of 1 cc of 40 mg/ml triamcinolone acetonide and 2 cc of 0.2% Naropin was injected into the bursa.  Needle size: 22 G, 2.5  Approach: Lateral  Medications: 40 mg triamcinolone acetonide 40 mg/mL  Patient tolerance: Patient tolerated the procedure well with no immediate complications    Ultrasound guidance was used for needle localization. Images were saved and stored for documentation. Dynamic visualization of the 20g x 3.5 needles was continuous throughout the procedures.    Triamcinolone:  NDC: [address]-3  LOT: [id number]  EXP: 12/2021    Assessment:      Encounter Diagnoses   Name Primary?    Lateral pain of left hip Yes    Greater trochanteric bursitis of left hip     Lateral pain of right hip           Plan:   1.  Deterioration of bilateral lateral hip pain (left worse than right) with associated left greater trochanteric bursitis likely secondary to poor pelvic stability, weak gluteal musculature, biomechanical restrictions of the lower kinetic chain and lumbar spine, and compensatory firing patterns.  Previous improvement with formal physical therapy, but patient had to stop due to COVID-19 restrictions. Mild pes planus with ankle over pronation also likely  contributing to biomechanical restrictions of the lower kinetic chain.  - Patient received a repeat ultrasound guided corticosteroid injection of the left greater trochanteric bursa today (see details above).   - Referral to outpatient PT ([facility]) for increased pelvic stability, neuromuscular retraining, and IT band stretching  - Recommend OTC NSAIDs and Ice up to 20 minutes at a time prn for pain control  - OTC medial arch support recommended for bilateral pes planus and overpronation.   -  X-ray images of bilateral hip taken on 6/5/19 (AP pelvis and frogleg lateral  bilateral views) showed mild bilateral hip DJD. Images were personally reviewed with patient.    2. Follow-up upon completion of PT if pain persist or deteriorates    3. Patient agreeable to today's plan and all questions were answered    This note is dictated using the M*Modal Fluency Direct word recognition program. There are word recognition mistakes that are occasionally missed on review.

## 2020-07-16 ENCOUNTER — CLINICAL SUPPORT (OUTPATIENT)
Dept: REHABILITATION | Facility: HOSPITAL | Age: 70
End: 2020-07-16
Payer: MEDICARE

## 2020-07-16 DIAGNOSIS — R26.89 IMPAIRED GAIT AND MOBILITY: ICD-10-CM

## 2020-07-16 DIAGNOSIS — M25.551 CHRONIC HIP PAIN, BILATERAL: Primary | ICD-10-CM

## 2020-07-16 DIAGNOSIS — G89.29 CHRONIC HIP PAIN, BILATERAL: Primary | ICD-10-CM

## 2020-07-16 DIAGNOSIS — R29.898 DECREASED STRENGTH OF LOWER EXTREMITY: ICD-10-CM

## 2020-07-16 DIAGNOSIS — M25.552 CHRONIC HIP PAIN, BILATERAL: Primary | ICD-10-CM

## 2020-07-16 PROBLEM — R53.1 DECREASED STRENGTH: Status: RESOLVED | Noted: 2020-01-27 | Resolved: 2020-07-16

## 2020-07-16 PROCEDURE — 97161 PT EVAL LOW COMPLEX 20 MIN: CPT | Mod: PN

## 2020-07-16 PROCEDURE — 97110 THERAPEUTIC EXERCISES: CPT | Mod: PN

## 2020-07-16 NOTE — PLAN OF CARE
[facility]  Physical Therapy Initial Evaluation    Name: Jolly Matias  Clinic Number: [id number]    Therapy Diagnosis:   Encounter Diagnoses   Name Primary?    Chronic hip pain, bilateral Yes    Impaired gait and mobility     Decreased strength of lower extremity      Physician: Camilla Knowles DO    Physician Orders: PT Eval and Treat:Bilateral lateral hip pain secondary to pelvic and core instability with associated greater trochanteric bursitis. Home exercise program, pelvic and core stability exercises, and neuromuscular retraining needed.   Medical Diagnosis from Referral: M25.552 (ICD-10-CM) - Lateral pain of left hip M70.62 (ICD-10-CM) - Greater trochanteric bursitis of left hip   Evaluation Date: 7/16/2020  Authorization Period Expiration: 12/31/2020  Plan of Care Expiration: 7/16/2020 to 8/28/2020  Visit # / Visits authorized: 1/1    Time In: 1303  Time Out: 1345  Total Billable Time: 42 minutes    Precautions: Standard; hypertension; diabetes    Subjective     Date of onset: 1 year ago; increased 2 weeks ago    History of current condition - Jolly reports: chronic B hip pain beginning a year ago. Patient went to therapy at the beginning of the year and experienced 'tremendous' relief, however sessions cancelled due to COVID-19 outbreak. Pain resumed equally in B lateral hip a few months after last therapy session. Patient reports poor HEP compliance in the meantime. Patient received a corticosteroid injection 2 weeks ago in L hip with significant relief.     Medical History:   Past Medical History:   Diagnosis Date    Atypical ductal hyperplasia, breast     Basal cell carcinoma 4/2011    left forehead    Basal cell carcinoma 4/2015    R temporal scalp, R upper forehead, L upper forehead    Basal cell carcinoma 8/2015    right mid ala    BCC (basal cell carcinoma) excised     right shoulder    Diabetes mellitus     Diverticulitis     Fibrocystic breast      "HLD (hyperlipidemia)     Hypertension     Prediabetes 10/16/2013    Skin cancer      Surgical History:   Jolly Matias  has a past surgical history that includes  section; Hysterectomy; bilateral breast duct excision; fulgaration of endometriosis x 2; Appendectomy; Tonsillectomy; Skin cancer excision; Colonoscopy (N/A, 2017); Colon surgery (Left, 2017); Oophorectomy; Incisional hernia repair (2017); Knee arthroscopy w/ meniscectomy (Right, 2018); Knee arthroscopy w/ debridement (Right, 2018); Knee arthroscopy w/ plica excision (Right, 2018); Knee surgery; Breast biopsy (Right); and Breast biopsy (Left).    Medications:   Jolly has a current medication list which includes the following prescription(s): atorvastatin, azelastine, celecoxib, cephalexin, cyanocobalamin, diazepam, durezol, flaxseed oil, fluocinonide, fluzone high-dose - (pf), fluzone high-dose - (pf), ilevro, ketoconazole, levocetirizine, metformin, nystatin, ofloxacin, olmesartan, permethrin, sertraline, spironolactone, triamcinolone acetonide 0.1%, triamcinolone acetonide 0.1%, triamcinolone acetonide 0.1%, valacyclovir, and vitamin d.    Allergies:   Review of patient's allergies indicates:   Allergen Reactions    Hydrocodone      Also makes pt "very sleepy"    Kiwi (actinidia chinensis) Swelling      Imaging: X-Ray Hip 2020: Mild DJD.  No fracture or dislocation.  No bone destruction identified    Prior Therapy: Yes. Patient reports relief with participation but cut short secondary to COVID-19.  Social History: Patient lives with  in single story house. She denies household barriers.   Occupation: Retired  Prior Level of Function: Independent  Current Level of Function: Independent; pain limiting walking and sleeping    Pain:  Current 4/10, worst 9/10, best 4/10   Location: B lateral hip  Description: Ache  Aggravating Factors: Sidelying, standing, walking >/= 1 block  Easing Factors: " Sitting    Pts goals: Eliminate pain    Objective     WNL=within normal limits  WFL=within functional limits  NT=not tested  !=pain    Posture: Elevated L iliac crest and PSIS; exaggerated lumbar lordosis; B genu varus  Palpation: Tenderness to L quadratus lumborum and PSIS; tenderness to R gluteus minimus; tenderness to B gluteus medius, greater trochanter, IT band and vastus lateralis    Range of Motion/Strength:     Hip Right Left Pain/Dysfunction with Movement   AROM/PROM      flexion  WNL/WNL!  WNL/WNL!    extension  WNL/NT  WNL/NT    abduction  WNL!/WNL!  WNL!/WNL!    adduction  WNL/NT  WNL/NT    Internal rotation  WNL/NT  WNL/NT    External rotation  WNL/NT  WNL/NT      Knee Right Left Pain/Dysfunction with Movement   AROM/PROM      flexion  WNL/NT  WNL/NT    extension  WNL/NT  WNL/NT      L/E MMT Right Left Pain/Dysfunction with Movement   Hip Flexion 4/5! 4/5! Pain in ipsilateral lateral hip   Hip Extension 3+/5 3+/5    Hip Abduction 4-/5 4-/5    Hip Adduction 3+/5! 3+/5 Pain in R lateral hip   Hip IR 4+/5 4-/5! Pain in L lateral hip   Hip ER 4+/5 4-/5 Discomfort in L lateral hip   Knee Flexion 5/5 5/5    Knee Extension 5/5 5/5      Joint Mobility:   - Lumbar: Guarding with grade II central posterior to anterior mobilizations (); reports of pain relief with performance  - Sacral: Pain    Special Tests: SI compression (+) B  SI distraction: (+)  Sacral thrust (+)  SIMEON (+) B  FADIR (+) B  Scour (-) B  Ganslen's (+) B   Long sitting (+) for L anterior rotated innominate     Gait Analysis: R Trendelenburg; B knee valgus collapse    CMS Impairment/Limitation/Restriction for FOTO Hip Survey    Therapist reviewed FOTO scores for Jolly HUBERT Matias on 7/16/2020.   FOTO documents entered into [employer] - see Media section.    Limitation Score: 53%  Category: Mobility     TREATMENT     Treatment Time In: 1330  Treatment Time Out: 1345  Total Treatment time separate from Evaluation: 15 minutes    Jolly received  "therapeutic exercises to develop strength, posture and core stabilization for 15 minutes including:    Sidelying clams: x10 B  Double leg bridges: x15  Push/pull muscle energy technique for L anterior rotated innominate: 10" holds x 3 reps    Home Exercises and Patient Education Provided:    Education provided:   - Prognosis and plan of care  - Home exercise program    Written Home Exercises Provided: yes.  Exercises were reviewed and Jolly was able to demonstrate them prior to the end of the session.  Jolly demonstrated good  understanding of the education provided.     See EMR under Patient Instructions for exercises provided 7/16/2020.    Assessment     Jolly is a 70 y.o. female referred to outpatient Physical Therapy with a medical diagnosis of greater trochanteric bursitis and lateral pain of L hip. Pt presents to initial evaluation with signs and symptoms consistent with SI joint dysfunction. B hip joint dysfunction also in differential. Moderate Trendelenburg present on R as well as B valgus collapse, demonstrating weakness in addition to MMT testing.     Pt prognosis is Good.   Pt will benefit from skilled outpatient Physical Therapy to address the deficits stated above and in the chart below, provide pt/family education, and to maximize pt's level of independence.     Plan of care discussed with patient: Yes  Pt's spiritual, cultural and educational needs considered and pt agreeable to plan of care and goals as stated below:     Anticipated Barriers for therapy: Chronity of pain; imaging; sedentary lifestyle    Medical Necessity is demonstrated by the following  History  Co-morbidities and personal factors that may impact the plan of care Co-morbidities:   Back pain, BMI over 30, Headaches, High Blood Pressure    Personal Factors:   age  lifestyle-sedentary   moderate   Examination  Body Structures and Functions, activity limitations and participation restrictions that may impact the plan of care Body " Regions:   back  lower extremities    Body Systems:    ROM  strength  gait  transfers  transitions    Participation Restrictions:   Walking program    Activity limitations:   Learning and applying knowledge  no deficits    General Tasks and Commands  no deficits    Communication  no deficits    Mobility  walking    Self care  dressing    Domestic Life  shopping  cooking  doing house work (cleaning house, washing dishes, laundry)    Interactions/Relationships  no deficits    Life Areas  no deficits    Community and Social Life  community life  recreation and leisure         moderate   Clinical Presentation stable and uncomplicated low   Decision Making/ Complexity Score: low     Short Term Goals (3 Weeks):  1. Pt will be compliant with HEP to supplement PT in restoring pain free function.  2. Pt will improve impaired LE MMTs to >/= 4-/5 to improve dynamic hip support for functional tasks.  3. Pt will walk with mild Trendelenburg to demonstrate increased hip strength for appropriate swing mechanics.  4. Pt will stand 20 minutes without pain to promote functional mobility.   Long Term Goals (6 Weeks):  1. Pt will improve FOTO score to </= 40% limited to decrease perceived limitation with mobility.   2. Pt will improve impaired LE MMTs to >/= 4/5 to improve dynamic hip support for functional tasks.  3. Pt will walkout Trendelenburg to demonstrate increased hip strength for appropriate swing mechanics.  4. Pt will walk 5 minutes on treadmill without pain to promote initiation to community walking program.     Plan     Plan of care Certification: 7/16/2020 to 8/28/2020.    Outpatient Physical Therapy 2 times weekly for 7 weeks to include the following interventions: Electrical Stimulation -, Gait Training, Manual Therapy, Moist Heat/ Ice, Neuromuscular Re-ed, Patient Education, Self Care, Therapeutic Activites and Therapeutic Exercise.     Polly Royal, PT, DPT

## 2020-07-16 NOTE — PATIENT INSTRUCTIONS
PUSH/PULL Self Pelvic Correction        Wrap left hand around left knee and place right hand on top of right knee while lying on back. Pull left knee down towards mat, left hand applying counter force to prevent movement. Push right knee towards you into rigth hand, also applying counter force to prevent movement.  Hold 10 seconds. Repeat 3 times. Perform 2-3 times a day.              Polly Royal, PT, DPT, cert. DN  Email: [email or web address]  Clinic number: [phone]

## 2020-07-23 ENCOUNTER — CLINICAL SUPPORT (OUTPATIENT)
Dept: REHABILITATION | Facility: HOSPITAL | Age: 70
End: 2020-07-23
Payer: MEDICARE

## 2020-07-23 DIAGNOSIS — M25.552 CHRONIC PAIN OF BOTH HIPS: ICD-10-CM

## 2020-07-23 DIAGNOSIS — R26.89 IMPAIRED GAIT AND MOBILITY: ICD-10-CM

## 2020-07-23 DIAGNOSIS — M25.551 CHRONIC PAIN OF BOTH HIPS: ICD-10-CM

## 2020-07-23 DIAGNOSIS — G89.29 CHRONIC PAIN OF BOTH HIPS: ICD-10-CM

## 2020-07-23 DIAGNOSIS — R29.898 DECREASED STRENGTH OF LOWER EXTREMITY: ICD-10-CM

## 2020-07-23 PROCEDURE — 97140 MANUAL THERAPY 1/> REGIONS: CPT | Mod: PN

## 2020-07-23 PROCEDURE — 97110 THERAPEUTIC EXERCISES: CPT | Mod: PN

## 2020-07-23 NOTE — PROGRESS NOTES
"  Physical Therapy Treatment Note     Name: Jolly Matias  Clinic Number: [id number]    Therapy Diagnosis:   Encounter Diagnoses   Name Primary?    Chronic pain of both hips     Impaired gait and mobility     Decreased strength of lower extremity      Physician: Camilla Knowles DO    Visit Date: 7/23/2020    Physician Orders: PT Eval and Treat:Bilateral lateral hip pain secondary to pelvic and core instability with associated greater trochanteric bursitis. Home exercise program, pelvic and core stability exercises, and neuromuscular retraining needed.   Medical Diagnosis from Referral: M25.552 (ICD-10-CM) - Lateral pain of left hip M70.62 (ICD-10-CM) - Greater trochanteric bursitis of left hip   Evaluation Date: 7/16/2020    Authorization Period Expiration: 09/16/2020  Plan of Care Expiration: 7/16/2020 to 8/28/2020  Visit # / Visits authorized: 2/12    FOTO: 2/5  Visit: 88  Total: KX      Time In: 1130  Time Out: 1210  Total Billable Time: 40 minutes (2 TE, 1 MT)   Precautions: Standard; hypertension; diabetes    Subjective     Pt reports: that she went for a walk in her neighborhood and started having B lateral hip pain at about 3 blocks.   She was compliant with home exercise program.  Response to previous treatment: eval  Functional change: none mentioned    Pain: 3/10  Location: bilateral hips, bilateral SIJ areas     Objective   (+) Jake's sign B  L hip abductor weakness > R  Hypertrophied lumbar paraspinals  TTP along lateral hip musculature including greater trochanter B.    Jolly received therapeutic exercises to develop strength, endurance, ROM, flexibility, posture and core stabilization for 30 minutes including:  SKTC    x10/B  DKTC peanut ball  10x10"  hooklying QL LTR  10x10" B  Piriformis stretch  10x10" B  Sciatic nerve glides  15x B    Sidelying clams:   x10 B  Sidelying hip abd   2x10 B  Double leg bridges:   x15  Push/pull muscle energy technique for L anterior rotated innominate: 10" holds " "x 3 reps (not today)    Standing hip flexor/quad stretch Chair support 15x glut sets   Walking loops    x3  Seated lumbar flexion ball rolls 10x10"    Jolly received the following manual therapy techniques: 10 minutes  - STM/MFR B lumbar parapsinals/QL      Home Exercises Provided and Patient Education Provided   Education provided:   - continue HEP  - hold walking program for now.    Written Home Exercises Provided: Patient instructed to cont prior HEP.  Exercises were reviewed and Jolly was able to demonstrate them prior to the end of the session.  Jolly demonstrated good  understanding of the education provided.     See EMR under Patient Instructions for exercises provided prior visit.    Assessment   A:  Lumbosacral vs hip source of pain.  Noted B hip weakness (L > R).  (+) SIJ cluster testing.  Significant lumbar parapspinal hypertrophy with increased lumbar lordosis.  Relatively normal hip clearing tests.  No pain onset with any interventions today.   Will build up walking tolerance in PT.     Jolly is progressing well towards her goals.   Pt prognosis is Excellent.     Pt will continue to benefit from skilled outpatient physical therapy to address the deficits listed in the problem list box on initial evaluation, provide pt/family education and to maximize pt's level of independence in the home and community environment.   Pt's spiritual, cultural and educational needs considered and pt agreeable to plan of care and goals.     Anticipated barriers to physical therapy: Chronity of pain; imaging; sedentary lifestyle    Goals:   Short Term Goals (3 Weeks):  1. Pt will be compliant with HEP to supplement PT in restoring pain free function. Progressing towards; not met  2. Pt will improve impaired LE MMTs to >/= 4-/5 to improve dynamic hip support for functional tasks.  Progressing towards; not met  3. Pt will walk with mild Trendelenburg to demonstrate increased hip strength for appropriate swing " mechanics.  Progressing towards; not met  4. Pt will stand 20 minutes without pain to promote functional mobility.  Progressing towards; not met     Long Term Goals (6 Weeks):  1. Pt will improve FOTO score to </= 40% limited to decrease perceived limitation with mobility. Progressing towards; not met  2. Pt will improve impaired LE MMTs to >/= 4/5 to improve dynamic hip support for functional tasks.  Progressing towards; not met  3. Pt will walkout Trendelenburg to demonstrate increased hip strength for appropriate swing mechanics.  Progressing towards; not met  4. Pt will walk 5 minutes on treadmill without pain to promote initiation to community walking program.   Progressing towards; not met    Plan   Continue plan of care.  Monitor response to interventions.  Adjust intensity accordingly.   Address graded walking program.     Sheng Mueller, PT

## 2020-07-28 ENCOUNTER — CLINICAL SUPPORT (OUTPATIENT)
Dept: REHABILITATION | Facility: HOSPITAL | Age: 70
End: 2020-07-28
Payer: MEDICARE

## 2020-07-28 DIAGNOSIS — M25.551 CHRONIC PAIN OF BOTH HIPS: ICD-10-CM

## 2020-07-28 DIAGNOSIS — G89.29 CHRONIC PAIN OF BOTH HIPS: ICD-10-CM

## 2020-07-28 DIAGNOSIS — M25.552 CHRONIC PAIN OF BOTH HIPS: ICD-10-CM

## 2020-07-28 DIAGNOSIS — R26.89 IMPAIRED GAIT AND MOBILITY: ICD-10-CM

## 2020-07-28 DIAGNOSIS — R29.898 DECREASED STRENGTH OF LOWER EXTREMITY: ICD-10-CM

## 2020-07-28 PROCEDURE — 97110 THERAPEUTIC EXERCISES: CPT | Mod: KX,PN,CQ

## 2020-07-28 PROCEDURE — 97140 MANUAL THERAPY 1/> REGIONS: CPT | Mod: KX,PN,CQ

## 2020-07-28 NOTE — PROGRESS NOTES
"  Physical Therapy Treatment Note     Name: Jolly Matias  Clinic Number: [id number]    Therapy Diagnosis:   Encounter Diagnoses   Name Primary?    Chronic pain of both hips     Impaired gait and mobility     Decreased strength of lower extremity      Physician: Camilla Knowles DO    Visit Date: 7/28/2020    Physician Orders: PT Eval and Treat:Bilateral lateral hip pain secondary to pelvic and core instability with associated greater trochanteric bursitis. Home exercise program, pelvic and core stability exercises, and neuromuscular retraining needed.   Medical Diagnosis from Referral: M25.552 (ICD-10-CM) - Lateral pain of left hip M70.62 (ICD-10-CM) - Greater trochanteric bursitis of left hip   Evaluation Date: 7/16/2020    Authorization Period Expiration: 09/16/2020  Plan of Care Expiration: 7/16/2020 to 8/28/2020  Visit # / Visits authorized: 3/12    FOTO: 3/5  Visit: 88.10  Total: KX      Time In: 1300  Time Out: 1345  Total Billable Time: 40 minutes (2 TE, 1 MT)   Precautions: Standard; hypertension; diabetes    Subjective     Pt reports: That she had increased pain for two days after last visit.    She was compliant with home exercise program.  Response to previous treatment: increased pain for two days "8/10 first day, 6/10 second"  Functional change: none     Pain: "discomfort"  Location:  right hip, and SIJ areas     Objective       Jolly received therapeutic exercises to develop strength, endurance, ROM, flexibility, posture and core stabilization for 30 minutes including:  SKTC    x10 5" hold B  DKTC peanut ball  10x10"  hooklying QL LTR  10x10" B  Piriformis stretch  10x10" B  Sciatic nerve glides  15x B  Double leg bridges:   2x10    Sidelying clams:   2x10 B  Sidelying hip abd   2x10 B    Push/pull muscle energy technique for L anterior rotated innominate: 10" holds x 3 reps (not today, no rotation noted)    Standing hip flexor/quad stretch Chair support 15x glut sets   Walking loops    3 x 140' " "in gym  Seated lumbar flexion ball rolls 10x10"    Jolly received the following manual therapy techniques: 10 minutes  - STM/MFR B lumbar parapsinals/QL  - STM  B sacral border      Home Exercises Provided and Patient Education Provided   Education provided:   - continue HEP  - hold walking program for now.    Written Home Exercises Provided: Patient instructed to cont prior HEP.  Exercises were reviewed and Jolly was able to demonstrate them prior to the end of the session.  Jolly demonstrated good  understanding of the education provided.     See EMR under Patient Instructions for exercises provided 7/16/2020    Assessment   No rotation noted with assessment.  "Tender" at right SI joint with manual therapy. Able to tolerate gentle to moderate pressure with same.  Tolerated all therex with reports of "slight pain" after treatment.  Rates "1/10"    Jolly is progressing well towards her goals.   Pt prognosis is Excellent.     Pt will continue to benefit from skilled outpatient physical therapy to address the deficits listed in the problem list box on initial evaluation, provide pt/family education and to maximize pt's level of independence in the home and community environment.   Pt's spiritual, cultural and educational needs considered and pt agreeable to plan of care and goals.     Anticipated barriers to physical therapy: Chronity of pain; imaging; sedentary lifestyle    Goals:   Short Term Goals (3 Weeks):  1. Pt will be compliant with HEP to supplement PT in restoring pain free function. Progressing towards; not met  2. Pt will improve impaired LE MMTs to >/= 4-/5 to improve dynamic hip support for functional tasks.  Progressing towards; not met  3. Pt will walk with mild Trendelenburg to demonstrate increased hip strength for appropriate swing mechanics.  Progressing towards; not met  4. Pt will stand 20 minutes without pain to promote functional mobility.  Progressing towards; not met     Long Term " Goals (6 Weeks):  1. Pt will improve FOTO score to </= 40% limited to decrease perceived limitation with mobility. Progressing towards; not met  2. Pt will improve impaired LE MMTs to >/= 4/5 to improve dynamic hip support for functional tasks.  Progressing towards; not met  3. Pt will walkout Trendelenburg to demonstrate increased hip strength for appropriate swing mechanics.  Progressing towards; not met  4. Pt will walk 5 minutes on treadmill without pain to promote initiation to community walking program.   Progressing towards; not met    Plan   Continue plan of care.  Monitor response to interventions.  Adjust intensity accordingly. Progress walking program.     Alba Nava, PTA

## 2020-07-30 ENCOUNTER — CLINICAL SUPPORT (OUTPATIENT)
Dept: REHABILITATION | Facility: HOSPITAL | Age: 70
End: 2020-07-30
Payer: MEDICARE

## 2020-07-30 DIAGNOSIS — R26.89 IMPAIRED GAIT AND MOBILITY: ICD-10-CM

## 2020-07-30 DIAGNOSIS — M25.551 CHRONIC PAIN OF BOTH HIPS: ICD-10-CM

## 2020-07-30 DIAGNOSIS — M25.552 CHRONIC PAIN OF BOTH HIPS: ICD-10-CM

## 2020-07-30 DIAGNOSIS — G89.29 CHRONIC PAIN OF BOTH HIPS: ICD-10-CM

## 2020-07-30 DIAGNOSIS — R29.898 DECREASED STRENGTH OF LOWER EXTREMITY: ICD-10-CM

## 2020-07-30 PROCEDURE — 97110 THERAPEUTIC EXERCISES: CPT | Mod: PN,CQ

## 2020-07-30 PROCEDURE — 97140 MANUAL THERAPY 1/> REGIONS: CPT | Mod: PN,CQ

## 2020-07-30 NOTE — PROGRESS NOTES
"  Physical Therapy Treatment Note     Name: Jolly Matias  Clinic Number: [id number]    Therapy Diagnosis:   Encounter Diagnoses   Name Primary?    Chronic pain of both hips     Impaired gait and mobility     Decreased strength of lower extremity      Physician: Camilla Knowles DO    Visit Date: 7/30/2020    Physician Orders: PT Eval and Treat:Bilateral lateral hip pain secondary to pelvic and core instability with associated greater trochanteric bursitis. Home exercise program, pelvic and core stability exercises, and neuromuscular retraining needed.   Medical Diagnosis from Referral: M25.552 (ICD-10-CM) - Lateral pain of left hip M70.62 (ICD-10-CM) - Greater trochanteric bursitis of left hip   Evaluation Date: 7/16/2020    Authorization Period Expiration: 09/16/2020  Plan of Care Expiration: 7/16/2020 to 8/28/2020  Visit # / Visits authorized: 4/12    FOTO: 4/5  Visit: 88.10  Total: KX      Time In: 1400  Time Out: 1445  Total Billable Time: 45 minutes (2 TE, 1 MT)   Precautions: Standard; hypertension; diabetes    Subjective     Pt reports: she is walking further with less pain   She was compliant with home exercise program.  Response to previous treatment: tolerated well  Functional change: Walking further w/o pain    Pain: "discomfort"  Location:  right hip, and SIJ areas     Objective       Jolly received therapeutic exercises to develop strength, endurance, ROM, flexibility, posture and core stabilization for 35 minutes including:  SKTC    x10 10" hold B  DKTC peanut ball  10x10"  hooklying QL LTR  10x10" B  Piriformis stretch  3x30" B  Hamstring stretch                   2x30" w/strap  Sciatic nerve glides  15x B  Double leg bridges:   2x10    Sidelying clams:   2x10 B   Sidelying hip abd   2x10 B    Push/pull muscle energy technique for L anterior rotated innominate: 10" holds x 3 reps (not today, no asymmetry  noted)    Prone  hip flexor/quad stretch            3x30" w/strap  Walking loops    3 x 140' " "in gym  Seated lumbar flexion ball rolls 10x10"    Jolly received the following manual therapy techniques: 10 minutes  - STM/MFR B lumbar parapsinals/QL  - STM  B sacral border      Home Exercises Provided and Patient Education Provided   Education provided:   - continue HEP  - hold walking program for now.    Written Home Exercises Provided: Patient instructed to cont prior HEP.  Exercises were reviewed and Jolly was able to demonstrate them prior to the end of the session.  Jolly demonstrated good  understanding of the education provided.     See EMR under Patient Instructions for exercises provided 7/16/2020    Assessment   Presents w/o pain. No rotation noted with assessment.   Tolerated all there ex well reporting no pain    Jolly is progressing well towards her goals.   Pt prognosis is Excellent.     Pt will continue to benefit from skilled outpatient physical therapy to address the deficits listed in the problem list box on initial evaluation, provide pt/family education and to maximize pt's level of independence in the home and community environment.   Pt's spiritual, cultural and educational needs considered and pt agreeable to plan of care and goals.     Anticipated barriers to physical therapy: Chronity of pain; imaging; sedentary lifestyle    Goals:   Short Term Goals (3 Weeks):  1. Pt will be compliant with HEP to supplement PT in restoring pain free function. Progressing towards; not met  2. Pt will improve impaired LE MMTs to >/= 4-/5 to improve dynamic hip support for functional tasks.  Progressing towards; not met  3. Pt will walk with mild Trendelenburg to demonstrate increased hip strength for appropriate swing mechanics.  Progressing towards; not met  4. Pt will stand 20 minutes without pain to promote functional mobility.  Progressing towards; not met     Long Term Goals (6 Weeks):  1. Pt will improve FOTO score to </= 40% limited to decrease perceived limitation with mobility. " Progressing towards; not met  2. Pt will improve impaired LE MMTs to >/= 4/5 to improve dynamic hip support for functional tasks.  Progressing towards; not met  3. Pt will walkout Trendelenburg to demonstrate increased hip strength for appropriate swing mechanics.  Progressing towards; not met  4. Pt will walk 5 minutes on treadmill without pain to promote initiation to community walking program.   Progressing towards; not met    Plan   Continue plan of care.  Monitor response to interventions.  Adjust intensity accordingly. Progress walking program.     Aquilino Medeiros, PTA

## 2020-08-03 ENCOUNTER — CLINICAL SUPPORT (OUTPATIENT)
Dept: REHABILITATION | Facility: HOSPITAL | Age: 70
End: 2020-08-03
Payer: MEDICARE

## 2020-08-03 DIAGNOSIS — M25.552 CHRONIC PAIN OF BOTH HIPS: ICD-10-CM

## 2020-08-03 DIAGNOSIS — M25.551 CHRONIC PAIN OF BOTH HIPS: ICD-10-CM

## 2020-08-03 DIAGNOSIS — R29.898 DECREASED STRENGTH OF LOWER EXTREMITY: ICD-10-CM

## 2020-08-03 DIAGNOSIS — R26.89 IMPAIRED GAIT AND MOBILITY: ICD-10-CM

## 2020-08-03 DIAGNOSIS — G89.29 CHRONIC PAIN OF BOTH HIPS: ICD-10-CM

## 2020-08-03 PROCEDURE — 97112 NEUROMUSCULAR REEDUCATION: CPT | Mod: PN

## 2020-08-03 PROCEDURE — 97110 THERAPEUTIC EXERCISES: CPT | Mod: PN

## 2020-08-03 NOTE — PROGRESS NOTES
"  Physical Therapy Treatment Note     Name: Jolly Matias  Clinic Number: [id number]    Therapy Diagnosis:   Encounter Diagnoses   Name Primary?    Chronic pain of both hips     Impaired gait and mobility     Decreased strength of lower extremity      Physician: Camilla Knowles DO    Visit Date: 8/3/2020    Physician Orders: PT Eval and Treat:Bilateral lateral hip pain secondary to pelvic and core instability with associated greater trochanteric bursitis. Home exercise program, pelvic and core stability exercises, and neuromuscular retraining needed.   Medical Diagnosis from Referral: M25.552 (ICD-10-CM) - Lateral pain of left hip M70.62 (ICD-10-CM) - Greater trochanteric bursitis of left hip   Evaluation Date: 7/16/2020  Authorization Period Expiration: 09/16/2020  Plan of Care Expiration: 7/16/2020 to 8/28/2020  Visit # / Visits authorized: 5/12    FOTO: 5/5 done  Cap Visit: 95.01  Cap Total: 2035.59 KX      Time In: 1415  Time Out: 1501  Total Billable Time: 46 minutes     Precautions: Standard; hypertension; diabetes    Subjective     Pt reports: radicular pain in L3 dermatome distribution following last session.   She was compliant with home exercise program.  Response to previous treatment: increased pain, rated as 8-9/10  Functional change: decreased functional mobility. No pain with standing for long durations; B hip fatigue reported.    Pain: 2/10  Location:  R hip and sacral area     Objective     Jolly received therapeutic exercises to develop strength, endurance, ROM, flexibility, posture and core stabilization for 36 minutes including:  Gait analysis: Mild L trendelenburg    Supine transverse abdominus activation (TAs): 5"x10. Verbal and tactile cues for technique  Double leg bridges: 3x10  Sidelying clams: RTB x12, x10 B   Seated TAs: 5"x10. Verbal and tactile cues for technique    Standing:  Hip abduction: YTB 2x10 B  Hip extension: YTB 2x10 B  Single straight arm pull down: GTB 2x10 B  Single " "straight arm pull down + contralateral 6" step up: GTB x10 B    Jolly participated in neuromuscular re-education activities to improve: Posture for 10 minutes. The following activities were included:  Long sitting test (+) for R anterior rotated innominate; (-) after METs    Push/pull muscle energy technique for R anterior rotated innominate: 3 reps x 10" holds. Trial against therapist and self.    Home Exercises Provided and Patient Education Provided:    Education provided:   - Continue HEP; add TAs    Written Home Exercises Provided: Yes.  Exercises were reviewed and Jolly was able to demonstrate them prior to the end of the session.  Jolly demonstrated good  understanding of the education provided.     See EMR under Patient Instructions for exercises provided 7/16/2020 and 8/3/2020.    Assessment     Pelvic obliquity present upon arrival to session. Poor abdominal activation that improved to fair (-) with cues. Fatigue with sidelying and standing hip exercises. Majority of short term goals met.     Jolly is progressing well towards her goals.   Pt prognosis is Excellent.     Pt will continue to benefit from skilled outpatient physical therapy to address the deficits listed in the problem list box on initial evaluation, provide pt/family education and to maximize pt's level of independence in the home and community environment.   Pt's spiritual, cultural and educational needs considered and pt agreeable to plan of care and goals.     Anticipated barriers to physical therapy: Chronity of pain; imaging; sedentary lifestyle    Short Term Goals (3 Weeks):  1. Pt will be compliant with HEP to supplement PT in restoring pain free function. MET 8/3/2020  2. Pt will improve impaired LE MMTs to >/= 4-/5 to improve dynamic hip support for functional tasks.  Progressing towards; not met  3. Pt will walk with mild Trendelenburg to demonstrate increased hip strength for appropriate swing mechanics. MET 8/3/2020  4. Pt " will stand 20 minutes without pain to promote functional mobility.  MET 8/3/2020     Long Term Goals (6 Weeks):  1. Pt will improve FOTO score to </= 40% limited to decrease perceived limitation with mobility. Progressing towards; not met  2. Pt will improve impaired LE MMTs to >/= 4/5 to improve dynamic hip support for functional tasks.  Progressing towards; not met  3. Pt will walk without Trendelenburg to demonstrate increased hip strength for appropriate swing mechanics.  Progressing towards; not met  4. Pt will walk 5 minutes on treadmill without pain to promote initiation to community walking program.   Progressing towards; not met    Plan     Monitor pain and pelvic alignment. Improve core and hip strength.     Polly Royal, PT

## 2020-08-04 DIAGNOSIS — I10 ESSENTIAL HYPERTENSION: ICD-10-CM

## 2020-08-04 RX ORDER — SPIRONOLACTONE 25 MG/1
TABLET ORAL
Qty: 90 TABLET | Refills: 0 | Status: SHIPPED | OUTPATIENT
Start: 2020-08-04 | End: 2020-10-30

## 2020-08-04 NOTE — TELEPHONE ENCOUNTER
spoke with pt, just had annual with esther in march. Scheduled her preop visit. Will see PCP when she returns

## 2020-08-05 ENCOUNTER — CLINICAL SUPPORT (OUTPATIENT)
Dept: REHABILITATION | Facility: HOSPITAL | Age: 70
End: 2020-08-05
Payer: MEDICARE

## 2020-08-05 DIAGNOSIS — G89.29 CHRONIC PAIN OF BOTH HIPS: ICD-10-CM

## 2020-08-05 DIAGNOSIS — R29.898 DECREASED STRENGTH OF LOWER EXTREMITY: ICD-10-CM

## 2020-08-05 DIAGNOSIS — R26.89 IMPAIRED GAIT AND MOBILITY: ICD-10-CM

## 2020-08-05 DIAGNOSIS — M25.551 CHRONIC PAIN OF BOTH HIPS: ICD-10-CM

## 2020-08-05 DIAGNOSIS — M25.552 CHRONIC PAIN OF BOTH HIPS: ICD-10-CM

## 2020-08-05 PROCEDURE — 97110 THERAPEUTIC EXERCISES: CPT | Mod: PN | Performed by: PHYSICAL THERAPIST

## 2020-08-05 NOTE — PROGRESS NOTES
"  Physical Therapy Treatment Note     Name: Jolly Matias  Clinic Number: [id number]    Therapy Diagnosis:   No diagnosis found.  Physician: Camilla Knowles DO    Visit Date: 8/5/2020    Physician Orders: PT Eval and Treat:Bilateral lateral hip pain secondary to pelvic and core instability with associated greater trochanteric bursitis. Home exercise program, pelvic and core stability exercises, and neuromuscular retraining needed.   Medical Diagnosis from Referral: M25.552 (ICD-10-CM) - Lateral pain of left hip M70.62 (ICD-10-CM) - Greater trochanteric bursitis of left hip   Evaluation Date: 7/16/2020  Authorization Period Expiration: 09/16/2020  Plan of Care Expiration: 7/16/2020 to 8/28/2020  Visit # / Visits authorized: 6/12    FOTO: 1/5    Cap Visit: 90.96  Cap Total: 2125.55 KX      Time In: 231  Time Out: 315  Total Billable Time: 44 minutes     Precautions: Standard; hypertension; diabetes    Subjective     Pt reports: significant soreness in glute bilaterally following previous visit.   She was compliant with home exercise program.  Response to previous treatment: increased pain, rated as 8-9/10  Functional change: decreased functional mobility. No pain with standing for long durations; B hip fatigue reported.    Pain: 2/10  Location:  R hip and sacral area     Objective     Jolly received therapeutic exercises to develop strength, endurance, ROM, flexibility, posture and core stabilization for 45 minutes including:  Gait analysis: Mild L trendelenburg    Supine transverse abdominus activation (TAs): 5"x10. Verbal and tactile cues for technique  Double leg bridges: 3x10  Sidelying clams:  3x12, B   Seated TAs: 5"x10. Verbal and tactile cues for technique    Standing:  Hip abduction: YTB 3x10 B  Hip extension: YTB 3x10 B  Single straight arm pull down: GTB 3x10 B  Single straight arm pull down + contralateral 6" step up: GTB 2x10 B  scap retractions GTB 3x10          Home Exercises Provided and Patient " Education Provided:    Education provided:   - Continue HEP; add TAs    Written Home Exercises Provided: Yes.  Exercises were reviewed and Jolly was able to demonstrate them prior to the end of the session.  Jolly demonstrated good  understanding of the education provided.     See EMR under Patient Instructions for exercises provided 7/16/2020 and 8/3/2020.    Assessment   Pt reports significant soreness following previous visit which subsided slightly today. Pt tolerated session without increase in glute pain or back pain. Pt will continue to benefit from lateral hip and posterior hip strengthening program.      Jolly is progressing well towards her goals.   Pt prognosis is Excellent.     Pt will continue to benefit from skilled outpatient physical therapy to address the deficits listed in the problem list box on initial evaluation, provide pt/family education and to maximize pt's level of independence in the home and community environment.   Pt's spiritual, cultural and educational needs considered and pt agreeable to plan of care and goals.     Anticipated barriers to physical therapy: Chronity of pain; imaging; sedentary lifestyle    Short Term Goals (3 Weeks):  1. Pt will be compliant with HEP to supplement PT in restoring pain free function. MET 8/3/2020  2. Pt will improve impaired LE MMTs to >/= 4-/5 to improve dynamic hip support for functional tasks.  Progressing towards; not met  3. Pt will walk with mild Trendelenburg to demonstrate increased hip strength for appropriate swing mechanics. MET 8/3/2020  4. Pt will stand 20 minutes without pain to promote functional mobility.  MET 8/3/2020     Long Term Goals (6 Weeks):  1. Pt will improve FOTO score to </= 40% limited to decrease perceived limitation with mobility. Progressing towards; not met  2. Pt will improve impaired LE MMTs to >/= 4/5 to improve dynamic hip support for functional tasks.  Progressing towards; not met  3. Pt will walk without  Trendelenburg to demonstrate increased hip strength for appropriate swing mechanics.  Progressing towards; not met  4. Pt will walk 5 minutes on treadmill without pain to promote initiation to community walking program.   Progressing towards; not met    Plan     Monitor pain and pelvic alignment. Improve core and hip strength.     Art Spain, PT

## 2020-08-10 ENCOUNTER — CLINICAL SUPPORT (OUTPATIENT)
Dept: REHABILITATION | Facility: HOSPITAL | Age: 70
End: 2020-08-10
Payer: MEDICARE

## 2020-08-10 DIAGNOSIS — M25.551 CHRONIC PAIN OF BOTH HIPS: ICD-10-CM

## 2020-08-10 DIAGNOSIS — R29.898 DECREASED STRENGTH OF LOWER EXTREMITY: ICD-10-CM

## 2020-08-10 DIAGNOSIS — M25.552 CHRONIC PAIN OF BOTH HIPS: ICD-10-CM

## 2020-08-10 DIAGNOSIS — R26.89 IMPAIRED GAIT AND MOBILITY: ICD-10-CM

## 2020-08-10 DIAGNOSIS — G89.29 CHRONIC PAIN OF BOTH HIPS: ICD-10-CM

## 2020-08-10 PROCEDURE — 97110 THERAPEUTIC EXERCISES: CPT | Mod: KX,PN

## 2020-08-12 ENCOUNTER — CLINICAL SUPPORT (OUTPATIENT)
Dept: REHABILITATION | Facility: HOSPITAL | Age: 70
End: 2020-08-12
Payer: MEDICARE

## 2020-08-12 DIAGNOSIS — R29.898 DECREASED STRENGTH OF LOWER EXTREMITY: ICD-10-CM

## 2020-08-12 DIAGNOSIS — R26.89 IMPAIRED GAIT AND MOBILITY: ICD-10-CM

## 2020-08-12 DIAGNOSIS — M25.552 CHRONIC PAIN OF BOTH HIPS: ICD-10-CM

## 2020-08-12 DIAGNOSIS — G89.29 CHRONIC PAIN OF BOTH HIPS: ICD-10-CM

## 2020-08-12 DIAGNOSIS — M25.551 CHRONIC PAIN OF BOTH HIPS: ICD-10-CM

## 2020-08-12 PROCEDURE — 97110 THERAPEUTIC EXERCISES: CPT | Mod: PN

## 2020-08-12 NOTE — PROGRESS NOTES
"    Physical Therapy Treatment Note     Name: Jolly Matias  Clinic Number: [id number]    Therapy Diagnosis:   Encounter Diagnoses   Name Primary?    Chronic pain of both hips     Impaired gait and mobility     Decreased strength of lower extremity      Physician: Camilla Knowles DO    Visit Date: 8/12/2020    Physician Orders: PT Eval and Treat:Bilateral lateral hip pain secondary to pelvic and core instability with associated greater trochanteric bursitis. Home exercise program, pelvic and core stability exercises, and neuromuscular retraining needed.   Medical Diagnosis from Referral: M25.552 (ICD-10-CM) - Lateral pain of left hip M70.62 (ICD-10-CM) - Greater trochanteric bursitis of left hip   Evaluation Date: 7/16/2020  Authorization Period Expiration: 09/16/2020  Plan of Care Expiration: 7/16/2020 to 8/28/2020  Visit # / Visits authorized: 8/12    FOTO: 8/10  Cap Visit: 90.96  Cap Total: 2307.47 KX      Time In: 1245  Time Out: 1325  Total Billable Time: 40 minutes     Precautions: Standard; hypertension; diabetes    Subjective     Pt reports: very little pain today.  She was compliant with home exercise program.  Response to previous treatment: no pain/soreness  Functional change: unsure if there is a change in soreness with prolonged standing--has not tested it since last session    Pain: 0/10  Location:  R hip and sacral area     Objective     Jolly received therapeutic exercises to develop strength, endurance, ROM, flexibility, posture and core stabilization for 40 minutes including:  Gait analysis: Absent Trendelenburg    Supine transverse abdominus activation (TAs): 10"x10  TAs with alternate LE extension: x10 B  TAs with LE dead bug: x10 B  Double leg bridges: x20   Single leg bridges: 2x10 B  Sidelying clams:  YTB 2x10 B    Standing:  Hip abduction: RTB 2x15 B  Hip extension: RTB x15, x10 B  Single straight arm pull down + contralateral 6" step up: BlackTB x15 B  Lateral walks: RTB 3x10 feet " B    Walking for endurance: Treadmill, 1.2-1.4 MPH x5'    Home Exercises Provided and Patient Education Provided:    Education provided:   - Continue HEP  - Attempt standing ~20 minutes over the weekend    Written Home Exercises Provided: Not today.  Exercises were reviewed and Jolly was able to demonstrate them prior to the end of the session.  Jolly demonstrated good  understanding of the education provided.     See EMR under Patient Instructions for exercises provided 7/16/2020 and 8/3/2020.    Assessment     Progressive subjective pain improvement. No issues with treadmill walking 5 minutes or Trendelenburg pattern.     Jolly is progressing well towards her goals.   Pt prognosis is Excellent.     Pt will continue to benefit from skilled outpatient physical therapy to address the deficits listed in the problem list box on initial evaluation, provide pt/family education and to maximize pt's level of independence in the home and community environment.   Pt's spiritual, cultural and educational needs considered and pt agreeable to plan of care and goals.     Anticipated barriers to physical therapy: Chronity of pain; imaging; sedentary lifestyle    Short Term Goals (3 Weeks):  1. Pt will be compliant with HEP to supplement PT in restoring pain free function. MET 8/3/2020  2. Pt will improve impaired LE MMTs to >/= 4-/5 to improve dynamic hip support for functional tasks.  Progressing towards; not met  3. Pt will walk with mild Trendelenburg to demonstrate increased hip strength for appropriate swing mechanics. MET 8/3/2020  4. Pt will stand 20 minutes without pain to promote functional mobility.  MET 8/3/2020   Long Term Goals (6 Weeks):  1. Pt will improve FOTO score to </= 40% limited to decrease perceived limitation with mobility. Progressing towards; not met  2. Pt will improve impaired LE MMTs to >/= 4/5 to improve dynamic hip support for functional tasks.  Progressing towards; not met  3. Pt will walk  without Trendelenburg to demonstrate increased hip strength for appropriate swing mechanics. MET 8/12/2020  4. Pt will walk 5 minutes on treadmill without pain to promote initiation to community walking program.   MET 8/12/2020    Plan     Discharge planning. Test MMTs next    Polly Royal, PT

## 2020-08-19 ENCOUNTER — CLINICAL SUPPORT (OUTPATIENT)
Dept: REHABILITATION | Facility: HOSPITAL | Age: 70
End: 2020-08-19
Payer: MEDICARE

## 2020-08-19 DIAGNOSIS — M25.552 CHRONIC PAIN OF BOTH HIPS: ICD-10-CM

## 2020-08-19 DIAGNOSIS — R26.89 IMPAIRED GAIT AND MOBILITY: ICD-10-CM

## 2020-08-19 DIAGNOSIS — G89.29 CHRONIC PAIN OF BOTH HIPS: ICD-10-CM

## 2020-08-19 DIAGNOSIS — M25.551 CHRONIC PAIN OF BOTH HIPS: ICD-10-CM

## 2020-08-19 DIAGNOSIS — R29.898 DECREASED STRENGTH OF LOWER EXTREMITY: ICD-10-CM

## 2020-08-19 PROCEDURE — 97110 THERAPEUTIC EXERCISES: CPT | Mod: PN

## 2020-08-19 NOTE — PROGRESS NOTES
Physical Therapy Treatment Note     Name: Jolly Matias  Clinic Number: [id number]    Therapy Diagnosis:   Encounter Diagnoses   Name Primary?    Chronic pain of both hips     Impaired gait and mobility     Decreased strength of lower extremity      Physician: Camilla Knowles DO    Visit Date: 8/19/2020    Physician Orders: PT Eval and Treat:Bilateral lateral hip pain secondary to pelvic and core instability with associated greater trochanteric bursitis. Home exercise program, pelvic and core stability exercises, and neuromuscular retraining needed.   Medical Diagnosis from Referral: M25.552 (ICD-10-CM) - Lateral pain of left hip M70.62 (ICD-10-CM) - Greater trochanteric bursitis of left hip   Evaluation Date: 7/16/2020  Authorization Period Expiration: 09/16/2020  Plan of Care Expiration: 7/16/2020 to 8/28/2020  Visit # / Visits authorized: 9/12    FOTO: 9/10  Cap Visit: 90.96  Cap Total: 2398.43 KX      Time In: 1219  Time Out: 1305  Total Billable Time: 46 minutes     Precautions: Standard; hypertension; diabetes    Subjective     Pt reports: pain 8/10 at worst with sit<>stands from soft, low surfaces.   She was compliant with home exercise program.  Response to previous treatment: fatigued; relates this to mat  Functional change: stood 20 minutes without pain     Pain: 0/10  Location:  R hip and sacral area     Objective     Jolly received therapeutic exercises to develop strength, endurance, ROM, flexibility, posture and core stabilization for 46 minutes including:    LE MMTs Right Left Pain/Dysfunction with Movement   Hip Flexion 4/5 4/5    Hip Extension 4-/5 4-/5     Hip Abduction 4+/5 4/5     Hip Adduction 4/5 4/5    Hip IR 4+/5 4/5!    Hip ER 4/5 4/5    Knee Flexion 4/5 5/5     Knee Extension 4+/5 4+/5        CMS Impairment/Limitation/Restriction for FOTO Hip Survey    Therapist reviewed FOTO scores for Jolly Matias on 8/19/2020.   FOTO documents entered into [employer] - see Media  "section.    Limitation Score: 36%  Category: Mobility     TAs with LE dead bug: 3x10 B  Single leg bridges: 3x10 B  Sidelying clams:  RTB 2x10 B    Standing:  Unsupported sit<>stands with TA: 18" seat x10; 15.5" seat x10; 12" seat x8; 11" seat x5  Hip abduction: RTB 2x15 B  Hip extension: RTB 2x15 B  Single straight arm pull down + contralateral knee high: BlackTB x15 B  Lateral walks: Reviewed for HEP    Home Exercises Provided and Patient Education Provided:    Education provided:   - HEP update  - Continue working on standing tolerance.     Written Home Exercises Provided: Yes.  Exercises were reviewed and Jolly was able to demonstrate them prior to the end of the session.  Jolly demonstrated good  understanding of the education provided.     See EMR under Patient Instructions for exercises provided 8/19/2020    Assessment     Since beginning PT, pt has been seen 9 times since initial evaluation on 7/16/2020. Overall, she has made good, steady progress with her PT treatments and has worked hard towards all of her PT goals as evidenced by subjective and objective improvements. Since attending PT she has reached all of her PT goals. She will be discharged with an updated HEP and was instructed to contact us with any other questions or concerns. Pt agreeable to d/c.    Short Term Goals (3 Weeks):  1. Pt will be compliant with HEP to supplement PT in restoring pain free function. MET 8/3/2020  2. Pt will improve impaired LE MMTs to >/= 4-/5 to improve dynamic hip support for functional tasks.  MET 8/19/2020  3. Pt will walk with mild Trendelenburg to demonstrate increased hip strength for appropriate swing mechanics. MET 8/3/2020  4. Pt will stand 20 minutes without pain to promote functional mobility.  MET 8/3/2020   Long Term Goals (6 Weeks):  1. Pt will improve FOTO score to </= 40% limited to decrease perceived limitation with mobility. MET 8/19/2020.  2. Pt will improve impaired LE MMTs to >/= 4/5 to improve " dynamic hip support for functional tasks.  PARTIALLY MET 8/19/2020  3. Pt will walk without Trendelenburg to demonstrate increased hip strength for appropriate swing mechanics. MET 8/12/2020  4. Pt will walk 5 minutes on treadmill without pain to promote initiation to community walking program.   MET 8/12/2020    Plan     Discharge from therapy.     Polly Royal, PT

## 2020-09-08 ENCOUNTER — TELEPHONE (OUTPATIENT)
Dept: INTERNAL MEDICINE | Facility: CLINIC | Age: 70
End: 2020-09-08

## 2020-09-08 ENCOUNTER — OFFICE VISIT (OUTPATIENT)
Dept: INTERNAL MEDICINE | Facility: CLINIC | Age: 70
End: 2020-09-08
Payer: MEDICARE

## 2020-09-08 ENCOUNTER — HOSPITAL ENCOUNTER (OUTPATIENT)
Dept: RADIOLOGY | Facility: HOSPITAL | Age: 70
Discharge: HOME | End: 2020-09-08
Attending: NURSE PRACTITIONER
Payer: MEDICARE

## 2020-09-08 VITALS
TEMPERATURE: 97 F | SYSTOLIC BLOOD PRESSURE: 132 MMHG | WEIGHT: 159.19 LBS | BODY MASS INDEX: 30.06 KG/M2 | HEIGHT: 61 IN | DIASTOLIC BLOOD PRESSURE: 78 MMHG | HEART RATE: 84 BPM

## 2020-09-08 DIAGNOSIS — E11.9 TYPE 2 DIABETES MELLITUS WITHOUT RETINOPATHY: ICD-10-CM

## 2020-09-08 DIAGNOSIS — H66.91 RIGHT OTITIS MEDIA, UNSPECIFIED OTITIS MEDIA TYPE: ICD-10-CM

## 2020-09-08 DIAGNOSIS — J42 CHRONIC BRONCHITIS, UNSPECIFIED CHRONIC BRONCHITIS TYPE: ICD-10-CM

## 2020-09-08 DIAGNOSIS — R05.9 COUGH: ICD-10-CM

## 2020-09-08 DIAGNOSIS — Z01.818 PRE-OP EXAM: ICD-10-CM

## 2020-09-08 DIAGNOSIS — H02.403 PTOSIS OF BOTH EYELIDS: ICD-10-CM

## 2020-09-08 DIAGNOSIS — H02.403 PTOSIS OF BOTH EYELIDS: Primary | ICD-10-CM

## 2020-09-08 DIAGNOSIS — J30.1 ALLERGIC RHINITIS DUE TO POLLEN, UNSPECIFIED SEASONALITY: ICD-10-CM

## 2020-09-08 PROCEDURE — 71046 XR CHEST PA AND LATERAL: ICD-10-PCS | Mod: 26,,, | Performed by: RADIOLOGY

## 2020-09-08 PROCEDURE — 99999 PR PBB SHADOW E&M-EST. PATIENT-LVL IV: ICD-10-PCS | Mod: PBBFAC,,, | Performed by: NURSE PRACTITIONER

## 2020-09-08 PROCEDURE — 99999 PR PBB SHADOW E&M-EST. PATIENT-LVL IV: CPT | Mod: PBBFAC,,, | Performed by: NURSE PRACTITIONER

## 2020-09-08 PROCEDURE — 71046 X-RAY EXAM CHEST 2 VIEWS: CPT | Mod: TC,PO

## 2020-09-08 PROCEDURE — 99214 OFFICE O/P EST MOD 30 MIN: CPT | Mod: S$GLB,,, | Performed by: NURSE PRACTITIONER

## 2020-09-08 PROCEDURE — 99214 PR OFFICE/OUTPT VISIT, EST, LEVL IV, 30-39 MIN: ICD-10-PCS | Mod: S$GLB,,, | Performed by: NURSE PRACTITIONER

## 2020-09-08 PROCEDURE — 71046 X-RAY EXAM CHEST 2 VIEWS: CPT | Mod: 26,,, | Performed by: RADIOLOGY

## 2020-09-08 RX ORDER — LEVOCETIRIZINE DIHYDROCHLORIDE 5 MG/1
5 TABLET, FILM COATED ORAL NIGHTLY
Qty: 30 TABLET | Refills: 11 | Status: SHIPPED | OUTPATIENT
Start: 2020-09-08 | End: 2020-12-01 | Stop reason: SDUPTHER

## 2020-09-08 RX ORDER — AMOXICILLIN 500 MG/1
500 TABLET, FILM COATED ORAL EVERY 12 HOURS
Qty: 14 TABLET | Refills: 0 | Status: SHIPPED | OUTPATIENT
Start: 2020-09-08 | End: 2020-09-15

## 2020-09-08 RX ORDER — AZELASTINE 1 MG/ML
1 SPRAY, METERED NASAL 2 TIMES DAILY
Qty: 30 ML | Refills: 0 | Status: SHIPPED | OUTPATIENT
Start: 2020-09-08 | End: 2021-03-15

## 2020-09-08 NOTE — PROGRESS NOTES
[facility] Note    Chief Complaint      Chief Complaint   Patient presents with    Pre-op Exam     History of Present Illness      Jolly Matias is a 70 y.o. female patient of Dr. Venegas's with chronic conditions of anxiety, chronic bronchitis, bronchiectasis without complication, hypercholesterolemia, HTN, dm 2, atherosclerosis, sleep apnea and chronic cough who is new to me and presents today for medical clearance for bilateral ptosis with Dr. Mackay on Sept 21.  Patient feeling, denies shortness of breath or chest pain, reviewed meds and history with patient.  Patient reports his right ear pressure, postnasal drip sinus pressure over the past few days.  Discussed something any blood thinning type medications and any over-the-counter meds not needed 1 week prior to surgery.    EKG done 03/09/2020-normal sinus rhythm  CXR, CBC, CMP, A1c ordered    Problem List Items Addressed This Visit        Pulmonary    Chronic bronchitis    Relevant Orders    CBC auto differential    Comprehensive metabolic panel    X-Ray Chest PA And Lateral       Endocrine    Type 2 diabetes mellitus without retinopathy    Relevant Orders    CBC auto differential    Comprehensive metabolic panel    X-Ray Chest PA And Lateral    Hemoglobin A1C      Other Visit Diagnoses     Ptosis of both eyelids    -  Primary    Relevant Orders    CBC auto differential    Comprehensive metabolic panel    X-Ray Chest PA And Lateral    Pre-op exam        Relevant Orders    CBC auto differential    Comprehensive metabolic panel    X-Ray Chest PA And Lateral    Cough        Relevant Orders    CBC auto differential    Comprehensive metabolic panel    X-Ray Chest PA And Lateral    Allergic rhinitis due to pollen, unspecified seasonality        Relevant Medications    azelastine (ASTELIN) 137 mcg (0.1 %) nasal spray    levocetirizine (XYZAL) 5 MG tablet    amoxicillin (AMOXIL) 500 MG Tab    Right otitis media, unspecified otitis media type         Relevant Medications    amoxicillin (AMOXIL) 500 MG Tab          Health Maintenance   Topic Date Due    Hemoglobin A1c  2019    Foot Exam  2020    Lipid Panel  2020    DEXA SCAN  2020    Eye Exam  2021    Mammogram  2021    TETANUS VACCINE  2025    Hepatitis C Screening  Completed    Pneumococcal Vaccine (65+ High/Highest Risk)  Completed       Past Medical History:   Diagnosis Date    Atypical ductal hyperplasia, breast     Basal cell carcinoma 2011    left forehead    Basal cell carcinoma 2015    R temporal scalp, R upper forehead, L upper forehead    Basal cell carcinoma 2015    right mid ala    BCC (basal cell carcinoma) excised     right shoulder    Diabetes mellitus     Diverticulitis     Fibrocystic breast     HLD (hyperlipidemia)     Hypertension     Prediabetes 10/16/2013    Skin cancer        Past Surgical History:   Procedure Laterality Date    APPENDECTOMY      bilateral breast duct excision      BREAST BIOPSY Right     Excisional bx, benign    BREAST BIOPSY Left     Excisional bx, benign     SECTION      x2    COLON SURGERY Left 2017    sigmoidectomy for diverticulitis    COLONOSCOPY N/A 2017    Procedure: COLONOSCOPY;  Surgeon: IBRAHIMA Philip MD;  Location: [facility] (4TH [facility]);  Service: Endoscopy;  Laterality: N/A;    fulgaration of endometriosis x 2      HYSTERECTOMY      For endometriosis and DUB--age 42, ovaries in?    INCISIONAL HERNIA REPAIR  2017    KNEE ARTHROSCOPY W/ DEBRIDEMENT Right 2018    Procedure: ARTHROSCOPY, KNEE, WITH DEBRIDEMENT;  Surgeon: MARVA Arias MD;  [facility];  Service: Orthopedics;  Laterality: Right;    KNEE ARTHROSCOPY W/ MENISCECTOMY Right 2018    Procedure: ARTHROSCOPY, KNEE, WITH MENISCECTOMY (partial lateral and medial menisectomy, chondraplasty;  Surgeon: MARVA Arias MD;  Location: [facility] OR [address] [place] [address];  Service: Orthopedics;   Laterality: Right;    KNEE ARTHROSCOPY W/ PLICA EXCISION Right 7/6/2018    Procedure: EXCISION, PLICA, KNEE, ARTHROSCOPIC,;  Surgeon: MARVA Arias MD;  Location: [facility] OR [address] [place] [address];  Service: Orthopedics;  Laterality: Right;    KNEE SURGERY      OOPHORECTOMY      SKIN CANCER EXCISION      BCC forehead , temple, shoulder, chest    TONSILLECTOMY         family history includes Anxiety disorder in her daughter; Breast cancer in her maternal aunt; Cancer in her father; Dementia in her mother; Diabetes in her father and maternal uncle; Heart disease in her father; Hyperlipidemia in her sister and son; Hypertension in her father, mother, sister, and son; Melanoma in her father; Skin cancer in her father and mother; Thyroid disease in her mother.    Social History     Tobacco Use    Smoking status: Never Smoker    Smokeless tobacco: Never Used   Substance Use Topics    Alcohol use: Yes     Alcohol/week: 0.0 standard drinks     Types: 1 - 2 Glasses of wine per week     Comment: 3-4x /week    Drug use: No       Review of Systems   Constitutional: Negative for chills and fever.   HENT: Positive for ear pain and sinus pain. Negative for congestion and sore throat.    Eyes: Negative for blurred vision.        Bilateral ptosis   Respiratory: Positive for cough. Negative for shortness of breath and wheezing.    Cardiovascular: Negative for chest pain and palpitations.   Gastrointestinal: Negative for abdominal pain, constipation, diarrhea, nausea and vomiting.   Genitourinary: Negative for dysuria.   Musculoskeletal: Negative for myalgias.   Skin: Negative for rash.   Neurological: Negative for dizziness, weakness and headaches.   Psychiatric/Behavioral: Negative for depression. The patient is nervous/anxious.         Outpatient Encounter Medications as of 9/8/2020   Medication Sig Dispense Refill    atorvastatin (LIPITOR) 20 MG tablet TAKE 1 TABLET BY MOUTH EVERY DAY 90 tablet 1    cyanocobalamin (VITAMIN B-12) 1000  MCG tablet Take 100 mcg by mouth once daily.      flaxseed oil 1,000 mg Cap Take 1 capsule by mouth once daily.        ILEVRO 0.3 % DrpS   2    ketoconazole (NIZORAL) 2 % cream AAA in corners of mouth qhs 30 g 3    metFORMIN (GLUCOPHAGE) 500 MG tablet TAKE 1 TABLET(500 MG) BY MOUTH TWICE DAILY WITH MEALS 180 tablet 1    olmesartan (BENICAR) 40 MG tablet Take 1 tablet (40 mg total) by mouth once daily. 90 tablet 3    permethrin (ELIMITE) 5 % cream ADA EXT AA 1 TIME FOR 1 DOSE  0    sertraline (ZOLOFT) 100 MG tablet TAKE 1 TABLET(100 MG) BY MOUTH EVERY DAY 90 tablet 0    spironolactone (ALDACTONE) 25 MG tablet TAKE 1 TABLET(25 MG) BY MOUTH EVERY DAY 90 tablet 0    triamcinolone acetonide 0.1% (KENALOG) 0.1 % cream AAA bid 60 g 1    triamcinolone acetonide 0.1% (KENALOG) 0.1 % cream Apply topically 2 (two) times daily. For rash x 2 weeks then stop 80 g 1    vitamin D 1000 units Tab Take 185 mg by mouth once daily.      amoxicillin (AMOXIL) 500 MG Tab Take 1 tablet (500 mg total) by mouth every 12 (twelve) hours. for 7 days 14 tablet 0    azelastine (ASTELIN) 137 mcg (0.1 %) nasal spray 1 spray (137 mcg total) by Nasal route 2 (two) times daily. 30 mL 0    levocetirizine (XYZAL) 5 MG tablet Take 1 tablet (5 mg total) by mouth every evening. 30 tablet 11    nystatin (MYCOSTATIN) powder Apply topically 2 (two) times daily. aaa qd- bid prn flare groin, under arm, under breasts (Patient not taking: Reported on 9/8/2020) 120 g 2    [DISCONTINUED] azelastine (ASTELIN) 137 mcg (0.1 %) nasal spray 1 spray (137 mcg total) by Nasal route 2 (two) times daily. 30 mL 0    [DISCONTINUED] celecoxib (CELEBREX) 200 MG capsule TAKE ONE CAPSULE BY MOUTH ONCE DAILY 30 capsule 0    [DISCONTINUED] cephALEXin (KEFLEX) 250 MG capsule Take 1 capsule (250 mg total) by mouth every 12 (twelve) hours. 10 capsule 0    [DISCONTINUED] diazePAM (VALIUM) 5 MG tablet BRING   TO  PROCEDURE  ON  3/16/2020 .  TAKE  WHEN  ADVISED       "[DISCONTINUED] DUREZOL 0.05 % Drop ophthalmic solution   1    [DISCONTINUED] fluocinonide (LIDEX) 0.05 % external solution AAA scalp qday - bid prn pruritus (Patient not taking: Reported on 6/25/2020) 60 mL 3    [DISCONTINUED] FLUZONE HIGH-DOSE 2018-19, PF, 180 mcg/0.5 mL vaccine ADMINISTER 0.5ML IN THE MUSCLE AS DIRECTED  0    [DISCONTINUED] FLUZONE HIGH-DOSE 2019-20, PF, 180 mcg/0.5 mL Syrg ADMINISTER 0.5ML IN THE MUSCLE AS DIRECTED  0    [DISCONTINUED] levocetirizine (XYZAL) 5 MG tablet Take 1 tablet (5 mg total) by mouth every evening. 30 tablet 11    [DISCONTINUED] ofloxacin (OCUFLOX) 0.3 % ophthalmic solution INT 1 GTT INTO OU QID  0    [DISCONTINUED] triamcinolone acetonide 0.1% (KENALOG) 0.1 % cream AAA bid; not more than 2 weeks straight in same location, avoid use on face and groin 45 g 1    [DISCONTINUED] valACYclovir (VALTREX) 1000 MG tablet Take 2 tablets (2,000 mg total) by mouth every 12 (twelve) hours. For 1-2 days at first sign of flare 12 tablet 5     No facility-administered encounter medications on file as of 9/8/2020.         Review of patient's allergies indicates:   Allergen Reactions    Hydrocodone      Also makes pt "very sleepy"    Kiwi (actinidia chinensis) Swelling       Physical Exam      Vital Signs  Temp: 97 °F (36.1 °C)  Temp src: Temporal  Pulse: 84  BP: (!) 140/70  BP Location: Left arm  Patient Position: Sitting  Pain Score: 0-No pain  Height and Weight  Height: 5' 1" (154.9 cm)  Weight: 72.2 kg (159 lb 2.8 oz)  BSA (Calculated - sq m): 1.76 sq meters  BMI (Calculated): 30.1  Weight in (lb) to have BMI = 25: 132]    Physical Exam  Vitals signs and nursing note reviewed.   Constitutional:       Appearance: Normal appearance. She is well-developed.   HENT:      Head: Normocephalic and atraumatic.      Comments: Redness right ear canal with clear effusion noted behind TM.     Right Ear: External ear normal.      Left Ear: External ear normal.      Mouth/Throat:      Comments: " Slight erythema noted to oropharynx.  Eyes:      Conjunctiva/sclera: Conjunctivae normal.      Pupils: Pupils are equal, round, and reactive to light.      Comments: Drooping to upper eyelids noted bilaterally.   Neck:      Musculoskeletal: Normal range of motion and neck supple.      Thyroid: No thyromegaly.      Vascular: No JVD.      Trachea: No tracheal deviation.   Cardiovascular:      Rate and Rhythm: Normal rate and regular rhythm.      Heart sounds: Normal heart sounds. No murmur.   Pulmonary:      Effort: Pulmonary effort is normal.      Breath sounds: Normal breath sounds.   Abdominal:      Palpations: Abdomen is soft.   Musculoskeletal: Normal range of motion.   Lymphadenopathy:      Cervical: No cervical adenopathy.   Skin:     General: Skin is warm and dry.   Neurological:      Mental Status: She is alert and oriented to person, place, and time.   Psychiatric:         Behavior: Behavior normal.         Thought Content: Thought content normal.         Judgment: Judgment normal.          Laboratory:  CBC:  No results for input(s): WBC, RBC, HGB, HCT, PLT, MCV, MCH, MCHC in the last 2160 hours.  CMP:  No results for input(s): GLU, CALCIUM, ALBUMIN, PROT, NA, K, CO2, CL, BUN, ALKPHOS, ALT, AST, BILITOT in the last 2160 hours.    Invalid input(s): CREATININ  URINALYSIS:  No results for input(s): COLORU, CLARITYU, SPECGRAV, PHUR, PROTEINUA, GLUCOSEU, BILIRUBINCON, BLOODU, WBCU, RBCU, BACTERIA, MUCUS, NITRITE, LEUKOCYTESUR, UROBILINOGEN, HYALINECASTS in the last 2160 hours.   LIPIDS:  No results for input(s): TSH, HDL, CHOL, TRIG, LDLCALC, CHOLHDL, NONHDLCHOL, TOTALCHOLEST in the last 2160 hours.  TSH:  No results for input(s): TSH in the last 2160 hours.  A1C:  No results for input(s): HGBA1C in the last 2160 hours.      Assessment/Plan     Jolly Matias is a 70 y.o.female with:    1. Pre-op exam  - CBC auto differential; Future  - Comprehensive metabolic panel; Future  - X-Ray Chest PA And Lateral;  Future    2. Ptosis of both eyelids  - CBC auto differential; Future  - Comprehensive metabolic panel; Future  - X-Ray Chest PA And Lateral; Future    3. Type 2 diabetes mellitus without retinopathy  - CBC auto differential; Future  - Comprehensive metabolic panel; Future  - X-Ray Chest PA And Lateral; Future  - Hemoglobin A1C; Future    4. Cough  - CBC auto differential; Future  - Comprehensive metabolic panel; Future  - X-Ray Chest PA And Lateral; Future    5. Allergic rhinitis due to pollen, unspecified seasonality  - azelastine (ASTELIN) 137 mcg (0.1 %) nasal spray; 1 spray (137 mcg total) by Nasal route 2 (two) times daily.  Dispense: 30 mL; Refill: 0  - levocetirizine (XYZAL) 5 MG tablet; Take 1 tablet (5 mg total) by mouth every evening.  Dispense: 30 tablet; Refill: 11  - amoxicillin (AMOXIL) 500 MG Tab; Take 1 tablet (500 mg total) by mouth every 12 (twelve) hours. for 7 days  Dispense: 14 tablet; Refill: 0    6. Right otitis media, unspecified otitis media type  - amoxicillin (AMOXIL) 500 MG Tab; Take 1 tablet (500 mg total) by mouth every 12 (twelve) hours. for 7 days  Dispense: 14 tablet; Refill: 0    7. Chronic bronchitis, unspecified chronic bronchitis type  - CBC auto differential; Future  - Comprehensive metabolic panel; Future  - X-Ray Chest PA And Lateral; Future    Pt is medically cleared for low risk procedure with low cardiac risk profile and may proceed with surgery with Dr. Mackay      -Continue current medications and maintain follow up with specialists.  Return to clinic as needed for any concerns.        LASHELL PiperC  [facility]

## 2020-09-11 ENCOUNTER — TELEPHONE (OUTPATIENT)
Dept: INTERNAL MEDICINE | Facility: CLINIC | Age: 70
End: 2020-09-11

## 2020-09-17 ENCOUNTER — TELEPHONE (OUTPATIENT)
Dept: OPHTHALMOLOGY | Facility: CLINIC | Age: 70
End: 2020-09-17

## 2020-09-17 DIAGNOSIS — Z13.9 SCREENING FOR UNSPECIFIED CONDITION: Primary | ICD-10-CM

## 2020-09-18 ENCOUNTER — LAB VISIT (OUTPATIENT)
Dept: SURGERY | Facility: CLINIC | Age: 70
End: 2020-09-18
Payer: MEDICARE

## 2020-09-18 ENCOUNTER — TELEPHONE (OUTPATIENT)
Dept: OPHTHALMOLOGY | Facility: CLINIC | Age: 70
End: 2020-09-18

## 2020-09-18 DIAGNOSIS — Z13.9 SCREENING FOR UNSPECIFIED CONDITION: ICD-10-CM

## 2020-09-18 PROCEDURE — U0003 INFECTIOUS AGENT DETECTION BY NUCLEIC ACID (DNA OR RNA); SEVERE ACUTE RESPIRATORY SYNDROME CORONAVIRUS 2 (SARS-COV-2) (CORONAVIRUS DISEASE [COVID-19]), AMPLIFIED PROBE TECHNIQUE, MAKING USE OF HIGH THROUGHPUT TECHNOLOGIES AS DESCRIBED BY CMS-2020-01-R: HCPCS

## 2020-09-19 LAB — SARS-COV-2 RNA RESP QL NAA+PROBE: NOT DETECTED

## 2020-09-21 ENCOUNTER — PROCEDURE VISIT (OUTPATIENT)
Dept: OPHTHALMOLOGY | Facility: CLINIC | Age: 70
End: 2020-09-21
Payer: MEDICARE

## 2020-09-21 VITALS — DIASTOLIC BLOOD PRESSURE: 72 MMHG | HEART RATE: 84 BPM | SYSTOLIC BLOOD PRESSURE: 142 MMHG

## 2020-09-21 DIAGNOSIS — E11.9 TYPE 2 DIABETES MELLITUS WITHOUT RETINOPATHY: ICD-10-CM

## 2020-09-21 DIAGNOSIS — H02.413 MECHANICAL PTOSIS, BILATERAL: Primary | ICD-10-CM

## 2020-09-21 DIAGNOSIS — Z96.1 BILATERAL PSEUDOPHAKIA: ICD-10-CM

## 2020-09-21 PROCEDURE — 15823 PR REV UPPER EYELID W EXCESS SKIN: ICD-10-PCS | Mod: 50,S$GLB,, | Performed by: OPHTHALMOLOGY

## 2020-09-21 PROCEDURE — 99499 UNLISTED E&M SERVICE: CPT | Mod: S$GLB,,, | Performed by: OPHTHALMOLOGY

## 2020-09-21 PROCEDURE — 99499 NO LOS: ICD-10-PCS | Mod: S$GLB,,, | Performed by: OPHTHALMOLOGY

## 2020-09-21 PROCEDURE — 15823 BLEPHARP UPR EYELID XCSV SKN: CPT | Mod: 50,S$GLB,, | Performed by: OPHTHALMOLOGY

## 2020-09-21 RX ORDER — HYDROCODONE BITARTRATE AND ACETAMINOPHEN 5; 325 MG/1; MG/1
1 TABLET ORAL EVERY 6 HOURS PRN
Qty: 16 TABLET | Refills: 0 | Status: SHIPPED | OUTPATIENT
Start: 2020-09-21 | End: 2020-09-25

## 2020-09-21 RX ORDER — NEOMYCIN SULFATE, POLYMYXIN B SULFATE, AND DEXAMETHASONE 3.5; 10000; 1 MG/G; [USP'U]/G; MG/G
OINTMENT OPHTHALMIC 3 TIMES DAILY
Qty: 2 TUBE | Refills: 2 | Status: SHIPPED | OUTPATIENT
Start: 2020-09-21 | End: 2020-12-01

## 2020-09-21 NOTE — PROGRESS NOTES
HPI     Pt is here today for bilateral Blepharoplasty.    No current eye meds    Last edited by Antony Tavares on 9/21/2020 10:04 AM. (History)            Assessment /Plan     For exam results, see Encounter Report.    Mechanical ptosis, bilateral    Bilateral pseudophakia    Type 2 diabetes mellitus without retinopathy    Other orders  -     neomycin-polymyxin-dexamethasone (MAXITROL) 3.5 mg/g-10,000 unit/g-0.1 % Oint; Place into both eyes 3 (three) times daily. Place onto suture lines of both eyes three times daily  Dispense: 2 Tube; Refill: 2  -     HYDROcodone-acetaminophen (NORCO) 5-325 mg per tablet; Take 1 tablet by mouth every 6 (six) hours as needed for Pain.  Dispense: 16 tablet; Refill: 0        Patient is here for bilateral upper eyelid blepharoplasty.     PROCEDURE NOTE:  Attending: Renetta Mackay M.D.  Resident: Kristy Mccartney M.D.  Procedure: Blepharoplasty OU  Pre-op Dx: Mechanical ptosis OU  Post-op Dx: same  Local: 2% lidocaine with epinephrine with 0.5% marcaine with vitrase   Specimens: None  Complications: None  Blood Loss: minimal     The patient was prepped and draped in the usual sterile manner for ophthalmic plastic surgery. The eyelids were marked with a marking pen, 8 mm centrally from the margin, 6 mm medially and 7 mm laterally, and from the brow 10 mm medially, centrally, and laterally. Corneal shields were placed in each eye. Attention was first placed to the right eyelid. A 4-0 Silk traction suture was placed into the eyelid. A #15 blade was used to incise the marked skin. Then the skin flap was raised using using monopolar cautery, taking care to preserve the underlying orbicularis oculi. Hemostasis was achieved with bovie electrocautery. Sharp Jcarlos scissor were then used to make a small incision in the medial orbicularis, dissecting down into the medial upper fat pad. A small pedicle of the medial fat pad was exposed, and electrocautery was used to excise this pedicle at its base.  A  small adjacent portion of the central fat pad was also cauterized and removed.  After hemostasis was ensured, a 6-0 plain gut on G-1 running suture was used to close the skin of the incision. This procedure was repeated in its entirety on the left upper eyelid. The corneal shields were removed and traction sutures cut and removed. Tobradex ointment was applied to the wounds and onto the eye. The patient tolerated the procedure well. There were no complications.      Post-operative instructions were given to the patient both verbally and written.     Return in one week PRN sooner any worsening.    Post-operative instructions were given to the patient.     I have reviewed and concur with the resident's history, physical, assessment, and plan.  I have personally interviewed and examined the patient.

## 2020-09-29 ENCOUNTER — OFFICE VISIT (OUTPATIENT)
Dept: OPHTHALMOLOGY | Facility: CLINIC | Age: 70
End: 2020-09-29
Payer: MEDICARE

## 2020-09-29 DIAGNOSIS — Z96.1 BILATERAL PSEUDOPHAKIA: ICD-10-CM

## 2020-09-29 DIAGNOSIS — E11.9 TYPE 2 DIABETES MELLITUS WITHOUT RETINOPATHY: ICD-10-CM

## 2020-09-29 DIAGNOSIS — Z98.890 POST-OPERATIVE STATE: Primary | ICD-10-CM

## 2020-09-29 DIAGNOSIS — H02.413 MECHANICAL PTOSIS, BILATERAL: ICD-10-CM

## 2020-09-29 PROCEDURE — 99999 PR PBB SHADOW E&M-EST. PATIENT-LVL III: ICD-10-PCS | Mod: PBBFAC,,, | Performed by: OPHTHALMOLOGY

## 2020-09-29 PROCEDURE — 99024 POSTOP FOLLOW-UP VISIT: CPT | Mod: S$GLB,,, | Performed by: OPHTHALMOLOGY

## 2020-09-29 PROCEDURE — 99024 PR POST-OP FOLLOW-UP VISIT: ICD-10-PCS | Mod: S$GLB,,, | Performed by: OPHTHALMOLOGY

## 2020-09-29 PROCEDURE — 99999 PR PBB SHADOW E&M-EST. PATIENT-LVL III: CPT | Mod: PBBFAC,,, | Performed by: OPHTHALMOLOGY

## 2020-09-29 NOTE — PROGRESS NOTES
HPI     Jolly Matias is a/an 70 y.o. female here for 1 week post-op.  Pt states both itched for a few days with FB sensation.    Date of Procedure: 09/21/20  Procedure: Blepharoplasty  Oral antibiotics: none  Eye meds: Maxitrol marvel TID OU      Last edited by Antony Tavares on 9/29/2020  2:37 PM. (History)            Assessment /Plan     For exam results, see Encounter Report.    Post-operative state  -     External/Slit Lamp Photography    Mechanical ptosis, bilateral    Bilateral pseudophakia    Type 2 diabetes mellitus without retinopathy      Patient doing well! Post-operative instructions reviewed.  All questions answered.  Return in 4-5 weeks prn sooner any worsening of vision/symptoms or any concerns.    I have reviewed and concur with the resident's history, physical, assessment, and plan.  I have personally interviewed and examined the patient.

## 2020-10-05 ENCOUNTER — PATIENT OUTREACH (OUTPATIENT)
Dept: ADMINISTRATIVE | Facility: OTHER | Age: 70
End: 2020-10-05

## 2020-10-05 NOTE — PROGRESS NOTES
LINKS immunization registry not responding  Care Everywhere updated  Health Maintenance updated  Chart reviewed for overdue [facility]active [facility] ([facility]) health maintenance testing (CRS, Breast Ca, Diabetic Eye Exam)   Orders entered:N/A

## 2020-10-06 ENCOUNTER — OFFICE VISIT (OUTPATIENT)
Dept: SURGERY | Facility: CLINIC | Age: 70
End: 2020-10-06
Payer: MEDICARE

## 2020-10-06 VITALS
BODY MASS INDEX: 29.45 KG/M2 | WEIGHT: 156 LBS | DIASTOLIC BLOOD PRESSURE: 83 MMHG | HEIGHT: 61 IN | SYSTOLIC BLOOD PRESSURE: 156 MMHG | HEART RATE: 86 BPM

## 2020-10-06 DIAGNOSIS — Z01.818 PREOP EXAMINATION: ICD-10-CM

## 2020-10-06 DIAGNOSIS — K43.2 RECURRENT INCISIONAL HERNIA: Primary | ICD-10-CM

## 2020-10-06 PROCEDURE — 99999 PR PBB SHADOW E&M-EST. PATIENT-LVL V: ICD-10-PCS | Mod: PBBFAC,,, | Performed by: SURGERY

## 2020-10-06 PROCEDURE — 3079F DIAST BP 80-89 MM HG: CPT | Mod: CPTII,S$GLB,, | Performed by: SURGERY

## 2020-10-06 PROCEDURE — 1101F PT FALLS ASSESS-DOCD LE1/YR: CPT | Mod: CPTII,S$GLB,, | Performed by: SURGERY

## 2020-10-06 PROCEDURE — 99214 OFFICE O/P EST MOD 30 MIN: CPT | Mod: S$GLB,,, | Performed by: SURGERY

## 2020-10-06 PROCEDURE — 3079F PR MOST RECENT DIASTOLIC BLOOD PRESSURE 80-89 MM HG: ICD-10-PCS | Mod: CPTII,S$GLB,, | Performed by: SURGERY

## 2020-10-06 PROCEDURE — 3077F SYST BP >= 140 MM HG: CPT | Mod: CPTII,S$GLB,, | Performed by: SURGERY

## 2020-10-06 PROCEDURE — 1159F MED LIST DOCD IN RCRD: CPT | Mod: S$GLB,,, | Performed by: SURGERY

## 2020-10-06 PROCEDURE — 3008F PR BODY MASS INDEX (BMI) DOCUMENTED: ICD-10-PCS | Mod: CPTII,S$GLB,, | Performed by: SURGERY

## 2020-10-06 PROCEDURE — 3077F PR MOST RECENT SYSTOLIC BLOOD PRESSURE >= 140 MM HG: ICD-10-PCS | Mod: CPTII,S$GLB,, | Performed by: SURGERY

## 2020-10-06 PROCEDURE — 1101F PR PT FALLS ASSESS DOC 0-1 FALLS W/OUT INJ PAST YR: ICD-10-PCS | Mod: CPTII,S$GLB,, | Performed by: SURGERY

## 2020-10-06 PROCEDURE — 3008F BODY MASS INDEX DOCD: CPT | Mod: CPTII,S$GLB,, | Performed by: SURGERY

## 2020-10-06 PROCEDURE — 99214 PR OFFICE/OUTPT VISIT, EST, LEVL IV, 30-39 MIN: ICD-10-PCS | Mod: S$GLB,,, | Performed by: SURGERY

## 2020-10-06 PROCEDURE — 99999 PR PBB SHADOW E&M-EST. PATIENT-LVL V: CPT | Mod: PBBFAC,,, | Performed by: SURGERY

## 2020-10-06 PROCEDURE — 1159F PR MEDICATION LIST DOCUMENTED IN MEDICAL RECORD: ICD-10-PCS | Mod: S$GLB,,, | Performed by: SURGERY

## 2020-10-06 RX ORDER — DIAZEPAM 5 MG/1
TABLET ORAL
COMMUNITY
Start: 2020-09-18 | End: 2020-12-01

## 2020-10-06 NOTE — LETTER
[facility]  [address]  [facility] [address]  Phone: [phone] October 6, 2020      Debbie Venegas,   [address] [facility] [place] [address]    Patient: Jolly Matias   MR Number: [id number]   YOB: 1950   Date of Visit: 10/6/2020     Dear Dr. Venegas:    Thank you for referring Jolly Matias to me for evaluation. Attached you will find relevant portions of my assessment and plan of care.    Ms. Matias is 70-years-old with essential HTN, DM and status post resection for diverticulitis and robotic incisional hernia repair with primary closure and underlay mesh (this was below her navel).  Now with epigastric recurrence higher than prior hernia.  On physical examination it is 2 fb above the navel and for 3 fb.  We will plan for laparoscopic, possible open incisional hernia repair with mesh (underlay without primary closure, possible transfacial sutures, open on the right side).    If you have questions, please do not hesitate to call me. I look forward to following Jolly Matias along with you.    Sincerely,      Deshawn Arias M.D.  Professor, [employer]  Section Head, General Surgery  [facility]    WSR/afdomenico

## 2020-10-06 NOTE — PROGRESS NOTES
I have seen the patient, reviewed the Student's history and physical, assessment and plan. I have personally interviewed and examined the patient at bedside and: agree with the findings.       69 y/o with essential htn, dm and s/p resection for diverticulitis and robotic incisional hernia repair with primary closure and underlay mesh (this was below her navel).  Now with epigastric recurrence higher than prior hernia.  On PE it is 2 fb above the navel and for 3 fb.  For lap, possible open incisional hernia repair with mesh (underlay without primary closure, possible transfacial sutures, open on the right side).

## 2020-10-06 NOTE — PROGRESS NOTES
GENERAL SURGERY CLINIC NOTE    Jolly Matias is a 70 y.o. female with history of atypical ductal hyperplasia, BCC, diverticulitis s/p resection, HTN, HLD, and DM2 who presents to clinic today for evaluation of hernia recurrence. Patient reports that in 2016 she had diverticulitis without perforation and underwent resection. Following this procedure she developed an incisional hernia just inferior to the umbilicus that was repaired robotically by Dr. Haile with mesh placement. At that time, a CT abdo/pelvis noted recurrence of a hernia superior to the umbilicus. She states this hernia has been present for the last two years and has slowly increased in size within the past few months. Denies any abdominal pain, nausea, or vomiting. No changes to her bowel movements. Hernia is most prominent with valsalva.      ROS:   Review of Systems   Constitutional: Negative for fever.   HENT: Negative for sore throat.    Eyes: Negative for blurred vision.   Respiratory: Negative for cough and shortness of breath.    Cardiovascular: Negative for chest pain and palpitations.   Gastrointestinal: Negative for abdominal pain, diarrhea, nausea and vomiting.   Genitourinary: Negative for dysuria, frequency and urgency.   Musculoskeletal: Negative for myalgias.   Skin: Negative for itching and rash.   Neurological: Negative for tingling and headaches.       Past Medical History:   Diagnosis Date    Atypical ductal hyperplasia, breast     Basal cell carcinoma 4/2011    left forehead    Basal cell carcinoma 4/2015    R temporal scalp, R upper forehead, L upper forehead    Basal cell carcinoma 8/2015    right mid ala    BCC (basal cell carcinoma) excised     right shoulder    Diabetes mellitus     Diverticulitis     Fibrocystic breast     HLD (hyperlipidemia)     Hypertension     Prediabetes 10/16/2013    Skin cancer      Past Surgical History:   Procedure Laterality Date    APPENDECTOMY      bilateral breast duct  excision      BREAST BIOPSY Right     Excisional bx, benign    BREAST BIOPSY Left     Excisional bx, benign     SECTION      x2    COLON SURGERY Left 2017    sigmoidectomy for diverticulitis    COLONOSCOPY N/A 2017    Procedure: COLONOSCOPY;  Surgeon: IBRAHIMA Philip MD;  Location: [facility] (4TH FLR);  Service: Endoscopy;  Laterality: N/A;    fulgaration of endometriosis x 2      HYSTERECTOMY      For endometriosis and DUB--age 42, ovaries in?    INCISIONAL HERNIA REPAIR  2017    KNEE ARTHROSCOPY W/ DEBRIDEMENT Right 2018    Procedure: ARTHROSCOPY, KNEE, WITH DEBRIDEMENT;  Surgeon: MARVA Arias MD;  Location: [facility] OR 1ST FLR;  Service: Orthopedics;  Laterality: Right;    KNEE ARTHROSCOPY W/ MENISCECTOMY Right 2018    Procedure: ARTHROSCOPY, KNEE, WITH MENISCECTOMY (partial lateral and medial menisectomy, chondraplasty;  Surgeon: MARVA Arias MD;  Location: [facility] OR 1ST FLR;  Service: Orthopedics;  Laterality: Right;    KNEE ARTHROSCOPY W/ PLICA EXCISION Right 2018    Procedure: EXCISION, PLICA, KNEE, ARTHROSCOPIC,;  Surgeon: MARVA Arias MD;  [facility];  Service: Orthopedics;  Laterality: Right;    KNEE SURGERY      OOPHORECTOMY      SKIN CANCER EXCISION      BCC forehead , temple, shoulder, chest    TONSILLECTOMY       Social History     Socioeconomic History    Marital status:      Spouse name: Not on file    Number of children: Not on file    Years of education: Not on file    Highest education level: Not on file   Occupational History    Not on file   Social Needs    Financial resource strain: Not on file    Food insecurity     Worry: Not on file     Inability: Not on file    Transportation needs     Medical: Not on file     Non-medical: Not on file   Tobacco Use    Smoking status: Never Smoker    Smokeless tobacco: Never Used   Substance and Sexual Activity    Alcohol use: Yes     Alcohol/week: 0.0 standard drinks     " Types: 1 - 2 Glasses of wine per week     Comment: 3-4x /week    Drug use: No    Sexual activity: Yes     Partners: Male     Birth control/protection: Post-menopausal   Lifestyle    Physical activity     Days per week: Not on file     Minutes per session: Not on file    Stress: Not on file   Relationships    Social connections     Talks on phone: Not on file     Gets together: Not on file     Attends Christian service: Not on file     Active member of club or organization: Not on file     Attends meetings of clubs or organizations: Not on file     Relationship status: Not on file   Other Topics Concern    Are you pregnant or think you may be? No    Breast-feeding No   Social History Narrative    , walking some     Review of patient's allergies indicates:   Allergen Reactions    Hydrocodone      Also makes pt "very sleepy"    Kiwi (actinidia chinensis) Swelling         PHYSICAL EXAM:  Vitals:    10/06/20 0829   BP: (!) 156/83   Pulse: 86     Physical Exam  Constitutional:       Appearance: Normal appearance.   Cardiovascular:      Rate and Rhythm: Normal rate and regular rhythm.      Pulses: Normal pulses.      Heart sounds: Normal heart sounds.   Pulmonary:      Effort: Pulmonary effort is normal.      Breath sounds: Normal breath sounds.   Abdominal:      General: Bowel sounds are normal.      Palpations: Abdomen is soft.      Hernia: A hernia (reducible hernia two finger breaths above navel and lasts for 3 finger breaths) is present.   Skin:     General: Skin is warm and dry.      Capillary Refill: Capillary refill takes less than 2 seconds.   Neurological:      General: No focal deficit present.      Mental Status: She is alert and oriented to person, place, and time.           PERTINENT LABS:  Reviewed.   HbA1c = 6.0 on 9/8  CBC on 9/8 showed H/H of 11.4/36.2       PERTINENT IMAGING:  Reviewed.   CT abdo/pelvis on 11/14/18 showed recurrence of hernia.      ASSESSMENT/PLAN:  70 y.o. female with " recurrence of epigastric incisional hernia two finger breaths superior to navel, lasting for three finger breaths    - Plan for laparoscopic repair of hernia with mesh placement, possibly open if many adhesions present          Jhoan Yu  M[employer]  10/6/2020 9:09 AM

## 2020-10-06 NOTE — H&P (VIEW-ONLY)
I have seen the patient, reviewed the Student's history and physical, assessment and plan. I have personally interviewed and examined the patient at bedside and: agree with the findings.       71 y/o with essential htn, dm and s/p resection for diverticulitis and robotic incisional hernia repair with primary closure and underlay mesh (this was below her navel).  Now with epigastric recurrence higher than prior hernia.  On PE it is 2 fb above the navel and for 3 fb.  For lap, possible open incisional hernia repair with mesh (underlay without primary closure, possible transfacial sutures, open on the right side).

## 2020-10-26 ENCOUNTER — TELEPHONE (OUTPATIENT)
Dept: SURGERY | Facility: CLINIC | Age: 70
End: 2020-10-26

## 2020-10-26 ENCOUNTER — LAB VISIT (OUTPATIENT)
Dept: SURGERY | Facility: CLINIC | Age: 70
End: 2020-10-26
Payer: MEDICARE

## 2020-10-26 DIAGNOSIS — Z01.818 PREOP EXAMINATION: ICD-10-CM

## 2020-10-26 LAB — SARS-COV-2 RNA RESP QL NAA+PROBE: NOT DETECTED

## 2020-10-26 PROCEDURE — U0003 INFECTIOUS AGENT DETECTION BY NUCLEIC ACID (DNA OR RNA); SEVERE ACUTE RESPIRATORY SYNDROME CORONAVIRUS 2 (SARS-COV-2) (CORONAVIRUS DISEASE [COVID-19]), AMPLIFIED PROBE TECHNIQUE, MAKING USE OF HIGH THROUGHPUT TECHNOLOGIES AS DESCRIBED BY CMS-2020-01-R: HCPCS

## 2020-10-26 NOTE — TELEPHONE ENCOUNTER
----- Message from Jenni Sanchez sent at 10/26/2020 11:44 AM CDT -----  Regarding: PT  Contact: PT  PT returned a missed call from Lorenzo about her surgery on Wednesday. She called the number that was left but they said she had the wrong number. Please call back     Callback: [phone]

## 2020-10-26 NOTE — TELEPHONE ENCOUNTER
Returned pt phone call and informed her that our office didn't call her, but that we would be calling tomorrow with her arrival time. Pt understands.

## 2020-10-27 ENCOUNTER — ANESTHESIA EVENT (OUTPATIENT)
Dept: SURGERY | Facility: HOSPITAL | Age: 70
End: 2020-10-27
Payer: MEDICARE

## 2020-10-27 ENCOUNTER — TELEPHONE (OUTPATIENT)
Dept: SURGERY | Facility: CLINIC | Age: 70
End: 2020-10-27

## 2020-10-28 ENCOUNTER — TELEPHONE (OUTPATIENT)
Dept: SURGERY | Facility: CLINIC | Age: 70
End: 2020-10-28

## 2020-10-28 ENCOUNTER — ANESTHESIA (OUTPATIENT)
Dept: SURGERY | Facility: HOSPITAL | Age: 70
End: 2020-10-28
Payer: MEDICARE

## 2020-10-29 ENCOUNTER — HOSPITAL ENCOUNTER (OUTPATIENT)
Facility: HOSPITAL | Age: 70
Discharge: HOME | End: 2020-10-29
Attending: SURGERY | Admitting: SURGERY
Payer: MEDICARE

## 2020-10-29 VITALS
BODY MASS INDEX: 29.45 KG/M2 | DIASTOLIC BLOOD PRESSURE: 73 MMHG | RESPIRATION RATE: 21 BRPM | WEIGHT: 156 LBS | TEMPERATURE: 98 F | HEART RATE: 66 BPM | OXYGEN SATURATION: 90 % | HEIGHT: 61 IN | SYSTOLIC BLOOD PRESSURE: 156 MMHG

## 2020-10-29 DIAGNOSIS — K43.9 VENTRAL HERNIA: Primary | ICD-10-CM

## 2020-10-29 DIAGNOSIS — K43.9 VENTRAL HERNIA WITHOUT OBSTRUCTION OR GANGRENE: ICD-10-CM

## 2020-10-29 LAB
POCT GLUCOSE: 107 MG/DL (ref 70–110)
POCT GLUCOSE: 140 MG/DL (ref 70–110)

## 2020-10-29 PROCEDURE — 63600175 PHARM REV CODE 636 W HCPCS: Performed by: STUDENT IN AN ORGANIZED HEALTH CARE EDUCATION/TRAINING PROGRAM

## 2020-10-29 PROCEDURE — 25000003 PHARM REV CODE 250: Performed by: STUDENT IN AN ORGANIZED HEALTH CARE EDUCATION/TRAINING PROGRAM

## 2020-10-29 PROCEDURE — 76942 ECHO GUIDE FOR BIOPSY: CPT | Mod: 26,,, | Performed by: ANESTHESIOLOGY

## 2020-10-29 PROCEDURE — 36000709 HC OR TIME LEV III EA ADD 15 MIN: Performed by: SURGERY

## 2020-10-29 PROCEDURE — 64450: ICD-10-PCS | Mod: 59,50,, | Performed by: ANESTHESIOLOGY

## 2020-10-29 PROCEDURE — 49656 PR LAP, RECURRENT INCISIONAL HERNIA REPAIR,REDUCIBLE: ICD-10-PCS | Mod: ,,, | Performed by: SURGERY

## 2020-10-29 PROCEDURE — 37000009 HC ANESTHESIA EA ADD 15 MINS: Performed by: SURGERY

## 2020-10-29 PROCEDURE — 37000008 HC ANESTHESIA 1ST 15 MINUTES: Performed by: SURGERY

## 2020-10-29 PROCEDURE — C1781 MESH (IMPLANTABLE): HCPCS | Performed by: SURGERY

## 2020-10-29 PROCEDURE — 63600175 PHARM REV CODE 636 W HCPCS: Performed by: SURGERY

## 2020-10-29 PROCEDURE — 82962 GLUCOSE BLOOD TEST: CPT | Performed by: SURGERY

## 2020-10-29 PROCEDURE — D9220A PRA ANESTHESIA: Mod: ,,, | Performed by: ANESTHESIOLOGY

## 2020-10-29 PROCEDURE — 71000044 HC DOSC ROUTINE RECOVERY FIRST HOUR: Performed by: SURGERY

## 2020-10-29 PROCEDURE — 49656 PR LAP, RECURRENT INCISIONAL HERNIA REPAIR,REDUCIBLE: CPT | Mod: ,,, | Performed by: SURGERY

## 2020-10-29 PROCEDURE — 71000016 HC POSTOP RECOV ADDL HR: Performed by: SURGERY

## 2020-10-29 PROCEDURE — 36000708 HC OR TIME LEV III 1ST 15 MIN: Performed by: SURGERY

## 2020-10-29 PROCEDURE — 71000015 HC POSTOP RECOV 1ST HR: Performed by: SURGERY

## 2020-10-29 PROCEDURE — 64450 NJX AA&/STRD OTHER PN/BRANCH: CPT | Mod: 59,50,, | Performed by: ANESTHESIOLOGY

## 2020-10-29 PROCEDURE — 25000003 PHARM REV CODE 250: Performed by: SURGERY

## 2020-10-29 PROCEDURE — D9220A PRA ANESTHESIA: ICD-10-PCS | Mod: ,,, | Performed by: ANESTHESIOLOGY

## 2020-10-29 PROCEDURE — 76942: ICD-10-PCS | Mod: 26,,, | Performed by: ANESTHESIOLOGY

## 2020-10-29 PROCEDURE — 76942 ECHO GUIDE FOR BIOPSY: CPT | Performed by: STUDENT IN AN ORGANIZED HEALTH CARE EDUCATION/TRAINING PROGRAM

## 2020-10-29 PROCEDURE — 27201423 OPTIME MED/SURG SUP & DEVICES STERILE SUPPLY: Performed by: SURGERY

## 2020-10-29 DEVICE — IMPLANTABLE DEVICE: Type: IMPLANTABLE DEVICE | Site: ABDOMEN | Status: FUNCTIONAL

## 2020-10-29 RX ORDER — IBUPROFEN 200 MG
400 TABLET ORAL EVERY 6 HOURS
Status: DISCONTINUED | OUTPATIENT
Start: 2020-10-29 | End: 2020-10-29 | Stop reason: HOSPADM

## 2020-10-29 RX ORDER — ACETAMINOPHEN 10 MG/ML
INJECTION, SOLUTION INTRAVENOUS
Status: DISCONTINUED | OUTPATIENT
Start: 2020-10-29 | End: 2020-10-29

## 2020-10-29 RX ORDER — VANCOMYCIN HYDROCHLORIDE 1 G/20ML
INJECTION, POWDER, LYOPHILIZED, FOR SOLUTION INTRAVENOUS
Status: DISCONTINUED | OUTPATIENT
Start: 2020-10-29 | End: 2020-10-29 | Stop reason: HOSPADM

## 2020-10-29 RX ORDER — LIDOCAINE HYDROCHLORIDE 10 MG/ML
1 INJECTION, SOLUTION EPIDURAL; INFILTRATION; INTRACAUDAL; PERINEURAL ONCE
Status: DISCONTINUED | OUTPATIENT
Start: 2020-10-29 | End: 2023-06-27

## 2020-10-29 RX ORDER — FENTANYL CITRATE 50 UG/ML
25 INJECTION, SOLUTION INTRAMUSCULAR; INTRAVENOUS EVERY 5 MIN PRN
Status: COMPLETED | OUTPATIENT
Start: 2020-10-29 | End: 2020-10-29

## 2020-10-29 RX ORDER — HALOPERIDOL 5 MG/ML
0.5 INJECTION INTRAMUSCULAR ONCE AS NEEDED
Status: DISCONTINUED | OUTPATIENT
Start: 2020-10-29 | End: 2020-10-29 | Stop reason: HOSPADM

## 2020-10-29 RX ORDER — EPHEDRINE SULFATE 50 MG/ML
INJECTION, SOLUTION INTRAVENOUS
Status: DISCONTINUED | OUTPATIENT
Start: 2020-10-29 | End: 2020-10-29

## 2020-10-29 RX ORDER — LIDOCAINE HYDROCHLORIDE 20 MG/ML
INJECTION INTRAVENOUS
Status: DISCONTINUED | OUTPATIENT
Start: 2020-10-29 | End: 2020-10-29

## 2020-10-29 RX ORDER — PROPOFOL 10 MG/ML
VIAL (ML) INTRAVENOUS
Status: DISCONTINUED | OUTPATIENT
Start: 2020-10-29 | End: 2020-10-29

## 2020-10-29 RX ORDER — CEFAZOLIN SODIUM 1 G/3ML
2 INJECTION, POWDER, FOR SOLUTION INTRAMUSCULAR; INTRAVENOUS
Status: COMPLETED | OUTPATIENT
Start: 2020-10-29 | End: 2020-10-29

## 2020-10-29 RX ORDER — ROCURONIUM BROMIDE 10 MG/ML
INJECTION, SOLUTION INTRAVENOUS
Status: DISCONTINUED | OUTPATIENT
Start: 2020-10-29 | End: 2020-10-29

## 2020-10-29 RX ORDER — IBUPROFEN 400 MG/1
400 TABLET ORAL EVERY 6 HOURS PRN
COMMUNITY
Start: 2020-10-29 | End: 2021-03-15

## 2020-10-29 RX ORDER — ONDANSETRON 2 MG/ML
INJECTION INTRAMUSCULAR; INTRAVENOUS
Status: DISCONTINUED | OUTPATIENT
Start: 2020-10-29 | End: 2020-10-29

## 2020-10-29 RX ORDER — POLYETHYLENE GLYCOL 3350 17 G/17G
17 POWDER, FOR SOLUTION ORAL DAILY PRN
Qty: 238 G | Refills: 0 | Status: SHIPPED | OUTPATIENT
Start: 2020-10-29 | End: 2021-03-15

## 2020-10-29 RX ORDER — MIDAZOLAM HYDROCHLORIDE 1 MG/ML
0.5 INJECTION INTRAMUSCULAR; INTRAVENOUS
Status: DISCONTINUED | OUTPATIENT
Start: 2020-10-29 | End: 2023-06-27

## 2020-10-29 RX ORDER — SODIUM CHLORIDE 9 MG/ML
INJECTION, SOLUTION INTRAVENOUS CONTINUOUS PRN
Status: DISCONTINUED | OUTPATIENT
Start: 2020-10-29 | End: 2020-10-29

## 2020-10-29 RX ORDER — OXYCODONE HYDROCHLORIDE 5 MG/1
5 TABLET ORAL EVERY 4 HOURS PRN
Qty: 25 TABLET | Refills: 0 | Status: SHIPPED | OUTPATIENT
Start: 2020-10-29 | End: 2020-12-01

## 2020-10-29 RX ORDER — OXYCODONE HYDROCHLORIDE 5 MG/1
5 TABLET ORAL EVERY 6 HOURS PRN
Status: DISCONTINUED | OUTPATIENT
Start: 2020-10-29 | End: 2020-10-29 | Stop reason: HOSPADM

## 2020-10-29 RX ORDER — DEXAMETHASONE SODIUM PHOSPHATE 4 MG/ML
INJECTION, SOLUTION INTRA-ARTICULAR; INTRALESIONAL; INTRAMUSCULAR; INTRAVENOUS; SOFT TISSUE
Status: DISCONTINUED | OUTPATIENT
Start: 2020-10-29 | End: 2020-10-29

## 2020-10-29 RX ORDER — SODIUM CHLORIDE 0.9 % (FLUSH) 0.9 %
10 SYRINGE (ML) INJECTION
Status: DISCONTINUED | OUTPATIENT
Start: 2020-10-29 | End: 2020-10-29 | Stop reason: HOSPADM

## 2020-10-29 RX ORDER — FENTANYL CITRATE 50 UG/ML
INJECTION, SOLUTION INTRAMUSCULAR; INTRAVENOUS
Status: DISCONTINUED | OUTPATIENT
Start: 2020-10-29 | End: 2020-10-29

## 2020-10-29 RX ORDER — FENTANYL CITRATE 50 UG/ML
25 INJECTION, SOLUTION INTRAMUSCULAR; INTRAVENOUS EVERY 5 MIN PRN
Status: COMPLETED | OUTPATIENT
Start: 2020-10-29 | End: 2021-04-09

## 2020-10-29 RX ORDER — PHENYLEPHRINE HYDROCHLORIDE 10 MG/ML
INJECTION INTRAVENOUS
Status: DISCONTINUED | OUTPATIENT
Start: 2020-10-29 | End: 2020-10-29

## 2020-10-29 RX ORDER — ACETAMINOPHEN 500 MG
500 TABLET ORAL EVERY 6 HOURS
Status: DISCONTINUED | OUTPATIENT
Start: 2020-10-29 | End: 2020-10-29 | Stop reason: HOSPADM

## 2020-10-29 RX ORDER — ACETAMINOPHEN 500 MG
500 TABLET ORAL EVERY 6 HOURS PRN
Refills: 0 | COMMUNITY
Start: 2020-10-29 | End: 2021-03-15

## 2020-10-29 RX ORDER — SODIUM CHLORIDE 9 MG/ML
INJECTION, SOLUTION INTRAVENOUS CONTINUOUS
Status: DISCONTINUED | OUTPATIENT
Start: 2020-10-29 | End: 2023-06-27

## 2020-10-29 RX ORDER — BUPIVACAINE HYDROCHLORIDE 2.5 MG/ML
INJECTION, SOLUTION EPIDURAL; INFILTRATION; INTRACAUDAL
Status: DISCONTINUED | OUTPATIENT
Start: 2020-10-29 | End: 2020-10-29 | Stop reason: HOSPADM

## 2020-10-29 RX ADMIN — ACETAMINOPHEN 1000 MG: 10 INJECTION, SOLUTION INTRAVENOUS at 10:10

## 2020-10-29 RX ADMIN — LIDOCAINE HYDROCHLORIDE 100 MG: 20 INJECTION, SOLUTION INTRAVENOUS at 09:10

## 2020-10-29 RX ADMIN — PROPOFOL 150 MG: 10 INJECTION, EMULSION INTRAVENOUS at 09:10

## 2020-10-29 RX ADMIN — EPHEDRINE SULFATE 10 MG: 50 INJECTION INTRAVENOUS at 10:10

## 2020-10-29 RX ADMIN — PHENYLEPHRINE HYDROCHLORIDE 100 MCG: 10 INJECTION INTRAVENOUS at 09:10

## 2020-10-29 RX ADMIN — FENTANYL CITRATE 50 MCG: 50 INJECTION, SOLUTION INTRAMUSCULAR; INTRAVENOUS at 09:10

## 2020-10-29 RX ADMIN — OXYCODONE 5 MG: 5 TABLET ORAL at 11:10

## 2020-10-29 RX ADMIN — FENTANYL CITRATE 100 MCG: 50 INJECTION INTRAMUSCULAR; INTRAVENOUS at 08:10

## 2020-10-29 RX ADMIN — SODIUM CHLORIDE, SODIUM GLUCONATE, SODIUM ACETATE, POTASSIUM CHLORIDE, MAGNESIUM CHLORIDE, SODIUM PHOSPHATE, DIBASIC, AND POTASSIUM PHOSPHATE: .53; .5; .37; .037; .03; .012; .00082 INJECTION, SOLUTION INTRAVENOUS at 10:10

## 2020-10-29 RX ADMIN — FENTANYL CITRATE 25 MCG: 50 INJECTION, SOLUTION INTRAMUSCULAR; INTRAVENOUS at 12:10

## 2020-10-29 RX ADMIN — ROCURONIUM BROMIDE 10 MG: 10 INJECTION, SOLUTION INTRAVENOUS at 09:10

## 2020-10-29 RX ADMIN — CEFAZOLIN 2 G: 330 INJECTION, POWDER, FOR SOLUTION INTRAMUSCULAR; INTRAVENOUS at 09:10

## 2020-10-29 RX ADMIN — ONDANSETRON 4 MG: 2 INJECTION, SOLUTION INTRAMUSCULAR; INTRAVENOUS at 10:10

## 2020-10-29 RX ADMIN — MIDAZOLAM 2 MG: 1 INJECTION INTRAMUSCULAR; INTRAVENOUS at 09:10

## 2020-10-29 RX ADMIN — SODIUM CHLORIDE: 0.9 INJECTION, SOLUTION INTRAVENOUS at 09:10

## 2020-10-29 RX ADMIN — SODIUM CHLORIDE: 0.9 INJECTION, SOLUTION INTRAVENOUS at 08:10

## 2020-10-29 RX ADMIN — SUGAMMADEX 200 MG: 100 INJECTION, SOLUTION INTRAVENOUS at 10:10

## 2020-10-29 RX ADMIN — DEXAMETHASONE SODIUM PHOSPHATE 4 MG: 4 INJECTION, SOLUTION INTRAMUSCULAR; INTRAVENOUS at 09:10

## 2020-10-29 RX ADMIN — ROCURONIUM BROMIDE 40 MG: 10 INJECTION, SOLUTION INTRAVENOUS at 09:10

## 2020-10-29 NOTE — BRIEF OP NOTE
[facility]  Brief Operative Note    Surgery Date: 10/29/2020     Surgeon(s) and Role:     * Deshawn Arias MD - Primary     * ROXIE Forte MD - Resident - Assisting        Pre-op Diagnosis:  Recurrent incisional hernia [K43.2]    Post-op Diagnosis:  Post-Op Diagnosis Codes:     * Recurrent incisional hernia [K43.2]    Procedure(s) (LRB):  REPAIR, HERNIA, INCISIONAL, LAPAROSCOPIC recurrent, WITH MESH (N/A)    Anesthesia: General    Description of the findings of the procedure(s): laparoscopic repair of ventral hernia defects superior to umbilicus with intra-peritoneal Ventralight ST    Estimated Blood Loss: minimal         Specimens:   Specimen (12h ago, onward)    None            Discharge Note    OUTCOME: Patient tolerated treatment/procedure well without complication and is now ready for discharge.    DISPOSITION: Home or Self Care    FINAL DIAGNOSIS:  Ventral hernia    FOLLOWUP: In clinic    DISCHARGE INSTRUCTIONS:    Discharge Procedure Orders   Notify your health care provider if you experience any of the following:  increased confusion or weakness     Notify your health care provider if you experience any of the following:  persistent dizziness, light-headedness, or visual disturbances     Notify your health care provider if you experience any of the following:  worsening rash     Notify your health care provider if you experience any of the following:  severe persistent headache     Notify your health care provider if you experience any of the following:  difficulty breathing or increased cough     Notify your health care provider if you experience any of the following:  redness, tenderness, or signs of infection (pain, swelling, redness, odor or green/yellow discharge around incision site)     Notify your health care provider if you experience any of the following:  severe uncontrolled pain     Notify your health care provider if you experience any of the following:   persistent nausea and vomiting or diarrhea     Notify your health care provider if you experience any of the following:  temperature >100.4     Activity as tolerated   Order Comments: No lifting greater than weight of a jug of milk for six weeks.     You have dermabond covering your incision. This purple glue will come off on its own in 10-14 days. Do not submerge yourself in water. You may shower, letting water run over the incision. Do not scrub. Pat dry.    ROXIE Forte MD   General Surgery, PGY-3

## 2020-10-29 NOTE — OP NOTE
Surgery Date: 10/29/2020     Surgeon(s) and Role:     * Deshawn Arias MD - Primary     * ROXIE Forte MD - Resident - Assisting    Pre-op Diagnosis:  Recurrent incisional hernia [K43.2]    Post-op Diagnosis:  Recurrent incisional hernia [K43.2]    Procedure(s) (LRB):  REPAIR, HERNIA, INCISIONAL, LAPAROSCOPIC recurrent, WITH MESH (N/A)    Anesthesia: GeneralANESTHESIA: General endotracheal and local.     DESCRIPTION OF PROCEDURE: The patient was taken to the Operating Room, placed under general anesthesia and prepped and draped in sterile fashion.  A gonzalez was not placed. An incision was made in the right after infiltrating with local anesthetic.  Using a 10 mm Optiview trocar under direct visualization, intra-abdominal access was obtained without difficulty and pneumoperitoneum to 15mmHg was obtained. Further ports were placed in the remaining 4 quadrants under direct vision after infiltrating with local anesthetic. Once the ports were placed, we noticed   there were omental adhesions to the anterior abdominal wall at the area of the prior mesh repair. We took these   down sharply where appropriate and bluntly where able to and reduced the hernia. We measured the hernia as 4 cm (three separate and adjacent hernias cephalad to the prior mesh repair).  We used electrocautery to take down the falciform   ligament as necessary for the patch.  We used a 8 by 4 oval ventralex st mesh and placed gortex sutures in the apices and lateral mesh and soaked it in antibiotic solution. This was rolled, placed into the abdominal cavity and using a suture passer pulled the sutures through appropriate areas of the abdominal wall and tied them down.  A Secure Strap Tacker was used to tack the mesh in a double crown technique. Once this was complete, we inspected the mesh and it was in good position.  The abdomen was inspected for hemostasis.  Trocars were removed under direct vision and pneumoperitoneum was released prior  to removing the last port.  Skin incisions were reapproximated with 4-0 plain catgut and reinforced with Mastisol and Steri-Strips and bandaids.  A pressure dressing over the hernia and/or abdominal binder was placed.  The patient was taken to the recovery room in stable condition.    COMPLICATIONS: None.   SPONGE COUNT: Correct.   BLOOD LOSS: Minimal  BLOOD GIVEN: None.   DRAINS: None.   PATHOLOGY: None.

## 2020-10-29 NOTE — TRANSFER OF CARE
"Anesthesia Transfer of Care Note    Patient: Jolly Matias    Procedure(s) Performed: Procedure(s) (LRB):  REPAIR, HERNIA, INCISIONAL, LAPAROSCOPIC recurrent, WITH MESH (N/A)    Patient location: PACU    Anesthesia Type: general    Transport from OR: Transported from OR on 6-10 L/min O2 by face mask with adequate spontaneous ventilation    Post pain: adequate analgesia    Post assessment: no apparent anesthetic complications    Post vital signs: stable    Level of consciousness: lethargic and responds to stimulation    Nausea/Vomiting: no nausea/vomiting    Complications: none    Transfer of care protocol was followed      Last vitals:   Visit Vitals  BP (!) 162/77 (BP Location: Right arm, Patient Position: Lying)   Pulse 74   Temp 36.6 °C (97.8 °F) (Oral)   Resp 17   Ht 5' 1" (1.549 m)   Wt 70.8 kg (156 lb)   LMP 01/01/1991   SpO2 100%   Breastfeeding No   BMI 29.48 kg/m²     "

## 2020-10-29 NOTE — BRIEF OP NOTE
Operative Note       Surgery Date: 10/29/2020     Surgeon(s) and Role:     * Deshawn Arias MD - Primary     * ROXIE Forte MD - Resident - Assisting    Pre-op Diagnosis:  Recurrent incisional hernia [K43.2]    Post-op Diagnosis:  Recurrent incisional hernia [K43.2]    Procedure(s) (LRB):  REPAIR, HERNIA, INCISIONAL, LAPAROSCOPIC recurrent, WITH MESH (N/A)    Anesthesia: General    Procedure in Detail/Findings:  Three hernias near eachother spanning about 4 cm in the epigastrium.  Repair with 8 by 6 inch oval mesh.    Estimated Blood Loss: Minimal           Specimens (From admission, onward)    None        Implants:   Implant Name Type Inv. Item Serial No.  Lot No. LRB No. Used Action   MESH PATCH KENYA VENTRALEX 6X8 - [id number]  MESH PATCH KENYA VENTRALEX 6X8  [employer] [place] [id number] N/A 1 Implanted              Disposition: PACU - hemodynamically stable.           Condition: Good    Attestation:  I was present and scrubbed for the entire procedure.

## 2020-10-29 NOTE — DISCHARGE INSTRUCTIONS
Having Laparoscopic Hernia Repair: MARCELO    A hernia is a bulge at a weak spot in the wall of the abdomen. Tissue or organs may press into the weak spot. This may cause symptoms of discomfort or pain. If left untreated, a hernia can get worse and may lead to serious problems. Surgery can be done to repair a hernia.  What is laparoscopy?  Your hernia operation will be done with a technique called laparoscopy. For this, a thin lighted tube called a laparoscope is used. The scope allows the surgeon to work through a few small incisions. This is instead of the one larger incision that is made for open surgery. Recovery from laparoscopy is often faster and less painful than from open surgery.  What is MARCELO?  MARCELO is one way to do a hernia repair. It stands for transabdominal preperitoneal. The peritoneum is a membrane that covers the organs in the abdomen. During MARCELO, the peritoneum is opened to reach the hernia.  Preparing for your surgery  · Schedule tests as you have been told. These make sure your heart and lungs are healthy for surgery.  · Tell your healthcare provider about all medicine you take. This includes aspirin, NSAIDs, blood thinning medicines (such as warfarin), herbs and other supplements. You may need to stop taking some or all of them before surgery.  · Ask your healthcare provider for help to quit smoking. This will help stop the hernia from being strained by smokers cough. It will also promote good blood flow for healing.  · Avoid heavy lifting. It can strain your hernia and make it worse.  · Follow any directions you are given for taking medicines and for not eating or drinking before surgery.  · Plan to have an adult family member or friend drive you home from the surgery. Arrange for help with chores and errands while you recover.  During the procedure  The surgery takes 1 to 2 hours. You can likely go home the same day. Before the surgery begins, an IV line is put into a vein in your arm or  hand. This line supplies fluids and medicines.   · To keep you free of pain during the surgery, youll be given anesthesia. This may be general anesthesia. This medicine puts you in a state like deep sleep through the procedure. Or, you may be given regional anesthesia. This numbs the abdomen and makes you relaxed and drowsy through the surgery.  · The surgeon makes 2 to 4 small incisions in the abdomen. The scope is put through one of the incisions. The scope sends live pictures to a video screen. This allows the surgeon to see inside the abdomen. Surgical tools are placed through the other small incisions.  · Your abdomen is inflated with carbon dioxide. This gas provides space for the surgeon to see and work to repair the hernia.  · After the repair, a patch of strong mesh is put over the weak spot in your abdominal wall. The patch acts like a patch on a tire. It stays in place permanently.  · The gas is released from your abdomen. Your incisions are then closed with sutures.  · For a small number of people, the laparoscopic procedure may not be able to be performed due to certain factors and the surgeon will have to perform an open procedure. These factors can include obesity, past history of abdominal surgery, bleeding, and the inability to see the organs. The surgeon will make this decision either before or during the procedure.  Risks and possible complications of hernia repair  · Bleeding  · Infection  · Numbness or pain in the groin or leg  · Urinary retention (inability to urinate)  · Bowel or bladder injury  · Hernia comes back or new hernia forms  · Injury to testes  · Problems from mesh  · Risks of anesthesia    Date Last Reviewed: 11/1/2016 © 2000-2017 [employer], [employer]. [address] [place] [address], [place] [address]. All rights reserved. This information is not intended as a substitute for professional medical care. Always follow your healthcare professional's instructions.

## 2020-10-29 NOTE — ANESTHESIA PROCEDURE NOTES
Quadratus Lumborum II single injection    Patient location during procedure: pre-op   Block not for primary anesthetic.  Reason for block: at surgeon's request and post-op pain management   Post-op Pain Location: Abdominal pain  Start time: 10/29/2020 8:51 AM  Timeout: 10/29/2020 8:50 AM   End time: 10/29/2020 9:05 AM    Staffing  Authorizing Provider: Abraham Pak MD  Performing Provider: Chele Hogue MD    Staffing  Other anesthesia staff: Chele Yoon MD  Preanesthetic Checklist  Completed: patient identified, site marked, surgical consent, pre-op evaluation, timeout performed, IV checked, risks and benefits discussed and monitors and equipment checked  Peripheral Block  Patient position: left lateral decubitus (left and right lateral decubitus)  Prep: ChloraPrep  Patient monitoring: heart rate, cardiac monitor, continuous pulse ox, continuous capnometry and frequent blood pressure checks  Block type: Quadratus Lumborum  Laterality: bilateral  Injection technique: single shot  Needle  Needle type: Stimuplex   Needle gauge: 22 G  Needle length: 4 in  Needle localization: ultrasound guidance   -ultrasound image captured on disc.  Assessment  Injection assessment: negative aspiration and negative parasthesia  Paresthesia pain: none  Heart rate change: no  Slow fractionated injection: yes  Additional Notes  30cc 0.375% bupiv + epi + additives injected bilaterally

## 2020-10-29 NOTE — ANESTHESIA PREPROCEDURE EVALUATION
[facility]  Anesthesia Pre-Operative Evaluation         Patient Name: Jolly Matias  YOB: 1950  MRN: [id number]    SUBJECTIVE:     Pre-operative evaluation for Procedure(s) (LRB):  REPAIR, HERNIA, INCISIONAL, LAPAROSCOPIC recurrent, possible OPEN (N/A)     10/29/2020    Jolly Matias is a 70 y.o. female w/ a significant PMHx of HTN, HLD, T2DM, АНДРЕЙ and diverticulitis s/p sigmoidectomy (01/2017) and robotic incisional hernia repair (2017) with primary closure and underlay mesh (this was below her navel). Now with epigastric recurrence higher than prior hernia. On Presents for lap, possible open incisional hernia repair with mesh (underlay without primary closure, possible transfacial sutures, open on the right side).    Patient now presents for the above procedure(s).      LDA: None documented.     Prev airway: Method of Intubation: Delcid, Bougie Intubation; Inserted by: Anesthesia Resident; Airway Device: Endotracheal Tube; Mask Ventilation: Easy; Intubated: Postinduction; Blade: Ez #3; Airway Device Size: 7.0; Style: Cuffed; Cuff Inflation: Minimal occlusive pressure; Inflation Amount: 5; Placement Verified By: Auscultation, Capnometry, Colorimetric EtCO2 device, ETT Condensation; Grade: Grade II; Complicating Factors: Anterior larynx (extremely anterior larynx. short epiglottis ); Intubation Findings: Positive EtCO2, Bilateral breath sounds, Atraumatic/Condition of teeth unchanged;  Depth of Insertion: 22; Securment: Lips; Complications: None; Breath Sounds: Equal Bilateral; Insertion Attempts: 2    Drips:    sodium chloride 0.9%         Patient Active Problem List   Diagnosis    Prediabetes    Chronic bronchitis    Anxiety    Bronchiectasis without complication    Pure hypercholesterolemia    Essential hypertension    Splenic artery aneurysm    Abdominal aortic atherosclerosis    History of basal cell carcinoma    Recurrent incisional hernia    Sleep apnea     "Chondromalacia of right knee    Type 2 diabetes mellitus without retinopathy    Chronic hip pain, bilateral    Ventral hernia       Review of patient's allergies indicates:   Allergen Reactions    Hydrocodone      Also makes pt "very sleepy"    Kiwi (actinidia chinensis) Swelling       Current Inpatient Medications:   lidocaine (PF) 10 mg/ml (1%)  1 mL Intradermal Once       No current facility-administered medications on file prior to encounter.      Current Outpatient Medications on File Prior to Encounter   Medication Sig Dispense Refill    atorvastatin (LIPITOR) 20 MG tablet TAKE 1 TABLET BY MOUTH EVERY DAY 90 tablet 1    cyanocobalamin (VITAMIN B-12) 1000 MCG tablet Take 100 mcg by mouth once daily.      flaxseed oil 1,000 mg Cap Take 1 capsule by mouth once daily.        metFORMIN (GLUCOPHAGE) 500 MG tablet TAKE 1 TABLET(500 MG) BY MOUTH TWICE DAILY WITH MEALS 180 tablet 1    neomycin-polymyxin-dexamethasone (MAXITROL) 3.5 mg/g-10,000 unit/g-0.1 % Oint Place into both eyes 3 (three) times daily. Place onto suture lines of both eyes three times daily 2 Tube 2    olmesartan (BENICAR) 40 MG tablet Take 1 tablet (40 mg total) by mouth once daily. 90 tablet 3    sertraline (ZOLOFT) 100 MG tablet TAKE 1 TABLET(100 MG) BY MOUTH EVERY DAY 90 tablet 0    spironolactone (ALDACTONE) 25 MG tablet TAKE 1 TABLET(25 MG) BY MOUTH EVERY DAY 90 tablet 0    vitamin D 1000 units Tab Take 185 mg by mouth once daily.      azelastine (ASTELIN) 137 mcg (0.1 %) nasal spray 1 spray (137 mcg total) by Nasal route 2 (two) times daily. 30 mL 0    diazePAM (VALIUM) 5 MG tablet BRING TO PROCEDURE ON 9/21/2020      levocetirizine (XYZAL) 5 MG tablet Take 1 tablet (5 mg total) by mouth every evening. 30 tablet 11    nystatin (MYCOSTATIN) powder Apply topically 2 (two) times daily. aaa qd- bid prn flare groin, under arm, under breasts 120 g 2       Past Surgical History:   Procedure Laterality Date    APPENDECTOMY      " bilateral breast duct excision      BREAST BIOPSY Right     Excisional bx, benign    BREAST BIOPSY Left     Excisional bx, benign     SECTION      x2    COLON SURGERY Left 2017    sigmoidectomy for diverticulitis    COLONOSCOPY N/A 2017    Procedure: COLONOSCOPY;  Surgeon: IBRAHIMA Philip MD;  Location: [facility] (4TH FLR);  Service: Endoscopy;  Laterality: N/A;    EYE SURGERY      fulgaration of endometriosis x 2      HYSTERECTOMY      For endometriosis and DUB--age 42, ovaries in?    INCISIONAL HERNIA REPAIR  2017    KNEE ARTHROSCOPY W/ DEBRIDEMENT Right 2018    Procedure: ARTHROSCOPY, KNEE, WITH DEBRIDEMENT;  Surgeon: MARVA Arias MD;  Location: [facility] OR 1ST FLR;  Service: Orthopedics;  Laterality: Right;    KNEE ARTHROSCOPY W/ MENISCECTOMY Right 2018    Procedure: ARTHROSCOPY, KNEE, WITH MENISCECTOMY (partial lateral and medial menisectomy, chondraplasty;  Surgeon: MARVA Arias MD;  Location: [facility] OR 1ST FLR;  Service: Orthopedics;  Laterality: Right;    KNEE ARTHROSCOPY W/ PLICA EXCISION Right 2018    Procedure: EXCISION, PLICA, KNEE, ARTHROSCOPIC,;  Surgeon: MARVA Arias MD;  Location: [facility] OR 1ST FLR;  Service: Orthopedics;  Laterality: Right;    KNEE SURGERY      OOPHORECTOMY      SKIN CANCER EXCISION      BCC forehead , temple, shoulder, chest    TONSILLECTOMY         Social History     Socioeconomic History    Marital status:      Spouse name: Not on file    Number of children: Not on file    Years of education: Not on file    Highest education level: Not on file   Occupational History    Not on file   Social Needs    Financial resource strain: Not on file    Food insecurity     Worry: Not on file     Inability: Not on file    Transportation needs     Medical: Not on file     Non-medical: Not on file   Tobacco Use    Smoking status: Never Smoker    Smokeless tobacco: Never Used   Substance and Sexual Activity    Alcohol use:  Yes     Alcohol/week: 0.0 standard drinks     Types: 1 - 2 Glasses of wine per week     Comment: 3-4x /week    Drug use: No    Sexual activity: Yes     Partners: Male     Birth control/protection: Post-menopausal   Lifestyle    Physical activity     Days per week: Not on file     Minutes per session: Not on file    Stress: Not on file   Relationships    Social connections     Talks on phone: Not on file     Gets together: Not on file     Attends Baptism service: Not on file     Active member of club or organization: Not on file     Attends meetings of clubs or organizations: Not on file     Relationship status: Not on file   Other Topics Concern    Are you pregnant or think you may be? No    Breast-feeding No   Social History Narrative    , walking some       OBJECTIVE:     Vital Signs Range (Last 24H):         Significant Labs:  Lab Results   Component Value Date    WBC 6.46 09/08/2020    HGB 11.4 (L) 09/08/2020    HCT 36.2 (L) 09/08/2020     09/08/2020    CHOL 156 09/06/2019    TRIG 95 09/06/2019    HDL 54 09/06/2019    ALT 12 09/08/2020    AST 16 09/08/2020     09/08/2020    K 4.6 09/08/2020     09/08/2020    CREATININE 1.0 09/08/2020    BUN 22 09/08/2020    CO2 24 09/08/2020    TSH 2.489 09/06/2019    INR 1.0 10/15/2015    HGBA1C 6.0 (H) 09/08/2020       Diagnostic Studies: No relevant studies.    EKG:   Results for orders placed or performed in visit on 03/09/20   IN OFFICE EKG 12-LEAD (to [place])    Collection Time: 03/09/20 11:21 AM    Narrative    Test Reason : Z01.818    Vent. Rate : 074 BPM     Atrial Rate : 074 BPM     P-R Int : 136 ms          QRS Dur : 064 ms      QT Int : 360 ms       P-R-T Axes : 003 046 062 degrees     QTc Int : 399 ms    Normal sinus rhythm  Normal ECG  When compared with ECG of 13-AUG-2019 11:22,  No significant change was found  Confirmed by Aquilino Angel MD ([id number]) on 3/9/2020 3:18:57 PM    Referred By: [facility] IMSA[facility]           Confirmed By:Aquilino Angel  MD       2D ECHO:  TTE:  No results found for this or any previous visit.    ABDIEL:  No results found for this or any previous visit.    ASSESSMENT/PLAN:         Anesthesia Evaluation    I have reviewed the Patient Summary Reports.    I have reviewed the Nursing Notes.    I have reviewed the Medications.     Review of Systems  Anesthesia Hx:  No problems with previous Anesthesia  History of prior surgery of interest to airway management or planning: Previous anesthesia: General Knee arthroscopy 07/06/18 with general anesthesia.  Procedure performed at an [facility]. Airway issues documented on chart review include mask, easy, difficult direct laryngoscopy  Denies Family Hx of Anesthesia complications.   Denies Personal Hx of Anesthesia complications.   Social:  Non-Smoker    Hematology/Oncology:         -- Cancer in past history: Oncology Comments: Basal cell CA     Cardiovascular:   Hypertension PVD hyperlipidemia ECG has been reviewed.    Pulmonary:   Sleep Apnea bronchiectasis   Renal/:  Renal/ Normal     Hepatic/GI:   Diverticulitis s/p sigmoidectomy 01/2017   Musculoskeletal:   Arthritis     Neurological:  Neurology Normal    Endocrine:   Diabetes, well controlled, type 2    Psych:   anxiety          Physical Exam  General:  Well nourished    Airway/Jaw/Neck:  Airway Findings: Mouth Opening: Normal Tongue: Normal  General Airway Assessment: Adult  Mallampati: III  Improves to II with phonation.  TM Distance: Normal, at least 6 cm  Jaw/Neck Findings:  Neck ROM: Normal ROM      Dental:  Dental Findings: In tact   Chest/Lungs:  Chest/Lungs Findings: Normal Respiratory Rate     Heart/Vascular:  Heart Findings: Rate: Normal  Rhythm: Regular Rhythm        Mental Status:  Mental Status Findings:  Alert and Oriented, Cooperative         Anesthesia Plan  Type of Anesthesia, risks & benefits discussed:  Anesthesia Type:  general, regional  Patient's Preference:   Intra-op Monitoring Plan: standard ASA  monitors  Intra-op Monitoring Plan Comments:   Post Op Pain Control Plan: multimodal analgesia and per primary service following discharge from PACU  Post Op Pain Control Plan Comments:   Induction:   IV  Beta Blocker:  Patient is not currently on a Beta-Blocker (No further documentation required).       Informed Consent: Patient understands risks and agrees with Anesthesia plan.  Questions answered. Anesthesia consent signed with patient.  ASA Score: 3     Day of Surgery Review of History & Physical: I have interviewed and examined the patient. I have reviewed the patient's H&P dated:    H&P update referred to the surgeon.         Ready For Surgery From Anesthesia Perspective.

## 2020-10-29 NOTE — INTERVAL H&P NOTE
The patient has been examined and the H&P has been reviewed:    I concur with the findings and no changes have occurred since H&P was written.    Surgery risks, benefits and alternative options discussed and understood by patient/family.          Active Hospital Problems    Diagnosis  POA    Ventral hernia [K43.9]  Yes      Resolved Hospital Problems   No resolved problems to display.

## 2020-10-30 NOTE — ANESTHESIA POSTPROCEDURE EVALUATION
Anesthesia Post Evaluation    Patient: Jolly Matias    Procedure(s) Performed: Procedure(s) (LRB):  REPAIR, HERNIA, INCISIONAL, LAPAROSCOPIC recurrent, WITH MESH (N/A)    Final Anesthesia Type: general    Patient location during evaluation: PACU  Patient participation: Yes- Able to Participate  Level of consciousness: awake  Post-procedure vital signs: reviewed and stable  Pain management: adequate  Airway patency: patent    PONV status at discharge: No PONV  Anesthetic complications: no      Cardiovascular status: blood pressure returned to baseline  Respiratory status: unassisted  Hydration status: euvolemic  Follow-up not needed.          Vitals Value Taken Time   /73 10/29/20 1401   Temp 36.4 °C (97.5 °F) 10/29/20 1105   Pulse 83 10/29/20 1413   Resp 27 10/29/20 1409   SpO2 94 % 10/29/20 1413   Vitals shown include unvalidated device data.      No case tracking events are documented in the log.      Pain/Hyun Score: Pain Rating Prior to Med Admin: 9 (10/29/2020 12:57 PM)  Pain Rating Post Med Admin: 0 (10/29/2020  9:30 AM)  Hyun Score: 10 (10/29/2020  2:15 PM)

## 2020-11-02 ENCOUNTER — TELEPHONE (OUTPATIENT)
Dept: SURGERY | Facility: CLINIC | Age: 70
End: 2020-11-02

## 2020-11-02 NOTE — TELEPHONE ENCOUNTER
----- Message from Antonia Galeana sent at 11/2/2020  3:01 PM CST -----  Regarding: pt advice  Contact: pt @ [phone]  Pt calling to speak with someone in Dr. Arias's office regarding her condition of constipation, since her surgery. Please call.

## 2020-11-02 NOTE — TELEPHONE ENCOUNTER
Returned pt's phone call-she is passing gas, but haivng abdominal cramps from not being able to have a bowel movement.advised pt to take mg citrate and if no bowel movement then we would recommend a fleets enema.  She voices understanding and would call with an update in the morning.

## 2020-11-02 NOTE — ADDENDUM NOTE
Addendum  created 11/02/20 1320 by Abraham Pak MD    Attestation recorded in Intraprocedure, Intraprocedure Attestations filed

## 2020-11-12 ENCOUNTER — OFFICE VISIT (OUTPATIENT)
Dept: SURGERY | Facility: CLINIC | Age: 70
End: 2020-11-12
Payer: MEDICARE

## 2020-11-12 VITALS
BODY MASS INDEX: 29.1 KG/M2 | HEIGHT: 61 IN | HEART RATE: 64 BPM | DIASTOLIC BLOOD PRESSURE: 64 MMHG | SYSTOLIC BLOOD PRESSURE: 137 MMHG | WEIGHT: 154.13 LBS

## 2020-11-12 DIAGNOSIS — Z09 POSTOP CHECK: Primary | ICD-10-CM

## 2020-11-12 PROBLEM — K43.2 RECURRENT INCISIONAL HERNIA: Status: RESOLVED | Noted: 2017-08-22 | Resolved: 2020-11-12

## 2020-11-12 PROBLEM — K43.9 VENTRAL HERNIA: Status: RESOLVED | Noted: 2020-10-29 | Resolved: 2020-11-12

## 2020-11-12 PROCEDURE — 99024 PR POST-OP FOLLOW-UP VISIT: ICD-10-PCS | Mod: S$GLB,,, | Performed by: SURGERY

## 2020-11-12 PROCEDURE — 1125F PR PAIN SEVERITY QUANTIFIED, PAIN PRESENT: ICD-10-PCS | Mod: S$GLB,,, | Performed by: SURGERY

## 2020-11-12 PROCEDURE — 99024 POSTOP FOLLOW-UP VISIT: CPT | Mod: S$GLB,,, | Performed by: SURGERY

## 2020-11-12 PROCEDURE — 99999 PR PBB SHADOW E&M-EST. PATIENT-LVL III: ICD-10-PCS | Mod: PBBFAC,,, | Performed by: SURGERY

## 2020-11-12 PROCEDURE — 3008F BODY MASS INDEX DOCD: CPT | Mod: CPTII,S$GLB,, | Performed by: SURGERY

## 2020-11-12 PROCEDURE — 3008F PR BODY MASS INDEX (BMI) DOCUMENTED: ICD-10-PCS | Mod: CPTII,S$GLB,, | Performed by: SURGERY

## 2020-11-12 PROCEDURE — 1157F PR ADVANCE CARE PLAN OR EQUIV PRESENT IN MEDICAL RECORD: ICD-10-PCS | Mod: S$GLB,,, | Performed by: SURGERY

## 2020-11-12 PROCEDURE — 1125F AMNT PAIN NOTED PAIN PRSNT: CPT | Mod: S$GLB,,, | Performed by: SURGERY

## 2020-11-12 PROCEDURE — 1157F ADVNC CARE PLAN IN RCRD: CPT | Mod: S$GLB,,, | Performed by: SURGERY

## 2020-11-12 PROCEDURE — 99999 PR PBB SHADOW E&M-EST. PATIENT-LVL III: CPT | Mod: PBBFAC,,, | Performed by: SURGERY

## 2020-11-12 NOTE — PROGRESS NOTES
"Jolly Matias is a 70 y.o. female patient.   1. Postop check      Past Medical History:   Diagnosis Date    Atypical ductal hyperplasia, breast     Basal cell carcinoma 4/2011    left forehead    Basal cell carcinoma 4/2015    R temporal scalp, R upper forehead, L upper forehead    Basal cell carcinoma 8/2015    right mid ala    BCC (basal cell carcinoma) excised     right shoulder    Diabetes mellitus     Diverticulitis     Fibrocystic breast     HLD (hyperlipidemia)     Hypertension     Prediabetes 10/16/2013    Skin cancer      No past surgical history pertinent negatives on file.  Scheduled Meds:  Continuous Infusions:  PRN Meds:    Review of patient's allergies indicates:   Allergen Reactions    Hydrocodone      Also makes pt "very sleepy"    Kiwi (actinidia chinensis) Swelling     There are no hospital problems to display for this patient.    Blood pressure 137/64, pulse 64, height 5' 1" (1.549 m), weight 69.9 kg (154 lb 1.6 oz), last menstrual period 01/01/1991.    Subjective S/p lap ventral hernia 10/29/20.  She has pain when her pants rub against the incisions (3-4/10).  This is improving.  She has a poor appetite, has normal bowel habits and denies fevers.  She denies any nausea now or vomiting.  Objective Abdomen benign, wounds clear.   Assessment & Plan Doing well after surgery.  Light duty until Dec 7.  Rtc one month.       Deshawn Arias MD  11/12/2020    "

## 2020-11-17 DIAGNOSIS — R73.03 PREDIABETES: ICD-10-CM

## 2020-11-17 RX ORDER — METFORMIN HYDROCHLORIDE 500 MG/1
500 TABLET ORAL 2 TIMES DAILY WITH MEALS
Qty: 180 TABLET | Refills: 1 | Status: SHIPPED | OUTPATIENT
Start: 2020-11-17 | End: 2021-06-01 | Stop reason: SDUPTHER

## 2020-11-18 ENCOUNTER — TELEPHONE (OUTPATIENT)
Dept: INTERNAL MEDICINE | Facility: CLINIC | Age: 70
End: 2020-11-18

## 2020-11-18 NOTE — TELEPHONE ENCOUNTER
----- Message from Ladi Dewey sent at 11/18/2020  1:05 PM CST -----  Regarding: Pt self Mobile [phone]  Caller is requesting an earlier appt than we can schedule.  Caller declined first available appointment listed below. Caller will not accept being placed on the wait list and is requesting a message be sent to the provider.  When is the next available appointment:  05/04/2021  Reason for the appointment:  Annual Physical   Patient preference of timeframe to be scheduled:  As soon as possible

## 2020-11-24 ENCOUNTER — LAB VISIT (OUTPATIENT)
Dept: LAB | Facility: HOSPITAL | Age: 70
End: 2020-11-24
Attending: INTERNAL MEDICINE
Payer: MEDICARE

## 2020-11-24 DIAGNOSIS — I10 ESSENTIAL HYPERTENSION: ICD-10-CM

## 2020-11-24 DIAGNOSIS — E78.00 PURE HYPERCHOLESTEROLEMIA: ICD-10-CM

## 2020-11-24 DIAGNOSIS — E11.9 TYPE 2 DIABETES MELLITUS WITHOUT COMPLICATION, UNSPECIFIED WHETHER LONG TERM INSULIN USE: ICD-10-CM

## 2020-11-24 LAB
ALBUMIN SERPL BCP-MCNC: 3.9 G/DL (ref 3.5–5.2)
ALP SERPL-CCNC: 106 U/L (ref 55–135)
ALT SERPL W/O P-5'-P-CCNC: 7 U/L (ref 10–44)
ANION GAP SERPL CALC-SCNC: 8 MMOL/L (ref 8–16)
AST SERPL-CCNC: 12 U/L (ref 10–40)
BASOPHILS # BLD AUTO: 0.09 K/UL (ref 0–0.2)
BASOPHILS NFR BLD: 1.4 % (ref 0–1.9)
BILIRUB SERPL-MCNC: 0.2 MG/DL (ref 0.1–1)
BUN SERPL-MCNC: 28 MG/DL (ref 8–23)
CALCIUM SERPL-MCNC: 9.4 MG/DL (ref 8.7–10.5)
CHLORIDE SERPL-SCNC: 109 MMOL/L (ref 95–110)
CHOLEST SERPL-MCNC: 148 MG/DL (ref 120–199)
CHOLEST/HDLC SERPL: 4.1 {RATIO} (ref 2–5)
CO2 SERPL-SCNC: 25 MMOL/L (ref 23–29)
CREAT SERPL-MCNC: 1.2 MG/DL (ref 0.5–1.4)
DIFFERENTIAL METHOD: ABNORMAL
EOSINOPHIL # BLD AUTO: 0.6 K/UL (ref 0–0.5)
EOSINOPHIL NFR BLD: 9.5 % (ref 0–8)
ERYTHROCYTE [DISTWIDTH] IN BLOOD BY AUTOMATED COUNT: 12.7 % (ref 11.5–14.5)
EST. GFR  (AFRICAN AMERICAN): 52.5 ML/MIN/1.73 M^2
EST. GFR  (NON AFRICAN AMERICAN): 45.6 ML/MIN/1.73 M^2
ESTIMATED AVG GLUCOSE: 131 MG/DL (ref 68–131)
GLUCOSE SERPL-MCNC: 121 MG/DL (ref 70–110)
HBA1C MFR BLD HPLC: 6.2 % (ref 4–5.6)
HCT VFR BLD AUTO: 35.7 % (ref 37–48.5)
HDLC SERPL-MCNC: 36 MG/DL (ref 40–75)
HDLC SERPL: 24.3 % (ref 20–50)
HGB BLD-MCNC: 11.3 G/DL (ref 12–16)
IMM GRANULOCYTES # BLD AUTO: 0.02 K/UL (ref 0–0.04)
IMM GRANULOCYTES NFR BLD AUTO: 0.3 % (ref 0–0.5)
LDLC SERPL CALC-MCNC: 82.2 MG/DL (ref 63–159)
LYMPHOCYTES # BLD AUTO: 1.1 K/UL (ref 1–4.8)
LYMPHOCYTES NFR BLD: 17.8 % (ref 18–48)
MCH RBC QN AUTO: 30.1 PG (ref 27–31)
MCHC RBC AUTO-ENTMCNC: 31.7 G/DL (ref 32–36)
MCV RBC AUTO: 95 FL (ref 82–98)
MONOCYTES # BLD AUTO: 0.6 K/UL (ref 0.3–1)
MONOCYTES NFR BLD: 9.9 % (ref 4–15)
NEUTROPHILS # BLD AUTO: 3.9 K/UL (ref 1.8–7.7)
NEUTROPHILS NFR BLD: 61.1 % (ref 38–73)
NONHDLC SERPL-MCNC: 112 MG/DL
NRBC BLD-RTO: 0 /100 WBC
PLATELET # BLD AUTO: 340 K/UL (ref 150–350)
PMV BLD AUTO: 10.4 FL (ref 9.2–12.9)
POTASSIUM SERPL-SCNC: 4.6 MMOL/L (ref 3.5–5.1)
PROT SERPL-MCNC: 6.9 G/DL (ref 6–8.4)
RBC # BLD AUTO: 3.76 M/UL (ref 4–5.4)
SODIUM SERPL-SCNC: 142 MMOL/L (ref 136–145)
TRIGL SERPL-MCNC: 149 MG/DL (ref 30–150)
TSH SERPL DL<=0.005 MIU/L-ACNC: 0.92 UIU/ML (ref 0.4–4)
WBC # BLD AUTO: 6.34 K/UL (ref 3.9–12.7)

## 2020-11-24 PROCEDURE — 84443 ASSAY THYROID STIM HORMONE: CPT

## 2020-11-24 PROCEDURE — 36415 COLL VENOUS BLD VENIPUNCTURE: CPT | Mod: PO

## 2020-11-24 PROCEDURE — 80053 COMPREHEN METABOLIC PANEL: CPT

## 2020-11-24 PROCEDURE — 85025 COMPLETE CBC W/AUTO DIFF WBC: CPT

## 2020-11-24 PROCEDURE — 80061 LIPID PANEL: CPT

## 2020-11-24 PROCEDURE — 83036 HEMOGLOBIN GLYCOSYLATED A1C: CPT

## 2020-12-01 ENCOUNTER — OFFICE VISIT (OUTPATIENT)
Dept: INTERNAL MEDICINE | Facility: CLINIC | Age: 70
End: 2020-12-01
Payer: MEDICARE

## 2020-12-01 VITALS
WEIGHT: 154.13 LBS | HEART RATE: 76 BPM | BODY MASS INDEX: 29.1 KG/M2 | HEIGHT: 61 IN | SYSTOLIC BLOOD PRESSURE: 128 MMHG | TEMPERATURE: 98 F | DIASTOLIC BLOOD PRESSURE: 72 MMHG

## 2020-12-01 DIAGNOSIS — J30.1 ALLERGIC RHINITIS DUE TO POLLEN, UNSPECIFIED SEASONALITY: ICD-10-CM

## 2020-12-01 DIAGNOSIS — J06.9 URTI (ACUTE UPPER RESPIRATORY INFECTION): ICD-10-CM

## 2020-12-01 DIAGNOSIS — F41.9 ANXIETY: Chronic | ICD-10-CM

## 2020-12-01 DIAGNOSIS — Z12.39 ENCOUNTER FOR SCREENING FOR MALIGNANT NEOPLASM OF BREAST, UNSPECIFIED SCREENING MODALITY: ICD-10-CM

## 2020-12-01 DIAGNOSIS — E11.9 TYPE 2 DIABETES MELLITUS WITHOUT RETINOPATHY: ICD-10-CM

## 2020-12-01 DIAGNOSIS — I10 ESSENTIAL HYPERTENSION: ICD-10-CM

## 2020-12-01 DIAGNOSIS — J47.9 BRONCHIECTASIS WITHOUT COMPLICATION: ICD-10-CM

## 2020-12-01 DIAGNOSIS — Z12.31 ENCOUNTER FOR SCREENING MAMMOGRAM FOR MALIGNANT NEOPLASM OF BREAST: ICD-10-CM

## 2020-12-01 DIAGNOSIS — Z00.00 ANNUAL PHYSICAL EXAM: Primary | ICD-10-CM

## 2020-12-01 DIAGNOSIS — E78.00 PURE HYPERCHOLESTEROLEMIA: ICD-10-CM

## 2020-12-01 PROCEDURE — 3288F PR FALLS RISK ASSESSMENT DOCUMENTED: ICD-10-PCS | Mod: CPTII,S$GLB,, | Performed by: NURSE PRACTITIONER

## 2020-12-01 PROCEDURE — 3008F PR BODY MASS INDEX (BMI) DOCUMENTED: ICD-10-PCS | Mod: CPTII,S$GLB,, | Performed by: NURSE PRACTITIONER

## 2020-12-01 PROCEDURE — 1101F PR PT FALLS ASSESS DOC 0-1 FALLS W/OUT INJ PAST YR: ICD-10-PCS | Mod: CPTII,S$GLB,, | Performed by: NURSE PRACTITIONER

## 2020-12-01 PROCEDURE — 99214 OFFICE O/P EST MOD 30 MIN: CPT | Mod: S$GLB,,, | Performed by: NURSE PRACTITIONER

## 2020-12-01 PROCEDURE — 3288F FALL RISK ASSESSMENT DOCD: CPT | Mod: CPTII,S$GLB,, | Performed by: NURSE PRACTITIONER

## 2020-12-01 PROCEDURE — 1126F AMNT PAIN NOTED NONE PRSNT: CPT | Mod: S$GLB,,, | Performed by: NURSE PRACTITIONER

## 2020-12-01 PROCEDURE — 99214 PR OFFICE/OUTPT VISIT, EST, LEVL IV, 30-39 MIN: ICD-10-PCS | Mod: S$GLB,,, | Performed by: NURSE PRACTITIONER

## 2020-12-01 PROCEDURE — 1126F PR PAIN SEVERITY QUANTIFIED, NO PAIN PRESENT: ICD-10-PCS | Mod: S$GLB,,, | Performed by: NURSE PRACTITIONER

## 2020-12-01 PROCEDURE — 99999 PR PBB SHADOW E&M-EST. PATIENT-LVL III: ICD-10-PCS | Mod: PBBFAC,,, | Performed by: NURSE PRACTITIONER

## 2020-12-01 PROCEDURE — 1157F ADVNC CARE PLAN IN RCRD: CPT | Mod: S$GLB,,, | Performed by: NURSE PRACTITIONER

## 2020-12-01 PROCEDURE — 3008F BODY MASS INDEX DOCD: CPT | Mod: CPTII,S$GLB,, | Performed by: NURSE PRACTITIONER

## 2020-12-01 PROCEDURE — 1101F PT FALLS ASSESS-DOCD LE1/YR: CPT | Mod: CPTII,S$GLB,, | Performed by: NURSE PRACTITIONER

## 2020-12-01 PROCEDURE — 1157F PR ADVANCE CARE PLAN OR EQUIV PRESENT IN MEDICAL RECORD: ICD-10-PCS | Mod: S$GLB,,, | Performed by: NURSE PRACTITIONER

## 2020-12-01 PROCEDURE — 99999 PR PBB SHADOW E&M-EST. PATIENT-LVL III: CPT | Mod: PBBFAC,,, | Performed by: NURSE PRACTITIONER

## 2020-12-01 RX ORDER — AZITHROMYCIN 250 MG/1
TABLET, FILM COATED ORAL
Qty: 6 TABLET | Refills: 0 | Status: SHIPPED | OUTPATIENT
Start: 2020-12-01 | End: 2020-12-06

## 2020-12-01 RX ORDER — BENZONATATE 200 MG/1
200 CAPSULE ORAL 3 TIMES DAILY PRN
Qty: 30 CAPSULE | Refills: 0 | Status: SHIPPED | OUTPATIENT
Start: 2020-12-01 | End: 2020-12-11

## 2020-12-01 RX ORDER — LEVOCETIRIZINE DIHYDROCHLORIDE 5 MG/1
5 TABLET, FILM COATED ORAL NIGHTLY
Qty: 30 TABLET | Refills: 11 | Status: SHIPPED | OUTPATIENT
Start: 2020-12-01 | End: 2021-03-15

## 2020-12-01 NOTE — PROGRESS NOTES
[facility] Note    Chief Complaint      Chief Complaint   Patient presents with    Annual Exam     History of Present Illness      Jolly Matias is a 70 y.o. female patient of Dr. Bedoya who presents today for annual visit exam.  Patient feeling well no complaints, denies shortness of breath or chest pain, reviewed meds and history of patient.    Labs- reviewed  Eye exam-utd  Vaccine- utd, [facility]  Mammogram- will order  Gyn- defer      Problem List Items Addressed This Visit        Psychiatric    Anxiety (Chronic)       Pulmonary    Bronchiectasis without complication    Overview     No cough for last year 1/2018            Cardiac/Vascular    Pure hypercholesterolemia    Essential hypertension       Endocrine    Type 2 diabetes mellitus without retinopathy      Other Visit Diagnoses     Annual physical exam    -  Primary    URTI (acute upper respiratory infection)        Relevant Medications    levocetirizine (XYZAL) 5 MG tablet    azithromycin (Z-PAULINE) 250 MG tablet    benzonatate (TESSALON) 200 MG capsule    Allergic rhinitis due to pollen, unspecified seasonality        Relevant Medications    levocetirizine (XYZAL) 5 MG tablet    Encounter for screening for malignant neoplasm of breast, unspecified screening modality        Relevant Orders    Mammo Digital Screening Bilat w/ Mango (Completed)    Encounter for screening mammogram for malignant neoplasm of breast         Relevant Orders    Mammo Digital Screening Bilat w/ [facility]ango (Completed)          Health Maintenance   Topic Date Due    Foot Exam  02/06/2020    DEXA SCAN  11/05/2020    Hemoglobin A1c  05/24/2021    Eye Exam  06/08/2021    Lipid Panel  11/24/2021    Mammogram  12/02/2022    TETANUS VACCINE  11/30/2025    Hepatitis C Screening  Completed    Pneumococcal Vaccine (65+ High/Highest Risk)  Completed       Past Medical History:   Diagnosis Date    Atypical ductal hyperplasia, breast     Basal cell carcinoma 4/2011    left  forehead    Basal cell carcinoma 2015    R temporal scalp, R upper forehead, L upper forehead    Basal cell carcinoma 2015    right mid ala    BCC (basal cell carcinoma) excised     right shoulder    Diabetes mellitus     Diverticulitis     Fibrocystic breast     HLD (hyperlipidemia)     Hypertension     Prediabetes 10/16/2013    Skin cancer        Past Surgical History:   Procedure Laterality Date    APPENDECTOMY      bilateral breast duct excision      BREAST BIOPSY Right     Excisional bx, benign    BREAST BIOPSY Left     Excisional bx, benign     SECTION      x2    COLON SURGERY Left 2017    sigmoidectomy for diverticulitis    COLONOSCOPY N/A 2017    Procedure: COLONOSCOPY;  Surgeon: IBRAHIMA Philip MD;  Location: [facility] (4TH FLR);  Service: Endoscopy;  Laterality: N/A;    EYE SURGERY      fulgaration of endometriosis x 2      HYSTERECTOMY      For endometriosis and DUB--age 42, ovaries in?    INCISIONAL HERNIA REPAIR  2017    KNEE ARTHROSCOPY W/ DEBRIDEMENT Right 2018    Procedure: ARTHROSCOPY, KNEE, WITH DEBRIDEMENT;  Surgeon: MARVA Arias MD;  Location: [facility] OR 1ST FLR;  Service: Orthopedics;  Laterality: Right;    KNEE ARTHROSCOPY W/ MENISCECTOMY Right 2018    Procedure: ARTHROSCOPY, KNEE, WITH MENISCECTOMY (partial lateral and medial menisectomy, chondraplasty;  Surgeon: MARVA Arias MD;  Location: [facility] OR 1ST FLR;  Service: Orthopedics;  Laterality: Right;    KNEE ARTHROSCOPY W/ PLICA EXCISION Right 2018    Procedure: EXCISION, PLICA, KNEE, ARTHROSCOPIC,;  Surgeon: MARVA Arias MD;  Location: [facility] OR 1ST FLR;  Service: Orthopedics;  Laterality: Right;    KNEE SURGERY      LAPAROSCOPIC REPAIR OF INCISIONAL HERNIA N/A 10/29/2020    Procedure: REPAIR, HERNIA, INCISIONAL, LAPAROSCOPIC recurrent, WITH MESH;  Surgeon: Deshawn Arias MD;  Location: [facility] OR 2ND FLR;  Service: General;  Laterality: N/A;    OOPHORECTOMY       SKIN CANCER EXCISION      BCC forehead , temple, shoulder, chest    TONSILLECTOMY         family history includes Anxiety disorder in her daughter; Breast cancer in her maternal aunt; Cancer in her father; Dementia in her mother; Diabetes in her father and maternal uncle; Heart disease in her father; Hyperlipidemia in her sister and son; Hypertension in her father, mother, sister, and son; Melanoma in her father; Skin cancer in her father and mother; Thyroid disease in her mother.    Social History     Tobacco Use    Smoking status: Never Smoker    Smokeless tobacco: Never Used   Substance Use Topics    Alcohol use: Yes     Alcohol/week: 0.0 standard drinks     Types: 1 - 2 Glasses of wine per week     Comment: 3-4x /week    Drug use: No       Review of Systems   Constitutional: Negative for chills and fever.   HENT: Positive for congestion, sinus pain and sore throat.    Eyes: Negative for blurred vision.   Respiratory: Negative for cough, shortness of breath and wheezing.    Cardiovascular: Negative for chest pain, palpitations and leg swelling.   Gastrointestinal: Negative for abdominal pain, constipation, diarrhea, nausea and vomiting.   Genitourinary: Negative for dysuria.   Musculoskeletal: Negative for myalgias.   Skin: Negative for rash.   Neurological: Negative for dizziness, weakness and headaches.   Psychiatric/Behavioral: Negative for depression. The patient is not nervous/anxious.         Outpatient Encounter Medications as of 12/1/2020   Medication Sig Dispense Refill    acetaminophen (TYLENOL) 500 MG tablet Take 1 tablet (500 mg total) by mouth every 6 (six) hours as needed for Pain (alternate with ibuprofen).  0    atorvastatin (LIPITOR) 20 MG tablet TAKE 1 TABLET BY MOUTH EVERY DAY 90 tablet 1    azelastine (ASTELIN) 137 mcg (0.1 %) nasal spray 1 spray (137 mcg total) by Nasal route 2 (two) times daily. 30 mL 0    cyanocobalamin (VITAMIN B-12) 1000 MCG tablet Take 100 mcg by mouth once  daily.      flaxseed oil 1,000 mg Cap Take 1 capsule by mouth once daily.        ibuprofen (ADVIL,MOTRIN) 400 MG tablet Take 1 tablet (400 mg total) by mouth every 6 (six) hours as needed for Other (pain). Alternate with tylenol      levocetirizine (XYZAL) 5 MG tablet Take 1 tablet (5 mg total) by mouth every evening. 30 tablet 11    metFORMIN (GLUCOPHAGE) 500 MG tablet Take 1 tablet (500 mg total) by mouth 2 (two) times daily with meals. 180 tablet 1    nystatin (MYCOSTATIN) powder Apply topically 2 (two) times daily. aaa qd- bid prn flare groin, under arm, under breasts 120 g 2    olmesartan (BENICAR) 40 MG tablet Take 1 tablet (40 mg total) by mouth once daily. 90 tablet 3    polyethylene glycol (GLYCOLAX) 17 gram/dose powder Take 17 g by mouth daily as needed. Mix in 8 oz of clear liquid 238 g 0    spironolactone (ALDACTONE) 25 MG tablet TAKE 1 TABLET(25 MG) BY MOUTH EVERY DAY 90 tablet 3    vitamin D 1000 units Tab Take 185 mg by mouth once daily.      [DISCONTINUED] levocetirizine (XYZAL) 5 MG tablet Take 1 tablet (5 mg total) by mouth every evening. 30 tablet 11    [DISCONTINUED] sertraline (ZOLOFT) 100 MG tablet TAKE 1 TABLET(100 MG) BY MOUTH EVERY DAY 90 tablet 0    azithromycin (Z-PAULINE) 250 MG tablet Take 2 tablets (500 mg total) by mouth once daily for 1 day, THEN 1 tablet (250 mg total) once daily for 4 days. 6 tablet 0    benzonatate (TESSALON) 200 MG capsule Take 1 capsule (200 mg total) by mouth 3 (three) times daily as needed. 30 capsule 0    [DISCONTINUED] diazePAM (VALIUM) 5 MG tablet BRING TO PROCEDURE ON 9/21/2020      [DISCONTINUED] neomycin-polymyxin-dexamethasone (MAXITROL) 3.5 mg/g-10,000 unit/g-0.1 % Oint Place into both eyes 3 (three) times daily. Place onto suture lines of both eyes three times daily (Patient not taking: Reported on 11/12/2020) 2 Tube 2    [DISCONTINUED] oxyCODONE (ROXICODONE) 5 MG immediate release tablet Take 1 tablet (5 mg total) by mouth every 4 (four)  "hours as needed for Pain (if pain is not relieved by alternating tylenol and ibuprofen). 25 tablet 0     Facility-Administered Encounter Medications as of 12/1/2020   Medication Dose Route Frequency Provider Last Rate Last Dose    0.9%  NaCl infusion   Intravenous Continuous Deshawn Arias MD 70 mL/hr at 10/29/20 0824      fentaNYL injection 25 mcg  25 mcg Intravenous Q5 Min PRN Jhoan Kenny MD   100 mcg at 10/29/20 0850    lidocaine (PF) 10 mg/ml (1%) injection 10 mg  1 mL Intradermal Once Deshawn Arias MD        midazolam (VERSED) 1 mg/mL injection 0.5 mg  0.5 mg Intravenous PRN Jhoan Kenny MD   2 mg at 10/29/20 0900        Review of patient's allergies indicates:   Allergen Reactions    Hydrocodone      Also makes pt "very sleepy"    Kiwi (actinidia chinensis) Swelling       Physical Exam      Vital Signs  Temp: 97.7 °F (36.5 °C)  Temp src: Temporal  Pulse: 76  BP: 128/72  BP Location: Right leg  Patient Position: Sitting  Pain Score: 0-No pain  Height and Weight  Height: 5' 1" (154.9 cm)  Weight: 69.9 kg (154 lb 1.6 oz)  BSA (Calculated - sq m): 1.73 sq meters  BMI (Calculated): 29.1  Weight in (lb) to have BMI = 25: 132]    Physical Exam  Vitals signs and nursing note reviewed.   Constitutional:       Appearance: Normal appearance. She is well-developed.   HENT:      Head: Normocephalic and atraumatic.      Right Ear: External ear normal.      Left Ear: External ear normal.   Eyes:      Conjunctiva/sclera: Conjunctivae normal.      Pupils: Pupils are equal, round, and reactive to light.   Neck:      Musculoskeletal: Normal range of motion and neck supple.      Thyroid: No thyromegaly.      Vascular: No JVD.      Trachea: No tracheal deviation.   Cardiovascular:      Rate and Rhythm: Normal rate and regular rhythm.      Heart sounds: Normal heart sounds. No murmur.   Pulmonary:      Effort: Pulmonary effort is normal.      Breath sounds: Normal breath sounds.   Chest:      " Chest wall: No tenderness.   Abdominal:      General: Bowel sounds are normal.      Palpations: Abdomen is soft.      Tenderness: There is no abdominal tenderness. There is no guarding.   Musculoskeletal: Normal range of motion.   Lymphadenopathy:      Cervical: No cervical adenopathy.   Skin:     General: Skin is warm and dry.   Neurological:      Mental Status: She is alert and oriented to person, place, and time.   Psychiatric:         Behavior: Behavior normal.         Thought Content: Thought content normal.         Judgment: Judgment normal.          Laboratory:  CBC:  Recent Labs   Lab Result Units 09/08/20  1006 11/24/20  0743   WBC K/uL 6.46 6.34   RBC M/uL 3.77* 3.76*   Hemoglobin g/dL 11.4* 11.3*   Hematocrit % 36.2* 35.7*   Platelets K/uL 283 340   MCV fL 96 95   MCH pg 30.2 30.1   MCHC g/dL 31.5* 31.7*     CMP:  Recent Labs   Lab Result Units 09/08/20  1006 11/24/20  0743   Glucose mg/dL 105 121*   Calcium mg/dL 9.7 9.4   Albumin g/dL 4.1 3.9   Total Protein g/dL 7.0 6.9   Sodium mmol/L 141 142   Potassium mmol/L 4.6 4.6   CO2 mmol/L 24 25   Chloride mmol/L 108 109   BUN mg/dL 22 28*   Alkaline Phosphatase U/L 105 106   ALT U/L 12 7*   AST U/L 16 12   Total Bilirubin mg/dL 0.4 0.2     URINALYSIS:  Recent Labs   Lab Result Units 11/24/20  0731   Color, UA  Yellow   Specific Gravity, UA  1.015   pH, UA  5.0   Protein, UA  Negative   Bacteria /hpf Occasional   Nitrite, UA  Negative   Leukocytes, UA  3+*   Hyaline Casts, UA /lpf 9*      LIPIDS:  Recent Labs   Lab Result Units 11/24/20  0743   TSH uIU/mL 0.921   HDL mg/dL 36*   Cholesterol mg/dL 148   Triglycerides mg/dL 149   LDL Cholesterol mg/dL 82.2   HDL/Cholesterol Ratio % 24.3   Non-HDL Cholesterol mg/dL 112   Total Cholesterol/HDL Ratio  4.1     TSH:  Recent Labs   Lab Result Units 11/24/20  0743   TSH uIU/mL 0.921     A1C:  Recent Labs   Lab Result Units 09/08/20  1006 11/24/20  0743   Hemoglobin A1C % 6.0* 6.2*         Assessment/Plan     Jolly GASPAR  Polly is a 70 y.o.female with:    Encounter Diagnoses   Name Primary?    Annual physical exam Yes    Type 2 diabetes mellitus without retinopathy     Anxiety     Essential hypertension     Bronchiectasis without complication     Pure hypercholesterolemia     URTI (acute upper respiratory infection)     Allergic rhinitis due to pollen, unspecified seasonality     Encounter for screening for malignant neoplasm of breast, unspecified screening modality     Encounter for screening mammogram for malignant neoplasm of breast          PLAN  1.- DM2- monitor diet  2.-anxiety stable  3.-H TN-stable  4.-bronchiectasis-stable at this time  5.-hypercholesterolemia-stable  6.-Stay hydrated with water, gargle with warm salt water prn, take meds as prescribed  Mammogram ordered        -Continue current medications and maintain follow up with specialists.  Return to clinic in 6 months with Dr. Venegas or sooner for any concerns       Erin Jiminez, NP-C [facility]

## 2020-12-02 ENCOUNTER — HOSPITAL ENCOUNTER (OUTPATIENT)
Dept: RADIOLOGY | Facility: HOSPITAL | Age: 70
Discharge: HOME | End: 2020-12-02
Attending: NURSE PRACTITIONER
Payer: MEDICARE

## 2020-12-02 ENCOUNTER — PATIENT MESSAGE (OUTPATIENT)
Dept: INTERNAL MEDICINE | Facility: CLINIC | Age: 70
End: 2020-12-02

## 2020-12-02 DIAGNOSIS — Z12.31 ENCOUNTER FOR SCREENING MAMMOGRAM FOR MALIGNANT NEOPLASM OF BREAST: ICD-10-CM

## 2020-12-02 DIAGNOSIS — Z12.39 ENCOUNTER FOR SCREENING FOR MALIGNANT NEOPLASM OF BREAST, UNSPECIFIED SCREENING MODALITY: ICD-10-CM

## 2020-12-02 DIAGNOSIS — F32.9 REACTIVE DEPRESSION: ICD-10-CM

## 2020-12-02 PROCEDURE — 77067 MAMMO DIGITAL SCREENING BILAT WITH TOMO: ICD-10-PCS | Mod: 26,,, | Performed by: RADIOLOGY

## 2020-12-02 PROCEDURE — 77063 BREAST TOMOSYNTHESIS BI: CPT | Mod: 26,,, | Performed by: RADIOLOGY

## 2020-12-02 PROCEDURE — 77063 MAMMO DIGITAL SCREENING BILAT WITH TOMO: ICD-10-PCS | Mod: 26,,, | Performed by: RADIOLOGY

## 2020-12-02 PROCEDURE — 77067 SCR MAMMO BI INCL CAD: CPT | Mod: 26,,, | Performed by: RADIOLOGY

## 2020-12-02 PROCEDURE — 77067 SCR MAMMO BI INCL CAD: CPT | Mod: TC

## 2020-12-02 RX ORDER — SERTRALINE HYDROCHLORIDE 100 MG/1
100 TABLET, FILM COATED ORAL DAILY
Qty: 90 TABLET | Refills: 0 | Status: SHIPPED | OUTPATIENT
Start: 2020-12-02 | End: 2021-04-14

## 2020-12-02 NOTE — TELEPHONE ENCOUNTER
mammo result:    Findings:  The breasts are heterogeneously dense, which may obscure small masses. There is no evidence of suspicious masses, calcifications, or other abnormal findings.     Impression:  Bilateral  There is no mammographic evidence of malignancy.        Recommendation:  Routine screening mammogram in 1 year is recommended.

## 2020-12-15 ENCOUNTER — OFFICE VISIT (OUTPATIENT)
Dept: SURGERY | Facility: CLINIC | Age: 70
End: 2020-12-15
Payer: MEDICARE

## 2020-12-15 ENCOUNTER — LAB VISIT (OUTPATIENT)
Dept: LAB | Facility: HOSPITAL | Age: 70
End: 2020-12-15
Attending: SURGERY
Payer: MEDICARE

## 2020-12-15 VITALS
WEIGHT: 155.88 LBS | SYSTOLIC BLOOD PRESSURE: 131 MMHG | BODY MASS INDEX: 29.43 KG/M2 | DIASTOLIC BLOOD PRESSURE: 64 MMHG | HEART RATE: 78 BPM | HEIGHT: 61 IN

## 2020-12-15 DIAGNOSIS — R10.9 ABDOMINAL PAIN, UNSPECIFIED ABDOMINAL LOCATION: ICD-10-CM

## 2020-12-15 DIAGNOSIS — Z09 POSTOP CHECK: Primary | ICD-10-CM

## 2020-12-15 LAB
CREAT SERPL-MCNC: 1 MG/DL (ref 0.5–1.4)
EST. GFR  (AFRICAN AMERICAN): >60 ML/MIN/1.73 M^2
EST. GFR  (NON AFRICAN AMERICAN): 56.8 ML/MIN/1.73 M^2

## 2020-12-15 PROCEDURE — 1125F PR PAIN SEVERITY QUANTIFIED, PAIN PRESENT: ICD-10-PCS | Mod: S$GLB,,, | Performed by: SURGERY

## 2020-12-15 PROCEDURE — 99999 PR PBB SHADOW E&M-EST. PATIENT-LVL III: CPT | Mod: PBBFAC,,, | Performed by: SURGERY

## 2020-12-15 PROCEDURE — 36415 COLL VENOUS BLD VENIPUNCTURE: CPT

## 2020-12-15 PROCEDURE — 99024 PR POST-OP FOLLOW-UP VISIT: ICD-10-PCS | Mod: S$GLB,,, | Performed by: SURGERY

## 2020-12-15 PROCEDURE — 99024 POSTOP FOLLOW-UP VISIT: CPT | Mod: S$GLB,,, | Performed by: SURGERY

## 2020-12-15 PROCEDURE — 3008F BODY MASS INDEX DOCD: CPT | Mod: CPTII,S$GLB,, | Performed by: SURGERY

## 2020-12-15 PROCEDURE — 1157F PR ADVANCE CARE PLAN OR EQUIV PRESENT IN MEDICAL RECORD: ICD-10-PCS | Mod: S$GLB,,, | Performed by: SURGERY

## 2020-12-15 PROCEDURE — 1157F ADVNC CARE PLAN IN RCRD: CPT | Mod: S$GLB,,, | Performed by: SURGERY

## 2020-12-15 PROCEDURE — 3008F PR BODY MASS INDEX (BMI) DOCUMENTED: ICD-10-PCS | Mod: CPTII,S$GLB,, | Performed by: SURGERY

## 2020-12-15 PROCEDURE — 99999 PR PBB SHADOW E&M-EST. PATIENT-LVL III: ICD-10-PCS | Mod: PBBFAC,,, | Performed by: SURGERY

## 2020-12-15 PROCEDURE — 1125F AMNT PAIN NOTED PAIN PRSNT: CPT | Mod: S$GLB,,, | Performed by: SURGERY

## 2020-12-15 PROCEDURE — 82565 ASSAY OF CREATININE: CPT

## 2020-12-15 RX ORDER — BACLOFEN 10 MG/1
10 TABLET ORAL 3 TIMES DAILY
Qty: 10 TABLET | Refills: 11 | Status: SHIPPED | OUTPATIENT
Start: 2020-12-15 | End: 2021-02-03

## 2020-12-15 NOTE — PROGRESS NOTES
I have seen the patient, reviewed the Resident's history and physical, assessment and plan. I have personally interviewed and examined the patient at bedside and: agree with the findings.      S/p lap ventral hernia 10/29/20.  She has luq pain, worse on movement.  This is limiting her movement.  Obtain ct.  Consider anesthesia pain for injection of the area with u/s.  Baclofen rx.  May use nsaids and ice.  This may be at a point of suturing the mesh.

## 2020-12-15 NOTE — PROGRESS NOTES
History & Physical  Plastic Surgery Clinic    SUBJECTIVE:     Chief complaint: post-operative incisional hernia repair    History of Present Illness:  Patient is a 70 y.o. female s/p laparoscopic incisional hernia repair in 10/2020 who presents for a 1 month follow-up visit. The patient states that things had been going very well post-operatively until this weekend. The patient states that she has been experiencing this LUQ pain, with onset of this past weekend. She cannot identify any inciting event to bring this pain on. She states that the pain is worse with movement. She has tried to alleviate the pain with aleve, but states that it does not help. She has no other complaints at this time.     Past Medical History:  Past Medical History:   Diagnosis Date    Atypical ductal hyperplasia, breast     Basal cell carcinoma 2011    left forehead    Basal cell carcinoma 2015    R temporal scalp, R upper forehead, L upper forehead    Basal cell carcinoma 2015    right mid ala    BCC (basal cell carcinoma) excised     right shoulder    Diabetes mellitus     Diverticulitis     Fibrocystic breast     HLD (hyperlipidemia)     Hypertension     Prediabetes 10/16/2013    Skin cancer        Past Surgical History:  Past Surgical History:   Procedure Laterality Date    APPENDECTOMY      bilateral breast duct excision      BREAST BIOPSY Right     Excisional bx, benign    BREAST BIOPSY Left     Excisional bx, benign     SECTION      x2    COLON SURGERY Left 2017    sigmoidectomy for diverticulitis    COLONOSCOPY N/A 2017    Procedure: COLONOSCOPY;  Surgeon: IBRAHIMA Philip MD;  Location: [address];  Service: Endoscopy;  Laterality: N/A;    EYE SURGERY      fulgaration of endometriosis x 2      HYSTERECTOMY      For endometriosis and DUB--age 42, ovaries in?    INCISIONAL HERNIA REPAIR  2017    KNEE ARTHROSCOPY W/ DEBRIDEMENT Right 2018    Procedure: ARTHROSCOPY,  KNEE, WITH DEBRIDEMENT;  Surgeon: MARVA Arias MD;  Location: [facility] OR 1ST FLR;  Service: Orthopedics;  Laterality: Right;    KNEE ARTHROSCOPY W/ MENISCECTOMY Right 7/6/2018    Procedure: ARTHROSCOPY, KNEE, WITH MENISCECTOMY (partial lateral and medial menisectomy, chondraplasty;  Surgeon: MARVA Arias MD;  Location: [facility] OR 1ST FLR;  Service: Orthopedics;  Laterality: Right;    KNEE ARTHROSCOPY W/ PLICA EXCISION Right 7/6/2018    Procedure: EXCISION, PLICA, KNEE, ARTHROSCOPIC,;  Surgeon: MARVA Arias MD;  Location: [facility] OR 1ST FLR;  Service: Orthopedics;  Laterality: Right;    KNEE SURGERY      LAPAROSCOPIC REPAIR OF INCISIONAL HERNIA N/A 10/29/2020    Procedure: REPAIR, HERNIA, INCISIONAL, LAPAROSCOPIC recurrent, WITH MESH;  Surgeon: Deshawn Arias MD;  Location: [facility] OR 2ND FLR;  Service: General;  Laterality: N/A;    OOPHORECTOMY      SKIN CANCER EXCISION      BCC forehead , temple, shoulder, chest    TONSILLECTOMY         Family History:  Family History   Problem Relation Age of Onset    Skin cancer Father     Hypertension Father     Heart disease Father     Diabetes Father     Melanoma Father     Cancer Father     Skin cancer Mother     Hypertension Mother     Thyroid disease Mother     Dementia Mother     Hypertension Sister     Hyperlipidemia Sister     Anxiety disorder Daughter     Hypertension Son     Hyperlipidemia Son     Breast cancer Maternal Aunt     Diabetes Maternal Uncle        Social History:  Social History     Socioeconomic History    Marital status:      Spouse name: Not on file    Number of children: Not on file    Years of education: Not on file    Highest education level: Not on file   Occupational History    Not on file   Social Needs    Financial resource strain: Not on file    Food insecurity     Worry: Not on file     Inability: Not on file    Transportation needs     Medical: Not on file     Non-medical: Not on file   Tobacco  Use    Smoking status: Never Smoker    Smokeless tobacco: Never Used   Substance and Sexual Activity    Alcohol use: Yes     Alcohol/week: 0.0 standard drinks     Types: 1 - 2 Glasses of wine per week     Comment: 3-4x /week    Drug use: No    Sexual activity: Yes     Partners: Male     Birth control/protection: Post-menopausal   Lifestyle    Physical activity     Days per week: Not on file     Minutes per session: Not on file    Stress: Not on file   Relationships    Social connections     Talks on phone: Not on file     Gets together: Not on file     Attends [employer] service: Not on file     Active member of club or organization: Not on file     Attends meetings of clubs or organizations: Not on file     Relationship status: Not on file   Other Topics Concern    Are you pregnant or think you may be? No    Breast-feeding No   Social History Narrative    , walking some       Medications:  Current Outpatient Medications   Medication Sig Dispense Refill    acetaminophen (TYLENOL) 500 MG tablet Take 1 tablet (500 mg total) by mouth every 6 (six) hours as needed for Pain (alternate with ibuprofen).  0    atorvastatin (LIPITOR) 20 MG tablet TAKE 1 TABLET BY MOUTH EVERY DAY 90 tablet 1    azelastine (ASTELIN) 137 mcg (0.1 %) nasal spray 1 spray (137 mcg total) by Nasal route 2 (two) times daily. 30 mL 0    cyanocobalamin (VITAMIN B-12) 1000 MCG tablet Take 100 mcg by mouth once daily.      flaxseed oil 1,000 mg Cap Take 1 capsule by mouth once daily.        ibuprofen (ADVIL,MOTRIN) 400 MG tablet Take 1 tablet (400 mg total) by mouth every 6 (six) hours as needed for Other (pain). Alternate with tylenol      levocetirizine (XYZAL) 5 MG tablet Take 1 tablet (5 mg total) by mouth every evening. 30 tablet 11    metFORMIN (GLUCOPHAGE) 500 MG tablet Take 1 tablet (500 mg total) by mouth 2 (two) times daily with meals. 180 tablet 1    nystatin (MYCOSTATIN) powder Apply topically 2 (two) times daily.  "aaa qd- bid prn flare groin, under arm, under breasts 120 g 2    olmesartan (BENICAR) 40 MG tablet Take 1 tablet (40 mg total) by mouth once daily. 90 tablet 3    polyethylene glycol (GLYCOLAX) 17 gram/dose powder Take 17 g by mouth daily as needed. Mix in 8 oz of clear liquid 238 g 0    sertraline (ZOLOFT) 100 MG tablet Take 1 tablet (100 mg total) by mouth once daily. 90 tablet 0    spironolactone (ALDACTONE) 25 MG tablet TAKE 1 TABLET(25 MG) BY MOUTH EVERY DAY 90 tablet 3    vitamin D 1000 units Tab Take 185 mg by mouth once daily.       No current facility-administered medications for this visit.      Facility-Administered Medications Ordered in Other Visits   Medication Dose Route Frequency Provider Last Rate Last Dose    0.9%  NaCl infusion   Intravenous Continuous Deshawn Arias MD 70 mL/hr at 10/29/20 0824      fentaNYL injection 25 mcg  25 mcg Intravenous Q5 Min PRN Jhoan Kenny MD   100 mcg at 10/29/20 0850    lidocaine (PF) 10 mg/ml (1%) injection 10 mg  1 mL Intradermal Once Deshawn Arias MD        midazolam (VERSED) 1 mg/mL injection 0.5 mg  0.5 mg Intravenous PRN Jhoan Kenny MD   2 mg at 10/29/20 0900       Allergies:  Review of patient's allergies indicates:   Allergen Reactions    Hydrocodone      Also makes pt "very sleepy"    Kiwi (actinidia chinensis) Swelling       Review of Systems:  Negative except for HPI.      OBJECTIVE:     /64   Pulse 78   Ht 5' 1" (1.549 m)   Wt 70.7 kg (155 lb 13.8 oz)   LMP 01/01/1991   BMI 29.45 kg/m²     Physical Exam:  Constitutional: Oriented to person, place, and time. Appears well-developed and well-nourished.   General appearance: alert, appears stated age and cooperative  Lungs: clear to auscultation bilaterally  Heart: regular rate and rhythm and S1, S2 normal  Abdomen: soft, non-distended abdomen; no evidence of recurrent hernia on light or deep palpation; tender to the LUQ with light palpation  Extremities: " extremities normal, atraumatic, no cyanosis or edema  Skin: Skin color, texture, turgor normal. No rashes or lesions        ASSESSMENT/PLAN:     70 y.o. female s/p laparoscopic incisional hernia repair in 10/2020 who is complaining of LUQ pain.    - counseled on NSAIDs, ice  - baclofen prescribed  - plan for CT scan

## 2020-12-17 ENCOUNTER — HOSPITAL ENCOUNTER (OUTPATIENT)
Dept: RADIOLOGY | Facility: HOSPITAL | Age: 70
Discharge: HOME | End: 2020-12-17
Attending: SURGERY
Payer: MEDICARE

## 2020-12-17 DIAGNOSIS — R10.9 ABDOMINAL PAIN, UNSPECIFIED ABDOMINAL LOCATION: ICD-10-CM

## 2020-12-17 PROCEDURE — 74177 CT ABD & PELVIS W/CONTRAST: CPT | Mod: TC

## 2020-12-17 PROCEDURE — 25500020 PHARM REV CODE 255: Performed by: SURGERY

## 2020-12-17 PROCEDURE — 74177 CT ABD & PELVIS W/CONTRAST: CPT | Mod: 26,,, | Performed by: RADIOLOGY

## 2020-12-17 PROCEDURE — 74177 CT ABDOMEN PELVIS WITH CONTRAST: ICD-10-PCS | Mod: 26,,, | Performed by: RADIOLOGY

## 2020-12-17 RX ADMIN — IOHEXOL 15 ML: 350 INJECTION, SOLUTION INTRAVENOUS at 03:12

## 2020-12-17 RX ADMIN — IOHEXOL 15 ML: 350 INJECTION, SOLUTION INTRAVENOUS at 02:12

## 2020-12-17 RX ADMIN — IOHEXOL 75 ML: 350 INJECTION, SOLUTION INTRAVENOUS at 03:12

## 2020-12-29 ENCOUNTER — TELEPHONE (OUTPATIENT)
Dept: SURGERY | Facility: CLINIC | Age: 70
End: 2020-12-29

## 2020-12-29 DIAGNOSIS — R91.8 OPACITY OF LUNG ON IMAGING STUDY: Primary | ICD-10-CM

## 2020-12-29 NOTE — TELEPHONE ENCOUNTER
----- Message from Deshawn Arias MD sent at 12/18/2020  3:06 PM CST -----  No problems in the area of her pain but needs chest ct to follow up on an area there.  Please have her return in a month or so to see how she is doing.

## 2021-01-06 ENCOUNTER — HOSPITAL ENCOUNTER (OUTPATIENT)
Dept: RADIOLOGY | Facility: HOSPITAL | Age: 71
Discharge: HOME | End: 2021-01-06
Attending: SURGERY
Payer: MEDICARE

## 2021-01-06 DIAGNOSIS — R91.8 OPACITY OF LUNG ON IMAGING STUDY: ICD-10-CM

## 2021-01-06 PROCEDURE — 71250 CT THORAX DX C-: CPT | Mod: TC

## 2021-01-06 PROCEDURE — 71250 CT CHEST WITHOUT CONTRAST: ICD-10-PCS | Mod: 26,,, | Performed by: RADIOLOGY

## 2021-01-06 PROCEDURE — 71250 CT THORAX DX C-: CPT | Mod: 26,,, | Performed by: RADIOLOGY

## 2021-01-07 ENCOUNTER — TELEPHONE (OUTPATIENT)
Dept: SURGERY | Facility: CLINIC | Age: 71
End: 2021-01-07

## 2021-01-11 ENCOUNTER — TELEPHONE (OUTPATIENT)
Dept: SURGERY | Facility: CLINIC | Age: 71
End: 2021-01-11

## 2021-01-12 LAB
LEFT EYE DM RETINOPATHY: POSITIVE
RIGHT EYE DM RETINOPATHY: POSITIVE

## 2021-01-13 ENCOUNTER — IMMUNIZATION (OUTPATIENT)
Dept: PHARMACY | Facility: CLINIC | Age: 71
End: 2021-01-13
Payer: MEDICARE

## 2021-01-13 DIAGNOSIS — Z23 NEED FOR VACCINATION: ICD-10-CM

## 2021-01-14 ENCOUNTER — PATIENT OUTREACH (OUTPATIENT)
Dept: ADMINISTRATIVE | Facility: HOSPITAL | Age: 71
End: 2021-01-14

## 2021-01-21 ENCOUNTER — OFFICE VISIT (OUTPATIENT)
Dept: DERMATOLOGY | Facility: CLINIC | Age: 71
End: 2021-01-21
Payer: MEDICARE

## 2021-01-21 DIAGNOSIS — D22.9 NEVUS: ICD-10-CM

## 2021-01-21 DIAGNOSIS — L81.4 LENTIGO: ICD-10-CM

## 2021-01-21 DIAGNOSIS — D48.5 NEOPLASM OF UNCERTAIN BEHAVIOR OF SKIN: ICD-10-CM

## 2021-01-21 DIAGNOSIS — Z85.828 HISTORY OF BASAL CELL CARCINOMA: ICD-10-CM

## 2021-01-21 DIAGNOSIS — L90.5 SCAR: Primary | ICD-10-CM

## 2021-01-21 DIAGNOSIS — D18.01 CHERRY ANGIOMA: ICD-10-CM

## 2021-01-21 DIAGNOSIS — L82.1 SK (SEBORRHEIC KERATOSIS): ICD-10-CM

## 2021-01-21 PROCEDURE — 99999 PR PBB SHADOW E&M-EST. PATIENT-LVL III: CPT | Mod: PBBFAC,,, | Performed by: DERMATOLOGY

## 2021-01-21 PROCEDURE — 88305 TISSUE EXAM BY PATHOLOGIST: ICD-10-PCS | Mod: 26,,, | Performed by: PATHOLOGY

## 2021-01-21 PROCEDURE — 11102 TANGNTL BX SKIN SINGLE LES: CPT | Mod: S$GLB,,, | Performed by: DERMATOLOGY

## 2021-01-21 PROCEDURE — 88305 TISSUE EXAM BY PATHOLOGIST: CPT | Mod: 26,,, | Performed by: PATHOLOGY

## 2021-01-21 PROCEDURE — 11102 PR TANGENTIAL BIOPSY, SKIN, SINGLE LESION: ICD-10-PCS | Mod: S$GLB,,, | Performed by: DERMATOLOGY

## 2021-01-21 PROCEDURE — 3288F FALL RISK ASSESSMENT DOCD: CPT | Mod: CPTII,S$GLB,, | Performed by: DERMATOLOGY

## 2021-01-21 PROCEDURE — 99999 PR PBB SHADOW E&M-EST. PATIENT-LVL III: ICD-10-PCS | Mod: PBBFAC,,, | Performed by: DERMATOLOGY

## 2021-01-21 PROCEDURE — 1157F ADVNC CARE PLAN IN RCRD: CPT | Mod: S$GLB,,, | Performed by: DERMATOLOGY

## 2021-01-21 PROCEDURE — 1157F PR ADVANCE CARE PLAN OR EQUIV PRESENT IN MEDICAL RECORD: ICD-10-PCS | Mod: S$GLB,,, | Performed by: DERMATOLOGY

## 2021-01-21 PROCEDURE — 1126F AMNT PAIN NOTED NONE PRSNT: CPT | Mod: S$GLB,,, | Performed by: DERMATOLOGY

## 2021-01-21 PROCEDURE — 88305 TISSUE EXAM BY PATHOLOGIST: CPT | Performed by: PATHOLOGY

## 2021-01-21 PROCEDURE — 1159F PR MEDICATION LIST DOCUMENTED IN MEDICAL RECORD: ICD-10-PCS | Mod: S$GLB,,, | Performed by: DERMATOLOGY

## 2021-01-21 PROCEDURE — 3288F PR FALLS RISK ASSESSMENT DOCUMENTED: ICD-10-PCS | Mod: CPTII,S$GLB,, | Performed by: DERMATOLOGY

## 2021-01-21 PROCEDURE — 1101F PT FALLS ASSESS-DOCD LE1/YR: CPT | Mod: CPTII,S$GLB,, | Performed by: DERMATOLOGY

## 2021-01-21 PROCEDURE — 1101F PR PT FALLS ASSESS DOC 0-1 FALLS W/OUT INJ PAST YR: ICD-10-PCS | Mod: CPTII,S$GLB,, | Performed by: DERMATOLOGY

## 2021-01-21 PROCEDURE — 99213 PR OFFICE/OUTPT VISIT, EST, LEVL III, 20-29 MIN: ICD-10-PCS | Mod: 25,S$GLB,, | Performed by: DERMATOLOGY

## 2021-01-21 PROCEDURE — 1159F MED LIST DOCD IN RCRD: CPT | Mod: S$GLB,,, | Performed by: DERMATOLOGY

## 2021-01-21 PROCEDURE — 1126F PR PAIN SEVERITY QUANTIFIED, NO PAIN PRESENT: ICD-10-PCS | Mod: S$GLB,,, | Performed by: DERMATOLOGY

## 2021-01-21 PROCEDURE — 99213 OFFICE O/P EST LOW 20 MIN: CPT | Mod: 25,S$GLB,, | Performed by: DERMATOLOGY

## 2021-01-25 ENCOUNTER — OFFICE VISIT (OUTPATIENT)
Dept: PULMONOLOGY | Facility: CLINIC | Age: 71
End: 2021-01-25
Payer: MEDICARE

## 2021-01-25 VITALS
OXYGEN SATURATION: 95 % | DIASTOLIC BLOOD PRESSURE: 70 MMHG | SYSTOLIC BLOOD PRESSURE: 130 MMHG | BODY MASS INDEX: 29.63 KG/M2 | WEIGHT: 156.94 LBS | HEIGHT: 61 IN | HEART RATE: 94 BPM

## 2021-01-25 DIAGNOSIS — R91.1 PULMONARY NODULE: Primary | ICD-10-CM

## 2021-01-25 DIAGNOSIS — J42 CHRONIC BRONCHITIS, UNSPECIFIED CHRONIC BRONCHITIS TYPE: ICD-10-CM

## 2021-01-25 DIAGNOSIS — J47.9 BRONCHIECTASIS WITHOUT COMPLICATION: ICD-10-CM

## 2021-01-25 PROCEDURE — 1126F PR PAIN SEVERITY QUANTIFIED, NO PAIN PRESENT: ICD-10-PCS | Mod: S$GLB,,, | Performed by: NURSE PRACTITIONER

## 2021-01-25 PROCEDURE — 3288F PR FALLS RISK ASSESSMENT DOCUMENTED: ICD-10-PCS | Mod: CPTII,S$GLB,, | Performed by: NURSE PRACTITIONER

## 2021-01-25 PROCEDURE — 99499 UNLISTED E&M SERVICE: CPT | Mod: S$GLB,,, | Performed by: NURSE PRACTITIONER

## 2021-01-25 PROCEDURE — 1101F PT FALLS ASSESS-DOCD LE1/YR: CPT | Mod: CPTII,S$GLB,, | Performed by: NURSE PRACTITIONER

## 2021-01-25 PROCEDURE — 99999 PR PBB SHADOW E&M-EST. PATIENT-LVL IV: ICD-10-PCS | Mod: PBBFAC,,, | Performed by: NURSE PRACTITIONER

## 2021-01-25 PROCEDURE — 99999 PR PBB SHADOW E&M-EST. PATIENT-LVL IV: CPT | Mod: PBBFAC,,, | Performed by: NURSE PRACTITIONER

## 2021-01-25 PROCEDURE — 99499 RISK ADDL DX/OHS AUDIT: ICD-10-PCS | Mod: S$GLB,,, | Performed by: NURSE PRACTITIONER

## 2021-01-25 PROCEDURE — 99204 PR OFFICE/OUTPT VISIT, NEW, LEVL IV, 45-59 MIN: ICD-10-PCS | Mod: S$GLB,,, | Performed by: NURSE PRACTITIONER

## 2021-01-25 PROCEDURE — 1126F AMNT PAIN NOTED NONE PRSNT: CPT | Mod: S$GLB,,, | Performed by: NURSE PRACTITIONER

## 2021-01-25 PROCEDURE — 3288F FALL RISK ASSESSMENT DOCD: CPT | Mod: CPTII,S$GLB,, | Performed by: NURSE PRACTITIONER

## 2021-01-25 PROCEDURE — 99204 OFFICE O/P NEW MOD 45 MIN: CPT | Mod: S$GLB,,, | Performed by: NURSE PRACTITIONER

## 2021-01-25 PROCEDURE — 1157F ADVNC CARE PLAN IN RCRD: CPT | Mod: S$GLB,,, | Performed by: NURSE PRACTITIONER

## 2021-01-25 PROCEDURE — 1101F PR PT FALLS ASSESS DOC 0-1 FALLS W/OUT INJ PAST YR: ICD-10-PCS | Mod: CPTII,S$GLB,, | Performed by: NURSE PRACTITIONER

## 2021-01-25 PROCEDURE — 3008F BODY MASS INDEX DOCD: CPT | Mod: CPTII,S$GLB,, | Performed by: NURSE PRACTITIONER

## 2021-01-25 PROCEDURE — 3008F PR BODY MASS INDEX (BMI) DOCUMENTED: ICD-10-PCS | Mod: CPTII,S$GLB,, | Performed by: NURSE PRACTITIONER

## 2021-01-25 PROCEDURE — 1157F PR ADVANCE CARE PLAN OR EQUIV PRESENT IN MEDICAL RECORD: ICD-10-PCS | Mod: S$GLB,,, | Performed by: NURSE PRACTITIONER

## 2021-01-26 ENCOUNTER — OFFICE VISIT (OUTPATIENT)
Dept: SURGERY | Facility: CLINIC | Age: 71
End: 2021-01-26
Payer: MEDICARE

## 2021-01-26 VITALS
DIASTOLIC BLOOD PRESSURE: 67 MMHG | HEART RATE: 80 BPM | WEIGHT: 157.19 LBS | SYSTOLIC BLOOD PRESSURE: 141 MMHG | BODY MASS INDEX: 29.68 KG/M2 | HEIGHT: 61 IN

## 2021-01-26 DIAGNOSIS — Z09 POSTOP CHECK: Primary | ICD-10-CM

## 2021-01-26 PROBLEM — R91.1 PULMONARY NODULE: Status: ACTIVE | Noted: 2021-01-26

## 2021-01-26 LAB
FINAL PATHOLOGIC DIAGNOSIS: NORMAL
GROSS: NORMAL
MICROSCOPIC EXAM: NORMAL

## 2021-01-26 PROCEDURE — 1126F PR PAIN SEVERITY QUANTIFIED, NO PAIN PRESENT: ICD-10-PCS | Mod: S$GLB,,, | Performed by: SURGERY

## 2021-01-26 PROCEDURE — 3288F PR FALLS RISK ASSESSMENT DOCUMENTED: ICD-10-PCS | Mod: CPTII,S$GLB,, | Performed by: SURGERY

## 2021-01-26 PROCEDURE — 1157F PR ADVANCE CARE PLAN OR EQUIV PRESENT IN MEDICAL RECORD: ICD-10-PCS | Mod: S$GLB,,, | Performed by: SURGERY

## 2021-01-26 PROCEDURE — 99999 PR PBB SHADOW E&M-EST. PATIENT-LVL III: CPT | Mod: PBBFAC,,, | Performed by: SURGERY

## 2021-01-26 PROCEDURE — 1101F PR PT FALLS ASSESS DOC 0-1 FALLS W/OUT INJ PAST YR: ICD-10-PCS | Mod: CPTII,S$GLB,, | Performed by: SURGERY

## 2021-01-26 PROCEDURE — 3008F PR BODY MASS INDEX (BMI) DOCUMENTED: ICD-10-PCS | Mod: CPTII,S$GLB,, | Performed by: SURGERY

## 2021-01-26 PROCEDURE — 3288F FALL RISK ASSESSMENT DOCD: CPT | Mod: CPTII,S$GLB,, | Performed by: SURGERY

## 2021-01-26 PROCEDURE — 3008F BODY MASS INDEX DOCD: CPT | Mod: CPTII,S$GLB,, | Performed by: SURGERY

## 2021-01-26 PROCEDURE — 99999 PR PBB SHADOW E&M-EST. PATIENT-LVL III: ICD-10-PCS | Mod: PBBFAC,,, | Performed by: SURGERY

## 2021-01-26 PROCEDURE — 99024 PR POST-OP FOLLOW-UP VISIT: ICD-10-PCS | Mod: S$GLB,,, | Performed by: SURGERY

## 2021-01-26 PROCEDURE — 99024 POSTOP FOLLOW-UP VISIT: CPT | Mod: S$GLB,,, | Performed by: SURGERY

## 2021-01-26 PROCEDURE — 1126F AMNT PAIN NOTED NONE PRSNT: CPT | Mod: S$GLB,,, | Performed by: SURGERY

## 2021-01-26 PROCEDURE — 1101F PT FALLS ASSESS-DOCD LE1/YR: CPT | Mod: CPTII,S$GLB,, | Performed by: SURGERY

## 2021-01-26 PROCEDURE — 1157F ADVNC CARE PLAN IN RCRD: CPT | Mod: S$GLB,,, | Performed by: SURGERY

## 2021-01-27 ENCOUNTER — TELEPHONE (OUTPATIENT)
Dept: DERMATOLOGY | Facility: CLINIC | Age: 71
End: 2021-01-27

## 2021-01-27 DIAGNOSIS — C44.611: Primary | ICD-10-CM

## 2021-01-27 RX ORDER — IMIQUIMOD 12.5 MG/.25G
CREAM TOPICAL
Qty: 12 PACKET | Refills: 1 | Status: SHIPPED | OUTPATIENT
Start: 2021-01-27 | End: 2021-03-15

## 2021-02-03 ENCOUNTER — CLINICAL SUPPORT (OUTPATIENT)
Dept: INTERVENTIONAL RADIOLOGY/VASCULAR | Facility: CLINIC | Age: 71
End: 2021-02-03
Payer: MEDICARE

## 2021-02-03 DIAGNOSIS — R91.8 GROUND GLASS OPACITY PRESENT ON IMAGING OF LUNG: Primary | ICD-10-CM

## 2021-02-03 DIAGNOSIS — Z01.818 PRE-OP TESTING: ICD-10-CM

## 2021-02-03 PROCEDURE — 99204 PR OFFICE/OUTPT VISIT, NEW, LEVL IV, 45-59 MIN: ICD-10-PCS | Mod: 95,,, | Performed by: PHYSICIAN ASSISTANT

## 2021-02-03 PROCEDURE — 99204 OFFICE O/P NEW MOD 45 MIN: CPT | Mod: 95,,, | Performed by: PHYSICIAN ASSISTANT

## 2021-02-04 LAB
LEFT EYE DM RETINOPATHY: NORMAL
RIGHT EYE DM RETINOPATHY: NORMAL

## 2021-02-10 ENCOUNTER — PATIENT OUTREACH (OUTPATIENT)
Dept: ADMINISTRATIVE | Facility: HOSPITAL | Age: 71
End: 2021-02-10

## 2021-02-12 ENCOUNTER — IMMUNIZATION (OUTPATIENT)
Dept: PHARMACY | Facility: CLINIC | Age: 71
End: 2021-02-12
Payer: MEDICARE

## 2021-02-12 DIAGNOSIS — Z23 NEED FOR VACCINATION: Primary | ICD-10-CM

## 2021-02-23 ENCOUNTER — LAB VISIT (OUTPATIENT)
Dept: FAMILY MEDICINE | Facility: CLINIC | Age: 71
End: 2021-02-23
Payer: MEDICARE

## 2021-02-23 DIAGNOSIS — Z01.818 PRE-OP TESTING: ICD-10-CM

## 2021-02-23 PROCEDURE — U0005 INFEC AGEN DETEC AMPLI PROBE: HCPCS

## 2021-02-23 PROCEDURE — U0003 INFECTIOUS AGENT DETECTION BY NUCLEIC ACID (DNA OR RNA); SEVERE ACUTE RESPIRATORY SYNDROME CORONAVIRUS 2 (SARS-COV-2) (CORONAVIRUS DISEASE [COVID-19]), AMPLIFIED PROBE TECHNIQUE, MAKING USE OF HIGH THROUGHPUT TECHNOLOGIES AS DESCRIBED BY CMS-2020-01-R: HCPCS

## 2021-02-24 ENCOUNTER — PATIENT MESSAGE (OUTPATIENT)
Dept: INTERVENTIONAL RADIOLOGY/VASCULAR | Facility: CLINIC | Age: 71
End: 2021-02-24

## 2021-02-24 LAB — SARS-COV-2 RNA RESP QL NAA+PROBE: NOT DETECTED

## 2021-02-24 RX ORDER — FENTANYL CITRATE 50 UG/ML
50 INJECTION, SOLUTION INTRAMUSCULAR; INTRAVENOUS
Status: CANCELLED | OUTPATIENT
Start: 2021-02-24

## 2021-02-24 RX ORDER — MIDAZOLAM HYDROCHLORIDE 1 MG/ML
1 INJECTION INTRAMUSCULAR; INTRAVENOUS
Status: CANCELLED | OUTPATIENT
Start: 2021-02-24

## 2021-02-25 ENCOUNTER — HOSPITAL ENCOUNTER (OUTPATIENT)
Dept: INTERVENTIONAL RADIOLOGY/VASCULAR | Facility: HOSPITAL | Age: 71
Discharge: HOME | End: 2021-02-25
Attending: PHYSICIAN ASSISTANT
Payer: MEDICARE

## 2021-02-25 ENCOUNTER — HOSPITAL ENCOUNTER (OUTPATIENT)
Dept: RADIOLOGY | Facility: HOSPITAL | Age: 71
Discharge: HOME | End: 2021-02-25
Attending: RADIOLOGY
Payer: MEDICARE

## 2021-02-25 VITALS
BODY MASS INDEX: 29.68 KG/M2 | HEART RATE: 62 BPM | HEIGHT: 61 IN | DIASTOLIC BLOOD PRESSURE: 62 MMHG | TEMPERATURE: 97 F | OXYGEN SATURATION: 96 % | SYSTOLIC BLOOD PRESSURE: 131 MMHG | RESPIRATION RATE: 12 BRPM | WEIGHT: 157.19 LBS

## 2021-02-25 DIAGNOSIS — R91.8 GROUND GLASS OPACITY PRESENT ON IMAGING OF LUNG: ICD-10-CM

## 2021-02-25 LAB
BASOPHILS # BLD AUTO: 0.11 K/UL (ref 0–0.2)
BASOPHILS NFR BLD: 1.8 % (ref 0–1.9)
DIFFERENTIAL METHOD: ABNORMAL
EOSINOPHIL # BLD AUTO: 0.5 K/UL (ref 0–0.5)
EOSINOPHIL NFR BLD: 7.9 % (ref 0–8)
ERYTHROCYTE [DISTWIDTH] IN BLOOD BY AUTOMATED COUNT: 13.7 % (ref 11.5–14.5)
HCT VFR BLD AUTO: 35.6 % (ref 37–48.5)
HGB BLD-MCNC: 11.2 G/DL (ref 12–16)
IMM GRANULOCYTES # BLD AUTO: 0.06 K/UL (ref 0–0.04)
IMM GRANULOCYTES NFR BLD AUTO: 1 % (ref 0–0.5)
INR PPP: 0.9 (ref 0.8–1.2)
LYMPHOCYTES # BLD AUTO: 0.8 K/UL (ref 1–4.8)
LYMPHOCYTES NFR BLD: 13.6 % (ref 18–48)
MCH RBC QN AUTO: 29.1 PG (ref 27–31)
MCHC RBC AUTO-ENTMCNC: 31.5 G/DL (ref 32–36)
MCV RBC AUTO: 93 FL (ref 82–98)
MONOCYTES # BLD AUTO: 0.6 K/UL (ref 0.3–1)
MONOCYTES NFR BLD: 10.5 % (ref 4–15)
NEUTROPHILS # BLD AUTO: 4 K/UL (ref 1.8–7.7)
NEUTROPHILS NFR BLD: 65.2 % (ref 38–73)
NRBC BLD-RTO: 0 /100 WBC
PLATELET # BLD AUTO: 237 K/UL (ref 150–350)
PMV BLD AUTO: 9.5 FL (ref 9.2–12.9)
POCT GLUCOSE: 109 MG/DL (ref 70–110)
PROTHROMBIN TIME: 10.4 SEC (ref 9–12.5)
RBC # BLD AUTO: 3.85 M/UL (ref 4–5.4)
WBC # BLD AUTO: 6.1 K/UL (ref 3.9–12.7)

## 2021-02-25 PROCEDURE — 71045 XR CHEST 1 VIEW: ICD-10-PCS | Mod: 26,,, | Performed by: RADIOLOGY

## 2021-02-25 PROCEDURE — 88333 PATH CONSLTJ SURG CYTO XM 1: CPT | Mod: 26,,, | Performed by: PATHOLOGY

## 2021-02-25 PROCEDURE — 99153 MOD SED SAME PHYS/QHP EA: CPT | Performed by: RADIOLOGY

## 2021-02-25 PROCEDURE — 88305 TISSUE EXAM BY PATHOLOGIST: CPT | Performed by: PATHOLOGY

## 2021-02-25 PROCEDURE — 32408 IR BIOPSY LUNG W/ GUIDANCE: ICD-10-PCS | Mod: RT,,, | Performed by: RADIOLOGY

## 2021-02-25 PROCEDURE — 88333 PR  INTRAOPERATIVE CYTO PATH CONSULT, INITIAL SITE: ICD-10-PCS | Mod: 26,,, | Performed by: PATHOLOGY

## 2021-02-25 PROCEDURE — 88305 TISSUE EXAM BY PATHOLOGIST: ICD-10-PCS | Mod: 26,,, | Performed by: PATHOLOGY

## 2021-02-25 PROCEDURE — 99152 MOD SED SAME PHYS/QHP 5/>YRS: CPT | Performed by: RADIOLOGY

## 2021-02-25 PROCEDURE — 85025 COMPLETE CBC W/AUTO DIFF WBC: CPT

## 2021-02-25 PROCEDURE — 99152 MOD SED SAME PHYS/QHP 5/>YRS: CPT | Mod: ,,, | Performed by: RADIOLOGY

## 2021-02-25 PROCEDURE — 71045 X-RAY EXAM CHEST 1 VIEW: CPT | Mod: TC,FY

## 2021-02-25 PROCEDURE — 99152 PR MOD CONSCIOUS SEDATION, SAME PHYS, 5+ YRS, FIRST 15 MIN: ICD-10-PCS | Mod: ,,, | Performed by: RADIOLOGY

## 2021-02-25 PROCEDURE — 63600175 PHARM REV CODE 636 W HCPCS: Performed by: RADIOLOGY

## 2021-02-25 PROCEDURE — 82962 GLUCOSE BLOOD TEST: CPT

## 2021-02-25 PROCEDURE — 32408 CORE NDL BX LNG/MED PERQ: CPT | Performed by: RADIOLOGY

## 2021-02-25 PROCEDURE — 71045 X-RAY EXAM CHEST 1 VIEW: CPT | Mod: 26,,, | Performed by: RADIOLOGY

## 2021-02-25 PROCEDURE — 71045 XR CHEST 1 VIEW: ICD-10-PCS | Mod: 26,76,, | Performed by: RADIOLOGY

## 2021-02-25 PROCEDURE — 88305 TISSUE EXAM BY PATHOLOGIST: CPT | Mod: 26,,, | Performed by: PATHOLOGY

## 2021-02-25 PROCEDURE — 88333 PATH CONSLTJ SURG CYTO XM 1: CPT | Performed by: PATHOLOGY

## 2021-02-25 PROCEDURE — 27201068 IR BIOPSY LUNG W/ GUIDANCE

## 2021-02-25 PROCEDURE — 85610 PROTHROMBIN TIME: CPT

## 2021-02-25 PROCEDURE — 71045 X-RAY EXAM CHEST 1 VIEW: CPT | Mod: 26,76,, | Performed by: RADIOLOGY

## 2021-02-25 RX ORDER — FENTANYL CITRATE 50 UG/ML
INJECTION, SOLUTION INTRAMUSCULAR; INTRAVENOUS CODE/TRAUMA/SEDATION MEDICATION
Status: COMPLETED | OUTPATIENT
Start: 2021-02-25 | End: 2021-02-25

## 2021-02-25 RX ORDER — MIDAZOLAM HYDROCHLORIDE 1 MG/ML
INJECTION INTRAMUSCULAR; INTRAVENOUS CODE/TRAUMA/SEDATION MEDICATION
Status: COMPLETED | OUTPATIENT
Start: 2021-02-25 | End: 2021-02-25

## 2021-02-25 RX ORDER — SODIUM CHLORIDE 9 MG/ML
INJECTION, SOLUTION INTRAVENOUS ONCE
Status: DISCONTINUED | OUTPATIENT
Start: 2021-02-25 | End: 2021-02-26 | Stop reason: HOSPADM

## 2021-02-25 RX ADMIN — MIDAZOLAM HYDROCHLORIDE 0.5 MG: 1 INJECTION, SOLUTION INTRAMUSCULAR; INTRAVENOUS at 10:02

## 2021-02-25 RX ADMIN — FENTANYL CITRATE 50 MCG: 50 INJECTION, SOLUTION INTRAMUSCULAR; INTRAVENOUS at 10:02

## 2021-02-25 RX ADMIN — MIDAZOLAM HYDROCHLORIDE 1 MG: 1 INJECTION, SOLUTION INTRAMUSCULAR; INTRAVENOUS at 10:02

## 2021-02-25 RX ADMIN — FENTANYL CITRATE 25 MCG: 50 INJECTION, SOLUTION INTRAMUSCULAR; INTRAVENOUS at 10:02

## 2021-03-02 ENCOUNTER — PATIENT MESSAGE (OUTPATIENT)
Dept: PULMONOLOGY | Facility: CLINIC | Age: 71
End: 2021-03-02

## 2021-03-02 ENCOUNTER — TELEPHONE (OUTPATIENT)
Dept: PULMONOLOGY | Facility: CLINIC | Age: 71
End: 2021-03-02

## 2021-03-02 DIAGNOSIS — C34.90 MALIGNANT NEOPLASM OF UNSPECIFIED PART OF UNSPECIFIED BRONCHUS OR LUNG: ICD-10-CM

## 2021-03-02 DIAGNOSIS — C34.31 MALIGNANT NEOPLASM OF LOWER LOBE, RIGHT BRONCHUS OR LUNG: Primary | ICD-10-CM

## 2021-03-02 LAB
ADEQUACY: NORMAL
COMMENT: NORMAL
FINAL PATHOLOGIC DIAGNOSIS: NORMAL
GROSS: NORMAL

## 2021-03-05 ENCOUNTER — TELEPHONE (OUTPATIENT)
Dept: HEMATOLOGY/ONCOLOGY | Facility: CLINIC | Age: 71
End: 2021-03-05

## 2021-03-05 DIAGNOSIS — R91.8 LUNG NODULES: Primary | ICD-10-CM

## 2021-03-08 ENCOUNTER — TELEPHONE (OUTPATIENT)
Dept: HEMATOLOGY/ONCOLOGY | Facility: CLINIC | Age: 71
End: 2021-03-08

## 2021-03-12 ENCOUNTER — LAB VISIT (OUTPATIENT)
Dept: INTERNAL MEDICINE | Facility: CLINIC | Age: 71
End: 2021-03-12
Payer: MEDICARE

## 2021-03-12 ENCOUNTER — PATIENT OUTREACH (OUTPATIENT)
Dept: ADMINISTRATIVE | Facility: HOSPITAL | Age: 71
End: 2021-03-12

## 2021-03-12 DIAGNOSIS — Z13.9 SCREENING PROCEDURE: ICD-10-CM

## 2021-03-12 LAB — SARS-COV-2 RNA RESP QL NAA+PROBE: NOT DETECTED

## 2021-03-12 PROCEDURE — U0005 INFEC AGEN DETEC AMPLI PROBE: HCPCS | Performed by: PHYSICIAN ASSISTANT

## 2021-03-12 PROCEDURE — U0003 INFECTIOUS AGENT DETECTION BY NUCLEIC ACID (DNA OR RNA); SEVERE ACUTE RESPIRATORY SYNDROME CORONAVIRUS 2 (SARS-COV-2) (CORONAVIRUS DISEASE [COVID-19]), AMPLIFIED PROBE TECHNIQUE, MAKING USE OF HIGH THROUGHPUT TECHNOLOGIES AS DESCRIBED BY CMS-2020-01-R: HCPCS | Performed by: PHYSICIAN ASSISTANT

## 2021-03-15 ENCOUNTER — OFFICE VISIT (OUTPATIENT)
Dept: CARDIOTHORACIC SURGERY | Facility: CLINIC | Age: 71
End: 2021-03-15
Payer: MEDICARE

## 2021-03-15 ENCOUNTER — HOSPITAL ENCOUNTER (OUTPATIENT)
Dept: PULMONOLOGY | Facility: CLINIC | Age: 71
Discharge: HOME | End: 2021-03-15
Payer: MEDICARE

## 2021-03-15 ENCOUNTER — HOSPITAL ENCOUNTER (OUTPATIENT)
Dept: RADIOLOGY | Facility: HOSPITAL | Age: 71
Discharge: HOME | End: 2021-03-15
Attending: INTERNAL MEDICINE
Payer: MEDICARE

## 2021-03-15 VITALS
SYSTOLIC BLOOD PRESSURE: 143 MMHG | BODY MASS INDEX: 29.63 KG/M2 | HEART RATE: 83 BPM | DIASTOLIC BLOOD PRESSURE: 71 MMHG | OXYGEN SATURATION: 97 % | WEIGHT: 156.94 LBS | HEIGHT: 61 IN

## 2021-03-15 DIAGNOSIS — C34.31 MALIGNANT NEOPLASM OF LOWER LOBE, RIGHT BRONCHUS OR LUNG: ICD-10-CM

## 2021-03-15 DIAGNOSIS — C34.31 MALIGNANT NEOPLASM OF LOWER LOBE, RIGHT BRONCHUS OR LUNG: Primary | ICD-10-CM

## 2021-03-15 DIAGNOSIS — C34.90 MALIGNANT NEOPLASM OF UNSPECIFIED PART OF UNSPECIFIED BRONCHUS OR LUNG: ICD-10-CM

## 2021-03-15 DIAGNOSIS — R91.8 LUNG NODULES: ICD-10-CM

## 2021-03-15 DIAGNOSIS — Z01.818 PRE-OP TESTING: ICD-10-CM

## 2021-03-15 LAB — POCT GLUCOSE: 107 MG/DL (ref 70–110)

## 2021-03-15 PROCEDURE — 94727 PR PULM FUNCTION TEST BY GAS: ICD-10-PCS | Mod: S$GLB,,, | Performed by: INTERNAL MEDICINE

## 2021-03-15 PROCEDURE — 3008F PR BODY MASS INDEX (BMI) DOCUMENTED: ICD-10-PCS | Mod: CPTII,S$GLB,, | Performed by: THORACIC SURGERY (CARDIOTHORACIC VASCULAR SURGERY)

## 2021-03-15 PROCEDURE — 94729 DIFFUSING CAPACITY: CPT | Mod: S$GLB,,, | Performed by: INTERNAL MEDICINE

## 2021-03-15 PROCEDURE — 3077F PR MOST RECENT SYSTOLIC BLOOD PRESSURE >= 140 MM HG: ICD-10-PCS | Mod: CPTII,S$GLB,, | Performed by: THORACIC SURGERY (CARDIOTHORACIC VASCULAR SURGERY)

## 2021-03-15 PROCEDURE — 94729 PR C02/MEMBANE DIFFUSE CAPACITY: ICD-10-PCS | Mod: S$GLB,,, | Performed by: INTERNAL MEDICINE

## 2021-03-15 PROCEDURE — 1159F PR MEDICATION LIST DOCUMENTED IN MEDICAL RECORD: ICD-10-PCS | Mod: S$GLB,,, | Performed by: THORACIC SURGERY (CARDIOTHORACIC VASCULAR SURGERY)

## 2021-03-15 PROCEDURE — 3008F BODY MASS INDEX DOCD: CPT | Mod: CPTII,S$GLB,, | Performed by: THORACIC SURGERY (CARDIOTHORACIC VASCULAR SURGERY)

## 2021-03-15 PROCEDURE — 99999 PR PBB SHADOW E&M-EST. PATIENT-LVL V: ICD-10-PCS | Mod: PBBFAC,,, | Performed by: THORACIC SURGERY (CARDIOTHORACIC VASCULAR SURGERY)

## 2021-03-15 PROCEDURE — 99999 PR PBB SHADOW E&M-EST. PATIENT-LVL V: CPT | Mod: PBBFAC,,, | Performed by: THORACIC SURGERY (CARDIOTHORACIC VASCULAR SURGERY)

## 2021-03-15 PROCEDURE — 99205 PR OFFICE/OUTPT VISIT, NEW, LEVL V, 60-74 MIN: ICD-10-PCS | Mod: S$GLB,,, | Performed by: THORACIC SURGERY (CARDIOTHORACIC VASCULAR SURGERY)

## 2021-03-15 PROCEDURE — 3078F PR MOST RECENT DIASTOLIC BLOOD PRESSURE < 80 MM HG: ICD-10-PCS | Mod: CPTII,S$GLB,, | Performed by: THORACIC SURGERY (CARDIOTHORACIC VASCULAR SURGERY)

## 2021-03-15 PROCEDURE — 1159F MED LIST DOCD IN RCRD: CPT | Mod: S$GLB,,, | Performed by: THORACIC SURGERY (CARDIOTHORACIC VASCULAR SURGERY)

## 2021-03-15 PROCEDURE — 99499 RISK ADDL DX/OHS AUDIT: ICD-10-PCS | Mod: S$GLB,,, | Performed by: THORACIC SURGERY (CARDIOTHORACIC VASCULAR SURGERY)

## 2021-03-15 PROCEDURE — 3288F PR FALLS RISK ASSESSMENT DOCUMENTED: ICD-10-PCS | Mod: CPTII,S$GLB,, | Performed by: THORACIC SURGERY (CARDIOTHORACIC VASCULAR SURGERY)

## 2021-03-15 PROCEDURE — 3288F FALL RISK ASSESSMENT DOCD: CPT | Mod: CPTII,S$GLB,, | Performed by: THORACIC SURGERY (CARDIOTHORACIC VASCULAR SURGERY)

## 2021-03-15 PROCEDURE — 99499 UNLISTED E&M SERVICE: CPT | Mod: S$GLB,,, | Performed by: THORACIC SURGERY (CARDIOTHORACIC VASCULAR SURGERY)

## 2021-03-15 PROCEDURE — 94060 PR EVAL OF BRONCHOSPASM: ICD-10-PCS | Mod: S$GLB,,, | Performed by: INTERNAL MEDICINE

## 2021-03-15 PROCEDURE — 99205 OFFICE O/P NEW HI 60 MIN: CPT | Mod: S$GLB,,, | Performed by: THORACIC SURGERY (CARDIOTHORACIC VASCULAR SURGERY)

## 2021-03-15 PROCEDURE — 1157F PR ADVANCE CARE PLAN OR EQUIV PRESENT IN MEDICAL RECORD: ICD-10-PCS | Mod: S$GLB,,, | Performed by: THORACIC SURGERY (CARDIOTHORACIC VASCULAR SURGERY)

## 2021-03-15 PROCEDURE — 1126F AMNT PAIN NOTED NONE PRSNT: CPT | Mod: S$GLB,,, | Performed by: THORACIC SURGERY (CARDIOTHORACIC VASCULAR SURGERY)

## 2021-03-15 PROCEDURE — 94060 EVALUATION OF WHEEZING: CPT | Mod: S$GLB,,, | Performed by: INTERNAL MEDICINE

## 2021-03-15 PROCEDURE — 78815 PET IMAGE W/CT SKULL-THIGH: CPT | Mod: TC

## 2021-03-15 PROCEDURE — 1101F PT FALLS ASSESS-DOCD LE1/YR: CPT | Mod: CPTII,S$GLB,, | Performed by: THORACIC SURGERY (CARDIOTHORACIC VASCULAR SURGERY)

## 2021-03-15 PROCEDURE — 3077F SYST BP >= 140 MM HG: CPT | Mod: CPTII,S$GLB,, | Performed by: THORACIC SURGERY (CARDIOTHORACIC VASCULAR SURGERY)

## 2021-03-15 PROCEDURE — 1101F PR PT FALLS ASSESS DOC 0-1 FALLS W/OUT INJ PAST YR: ICD-10-PCS | Mod: CPTII,S$GLB,, | Performed by: THORACIC SURGERY (CARDIOTHORACIC VASCULAR SURGERY)

## 2021-03-15 PROCEDURE — 3078F DIAST BP <80 MM HG: CPT | Mod: CPTII,S$GLB,, | Performed by: THORACIC SURGERY (CARDIOTHORACIC VASCULAR SURGERY)

## 2021-03-15 PROCEDURE — 94727 GAS DIL/WSHOT DETER LNG VOL: CPT | Mod: S$GLB,,, | Performed by: INTERNAL MEDICINE

## 2021-03-15 PROCEDURE — 1157F ADVNC CARE PLAN IN RCRD: CPT | Mod: S$GLB,,, | Performed by: THORACIC SURGERY (CARDIOTHORACIC VASCULAR SURGERY)

## 2021-03-15 PROCEDURE — 78815 PET IMAGE W/CT SKULL-THIGH: CPT | Mod: 26,PS,, | Performed by: RADIOLOGY

## 2021-03-15 PROCEDURE — 78815 NM PET CT ROUTINE: ICD-10-PCS | Mod: 26,PS,, | Performed by: RADIOLOGY

## 2021-03-15 PROCEDURE — 1126F PR PAIN SEVERITY QUANTIFIED, NO PAIN PRESENT: ICD-10-PCS | Mod: S$GLB,,, | Performed by: THORACIC SURGERY (CARDIOTHORACIC VASCULAR SURGERY)

## 2021-03-21 ENCOUNTER — ANESTHESIA EVENT (OUTPATIENT)
Dept: SURGERY | Facility: HOSPITAL | Age: 71
DRG: 165 | End: 2021-03-21
Payer: MEDICARE

## 2021-03-22 ENCOUNTER — PATIENT MESSAGE (OUTPATIENT)
Dept: INFECTIOUS DISEASES | Facility: CLINIC | Age: 71
End: 2021-03-22

## 2021-03-23 ENCOUNTER — HOSPITAL ENCOUNTER (OUTPATIENT)
Dept: CARDIOLOGY | Facility: HOSPITAL | Age: 71
Discharge: HOME | End: 2021-03-23
Attending: THORACIC SURGERY (CARDIOTHORACIC VASCULAR SURGERY)
Payer: MEDICARE

## 2021-03-23 VITALS
BODY MASS INDEX: 29.45 KG/M2 | DIASTOLIC BLOOD PRESSURE: 72 MMHG | HEIGHT: 61 IN | WEIGHT: 156 LBS | HEART RATE: 84 BPM | SYSTOLIC BLOOD PRESSURE: 142 MMHG

## 2021-03-23 DIAGNOSIS — C34.90 MALIGNANT NEOPLASM OF UNSPECIFIED PART OF UNSPECIFIED BRONCHUS OR LUNG: ICD-10-CM

## 2021-03-23 LAB
ASCENDING AORTA: 2.76 CM
AV INDEX (PROSTH): 0.82
AV MEAN GRADIENT: 6 MMHG
AV PEAK GRADIENT: 11 MMHG
AV VALVE AREA: 3.01 CM2
AV VELOCITY RATIO: 0.8
BSA FOR ECHO PROCEDURE: 1.75 M2
CV ECHO LV RWT: 0.42 CM
DOP CALC AO PEAK VEL: 1.65 M/S
DOP CALC AO VTI: 32.39 CM
DOP CALC LVOT AREA: 3.7 CM2
DOP CALC LVOT DIAMETER: 2.16 CM
DOP CALC LVOT PEAK VEL: 1.32 M/S
DOP CALC LVOT STROKE VOLUME: 97.35 CM3
DOP CALCLVOT PEAK VEL VTI: 26.58 CM
E WAVE DECELERATION TIME: 250.16 MSEC
E/A RATIO: 0.96
E/E' RATIO: 8 M/S
ECHO LV POSTERIOR WALL: 0.79 CM (ref 0.6–1.1)
FRACTIONAL SHORTENING: 35 % (ref 28–44)
INTERVENTRICULAR SEPTUM: 0.86 CM (ref 0.6–1.1)
LA MAJOR: 5.37 CM
LA MINOR: 5.28 CM
LA WIDTH: 3.47 CM
LEFT ATRIUM SIZE: 3.11 CM
LEFT ATRIUM VOLUME INDEX MOD: 25.3 ML/M2
LEFT ATRIUM VOLUME INDEX: 28.7 ML/M2
LEFT ATRIUM VOLUME MOD: 43.01 CM3
LEFT ATRIUM VOLUME: 48.84 CM3
LEFT INTERNAL DIMENSION IN SYSTOLE: 2.44 CM (ref 2.1–4)
LEFT VENTRICLE DIASTOLIC VOLUME INDEX: 35.52 ML/M2
LEFT VENTRICLE DIASTOLIC VOLUME: 60.38 ML
LEFT VENTRICLE MASS INDEX: 52 G/M2
LEFT VENTRICLE SYSTOLIC VOLUME INDEX: 12.3 ML/M2
LEFT VENTRICLE SYSTOLIC VOLUME: 20.99 ML
LEFT VENTRICULAR INTERNAL DIMENSION IN DIASTOLE: 3.76 CM (ref 3.5–6)
LEFT VENTRICULAR MASS: 88.11 G
LV LATERAL E/E' RATIO: 7.56 M/S
LV SEPTAL E/E' RATIO: 8.5 M/S
MV A" WAVE DURATION": 10.28 MSEC
MV PEAK A VEL: 0.71 M/S
MV PEAK E VEL: 0.68 M/S
MV STENOSIS PRESSURE HALF TIME: 72.55 MS
MV VALVE AREA P 1/2 METHOD: 3.03 CM2
PISA TR MAX VEL: 2.05 M/S
PULM VEIN S/D RATIO: 1.55
PV PEAK D VEL: 0.38 M/S
PV PEAK S VEL: 0.59 M/S
RA MAJOR: 4.07 CM
RA PRESSURE: 3 MMHG
RA WIDTH: 3.02 CM
RIGHT VENTRICULAR END-DIASTOLIC DIMENSION: 1.96 CM
SINUS: 3.01 CM
STJ: 2.26 CM
TDI LATERAL: 0.09 M/S
TDI SEPTAL: 0.08 M/S
TDI: 0.09 M/S
TR MAX PG: 17 MMHG
TRICUSPID ANNULAR PLANE SYSTOLIC EXCURSION: 2.37 CM
TV REST PULMONARY ARTERY PRESSURE: 20 MMHG

## 2021-03-23 PROCEDURE — 93306 TTE W/DOPPLER COMPLETE: CPT | Mod: 26,,, | Performed by: INTERNAL MEDICINE

## 2021-03-23 PROCEDURE — 93306 ECHO (CUPID ONLY): ICD-10-PCS | Mod: 26,,, | Performed by: INTERNAL MEDICINE

## 2021-03-23 PROCEDURE — 93306 TTE W/DOPPLER COMPLETE: CPT

## 2021-04-01 ENCOUNTER — OFFICE VISIT (OUTPATIENT)
Dept: DERMATOLOGY | Facility: CLINIC | Age: 71
End: 2021-04-01
Payer: MEDICARE

## 2021-04-01 DIAGNOSIS — C44.612 BASAL CELL CARCINOMA (BCC) OF RIGHT UPPER ARM: Primary | ICD-10-CM

## 2021-04-01 PROCEDURE — 99499 NO LOS: ICD-10-PCS | Mod: S$GLB,,, | Performed by: DERMATOLOGY

## 2021-04-01 PROCEDURE — 99499 UNLISTED E&M SERVICE: CPT | Mod: S$GLB,,, | Performed by: DERMATOLOGY

## 2021-04-01 PROCEDURE — 1126F AMNT PAIN NOTED NONE PRSNT: CPT | Mod: S$GLB,,, | Performed by: DERMATOLOGY

## 2021-04-01 PROCEDURE — 1101F PR PT FALLS ASSESS DOC 0-1 FALLS W/OUT INJ PAST YR: ICD-10-PCS | Mod: CPTII,S$GLB,, | Performed by: DERMATOLOGY

## 2021-04-01 PROCEDURE — 99999 PR PBB SHADOW E&M-EST. PATIENT-LVL II: CPT | Mod: PBBFAC,,, | Performed by: DERMATOLOGY

## 2021-04-01 PROCEDURE — 3288F PR FALLS RISK ASSESSMENT DOCUMENTED: ICD-10-PCS | Mod: CPTII,S$GLB,, | Performed by: DERMATOLOGY

## 2021-04-01 PROCEDURE — 17262 PR DESTR MALIG TRUNK,EXTREM 1.1-2 CM: ICD-10-PCS | Mod: S$GLB,,, | Performed by: DERMATOLOGY

## 2021-04-01 PROCEDURE — 1126F PR PAIN SEVERITY QUANTIFIED, NO PAIN PRESENT: ICD-10-PCS | Mod: S$GLB,,, | Performed by: DERMATOLOGY

## 2021-04-01 PROCEDURE — 1101F PT FALLS ASSESS-DOCD LE1/YR: CPT | Mod: CPTII,S$GLB,, | Performed by: DERMATOLOGY

## 2021-04-01 PROCEDURE — 1157F ADVNC CARE PLAN IN RCRD: CPT | Mod: S$GLB,,, | Performed by: DERMATOLOGY

## 2021-04-01 PROCEDURE — 1157F PR ADVANCE CARE PLAN OR EQUIV PRESENT IN MEDICAL RECORD: ICD-10-PCS | Mod: S$GLB,,, | Performed by: DERMATOLOGY

## 2021-04-01 PROCEDURE — 3288F FALL RISK ASSESSMENT DOCD: CPT | Mod: CPTII,S$GLB,, | Performed by: DERMATOLOGY

## 2021-04-01 PROCEDURE — 17262 DSTRJ MAL LES T/A/L 1.1-2.0: CPT | Mod: S$GLB,,, | Performed by: DERMATOLOGY

## 2021-04-01 PROCEDURE — 99999 PR PBB SHADOW E&M-EST. PATIENT-LVL II: ICD-10-PCS | Mod: PBBFAC,,, | Performed by: DERMATOLOGY

## 2021-04-09 ENCOUNTER — ANESTHESIA (OUTPATIENT)
Dept: SURGERY | Facility: HOSPITAL | Age: 71
DRG: 165 | End: 2021-04-09
Payer: MEDICARE

## 2021-04-09 ENCOUNTER — HOSPITAL ENCOUNTER (INPATIENT)
Facility: HOSPITAL | Age: 71
LOS: 3 days | Discharge: HOME | DRG: 165 | End: 2021-04-12
Attending: THORACIC SURGERY (CARDIOTHORACIC VASCULAR SURGERY) | Admitting: THORACIC SURGERY (CARDIOTHORACIC VASCULAR SURGERY)
Payer: MEDICARE

## 2021-04-09 DIAGNOSIS — R91.8 GROUND GLASS OPACITY PRESENT ON IMAGING OF LUNG: Primary | ICD-10-CM

## 2021-04-09 DIAGNOSIS — R91.8 LUNG MASS: ICD-10-CM

## 2021-04-09 DIAGNOSIS — R91.1 PULMONARY NODULE: ICD-10-CM

## 2021-04-09 LAB
ABO + RH BLD: NORMAL
BLD GP AB SCN CELLS X3 SERPL QL: NORMAL
POCT GLUCOSE: 108 MG/DL (ref 70–110)
POCT GLUCOSE: 130 MG/DL (ref 70–110)
POCT GLUCOSE: 160 MG/DL (ref 70–110)
POCT GLUCOSE: 160 MG/DL (ref 70–110)

## 2021-04-09 PROCEDURE — 63600175 PHARM REV CODE 636 W HCPCS: Performed by: PHYSICIAN ASSISTANT

## 2021-04-09 PROCEDURE — 94761 N-INVAS EAR/PLS OXIMETRY MLT: CPT

## 2021-04-09 PROCEDURE — 88307 TISSUE EXAM BY PATHOLOGIST: CPT | Mod: 26,,, | Performed by: PATHOLOGY

## 2021-04-09 PROCEDURE — 88305 TISSUE EXAM BY PATHOLOGIST: CPT | Mod: 59 | Performed by: PATHOLOGY

## 2021-04-09 PROCEDURE — 25000242 PHARM REV CODE 250 ALT 637 W/ HCPCS: Performed by: PHYSICIAN ASSISTANT

## 2021-04-09 PROCEDURE — 82962 GLUCOSE BLOOD TEST: CPT | Performed by: THORACIC SURGERY (CARDIOTHORACIC VASCULAR SURGERY)

## 2021-04-09 PROCEDURE — 32663 PR THORACOSCOPY SURG LOBECTOMY: ICD-10-PCS | Mod: AS,RT,, | Performed by: PHYSICIAN ASSISTANT

## 2021-04-09 PROCEDURE — 71000015 HC POSTOP RECOV 1ST HR: Performed by: THORACIC SURGERY (CARDIOTHORACIC VASCULAR SURGERY)

## 2021-04-09 PROCEDURE — 32663 THORACOSCOPY W/LOBECTOMY: CPT | Mod: RT,,, | Performed by: THORACIC SURGERY (CARDIOTHORACIC VASCULAR SURGERY)

## 2021-04-09 PROCEDURE — 63600175 PHARM REV CODE 636 W HCPCS: Performed by: NURSE ANESTHETIST, CERTIFIED REGISTERED

## 2021-04-09 PROCEDURE — C1729 CATH, DRAINAGE: HCPCS | Performed by: THORACIC SURGERY (CARDIOTHORACIC VASCULAR SURGERY)

## 2021-04-09 PROCEDURE — 25000003 PHARM REV CODE 250: Performed by: PHYSICIAN ASSISTANT

## 2021-04-09 PROCEDURE — 25000003 PHARM REV CODE 250: Performed by: NURSE ANESTHETIST, CERTIFIED REGISTERED

## 2021-04-09 PROCEDURE — 32674 THORACOSCOPY LYMPH NODE EXC: CPT | Mod: AS,,, | Performed by: PHYSICIAN ASSISTANT

## 2021-04-09 PROCEDURE — 36620 PR INSERT CATH,ART,PERCUT,SHORTTERM: ICD-10-PCS | Mod: 59,,, | Performed by: ANESTHESIOLOGY

## 2021-04-09 PROCEDURE — 25000003 PHARM REV CODE 250: Performed by: THORACIC SURGERY (CARDIOTHORACIC VASCULAR SURGERY)

## 2021-04-09 PROCEDURE — 37000009 HC ANESTHESIA EA ADD 15 MINS: Performed by: THORACIC SURGERY (CARDIOTHORACIC VASCULAR SURGERY)

## 2021-04-09 PROCEDURE — 99900035 HC TECH TIME PER 15 MIN (STAT)

## 2021-04-09 PROCEDURE — 20600001 HC STEP DOWN PRIVATE ROOM

## 2021-04-09 PROCEDURE — 88305 TISSUE EXAM BY PATHOLOGIST: CPT | Mod: 26,,, | Performed by: PATHOLOGY

## 2021-04-09 PROCEDURE — 71000033 HC RECOVERY, INTIAL HOUR: Performed by: THORACIC SURGERY (CARDIOTHORACIC VASCULAR SURGERY)

## 2021-04-09 PROCEDURE — 36000713 HC OR TIME LEV V EA ADD 15 MIN: Performed by: THORACIC SURGERY (CARDIOTHORACIC VASCULAR SURGERY)

## 2021-04-09 PROCEDURE — 63600175 PHARM REV CODE 636 W HCPCS: Performed by: ANESTHESIOLOGY

## 2021-04-09 PROCEDURE — 36000712 HC OR TIME LEV V 1ST 15 MIN: Performed by: THORACIC SURGERY (CARDIOTHORACIC VASCULAR SURGERY)

## 2021-04-09 PROCEDURE — 32663 THORACOSCOPY W/LOBECTOMY: CPT | Mod: AS,RT,, | Performed by: PHYSICIAN ASSISTANT

## 2021-04-09 PROCEDURE — 63600175 PHARM REV CODE 636 W HCPCS: Performed by: THORACIC SURGERY (CARDIOTHORACIC VASCULAR SURGERY)

## 2021-04-09 PROCEDURE — D9220A PRA ANESTHESIA: Mod: ANES,,, | Performed by: ANESTHESIOLOGY

## 2021-04-09 PROCEDURE — D9220A PRA ANESTHESIA: ICD-10-PCS | Mod: ANES,,, | Performed by: ANESTHESIOLOGY

## 2021-04-09 PROCEDURE — 25000003 PHARM REV CODE 250: Performed by: SURGERY

## 2021-04-09 PROCEDURE — D9220A PRA ANESTHESIA: Mod: CRNA,,, | Performed by: NURSE ANESTHETIST, CERTIFIED REGISTERED

## 2021-04-09 PROCEDURE — 88307 PR  SURG PATH,LEVEL V: ICD-10-PCS | Mod: 26,,, | Performed by: PATHOLOGY

## 2021-04-09 PROCEDURE — 71000016 HC POSTOP RECOV ADDL HR: Performed by: THORACIC SURGERY (CARDIOTHORACIC VASCULAR SURGERY)

## 2021-04-09 PROCEDURE — 27201037 HC PRESSURE MONITORING SET UP

## 2021-04-09 PROCEDURE — 32663 PR THORACOSCOPY SURG LOBECTOMY: ICD-10-PCS | Mod: RT,,, | Performed by: THORACIC SURGERY (CARDIOTHORACIC VASCULAR SURGERY)

## 2021-04-09 PROCEDURE — C9290 INJ, BUPIVACAINE LIPOSOME: HCPCS | Performed by: THORACIC SURGERY (CARDIOTHORACIC VASCULAR SURGERY)

## 2021-04-09 PROCEDURE — 63600175 PHARM REV CODE 636 W HCPCS: Performed by: STUDENT IN AN ORGANIZED HEALTH CARE EDUCATION/TRAINING PROGRAM

## 2021-04-09 PROCEDURE — 71000039 HC RECOVERY, EACH ADD'L HOUR: Performed by: THORACIC SURGERY (CARDIOTHORACIC VASCULAR SURGERY)

## 2021-04-09 PROCEDURE — 94640 AIRWAY INHALATION TREATMENT: CPT

## 2021-04-09 PROCEDURE — 32674 THORACOSCOPY LYMPH NODE EXC: CPT | Mod: ,,, | Performed by: THORACIC SURGERY (CARDIOTHORACIC VASCULAR SURGERY)

## 2021-04-09 PROCEDURE — 88305 TISSUE EXAM BY PATHOLOGIST: ICD-10-PCS | Mod: 26,,, | Performed by: PATHOLOGY

## 2021-04-09 PROCEDURE — 27201423 OPTIME MED/SURG SUP & DEVICES STERILE SUPPLY: Performed by: THORACIC SURGERY (CARDIOTHORACIC VASCULAR SURGERY)

## 2021-04-09 PROCEDURE — 36620 INSERTION CATHETER ARTERY: CPT | Mod: 59,,, | Performed by: ANESTHESIOLOGY

## 2021-04-09 PROCEDURE — D9220A PRA ANESTHESIA: ICD-10-PCS | Mod: CRNA,,, | Performed by: NURSE ANESTHETIST, CERTIFIED REGISTERED

## 2021-04-09 PROCEDURE — 86900 BLOOD TYPING SEROLOGIC ABO: CPT | Performed by: THORACIC SURGERY (CARDIOTHORACIC VASCULAR SURGERY)

## 2021-04-09 PROCEDURE — 88307 TISSUE EXAM BY PATHOLOGIST: CPT | Performed by: PATHOLOGY

## 2021-04-09 PROCEDURE — 37000008 HC ANESTHESIA 1ST 15 MINUTES: Performed by: THORACIC SURGERY (CARDIOTHORACIC VASCULAR SURGERY)

## 2021-04-09 PROCEDURE — 94799 UNLISTED PULMONARY SVC/PX: CPT

## 2021-04-09 PROCEDURE — 32674 PR THORACOSCOPY LYMPH NODE EXC: ICD-10-PCS | Mod: AS,,, | Performed by: PHYSICIAN ASSISTANT

## 2021-04-09 PROCEDURE — 32674 PR THORACOSCOPY LYMPH NODE EXC: ICD-10-PCS | Mod: ,,, | Performed by: THORACIC SURGERY (CARDIOTHORACIC VASCULAR SURGERY)

## 2021-04-09 RX ORDER — INSULIN ASPART 100 [IU]/ML
0-5 INJECTION, SOLUTION INTRAVENOUS; SUBCUTANEOUS
Status: DISCONTINUED | OUTPATIENT
Start: 2021-04-09 | End: 2021-04-12 | Stop reason: HOSPADM

## 2021-04-09 RX ORDER — OXYCODONE HYDROCHLORIDE 5 MG/1
5 TABLET ORAL EVERY 4 HOURS PRN
Status: DISCONTINUED | OUTPATIENT
Start: 2021-04-09 | End: 2021-04-12 | Stop reason: HOSPADM

## 2021-04-09 RX ORDER — GLUCAGON 1 MG
1 KIT INJECTION
Status: DISCONTINUED | OUTPATIENT
Start: 2021-04-09 | End: 2021-04-12 | Stop reason: HOSPADM

## 2021-04-09 RX ORDER — IBUPROFEN 200 MG
24 TABLET ORAL
Status: DISCONTINUED | OUTPATIENT
Start: 2021-04-09 | End: 2021-04-12 | Stop reason: HOSPADM

## 2021-04-09 RX ORDER — BUPIVACAINE HYDROCHLORIDE 2.5 MG/ML
INJECTION, SOLUTION EPIDURAL; INFILTRATION; INTRACAUDAL
Status: DISCONTINUED | OUTPATIENT
Start: 2021-04-09 | End: 2021-04-09 | Stop reason: HOSPADM

## 2021-04-09 RX ORDER — SODIUM CHLORIDE 0.9 % (FLUSH) 0.9 %
3 SYRINGE (ML) INJECTION
Status: DISCONTINUED | OUTPATIENT
Start: 2021-04-09 | End: 2021-04-09 | Stop reason: HOSPADM

## 2021-04-09 RX ORDER — AMOXICILLIN 250 MG
1 CAPSULE ORAL 2 TIMES DAILY
Status: DISCONTINUED | OUTPATIENT
Start: 2021-04-09 | End: 2021-04-12 | Stop reason: HOSPADM

## 2021-04-09 RX ORDER — CEFAZOLIN SODIUM 1 G/3ML
2 INJECTION, POWDER, FOR SOLUTION INTRAMUSCULAR; INTRAVENOUS
Status: COMPLETED | OUTPATIENT
Start: 2021-04-09 | End: 2021-04-09

## 2021-04-09 RX ORDER — POLYETHYLENE GLYCOL 3350 17 G/17G
17 POWDER, FOR SOLUTION ORAL DAILY
Status: DISCONTINUED | OUTPATIENT
Start: 2021-04-09 | End: 2021-04-12 | Stop reason: HOSPADM

## 2021-04-09 RX ORDER — IBUPROFEN 200 MG
16 TABLET ORAL
Status: DISCONTINUED | OUTPATIENT
Start: 2021-04-09 | End: 2021-04-12 | Stop reason: HOSPADM

## 2021-04-09 RX ORDER — HALOPERIDOL 5 MG/ML
0.5 INJECTION INTRAMUSCULAR EVERY 10 MIN PRN
Status: DISCONTINUED | OUTPATIENT
Start: 2021-04-09 | End: 2021-04-09 | Stop reason: HOSPADM

## 2021-04-09 RX ORDER — ONDANSETRON 8 MG/1
8 TABLET, ORALLY DISINTEGRATING ORAL EVERY 8 HOURS PRN
Status: DISCONTINUED | OUTPATIENT
Start: 2021-04-09 | End: 2021-04-12 | Stop reason: HOSPADM

## 2021-04-09 RX ORDER — CEFAZOLIN SODIUM 1 G/3ML
2 INJECTION, POWDER, FOR SOLUTION INTRAMUSCULAR; INTRAVENOUS
Status: COMPLETED | OUTPATIENT
Start: 2021-04-09 | End: 2021-04-10

## 2021-04-09 RX ORDER — IPRATROPIUM BROMIDE AND ALBUTEROL SULFATE 2.5; .5 MG/3ML; MG/3ML
3 SOLUTION RESPIRATORY (INHALATION)
Status: DISCONTINUED | OUTPATIENT
Start: 2021-04-09 | End: 2021-04-12 | Stop reason: HOSPADM

## 2021-04-09 RX ORDER — PHENYLEPHRINE HCL IN 0.9% NACL 1 MG/10 ML
SYRINGE (ML) INTRAVENOUS
Status: DISCONTINUED | OUTPATIENT
Start: 2021-04-09 | End: 2021-04-09

## 2021-04-09 RX ORDER — ACETAMINOPHEN 325 MG/1
650 TABLET ORAL EVERY 4 HOURS PRN
Status: DISCONTINUED | OUTPATIENT
Start: 2021-04-09 | End: 2021-04-12 | Stop reason: HOSPADM

## 2021-04-09 RX ORDER — ACETAMINOPHEN 325 MG/1
650 TABLET ORAL EVERY 8 HOURS
Status: DISCONTINUED | OUTPATIENT
Start: 2021-04-09 | End: 2021-04-12 | Stop reason: HOSPADM

## 2021-04-09 RX ORDER — ONDANSETRON 2 MG/ML
INJECTION INTRAMUSCULAR; INTRAVENOUS
Status: DISCONTINUED | OUTPATIENT
Start: 2021-04-09 | End: 2021-04-09

## 2021-04-09 RX ORDER — ENOXAPARIN SODIUM 100 MG/ML
40 INJECTION SUBCUTANEOUS EVERY 24 HOURS
Status: DISCONTINUED | OUTPATIENT
Start: 2021-04-09 | End: 2021-04-12 | Stop reason: HOSPADM

## 2021-04-09 RX ORDER — ATORVASTATIN CALCIUM 20 MG/1
20 TABLET, FILM COATED ORAL DAILY
Status: DISCONTINUED | OUTPATIENT
Start: 2021-04-10 | End: 2021-04-12 | Stop reason: HOSPADM

## 2021-04-09 RX ORDER — DEXMEDETOMIDINE HYDROCHLORIDE 100 UG/ML
INJECTION, SOLUTION INTRAVENOUS
Status: DISCONTINUED | OUTPATIENT
Start: 2021-04-09 | End: 2021-04-09

## 2021-04-09 RX ORDER — OXYCODONE HYDROCHLORIDE 10 MG/1
10 TABLET ORAL EVERY 4 HOURS PRN
Status: DISCONTINUED | OUTPATIENT
Start: 2021-04-09 | End: 2021-04-12 | Stop reason: HOSPADM

## 2021-04-09 RX ORDER — HYDROMORPHONE HYDROCHLORIDE 1 MG/ML
0.2 INJECTION, SOLUTION INTRAMUSCULAR; INTRAVENOUS; SUBCUTANEOUS EVERY 5 MIN PRN
Status: DISCONTINUED | OUTPATIENT
Start: 2021-04-09 | End: 2021-04-09 | Stop reason: HOSPADM

## 2021-04-09 RX ORDER — DEXAMETHASONE SODIUM PHOSPHATE 4 MG/ML
INJECTION, SOLUTION INTRA-ARTICULAR; INTRALESIONAL; INTRAMUSCULAR; INTRAVENOUS; SOFT TISSUE
Status: DISCONTINUED | OUTPATIENT
Start: 2021-04-09 | End: 2021-04-09

## 2021-04-09 RX ORDER — PROPOFOL 10 MG/ML
VIAL (ML) INTRAVENOUS
Status: DISCONTINUED | OUTPATIENT
Start: 2021-04-09 | End: 2021-04-09

## 2021-04-09 RX ORDER — LIDOCAINE 50 MG/G
1 PATCH TOPICAL
Status: DISCONTINUED | OUTPATIENT
Start: 2021-04-09 | End: 2021-04-12 | Stop reason: HOSPADM

## 2021-04-09 RX ORDER — SERTRALINE HYDROCHLORIDE 100 MG/1
100 TABLET, FILM COATED ORAL DAILY
Status: DISCONTINUED | OUTPATIENT
Start: 2021-04-09 | End: 2021-04-12 | Stop reason: HOSPADM

## 2021-04-09 RX ORDER — LIDOCAINE HYDROCHLORIDE 20 MG/ML
INJECTION, SOLUTION EPIDURAL; INFILTRATION; INTRACAUDAL; PERINEURAL
Status: DISCONTINUED | OUTPATIENT
Start: 2021-04-09 | End: 2021-04-09

## 2021-04-09 RX ORDER — ROCURONIUM BROMIDE 10 MG/ML
INJECTION, SOLUTION INTRAVENOUS
Status: DISCONTINUED | OUTPATIENT
Start: 2021-04-09 | End: 2021-04-09

## 2021-04-09 RX ORDER — BISACODYL 10 MG
10 SUPPOSITORY, RECTAL RECTAL DAILY PRN
Status: DISCONTINUED | OUTPATIENT
Start: 2021-04-09 | End: 2021-04-12 | Stop reason: HOSPADM

## 2021-04-09 RX ADMIN — SERTRALINE HYDROCHLORIDE 100 MG: 100 TABLET ORAL at 01:04

## 2021-04-09 RX ADMIN — LIDOCAINE HYDROCHLORIDE 50 MG: 20 INJECTION, SOLUTION EPIDURAL; INFILTRATION; INTRACAUDAL at 07:04

## 2021-04-09 RX ADMIN — HYDROMORPHONE HYDROCHLORIDE 0.2 MG: 1 INJECTION, SOLUTION INTRAMUSCULAR; INTRAVENOUS; SUBCUTANEOUS at 01:04

## 2021-04-09 RX ADMIN — PROPOFOL 100 MG: 10 INJECTION, EMULSION INTRAVENOUS at 07:04

## 2021-04-09 RX ADMIN — SODIUM CHLORIDE: 0.9 INJECTION, SOLUTION INTRAVENOUS at 07:04

## 2021-04-09 RX ADMIN — Medication 100 MCG: at 08:04

## 2021-04-09 RX ADMIN — ROCURONIUM BROMIDE 20 MG: 10 INJECTION, SOLUTION INTRAVENOUS at 08:04

## 2021-04-09 RX ADMIN — SUGAMMADEX 200 MG: 100 INJECTION, SOLUTION INTRAVENOUS at 11:04

## 2021-04-09 RX ADMIN — LIDOCAINE 1 PATCH: 50 PATCH TOPICAL at 09:04

## 2021-04-09 RX ADMIN — ACETAMINOPHEN 650 MG: 325 TABLET ORAL at 09:04

## 2021-04-09 RX ADMIN — ROCURONIUM BROMIDE 10 MG: 10 INJECTION, SOLUTION INTRAVENOUS at 09:04

## 2021-04-09 RX ADMIN — FENTANYL CITRATE 100 MCG: 50 INJECTION INTRAMUSCULAR; INTRAVENOUS at 07:04

## 2021-04-09 RX ADMIN — Medication 100 MCG: at 10:04

## 2021-04-09 RX ADMIN — ENOXAPARIN SODIUM 40 MG: 40 INJECTION SUBCUTANEOUS at 05:04

## 2021-04-09 RX ADMIN — DOCUSATE SODIUM 50MG AND SENNOSIDES 8.6MG 1 TABLET: 8.6; 5 TABLET, FILM COATED ORAL at 09:04

## 2021-04-09 RX ADMIN — ACETAMINOPHEN 650 MG: 325 TABLET ORAL at 01:04

## 2021-04-09 RX ADMIN — Medication 50 MCG: at 09:04

## 2021-04-09 RX ADMIN — DOCUSATE SODIUM 50MG AND SENNOSIDES 8.6MG 1 TABLET: 8.6; 5 TABLET, FILM COATED ORAL at 01:04

## 2021-04-09 RX ADMIN — DEXMEDETOMIDINE HYDROCHLORIDE 8 MCG: 100 INJECTION, SOLUTION, CONCENTRATE INTRAVENOUS at 11:04

## 2021-04-09 RX ADMIN — OXYCODONE 5 MG: 5 TABLET ORAL at 01:04

## 2021-04-09 RX ADMIN — CEFAZOLIN 2 G: 330 INJECTION, POWDER, FOR SOLUTION INTRAMUSCULAR; INTRAVENOUS at 07:04

## 2021-04-09 RX ADMIN — OXYCODONE HYDROCHLORIDE 10 MG: 10 TABLET ORAL at 06:04

## 2021-04-09 RX ADMIN — FENTANYL CITRATE 50 MCG: 50 INJECTION INTRAMUSCULAR; INTRAVENOUS at 10:04

## 2021-04-09 RX ADMIN — ROCURONIUM BROMIDE 50 MG: 10 INJECTION, SOLUTION INTRAVENOUS at 07:04

## 2021-04-09 RX ADMIN — IPRATROPIUM BROMIDE AND ALBUTEROL SULFATE 3 ML: .5; 2.5 SOLUTION RESPIRATORY (INHALATION) at 08:04

## 2021-04-09 RX ADMIN — DEXMEDETOMIDINE HYDROCHLORIDE 4 MCG: 100 INJECTION, SOLUTION, CONCENTRATE INTRAVENOUS at 11:04

## 2021-04-09 RX ADMIN — ONDANSETRON 4 MG: 2 INJECTION INTRAMUSCULAR; INTRAVENOUS at 10:04

## 2021-04-09 RX ADMIN — IPRATROPIUM BROMIDE AND ALBUTEROL SULFATE 3 ML: .5; 2.5 SOLUTION RESPIRATORY (INHALATION) at 01:04

## 2021-04-09 RX ADMIN — FENTANYL CITRATE 50 MCG: 50 INJECTION INTRAMUSCULAR; INTRAVENOUS at 09:04

## 2021-04-09 RX ADMIN — POLYETHYLENE GLYCOL 3350 17 G: 17 POWDER, FOR SOLUTION ORAL at 01:04

## 2021-04-09 RX ADMIN — CEFAZOLIN 2 G: 330 INJECTION, POWDER, FOR SOLUTION INTRAMUSCULAR; INTRAVENOUS at 05:04

## 2021-04-09 RX ADMIN — DEXAMETHASONE SODIUM PHOSPHATE 4 MG: 4 INJECTION INTRA-ARTICULAR; INTRALESIONAL; INTRAMUSCULAR; INTRAVENOUS; SOFT TISSUE at 07:04

## 2021-04-10 LAB
ESTIMATED AVG GLUCOSE: 123 MG/DL (ref 68–131)
HBA1C MFR BLD: 5.9 % (ref 4–5.6)
POCT GLUCOSE: 146 MG/DL (ref 70–110)
POCT GLUCOSE: 150 MG/DL (ref 70–110)
POCT GLUCOSE: 158 MG/DL (ref 70–110)

## 2021-04-10 PROCEDURE — 83036 HEMOGLOBIN GLYCOSYLATED A1C: CPT | Performed by: PHYSICIAN ASSISTANT

## 2021-04-10 PROCEDURE — 99900035 HC TECH TIME PER 15 MIN (STAT)

## 2021-04-10 PROCEDURE — 94761 N-INVAS EAR/PLS OXIMETRY MLT: CPT

## 2021-04-10 PROCEDURE — 27000221 HC OXYGEN, UP TO 24 HOURS

## 2021-04-10 PROCEDURE — 63600175 PHARM REV CODE 636 W HCPCS: Performed by: PHYSICIAN ASSISTANT

## 2021-04-10 PROCEDURE — 63600175 PHARM REV CODE 636 W HCPCS: Performed by: STUDENT IN AN ORGANIZED HEALTH CARE EDUCATION/TRAINING PROGRAM

## 2021-04-10 PROCEDURE — 94640 AIRWAY INHALATION TREATMENT: CPT

## 2021-04-10 PROCEDURE — 94799 UNLISTED PULMONARY SVC/PX: CPT

## 2021-04-10 PROCEDURE — 25000003 PHARM REV CODE 250: Performed by: PHYSICIAN ASSISTANT

## 2021-04-10 PROCEDURE — 25000242 PHARM REV CODE 250 ALT 637 W/ HCPCS: Performed by: PHYSICIAN ASSISTANT

## 2021-04-10 PROCEDURE — 25000003 PHARM REV CODE 250: Performed by: SURGERY

## 2021-04-10 PROCEDURE — 20600001 HC STEP DOWN PRIVATE ROOM

## 2021-04-10 PROCEDURE — 36415 COLL VENOUS BLD VENIPUNCTURE: CPT | Performed by: PHYSICIAN ASSISTANT

## 2021-04-10 RX ORDER — HYDROMORPHONE HYDROCHLORIDE 1 MG/ML
0.5 INJECTION, SOLUTION INTRAMUSCULAR; INTRAVENOUS; SUBCUTANEOUS ONCE
Status: COMPLETED | OUTPATIENT
Start: 2021-04-10 | End: 2021-04-10

## 2021-04-10 RX ADMIN — ACETAMINOPHEN 650 MG: 325 TABLET ORAL at 02:04

## 2021-04-10 RX ADMIN — OXYCODONE HYDROCHLORIDE 10 MG: 10 TABLET ORAL at 12:04

## 2021-04-10 RX ADMIN — ACETAMINOPHEN 650 MG: 325 TABLET ORAL at 06:04

## 2021-04-10 RX ADMIN — ATORVASTATIN CALCIUM 20 MG: 20 TABLET, FILM COATED ORAL at 08:04

## 2021-04-10 RX ADMIN — ONDANSETRON 8 MG: 8 TABLET, ORALLY DISINTEGRATING ORAL at 06:04

## 2021-04-10 RX ADMIN — SERTRALINE HYDROCHLORIDE 100 MG: 100 TABLET ORAL at 09:04

## 2021-04-10 RX ADMIN — POLYETHYLENE GLYCOL 3350 17 G: 17 POWDER, FOR SOLUTION ORAL at 08:04

## 2021-04-10 RX ADMIN — IPRATROPIUM BROMIDE AND ALBUTEROL SULFATE 3 ML: .5; 2.5 SOLUTION RESPIRATORY (INHALATION) at 08:04

## 2021-04-10 RX ADMIN — OXYCODONE HYDROCHLORIDE 10 MG: 10 TABLET ORAL at 08:04

## 2021-04-10 RX ADMIN — DOCUSATE SODIUM 50MG AND SENNOSIDES 8.6MG 1 TABLET: 8.6; 5 TABLET, FILM COATED ORAL at 08:04

## 2021-04-10 RX ADMIN — LIDOCAINE 1 PATCH: 50 PATCH TOPICAL at 08:04

## 2021-04-10 RX ADMIN — ACETAMINOPHEN 650 MG: 325 TABLET ORAL at 10:04

## 2021-04-10 RX ADMIN — HYDROMORPHONE HYDROCHLORIDE 0.5 MG: 1 INJECTION, SOLUTION INTRAMUSCULAR; INTRAVENOUS; SUBCUTANEOUS at 02:04

## 2021-04-10 RX ADMIN — OXYCODONE 5 MG: 5 TABLET ORAL at 12:04

## 2021-04-10 RX ADMIN — CEFAZOLIN 2 G: 330 INJECTION, POWDER, FOR SOLUTION INTRAMUSCULAR; INTRAVENOUS at 12:04

## 2021-04-10 RX ADMIN — ENOXAPARIN SODIUM 40 MG: 40 INJECTION SUBCUTANEOUS at 05:04

## 2021-04-11 LAB
POCT GLUCOSE: 114 MG/DL (ref 70–110)
POCT GLUCOSE: 143 MG/DL (ref 70–110)
POCT GLUCOSE: 148 MG/DL (ref 70–110)

## 2021-04-11 PROCEDURE — 94640 AIRWAY INHALATION TREATMENT: CPT

## 2021-04-11 PROCEDURE — 94761 N-INVAS EAR/PLS OXIMETRY MLT: CPT

## 2021-04-11 PROCEDURE — 25000003 PHARM REV CODE 250: Performed by: PHYSICIAN ASSISTANT

## 2021-04-11 PROCEDURE — 25000003 PHARM REV CODE 250: Performed by: SURGERY

## 2021-04-11 PROCEDURE — 99900035 HC TECH TIME PER 15 MIN (STAT)

## 2021-04-11 PROCEDURE — 94664 DEMO&/EVAL PT USE INHALER: CPT

## 2021-04-11 PROCEDURE — 63600175 PHARM REV CODE 636 W HCPCS: Performed by: PHYSICIAN ASSISTANT

## 2021-04-11 PROCEDURE — 94799 UNLISTED PULMONARY SVC/PX: CPT

## 2021-04-11 PROCEDURE — 25000242 PHARM REV CODE 250 ALT 637 W/ HCPCS: Performed by: PHYSICIAN ASSISTANT

## 2021-04-11 PROCEDURE — 27000221 HC OXYGEN, UP TO 24 HOURS

## 2021-04-11 PROCEDURE — 27000646 HC AEROBIKA DEVICE

## 2021-04-11 PROCEDURE — 20600001 HC STEP DOWN PRIVATE ROOM

## 2021-04-11 RX ADMIN — OXYCODONE 5 MG: 5 TABLET ORAL at 10:04

## 2021-04-11 RX ADMIN — IPRATROPIUM BROMIDE AND ALBUTEROL SULFATE 3 ML: .5; 2.5 SOLUTION RESPIRATORY (INHALATION) at 08:04

## 2021-04-11 RX ADMIN — LIDOCAINE 1 PATCH: 50 PATCH TOPICAL at 09:04

## 2021-04-11 RX ADMIN — OXYCODONE HYDROCHLORIDE 10 MG: 10 TABLET ORAL at 04:04

## 2021-04-11 RX ADMIN — ACETAMINOPHEN 650 MG: 325 TABLET ORAL at 01:04

## 2021-04-11 RX ADMIN — ACETAMINOPHEN 650 MG: 325 TABLET ORAL at 05:04

## 2021-04-11 RX ADMIN — DOCUSATE SODIUM 50MG AND SENNOSIDES 8.6MG 1 TABLET: 8.6; 5 TABLET, FILM COATED ORAL at 09:04

## 2021-04-11 RX ADMIN — IPRATROPIUM BROMIDE AND ALBUTEROL SULFATE 3 ML: .5; 2.5 SOLUTION RESPIRATORY (INHALATION) at 01:04

## 2021-04-11 RX ADMIN — DOCUSATE SODIUM 50MG AND SENNOSIDES 8.6MG 1 TABLET: 8.6; 5 TABLET, FILM COATED ORAL at 08:04

## 2021-04-11 RX ADMIN — SERTRALINE HYDROCHLORIDE 100 MG: 100 TABLET ORAL at 08:04

## 2021-04-11 RX ADMIN — ENOXAPARIN SODIUM 40 MG: 40 INJECTION SUBCUTANEOUS at 04:04

## 2021-04-11 RX ADMIN — POLYETHYLENE GLYCOL 3350 17 G: 17 POWDER, FOR SOLUTION ORAL at 08:04

## 2021-04-11 RX ADMIN — ATORVASTATIN CALCIUM 20 MG: 20 TABLET, FILM COATED ORAL at 08:04

## 2021-04-11 RX ADMIN — OXYCODONE 5 MG: 5 TABLET ORAL at 04:04

## 2021-04-11 RX ADMIN — OXYCODONE 5 MG: 5 TABLET ORAL at 09:04

## 2021-04-11 RX ADMIN — IPRATROPIUM BROMIDE AND ALBUTEROL SULFATE 3 ML: .5; 2.5 SOLUTION RESPIRATORY (INHALATION) at 09:04

## 2021-04-11 RX ADMIN — ACETAMINOPHEN 650 MG: 325 TABLET ORAL at 09:04

## 2021-04-12 VITALS
SYSTOLIC BLOOD PRESSURE: 146 MMHG | BODY MASS INDEX: 29.27 KG/M2 | RESPIRATION RATE: 19 BRPM | HEIGHT: 61 IN | WEIGHT: 155 LBS | TEMPERATURE: 98 F | HEART RATE: 90 BPM | DIASTOLIC BLOOD PRESSURE: 65 MMHG | OXYGEN SATURATION: 90 %

## 2021-04-12 LAB
POCT GLUCOSE: 126 MG/DL (ref 70–110)
POCT GLUCOSE: 142 MG/DL (ref 70–110)
POTASSIUM SERPL-SCNC: 4.1 MMOL/L (ref 3.5–5.1)

## 2021-04-12 PROCEDURE — 27000646 HC AEROBIKA DEVICE

## 2021-04-12 PROCEDURE — 27000221 HC OXYGEN, UP TO 24 HOURS

## 2021-04-12 PROCEDURE — 25000003 PHARM REV CODE 250: Performed by: PHYSICIAN ASSISTANT

## 2021-04-12 PROCEDURE — 84132 ASSAY OF SERUM POTASSIUM: CPT | Performed by: PHYSICIAN ASSISTANT

## 2021-04-12 PROCEDURE — 99900035 HC TECH TIME PER 15 MIN (STAT)

## 2021-04-12 PROCEDURE — 94640 AIRWAY INHALATION TREATMENT: CPT

## 2021-04-12 PROCEDURE — 94761 N-INVAS EAR/PLS OXIMETRY MLT: CPT

## 2021-04-12 PROCEDURE — 25000242 PHARM REV CODE 250 ALT 637 W/ HCPCS: Performed by: PHYSICIAN ASSISTANT

## 2021-04-12 PROCEDURE — 36415 COLL VENOUS BLD VENIPUNCTURE: CPT | Performed by: PHYSICIAN ASSISTANT

## 2021-04-12 PROCEDURE — 94664 DEMO&/EVAL PT USE INHALER: CPT

## 2021-04-12 PROCEDURE — 63600175 PHARM REV CODE 636 W HCPCS: Performed by: PHYSICIAN ASSISTANT

## 2021-04-12 RX ORDER — OXYCODONE HYDROCHLORIDE 5 MG/1
5 TABLET ORAL
Qty: 41 TABLET | Refills: 0 | Status: SHIPPED | OUTPATIENT
Start: 2021-04-12 | End: 2022-05-02

## 2021-04-12 RX ORDER — FUROSEMIDE 10 MG/ML
20 INJECTION INTRAMUSCULAR; INTRAVENOUS ONCE
Status: COMPLETED | OUTPATIENT
Start: 2021-04-12 | End: 2021-04-12

## 2021-04-12 RX ADMIN — DOCUSATE SODIUM 50MG AND SENNOSIDES 8.6MG 1 TABLET: 8.6; 5 TABLET, FILM COATED ORAL at 09:04

## 2021-04-12 RX ADMIN — ATORVASTATIN CALCIUM 20 MG: 20 TABLET, FILM COATED ORAL at 09:04

## 2021-04-12 RX ADMIN — IPRATROPIUM BROMIDE AND ALBUTEROL SULFATE 3 ML: .5; 2.5 SOLUTION RESPIRATORY (INHALATION) at 08:04

## 2021-04-12 RX ADMIN — FUROSEMIDE 20 MG: 10 INJECTION, SOLUTION INTRAMUSCULAR; INTRAVENOUS at 09:04

## 2021-04-12 RX ADMIN — SERTRALINE HYDROCHLORIDE 100 MG: 100 TABLET ORAL at 09:04

## 2021-04-12 RX ADMIN — POLYETHYLENE GLYCOL 3350 17 G: 17 POWDER, FOR SOLUTION ORAL at 09:04

## 2021-04-14 LAB
FINAL PATHOLOGIC DIAGNOSIS: NORMAL
GROSS: NORMAL
Lab: NORMAL
MICROSCOPIC EXAM: NORMAL

## 2021-04-15 ENCOUNTER — TELEPHONE (OUTPATIENT)
Dept: INTERNAL MEDICINE | Facility: CLINIC | Age: 71
End: 2021-04-15

## 2021-04-16 DIAGNOSIS — E78.00 PURE HYPERCHOLESTEROLEMIA: ICD-10-CM

## 2021-04-16 RX ORDER — ATORVASTATIN CALCIUM 20 MG/1
TABLET, FILM COATED ORAL
Qty: 90 TABLET | Refills: 1 | Status: SHIPPED | OUTPATIENT
Start: 2021-04-16 | End: 2021-06-01 | Stop reason: SDUPTHER

## 2021-04-19 ENCOUNTER — NURSE TRIAGE (OUTPATIENT)
Dept: ADMINISTRATIVE | Facility: CLINIC | Age: 71
End: 2021-04-19

## 2021-04-22 ENCOUNTER — OFFICE VISIT (OUTPATIENT)
Dept: CARDIOTHORACIC SURGERY | Facility: CLINIC | Age: 71
End: 2021-04-22
Payer: MEDICARE

## 2021-04-22 VITALS
SYSTOLIC BLOOD PRESSURE: 134 MMHG | HEIGHT: 61 IN | DIASTOLIC BLOOD PRESSURE: 75 MMHG | HEART RATE: 87 BPM | BODY MASS INDEX: 28.31 KG/M2 | WEIGHT: 149.94 LBS | OXYGEN SATURATION: 97 %

## 2021-04-22 DIAGNOSIS — C34.91 ADENOCARCINOMA OF RIGHT LUNG: ICD-10-CM

## 2021-04-22 DIAGNOSIS — C34.31 MALIGNANT NEOPLASM OF LOWER LOBE, RIGHT BRONCHUS OR LUNG: Primary | ICD-10-CM

## 2021-04-22 PROCEDURE — 1101F PT FALLS ASSESS-DOCD LE1/YR: CPT | Mod: CPTII,S$GLB,, | Performed by: THORACIC SURGERY (CARDIOTHORACIC VASCULAR SURGERY)

## 2021-04-22 PROCEDURE — 99024 PR POST-OP FOLLOW-UP VISIT: ICD-10-PCS | Mod: S$GLB,,, | Performed by: THORACIC SURGERY (CARDIOTHORACIC VASCULAR SURGERY)

## 2021-04-22 PROCEDURE — 1157F ADVNC CARE PLAN IN RCRD: CPT | Mod: S$GLB,,, | Performed by: THORACIC SURGERY (CARDIOTHORACIC VASCULAR SURGERY)

## 2021-04-22 PROCEDURE — 99999 PR PBB SHADOW E&M-EST. PATIENT-LVL III: ICD-10-PCS | Mod: PBBFAC,,, | Performed by: THORACIC SURGERY (CARDIOTHORACIC VASCULAR SURGERY)

## 2021-04-22 PROCEDURE — 3008F PR BODY MASS INDEX (BMI) DOCUMENTED: ICD-10-PCS | Mod: CPTII,S$GLB,, | Performed by: THORACIC SURGERY (CARDIOTHORACIC VASCULAR SURGERY)

## 2021-04-22 PROCEDURE — 99999 PR PBB SHADOW E&M-EST. PATIENT-LVL III: CPT | Mod: PBBFAC,,, | Performed by: THORACIC SURGERY (CARDIOTHORACIC VASCULAR SURGERY)

## 2021-04-22 PROCEDURE — 3288F FALL RISK ASSESSMENT DOCD: CPT | Mod: CPTII,S$GLB,, | Performed by: THORACIC SURGERY (CARDIOTHORACIC VASCULAR SURGERY)

## 2021-04-22 PROCEDURE — 3288F PR FALLS RISK ASSESSMENT DOCUMENTED: ICD-10-PCS | Mod: CPTII,S$GLB,, | Performed by: THORACIC SURGERY (CARDIOTHORACIC VASCULAR SURGERY)

## 2021-04-22 PROCEDURE — 1125F AMNT PAIN NOTED PAIN PRSNT: CPT | Mod: S$GLB,,, | Performed by: THORACIC SURGERY (CARDIOTHORACIC VASCULAR SURGERY)

## 2021-04-22 PROCEDURE — 1101F PR PT FALLS ASSESS DOC 0-1 FALLS W/OUT INJ PAST YR: ICD-10-PCS | Mod: CPTII,S$GLB,, | Performed by: THORACIC SURGERY (CARDIOTHORACIC VASCULAR SURGERY)

## 2021-04-22 PROCEDURE — 99024 POSTOP FOLLOW-UP VISIT: CPT | Mod: S$GLB,,, | Performed by: THORACIC SURGERY (CARDIOTHORACIC VASCULAR SURGERY)

## 2021-04-22 PROCEDURE — 3008F BODY MASS INDEX DOCD: CPT | Mod: CPTII,S$GLB,, | Performed by: THORACIC SURGERY (CARDIOTHORACIC VASCULAR SURGERY)

## 2021-04-22 PROCEDURE — 1157F PR ADVANCE CARE PLAN OR EQUIV PRESENT IN MEDICAL RECORD: ICD-10-PCS | Mod: S$GLB,,, | Performed by: THORACIC SURGERY (CARDIOTHORACIC VASCULAR SURGERY)

## 2021-04-22 PROCEDURE — 1125F PR PAIN SEVERITY QUANTIFIED, PAIN PRESENT: ICD-10-PCS | Mod: S$GLB,,, | Performed by: THORACIC SURGERY (CARDIOTHORACIC VASCULAR SURGERY)

## 2021-04-22 RX ORDER — GABAPENTIN 300 MG/1
300 CAPSULE ORAL 3 TIMES DAILY
Qty: 90 CAPSULE | Refills: 11 | Status: SHIPPED | OUTPATIENT
Start: 2021-04-22 | End: 2023-06-27

## 2021-04-27 PROBLEM — C34.91 ADENOCARCINOMA OF RIGHT LUNG: Status: ACTIVE | Noted: 2021-04-27

## 2021-06-01 ENCOUNTER — OFFICE VISIT (OUTPATIENT)
Dept: INTERNAL MEDICINE | Facility: CLINIC | Age: 71
End: 2021-06-01
Payer: MEDICARE

## 2021-06-01 VITALS
RESPIRATION RATE: 17 BRPM | SYSTOLIC BLOOD PRESSURE: 122 MMHG | TEMPERATURE: 98 F | WEIGHT: 151.25 LBS | DIASTOLIC BLOOD PRESSURE: 68 MMHG | HEIGHT: 61 IN | HEART RATE: 75 BPM | BODY MASS INDEX: 28.56 KG/M2 | OXYGEN SATURATION: 98 %

## 2021-06-01 DIAGNOSIS — F32.9 REACTIVE DEPRESSION: ICD-10-CM

## 2021-06-01 DIAGNOSIS — I10 ESSENTIAL HYPERTENSION: ICD-10-CM

## 2021-06-01 DIAGNOSIS — E78.00 PURE HYPERCHOLESTEROLEMIA: ICD-10-CM

## 2021-06-01 DIAGNOSIS — R25.2 BILATERAL LEG CRAMPS: ICD-10-CM

## 2021-06-01 DIAGNOSIS — Z78.0 POSTMENOPAUSAL: ICD-10-CM

## 2021-06-01 DIAGNOSIS — Z00.00 ANNUAL PHYSICAL EXAM: Primary | ICD-10-CM

## 2021-06-01 DIAGNOSIS — E11.9 TYPE 2 DIABETES MELLITUS WITHOUT RETINOPATHY: ICD-10-CM

## 2021-06-01 DIAGNOSIS — F41.9 ANXIETY: ICD-10-CM

## 2021-06-01 DIAGNOSIS — R73.03 PREDIABETES: ICD-10-CM

## 2021-06-01 PROCEDURE — 3008F BODY MASS INDEX DOCD: CPT | Mod: CPTII,S$GLB,, | Performed by: INTERNAL MEDICINE

## 2021-06-01 PROCEDURE — 1125F AMNT PAIN NOTED PAIN PRSNT: CPT | Mod: S$GLB,,, | Performed by: INTERNAL MEDICINE

## 2021-06-01 PROCEDURE — 99999 PR PBB SHADOW E&M-EST. PATIENT-LVL III: CPT | Mod: PBBFAC,,, | Performed by: INTERNAL MEDICINE

## 2021-06-01 PROCEDURE — 3288F FALL RISK ASSESSMENT DOCD: CPT | Mod: CPTII,S$GLB,, | Performed by: INTERNAL MEDICINE

## 2021-06-01 PROCEDURE — 99499 RISK ADDL DX/OHS AUDIT: ICD-10-PCS | Mod: S$GLB,,, | Performed by: INTERNAL MEDICINE

## 2021-06-01 PROCEDURE — 99999 PR PBB SHADOW E&M-EST. PATIENT-LVL III: ICD-10-PCS | Mod: PBBFAC,,, | Performed by: INTERNAL MEDICINE

## 2021-06-01 PROCEDURE — 1157F PR ADVANCE CARE PLAN OR EQUIV PRESENT IN MEDICAL RECORD: ICD-10-PCS | Mod: S$GLB,,, | Performed by: INTERNAL MEDICINE

## 2021-06-01 PROCEDURE — 3288F PR FALLS RISK ASSESSMENT DOCUMENTED: ICD-10-PCS | Mod: CPTII,S$GLB,, | Performed by: INTERNAL MEDICINE

## 2021-06-01 PROCEDURE — 3008F PR BODY MASS INDEX (BMI) DOCUMENTED: ICD-10-PCS | Mod: CPTII,S$GLB,, | Performed by: INTERNAL MEDICINE

## 2021-06-01 PROCEDURE — 99499 UNLISTED E&M SERVICE: CPT | Mod: S$GLB,,, | Performed by: INTERNAL MEDICINE

## 2021-06-01 PROCEDURE — 99214 OFFICE O/P EST MOD 30 MIN: CPT | Mod: S$GLB,,, | Performed by: INTERNAL MEDICINE

## 2021-06-01 PROCEDURE — 1101F PT FALLS ASSESS-DOCD LE1/YR: CPT | Mod: CPTII,S$GLB,, | Performed by: INTERNAL MEDICINE

## 2021-06-01 PROCEDURE — 99214 PR OFFICE/OUTPT VISIT, EST, LEVL IV, 30-39 MIN: ICD-10-PCS | Mod: S$GLB,,, | Performed by: INTERNAL MEDICINE

## 2021-06-01 PROCEDURE — 1125F PR PAIN SEVERITY QUANTIFIED, PAIN PRESENT: ICD-10-PCS | Mod: S$GLB,,, | Performed by: INTERNAL MEDICINE

## 2021-06-01 PROCEDURE — 1157F ADVNC CARE PLAN IN RCRD: CPT | Mod: S$GLB,,, | Performed by: INTERNAL MEDICINE

## 2021-06-01 PROCEDURE — 1101F PR PT FALLS ASSESS DOC 0-1 FALLS W/OUT INJ PAST YR: ICD-10-PCS | Mod: CPTII,S$GLB,, | Performed by: INTERNAL MEDICINE

## 2021-06-01 RX ORDER — ERYTHROMYCIN 5 MG/G
OINTMENT OPHTHALMIC
COMMUNITY
Start: 2021-05-20 | End: 2022-12-08

## 2021-06-01 RX ORDER — ATORVASTATIN CALCIUM 20 MG/1
TABLET, FILM COATED ORAL
Qty: 90 TABLET | Refills: 3 | Status: SHIPPED | OUTPATIENT
Start: 2021-06-01 | End: 2021-12-02

## 2021-06-01 RX ORDER — OLMESARTAN MEDOXOMIL 40 MG/1
40 TABLET ORAL DAILY
Qty: 90 TABLET | Refills: 3 | Status: SHIPPED | OUTPATIENT
Start: 2021-06-01 | End: 2022-06-04

## 2021-06-01 RX ORDER — SPIRONOLACTONE 25 MG/1
25 TABLET ORAL DAILY
Qty: 90 TABLET | Refills: 3 | Status: SHIPPED | OUTPATIENT
Start: 2021-06-01 | End: 2022-08-14

## 2021-06-01 RX ORDER — METFORMIN HYDROCHLORIDE 500 MG/1
500 TABLET ORAL 2 TIMES DAILY WITH MEALS
Qty: 180 TABLET | Refills: 3 | Status: SHIPPED | OUTPATIENT
Start: 2021-06-01 | End: 2022-06-14 | Stop reason: SDUPTHER

## 2021-06-01 RX ORDER — SERTRALINE HYDROCHLORIDE 100 MG/1
100 TABLET, FILM COATED ORAL DAILY
Qty: 90 TABLET | Refills: 3 | Status: SHIPPED | OUTPATIENT
Start: 2021-06-01 | End: 2022-08-19 | Stop reason: SDUPTHER

## 2021-06-01 RX ORDER — ZOSTER VACCINE RECOMBINANT, ADJUVANTED 50 MCG/0.5
0.5 KIT INTRAMUSCULAR ONCE
Qty: 1 EACH | Refills: 1 | Status: SHIPPED | OUTPATIENT
Start: 2021-06-01 | End: 2021-06-01

## 2021-06-01 RX ORDER — OFLOXACIN 3 MG/ML
1 SOLUTION/ DROPS OPHTHALMIC 4 TIMES DAILY
COMMUNITY
Start: 2021-05-19 | End: 2023-06-30 | Stop reason: ALTCHOICE

## 2021-06-04 ENCOUNTER — LAB VISIT (OUTPATIENT)
Dept: LAB | Facility: HOSPITAL | Age: 71
End: 2021-06-04
Attending: INTERNAL MEDICINE
Payer: MEDICARE

## 2021-06-04 DIAGNOSIS — E11.9 TYPE 2 DIABETES MELLITUS WITHOUT RETINOPATHY: ICD-10-CM

## 2021-06-04 DIAGNOSIS — R73.03 PREDIABETES: ICD-10-CM

## 2021-06-04 DIAGNOSIS — Z78.0 POSTMENOPAUSAL: ICD-10-CM

## 2021-06-04 DIAGNOSIS — Z00.00 ANNUAL PHYSICAL EXAM: ICD-10-CM

## 2021-06-04 DIAGNOSIS — F41.9 ANXIETY: ICD-10-CM

## 2021-06-04 DIAGNOSIS — R25.2 BILATERAL LEG CRAMPS: ICD-10-CM

## 2021-06-04 LAB
ALBUMIN SERPL BCP-MCNC: 4 G/DL (ref 3.5–5.2)
ALP SERPL-CCNC: 103 U/L (ref 55–135)
ALT SERPL W/O P-5'-P-CCNC: 13 U/L (ref 10–44)
ANION GAP SERPL CALC-SCNC: 10 MMOL/L (ref 8–16)
AST SERPL-CCNC: 16 U/L (ref 10–40)
BASOPHILS # BLD AUTO: 0.09 K/UL (ref 0–0.2)
BASOPHILS NFR BLD: 1.3 % (ref 0–1.9)
BILIRUB SERPL-MCNC: 0.3 MG/DL (ref 0.1–1)
BUN SERPL-MCNC: 24 MG/DL (ref 8–23)
CALCIUM SERPL-MCNC: 9.8 MG/DL (ref 8.7–10.5)
CHLORIDE SERPL-SCNC: 107 MMOL/L (ref 95–110)
CHOLEST SERPL-MCNC: 144 MG/DL (ref 120–199)
CHOLEST/HDLC SERPL: 3.1 {RATIO} (ref 2–5)
CO2 SERPL-SCNC: 23 MMOL/L (ref 23–29)
CREAT SERPL-MCNC: 1.1 MG/DL (ref 0.5–1.4)
DIFFERENTIAL METHOD: ABNORMAL
EOSINOPHIL # BLD AUTO: 0.6 K/UL (ref 0–0.5)
EOSINOPHIL NFR BLD: 8.9 % (ref 0–8)
ERYTHROCYTE [DISTWIDTH] IN BLOOD BY AUTOMATED COUNT: 13.8 % (ref 11.5–14.5)
EST. GFR  (AFRICAN AMERICAN): 58 ML/MIN/1.73 M^2
EST. GFR  (NON AFRICAN AMERICAN): 50.3 ML/MIN/1.73 M^2
ESTIMATED AVG GLUCOSE: 126 MG/DL (ref 68–131)
GLUCOSE SERPL-MCNC: 101 MG/DL (ref 70–110)
HBA1C MFR BLD: 6 % (ref 4–5.6)
HCT VFR BLD AUTO: 36.9 % (ref 37–48.5)
HDLC SERPL-MCNC: 47 MG/DL (ref 40–75)
HDLC SERPL: 32.6 % (ref 20–50)
HGB BLD-MCNC: 11.5 G/DL (ref 12–16)
IMM GRANULOCYTES # BLD AUTO: 0.03 K/UL (ref 0–0.04)
IMM GRANULOCYTES NFR BLD AUTO: 0.4 % (ref 0–0.5)
LDLC SERPL CALC-MCNC: 69.2 MG/DL (ref 63–159)
LYMPHOCYTES # BLD AUTO: 1 K/UL (ref 1–4.8)
LYMPHOCYTES NFR BLD: 14.8 % (ref 18–48)
MAGNESIUM SERPL-MCNC: 1.9 MG/DL (ref 1.6–2.6)
MCH RBC QN AUTO: 28.5 PG (ref 27–31)
MCHC RBC AUTO-ENTMCNC: 31.2 G/DL (ref 32–36)
MCV RBC AUTO: 92 FL (ref 82–98)
MONOCYTES # BLD AUTO: 0.7 K/UL (ref 0.3–1)
MONOCYTES NFR BLD: 9.6 % (ref 4–15)
NEUTROPHILS # BLD AUTO: 4.4 K/UL (ref 1.8–7.7)
NEUTROPHILS NFR BLD: 65 % (ref 38–73)
NONHDLC SERPL-MCNC: 97 MG/DL
NRBC BLD-RTO: 0 /100 WBC
PLATELET # BLD AUTO: 297 K/UL (ref 150–450)
PMV BLD AUTO: 10.5 FL (ref 9.2–12.9)
POTASSIUM SERPL-SCNC: 4.5 MMOL/L (ref 3.5–5.1)
PROT SERPL-MCNC: 7.2 G/DL (ref 6–8.4)
RBC # BLD AUTO: 4.03 M/UL (ref 4–5.4)
SODIUM SERPL-SCNC: 140 MMOL/L (ref 136–145)
TRIGL SERPL-MCNC: 139 MG/DL (ref 30–150)
TSH SERPL DL<=0.005 MIU/L-ACNC: 1.97 UIU/ML (ref 0.4–4)
WBC # BLD AUTO: 6.76 K/UL (ref 3.9–12.7)

## 2021-06-04 PROCEDURE — 83735 ASSAY OF MAGNESIUM: CPT | Performed by: INTERNAL MEDICINE

## 2021-06-04 PROCEDURE — 36415 COLL VENOUS BLD VENIPUNCTURE: CPT | Mod: PO | Performed by: INTERNAL MEDICINE

## 2021-06-04 PROCEDURE — 84443 ASSAY THYROID STIM HORMONE: CPT | Performed by: INTERNAL MEDICINE

## 2021-06-04 PROCEDURE — 83036 HEMOGLOBIN GLYCOSYLATED A1C: CPT | Performed by: INTERNAL MEDICINE

## 2021-06-04 PROCEDURE — 80053 COMPREHEN METABOLIC PANEL: CPT | Performed by: INTERNAL MEDICINE

## 2021-06-04 PROCEDURE — 80061 LIPID PANEL: CPT | Performed by: INTERNAL MEDICINE

## 2021-06-04 PROCEDURE — 85025 COMPLETE CBC W/AUTO DIFF WBC: CPT | Performed by: INTERNAL MEDICINE

## 2021-06-15 ENCOUNTER — TELEPHONE (OUTPATIENT)
Dept: INTERNAL MEDICINE | Facility: CLINIC | Age: 71
End: 2021-06-15

## 2021-06-15 RX ORDER — ROPINIROLE 2 MG/1
2 TABLET, FILM COATED ORAL NIGHTLY
Qty: 30 TABLET | Refills: 11 | Status: SHIPPED | OUTPATIENT
Start: 2021-06-15 | End: 2023-06-27

## 2021-06-25 ENCOUNTER — APPOINTMENT (OUTPATIENT)
Dept: RADIOLOGY | Facility: CLINIC | Age: 71
End: 2021-06-25
Attending: INTERNAL MEDICINE
Payer: MEDICARE

## 2021-06-25 DIAGNOSIS — Z78.0 POSTMENOPAUSAL: ICD-10-CM

## 2021-06-25 PROCEDURE — 77080 DXA BONE DENSITY AXIAL: CPT | Mod: 26,,, | Performed by: INTERNAL MEDICINE

## 2021-06-25 PROCEDURE — 77080 DXA BONE DENSITY AXIAL: CPT | Mod: TC,PO

## 2021-06-25 PROCEDURE — 77080 DEXA BONE DENSITY SPINE HIP: ICD-10-PCS | Mod: 26,,, | Performed by: INTERNAL MEDICINE

## 2021-06-29 ENCOUNTER — OFFICE VISIT (OUTPATIENT)
Dept: SPORTS MEDICINE | Facility: CLINIC | Age: 71
End: 2021-06-29
Payer: MEDICARE

## 2021-06-29 VITALS
BODY MASS INDEX: 28.51 KG/M2 | DIASTOLIC BLOOD PRESSURE: 84 MMHG | SYSTOLIC BLOOD PRESSURE: 171 MMHG | WEIGHT: 151 LBS | HEART RATE: 85 BPM | HEIGHT: 61 IN

## 2021-06-29 DIAGNOSIS — M70.62 GREATER TROCHANTERIC BURSITIS OF BOTH HIPS: Primary | ICD-10-CM

## 2021-06-29 DIAGNOSIS — M76.31 IT BAND SYNDROME, RIGHT: ICD-10-CM

## 2021-06-29 DIAGNOSIS — M25.551 LATERAL PAIN OF RIGHT HIP: ICD-10-CM

## 2021-06-29 DIAGNOSIS — M79.10 MYALGIA: ICD-10-CM

## 2021-06-29 DIAGNOSIS — M76.32 IT BAND SYNDROME, LEFT: ICD-10-CM

## 2021-06-29 DIAGNOSIS — M99.03 SOMATIC DYSFUNCTION OF LUMBAR REGION: ICD-10-CM

## 2021-06-29 DIAGNOSIS — M70.61 GREATER TROCHANTERIC BURSITIS OF BOTH HIPS: Primary | ICD-10-CM

## 2021-06-29 DIAGNOSIS — M99.04 SACRAL REGION SOMATIC DYSFUNCTION: ICD-10-CM

## 2021-06-29 DIAGNOSIS — M25.552 LATERAL PAIN OF LEFT HIP: ICD-10-CM

## 2021-06-29 DIAGNOSIS — M99.05 SOMATIC DYSFUNCTION OF PELVIC REGION: ICD-10-CM

## 2021-06-29 PROCEDURE — 98926 OSTEOPATH MANJ 3-4 REGIONS: CPT | Mod: S$GLB,,, | Performed by: NEUROMUSCULOSKELETAL MEDICINE & OMM

## 2021-06-29 PROCEDURE — 1101F PT FALLS ASSESS-DOCD LE1/YR: CPT | Mod: CPTII,S$GLB,, | Performed by: NEUROMUSCULOSKELETAL MEDICINE & OMM

## 2021-06-29 PROCEDURE — 1159F MED LIST DOCD IN RCRD: CPT | Mod: S$GLB,,, | Performed by: NEUROMUSCULOSKELETAL MEDICINE & OMM

## 2021-06-29 PROCEDURE — 3288F FALL RISK ASSESSMENT DOCD: CPT | Mod: CPTII,S$GLB,, | Performed by: NEUROMUSCULOSKELETAL MEDICINE & OMM

## 2021-06-29 PROCEDURE — 1157F ADVNC CARE PLAN IN RCRD: CPT | Mod: S$GLB,,, | Performed by: NEUROMUSCULOSKELETAL MEDICINE & OMM

## 2021-06-29 PROCEDURE — 99999 PR PBB SHADOW E&M-EST. PATIENT-LVL III: CPT | Mod: PBBFAC,,, | Performed by: NEUROMUSCULOSKELETAL MEDICINE & OMM

## 2021-06-29 PROCEDURE — 1157F PR ADVANCE CARE PLAN OR EQUIV PRESENT IN MEDICAL RECORD: ICD-10-PCS | Mod: S$GLB,,, | Performed by: NEUROMUSCULOSKELETAL MEDICINE & OMM

## 2021-06-29 PROCEDURE — 99999 PR PBB SHADOW E&M-EST. PATIENT-LVL III: ICD-10-PCS | Mod: PBBFAC,,, | Performed by: NEUROMUSCULOSKELETAL MEDICINE & OMM

## 2021-06-29 PROCEDURE — 3288F PR FALLS RISK ASSESSMENT DOCUMENTED: ICD-10-PCS | Mod: CPTII,S$GLB,, | Performed by: NEUROMUSCULOSKELETAL MEDICINE & OMM

## 2021-06-29 PROCEDURE — 1126F AMNT PAIN NOTED NONE PRSNT: CPT | Mod: S$GLB,,, | Performed by: NEUROMUSCULOSKELETAL MEDICINE & OMM

## 2021-06-29 PROCEDURE — 3008F BODY MASS INDEX DOCD: CPT | Mod: CPTII,S$GLB,, | Performed by: NEUROMUSCULOSKELETAL MEDICINE & OMM

## 2021-06-29 PROCEDURE — 99214 PR OFFICE/OUTPT VISIT, EST, LEVL IV, 30-39 MIN: ICD-10-PCS | Mod: 25,S$GLB,, | Performed by: NEUROMUSCULOSKELETAL MEDICINE & OMM

## 2021-06-29 PROCEDURE — 3008F PR BODY MASS INDEX (BMI) DOCUMENTED: ICD-10-PCS | Mod: CPTII,S$GLB,, | Performed by: NEUROMUSCULOSKELETAL MEDICINE & OMM

## 2021-06-29 PROCEDURE — 20611 DRAIN/INJ JOINT/BURSA W/US: CPT | Mod: 50,S$GLB,, | Performed by: NEUROMUSCULOSKELETAL MEDICINE & OMM

## 2021-06-29 PROCEDURE — 1101F PR PT FALLS ASSESS DOC 0-1 FALLS W/OUT INJ PAST YR: ICD-10-PCS | Mod: CPTII,S$GLB,, | Performed by: NEUROMUSCULOSKELETAL MEDICINE & OMM

## 2021-06-29 PROCEDURE — 98926 PR OSTEOPATHIC MANIP,3-4 BODY REGN: ICD-10-PCS | Mod: S$GLB,,, | Performed by: NEUROMUSCULOSKELETAL MEDICINE & OMM

## 2021-06-29 PROCEDURE — 99214 OFFICE O/P EST MOD 30 MIN: CPT | Mod: 25,S$GLB,, | Performed by: NEUROMUSCULOSKELETAL MEDICINE & OMM

## 2021-06-29 PROCEDURE — 1159F PR MEDICATION LIST DOCUMENTED IN MEDICAL RECORD: ICD-10-PCS | Mod: S$GLB,,, | Performed by: NEUROMUSCULOSKELETAL MEDICINE & OMM

## 2021-06-29 PROCEDURE — 20611 PR DRAIN/ASP/INJECT MAJOR JOINT/BURSA W/US GUIDANCE: ICD-10-PCS | Mod: 50,S$GLB,, | Performed by: NEUROMUSCULOSKELETAL MEDICINE & OMM

## 2021-06-29 PROCEDURE — 1126F PR PAIN SEVERITY QUANTIFIED, NO PAIN PRESENT: ICD-10-PCS | Mod: S$GLB,,, | Performed by: NEUROMUSCULOSKELETAL MEDICINE & OMM

## 2021-06-29 RX ORDER — TRIAMCINOLONE ACETONIDE 40 MG/ML
40 INJECTION, SUSPENSION INTRA-ARTICULAR; INTRAMUSCULAR
Status: COMPLETED | OUTPATIENT
Start: 2021-06-29 | End: 2021-06-29

## 2021-06-29 RX ADMIN — TRIAMCINOLONE ACETONIDE 40 MG: 40 INJECTION, SUSPENSION INTRA-ARTICULAR; INTRAMUSCULAR at 11:06

## 2021-08-09 ENCOUNTER — CLINICAL SUPPORT (OUTPATIENT)
Dept: REHABILITATION | Facility: HOSPITAL | Age: 71
End: 2021-08-09
Attending: NEUROMUSCULOSKELETAL MEDICINE & OMM
Payer: MEDICARE

## 2021-08-09 DIAGNOSIS — G89.29 CHRONIC HIP PAIN, BILATERAL: Primary | ICD-10-CM

## 2021-08-09 DIAGNOSIS — M70.61 GREATER TROCHANTERIC BURSITIS OF BOTH HIPS: ICD-10-CM

## 2021-08-09 DIAGNOSIS — M25.552 CHRONIC HIP PAIN, BILATERAL: Primary | ICD-10-CM

## 2021-08-09 DIAGNOSIS — R29.898 LEG WEAKNESS, BILATERAL: ICD-10-CM

## 2021-08-09 DIAGNOSIS — R53.81 PHYSICAL DECONDITIONING: ICD-10-CM

## 2021-08-09 DIAGNOSIS — M25.551 CHRONIC HIP PAIN, BILATERAL: Primary | ICD-10-CM

## 2021-08-09 DIAGNOSIS — M70.62 GREATER TROCHANTERIC BURSITIS OF BOTH HIPS: ICD-10-CM

## 2021-08-09 PROCEDURE — 97162 PT EVAL MOD COMPLEX 30 MIN: CPT | Mod: PN

## 2021-08-09 PROCEDURE — 97110 THERAPEUTIC EXERCISES: CPT | Mod: PN

## 2021-08-10 ENCOUNTER — OFFICE VISIT (OUTPATIENT)
Dept: DERMATOLOGY | Facility: CLINIC | Age: 71
End: 2021-08-10
Payer: MEDICARE

## 2021-08-10 DIAGNOSIS — L29.9 ITCH: Primary | ICD-10-CM

## 2021-08-10 DIAGNOSIS — Z76.89 ENCOUNTER FOR SKIN CARE: ICD-10-CM

## 2021-08-10 DIAGNOSIS — L25.9 CONTACT DERMATITIS, UNSPECIFIED CONTACT DERMATITIS TYPE, UNSPECIFIED TRIGGER: ICD-10-CM

## 2021-08-10 PROCEDURE — 3288F FALL RISK ASSESSMENT DOCD: CPT | Mod: CPTII,S$GLB,, | Performed by: DERMATOLOGY

## 2021-08-10 PROCEDURE — 1157F ADVNC CARE PLAN IN RCRD: CPT | Mod: CPTII,S$GLB,, | Performed by: DERMATOLOGY

## 2021-08-10 PROCEDURE — 99999 PR PBB SHADOW E&M-EST. PATIENT-LVL III: CPT | Mod: PBBFAC,,, | Performed by: DERMATOLOGY

## 2021-08-10 PROCEDURE — 1101F PR PT FALLS ASSESS DOC 0-1 FALLS W/OUT INJ PAST YR: ICD-10-PCS | Mod: CPTII,S$GLB,, | Performed by: DERMATOLOGY

## 2021-08-10 PROCEDURE — 3044F PR MOST RECENT HEMOGLOBIN A1C LEVEL <7.0%: ICD-10-PCS | Mod: CPTII,S$GLB,, | Performed by: DERMATOLOGY

## 2021-08-10 PROCEDURE — 3288F PR FALLS RISK ASSESSMENT DOCUMENTED: ICD-10-PCS | Mod: CPTII,S$GLB,, | Performed by: DERMATOLOGY

## 2021-08-10 PROCEDURE — 1126F PR PAIN SEVERITY QUANTIFIED, NO PAIN PRESENT: ICD-10-PCS | Mod: CPTII,S$GLB,, | Performed by: DERMATOLOGY

## 2021-08-10 PROCEDURE — 1157F PR ADVANCE CARE PLAN OR EQUIV PRESENT IN MEDICAL RECORD: ICD-10-PCS | Mod: CPTII,S$GLB,, | Performed by: DERMATOLOGY

## 2021-08-10 PROCEDURE — 99214 PR OFFICE/OUTPT VISIT, EST, LEVL IV, 30-39 MIN: ICD-10-PCS | Mod: S$GLB,,, | Performed by: DERMATOLOGY

## 2021-08-10 PROCEDURE — 1159F PR MEDICATION LIST DOCUMENTED IN MEDICAL RECORD: ICD-10-PCS | Mod: CPTII,S$GLB,, | Performed by: DERMATOLOGY

## 2021-08-10 PROCEDURE — 1159F MED LIST DOCD IN RCRD: CPT | Mod: CPTII,S$GLB,, | Performed by: DERMATOLOGY

## 2021-08-10 PROCEDURE — 99999 PR PBB SHADOW E&M-EST. PATIENT-LVL III: ICD-10-PCS | Mod: PBBFAC,,, | Performed by: DERMATOLOGY

## 2021-08-10 PROCEDURE — 3044F HG A1C LEVEL LT 7.0%: CPT | Mod: CPTII,S$GLB,, | Performed by: DERMATOLOGY

## 2021-08-10 PROCEDURE — 1101F PT FALLS ASSESS-DOCD LE1/YR: CPT | Mod: CPTII,S$GLB,, | Performed by: DERMATOLOGY

## 2021-08-10 PROCEDURE — 99214 OFFICE O/P EST MOD 30 MIN: CPT | Mod: S$GLB,,, | Performed by: DERMATOLOGY

## 2021-08-10 PROCEDURE — 1126F AMNT PAIN NOTED NONE PRSNT: CPT | Mod: CPTII,S$GLB,, | Performed by: DERMATOLOGY

## 2021-08-10 RX ORDER — CLOBETASOL PROPIONATE 0.5 MG/G
CREAM TOPICAL 2 TIMES DAILY
Qty: 60 G | Refills: 0 | Status: SHIPPED | OUTPATIENT
Start: 2021-08-10 | End: 2022-12-09

## 2021-08-12 ENCOUNTER — CLINICAL SUPPORT (OUTPATIENT)
Dept: REHABILITATION | Facility: HOSPITAL | Age: 71
End: 2021-08-12
Payer: MEDICARE

## 2021-08-12 DIAGNOSIS — R53.81 PHYSICAL DECONDITIONING: ICD-10-CM

## 2021-08-12 PROCEDURE — 97110 THERAPEUTIC EXERCISES: CPT | Mod: PN

## 2021-08-12 PROCEDURE — 97140 MANUAL THERAPY 1/> REGIONS: CPT | Mod: PN

## 2021-08-18 ENCOUNTER — CLINICAL SUPPORT (OUTPATIENT)
Dept: REHABILITATION | Facility: HOSPITAL | Age: 71
End: 2021-08-18
Payer: MEDICARE

## 2021-08-18 DIAGNOSIS — R53.81 PHYSICAL DECONDITIONING: ICD-10-CM

## 2021-08-18 PROCEDURE — 97110 THERAPEUTIC EXERCISES: CPT | Mod: PN

## 2021-08-18 PROCEDURE — 97112 NEUROMUSCULAR REEDUCATION: CPT | Mod: PN

## 2021-08-24 ENCOUNTER — CLINICAL SUPPORT (OUTPATIENT)
Dept: REHABILITATION | Facility: HOSPITAL | Age: 71
End: 2021-08-24
Payer: MEDICARE

## 2021-08-24 DIAGNOSIS — R53.81 PHYSICAL DECONDITIONING: ICD-10-CM

## 2021-08-24 DIAGNOSIS — R29.898 LEG WEAKNESS, BILATERAL: ICD-10-CM

## 2021-08-24 PROCEDURE — 97110 THERAPEUTIC EXERCISES: CPT | Mod: PN

## 2021-08-24 PROCEDURE — 97112 NEUROMUSCULAR REEDUCATION: CPT | Mod: PN

## 2021-08-26 ENCOUNTER — CLINICAL SUPPORT (OUTPATIENT)
Dept: REHABILITATION | Facility: HOSPITAL | Age: 71
End: 2021-08-26
Payer: MEDICARE

## 2021-08-26 DIAGNOSIS — R53.81 PHYSICAL DECONDITIONING: ICD-10-CM

## 2021-08-26 PROCEDURE — 97110 THERAPEUTIC EXERCISES: CPT | Mod: PN

## 2021-08-26 PROCEDURE — 97530 THERAPEUTIC ACTIVITIES: CPT | Mod: PN

## 2021-08-26 PROCEDURE — 97140 MANUAL THERAPY 1/> REGIONS: CPT | Mod: PN

## 2021-09-13 ENCOUNTER — TELEPHONE (OUTPATIENT)
Dept: REHABILITATION | Facility: HOSPITAL | Age: 71
End: 2021-09-13

## 2021-09-13 ENCOUNTER — CLINICAL SUPPORT (OUTPATIENT)
Dept: REHABILITATION | Facility: HOSPITAL | Age: 71
End: 2021-09-13
Payer: MEDICARE

## 2021-09-13 DIAGNOSIS — R53.81 PHYSICAL DECONDITIONING: ICD-10-CM

## 2021-09-13 PROCEDURE — 97530 THERAPEUTIC ACTIVITIES: CPT | Mod: PN

## 2021-09-13 PROCEDURE — 97140 MANUAL THERAPY 1/> REGIONS: CPT | Mod: PN

## 2021-09-13 PROCEDURE — 97110 THERAPEUTIC EXERCISES: CPT | Mod: PN

## 2021-09-16 ENCOUNTER — CLINICAL SUPPORT (OUTPATIENT)
Dept: REHABILITATION | Facility: HOSPITAL | Age: 71
End: 2021-09-16
Payer: MEDICARE

## 2021-09-16 DIAGNOSIS — R53.81 PHYSICAL DECONDITIONING: ICD-10-CM

## 2021-09-16 PROCEDURE — 97140 MANUAL THERAPY 1/> REGIONS: CPT | Mod: PN

## 2021-09-16 PROCEDURE — 97110 THERAPEUTIC EXERCISES: CPT | Mod: PN

## 2021-09-16 PROCEDURE — 97530 THERAPEUTIC ACTIVITIES: CPT | Mod: PN

## 2021-09-20 ENCOUNTER — TELEPHONE (OUTPATIENT)
Dept: REHABILITATION | Facility: HOSPITAL | Age: 71
End: 2021-09-20

## 2021-09-21 ENCOUNTER — CLINICAL SUPPORT (OUTPATIENT)
Dept: REHABILITATION | Facility: HOSPITAL | Age: 71
End: 2021-09-21
Payer: MEDICARE

## 2021-09-21 DIAGNOSIS — R53.81 PHYSICAL DECONDITIONING: ICD-10-CM

## 2021-09-21 PROCEDURE — 97110 THERAPEUTIC EXERCISES: CPT | Mod: PN

## 2021-09-21 PROCEDURE — 97140 MANUAL THERAPY 1/> REGIONS: CPT | Mod: PN

## 2021-09-22 ENCOUNTER — TELEPHONE (OUTPATIENT)
Dept: PODIATRY | Facility: CLINIC | Age: 71
End: 2021-09-22

## 2021-09-24 ENCOUNTER — OFFICE VISIT (OUTPATIENT)
Dept: PODIATRY | Facility: CLINIC | Age: 71
End: 2021-09-24
Payer: MEDICARE

## 2021-09-24 VITALS
SYSTOLIC BLOOD PRESSURE: 124 MMHG | HEART RATE: 86 BPM | BODY MASS INDEX: 28.28 KG/M2 | WEIGHT: 149.69 LBS | DIASTOLIC BLOOD PRESSURE: 67 MMHG

## 2021-09-24 DIAGNOSIS — R20.2 PARESTHESIA OF RIGHT LEG: ICD-10-CM

## 2021-09-24 DIAGNOSIS — M77.51 CAPSULITIS OF TOE, RIGHT: ICD-10-CM

## 2021-09-24 DIAGNOSIS — M72.2 PLANTAR FASCIITIS, LEFT: Primary | ICD-10-CM

## 2021-09-24 PROCEDURE — 3044F HG A1C LEVEL LT 7.0%: CPT | Mod: CPTII,S$GLB,, | Performed by: PODIATRIST

## 2021-09-24 PROCEDURE — 1157F PR ADVANCE CARE PLAN OR EQUIV PRESENT IN MEDICAL RECORD: ICD-10-PCS | Mod: CPTII,S$GLB,, | Performed by: PODIATRIST

## 2021-09-24 PROCEDURE — 1160F RVW MEDS BY RX/DR IN RCRD: CPT | Mod: CPTII,S$GLB,, | Performed by: PODIATRIST

## 2021-09-24 PROCEDURE — 3074F PR MOST RECENT SYSTOLIC BLOOD PRESSURE < 130 MM HG: ICD-10-PCS | Mod: CPTII,S$GLB,, | Performed by: PODIATRIST

## 2021-09-24 PROCEDURE — 3061F PR NEG MICROALBUMINURIA RESULT DOCUMENTED/REVIEW: ICD-10-PCS | Mod: CPTII,S$GLB,, | Performed by: PODIATRIST

## 2021-09-24 PROCEDURE — 3066F NEPHROPATHY DOC TX: CPT | Mod: CPTII,S$GLB,, | Performed by: PODIATRIST

## 2021-09-24 PROCEDURE — 3074F SYST BP LT 130 MM HG: CPT | Mod: CPTII,S$GLB,, | Performed by: PODIATRIST

## 2021-09-24 PROCEDURE — 1125F AMNT PAIN NOTED PAIN PRSNT: CPT | Mod: CPTII,S$GLB,, | Performed by: PODIATRIST

## 2021-09-24 PROCEDURE — 3008F PR BODY MASS INDEX (BMI) DOCUMENTED: ICD-10-PCS | Mod: CPTII,S$GLB,, | Performed by: PODIATRIST

## 2021-09-24 PROCEDURE — 3078F PR MOST RECENT DIASTOLIC BLOOD PRESSURE < 80 MM HG: ICD-10-PCS | Mod: CPTII,S$GLB,, | Performed by: PODIATRIST

## 2021-09-24 PROCEDURE — 4010F PR ACE/ARB THEARPY RXD/TAKEN: ICD-10-PCS | Mod: CPTII,S$GLB,, | Performed by: PODIATRIST

## 2021-09-24 PROCEDURE — 1157F ADVNC CARE PLAN IN RCRD: CPT | Mod: CPTII,S$GLB,, | Performed by: PODIATRIST

## 2021-09-24 PROCEDURE — 1159F MED LIST DOCD IN RCRD: CPT | Mod: CPTII,S$GLB,, | Performed by: PODIATRIST

## 2021-09-24 PROCEDURE — 3078F DIAST BP <80 MM HG: CPT | Mod: CPTII,S$GLB,, | Performed by: PODIATRIST

## 2021-09-24 PROCEDURE — 99999 PR PBB SHADOW E&M-EST. PATIENT-LVL III: CPT | Mod: PBBFAC,,, | Performed by: PODIATRIST

## 2021-09-24 PROCEDURE — 1159F PR MEDICATION LIST DOCUMENTED IN MEDICAL RECORD: ICD-10-PCS | Mod: CPTII,S$GLB,, | Performed by: PODIATRIST

## 2021-09-24 PROCEDURE — 3066F PR DOCUMENTATION OF TREATMENT FOR NEPHROPATHY: ICD-10-PCS | Mod: CPTII,S$GLB,, | Performed by: PODIATRIST

## 2021-09-24 PROCEDURE — 99214 OFFICE O/P EST MOD 30 MIN: CPT | Mod: S$GLB,,, | Performed by: PODIATRIST

## 2021-09-24 PROCEDURE — 99999 PR PBB SHADOW E&M-EST. PATIENT-LVL III: ICD-10-PCS | Mod: PBBFAC,,, | Performed by: PODIATRIST

## 2021-09-24 PROCEDURE — 1125F PR PAIN SEVERITY QUANTIFIED, PAIN PRESENT: ICD-10-PCS | Mod: CPTII,S$GLB,, | Performed by: PODIATRIST

## 2021-09-24 PROCEDURE — 3061F NEG MICROALBUMINURIA REV: CPT | Mod: CPTII,S$GLB,, | Performed by: PODIATRIST

## 2021-09-24 PROCEDURE — 3044F PR MOST RECENT HEMOGLOBIN A1C LEVEL <7.0%: ICD-10-PCS | Mod: CPTII,S$GLB,, | Performed by: PODIATRIST

## 2021-09-24 PROCEDURE — 1160F PR REVIEW ALL MEDS BY PRESCRIBER/CLIN PHARMACIST DOCUMENTED: ICD-10-PCS | Mod: CPTII,S$GLB,, | Performed by: PODIATRIST

## 2021-09-24 PROCEDURE — 3008F BODY MASS INDEX DOCD: CPT | Mod: CPTII,S$GLB,, | Performed by: PODIATRIST

## 2021-09-24 PROCEDURE — 99214 PR OFFICE/OUTPT VISIT, EST, LEVL IV, 30-39 MIN: ICD-10-PCS | Mod: S$GLB,,, | Performed by: PODIATRIST

## 2021-09-24 PROCEDURE — 4010F ACE/ARB THERAPY RXD/TAKEN: CPT | Mod: CPTII,S$GLB,, | Performed by: PODIATRIST

## 2021-09-24 RX ORDER — DICLOFENAC SODIUM 10 MG/G
2 GEL TOPICAL 3 TIMES DAILY
Qty: 100 G | Refills: 3 | Status: SHIPPED | OUTPATIENT
Start: 2021-09-24 | End: 2021-10-25

## 2021-09-28 ENCOUNTER — CLINICAL SUPPORT (OUTPATIENT)
Dept: REHABILITATION | Facility: HOSPITAL | Age: 71
End: 2021-09-28
Payer: MEDICARE

## 2021-09-28 DIAGNOSIS — R53.81 PHYSICAL DECONDITIONING: ICD-10-CM

## 2021-09-28 PROCEDURE — 97110 THERAPEUTIC EXERCISES: CPT | Mod: PN

## 2021-09-28 PROCEDURE — 97140 MANUAL THERAPY 1/> REGIONS: CPT | Mod: PN

## 2021-10-12 ENCOUNTER — CLINICAL SUPPORT (OUTPATIENT)
Dept: REHABILITATION | Facility: HOSPITAL | Age: 71
End: 2021-10-12
Payer: MEDICARE

## 2021-10-12 DIAGNOSIS — R53.81 PHYSICAL DECONDITIONING: ICD-10-CM

## 2021-10-12 PROCEDURE — 97140 MANUAL THERAPY 1/> REGIONS: CPT | Mod: KX,PN

## 2021-10-12 PROCEDURE — 97750 PHYSICAL PERFORMANCE TEST: CPT | Mod: 59,KX,PN

## 2021-10-14 ENCOUNTER — CLINICAL SUPPORT (OUTPATIENT)
Dept: REHABILITATION | Facility: HOSPITAL | Age: 71
End: 2021-10-14
Payer: MEDICARE

## 2021-10-14 DIAGNOSIS — R53.81 PHYSICAL DECONDITIONING: ICD-10-CM

## 2021-10-14 PROCEDURE — 97110 THERAPEUTIC EXERCISES: CPT | Mod: KX,PN

## 2021-10-14 PROCEDURE — 97140 MANUAL THERAPY 1/> REGIONS: CPT | Mod: KX,PN

## 2021-10-18 ENCOUNTER — HOSPITAL ENCOUNTER (OUTPATIENT)
Dept: RADIOLOGY | Facility: HOSPITAL | Age: 71
Discharge: HOME | End: 2021-10-18
Attending: THORACIC SURGERY (CARDIOTHORACIC VASCULAR SURGERY)
Payer: MEDICARE

## 2021-10-18 ENCOUNTER — OFFICE VISIT (OUTPATIENT)
Dept: CARDIOTHORACIC SURGERY | Facility: CLINIC | Age: 71
End: 2021-10-18
Payer: MEDICARE

## 2021-10-18 VITALS
BODY MASS INDEX: 27.97 KG/M2 | HEIGHT: 61 IN | DIASTOLIC BLOOD PRESSURE: 71 MMHG | SYSTOLIC BLOOD PRESSURE: 132 MMHG | WEIGHT: 148.13 LBS | HEART RATE: 91 BPM

## 2021-10-18 DIAGNOSIS — C34.91 ADENOCARCINOMA OF RIGHT LUNG: Primary | ICD-10-CM

## 2021-10-18 DIAGNOSIS — C34.31 MALIGNANT NEOPLASM OF LOWER LOBE, RIGHT BRONCHUS OR LUNG: ICD-10-CM

## 2021-10-18 PROCEDURE — 4010F PR ACE/ARB THEARPY RXD/TAKEN: ICD-10-PCS | Mod: CPTII,S$GLB,, | Performed by: PHYSICIAN ASSISTANT

## 2021-10-18 PROCEDURE — 1126F PR PAIN SEVERITY QUANTIFIED, NO PAIN PRESENT: ICD-10-PCS | Mod: CPTII,S$GLB,, | Performed by: PHYSICIAN ASSISTANT

## 2021-10-18 PROCEDURE — 99999 PR PBB SHADOW E&M-EST. PATIENT-LVL IV: ICD-10-PCS | Mod: PBBFAC,,, | Performed by: PHYSICIAN ASSISTANT

## 2021-10-18 PROCEDURE — 1126F AMNT PAIN NOTED NONE PRSNT: CPT | Mod: CPTII,S$GLB,, | Performed by: PHYSICIAN ASSISTANT

## 2021-10-18 PROCEDURE — 99213 OFFICE O/P EST LOW 20 MIN: CPT | Mod: S$GLB,,, | Performed by: PHYSICIAN ASSISTANT

## 2021-10-18 PROCEDURE — 71250 CT THORAX DX C-: CPT | Mod: TC

## 2021-10-18 PROCEDURE — 3044F HG A1C LEVEL LT 7.0%: CPT | Mod: CPTII,S$GLB,, | Performed by: PHYSICIAN ASSISTANT

## 2021-10-18 PROCEDURE — 1101F PT FALLS ASSESS-DOCD LE1/YR: CPT | Mod: CPTII,S$GLB,, | Performed by: PHYSICIAN ASSISTANT

## 2021-10-18 PROCEDURE — 3075F SYST BP GE 130 - 139MM HG: CPT | Mod: CPTII,S$GLB,, | Performed by: PHYSICIAN ASSISTANT

## 2021-10-18 PROCEDURE — 3066F NEPHROPATHY DOC TX: CPT | Mod: CPTII,S$GLB,, | Performed by: PHYSICIAN ASSISTANT

## 2021-10-18 PROCEDURE — 3078F DIAST BP <80 MM HG: CPT | Mod: CPTII,S$GLB,, | Performed by: PHYSICIAN ASSISTANT

## 2021-10-18 PROCEDURE — 3044F PR MOST RECENT HEMOGLOBIN A1C LEVEL <7.0%: ICD-10-PCS | Mod: CPTII,S$GLB,, | Performed by: PHYSICIAN ASSISTANT

## 2021-10-18 PROCEDURE — 1159F PR MEDICATION LIST DOCUMENTED IN MEDICAL RECORD: ICD-10-PCS | Mod: CPTII,S$GLB,, | Performed by: PHYSICIAN ASSISTANT

## 2021-10-18 PROCEDURE — 71250 CT CHEST WITHOUT CONTRAST: ICD-10-PCS | Mod: 26,,, | Performed by: RADIOLOGY

## 2021-10-18 PROCEDURE — 99213 PR OFFICE/OUTPT VISIT, EST, LEVL III, 20-29 MIN: ICD-10-PCS | Mod: S$GLB,,, | Performed by: PHYSICIAN ASSISTANT

## 2021-10-18 PROCEDURE — 1101F PR PT FALLS ASSESS DOC 0-1 FALLS W/OUT INJ PAST YR: ICD-10-PCS | Mod: CPTII,S$GLB,, | Performed by: PHYSICIAN ASSISTANT

## 2021-10-18 PROCEDURE — 3288F FALL RISK ASSESSMENT DOCD: CPT | Mod: CPTII,S$GLB,, | Performed by: PHYSICIAN ASSISTANT

## 2021-10-18 PROCEDURE — 99999 PR PBB SHADOW E&M-EST. PATIENT-LVL IV: CPT | Mod: PBBFAC,,, | Performed by: PHYSICIAN ASSISTANT

## 2021-10-18 PROCEDURE — 4010F ACE/ARB THERAPY RXD/TAKEN: CPT | Mod: CPTII,S$GLB,, | Performed by: PHYSICIAN ASSISTANT

## 2021-10-18 PROCEDURE — 3078F PR MOST RECENT DIASTOLIC BLOOD PRESSURE < 80 MM HG: ICD-10-PCS | Mod: CPTII,S$GLB,, | Performed by: PHYSICIAN ASSISTANT

## 2021-10-18 PROCEDURE — 3288F PR FALLS RISK ASSESSMENT DOCUMENTED: ICD-10-PCS | Mod: CPTII,S$GLB,, | Performed by: PHYSICIAN ASSISTANT

## 2021-10-18 PROCEDURE — 1159F MED LIST DOCD IN RCRD: CPT | Mod: CPTII,S$GLB,, | Performed by: PHYSICIAN ASSISTANT

## 2021-10-18 PROCEDURE — 3075F PR MOST RECENT SYSTOLIC BLOOD PRESS GE 130-139MM HG: ICD-10-PCS | Mod: CPTII,S$GLB,, | Performed by: PHYSICIAN ASSISTANT

## 2021-10-18 PROCEDURE — 3061F NEG MICROALBUMINURIA REV: CPT | Mod: CPTII,S$GLB,, | Performed by: PHYSICIAN ASSISTANT

## 2021-10-18 PROCEDURE — 71250 CT THORAX DX C-: CPT | Mod: 26,,, | Performed by: RADIOLOGY

## 2021-10-18 PROCEDURE — 3061F PR NEG MICROALBUMINURIA RESULT DOCUMENTED/REVIEW: ICD-10-PCS | Mod: CPTII,S$GLB,, | Performed by: PHYSICIAN ASSISTANT

## 2021-10-18 PROCEDURE — 3008F PR BODY MASS INDEX (BMI) DOCUMENTED: ICD-10-PCS | Mod: CPTII,S$GLB,, | Performed by: PHYSICIAN ASSISTANT

## 2021-10-18 PROCEDURE — 3008F BODY MASS INDEX DOCD: CPT | Mod: CPTII,S$GLB,, | Performed by: PHYSICIAN ASSISTANT

## 2021-10-18 PROCEDURE — 3066F PR DOCUMENTATION OF TREATMENT FOR NEPHROPATHY: ICD-10-PCS | Mod: CPTII,S$GLB,, | Performed by: PHYSICIAN ASSISTANT

## 2021-10-18 PROCEDURE — 1157F ADVNC CARE PLAN IN RCRD: CPT | Mod: CPTII,S$GLB,, | Performed by: PHYSICIAN ASSISTANT

## 2021-10-18 PROCEDURE — 1157F PR ADVANCE CARE PLAN OR EQUIV PRESENT IN MEDICAL RECORD: ICD-10-PCS | Mod: CPTII,S$GLB,, | Performed by: PHYSICIAN ASSISTANT

## 2021-10-18 RX ORDER — LEVOCETIRIZINE DIHYDROCHLORIDE 5 MG/1
5 TABLET, FILM COATED ORAL NIGHTLY
COMMUNITY
Start: 2021-06-02

## 2021-10-19 ENCOUNTER — CLINICAL SUPPORT (OUTPATIENT)
Dept: REHABILITATION | Facility: HOSPITAL | Age: 71
End: 2021-10-19
Payer: MEDICARE

## 2021-10-19 DIAGNOSIS — R53.81 PHYSICAL DECONDITIONING: ICD-10-CM

## 2021-10-19 PROCEDURE — 97110 THERAPEUTIC EXERCISES: CPT | Mod: KX,PN

## 2021-10-19 PROCEDURE — 97140 MANUAL THERAPY 1/> REGIONS: CPT | Mod: KX,PN

## 2021-10-25 ENCOUNTER — OFFICE VISIT (OUTPATIENT)
Dept: PODIATRY | Facility: CLINIC | Age: 71
End: 2021-10-25
Payer: MEDICARE

## 2021-10-25 VITALS
SYSTOLIC BLOOD PRESSURE: 118 MMHG | WEIGHT: 150.81 LBS | DIASTOLIC BLOOD PRESSURE: 68 MMHG | HEART RATE: 81 BPM | BODY MASS INDEX: 28.49 KG/M2

## 2021-10-25 DIAGNOSIS — M77.51 CAPSULITIS OF TOE, RIGHT: ICD-10-CM

## 2021-10-25 DIAGNOSIS — G89.29 CHRONIC HEEL PAIN, LEFT: ICD-10-CM

## 2021-10-25 DIAGNOSIS — M79.672 CHRONIC HEEL PAIN, LEFT: ICD-10-CM

## 2021-10-25 DIAGNOSIS — M72.2 PLANTAR FASCIITIS, LEFT: Primary | ICD-10-CM

## 2021-10-25 DIAGNOSIS — M79.671 FOOT PAIN, RIGHT: ICD-10-CM

## 2021-10-25 PROCEDURE — 1157F PR ADVANCE CARE PLAN OR EQUIV PRESENT IN MEDICAL RECORD: ICD-10-PCS | Mod: CPTII,S$GLB,, | Performed by: PODIATRIST

## 2021-10-25 PROCEDURE — 1125F PR PAIN SEVERITY QUANTIFIED, PAIN PRESENT: ICD-10-PCS | Mod: CPTII,S$GLB,, | Performed by: PODIATRIST

## 2021-10-25 PROCEDURE — 1159F MED LIST DOCD IN RCRD: CPT | Mod: CPTII,S$GLB,, | Performed by: PODIATRIST

## 2021-10-25 PROCEDURE — 3078F PR MOST RECENT DIASTOLIC BLOOD PRESSURE < 80 MM HG: ICD-10-PCS | Mod: CPTII,S$GLB,, | Performed by: PODIATRIST

## 2021-10-25 PROCEDURE — 3074F PR MOST RECENT SYSTOLIC BLOOD PRESSURE < 130 MM HG: ICD-10-PCS | Mod: CPTII,S$GLB,, | Performed by: PODIATRIST

## 2021-10-25 PROCEDURE — 4010F PR ACE/ARB THEARPY RXD/TAKEN: ICD-10-PCS | Mod: CPTII,S$GLB,, | Performed by: PODIATRIST

## 2021-10-25 PROCEDURE — 3074F SYST BP LT 130 MM HG: CPT | Mod: CPTII,S$GLB,, | Performed by: PODIATRIST

## 2021-10-25 PROCEDURE — 3061F NEG MICROALBUMINURIA REV: CPT | Mod: CPTII,S$GLB,, | Performed by: PODIATRIST

## 2021-10-25 PROCEDURE — 3061F PR NEG MICROALBUMINURIA RESULT DOCUMENTED/REVIEW: ICD-10-PCS | Mod: CPTII,S$GLB,, | Performed by: PODIATRIST

## 2021-10-25 PROCEDURE — 99999 PR PBB SHADOW E&M-EST. PATIENT-LVL IV: CPT | Mod: PBBFAC,,, | Performed by: PODIATRIST

## 2021-10-25 PROCEDURE — 3008F PR BODY MASS INDEX (BMI) DOCUMENTED: ICD-10-PCS | Mod: CPTII,S$GLB,, | Performed by: PODIATRIST

## 2021-10-25 PROCEDURE — 1159F PR MEDICATION LIST DOCUMENTED IN MEDICAL RECORD: ICD-10-PCS | Mod: CPTII,S$GLB,, | Performed by: PODIATRIST

## 2021-10-25 PROCEDURE — 3044F HG A1C LEVEL LT 7.0%: CPT | Mod: CPTII,S$GLB,, | Performed by: PODIATRIST

## 2021-10-25 PROCEDURE — 99999 PR PBB SHADOW E&M-EST. PATIENT-LVL IV: ICD-10-PCS | Mod: PBBFAC,,, | Performed by: PODIATRIST

## 2021-10-25 PROCEDURE — 3078F DIAST BP <80 MM HG: CPT | Mod: CPTII,S$GLB,, | Performed by: PODIATRIST

## 2021-10-25 PROCEDURE — 1157F ADVNC CARE PLAN IN RCRD: CPT | Mod: CPTII,S$GLB,, | Performed by: PODIATRIST

## 2021-10-25 PROCEDURE — 1125F AMNT PAIN NOTED PAIN PRSNT: CPT | Mod: CPTII,S$GLB,, | Performed by: PODIATRIST

## 2021-10-25 PROCEDURE — 1160F PR REVIEW ALL MEDS BY PRESCRIBER/CLIN PHARMACIST DOCUMENTED: ICD-10-PCS | Mod: CPTII,S$GLB,, | Performed by: PODIATRIST

## 2021-10-25 PROCEDURE — 1160F RVW MEDS BY RX/DR IN RCRD: CPT | Mod: CPTII,S$GLB,, | Performed by: PODIATRIST

## 2021-10-25 PROCEDURE — 3066F PR DOCUMENTATION OF TREATMENT FOR NEPHROPATHY: ICD-10-PCS | Mod: CPTII,S$GLB,, | Performed by: PODIATRIST

## 2021-10-25 PROCEDURE — 3044F PR MOST RECENT HEMOGLOBIN A1C LEVEL <7.0%: ICD-10-PCS | Mod: CPTII,S$GLB,, | Performed by: PODIATRIST

## 2021-10-25 PROCEDURE — 3066F NEPHROPATHY DOC TX: CPT | Mod: CPTII,S$GLB,, | Performed by: PODIATRIST

## 2021-10-25 PROCEDURE — 4010F ACE/ARB THERAPY RXD/TAKEN: CPT | Mod: CPTII,S$GLB,, | Performed by: PODIATRIST

## 2021-10-25 PROCEDURE — 3008F BODY MASS INDEX DOCD: CPT | Mod: CPTII,S$GLB,, | Performed by: PODIATRIST

## 2021-10-25 PROCEDURE — 99213 PR OFFICE/OUTPT VISIT, EST, LEVL III, 20-29 MIN: ICD-10-PCS | Mod: S$GLB,,, | Performed by: PODIATRIST

## 2021-10-25 PROCEDURE — 99213 OFFICE O/P EST LOW 20 MIN: CPT | Mod: S$GLB,,, | Performed by: PODIATRIST

## 2021-10-25 RX ORDER — MELOXICAM 15 MG/1
15 TABLET ORAL DAILY
Qty: 30 TABLET | Refills: 1 | Status: SHIPPED | OUTPATIENT
Start: 2021-10-25 | End: 2021-10-25

## 2021-10-26 ENCOUNTER — TELEPHONE (OUTPATIENT)
Dept: REHABILITATION | Facility: HOSPITAL | Age: 71
End: 2021-10-26
Payer: MEDICARE

## 2021-10-28 ENCOUNTER — CLINICAL SUPPORT (OUTPATIENT)
Dept: REHABILITATION | Facility: HOSPITAL | Age: 71
End: 2021-10-28
Payer: MEDICARE

## 2021-10-28 DIAGNOSIS — R53.81 PHYSICAL DECONDITIONING: ICD-10-CM

## 2021-10-28 PROCEDURE — 97110 THERAPEUTIC EXERCISES: CPT | Mod: KX,PN

## 2021-11-15 ENCOUNTER — OFFICE VISIT (OUTPATIENT)
Dept: INTERNAL MEDICINE | Facility: CLINIC | Age: 71
End: 2021-11-15
Payer: MEDICARE

## 2021-11-15 VITALS
TEMPERATURE: 97 F | HEART RATE: 84 BPM | SYSTOLIC BLOOD PRESSURE: 116 MMHG | BODY MASS INDEX: 27.88 KG/M2 | OXYGEN SATURATION: 94 % | DIASTOLIC BLOOD PRESSURE: 60 MMHG | WEIGHT: 147.69 LBS | RESPIRATION RATE: 18 BRPM | HEIGHT: 61 IN

## 2021-11-15 DIAGNOSIS — R05.9 COUGH: ICD-10-CM

## 2021-11-15 DIAGNOSIS — J01.00 ACUTE NON-RECURRENT MAXILLARY SINUSITIS: Primary | ICD-10-CM

## 2021-11-15 PROCEDURE — 1157F PR ADVANCE CARE PLAN OR EQUIV PRESENT IN MEDICAL RECORD: ICD-10-PCS | Mod: CPTII,S$GLB,, | Performed by: NURSE PRACTITIONER

## 2021-11-15 PROCEDURE — 3044F HG A1C LEVEL LT 7.0%: CPT | Mod: CPTII,S$GLB,, | Performed by: NURSE PRACTITIONER

## 2021-11-15 PROCEDURE — 99213 OFFICE O/P EST LOW 20 MIN: CPT | Mod: S$GLB,,, | Performed by: NURSE PRACTITIONER

## 2021-11-15 PROCEDURE — 3288F PR FALLS RISK ASSESSMENT DOCUMENTED: ICD-10-PCS | Mod: CPTII,S$GLB,, | Performed by: NURSE PRACTITIONER

## 2021-11-15 PROCEDURE — 3074F SYST BP LT 130 MM HG: CPT | Mod: CPTII,S$GLB,, | Performed by: NURSE PRACTITIONER

## 2021-11-15 PROCEDURE — 3288F FALL RISK ASSESSMENT DOCD: CPT | Mod: CPTII,S$GLB,, | Performed by: NURSE PRACTITIONER

## 2021-11-15 PROCEDURE — 99213 PR OFFICE/OUTPT VISIT, EST, LEVL III, 20-29 MIN: ICD-10-PCS | Mod: S$GLB,,, | Performed by: NURSE PRACTITIONER

## 2021-11-15 PROCEDURE — 3074F PR MOST RECENT SYSTOLIC BLOOD PRESSURE < 130 MM HG: ICD-10-PCS | Mod: CPTII,S$GLB,, | Performed by: NURSE PRACTITIONER

## 2021-11-15 PROCEDURE — 1126F PR PAIN SEVERITY QUANTIFIED, NO PAIN PRESENT: ICD-10-PCS | Mod: CPTII,S$GLB,, | Performed by: NURSE PRACTITIONER

## 2021-11-15 PROCEDURE — 3078F DIAST BP <80 MM HG: CPT | Mod: CPTII,S$GLB,, | Performed by: NURSE PRACTITIONER

## 2021-11-15 PROCEDURE — 99999 PR PBB SHADOW E&M-EST. PATIENT-LVL IV: CPT | Mod: PBBFAC,,, | Performed by: NURSE PRACTITIONER

## 2021-11-15 PROCEDURE — 3066F PR DOCUMENTATION OF TREATMENT FOR NEPHROPATHY: ICD-10-PCS | Mod: CPTII,S$GLB,, | Performed by: NURSE PRACTITIONER

## 2021-11-15 PROCEDURE — 1157F ADVNC CARE PLAN IN RCRD: CPT | Mod: CPTII,S$GLB,, | Performed by: NURSE PRACTITIONER

## 2021-11-15 PROCEDURE — 1159F PR MEDICATION LIST DOCUMENTED IN MEDICAL RECORD: ICD-10-PCS | Mod: CPTII,S$GLB,, | Performed by: NURSE PRACTITIONER

## 2021-11-15 PROCEDURE — 4010F PR ACE/ARB THEARPY RXD/TAKEN: ICD-10-PCS | Mod: CPTII,S$GLB,, | Performed by: NURSE PRACTITIONER

## 2021-11-15 PROCEDURE — 3066F NEPHROPATHY DOC TX: CPT | Mod: CPTII,S$GLB,, | Performed by: NURSE PRACTITIONER

## 2021-11-15 PROCEDURE — 1101F PT FALLS ASSESS-DOCD LE1/YR: CPT | Mod: CPTII,S$GLB,, | Performed by: NURSE PRACTITIONER

## 2021-11-15 PROCEDURE — 3008F PR BODY MASS INDEX (BMI) DOCUMENTED: ICD-10-PCS | Mod: CPTII,S$GLB,, | Performed by: NURSE PRACTITIONER

## 2021-11-15 PROCEDURE — 4010F ACE/ARB THERAPY RXD/TAKEN: CPT | Mod: CPTII,S$GLB,, | Performed by: NURSE PRACTITIONER

## 2021-11-15 PROCEDURE — 3061F PR NEG MICROALBUMINURIA RESULT DOCUMENTED/REVIEW: ICD-10-PCS | Mod: CPTII,S$GLB,, | Performed by: NURSE PRACTITIONER

## 2021-11-15 PROCEDURE — 99999 PR PBB SHADOW E&M-EST. PATIENT-LVL IV: ICD-10-PCS | Mod: PBBFAC,,, | Performed by: NURSE PRACTITIONER

## 2021-11-15 PROCEDURE — 1101F PR PT FALLS ASSESS DOC 0-1 FALLS W/OUT INJ PAST YR: ICD-10-PCS | Mod: CPTII,S$GLB,, | Performed by: NURSE PRACTITIONER

## 2021-11-15 PROCEDURE — 1126F AMNT PAIN NOTED NONE PRSNT: CPT | Mod: CPTII,S$GLB,, | Performed by: NURSE PRACTITIONER

## 2021-11-15 PROCEDURE — 3008F BODY MASS INDEX DOCD: CPT | Mod: CPTII,S$GLB,, | Performed by: NURSE PRACTITIONER

## 2021-11-15 PROCEDURE — 99499 UNLISTED E&M SERVICE: CPT | Mod: S$GLB,,, | Performed by: NURSE PRACTITIONER

## 2021-11-15 PROCEDURE — 1159F MED LIST DOCD IN RCRD: CPT | Mod: CPTII,S$GLB,, | Performed by: NURSE PRACTITIONER

## 2021-11-15 PROCEDURE — 3044F PR MOST RECENT HEMOGLOBIN A1C LEVEL <7.0%: ICD-10-PCS | Mod: CPTII,S$GLB,, | Performed by: NURSE PRACTITIONER

## 2021-11-15 PROCEDURE — 3078F PR MOST RECENT DIASTOLIC BLOOD PRESSURE < 80 MM HG: ICD-10-PCS | Mod: CPTII,S$GLB,, | Performed by: NURSE PRACTITIONER

## 2021-11-15 PROCEDURE — 99499 RISK ADDL DX/OHS AUDIT: ICD-10-PCS | Mod: S$GLB,,, | Performed by: NURSE PRACTITIONER

## 2021-11-15 PROCEDURE — 3061F NEG MICROALBUMINURIA REV: CPT | Mod: CPTII,S$GLB,, | Performed by: NURSE PRACTITIONER

## 2021-11-15 RX ORDER — BENZONATATE 200 MG/1
200 CAPSULE ORAL 3 TIMES DAILY PRN
Qty: 30 CAPSULE | Refills: 0 | Status: SHIPPED | OUTPATIENT
Start: 2021-11-15 | End: 2021-11-25

## 2021-11-15 RX ORDER — AMOXICILLIN AND CLAVULANATE POTASSIUM 875; 125 MG/1; MG/1
1 TABLET, FILM COATED ORAL EVERY 12 HOURS
Qty: 14 TABLET | Refills: 0 | Status: SHIPPED | OUTPATIENT
Start: 2021-11-15 | End: 2021-11-22

## 2021-11-15 RX ORDER — PROMETHAZINE HYDROCHLORIDE AND DEXTROMETHORPHAN HYDROBROMIDE 6.25; 15 MG/5ML; MG/5ML
5 SYRUP ORAL EVERY 4 HOURS PRN
Qty: 130 ML | Refills: 0 | Status: SHIPPED | OUTPATIENT
Start: 2021-11-15 | End: 2021-11-25

## 2021-11-29 ENCOUNTER — PES CALL (OUTPATIENT)
Dept: ADMINISTRATIVE | Facility: CLINIC | Age: 71
End: 2021-11-29
Payer: MEDICARE

## 2021-11-29 ENCOUNTER — PATIENT MESSAGE (OUTPATIENT)
Dept: INTERNAL MEDICINE | Facility: CLINIC | Age: 71
End: 2021-11-29
Payer: MEDICARE

## 2021-11-29 DIAGNOSIS — Z12.31 ENCOUNTER FOR SCREENING MAMMOGRAM FOR MALIGNANT NEOPLASM OF BREAST: Primary | ICD-10-CM

## 2021-12-01 ENCOUNTER — CLINICAL SUPPORT (OUTPATIENT)
Dept: REHABILITATION | Facility: HOSPITAL | Age: 71
End: 2021-12-01
Payer: MEDICARE

## 2021-12-01 DIAGNOSIS — M79.671 FOOT PAIN, RIGHT: ICD-10-CM

## 2021-12-01 DIAGNOSIS — M72.2 PLANTAR FASCIITIS, LEFT: ICD-10-CM

## 2021-12-01 DIAGNOSIS — M25.672 DECREASED RANGE OF MOTION OF LEFT ANKLE: ICD-10-CM

## 2021-12-01 DIAGNOSIS — R26.9 GAIT ABNORMALITY: ICD-10-CM

## 2021-12-01 DIAGNOSIS — R29.898 DECREASED STRENGTH OF LOWER EXTREMITY: ICD-10-CM

## 2021-12-01 DIAGNOSIS — M77.51 CAPSULITIS OF TOE, RIGHT: ICD-10-CM

## 2021-12-01 PROCEDURE — 97161 PT EVAL LOW COMPLEX 20 MIN: CPT | Mod: KX,PN

## 2021-12-01 PROCEDURE — 97110 THERAPEUTIC EXERCISES: CPT | Mod: KX,PN

## 2021-12-12 ENCOUNTER — PATIENT OUTREACH (OUTPATIENT)
Dept: ADMINISTRATIVE | Facility: OTHER | Age: 71
End: 2021-12-12
Payer: MEDICARE

## 2021-12-12 DIAGNOSIS — E11.9 TYPE 2 DIABETES MELLITUS WITHOUT COMPLICATION, UNSPECIFIED WHETHER LONG TERM INSULIN USE: ICD-10-CM

## 2021-12-12 DIAGNOSIS — E11.9 TYPE 2 DIABETES MELLITUS WITHOUT RETINOPATHY: Primary | ICD-10-CM

## 2021-12-13 ENCOUNTER — OFFICE VISIT (OUTPATIENT)
Dept: DERMATOLOGY | Facility: CLINIC | Age: 71
End: 2021-12-13
Payer: MEDICARE

## 2021-12-13 DIAGNOSIS — L81.4 LENTIGO: ICD-10-CM

## 2021-12-13 DIAGNOSIS — L57.0 AK (ACTINIC KERATOSIS): ICD-10-CM

## 2021-12-13 DIAGNOSIS — L90.5 SCAR: ICD-10-CM

## 2021-12-13 DIAGNOSIS — Z85.828 PERSONAL HISTORY OF SKIN CANCER: ICD-10-CM

## 2021-12-13 DIAGNOSIS — D48.5 NEOPLASM OF UNCERTAIN BEHAVIOR OF SKIN: Primary | ICD-10-CM

## 2021-12-13 DIAGNOSIS — D18.01 CHERRY ANGIOMA: ICD-10-CM

## 2021-12-13 DIAGNOSIS — D22.9 NEVUS: ICD-10-CM

## 2021-12-13 DIAGNOSIS — L82.1 SK (SEBORRHEIC KERATOSIS): ICD-10-CM

## 2021-12-13 PROCEDURE — 17003 DESTRUCTION, PREMALIGNANT LESIONS; SECOND THROUGH 14 LESIONS: ICD-10-PCS | Mod: S$GLB,,, | Performed by: DERMATOLOGY

## 2021-12-13 PROCEDURE — 99213 OFFICE O/P EST LOW 20 MIN: CPT | Mod: 25,S$GLB,, | Performed by: DERMATOLOGY

## 2021-12-13 PROCEDURE — 3061F PR NEG MICROALBUMINURIA RESULT DOCUMENTED/REVIEW: ICD-10-PCS | Mod: CPTII,S$GLB,, | Performed by: DERMATOLOGY

## 2021-12-13 PROCEDURE — 1157F ADVNC CARE PLAN IN RCRD: CPT | Mod: CPTII,S$GLB,, | Performed by: DERMATOLOGY

## 2021-12-13 PROCEDURE — 88305 TISSUE EXAM BY PATHOLOGIST: ICD-10-PCS | Mod: 26,,, | Performed by: PATHOLOGY

## 2021-12-13 PROCEDURE — 99999 PR PBB SHADOW E&M-EST. PATIENT-LVL III: CPT | Mod: PBBFAC,,, | Performed by: DERMATOLOGY

## 2021-12-13 PROCEDURE — 11102 TANGNTL BX SKIN SINGLE LES: CPT | Mod: S$GLB,,, | Performed by: DERMATOLOGY

## 2021-12-13 PROCEDURE — 11102 PR TANGENTIAL BIOPSY, SKIN, SINGLE LESION: ICD-10-PCS | Mod: S$GLB,,, | Performed by: DERMATOLOGY

## 2021-12-13 PROCEDURE — 88305 TISSUE EXAM BY PATHOLOGIST: CPT | Mod: 26,,, | Performed by: PATHOLOGY

## 2021-12-13 PROCEDURE — 4010F ACE/ARB THERAPY RXD/TAKEN: CPT | Mod: CPTII,S$GLB,, | Performed by: DERMATOLOGY

## 2021-12-13 PROCEDURE — 3066F PR DOCUMENTATION OF TREATMENT FOR NEPHROPATHY: ICD-10-PCS | Mod: CPTII,S$GLB,, | Performed by: DERMATOLOGY

## 2021-12-13 PROCEDURE — 4010F PR ACE/ARB THEARPY RXD/TAKEN: ICD-10-PCS | Mod: CPTII,S$GLB,, | Performed by: DERMATOLOGY

## 2021-12-13 PROCEDURE — 3061F NEG MICROALBUMINURIA REV: CPT | Mod: CPTII,S$GLB,, | Performed by: DERMATOLOGY

## 2021-12-13 PROCEDURE — 17000 DESTRUCT PREMALG LESION: CPT | Mod: 59,S$GLB,, | Performed by: DERMATOLOGY

## 2021-12-13 PROCEDURE — 99213 PR OFFICE/OUTPT VISIT, EST, LEVL III, 20-29 MIN: ICD-10-PCS | Mod: 25,S$GLB,, | Performed by: DERMATOLOGY

## 2021-12-13 PROCEDURE — 17000 PR DESTRUCTION(LASER SURGERY,CRYOSURGERY,CHEMOSURGERY),PREMALIGNANT LESIONS,FIRST LESION: ICD-10-PCS | Mod: 59,S$GLB,, | Performed by: DERMATOLOGY

## 2021-12-13 PROCEDURE — 1157F PR ADVANCE CARE PLAN OR EQUIV PRESENT IN MEDICAL RECORD: ICD-10-PCS | Mod: CPTII,S$GLB,, | Performed by: DERMATOLOGY

## 2021-12-13 PROCEDURE — 3066F NEPHROPATHY DOC TX: CPT | Mod: CPTII,S$GLB,, | Performed by: DERMATOLOGY

## 2021-12-13 PROCEDURE — 17003 DESTRUCT PREMALG LES 2-14: CPT | Mod: S$GLB,,, | Performed by: DERMATOLOGY

## 2021-12-13 PROCEDURE — 99999 PR PBB SHADOW E&M-EST. PATIENT-LVL III: ICD-10-PCS | Mod: PBBFAC,,, | Performed by: DERMATOLOGY

## 2021-12-13 PROCEDURE — 88305 TISSUE EXAM BY PATHOLOGIST: CPT | Performed by: PATHOLOGY

## 2021-12-16 LAB
FINAL PATHOLOGIC DIAGNOSIS: NORMAL
GROSS: NORMAL
Lab: NORMAL
MICROSCOPIC EXAM: NORMAL

## 2021-12-20 ENCOUNTER — OFFICE VISIT (OUTPATIENT)
Dept: PODIATRY | Facility: CLINIC | Age: 71
End: 2021-12-20
Payer: MEDICARE

## 2021-12-20 VITALS
SYSTOLIC BLOOD PRESSURE: 137 MMHG | HEART RATE: 84 BPM | DIASTOLIC BLOOD PRESSURE: 71 MMHG | WEIGHT: 147.69 LBS | BODY MASS INDEX: 27.91 KG/M2

## 2021-12-20 DIAGNOSIS — M72.2 PLANTAR FASCIITIS, LEFT: Primary | ICD-10-CM

## 2021-12-20 DIAGNOSIS — M79.672 CHRONIC HEEL PAIN, LEFT: ICD-10-CM

## 2021-12-20 DIAGNOSIS — M20.41 HAMMERTOE OF RIGHT FOOT: ICD-10-CM

## 2021-12-20 DIAGNOSIS — G89.29 CHRONIC HEEL PAIN, LEFT: ICD-10-CM

## 2021-12-20 DIAGNOSIS — M77.51 CAPSULITIS OF TOE, RIGHT: ICD-10-CM

## 2021-12-20 PROCEDURE — 20550 PR INJECT TENDON SHEATH/LIGAMENT: ICD-10-PCS | Mod: 59,LT,S$GLB, | Performed by: PODIATRIST

## 2021-12-20 PROCEDURE — 3066F PR DOCUMENTATION OF TREATMENT FOR NEPHROPATHY: ICD-10-PCS | Mod: CPTII,S$GLB,, | Performed by: PODIATRIST

## 2021-12-20 PROCEDURE — 99999 PR PBB SHADOW E&M-EST. PATIENT-LVL III: CPT | Mod: PBBFAC,,, | Performed by: PODIATRIST

## 2021-12-20 PROCEDURE — 99213 OFFICE O/P EST LOW 20 MIN: CPT | Mod: 25,S$GLB,, | Performed by: PODIATRIST

## 2021-12-20 PROCEDURE — 1157F ADVNC CARE PLAN IN RCRD: CPT | Mod: CPTII,S$GLB,, | Performed by: PODIATRIST

## 2021-12-20 PROCEDURE — 20550 NJX 1 TENDON SHEATH/LIGAMENT: CPT | Mod: 59,LT,S$GLB, | Performed by: PODIATRIST

## 2021-12-20 PROCEDURE — 3061F PR NEG MICROALBUMINURIA RESULT DOCUMENTED/REVIEW: ICD-10-PCS | Mod: CPTII,S$GLB,, | Performed by: PODIATRIST

## 2021-12-20 PROCEDURE — 99999 PR PBB SHADOW E&M-EST. PATIENT-LVL III: ICD-10-PCS | Mod: PBBFAC,,, | Performed by: PODIATRIST

## 2021-12-20 PROCEDURE — 99213 PR OFFICE/OUTPT VISIT, EST, LEVL III, 20-29 MIN: ICD-10-PCS | Mod: 25,S$GLB,, | Performed by: PODIATRIST

## 2021-12-20 PROCEDURE — 4010F ACE/ARB THERAPY RXD/TAKEN: CPT | Mod: CPTII,S$GLB,, | Performed by: PODIATRIST

## 2021-12-20 PROCEDURE — 20600 DRAIN/INJ JOINT/BURSA W/O US: CPT | Mod: RT,S$GLB,, | Performed by: PODIATRIST

## 2021-12-20 PROCEDURE — 1157F PR ADVANCE CARE PLAN OR EQUIV PRESENT IN MEDICAL RECORD: ICD-10-PCS | Mod: CPTII,S$GLB,, | Performed by: PODIATRIST

## 2021-12-20 PROCEDURE — 3066F NEPHROPATHY DOC TX: CPT | Mod: CPTII,S$GLB,, | Performed by: PODIATRIST

## 2021-12-20 PROCEDURE — 20600 PR DRAIN/INJECT SMALL JOINT/BURSA: ICD-10-PCS | Mod: RT,S$GLB,, | Performed by: PODIATRIST

## 2021-12-20 PROCEDURE — 3061F NEG MICROALBUMINURIA REV: CPT | Mod: CPTII,S$GLB,, | Performed by: PODIATRIST

## 2021-12-20 PROCEDURE — 4010F PR ACE/ARB THEARPY RXD/TAKEN: ICD-10-PCS | Mod: CPTII,S$GLB,, | Performed by: PODIATRIST

## 2021-12-20 RX ORDER — TRIAMCINOLONE ACETONIDE 40 MG/ML
20 INJECTION, SUSPENSION INTRA-ARTICULAR; INTRAMUSCULAR ONCE
Status: COMPLETED | OUTPATIENT
Start: 2021-12-20 | End: 2021-12-20

## 2021-12-20 RX ORDER — DEXAMETHASONE SODIUM PHOSPHATE 4 MG/ML
2 INJECTION, SOLUTION INTRA-ARTICULAR; INTRALESIONAL; INTRAMUSCULAR; INTRAVENOUS; SOFT TISSUE ONCE
Status: COMPLETED | OUTPATIENT
Start: 2021-12-20 | End: 2021-12-20

## 2021-12-20 RX ADMIN — TRIAMCINOLONE ACETONIDE 20 MG: 40 INJECTION, SUSPENSION INTRA-ARTICULAR; INTRAMUSCULAR at 10:12

## 2021-12-20 RX ADMIN — DEXAMETHASONE SODIUM PHOSPHATE 2 MG: 4 INJECTION, SOLUTION INTRA-ARTICULAR; INTRALESIONAL; INTRAMUSCULAR; INTRAVENOUS; SOFT TISSUE at 10:12

## 2022-01-05 ENCOUNTER — CLINICAL SUPPORT (OUTPATIENT)
Dept: REHABILITATION | Facility: HOSPITAL | Age: 72
End: 2022-01-05
Payer: MEDICARE

## 2022-01-05 DIAGNOSIS — M25.672 DECREASED RANGE OF MOTION OF LEFT ANKLE: ICD-10-CM

## 2022-01-05 DIAGNOSIS — R26.9 GAIT ABNORMALITY: ICD-10-CM

## 2022-01-05 DIAGNOSIS — R29.898 DECREASED STRENGTH OF LOWER EXTREMITY: ICD-10-CM

## 2022-01-05 PROCEDURE — 97110 THERAPEUTIC EXERCISES: CPT | Mod: PN

## 2022-01-05 NOTE — PROGRESS NOTES
"[facility]   Physical Therapy Treatment Note     Name: Jolly Matias  Clinic Number: [id number]    Therapy Diagnosis:   Encounter Diagnoses   Name Primary?    Gait abnormality     Decreased range of motion of left ankle     Decreased strength of lower extremity      Physician: Timothy Arshad DPM    Visit Date: 1/5/2022    Physician Orders: PT Eval and Treat   Medical Diagnosis from Referral:   M72.2 (ICD-10-CM) - Plantar fasciitis, left   M77.51 (ICD-10-CM) - Capsulitis of toe, right   M79.671 (ICD-10-CM) - Foot pain, right      Evaluation Date: 12/1/2021  Authorization Period Expiration: 12/15/2021  Plan of Care Expiration: 01/19/20211  Visit # / Visits authorized: 2/ 6    PTA Visit #: 0/5   Precautions: Standard    Time In: 11:05  Time Out: 12:00  Total Billable Time: 55 minutes (4 TE)    SUBJECTIVE     Pt reports: Pt had eval at beginning of December, and then was unable to get a PT appt for the rest of the month, so she got a cortisone shot the week before Christmas, and now her heel pain is completely alleviated. However she and the Dr discussed continuing therapy to address underlying issue while pain is temporarily gone.    She was compliant with home exercise program.  Response to previous treatment: decreased pain  Functional change: ongoing    Pain: 0/10  Location: bilateral feet      OBJECTIVE     Objective Measures updated at progress report unless specified.     Treatment     Jolly received the treatments listed below:      therapeutic exercises to develop strength, endurance, ROM and flexibility for 55 minutes including:  Gastroc stretch fitter   3x30" B  Soleus stretch  fitter   3x30" B  Heel raise 3-way   3x10   SLS and tandem stance  2x30" each   Arch doming    2x10 B  Towel scrunching   2x10 B  Seated towel stretch   2x15"      Patient Education and Home Exercises     Home Exercises Provided and Patient Education Provided     Education provided:   - Importance of " adherence to HEP      Written Home Exercises Provided: Patient instructed to cont prior HEP. Exercises were reviewed and Jolly was able to demonstrate them prior to the end of the session.  Jolly demonstrated good  understanding of the education provided. See EMR under Patient Instructions for exercises provided during therapy sessions    ASSESSMENT     Jolly is a 71 y.o. female referred to outpatient Physical Therapy with a medical diagnosis of left plantar fascitis, right capsulitis of toe, and right foot pain. She received cortisone injection recently and as a result is not experiencing any pain in either foot at the time. She was able to tolerate all newly introduced exercises well this date, without reports of any adverse reactions, though she did report some muscular burning/fatigue with completion of strengthening activities. She did note significantly more tightness in R lower extremity with performance of gastroc/soleus stretching as compared to L side. She would benefit from continued stretching and strengthening of lower extremities in order to prevent return of plantar fasciitis symptoms as cortisone injection wears off.     Jolly Is progressing well towards her goals.   Pt prognosis is Good.     Pt will continue to benefit from skilled outpatient physical therapy to address the deficits listed in the problem list box on initial evaluation, provide pt/family education and to maximize pt's level of independence in the home and community environment.     Pt's spiritual, cultural and educational needs considered and pt agreeable to plan of care and goals.     Anticipated barriers to physical therapy: co-morbidities     Goals:  Short Term Goals:  3 weeks  1.Report decreased L heel pain  < / =  4/10 at worse to increase tolerance for aggravating activiites  2. Increase Dorsiflexion ROM by 5 degrees in order to walk with min to no compensation.  3. Increase strength by 1/3 MMT grade for  BLE  to  increase tolerance for ADL and work activities.  4. Pt to tolerate HEP to improve ROM and independence with ADL's     Long Term Goals: 6 weeks  1.Report decreased L heel pain  < / =  2/10 at worst  to increase tolerance for walking  2.Patient goal: pt will return to recreational walking >30 min without increase in pain  3.Increase strength to 4+/5 for  B hip musculature  to increase balance       PLAN     Continue with POC to progress toward PT goals     Shamar Irwin, PT, DPT

## 2022-01-06 ENCOUNTER — HOSPITAL ENCOUNTER (OUTPATIENT)
Dept: RADIOLOGY | Facility: HOSPITAL | Age: 72
Discharge: HOME | End: 2022-01-06
Attending: INTERNAL MEDICINE
Payer: MEDICARE

## 2022-01-06 VITALS — HEIGHT: 61 IN | BODY MASS INDEX: 27.75 KG/M2 | WEIGHT: 147 LBS

## 2022-01-06 DIAGNOSIS — Z12.31 ENCOUNTER FOR SCREENING MAMMOGRAM FOR MALIGNANT NEOPLASM OF BREAST: ICD-10-CM

## 2022-01-06 PROCEDURE — 77063 BREAST TOMOSYNTHESIS BI: CPT | Mod: TC

## 2022-01-06 PROCEDURE — 77063 BREAST TOMOSYNTHESIS BI: CPT | Mod: 26,,, | Performed by: RADIOLOGY

## 2022-01-06 PROCEDURE — 77067 SCR MAMMO BI INCL CAD: CPT | Mod: 26,,, | Performed by: RADIOLOGY

## 2022-01-06 PROCEDURE — 77067 MAMMO DIGITAL SCREENING BILAT WITH TOMO: ICD-10-PCS | Mod: 26,,, | Performed by: RADIOLOGY

## 2022-01-06 PROCEDURE — 77063 MAMMO DIGITAL SCREENING BILAT WITH TOMO: ICD-10-PCS | Mod: 26,,, | Performed by: RADIOLOGY

## 2022-01-06 PROCEDURE — 77067 SCR MAMMO BI INCL CAD: CPT | Mod: TC

## 2022-01-10 ENCOUNTER — CLINICAL SUPPORT (OUTPATIENT)
Dept: REHABILITATION | Facility: HOSPITAL | Age: 72
End: 2022-01-10
Payer: MEDICARE

## 2022-01-10 DIAGNOSIS — M25.672 DECREASED RANGE OF MOTION OF LEFT ANKLE: ICD-10-CM

## 2022-01-10 DIAGNOSIS — R29.898 DECREASED STRENGTH OF LOWER EXTREMITY: ICD-10-CM

## 2022-01-10 DIAGNOSIS — R26.9 GAIT ABNORMALITY: ICD-10-CM

## 2022-01-10 PROCEDURE — 97110 THERAPEUTIC EXERCISES: CPT | Mod: PN

## 2022-01-10 NOTE — PROGRESS NOTES
"[facility]   Physical Therapy Treatment Note     Name: Jolly Matias  Clinic Number: [id number]    Therapy Diagnosis:   Encounter Diagnoses   Name Primary?    Gait abnormality     Decreased range of motion of left ankle     Decreased strength of lower extremity      Physician: Timothy Arshad DPM    Visit Date: 1/10/2022    Physician Orders: PT Eval and Treat   Medical Diagnosis from Referral:   M72.2 (ICD-10-CM) - Plantar fasciitis, left   M77.51 (ICD-10-CM) - Capsulitis of toe, right   M79.671 (ICD-10-CM) - Foot pain, right      Evaluation Date: 12/1/2021  Authorization Period Expiration: 12/15/2021  Plan of Care Expiration: 01/19/20211  Visit # / Visits authorized: 3/ 6    PTA Visit #: 0/5   Precautions: Standard    Time In: 11:06  Time Out: 12:00  Total Billable Time: 54 minutes (4 TE)    SUBJECTIVE     Pt reports: She is still feeling good, is not experiencing any pain. She reports that she was compliant with HEP though she finds arch doming exercise frustrating to perform because she feels her toes are difficult to control   She was compliant with home exercise program.  Response to previous treatment: decreased pain  Functional change: ongoing    Pain: 0/10  Location: bilateral feet      OBJECTIVE     Objective Measures updated at progress report unless specified.     Treatment     Jolly received the treatments listed below:      therapeutic exercises to develop strength, endurance, ROM and flexibility for 54 minutes including:  Gastroc stretch fitter   3x30" B  Soleus stretch  fitter   3x30" B  Plantar fascia stretch  3x30"  Seated heel raise on 1/2 foam roll 3x10 B  Heel raise on step   3x10 B, towel roll under toes   Arch doming    2x10 B  Towel scrunching   2x10 B  Seated soleus stretch   2x15" towel assisted     Not today:  Heel raise 3-way   3x10 (NP)  SLS and tandem stance  2x30" each (NP)      Patient Education and Home Exercises     Home Exercises Provided and Patient " Education Provided     Education provided:   - Importance of adherence to HEP      Written Home Exercises Provided: Patient instructed to cont prior HEP. Exercises were reviewed and Jolly was able to demonstrate them prior to the end of the session.  Jolly demonstrated good  understanding of the education provided. See EMR under Patient Instructions for exercises provided during therapy sessions    ASSESSMENT     Jolly is a 71 y.o. female referred to outpatient Physical Therapy with a medical diagnosis of left plantar fascitis, right capsulitis of toe, and right foot pain. Pt continues to experience effects of cortisone injection and is not experiencing any foot pain at this time. She was able to tolerate more targeted progressions of plantar fascia/calf strengthening activities without experiencing any adverse reactions. No pain or discomfort experienced at any point throughout treatment. HEP was updated to include plantar fascia stretch, and pt was instructed to perform it each morning before getting out of bed. Will assess pt's response to strengthening progressions next visit.     Jolly Is progressing well towards her goals.   Pt prognosis is Good.     Pt will continue to benefit from skilled outpatient physical therapy to address the deficits listed in the problem list box on initial evaluation, provide pt/family education and to maximize pt's level of independence in the home and community environment.     Pt's spiritual, cultural and educational needs considered and pt agreeable to plan of care and goals.     Anticipated barriers to physical therapy: co-morbidities     Goals:  Short Term Goals:  3 weeks  1.Report decreased L heel pain  < / =  4/10 at worse to increase tolerance for aggravating activiites  2. Increase Dorsiflexion ROM by 5 degrees in order to walk with min to no compensation.  3. Increase strength by 1/3 MMT grade for  BLE  to increase tolerance for ADL and work activities.  4. Pt to  tolerate HEP to improve ROM and independence with ADL's     Long Term Goals: 6 weeks  1.Report decreased L heel pain  < / =  2/10 at worst  to increase tolerance for walking  2.Patient goal: pt will return to recreational walking >30 min without increase in pain  3.Increase strength to 4+/5 for  B hip musculature  to increase balance       PLAN     Continue with POC to progress toward PT goals     Shamar Irwin, PT, DPT

## 2022-01-11 LAB
LEFT EYE DM RETINOPATHY: POSITIVE
RIGHT EYE DM RETINOPATHY: POSITIVE

## 2022-01-12 ENCOUNTER — CLINICAL SUPPORT (OUTPATIENT)
Dept: REHABILITATION | Facility: HOSPITAL | Age: 72
End: 2022-01-12
Payer: MEDICARE

## 2022-01-12 ENCOUNTER — PATIENT MESSAGE (OUTPATIENT)
Dept: DERMATOLOGY | Facility: CLINIC | Age: 72
End: 2022-01-12
Payer: MEDICARE

## 2022-01-12 DIAGNOSIS — R29.898 DECREASED STRENGTH OF LOWER EXTREMITY: ICD-10-CM

## 2022-01-12 DIAGNOSIS — R26.9 GAIT ABNORMALITY: ICD-10-CM

## 2022-01-12 DIAGNOSIS — M25.672 DECREASED RANGE OF MOTION OF LEFT ANKLE: ICD-10-CM

## 2022-01-12 PROCEDURE — 97140 MANUAL THERAPY 1/> REGIONS: CPT | Mod: PN

## 2022-01-12 PROCEDURE — 97110 THERAPEUTIC EXERCISES: CPT | Mod: PN

## 2022-01-12 NOTE — PROGRESS NOTES
"[facility]   Physical Therapy Treatment Note     Name: Jolly Matias  Clinic Number: [id number]    Therapy Diagnosis:   Encounter Diagnoses   Name Primary?    Gait abnormality     Decreased range of motion of left ankle     Decreased strength of lower extremity      Physician: Timothy Arshad DPM    Visit Date: 1/12/2022    Physician Orders: PT Eval and Treat   Medical Diagnosis from Referral:   M72.2 (ICD-10-CM) - Plantar fasciitis, left   M77.51 (ICD-10-CM) - Capsulitis of toe, right   M79.671 (ICD-10-CM) - Foot pain, right      Evaluation Date: 12/1/2021  Authorization Period Expiration: 12/15/2021  Plan of Care Expiration: 01/19/20211  Visit # / Visits authorized: 3/ 6    PTA Visit #: 0/5   Precautions: Standard    Time In: 1000 am  Time Out: 1100 am  Total Billable Time: 54 minutes (3 TE, 1 MT)    SUBJECTIVE     Pt reports: She is still feeling good, is not experiencing any pain. She reports that she was compliant with HEP though she finds arch doming exercise frustrating to perform because she feels her toes are difficult to control   She was compliant with home exercise program.  Response to previous treatment: decreased pain  Functional change: ongoing    Pain: 0/10  Location: bilateral feet      OBJECTIVE     Objective Measures updated at progress report unless specified.         Treatment     Jolly received the treatments listed below:      therapeutic exercises to develop strength, endurance, ROM and flexibility for 45 minutes including:  · Gastroc stretch fitter   3x30" B  · Soleus stretch fitter   3x30" B  · Seated soleus stretch   2x15" towel assisted   · Arch doming    3 min  B  · Toe yoga/reverse toe yoga  3 min   · Towel scrunching    3 min B    · Seated ankle plantar fascia sretch  3 x 30"   · Seated ankle DF towel stretch 10x10"  · Seated plantar fascia heavy resistance exercise 3 x 10  (2' rest between sets) 3" up and 3" down; 25 lbs  · 5 min rest  · Standing plantar " "fascia heavy resistance exercise 3 x 10 DL (2' rest between sets) 3" up and 3" down  · Walking loops; 3 laps w/ cues for heel-toe in between if pt responds well         Aim for 3 x 15 and then drop reps and add weight    Not today:  Heel raise 3-way   3x10 (NP)  SLS and tandem stance  2x30" each (NP)      MANUAL THERAPY TECHNIQUES including Joint mobilizations and Soft tissue Mobilization were applied to B ankles for 15 minutes.   - TC Dorsiflexion mobs; Gr 2-3  - Great Toe Ext mobs; Gr 2-3    Patient Education and Home Exercises     Home Exercises Provided and Patient Education Provided     Education provided:   - Importance of adherence to HEP      Written Home Exercises Provided: Patient instructed to cont prior HEP. Exercises were reviewed and Jolly was able to demonstrate them prior to the end of the session.  Jolly demonstrated good  understanding of the education provided. See EMR under Patient Instructions for exercises provided during therapy sessions    ASSESSMENT     Jolly is a 71 y.o. female referred to outpatient Physical Therapy with a medical diagnosis of left plantar fascitis, right capsulitis of toe, and right foot pain. She responded well to manual interventions with visible improvements in 1st toe extension and ankle dorsiflexion after. She responded well to increased resistance plantar flexion strengthening with no return of pain and appropriate fatigue. She will benefit continued progression of these exercises with progressive range of motion interventions.     Jolly Is progressing well towards her goals.   Pt prognosis is Good.     Pt will continue to benefit from skilled outpatient physical therapy to address the deficits listed in the problem list box on initial evaluation, provide pt/family education and to maximize pt's level of independence in the home and community environment.     Pt's spiritual, cultural and educational needs considered and pt agreeable to plan of care and " goals.     Anticipated barriers to physical therapy: co-morbidities     Goals:  Short Term Goals:  3 weeks  1.Report decreased L heel pain  < / =  4/10 at worse to increase tolerance for aggravating activiites  2. Increase Dorsiflexion ROM by 5 degrees in order to walk with min to no compensation.  3. Increase strength by 1/3 MMT grade for  BLE  to increase tolerance for ADL and work activities.  4. Pt to tolerate HEP to improve ROM and independence with ADL's     Long Term Goals: 6 weeks  1.Report decreased L heel pain  < / =  2/10 at worst  to increase tolerance for walking  2.Patient goal: pt will return to recreational walking >30 min without increase in pain  3.Increase strength to 4+/5 for  B hip musculature  to increase balance       PLAN     Continue with POC to progress toward PT goals     Iva De Los Santos PT, DPT

## 2022-01-19 ENCOUNTER — PATIENT OUTREACH (OUTPATIENT)
Dept: ADMINISTRATIVE | Facility: HOSPITAL | Age: 72
End: 2022-01-19
Payer: MEDICARE

## 2022-01-20 ENCOUNTER — PATIENT OUTREACH (OUTPATIENT)
Dept: ADMINISTRATIVE | Facility: OTHER | Age: 72
End: 2022-01-20
Payer: MEDICARE

## 2022-01-20 ENCOUNTER — OFFICE VISIT (OUTPATIENT)
Dept: DERMATOLOGY | Facility: CLINIC | Age: 72
End: 2022-01-20
Payer: MEDICARE

## 2022-01-20 DIAGNOSIS — L73.9 FOLLICULITIS: Primary | ICD-10-CM

## 2022-01-20 DIAGNOSIS — D48.5 NEOPLASM OF UNCERTAIN BEHAVIOR OF SKIN: ICD-10-CM

## 2022-01-20 PROCEDURE — 99214 OFFICE O/P EST MOD 30 MIN: CPT | Mod: 25,S$GLB,, | Performed by: DERMATOLOGY

## 2022-01-20 PROCEDURE — 1101F PR PT FALLS ASSESS DOC 0-1 FALLS W/OUT INJ PAST YR: ICD-10-PCS | Mod: CPTII,S$GLB,, | Performed by: DERMATOLOGY

## 2022-01-20 PROCEDURE — 99999 PR PBB SHADOW E&M-EST. PATIENT-LVL III: CPT | Mod: PBBFAC,,, | Performed by: DERMATOLOGY

## 2022-01-20 PROCEDURE — 1126F PR PAIN SEVERITY QUANTIFIED, NO PAIN PRESENT: ICD-10-PCS | Mod: CPTII,S$GLB,, | Performed by: DERMATOLOGY

## 2022-01-20 PROCEDURE — 88305 TISSUE EXAM BY PATHOLOGIST: CPT | Performed by: PATHOLOGY

## 2022-01-20 PROCEDURE — 1126F AMNT PAIN NOTED NONE PRSNT: CPT | Mod: CPTII,S$GLB,, | Performed by: DERMATOLOGY

## 2022-01-20 PROCEDURE — 1157F ADVNC CARE PLAN IN RCRD: CPT | Mod: CPTII,S$GLB,, | Performed by: DERMATOLOGY

## 2022-01-20 PROCEDURE — 1159F MED LIST DOCD IN RCRD: CPT | Mod: CPTII,S$GLB,, | Performed by: DERMATOLOGY

## 2022-01-20 PROCEDURE — 3288F FALL RISK ASSESSMENT DOCD: CPT | Mod: CPTII,S$GLB,, | Performed by: DERMATOLOGY

## 2022-01-20 PROCEDURE — 11102 PR TANGENTIAL BIOPSY, SKIN, SINGLE LESION: ICD-10-PCS | Mod: S$GLB,,, | Performed by: DERMATOLOGY

## 2022-01-20 PROCEDURE — 3288F PR FALLS RISK ASSESSMENT DOCUMENTED: ICD-10-PCS | Mod: CPTII,S$GLB,, | Performed by: DERMATOLOGY

## 2022-01-20 PROCEDURE — 99214 PR OFFICE/OUTPT VISIT, EST, LEVL IV, 30-39 MIN: ICD-10-PCS | Mod: 25,S$GLB,, | Performed by: DERMATOLOGY

## 2022-01-20 PROCEDURE — 1159F PR MEDICATION LIST DOCUMENTED IN MEDICAL RECORD: ICD-10-PCS | Mod: CPTII,S$GLB,, | Performed by: DERMATOLOGY

## 2022-01-20 PROCEDURE — 99999 PR PBB SHADOW E&M-EST. PATIENT-LVL III: ICD-10-PCS | Mod: PBBFAC,,, | Performed by: DERMATOLOGY

## 2022-01-20 PROCEDURE — 11102 TANGNTL BX SKIN SINGLE LES: CPT | Mod: S$GLB,,, | Performed by: DERMATOLOGY

## 2022-01-20 PROCEDURE — 88305 TISSUE EXAM BY PATHOLOGIST: ICD-10-PCS | Mod: 26,,, | Performed by: PATHOLOGY

## 2022-01-20 PROCEDURE — 1101F PT FALLS ASSESS-DOCD LE1/YR: CPT | Mod: CPTII,S$GLB,, | Performed by: DERMATOLOGY

## 2022-01-20 PROCEDURE — 1157F PR ADVANCE CARE PLAN OR EQUIV PRESENT IN MEDICAL RECORD: ICD-10-PCS | Mod: CPTII,S$GLB,, | Performed by: DERMATOLOGY

## 2022-01-20 PROCEDURE — 88305 TISSUE EXAM BY PATHOLOGIST: CPT | Mod: 26,,, | Performed by: PATHOLOGY

## 2022-01-20 RX ORDER — DOXYCYCLINE HYCLATE 100 MG
TABLET ORAL
Qty: 10 TABLET | Refills: 1 | Status: SHIPPED | OUTPATIENT
Start: 2022-01-20 | End: 2022-09-08

## 2022-01-20 RX ORDER — TRIAMCINOLONE ACETONIDE 1 MG/ML
LOTION TOPICAL
Qty: 60 ML | Refills: 3 | Status: SHIPPED | OUTPATIENT
Start: 2022-01-20 | End: 2022-05-02

## 2022-01-20 NOTE — PROGRESS NOTES
Subjective:       Patient ID:  Jolly Matias is a 71 y.o. female who presents for   Chief Complaint   Patient presents with    Rash     Arms  Chest     Lesion     R post ear  L temple     Pt c/o rash to arms and chest x 2wks. Itching, burn, prev tx with nerosporin and clobetasol  C/o mole to R post ear present for years. Recently gotten bigger, tender. Prev tx with nerosporin  C/o lesion to L temple x 2wks. Itching, prev tx with nerosporin    Rash    Lesion        Review of Systems   Skin: Positive for itching, rash, daily sunscreen use and activity-related sunscreen use. Negative for tendency to form keloidal scars.   Hematologic/Lymphatic: Does not bruise/bleed easily.        Objective:    Physical Exam   Constitutional: She appears well-developed and well-nourished. No distress.   Neurological: She is alert and oriented to person, place, and time. She is not disoriented.   Psychiatric: She has a normal mood and affect.   Skin:   Areas Examined (abnormalities noted in diagram):   Head / Face Inspection Performed  Chest / Axilla Inspection Performed  RUE Inspected  LUE Inspection Performed                       Diagram Legend     Erythematous scaling macule/papule c/w actinic keratosis       Vascular papule c/w angioma      Pigmented verrucoid papule/plaque c/w seborrheic keratosis      Yellow umbilicated papule c/w sebaceous hyperplasia      Irregularly shaped tan macule c/w lentigo     1-2 mm smooth white papules consistent with Milia      Movable subcutaneous cyst with punctum c/w epidermal inclusion cyst      Subcutaneous movable cyst c/w pilar cyst      Firm pink to brown papule c/w dermatofibroma      Pedunculated fleshy papule(s) c/w skin tag(s)      Evenly pigmented macule c/w junctional nevus     Mildly variegated pigmented, slightly irregular-bordered macule c/w mildly atypical nevus      Flesh colored to evenly pigmented papule c/w intradermal nevus       Pink pearly papule/plaque c/w basal cell  carcinoma      Erythematous hyperkeratotic cursted plaque c/w SCC      Surgical scar with no sign of skin cancer recurrence      Open and closed comedones      Inflammatory papules and pustules      Verrucoid papule consistent consistent with wart     Erythematous eczematous patches and plaques     Dystrophic onycholytic nail with subungual debris c/w onychomycosis     Umbilicated papule    Erythematous-base heme-crusted tan verrucoid plaque consistent with inflamed seborrheic keratosis     Erythematous Silvery Scaling Plaque c/w Psoriasis     See annotation      Assessment / Plan:      Pathology Orders:     Normal Orders This Visit    Specimen to Pathology, Dermatology     Comments:    Number of Specimens:->1  ------------------------->-------------------------  Spec 1 Procedure:->Biopsy  Spec 1 Clinical Impression:->r/o inflamed nevus v BCC  Spec 1 Source:->right postauricular neck    Questions:    Procedure Type: Dermatology and skin neoplasms    Number of Specimens: 1    ------------------------: -------------------------    Spec 1 Procedure: Biopsy    Spec 1 Clinical Impression: r/o inflamed nevus v BCC    Spec 1 Source: right postauricular neck    Release to patient:         Folliculitis, excoriated  -     doxycycline (VIBRA-TABS) 100 MG tablet; Take 1 po qd with food and not within 1 hour of lying down  Dispense: 10 tablet; Refill: 1  -     triamcinolone acetonide 0.1% (KENALOG) 0.1 % Lotn; aaa qd- bid prn itching  Dispense: 60 mL; Refill: 3  Discussed benefits and risks of doxycyline therapy including but not limited to GI discomfort, esophageal irritation/ulceration, and increased sun sensitivity. Patient was counseled to take medicine with meals and at least 1 hour before lying down.     Neoplasm of uncertain behavior of skin  Shave biopsy procedure note:    Shave biopsy performed after verbal consent including risk of infection, scar, recurrence, need for additional treatment of site. Area prepped with  alcohol, anesthetized with approximately 1.0cc of 1% lidocaine with epinephrine. Lesional tissue shaved with razor blade. Hemostasis achieved with application of aluminum chloride followed by hyfrecation. No complications. Dressing applied. Wound care explained.    -     Specimen to Pathology, Dermatology             Follow up in about 4 weeks (around 2/17/2022) for ed and c right shoulder BCC.

## 2022-01-20 NOTE — PATIENT INSTRUCTIONS
Discussed benefits and risks of doxycyline therapy including but not limited to GI discomfort, esophageal irritation/ulceration, and increased sun sensitivity. Patient was counseled to take medicine with meals and at least 1 hour before lying down.      Shave Biopsy Wound Care    Your doctor has performed a shave biopsy today.  A band aid and vaseline ointment has been placed over the site.  This should remain in place for NO LONGER THAN 48 hours.  It is fine to remove the bandaid after 24 hours, if the area is no longer bleeding. It is recommended that you keep the area dry (do not wet)) for the first 24 hours.  After 24 hours, wash the area with warm soap and water and apply Vaseline jelly.  Many patients prefer to use Neosporin or Bacitracin ointment.  This is acceptable; however, know that you can develop an allergy to this medication even if you have used it safely for years.  It is important to keep the area moist.  Letting it dry out and get air slows healing time, and will worsen the scar.        If you notice increasing redness, tenderness, pain, or yellow drainage at the biopsy site, please notify your doctor.  These are signs of an infection.    If your biopsy site is bleeding, apply firm pressure for 15 minutes straight.  Repeat for another 15 minutes, if it is still bleeding.   If the surgical site continues to bleed, then please contact your doctor.      For [facility] users:   You will receive your biopsy results in [facility] as soon as they are available. Please be assured that your physician/provider will review your results and will then determine what further treatment, evaluation, or planning is required. You should be contacted by your physician's/provider's office within 5 business days of receiving your results; If not, please reach out to directly. This is one more way [facility] is putting you first.     [facility] [place] [address]/ [phone] FAX/  [email or web address]

## 2022-01-21 ENCOUNTER — CLINICAL SUPPORT (OUTPATIENT)
Dept: REHABILITATION | Facility: HOSPITAL | Age: 72
End: 2022-01-21
Payer: MEDICARE

## 2022-01-21 DIAGNOSIS — R29.898 DECREASED STRENGTH OF LOWER EXTREMITY: ICD-10-CM

## 2022-01-21 DIAGNOSIS — M25.672 DECREASED RANGE OF MOTION OF LEFT ANKLE: ICD-10-CM

## 2022-01-21 DIAGNOSIS — R26.9 GAIT ABNORMALITY: ICD-10-CM

## 2022-01-21 PROCEDURE — 97110 THERAPEUTIC EXERCISES: CPT | Mod: PN

## 2022-01-21 NOTE — PROGRESS NOTES
[facility]   Physical Therapy Treatment Note     Name: Jolly Matias  Clinic Number: [id number]    Therapy Diagnosis:   Encounter Diagnoses   Name Primary?    Gait abnormality     Decreased range of motion of left ankle     Decreased strength of lower extremity      Physician: Timothy Arshad DPM    Visit Date: 1/21/2022    Physician Orders: PT Eval and Treat   Medical Diagnosis from Referral:   M72.2 (ICD-10-CM) - Plantar fasciitis, left   M77.51 (ICD-10-CM) - Capsulitis of toe, right   M79.671 (ICD-10-CM) - Foot pain, right      Evaluation Date: 12/1/2021  Authorization Period Expiration: 12/15/2021  Plan of Care Expiration: 01/19/2021  Visit # / Visits authorized: 4/ 6    PTA Visit #: 0/5   Precautions: Standard    Time In: 10:05 am  Time Out: 11:10 am  Total Billable Time: 65 minutes (4 TE)    SUBJECTIVE     Pt reports: she is feeling good today, no pain with any activity   She was compliant with home exercise program.  Response to previous treatment: decreased pain  Functional change: ongoing    Pain: 0/10  Location: bilateral feet      OBJECTIVE     Objective Measures updated at progress report unless specified.      Functional Tests:   SLS R: moderate balance, no change in pain   SLS L: moderate balance, no change in pain  Single leg calf raise: R heel rise 2+ inches; L 2+ inches         Ankle Active Range of Motion:   Ankle Right Left   DF 9 10          Lower Extremity Strength    Right  Left    Dorsiflexion 5/5 5/5   Plantarflexion (standing) 4/5 4/5   Inversion 5/5 5/5   Eversion 5/5 5/5   Hip extension 4-/5 4-/5   PGM 4/5 4/5         Joint Mobility: WNL        Palpation: WNL        CMS Impairment/Limitation/Restriction for FOTO Ankle Survey     Therapist reviewed FOTO scores for Jolly Matias on 12/1/2021.   FOTO documents entered into [employer] - see Media section.     Limitation Score: 25%  Predicted Score: 27%           Treatment     Jolly received the treatments listed below:   "    therapeutic exercises to develop strength, endurance, ROM and flexibility for 55 minutes including:  · Gastroc stretch fitter   3x30" B  · Soleus stretch fitter   3x30" B  · Seated soleus stretch   2x15" towel assisted   · Arch doming    3 min  B  · Toe yoga/reverse toe yoga  3 min   · Towel scrunching    3 min B    · Seated ankle plantar fascia sretch  3 x 30"   · Seated ankle DF towel stretch 10x10"  · Seated plantar fascia heavy resistance exercise 3 x 10  (2' rest between sets) 3" up and 3" down; 25 lbs  · 5 min rest  · Standing plantar fascia heavy resistance exercise 3 x 10 DL (2' rest between sets) 3" up and 3" down  · Walking loops; 3 laps w/ cues for heel-toe in between if pt responds well         Aim for 3 x 15 and then drop reps and add weight    Not today:  Heel raise 3-way   3x10 (NP)  SLS and tandem stance  2x30" each (NP)      MANUAL THERAPY TECHNIQUES including Joint mobilizations and Soft tissue Mobilization were applied to B ankles for 00 minutes.   - TC Dorsiflexion mobs; Gr 2-3  - Great Toe Ext mobs; Gr 2-3    Patient Education and Home Exercises     Home Exercises Provided and Patient Education Provided     Education provided:   - Importance of adherence to HEP    - Updated HEP consisting of heavy resistance training program exercises     Written Home Exercises Provided: yes. Exercises were reviewed and Jolly was able to demonstrate them prior to the end of the session.  Jolly demonstrated good  understanding of the education provided. See EMR under Patient Instructions for exercises provided during therapy sessions    ASSESSMENT     Jolly is a 71 y.o. female referred to outpatient Physical Therapy with a medical diagnosis of left plantar fascitis, right capsulitis of toe, and right foot pain. Pt undergoing heavy resistance exercise program. She tolerated all exercises well, no adverse reactions noted. She demonstrates good knowledge of exercises and is able to perform them " independently.     Jolly Is progressing well towards her goals.   Pt prognosis is Good.     Pt will continue to benefit from skilled outpatient physical therapy to address the deficits listed in the problem list box on initial evaluation, provide pt/family education and to maximize pt's level of independence in the home and community environment.     Pt's spiritual, cultural and educational needs considered and pt agreeable to plan of care and goals.     Anticipated barriers to physical therapy: co-morbidities     Goals:  Short Term Goals:  3 weeks  1.Report decreased L heel pain  < / =  4/10 at worse to increase tolerance for aggravating activities. MET  2. Increase Dorsiflexion ROM by 5 degrees in order to walk with min to no compensation. MET  3. Increase strength by 1/3 MMT grade for  BLE  to increase tolerance for ADL and work activities. MET  4. Pt to tolerate HEP to improve ROM and independence with activities of daily living's. MET     Long Term Goals: 6 weeks  1.Report decreased L heel pain  < / =  2/10 at worst  to increase tolerance for walking. MET  2.Patient goal: pt will return to recreational walking >30 min without increase in pain. Partially met - 20 min walk  3.Increase strength to 4+/5 for  B hip musculature  to increase balance. Not met, addressed with HEP       PLAN     Continue with POC to progress toward PT goals     Shamar Irwin, PT, DPT         [facility]  Physical Therapy Discharge Note    Name: Jolly Matias  Clinic Number: [id number]    Therapy Diagnosis:   Encounter Diagnoses   Name Primary?    Gait abnormality     Decreased range of motion of left ankle     Decreased strength of lower extremity      Physician: Timothy Arshad DPM      Physician Orders: PT Eval and Treat   Medical Diagnosis from Referral:   M72.2 (ICD-10-CM) - Plantar fasciitis, left   M77.51 (ICD-10-CM) - Capsulitis of toe, right   M79.671 (ICD-10-CM) - Foot pain, right      Evaluation  Date: 12/1/2021    Date of Last visit: 01/21/2022  Total Visits Received: 5    ASSESSMENT      Jolly is a 71 y.o. female referred to outpatient Physical Therapy with a medical diagnosis of left plantar fascitis, right capsulitis of toe, and right foot pain. Pt demonstrates significant improvements in pain and function since beginning physical therapy. Due to cortisone injections, pt has not pain at the moment with any activity. She also has demonstrated improvements in talocrural mobility and muscle extensibility. Ankle/foot strength as improved as demonstrated by MMT and pt's improved ability to perform both double leg and single leg heel raises. Pt has met majority of goals and is confident in her ability to continue with heavy resistance exercise program independently at home, and is agreeable to discharge.     Discharge reason: Patient has completed allowable visits authorized by insurance, has met majority of goals, and is agreeable to discharge    Discharge FOTO Score: 25%    Goals:  Short Term Goals:  3 weeks  1.Report decreased L heel pain  < / =  4/10 at worse to increase tolerance for aggravating activities. MET  2. Increase Dorsiflexion ROM by 5 degrees in order to walk with min to no compensation. MET  3. Increase strength by 1/3 MMT grade for  BLE  to increase tolerance for ADL and work activities. MET  4. Pt to tolerate HEP to improve ROM and independence with activities of daily living's. MET     Long Term Goals: 6 weeks  1.Report decreased L heel pain  < / =  2/10 at worst  to increase tolerance for walking. MET  2.Patient goal: pt will return to recreational walking >30 min without increase in pain. Partially met - 20 min walk  3.Increase strength to 4+/5 for  B hip musculature  to increase balance. Not met, addressed with HEP    PLAN   This patient is discharged from Physical Therapy      Shamar Irwin, PT, DPT

## 2022-01-25 ENCOUNTER — PATIENT MESSAGE (OUTPATIENT)
Dept: ADMINISTRATIVE | Facility: HOSPITAL | Age: 72
End: 2022-01-25
Payer: MEDICARE

## 2022-01-26 LAB
FINAL PATHOLOGIC DIAGNOSIS: NORMAL
GROSS: NORMAL
Lab: NORMAL
MICROSCOPIC EXAM: NORMAL

## 2022-01-27 NOTE — PROGRESS NOTES
MsErnesto Matias, your pathology report indicates a benign, non cancerous lesion. No further treatment is needed at this time.  Thank you for allowing me to care for you and take care, Dr. Mohr    .  Skin, right postauricular neck, shave biopsy:   - MELANOCYTIC NEVUS, INTRADERMAL TYPE.   - THE LESION IS EXCORIATED/ TRAUMATIZED.

## 2022-02-10 PROBLEM — R26.9 GAIT ABNORMALITY: Status: RESOLVED | Noted: 2021-12-01 | Resolved: 2022-02-10

## 2022-02-10 PROBLEM — R29.898 DECREASED STRENGTH OF LOWER EXTREMITY: Status: RESOLVED | Noted: 2021-12-01 | Resolved: 2022-02-10

## 2022-02-10 PROBLEM — R53.81 PHYSICAL DECONDITIONING: Status: RESOLVED | Noted: 2021-08-09 | Resolved: 2022-02-10

## 2022-02-10 PROBLEM — M25.672 DECREASED RANGE OF MOTION OF LEFT ANKLE: Status: RESOLVED | Noted: 2021-12-01 | Resolved: 2022-02-10

## 2022-02-22 ENCOUNTER — PATIENT OUTREACH (OUTPATIENT)
Dept: ADMINISTRATIVE | Facility: OTHER | Age: 72
End: 2022-02-22
Payer: MEDICARE

## 2022-02-23 ENCOUNTER — OFFICE VISIT (OUTPATIENT)
Dept: DERMATOLOGY | Facility: CLINIC | Age: 72
End: 2022-02-23
Payer: MEDICARE

## 2022-02-23 DIAGNOSIS — C44.612 BASAL CELL CARCINOMA (BCC) OF RIGHT SHOULDER: Primary | ICD-10-CM

## 2022-02-23 PROCEDURE — 1126F PR PAIN SEVERITY QUANTIFIED, NO PAIN PRESENT: ICD-10-PCS | Mod: CPTII,S$GLB,, | Performed by: DERMATOLOGY

## 2022-02-23 PROCEDURE — 1157F PR ADVANCE CARE PLAN OR EQUIV PRESENT IN MEDICAL RECORD: ICD-10-PCS | Mod: CPTII,S$GLB,, | Performed by: DERMATOLOGY

## 2022-02-23 PROCEDURE — 1157F ADVNC CARE PLAN IN RCRD: CPT | Mod: CPTII,S$GLB,, | Performed by: DERMATOLOGY

## 2022-02-23 PROCEDURE — 99499 UNLISTED E&M SERVICE: CPT | Mod: S$GLB,,, | Performed by: DERMATOLOGY

## 2022-02-23 PROCEDURE — 1101F PT FALLS ASSESS-DOCD LE1/YR: CPT | Mod: CPTII,S$GLB,, | Performed by: DERMATOLOGY

## 2022-02-23 PROCEDURE — 17262 PR DESTR MALIG TRUNK,EXTREM 1.1-2 CM: ICD-10-PCS | Mod: S$GLB,,, | Performed by: DERMATOLOGY

## 2022-02-23 PROCEDURE — 3288F FALL RISK ASSESSMENT DOCD: CPT | Mod: CPTII,S$GLB,, | Performed by: DERMATOLOGY

## 2022-02-23 PROCEDURE — 17262 DSTRJ MAL LES T/A/L 1.1-2.0: CPT | Mod: S$GLB,,, | Performed by: DERMATOLOGY

## 2022-02-23 PROCEDURE — 1159F PR MEDICATION LIST DOCUMENTED IN MEDICAL RECORD: ICD-10-PCS | Mod: CPTII,S$GLB,, | Performed by: DERMATOLOGY

## 2022-02-23 PROCEDURE — 99999 PR PBB SHADOW E&M-EST. PATIENT-LVL III: ICD-10-PCS | Mod: PBBFAC,,, | Performed by: DERMATOLOGY

## 2022-02-23 PROCEDURE — 99499 NO LOS: ICD-10-PCS | Mod: S$GLB,,, | Performed by: DERMATOLOGY

## 2022-02-23 PROCEDURE — 1159F MED LIST DOCD IN RCRD: CPT | Mod: CPTII,S$GLB,, | Performed by: DERMATOLOGY

## 2022-02-23 PROCEDURE — 3288F PR FALLS RISK ASSESSMENT DOCUMENTED: ICD-10-PCS | Mod: CPTII,S$GLB,, | Performed by: DERMATOLOGY

## 2022-02-23 PROCEDURE — 1126F AMNT PAIN NOTED NONE PRSNT: CPT | Mod: CPTII,S$GLB,, | Performed by: DERMATOLOGY

## 2022-02-23 PROCEDURE — 1101F PR PT FALLS ASSESS DOC 0-1 FALLS W/OUT INJ PAST YR: ICD-10-PCS | Mod: CPTII,S$GLB,, | Performed by: DERMATOLOGY

## 2022-02-23 PROCEDURE — 99999 PR PBB SHADOW E&M-EST. PATIENT-LVL III: CPT | Mod: PBBFAC,,, | Performed by: DERMATOLOGY

## 2022-02-23 NOTE — PROGRESS NOTES
Subjective:       Patient ID:  Jolly Matias is a 71 y.o. female who presents for   Chief Complaint   Patient presents with    Skin Cancer     ED&C right medial shoulder     Pt here today for  ED&C to r medial shoulder BX 12/13/2021      Review of Systems   Skin: Negative for tendency to form keloidal scars.   Hematologic/Lymphatic: Does not bruise/bleed easily.        Objective:    Physical Exam   Constitutional: She appears well-developed and well-nourished. No distress.   Neurological: She is alert and oriented to person, place, and time. She is not disoriented.   Psychiatric: She has a normal mood and affect.   Skin:   Areas Examined (abnormalities noted in diagram):   RUE Inspected              Diagram Legend     Erythematous scaling macule/papule c/w actinic keratosis       Vascular papule c/w angioma      Pigmented verrucoid papule/plaque c/w seborrheic keratosis      Yellow umbilicated papule c/w sebaceous hyperplasia      Irregularly shaped tan macule c/w lentigo     1-2 mm smooth white papules consistent with Milia      Movable subcutaneous cyst with punctum c/w epidermal inclusion cyst      Subcutaneous movable cyst c/w pilar cyst      Firm pink to brown papule c/w dermatofibroma      Pedunculated fleshy papule(s) c/w skin tag(s)      Evenly pigmented macule c/w junctional nevus     Mildly variegated pigmented, slightly irregular-bordered macule c/w mildly atypical nevus      Flesh colored to evenly pigmented papule c/w intradermal nevus       Pink pearly papule/plaque c/w basal cell carcinoma      Erythematous hyperkeratotic cursted plaque c/w SCC      Surgical scar with no sign of skin cancer recurrence      Open and closed comedones      Inflammatory papules and pustules      Verrucoid papule consistent consistent with wart     Erythematous eczematous patches and plaques     Dystrophic onycholytic nail with subungual debris c/w onychomycosis     Umbilicated papule    Erythematous-base heme-crusted  tan verrucoid plaque consistent with inflamed seborrheic keratosis     Erythematous Silvery Scaling Plaque c/w Psoriasis     See annotation      Assessment / Plan:        Basal cell carcinoma (BCC) of right shoulder  Here for electrodesiccation and curettage of Superficial bcc on the right medial shoulder. bx done on 12/13/2021:      Electrodessication and Curettage Procedure note:    Verbal consent obtained. Lesional tissue marked and prepped with alcohol. Lesion anesthetized with 1% lidocaine with epinephrine. Curettage and Desiccation x 3 cycles to base. Aluminum chloride for hemostasis. Lesion size after primary curettage: 1.3 cm    Area bandaged and wound care explained.    F/u 4 months           Follow up in about 4 months (around 6/23/2022).

## 2022-02-25 ENCOUNTER — PES CALL (OUTPATIENT)
Dept: ADMINISTRATIVE | Facility: CLINIC | Age: 72
End: 2022-02-25
Payer: MEDICARE

## 2022-04-11 ENCOUNTER — PES CALL (OUTPATIENT)
Dept: ADMINISTRATIVE | Facility: CLINIC | Age: 72
End: 2022-04-11
Payer: MEDICARE

## 2022-04-25 ENCOUNTER — OFFICE VISIT (OUTPATIENT)
Dept: INTERNAL MEDICINE | Facility: CLINIC | Age: 72
End: 2022-04-25
Payer: MEDICARE

## 2022-04-25 VITALS
SYSTOLIC BLOOD PRESSURE: 118 MMHG | BODY MASS INDEX: 26.85 KG/M2 | WEIGHT: 142.19 LBS | OXYGEN SATURATION: 98 % | HEIGHT: 61 IN | TEMPERATURE: 99 F | DIASTOLIC BLOOD PRESSURE: 64 MMHG | HEART RATE: 78 BPM

## 2022-04-25 DIAGNOSIS — J01.00 ACUTE NON-RECURRENT MAXILLARY SINUSITIS: Primary | ICD-10-CM

## 2022-04-25 DIAGNOSIS — R05.9 COUGH: ICD-10-CM

## 2022-04-25 PROCEDURE — 1159F PR MEDICATION LIST DOCUMENTED IN MEDICAL RECORD: ICD-10-PCS | Mod: CPTII,S$GLB,, | Performed by: NURSE PRACTITIONER

## 2022-04-25 PROCEDURE — 1159F MED LIST DOCD IN RCRD: CPT | Mod: CPTII,S$GLB,, | Performed by: NURSE PRACTITIONER

## 2022-04-25 PROCEDURE — 3288F PR FALLS RISK ASSESSMENT DOCUMENTED: ICD-10-PCS | Mod: CPTII,S$GLB,, | Performed by: NURSE PRACTITIONER

## 2022-04-25 PROCEDURE — 3008F BODY MASS INDEX DOCD: CPT | Mod: CPTII,S$GLB,, | Performed by: NURSE PRACTITIONER

## 2022-04-25 PROCEDURE — 3078F PR MOST RECENT DIASTOLIC BLOOD PRESSURE < 80 MM HG: ICD-10-PCS | Mod: CPTII,S$GLB,, | Performed by: NURSE PRACTITIONER

## 2022-04-25 PROCEDURE — 3008F PR BODY MASS INDEX (BMI) DOCUMENTED: ICD-10-PCS | Mod: CPTII,S$GLB,, | Performed by: NURSE PRACTITIONER

## 2022-04-25 PROCEDURE — 99214 PR OFFICE/OUTPT VISIT, EST, LEVL IV, 30-39 MIN: ICD-10-PCS | Mod: S$GLB,,, | Performed by: NURSE PRACTITIONER

## 2022-04-25 PROCEDURE — 1101F PT FALLS ASSESS-DOCD LE1/YR: CPT | Mod: CPTII,S$GLB,, | Performed by: NURSE PRACTITIONER

## 2022-04-25 PROCEDURE — 3074F PR MOST RECENT SYSTOLIC BLOOD PRESSURE < 130 MM HG: ICD-10-PCS | Mod: CPTII,S$GLB,, | Performed by: NURSE PRACTITIONER

## 2022-04-25 PROCEDURE — 1157F ADVNC CARE PLAN IN RCRD: CPT | Mod: CPTII,S$GLB,, | Performed by: NURSE PRACTITIONER

## 2022-04-25 PROCEDURE — 1101F PR PT FALLS ASSESS DOC 0-1 FALLS W/OUT INJ PAST YR: ICD-10-PCS | Mod: CPTII,S$GLB,, | Performed by: NURSE PRACTITIONER

## 2022-04-25 PROCEDURE — 3074F SYST BP LT 130 MM HG: CPT | Mod: CPTII,S$GLB,, | Performed by: NURSE PRACTITIONER

## 2022-04-25 PROCEDURE — 1157F PR ADVANCE CARE PLAN OR EQUIV PRESENT IN MEDICAL RECORD: ICD-10-PCS | Mod: CPTII,S$GLB,, | Performed by: NURSE PRACTITIONER

## 2022-04-25 PROCEDURE — 99499 RISK ADDL DX/OHS AUDIT: ICD-10-PCS | Mod: S$GLB,,, | Performed by: NURSE PRACTITIONER

## 2022-04-25 PROCEDURE — 99214 OFFICE O/P EST MOD 30 MIN: CPT | Mod: S$GLB,,, | Performed by: NURSE PRACTITIONER

## 2022-04-25 PROCEDURE — 1160F PR REVIEW ALL MEDS BY PRESCRIBER/CLIN PHARMACIST DOCUMENTED: ICD-10-PCS | Mod: CPTII,S$GLB,, | Performed by: NURSE PRACTITIONER

## 2022-04-25 PROCEDURE — 1160F RVW MEDS BY RX/DR IN RCRD: CPT | Mod: CPTII,S$GLB,, | Performed by: NURSE PRACTITIONER

## 2022-04-25 PROCEDURE — 99499 UNLISTED E&M SERVICE: CPT | Mod: S$GLB,,, | Performed by: NURSE PRACTITIONER

## 2022-04-25 PROCEDURE — 3078F DIAST BP <80 MM HG: CPT | Mod: CPTII,S$GLB,, | Performed by: NURSE PRACTITIONER

## 2022-04-25 PROCEDURE — 99999 PR PBB SHADOW E&M-EST. PATIENT-LVL IV: CPT | Mod: PBBFAC,,, | Performed by: NURSE PRACTITIONER

## 2022-04-25 PROCEDURE — 3288F FALL RISK ASSESSMENT DOCD: CPT | Mod: CPTII,S$GLB,, | Performed by: NURSE PRACTITIONER

## 2022-04-25 PROCEDURE — 1125F AMNT PAIN NOTED PAIN PRSNT: CPT | Mod: CPTII,S$GLB,, | Performed by: NURSE PRACTITIONER

## 2022-04-25 PROCEDURE — 1125F PR PAIN SEVERITY QUANTIFIED, PAIN PRESENT: ICD-10-PCS | Mod: CPTII,S$GLB,, | Performed by: NURSE PRACTITIONER

## 2022-04-25 PROCEDURE — 99999 PR PBB SHADOW E&M-EST. PATIENT-LVL IV: ICD-10-PCS | Mod: PBBFAC,,, | Performed by: NURSE PRACTITIONER

## 2022-04-25 RX ORDER — AMOXICILLIN 500 MG/1
500 TABLET, FILM COATED ORAL EVERY 12 HOURS
Qty: 14 TABLET | Refills: 0 | Status: SHIPPED | OUTPATIENT
Start: 2022-04-25 | End: 2022-05-02

## 2022-04-25 NOTE — PROGRESS NOTES
[facility] Note    Chief Complaint      Chief Complaint   Patient presents with    Sore Throat     Last Thursday started with ST    Cough     Dry cough, not productive and does not feel like it is deep in chest. Denies fever or any other sx     History of Present Illness      Jolly Matias is a 71 y.o. female patient of Dr. Venegas's with chronic conditons of DM2 w/ chronic complications, vascular dx, COPD fibrosis of lung-bronchiectasis, CKD3, major depression, adenocarcinoma of right lung who presents today for c/o head congestion, sinus pressure, pnd, sore throat, cough that started 5 days ago. Feels a little sob from coughing. Has taken tessalon perles with little relief. No c/o fever, chills, cp, n/v/d  reviewed meds and history, nothing new, no new allergies    Health Maintenance   Topic Date Due    Foot Exam  2020    Hemoglobin A1c  2021    Lipid Panel  2022    Mammogram  2023    Eye Exam  2023    DEXA Scan  2023    TETANUS VACCINE  2025    Hepatitis C Screening  Completed       Past Medical History:   Diagnosis Date    Atypical ductal hyperplasia, breast     Basal cell carcinoma 2011    left forehead    Basal cell carcinoma 2015    R temporal scalp, R upper forehead, L upper forehead    Basal cell carcinoma 2015    right mid ala    BCC (basal cell carcinoma) excised     right shoulder    BCC (basal cell carcinoma) 2021    right medial shoulder    Diabetes mellitus     Diverticulitis     Fibrocystic breast     HLD (hyperlipidemia)     Hypertension     Prediabetes 10/16/2013    Skin cancer        Past Surgical History:   Procedure Laterality Date    APPENDECTOMY      bilateral breast duct excision      BREAST BIOPSY Right     Excisional bx, benign    BREAST BIOPSY Left     Excisional bx, benign     SECTION      x2    COLON SURGERY Left 2017    sigmoidectomy for diverticulitis    COLONOSCOPY N/A  1/16/2017    Procedure: COLONOSCOPY;  Surgeon: IBRAHIMA Philip MD;  Location: [facility] ENDO (4TH FLR);  Service: Endoscopy;  Laterality: N/A;    EYE SURGERY      fulgaration of endometriosis x 2      HYSTERECTOMY      For endometriosis and DUB--age 42, ovaries in?    INCISIONAL HERNIA REPAIR  08/2017    KNEE ARTHROSCOPY W/ DEBRIDEMENT Right 7/6/2018    Procedure: ARTHROSCOPY, KNEE, WITH DEBRIDEMENT;  Surgeon: MARVA Arias MD;  Location: [facility] OR 1ST FLR;  Service: Orthopedics;  Laterality: Right;    KNEE ARTHROSCOPY W/ MENISCECTOMY Right 7/6/2018    Procedure: ARTHROSCOPY, KNEE, WITH MENISCECTOMY (partial lateral and medial menisectomy, chondraplasty;  Surgeon: MARVA Arias MD;  Location: [facility] OR 1ST FLR;  Service: Orthopedics;  Laterality: Right;    KNEE ARTHROSCOPY W/ PLICA EXCISION Right 7/6/2018    Procedure: EXCISION, PLICA, KNEE, ARTHROSCOPIC,;  Surgeon: MARVA Arias MD;  [facility];  Service: Orthopedics;  Laterality: Right;    KNEE SURGERY      LAPAROSCOPIC REPAIR OF INCISIONAL HERNIA N/A 10/29/2020    Procedure: REPAIR, HERNIA, INCISIONAL, LAPAROSCOPIC recurrent, WITH MESH;  Surgeon: Deshawn Arias MD;  Location: [facility] OR 2ND FLR;  Service: General;  Laterality: N/A;    OOPHORECTOMY      SKIN CANCER EXCISION      BCC forehead , temple, shoulder, chest    SURGICAL REMOVAL OF LYMPH NODE Right 4/9/2021    Procedure: EXCISION, LYMPH NODE;  Surgeon: Sloan Gómez MD;  [facility]  Service: Thoracic;  Laterality: Right;  Robotic mediastinal and hilar    TONSILLECTOMY      torn retina, left      XI ROBOTIC RATS,WITH LOBECTOMY,LUNG Right 4/9/2021    Procedure: XI ROBOTIC RATS,WITH LOBECTOMY,LUNG;  Surgeon: Sloan Gómez MD;  Location: [facility] OR 2ND FLR;  Service: Thoracic;  Laterality: Right;       family history includes Anxiety disorder in her daughter; Breast cancer in her maternal aunt; Cancer in her father; Dementia in her mother; Diabetes  in her father and maternal uncle; Heart disease in her father; Hyperlipidemia in her sister and son; Hypertension in her father, mother, sister, and son; Melanoma in her father; Skin cancer in her father and mother; Thyroid disease in her mother.    Social History     Tobacco Use    Smoking status: Never Smoker    Smokeless tobacco: Never Used   Substance Use Topics    Alcohol use: Yes     Alcohol/week: 0.0 standard drinks     Types: 1 - 2 Glasses of wine per week     Comment: 3-4x /week    Drug use: No       Review of Systems   Constitutional: Negative for chills, fever and malaise/fatigue.   HENT: Positive for congestion, sinus pain and sore throat. Negative for ear pain.    Respiratory: Positive for cough, sputum production and shortness of breath. Negative for wheezing.    Cardiovascular: Negative for chest pain.   Gastrointestinal: Negative for diarrhea, nausea and vomiting.   Neurological: Negative for dizziness and headaches.        Outpatient Encounter Medications as of 4/25/2022   Medication Sig Dispense Refill    atorvastatin (LIPITOR) 20 MG tablet TAKE 1 TABLET BY MOUTH EVERY DAY 90 tablet 3    clobetasoL (TEMOVATE) 0.05 % cream Apply topically 2 (two) times daily. Prn rash and itch.  Stop using steroid topical when skin is smooth and non itchy.  Do not treat dark or red coloring. 60 g 0    doxycycline (VIBRA-TABS) 100 MG tablet Take 1 po qd with food and not within 1 hour of lying down 10 tablet 1    erythromycin (ROMYCIN) ophthalmic ointment APPLY IN LEFT EYE AT BEDTIME      flaxseed oil 1,000 mg Cap Take 1 capsule by mouth once daily.      levocetirizine (XYZAL) 5 MG tablet Take 5 mg by mouth nightly.      meloxicam (MOBIC) 15 MG tablet TAKE 1 TABLET(15 MG) BY MOUTH EVERY DAY 90 tablet 0    metFORMIN (GLUCOPHAGE) 500 MG tablet Take 1 tablet (500 mg total) by mouth 2 (two) times daily with meals. 180 tablet 3    ofloxacin (OCUFLOX) 0.3 % ophthalmic solution Place 1 drop into both eyes 4  "(four) times daily.      olmesartan (BENICAR) 40 MG tablet Take 1 tablet (40 mg total) by mouth once daily. 90 tablet 3    oxyCODONE (ROXICODONE) 5 MG immediate release tablet Take 1 tablet (5 mg total) by mouth every 4 to 6 hours as needed for Pain. 41 tablet 0    rOPINIRole (REQUIP) 2 MG tablet Take 1 tablet (2 mg total) by mouth every evening. 30 tablet 11    sertraline (ZOLOFT) 100 MG tablet Take 1 tablet (100 mg total) by mouth once daily. 90 tablet 3    spironolactone (ALDACTONE) 25 MG tablet Take 1 tablet (25 mg total) by mouth once daily. 90 tablet 3    triamcinolone acetonide 0.1% (KENALOG) 0.1 % Lotn aaa qd- bid prn itching 60 mL 3    vitamin D 1000 units Tab Take 185 mg by mouth once daily.      amoxicillin (AMOXIL) 500 MG Tab Take 1 tablet (500 mg total) by mouth every 12 (twelve) hours. for 7 days 14 tablet 0    gabapentin (NEURONTIN) 300 MG capsule Take 1 capsule (300 mg total) by mouth 3 (three) times daily. 90 capsule 11     Facility-Administered Encounter Medications as of 4/25/2022   Medication Dose Route Frequency Provider Last Rate Last Admin    0.9%  NaCl infusion   Intravenous Continuous Deshawn Arias MD   Stopped at 04/09/21 1053    lidocaine (PF) 10 mg/ml (1%) injection 10 mg  1 mL Intradermal Once Deshawn Arias MD        midazolam (VERSED) 1 mg/mL injection 0.5 mg  0.5 mg Intravenous PRN Jhoan Kenny MD   2 mg at 10/29/20 0900        Review of patient's allergies indicates:   Allergen Reactions    Hydrocodone      Also makes pt "very sleepy"    Kiwi (actinidia chinensis) Swelling       Physical Exam      Vital Signs  Temp: 98.9 °F (37.2 °C)  Temp src: Oral  Pulse: 78  SpO2: 98 %  BP: 118/64  BP Location: Right arm  Patient Position: Sitting  Pain Score:   2  Height and Weight  Height: 5' 1" (154.9 cm)  Weight: 64.5 kg (142 lb 3.2 oz)  BSA (Calculated - sq m): 1.67 sq meters  BMI (Calculated): 26.9  Weight in (lb) to have BMI = 25: 132    Physical " Exam  Vitals and nursing note reviewed.   Constitutional:       General: She is not in acute distress.     Appearance: Normal appearance. She is ill-appearing.   HENT:      Head: Normocephalic and atraumatic.      Ears:      Comments: Redness to bilateral ear canals, with clear effusion noted     Mouth/Throat:      Mouth: Mucous membranes are moist.      Pharynx: Posterior oropharyngeal erythema present.   Eyes:      Pupils: Pupils are equal, round, and reactive to light.   Cardiovascular:      Rate and Rhythm: Normal rate.      Heart sounds: Normal heart sounds.   Pulmonary:      Effort: Pulmonary effort is normal. No respiratory distress.      Comments: Bronchial irritation noted w/ expiration  Musculoskeletal:      Cervical back: Normal range of motion and neck supple.   Lymphadenopathy:      Cervical: Cervical adenopathy present.   Skin:     General: Skin is warm and dry.   Neurological:      Mental Status: She is alert and oriented to person, place, and time.   Psychiatric:         Mood and Affect: Mood normal.         Behavior: Behavior normal.         Thought Content: Thought content normal.         Judgment: Judgment normal.          Laboratory:  CBC:  Lab Results   Component Value Date    WBC 6.76 06/04/2021    RBC 4.03 06/04/2021    HGB 11.5 (L) 06/04/2021    HCT 36.9 (L) 06/04/2021     06/04/2021    MCV 92 06/04/2021    MCH 28.5 06/04/2021    MCHC 31.2 (L) 06/04/2021    MCHC 31.5 (L) 02/25/2021    MCHC 31.7 (L) 11/24/2020     CMP:  Lab Results   Component Value Date     06/04/2021    CALCIUM 9.8 06/04/2021    ALBUMIN 4.0 06/04/2021    PROT 7.2 06/04/2021     06/04/2021    K 4.5 06/04/2021    CO2 23 06/04/2021     06/04/2021    BUN 24 (H) 06/04/2021    ALKPHOS 103 06/04/2021    ALT 13 06/04/2021    AST 16 06/04/2021    BILITOT 0.3 06/04/2021    BILITOT 0.2 11/24/2020    BILITOT 0.4 09/08/2020     URINALYSIS:  Lab Results   Component Value Date    COLORU Yellow 06/04/2021     SPECGRAV 1.010 06/04/2021    PHUR 5.0 06/04/2021    PROTEINUA Negative 06/04/2021    BACTERIA Occasional 11/24/2020    NITRITE Negative 06/04/2021    LEUKOCYTESUR 1+ (A) 06/04/2021    UROBILINOGEN Negative 10/15/2018    HYALINECASTS 9 (A) 11/24/2020    HYALINECASTS 0 10/15/2018    HYALINECASTS 0 03/06/2017      LIPIDS:  Lab Results   Component Value Date    TSH 1.966 06/04/2021    TSH 0.921 11/24/2020    TSH 2.489 09/06/2019    HDL 47 06/04/2021    HDL 36 (L) 11/24/2020    HDL 54 09/06/2019    CHOL 144 06/04/2021    CHOL 148 11/24/2020    CHOL 156 09/06/2019    TRIG 139 06/04/2021    TRIG 149 11/24/2020    TRIG 95 09/06/2019    LDLCALC 69.2 06/04/2021    LDLCALC 82.2 11/24/2020    LDLCALC 83.0 09/06/2019    CHOLHDL 32.6 06/04/2021    CHOLHDL 24.3 11/24/2020    CHOLHDL 34.6 09/06/2019    NONHDLCHOL 97 06/04/2021    NONHDLCHOL 112 11/24/2020    NONHDLCHOL 102 09/06/2019    TOTALCHOLEST 3.1 06/04/2021    TOTALCHOLEST 4.1 11/24/2020    TOTALCHOLEST 2.9 09/06/2019     TSH:  Lab Results   Component Value Date    TSH 1.966 06/04/2021    TSH 0.921 11/24/2020    TSH 2.489 09/06/2019     A1C:  Lab Results   Component Value Date    HGBA1C 6.0 (H) 06/04/2021    HGBA1C 5.9 (H) 04/10/2021    HGBA1C 6.2 (H) 11/24/2020    HGBA1C 6.0 (H) 09/08/2020    HGBA1C 6.1 (H) 02/06/2019    HGBA1C 6.0 (H) 07/03/2018    HGBA1C 5.9 (H) 04/06/2018    HGBA1C 5.9 (H) 01/17/2018    HGBA1C 6.1 05/11/2017    HGBA1C 5.7 11/22/2016    HGBA1C 6.0 09/07/2016    HGBA1C 5.9 11/25/2015         Assessment/Plan     Jolly Matias is a 71 y.o.female with:    Acute non-recurrent maxillary sinusitis  -     amoxicillin (AMOXIL) 500 MG Tab; Take 1 tablet (500 mg total) by mouth every 12 (twelve) hours. for 7 days  Dispense: 14 tablet; Refill: 0    Cough  -     amoxicillin (AMOXIL) 500 MG Tab; Take 1 tablet (500 mg total) by mouth every 12 (twelve) hours. for 7 days  Dispense: 14 tablet; Refill: 0      Stay hydrated with water  Gargle with warm salt water  Flonase,  saline spray, zyxol   Continue tessalon perles    I spent 30 minutes on the day of this encounter for preparing for, evaluating, treating, and managing this patient.      -Continue current medications and maintain follow up with specialists.  Return to clinic as needed for any concerns   No follow-ups on file.       Leatha Cortez, NP-C  [facility]

## 2022-04-29 NOTE — PROGRESS NOTES
"Subjective:       Patient ID: Jolly Matias is a 71 y.o. female.    Chief Complaint: Follow-up    Diagnosis: Adenocarcinoma in situ    Pre-operative therapy:  N/A    Procedure(s) and date(s): 4/9/21 Robotic Right Lower Lobectomy with MLND     Pathology: 1.2cm non-mucinous adenocarcinoma in situ. Margins negative. 5cm to parenchymal margin. Levels 2, 4, 7, 10, 11, 12= negative. pTisN0     Post-operative therapy: Surveillance    HPI   71 y.o. female with chronic bronchiectasis returns for 1 year follow up s/p right robot-assisted lower lobectomy with MLND for resection of adenocarcinoma in situ. At last visit scan showed clusters of micro nodules largely in RML and RUL. Reports cough and sore throat prompting PCP visit last week. Completely course of amoxil currently. Sore throat improved, however cough still present. Occasional nausea.     Review of Systems   Constitutional: Negative for fever.   HENT: Negative for trouble swallowing and voice change.    Eyes: Negative for visual disturbance.   Respiratory: Positive for cough. Negative for shortness of breath.    Cardiovascular: Negative for chest pain, palpitations and leg swelling.   Gastrointestinal: Negative for abdominal distention, abdominal pain, diarrhea and vomiting.   Genitourinary: Negative for difficulty urinating.   Musculoskeletal: Negative for arthralgias and myalgias.   Neurological: Negative for syncope, weakness and headaches.   Psychiatric/Behavioral: Negative for confusion.         Objective:       Vitals:    05/02/22 1444   BP: 126/66   Pulse: 72   SpO2: 96%   Weight: 64.7 kg (142 lb 10.2 oz)   Height: 5' 1" (1.549 m)   PainSc: 0-No pain       Physical Exam  Constitutional:       Appearance: Normal appearance.   Cardiovascular:      Rate and Rhythm: Normal rate and regular rhythm.      Pulses: Normal pulses.   Pulmonary:      Effort: Pulmonary effort is normal.      Breath sounds: Examination of the right-upper field reveals wheezing. " Examination of the left-upper field reveals wheezing. Wheezing present.   Abdominal:      General: There is no distension.      Tenderness: There is no abdominal tenderness.   Musculoskeletal:      Right lower leg: No edema.      Left lower leg: No edema.   Neurological:      General: No focal deficit present.      Mental Status: She is alert and oriented to person, place, and time.   Psychiatric:         Mood and Affect: Mood normal.           10/18/21- Chest CT without Contrast:  Postoperative change status post right lower lobectomy and mediastinal lymphadenectomy.  Redemonstration of tubular bronchiectasis and scattered clustered micronodules throughout both lungs that appear more conspicuous within the right middle lobe, noting interval changes of right lower lobectomy which contributes to the differing appearance.  These clusters of micronodules appear overall similar within the right upper lobe, lingula, and left lower lobe.  Findings may represent chronic infectious or noninfectious inflammatory process, however neoplasm is not excluded.  Clinical and oncologic considerations will determine surveillance schedule.  Stable splenic artery aneurysm.    5/2/22- Chest CT without Contrast:  Stable cylindrical bronchiectasis and linear branching nodules and and mild mucoid impaction.  A milder degree of disease of a similar nature is seen within the inferior lingular segment and left lower lobe.  Findings likely stable allowing for differences in technique and for inter-and intra-observer variability.  1.  No significant interval change since prior CT of the chest 10/18/2021.  Status post right lower lobectomy; no CT evidence for new metastatic or recurrent disease in the chest.  2.  Stable appearing, medium and small airways disease.  Continued attention on follow-up surveillance imaging recommended.      Assessment:       71 y.o. old female returns for 1 year follow up s/p right robot-assisted lower lobectomy with  MLND for resection of adenocarcinoma in situ.     Plan:       Waxing and waning RML and RUL nodules. Some increased from prior. Current cough. Nonspecific changes. Will discuss at conference although feel changes are inflammatory in nature given underlying bronchiectasis.     Call patient following [facility] [place].

## 2022-05-02 ENCOUNTER — OFFICE VISIT (OUTPATIENT)
Dept: CARDIOTHORACIC SURGERY | Facility: CLINIC | Age: 72
End: 2022-05-02
Payer: MEDICARE

## 2022-05-02 ENCOUNTER — HOSPITAL ENCOUNTER (OUTPATIENT)
Dept: RADIOLOGY | Facility: HOSPITAL | Age: 72
Discharge: HOME | End: 2022-05-02
Attending: PHYSICIAN ASSISTANT
Payer: MEDICARE

## 2022-05-02 VITALS
HEIGHT: 61 IN | BODY MASS INDEX: 26.93 KG/M2 | OXYGEN SATURATION: 96 % | SYSTOLIC BLOOD PRESSURE: 126 MMHG | DIASTOLIC BLOOD PRESSURE: 66 MMHG | HEART RATE: 72 BPM | WEIGHT: 142.63 LBS

## 2022-05-02 DIAGNOSIS — C34.91 ADENOCARCINOMA OF RIGHT LUNG: ICD-10-CM

## 2022-05-02 DIAGNOSIS — D09.9 ADENOCARCINOMA IN SITU: Primary | ICD-10-CM

## 2022-05-02 PROCEDURE — 1159F MED LIST DOCD IN RCRD: CPT | Mod: CPTII,S$GLB,, | Performed by: PHYSICIAN ASSISTANT

## 2022-05-02 PROCEDURE — 3074F PR MOST RECENT SYSTOLIC BLOOD PRESSURE < 130 MM HG: ICD-10-PCS | Mod: CPTII,S$GLB,, | Performed by: PHYSICIAN ASSISTANT

## 2022-05-02 PROCEDURE — 1159F PR MEDICATION LIST DOCUMENTED IN MEDICAL RECORD: ICD-10-PCS | Mod: CPTII,S$GLB,, | Performed by: PHYSICIAN ASSISTANT

## 2022-05-02 PROCEDURE — 3074F SYST BP LT 130 MM HG: CPT | Mod: CPTII,S$GLB,, | Performed by: PHYSICIAN ASSISTANT

## 2022-05-02 PROCEDURE — 99499 UNLISTED E&M SERVICE: CPT | Mod: S$GLB,,, | Performed by: PHYSICIAN ASSISTANT

## 2022-05-02 PROCEDURE — 1157F ADVNC CARE PLAN IN RCRD: CPT | Mod: CPTII,S$GLB,, | Performed by: PHYSICIAN ASSISTANT

## 2022-05-02 PROCEDURE — 99499 RISK ADDL DX/OHS AUDIT: ICD-10-PCS | Mod: S$GLB,,, | Performed by: PHYSICIAN ASSISTANT

## 2022-05-02 PROCEDURE — 71250 CT THORAX DX C-: CPT | Mod: TC

## 2022-05-02 PROCEDURE — 1126F AMNT PAIN NOTED NONE PRSNT: CPT | Mod: CPTII,S$GLB,, | Performed by: PHYSICIAN ASSISTANT

## 2022-05-02 PROCEDURE — 1101F PR PT FALLS ASSESS DOC 0-1 FALLS W/OUT INJ PAST YR: ICD-10-PCS | Mod: CPTII,S$GLB,, | Performed by: PHYSICIAN ASSISTANT

## 2022-05-02 PROCEDURE — 3288F FALL RISK ASSESSMENT DOCD: CPT | Mod: CPTII,S$GLB,, | Performed by: PHYSICIAN ASSISTANT

## 2022-05-02 PROCEDURE — 3078F DIAST BP <80 MM HG: CPT | Mod: CPTII,S$GLB,, | Performed by: PHYSICIAN ASSISTANT

## 2022-05-02 PROCEDURE — 99999 PR PBB SHADOW E&M-EST. PATIENT-LVL IV: CPT | Mod: PBBFAC,,, | Performed by: PHYSICIAN ASSISTANT

## 2022-05-02 PROCEDURE — 99213 PR OFFICE/OUTPT VISIT, EST, LEVL III, 20-29 MIN: ICD-10-PCS | Mod: S$GLB,,, | Performed by: PHYSICIAN ASSISTANT

## 2022-05-02 PROCEDURE — 1101F PT FALLS ASSESS-DOCD LE1/YR: CPT | Mod: CPTII,S$GLB,, | Performed by: PHYSICIAN ASSISTANT

## 2022-05-02 PROCEDURE — 99999 PR PBB SHADOW E&M-EST. PATIENT-LVL IV: ICD-10-PCS | Mod: PBBFAC,,, | Performed by: PHYSICIAN ASSISTANT

## 2022-05-02 PROCEDURE — 1157F PR ADVANCE CARE PLAN OR EQUIV PRESENT IN MEDICAL RECORD: ICD-10-PCS | Mod: CPTII,S$GLB,, | Performed by: PHYSICIAN ASSISTANT

## 2022-05-02 PROCEDURE — 1126F PR PAIN SEVERITY QUANTIFIED, NO PAIN PRESENT: ICD-10-PCS | Mod: CPTII,S$GLB,, | Performed by: PHYSICIAN ASSISTANT

## 2022-05-02 PROCEDURE — 3078F PR MOST RECENT DIASTOLIC BLOOD PRESSURE < 80 MM HG: ICD-10-PCS | Mod: CPTII,S$GLB,, | Performed by: PHYSICIAN ASSISTANT

## 2022-05-02 PROCEDURE — 1160F PR REVIEW ALL MEDS BY PRESCRIBER/CLIN PHARMACIST DOCUMENTED: ICD-10-PCS | Mod: CPTII,S$GLB,, | Performed by: PHYSICIAN ASSISTANT

## 2022-05-02 PROCEDURE — 3008F PR BODY MASS INDEX (BMI) DOCUMENTED: ICD-10-PCS | Mod: CPTII,S$GLB,, | Performed by: PHYSICIAN ASSISTANT

## 2022-05-02 PROCEDURE — 1160F RVW MEDS BY RX/DR IN RCRD: CPT | Mod: CPTII,S$GLB,, | Performed by: PHYSICIAN ASSISTANT

## 2022-05-02 PROCEDURE — 99213 OFFICE O/P EST LOW 20 MIN: CPT | Mod: S$GLB,,, | Performed by: PHYSICIAN ASSISTANT

## 2022-05-02 PROCEDURE — 71250 CT THORAX DX C-: CPT | Mod: 26,,, | Performed by: RADIOLOGY

## 2022-05-02 PROCEDURE — 71250 CT CHEST WITHOUT CONTRAST: ICD-10-PCS | Mod: 26,,, | Performed by: RADIOLOGY

## 2022-05-02 PROCEDURE — 3008F BODY MASS INDEX DOCD: CPT | Mod: CPTII,S$GLB,, | Performed by: PHYSICIAN ASSISTANT

## 2022-05-02 PROCEDURE — 3288F PR FALLS RISK ASSESSMENT DOCUMENTED: ICD-10-PCS | Mod: CPTII,S$GLB,, | Performed by: PHYSICIAN ASSISTANT

## 2022-05-04 ENCOUNTER — TELEPHONE (OUTPATIENT)
Dept: CARDIOTHORACIC SURGERY | Facility: CLINIC | Age: 72
End: 2022-05-04
Payer: MEDICARE

## 2022-05-04 ENCOUNTER — TELEPHONE (OUTPATIENT)
Dept: HEMATOLOGY/ONCOLOGY | Facility: CLINIC | Age: 72
End: 2022-05-04
Payer: MEDICARE

## 2022-05-04 ENCOUNTER — DOCUMENTATION ONLY (OUTPATIENT)
Dept: CARDIOTHORACIC SURGERY | Facility: HOSPITAL | Age: 72
End: 2022-05-04
Payer: MEDICARE

## 2022-05-04 NOTE — PATIENT CARE CONFERENCE
[facility]      THORACIC MULTIDISCIPLINARY TUMOR BOARD  PATIENT REVIEW FORM  ________________________________________________________________________    CLINIC #: [id number]  DATE: 05/04/2022    DIAGNOSIS: AIS     PRESENTER: Dr. Irwin     PATIENT SUMMARY: 71 y.o. female 1 year from right robot-assisted lower lobectomy with MLND (April 2021) for resection of adenocarcinoma in situ. 1.2cm non-mucinous adenocarcinoma in situ. Margins negative. 5cm to parenchymal margin. Levels 2, 4, 7, 10, 11, 12= negative. pTisN0. Review her 6 months and recent scan. Clusters of nodules and micro nodules RML and RUL. Chronic bronchiectasis. Current cough. Completely week of amoxicillin per primary. Feel they are inflammatory.    BOARD RECOMMENDATIONS: Recommend referral to Pulmonology for further workup and sputum studies.     CONSULT NEEDED:     [] Surgery    [] Hem/Onc    [] Rad/Onc    [] Dietary                 [] Social Service    [] Psychology       [] Pulmonology    Clinical Stage:  pTis N0 M0  Pathologic Stage:    pTis N0 M0    GROUP STAGE:     [x] O    [] 1A    [] IB    [] IIA    [] IIB     [] IIIA     [] IIIB     [] IIIC    [] IV                               [] Local recurrence     [] Regional recurrence     [] Distant recurrence                   [x] NSCLC     [] SCLC     Tumor type- adenocarcinoma in situ    Unstageable:      [] Yes     [] No  Metastatic site(s):         [] Kelli'[facility] Treatment Guidelines reviewed and care planned is consistent with guidelines.         (i.e., NCCN, NCI, PD, ACO, AUA, etc.)    PRESENTATION AT CANCER CONFERENCE:         [] Prospective    [] Retrospective     [] Follow-Up          [] Eligible for clinical trial

## 2022-05-04 NOTE — TELEPHONE ENCOUNTER
Patient notified of the referal to Pulmonary clinic with Dr. Jacinto scheduled for 05/11/22.  Appointment reminder mailed.

## 2022-05-05 ENCOUNTER — TELEPHONE (OUTPATIENT)
Dept: CARDIOTHORACIC SURGERY | Facility: HOSPITAL | Age: 72
End: 2022-05-05
Payer: MEDICARE

## 2022-05-11 ENCOUNTER — OFFICE VISIT (OUTPATIENT)
Dept: PULMONOLOGY | Facility: CLINIC | Age: 72
End: 2022-05-11
Payer: MEDICARE

## 2022-05-11 VITALS
SYSTOLIC BLOOD PRESSURE: 132 MMHG | BODY MASS INDEX: 26.81 KG/M2 | OXYGEN SATURATION: 95 % | WEIGHT: 142 LBS | HEART RATE: 77 BPM | HEIGHT: 61 IN | DIASTOLIC BLOOD PRESSURE: 62 MMHG

## 2022-05-11 DIAGNOSIS — C34.91 ADENOCARCINOMA OF RIGHT LUNG: ICD-10-CM

## 2022-05-11 DIAGNOSIS — R91.1 SOLITARY PULMONARY NODULE: ICD-10-CM

## 2022-05-11 DIAGNOSIS — R05.9 COUGH: ICD-10-CM

## 2022-05-11 DIAGNOSIS — J47.9 BRONCHIECTASIS WITHOUT COMPLICATION: Primary | ICD-10-CM

## 2022-05-11 PROCEDURE — 99499 UNLISTED E&M SERVICE: CPT | Mod: S$GLB,,, | Performed by: INTERNAL MEDICINE

## 2022-05-11 PROCEDURE — 1126F PR PAIN SEVERITY QUANTIFIED, NO PAIN PRESENT: ICD-10-PCS | Mod: CPTII,S$GLB,, | Performed by: INTERNAL MEDICINE

## 2022-05-11 PROCEDURE — 3075F PR MOST RECENT SYSTOLIC BLOOD PRESS GE 130-139MM HG: ICD-10-PCS | Mod: CPTII,S$GLB,, | Performed by: INTERNAL MEDICINE

## 2022-05-11 PROCEDURE — 1160F PR REVIEW ALL MEDS BY PRESCRIBER/CLIN PHARMACIST DOCUMENTED: ICD-10-PCS | Mod: CPTII,S$GLB,, | Performed by: INTERNAL MEDICINE

## 2022-05-11 PROCEDURE — 3008F PR BODY MASS INDEX (BMI) DOCUMENTED: ICD-10-PCS | Mod: CPTII,S$GLB,, | Performed by: INTERNAL MEDICINE

## 2022-05-11 PROCEDURE — 1101F PR PT FALLS ASSESS DOC 0-1 FALLS W/OUT INJ PAST YR: ICD-10-PCS | Mod: CPTII,S$GLB,, | Performed by: INTERNAL MEDICINE

## 2022-05-11 PROCEDURE — 99999 PR PBB SHADOW E&M-EST. PATIENT-LVL IV: CPT | Mod: PBBFAC,,, | Performed by: INTERNAL MEDICINE

## 2022-05-11 PROCEDURE — 3078F PR MOST RECENT DIASTOLIC BLOOD PRESSURE < 80 MM HG: ICD-10-PCS | Mod: CPTII,S$GLB,, | Performed by: INTERNAL MEDICINE

## 2022-05-11 PROCEDURE — 99214 OFFICE O/P EST MOD 30 MIN: CPT | Mod: S$GLB,,, | Performed by: INTERNAL MEDICINE

## 2022-05-11 PROCEDURE — 1126F AMNT PAIN NOTED NONE PRSNT: CPT | Mod: CPTII,S$GLB,, | Performed by: INTERNAL MEDICINE

## 2022-05-11 PROCEDURE — 99214 PR OFFICE/OUTPT VISIT, EST, LEVL IV, 30-39 MIN: ICD-10-PCS | Mod: S$GLB,,, | Performed by: INTERNAL MEDICINE

## 2022-05-11 PROCEDURE — 1157F ADVNC CARE PLAN IN RCRD: CPT | Mod: CPTII,S$GLB,, | Performed by: INTERNAL MEDICINE

## 2022-05-11 PROCEDURE — 1159F MED LIST DOCD IN RCRD: CPT | Mod: CPTII,S$GLB,, | Performed by: INTERNAL MEDICINE

## 2022-05-11 PROCEDURE — 3075F SYST BP GE 130 - 139MM HG: CPT | Mod: CPTII,S$GLB,, | Performed by: INTERNAL MEDICINE

## 2022-05-11 PROCEDURE — 1160F RVW MEDS BY RX/DR IN RCRD: CPT | Mod: CPTII,S$GLB,, | Performed by: INTERNAL MEDICINE

## 2022-05-11 PROCEDURE — 3288F PR FALLS RISK ASSESSMENT DOCUMENTED: ICD-10-PCS | Mod: CPTII,S$GLB,, | Performed by: INTERNAL MEDICINE

## 2022-05-11 PROCEDURE — 1159F PR MEDICATION LIST DOCUMENTED IN MEDICAL RECORD: ICD-10-PCS | Mod: CPTII,S$GLB,, | Performed by: INTERNAL MEDICINE

## 2022-05-11 PROCEDURE — 1101F PT FALLS ASSESS-DOCD LE1/YR: CPT | Mod: CPTII,S$GLB,, | Performed by: INTERNAL MEDICINE

## 2022-05-11 PROCEDURE — 3288F FALL RISK ASSESSMENT DOCD: CPT | Mod: CPTII,S$GLB,, | Performed by: INTERNAL MEDICINE

## 2022-05-11 PROCEDURE — 99499 RISK ADDL DX/OHS AUDIT: ICD-10-PCS | Mod: S$GLB,,, | Performed by: INTERNAL MEDICINE

## 2022-05-11 PROCEDURE — 3008F BODY MASS INDEX DOCD: CPT | Mod: CPTII,S$GLB,, | Performed by: INTERNAL MEDICINE

## 2022-05-11 PROCEDURE — 1157F PR ADVANCE CARE PLAN OR EQUIV PRESENT IN MEDICAL RECORD: ICD-10-PCS | Mod: CPTII,S$GLB,, | Performed by: INTERNAL MEDICINE

## 2022-05-11 PROCEDURE — 3078F DIAST BP <80 MM HG: CPT | Mod: CPTII,S$GLB,, | Performed by: INTERNAL MEDICINE

## 2022-05-11 PROCEDURE — 99999 PR PBB SHADOW E&M-EST. PATIENT-LVL IV: ICD-10-PCS | Mod: PBBFAC,,, | Performed by: INTERNAL MEDICINE

## 2022-05-11 RX ORDER — ALBUTEROL SULFATE 90 UG/1
2 AEROSOL, METERED RESPIRATORY (INHALATION) EVERY 6 HOURS PRN
Qty: 18 G | Refills: 11 | Status: SHIPPED | OUTPATIENT
Start: 2022-05-11 | End: 2023-03-15

## 2022-05-11 RX ORDER — PREDNISONE 20 MG/1
40 TABLET ORAL DAILY
Qty: 10 TABLET | Refills: 0 | Status: SHIPPED | OUTPATIENT
Start: 2022-05-11 | End: 2022-05-16

## 2022-05-11 NOTE — PROGRESS NOTES
"Subjective:       Patient ID: Jolly Matias is a 72 y.o. female.    Chief Complaint: Abnormal Ct Scan    71 year old with a history of bronchitis.  Had an enlarging pulmonary nodule in addition to bronchiectasis and tree in bud opacities that were present.  Resection of nodule was adenocarcinoma.  Follow up imaging was concerning for progression of bronchiectasis and inflammation.  ONe week prior to CT, denied fever but was treated for bronchitis with amoxicillin.  Did not receive steroids.  Cough is persistent.  No hemoptysis.  8# unexpected weight loss.  Cough is intermittently productive of sputum.  Occasional ANDRADE.  Often feels congestion.        Review of Systems    Objective:       Vitals:    05/11/22 1107   BP: 132/62   BP Location: Left arm   Patient Position: Sitting   Pulse: 77   SpO2: 95%   Weight: 64.4 kg (141 lb 15.6 oz)   Height: 5' 1" (1.549 m)     Physical Exam   Constitutional: She is oriented to person, place, and time. She appears well-developed and well-nourished.   Cardiovascular: Normal rate and regular rhythm.   Pulmonary/Chest: She has wheezes.   Musculoskeletal:         General: Normal range of motion.   Neurological: She is alert and oriented to person, place, and time.   Skin: Skin is warm and dry.   Psychiatric: She has a normal mood and affect.     Personal Diagnostic Review  CT of chest performed on 5/2/22 without contrast revealed Stable cylindrical bronchiectasis and linear branching nodules and and mild mucoid impaction.  A milder degree of disease of a similar nature is seen within the inferior lingular segment and left lower lobe.  Findings likely stable allowing for differences in technique and for inter-and intra-observer variability..  No flowsheet data found.      Assessment:       1. Bronchiectasis without complication    2. Solitary pulmonary nodule    3. Cough    4. Adenocarcinoma of right lung        Outpatient Encounter Medications as of 5/11/2022   Medication Sig Dispense " Refill    atorvastatin (LIPITOR) 20 MG tablet TAKE 1 TABLET BY MOUTH EVERY DAY 90 tablet 3    clobetasoL (TEMOVATE) 0.05 % cream Apply topically 2 (two) times daily. Prn rash and itch.  Stop using steroid topical when skin is smooth and non itchy.  Do not treat dark or red coloring. 60 g 0    erythromycin (ROMYCIN) ophthalmic ointment APPLY IN LEFT EYE AT BEDTIME      flaxseed oil 1,000 mg Cap Take 1 capsule by mouth once daily.      levocetirizine (XYZAL) 5 MG tablet Take 5 mg by mouth nightly.      meloxicam (MOBIC) 15 MG tablet TAKE 1 TABLET(15 MG) BY MOUTH EVERY DAY 90 tablet 0    metFORMIN (GLUCOPHAGE) 500 MG tablet Take 1 tablet (500 mg total) by mouth 2 (two) times daily with meals. 180 tablet 3    ofloxacin (OCUFLOX) 0.3 % ophthalmic solution Place 1 drop into both eyes 4 (four) times daily.      olmesartan (BENICAR) 40 MG tablet Take 1 tablet (40 mg total) by mouth once daily. 90 tablet 3    rOPINIRole (REQUIP) 2 MG tablet Take 1 tablet (2 mg total) by mouth every evening. 30 tablet 11    sertraline (ZOLOFT) 100 MG tablet Take 1 tablet (100 mg total) by mouth once daily. 90 tablet 3    spironolactone (ALDACTONE) 25 MG tablet Take 1 tablet (25 mg total) by mouth once daily. 90 tablet 3    vitamin D 1000 units Tab Take 185 mg by mouth once daily.      albuterol (PROVENTIL/VENTOLIN HFA) 90 mcg/actuation inhaler Inhale 2 puffs into the lungs every 6 (six) hours as needed for Wheezing or Shortness of Breath. 18 g 11    doxycycline (VIBRA-TABS) 100 MG tablet Take 1 po qd with food and not within 1 hour of lying down (Patient not taking: Reported on 2022) 10 tablet 1    gabapentin (NEURONTIN) 300 MG capsule Take 1 capsule (300 mg total) by mouth 3 (three) times daily. 90 capsule 11    [] predniSONE (DELTASONE) 20 MG tablet Take 2 tablets (40 mg total) by mouth once daily. for 5 days 10 tablet 0     Facility-Administered Encounter Medications as of 2022   Medication Dose Route  Frequency Provider Last Rate Last Admin    0.9%  NaCl infusion   Intravenous Continuous Deshawn Arias MD   Stopped at 04/09/21 1053    lidocaine (PF) 10 mg/ml (1%) injection 10 mg  1 mL Intradermal Once Deshawn Arias MD        midazolam (VERSED) 1 mg/mL injection 0.5 mg  0.5 mg Intravenous PRN Jhoan Kenny MD   2 mg at 10/29/20 0900     Orders Placed This Encounter   Procedures    Culture, Respiratory with Gram Stain     Standing Status:   Future     Standing Expiration Date:   11/11/2023    AFB Culture & Smear     Standing Status:   Future     Standing Expiration Date:   11/11/2023    CT Chest Without Contrast     Standing Status:   Future     Standing Expiration Date:   5/11/2023     Order Specific Question:   May the Radiologist modify the order per protocol to meet the clinical needs of the patient?     Answer:   Yes     Plan:     Problem List Items Addressed This Visit     Bronchiectasis without complication - Primary    Overview     Sputum cultures.  Recently completed course of antibiotics.    Course of prednisone for exacerbation           Relevant Orders    Culture, Respiratory with Gram Stain    AFB Culture & Smear    Adenocarcinoma of right lung    Current Assessment & Plan     Current changes concerning for infectious/inflammatory etiology for which she recently had infectious symptoms and was treated with amoxicillin.    Will obtain sputum cultures and monitor close.e             Other Visit Diagnoses     Solitary pulmonary nodule        Relevant Orders    CT Chest Without Contrast    Cough        Relevant Orders    CT Chest Without Contrast      Short interval follow up after treating bronchiectasis.

## 2022-05-17 NOTE — ASSESSMENT & PLAN NOTE
Current changes concerning for infectious/inflammatory etiology for which she recently had infectious symptoms and was treated with amoxicillin.    Will obtain sputum cultures and monitor close.e

## 2022-05-23 ENCOUNTER — PATIENT MESSAGE (OUTPATIENT)
Dept: INTERNAL MEDICINE | Facility: CLINIC | Age: 72
End: 2022-05-23
Payer: MEDICARE

## 2022-05-23 DIAGNOSIS — E11.9 TYPE 2 DIABETES MELLITUS WITHOUT RETINOPATHY: Primary | ICD-10-CM

## 2022-05-24 ENCOUNTER — PES CALL (OUTPATIENT)
Dept: ADMINISTRATIVE | Facility: CLINIC | Age: 72
End: 2022-05-24
Payer: MEDICARE

## 2022-05-24 NOTE — PROGRESS NOTES
Subjective:       Patient ID: Jolly Matias is a 72 y.o. female.    Chief Complaint: Follow-up (LAB RESULTS) and Medication Refill    Patient is a 72 y.o.female with MDD, aneurysm of specific arteries, ckd sgtage 3 and chronic bronchitis who presents today for annual  Labs due now  Vaccines up to date  Eye: surgery in January for retinal detachment  Mammogram utd  dexa utd  Colon due 2027  Review of Systems   Constitutional: Negative for appetite change, chills, diaphoresis and fever.   HENT: Negative for congestion, ear discharge, ear pain, postnasal drip, tinnitus, trouble swallowing and voice change.    Eyes: Negative for discharge, redness and itching.   Respiratory: Negative for cough, chest tightness, shortness of breath and wheezing.    Cardiovascular: Negative for chest pain, palpitations and leg swelling.   Gastrointestinal: Negative for abdominal pain, constipation, diarrhea, nausea and vomiting.   Endocrine: Negative for cold intolerance and heat intolerance.   Genitourinary: Negative for difficulty urinating, flank pain, hematuria and urgency.   Musculoskeletal: Negative for arthralgias, gait problem, myalgias and neck stiffness.   Skin: Negative for color change and rash.   Neurological: Negative for dizziness, seizures, syncope and headaches.   Hematological: Negative for adenopathy.   Psychiatric/Behavioral: Negative for agitation, behavioral problems, confusion and sleep disturbance.       Objective:      Physical Exam  Vitals and nursing note reviewed.   Constitutional:       General: She is not in acute distress.     Appearance: She is well-developed. She is not diaphoretic.   HENT:      Head: Normocephalic and atraumatic.      Right Ear: External ear normal.      Left Ear: External ear normal.      Nose: Nose normal.      Mouth/Throat:      Pharynx: No oropharyngeal exudate.   Eyes:      General: No scleral icterus.        Right eye: No discharge.         Left eye: No discharge.       Conjunctiva/sclera: Conjunctivae normal.      Pupils: Pupils are equal, round, and reactive to light.   Neck:      Thyroid: No thyromegaly.      Vascular: No JVD.      Trachea: No tracheal deviation.   Cardiovascular:      Rate and Rhythm: Normal rate.      Heart sounds: Normal heart sounds. No murmur heard.    No friction rub. No gallop.   Pulmonary:      Effort: Pulmonary effort is normal. No respiratory distress.      Breath sounds: Normal breath sounds. No stridor. No wheezing or rales.   Chest:      Chest wall: No tenderness.   Abdominal:      General: Bowel sounds are normal. There is no distension.      Palpations: Abdomen is soft.      Tenderness: There is no abdominal tenderness. There is no rebound.   Musculoskeletal:         General: No tenderness.      Cervical back: Neck supple.   Lymphadenopathy:      Cervical: No cervical adenopathy.   Skin:     General: Skin is warm and dry.      Findings: No erythema or rash.   Neurological:      Mental Status: She is alert and oriented to person, place, and time.   Psychiatric:         Behavior: Behavior normal.         Assessment and Plan:       1. Annual physical exam    - TSH; Future  - CBC Auto Differential; Future  - Vitamin D; Future  - Urinalysis; Future  - varicella-zoster gE-AS01B, PF, (SHINGRIX, PF,) 50 mcg/0.5 mL injection; Inject 0.5 mLs into the muscle once. for 1 dose  Dispense: 1 each; Refill: 1    2. Type 2 diabetes mellitus without retinopathy  Monitor; stable  - TSH; Future  - CBC Auto Differential; Future  - Vitamin D; Future  - Urinalysis; Future  - varicella-zoster gE-AS01B, PF, (SHINGRIX, PF,) 50 mcg/0.5 mL injection; Inject 0.5 mLs into the muscle once. for 1 dose  Dispense: 1 each; Refill: 1    3. Pure hypercholesterolemia    - TSH; Future  - CBC Auto Differential; Future  - Vitamin D; Future  - Urinalysis; Future  - varicella-zoster gE-AS01B, PF, (SHINGRIX, PF,) 50 mcg/0.5 mL injection; Inject 0.5 mLs into the muscle once. for 1 dose   Dispense: 1 each; Refill: 1    4. Vitamin D deficiency    - Vitamin D; Future    5. MDD; stable on zoloft; monitor  6. ckd stage 3: stable; monitor; avoid nsaids  7. Aneurysm of arteries: stable; monitor  8. Chronic bronchitis: stable; monitor; sees pulm        No follow-ups on file.

## 2022-05-27 ENCOUNTER — LAB VISIT (OUTPATIENT)
Dept: LAB | Facility: HOSPITAL | Age: 72
End: 2022-05-27
Attending: INTERNAL MEDICINE
Payer: MEDICARE

## 2022-05-27 DIAGNOSIS — E11.9 TYPE 2 DIABETES MELLITUS WITHOUT RETINOPATHY: ICD-10-CM

## 2022-05-27 LAB
ALBUMIN SERPL BCP-MCNC: 3.6 G/DL (ref 3.5–5.2)
ALP SERPL-CCNC: 93 U/L (ref 55–135)
ALT SERPL W/O P-5'-P-CCNC: 10 U/L (ref 10–44)
ANION GAP SERPL CALC-SCNC: 8 MMOL/L (ref 8–16)
AST SERPL-CCNC: 13 U/L (ref 10–40)
BILIRUB SERPL-MCNC: 0.4 MG/DL (ref 0.1–1)
BUN SERPL-MCNC: 31 MG/DL (ref 8–23)
CALCIUM SERPL-MCNC: 9.2 MG/DL (ref 8.7–10.5)
CHLORIDE SERPL-SCNC: 109 MMOL/L (ref 95–110)
CHOLEST SERPL-MCNC: 170 MG/DL (ref 120–199)
CHOLEST/HDLC SERPL: 3.3 {RATIO} (ref 2–5)
CO2 SERPL-SCNC: 24 MMOL/L (ref 23–29)
CREAT SERPL-MCNC: 1.2 MG/DL (ref 0.5–1.4)
EST. GFR  (AFRICAN AMERICAN): 51.8 ML/MIN/1.73 M^2
EST. GFR  (NON AFRICAN AMERICAN): 44.9 ML/MIN/1.73 M^2
ESTIMATED AVG GLUCOSE: 128 MG/DL (ref 68–131)
GLUCOSE SERPL-MCNC: 93 MG/DL (ref 70–110)
HBA1C MFR BLD: 6.1 % (ref 4–5.6)
HDLC SERPL-MCNC: 51 MG/DL (ref 40–75)
HDLC SERPL: 30 % (ref 20–50)
LDLC SERPL CALC-MCNC: 85.6 MG/DL (ref 63–159)
NONHDLC SERPL-MCNC: 119 MG/DL
POTASSIUM SERPL-SCNC: 4.8 MMOL/L (ref 3.5–5.1)
PROT SERPL-MCNC: 6.1 G/DL (ref 6–8.4)
SODIUM SERPL-SCNC: 141 MMOL/L (ref 136–145)
TRIGL SERPL-MCNC: 167 MG/DL (ref 30–150)

## 2022-05-27 PROCEDURE — 80053 COMPREHEN METABOLIC PANEL: CPT | Performed by: INTERNAL MEDICINE

## 2022-05-27 PROCEDURE — 36415 COLL VENOUS BLD VENIPUNCTURE: CPT | Performed by: INTERNAL MEDICINE

## 2022-05-27 PROCEDURE — 83036 HEMOGLOBIN GLYCOSYLATED A1C: CPT | Performed by: INTERNAL MEDICINE

## 2022-05-27 PROCEDURE — 80061 LIPID PANEL: CPT | Performed by: INTERNAL MEDICINE

## 2022-05-31 ENCOUNTER — LAB VISIT (OUTPATIENT)
Dept: LAB | Facility: HOSPITAL | Age: 72
End: 2022-05-31
Attending: INTERNAL MEDICINE
Payer: MEDICARE

## 2022-05-31 ENCOUNTER — OFFICE VISIT (OUTPATIENT)
Dept: INTERNAL MEDICINE | Facility: CLINIC | Age: 72
End: 2022-05-31
Payer: MEDICARE

## 2022-05-31 VITALS
WEIGHT: 146.19 LBS | OXYGEN SATURATION: 95 % | BODY MASS INDEX: 27.6 KG/M2 | RESPIRATION RATE: 12 BRPM | HEART RATE: 82 BPM | HEIGHT: 61 IN | SYSTOLIC BLOOD PRESSURE: 128 MMHG | TEMPERATURE: 98 F | DIASTOLIC BLOOD PRESSURE: 64 MMHG

## 2022-05-31 DIAGNOSIS — F33.0 MAJOR DEPRESSIVE DISORDER, RECURRENT, MILD: ICD-10-CM

## 2022-05-31 DIAGNOSIS — E78.00 PURE HYPERCHOLESTEROLEMIA: ICD-10-CM

## 2022-05-31 DIAGNOSIS — I72.8 ANEURYSM OF OTHER SPECIFIED ARTERIES: ICD-10-CM

## 2022-05-31 DIAGNOSIS — Z00.00 ANNUAL PHYSICAL EXAM: Primary | ICD-10-CM

## 2022-05-31 DIAGNOSIS — E11.9 TYPE 2 DIABETES MELLITUS WITHOUT RETINOPATHY: ICD-10-CM

## 2022-05-31 DIAGNOSIS — Z00.00 ANNUAL PHYSICAL EXAM: ICD-10-CM

## 2022-05-31 DIAGNOSIS — N18.31 CHRONIC KIDNEY DISEASE, STAGE 3A: ICD-10-CM

## 2022-05-31 DIAGNOSIS — E55.9 VITAMIN D DEFICIENCY: ICD-10-CM

## 2022-05-31 DIAGNOSIS — J42 CHRONIC BRONCHITIS, UNSPECIFIED CHRONIC BRONCHITIS TYPE: ICD-10-CM

## 2022-05-31 LAB
25(OH)D3+25(OH)D2 SERPL-MCNC: 43 NG/ML (ref 30–96)
BASOPHILS # BLD AUTO: 0.07 K/UL (ref 0–0.2)
BASOPHILS NFR BLD: 0.8 % (ref 0–1.9)
DIFFERENTIAL METHOD: ABNORMAL
EOSINOPHIL # BLD AUTO: 0.4 K/UL (ref 0–0.5)
EOSINOPHIL NFR BLD: 4.9 % (ref 0–8)
ERYTHROCYTE [DISTWIDTH] IN BLOOD BY AUTOMATED COUNT: 13.6 % (ref 11.5–14.5)
HCT VFR BLD AUTO: 36.4 % (ref 37–48.5)
HGB BLD-MCNC: 11.7 G/DL (ref 12–16)
IMM GRANULOCYTES # BLD AUTO: 0.05 K/UL (ref 0–0.04)
IMM GRANULOCYTES NFR BLD AUTO: 0.6 % (ref 0–0.5)
LYMPHOCYTES # BLD AUTO: 1.3 K/UL (ref 1–4.8)
LYMPHOCYTES NFR BLD: 14.3 % (ref 18–48)
MCH RBC QN AUTO: 30.1 PG (ref 27–31)
MCHC RBC AUTO-ENTMCNC: 32.1 G/DL (ref 32–36)
MCV RBC AUTO: 94 FL (ref 82–98)
MONOCYTES # BLD AUTO: 0.9 K/UL (ref 0.3–1)
MONOCYTES NFR BLD: 10.3 % (ref 4–15)
NEUTROPHILS # BLD AUTO: 6.2 K/UL (ref 1.8–7.7)
NEUTROPHILS NFR BLD: 69.1 % (ref 38–73)
NRBC BLD-RTO: 0 /100 WBC
PLATELET # BLD AUTO: 237 K/UL (ref 150–450)
PMV BLD AUTO: 10.4 FL (ref 9.2–12.9)
RBC # BLD AUTO: 3.89 M/UL (ref 4–5.4)
TSH SERPL DL<=0.005 MIU/L-ACNC: 1.67 UIU/ML (ref 0.4–4)
WBC # BLD AUTO: 8.92 K/UL (ref 3.9–12.7)

## 2022-05-31 PROCEDURE — 3008F PR BODY MASS INDEX (BMI) DOCUMENTED: ICD-10-PCS | Mod: CPTII,S$GLB,, | Performed by: INTERNAL MEDICINE

## 2022-05-31 PROCEDURE — 1126F AMNT PAIN NOTED NONE PRSNT: CPT | Mod: CPTII,S$GLB,, | Performed by: INTERNAL MEDICINE

## 2022-05-31 PROCEDURE — 1159F PR MEDICATION LIST DOCUMENTED IN MEDICAL RECORD: ICD-10-PCS | Mod: CPTII,S$GLB,, | Performed by: INTERNAL MEDICINE

## 2022-05-31 PROCEDURE — 1160F PR REVIEW ALL MEDS BY PRESCRIBER/CLIN PHARMACIST DOCUMENTED: ICD-10-PCS | Mod: CPTII,S$GLB,, | Performed by: INTERNAL MEDICINE

## 2022-05-31 PROCEDURE — 1157F PR ADVANCE CARE PLAN OR EQUIV PRESENT IN MEDICAL RECORD: ICD-10-PCS | Mod: CPTII,S$GLB,, | Performed by: INTERNAL MEDICINE

## 2022-05-31 PROCEDURE — 3074F PR MOST RECENT SYSTOLIC BLOOD PRESSURE < 130 MM HG: ICD-10-PCS | Mod: CPTII,S$GLB,, | Performed by: INTERNAL MEDICINE

## 2022-05-31 PROCEDURE — 3078F PR MOST RECENT DIASTOLIC BLOOD PRESSURE < 80 MM HG: ICD-10-PCS | Mod: CPTII,S$GLB,, | Performed by: INTERNAL MEDICINE

## 2022-05-31 PROCEDURE — 1157F ADVNC CARE PLAN IN RCRD: CPT | Mod: CPTII,S$GLB,, | Performed by: INTERNAL MEDICINE

## 2022-05-31 PROCEDURE — 3008F BODY MASS INDEX DOCD: CPT | Mod: CPTII,S$GLB,, | Performed by: INTERNAL MEDICINE

## 2022-05-31 PROCEDURE — 1101F PT FALLS ASSESS-DOCD LE1/YR: CPT | Mod: CPTII,S$GLB,, | Performed by: INTERNAL MEDICINE

## 2022-05-31 PROCEDURE — 99397 PR PREVENTIVE VISIT,EST,65 & OVER: ICD-10-PCS | Mod: GY,S$GLB,, | Performed by: INTERNAL MEDICINE

## 2022-05-31 PROCEDURE — 36415 COLL VENOUS BLD VENIPUNCTURE: CPT | Mod: PO | Performed by: INTERNAL MEDICINE

## 2022-05-31 PROCEDURE — 3044F HG A1C LEVEL LT 7.0%: CPT | Mod: CPTII,S$GLB,, | Performed by: INTERNAL MEDICINE

## 2022-05-31 PROCEDURE — 3078F DIAST BP <80 MM HG: CPT | Mod: CPTII,S$GLB,, | Performed by: INTERNAL MEDICINE

## 2022-05-31 PROCEDURE — 1126F PR PAIN SEVERITY QUANTIFIED, NO PAIN PRESENT: ICD-10-PCS | Mod: CPTII,S$GLB,, | Performed by: INTERNAL MEDICINE

## 2022-05-31 PROCEDURE — 3288F PR FALLS RISK ASSESSMENT DOCUMENTED: ICD-10-PCS | Mod: CPTII,S$GLB,, | Performed by: INTERNAL MEDICINE

## 2022-05-31 PROCEDURE — 99999 PR PBB SHADOW E&M-EST. PATIENT-LVL IV: CPT | Mod: PBBFAC,,, | Performed by: INTERNAL MEDICINE

## 2022-05-31 PROCEDURE — 99499 RISK ADDL DX/OHS AUDIT: ICD-10-PCS | Mod: S$GLB,,, | Performed by: INTERNAL MEDICINE

## 2022-05-31 PROCEDURE — 1159F MED LIST DOCD IN RCRD: CPT | Mod: CPTII,S$GLB,, | Performed by: INTERNAL MEDICINE

## 2022-05-31 PROCEDURE — 1101F PR PT FALLS ASSESS DOC 0-1 FALLS W/OUT INJ PAST YR: ICD-10-PCS | Mod: CPTII,S$GLB,, | Performed by: INTERNAL MEDICINE

## 2022-05-31 PROCEDURE — 82306 VITAMIN D 25 HYDROXY: CPT | Performed by: INTERNAL MEDICINE

## 2022-05-31 PROCEDURE — 3288F FALL RISK ASSESSMENT DOCD: CPT | Mod: CPTII,S$GLB,, | Performed by: INTERNAL MEDICINE

## 2022-05-31 PROCEDURE — 99397 PER PM REEVAL EST PAT 65+ YR: CPT | Mod: GY,S$GLB,, | Performed by: INTERNAL MEDICINE

## 2022-05-31 PROCEDURE — 85025 COMPLETE CBC W/AUTO DIFF WBC: CPT | Performed by: INTERNAL MEDICINE

## 2022-05-31 PROCEDURE — 3074F SYST BP LT 130 MM HG: CPT | Mod: CPTII,S$GLB,, | Performed by: INTERNAL MEDICINE

## 2022-05-31 PROCEDURE — 3044F PR MOST RECENT HEMOGLOBIN A1C LEVEL <7.0%: ICD-10-PCS | Mod: CPTII,S$GLB,, | Performed by: INTERNAL MEDICINE

## 2022-05-31 PROCEDURE — 99999 PR PBB SHADOW E&M-EST. PATIENT-LVL IV: ICD-10-PCS | Mod: PBBFAC,,, | Performed by: INTERNAL MEDICINE

## 2022-05-31 PROCEDURE — 1160F RVW MEDS BY RX/DR IN RCRD: CPT | Mod: CPTII,S$GLB,, | Performed by: INTERNAL MEDICINE

## 2022-05-31 PROCEDURE — 84443 ASSAY THYROID STIM HORMONE: CPT | Performed by: INTERNAL MEDICINE

## 2022-05-31 PROCEDURE — 99499 UNLISTED E&M SERVICE: CPT | Mod: S$GLB,,, | Performed by: INTERNAL MEDICINE

## 2022-05-31 RX ORDER — ZOSTER VACCINE RECOMBINANT, ADJUVANTED 50 MCG/0.5
0.5 KIT INTRAMUSCULAR ONCE
Qty: 1 EACH | Refills: 1 | Status: SHIPPED | OUTPATIENT
Start: 2022-05-31 | End: 2022-05-31

## 2022-06-01 ENCOUNTER — PATIENT MESSAGE (OUTPATIENT)
Dept: INTERNAL MEDICINE | Facility: CLINIC | Age: 72
End: 2022-06-01
Payer: MEDICARE

## 2022-06-02 ENCOUNTER — PATIENT MESSAGE (OUTPATIENT)
Dept: INTERNAL MEDICINE | Facility: CLINIC | Age: 72
End: 2022-06-02
Payer: MEDICARE

## 2022-06-04 RX ORDER — OLMESARTAN MEDOXOMIL 40 MG/1
TABLET ORAL
Qty: 90 TABLET | Refills: 3 | Status: SHIPPED | OUTPATIENT
Start: 2022-06-04 | End: 2023-06-30 | Stop reason: SDUPTHER

## 2022-06-04 NOTE — TELEPHONE ENCOUNTER
Refill Authorization Note   Jolly Matias  is requesting a refill authorization.  Brief Assessment and Rationale for Refill:  Approve     Medication Therapy Plan:       Medication Reconciliation Completed: No   Comments:     No Care Gaps recommended.     Note composed:1:04 PM 06/04/2022

## 2022-06-04 NOTE — TELEPHONE ENCOUNTER
No new care gaps identified.  [facility] Embedded Care Gaps. Reference number: [id number]. 6/03/2022   10:37:53 PM CDT

## 2022-06-14 DIAGNOSIS — R73.03 PREDIABETES: ICD-10-CM

## 2022-06-14 RX ORDER — METFORMIN HYDROCHLORIDE 500 MG/1
500 TABLET ORAL 2 TIMES DAILY WITH MEALS
Qty: 180 TABLET | Refills: 3 | Status: SHIPPED | OUTPATIENT
Start: 2022-06-14 | End: 2023-06-30 | Stop reason: SDUPTHER

## 2022-06-14 NOTE — TELEPHONE ENCOUNTER
----- Message from Asha Whittaker sent at 6/14/2022  1:03 PM CDT -----  Requesting an RX refill or new RX.  Is this a refill or new RX: refil   RX name and strength (copy/paste from chart):  Metformin 500 mg   Is this a 30 day or 90 day RX:   Pharmacy name and phone # (copy/paste from chart): [facility]    The doctors have asked that we provide their patients with the following 2 reminders -- prescription refills can take up to 72 hours, and a friendly reminder that in the future you can use your [facility] account to request refills:

## 2022-06-14 NOTE — TELEPHONE ENCOUNTER
No new care gaps identified.  [facility] Embedded Care Gaps. Reference number: [id number]. 6/14/2022   1:16:21 PM CDT

## 2022-06-22 ENCOUNTER — OFFICE VISIT (OUTPATIENT)
Dept: PULMONOLOGY | Facility: CLINIC | Age: 72
End: 2022-06-22
Payer: MEDICARE

## 2022-06-22 ENCOUNTER — HOSPITAL ENCOUNTER (OUTPATIENT)
Dept: RADIOLOGY | Facility: HOSPITAL | Age: 72
Discharge: HOME | End: 2022-06-22
Attending: INTERNAL MEDICINE
Payer: MEDICARE

## 2022-06-22 VITALS
HEIGHT: 61 IN | HEART RATE: 83 BPM | DIASTOLIC BLOOD PRESSURE: 67 MMHG | OXYGEN SATURATION: 95 % | BODY MASS INDEX: 27.14 KG/M2 | WEIGHT: 143.75 LBS | SYSTOLIC BLOOD PRESSURE: 125 MMHG

## 2022-06-22 DIAGNOSIS — C34.91 ADENOCARCINOMA OF RIGHT LUNG: Primary | ICD-10-CM

## 2022-06-22 DIAGNOSIS — R91.1 SOLITARY PULMONARY NODULE: ICD-10-CM

## 2022-06-22 DIAGNOSIS — R05.9 COUGH: ICD-10-CM

## 2022-06-22 PROCEDURE — 3008F BODY MASS INDEX DOCD: CPT | Mod: CPTII,S$GLB,, | Performed by: INTERNAL MEDICINE

## 2022-06-22 PROCEDURE — 1157F PR ADVANCE CARE PLAN OR EQUIV PRESENT IN MEDICAL RECORD: ICD-10-PCS | Mod: CPTII,S$GLB,, | Performed by: INTERNAL MEDICINE

## 2022-06-22 PROCEDURE — 3061F PR NEG MICROALBUMINURIA RESULT DOCUMENTED/REVIEW: ICD-10-PCS | Mod: CPTII,S$GLB,, | Performed by: INTERNAL MEDICINE

## 2022-06-22 PROCEDURE — 1159F PR MEDICATION LIST DOCUMENTED IN MEDICAL RECORD: ICD-10-PCS | Mod: CPTII,S$GLB,, | Performed by: INTERNAL MEDICINE

## 2022-06-22 PROCEDURE — 3078F DIAST BP <80 MM HG: CPT | Mod: CPTII,S$GLB,, | Performed by: INTERNAL MEDICINE

## 2022-06-22 PROCEDURE — 3066F NEPHROPATHY DOC TX: CPT | Mod: CPTII,S$GLB,, | Performed by: INTERNAL MEDICINE

## 2022-06-22 PROCEDURE — 1126F PR PAIN SEVERITY QUANTIFIED, NO PAIN PRESENT: ICD-10-PCS | Mod: CPTII,S$GLB,, | Performed by: INTERNAL MEDICINE

## 2022-06-22 PROCEDURE — 99999 PR PBB SHADOW E&M-EST. PATIENT-LVL III: ICD-10-PCS | Mod: PBBFAC,,, | Performed by: INTERNAL MEDICINE

## 2022-06-22 PROCEDURE — 71250 CT THORAX DX C-: CPT | Mod: TC

## 2022-06-22 PROCEDURE — 99213 OFFICE O/P EST LOW 20 MIN: CPT | Mod: S$GLB,,, | Performed by: INTERNAL MEDICINE

## 2022-06-22 PROCEDURE — 4010F ACE/ARB THERAPY RXD/TAKEN: CPT | Mod: CPTII,S$GLB,, | Performed by: INTERNAL MEDICINE

## 2022-06-22 PROCEDURE — 1157F ADVNC CARE PLAN IN RCRD: CPT | Mod: CPTII,S$GLB,, | Performed by: INTERNAL MEDICINE

## 2022-06-22 PROCEDURE — 1101F PR PT FALLS ASSESS DOC 0-1 FALLS W/OUT INJ PAST YR: ICD-10-PCS | Mod: CPTII,S$GLB,, | Performed by: INTERNAL MEDICINE

## 2022-06-22 PROCEDURE — 3066F PR DOCUMENTATION OF TREATMENT FOR NEPHROPATHY: ICD-10-PCS | Mod: CPTII,S$GLB,, | Performed by: INTERNAL MEDICINE

## 2022-06-22 PROCEDURE — 3061F NEG MICROALBUMINURIA REV: CPT | Mod: CPTII,S$GLB,, | Performed by: INTERNAL MEDICINE

## 2022-06-22 PROCEDURE — 1126F AMNT PAIN NOTED NONE PRSNT: CPT | Mod: CPTII,S$GLB,, | Performed by: INTERNAL MEDICINE

## 2022-06-22 PROCEDURE — 3044F PR MOST RECENT HEMOGLOBIN A1C LEVEL <7.0%: ICD-10-PCS | Mod: CPTII,S$GLB,, | Performed by: INTERNAL MEDICINE

## 2022-06-22 PROCEDURE — 71250 CT CHEST WITHOUT CONTRAST: ICD-10-PCS | Mod: 26,,, | Performed by: RADIOLOGY

## 2022-06-22 PROCEDURE — 3288F FALL RISK ASSESSMENT DOCD: CPT | Mod: CPTII,S$GLB,, | Performed by: INTERNAL MEDICINE

## 2022-06-22 PROCEDURE — 1159F MED LIST DOCD IN RCRD: CPT | Mod: CPTII,S$GLB,, | Performed by: INTERNAL MEDICINE

## 2022-06-22 PROCEDURE — 3078F PR MOST RECENT DIASTOLIC BLOOD PRESSURE < 80 MM HG: ICD-10-PCS | Mod: CPTII,S$GLB,, | Performed by: INTERNAL MEDICINE

## 2022-06-22 PROCEDURE — 1101F PT FALLS ASSESS-DOCD LE1/YR: CPT | Mod: CPTII,S$GLB,, | Performed by: INTERNAL MEDICINE

## 2022-06-22 PROCEDURE — 3288F PR FALLS RISK ASSESSMENT DOCUMENTED: ICD-10-PCS | Mod: CPTII,S$GLB,, | Performed by: INTERNAL MEDICINE

## 2022-06-22 PROCEDURE — 4010F PR ACE/ARB THEARPY RXD/TAKEN: ICD-10-PCS | Mod: CPTII,S$GLB,, | Performed by: INTERNAL MEDICINE

## 2022-06-22 PROCEDURE — 3074F SYST BP LT 130 MM HG: CPT | Mod: CPTII,S$GLB,, | Performed by: INTERNAL MEDICINE

## 2022-06-22 PROCEDURE — 3074F PR MOST RECENT SYSTOLIC BLOOD PRESSURE < 130 MM HG: ICD-10-PCS | Mod: CPTII,S$GLB,, | Performed by: INTERNAL MEDICINE

## 2022-06-22 PROCEDURE — 99999 PR PBB SHADOW E&M-EST. PATIENT-LVL III: CPT | Mod: PBBFAC,,, | Performed by: INTERNAL MEDICINE

## 2022-06-22 PROCEDURE — 3044F HG A1C LEVEL LT 7.0%: CPT | Mod: CPTII,S$GLB,, | Performed by: INTERNAL MEDICINE

## 2022-06-22 PROCEDURE — 3008F PR BODY MASS INDEX (BMI) DOCUMENTED: ICD-10-PCS | Mod: CPTII,S$GLB,, | Performed by: INTERNAL MEDICINE

## 2022-06-22 PROCEDURE — 71250 CT THORAX DX C-: CPT | Mod: 26,,, | Performed by: RADIOLOGY

## 2022-06-22 PROCEDURE — 99213 PR OFFICE/OUTPT VISIT, EST, LEVL III, 20-29 MIN: ICD-10-PCS | Mod: S$GLB,,, | Performed by: INTERNAL MEDICINE

## 2022-06-25 NOTE — PROGRESS NOTES
Subjective:       Patient ID: Jolly Matias is a 72 y.o. female.    Chief Complaint: Cancer    72 year old with a history of bronchitis.  Had an enlarging pulmonary nodule in addition to bronchiectasis and tree in bud opacities that were present.  Resection of nodule was adenocarcinoma.  Follow up imaging was concerning for progression of bronchiectasis and inflammation.  ONe week prior to CT in May, denied fever but was treated for bronchitis with amoxicillin.  Did not receive steroids.  Cough has since resolved.  She has not been able to produce a sputum sample    Review of Systems    Objective:       Vitals:    06/22/22 1013   BP: 125/67   Pulse: 83     Physical Exam   Constitutional: She is oriented to person, place, and time. She appears well-developed and well-nourished.   Cardiovascular: Normal rate and regular rhythm.   Pulmonary/Chest: She has no wheezes. She has no rales.   Musculoskeletal:         General: No edema. Normal range of motion.   Lymphadenopathy: No supraclavicular adenopathy is present.   Neurological: She is alert and oriented to person, place, and time.   Skin: Skin is warm and dry.   Psychiatric: She has a normal mood and affect.     Personal Diagnostic Review  CT of chest performed on 5/22 and six week follow up 6/22 without contrast revealed changes in tree in bud opacities suggesting inflammation.  Agree that continued surveillance is necessary..  No flowsheet data found.      Assessment:       1. Adenocarcinoma of right lung    2. Solitary pulmonary nodule        Outpatient Encounter Medications as of 6/22/2022   Medication Sig Dispense Refill    albuterol (PROVENTIL/VENTOLIN HFA) 90 mcg/actuation inhaler Inhale 2 puffs into the lungs every 6 (six) hours as needed for Wheezing or Shortness of Breath. 18 g 11    atorvastatin (LIPITOR) 20 MG tablet TAKE 1 TABLET BY MOUTH EVERY DAY 90 tablet 3    clobetasoL (TEMOVATE) 0.05 % cream Apply topically 2 (two) times daily. Prn rash and  itch.  Stop using steroid topical when skin is smooth and non itchy.  Do not treat dark or red coloring. (Patient taking differently: Apply topically 2 (two) times daily. Prn rash and itch.  Stop using steroid topical when skin is smooth and non itchy.  Do not treat dark or red coloring.) 60 g 0    erythromycin (ROMYCIN) ophthalmic ointment APPLY IN LEFT EYE AT BEDTIME      flaxseed oil 1,000 mg Cap Take 1 capsule by mouth once daily.      levocetirizine (XYZAL) 5 MG tablet Take 5 mg by mouth nightly.      metFORMIN (GLUCOPHAGE) 500 MG tablet Take 1 tablet (500 mg total) by mouth 2 (two) times daily with meals. 180 tablet 3    ofloxacin (OCUFLOX) 0.3 % ophthalmic solution Place 1 drop into both eyes 4 (four) times daily.      olmesartan (BENICAR) 40 MG tablet TAKE 1 TABLET(40 MG) BY MOUTH EVERY DAY 90 tablet 3    sertraline (ZOLOFT) 100 MG tablet Take 1 tablet (100 mg total) by mouth once daily. 90 tablet 3    spironolactone (ALDACTONE) 25 MG tablet Take 1 tablet (25 mg total) by mouth once daily. 90 tablet 3    vitamin D 1000 units Tab Take 185 mg by mouth once daily.      doxycycline (VIBRA-TABS) 100 MG tablet Take 1 po qd with food and not within 1 hour of lying down 10 tablet 1    gabapentin (NEURONTIN) 300 MG capsule Take 1 capsule (300 mg total) by mouth 3 (three) times daily. 90 capsule 11    rOPINIRole (REQUIP) 2 MG tablet Take 1 tablet (2 mg total) by mouth every evening. (Patient not taking: Reported on 5/31/2022) 30 tablet 11     Facility-Administered Encounter Medications as of 6/22/2022   Medication Dose Route Frequency Provider Last Rate Last Admin    0.9%  NaCl infusion   Intravenous Continuous Deshawn Arias MD   Stopped at 04/09/21 1053    lidocaine (PF) 10 mg/ml (1%) injection 10 mg  1 mL Intradermal Once Deshawn Arias MD        midazolam (VERSED) 1 mg/mL injection 0.5 mg  0.5 mg Intravenous PRN Jhoan Kenny MD   2 mg at 10/29/20 0900     No orders of the  defined types were placed in this encounter.    Plan:     Problem List Items Addressed This Visit     Adenocarcinoma of right lung - Primary    Current Assessment & Plan     Will continue to monitor tree in bud and solid nodules with follow up in three months.             Other Visit Diagnoses     Solitary pulmonary nodule

## 2022-07-25 ENCOUNTER — PATIENT MESSAGE (OUTPATIENT)
Dept: INTERNAL MEDICINE | Facility: CLINIC | Age: 72
End: 2022-07-25
Payer: MEDICARE

## 2022-08-01 ENCOUNTER — LAB VISIT (OUTPATIENT)
Dept: LAB | Facility: HOSPITAL | Age: 72
End: 2022-08-01
Attending: INTERNAL MEDICINE
Payer: MEDICARE

## 2022-08-01 DIAGNOSIS — J47.9 BRONCHIECTASIS WITHOUT COMPLICATION: ICD-10-CM

## 2022-08-01 PROCEDURE — 87206 SMEAR FLUORESCENT/ACID STAI: CPT | Performed by: INTERNAL MEDICINE

## 2022-08-01 PROCEDURE — 87015 SPECIMEN INFECT AGNT CONCNTJ: CPT | Performed by: INTERNAL MEDICINE

## 2022-08-01 PROCEDURE — 87149 DNA/RNA DIRECT PROBE: CPT | Performed by: INTERNAL MEDICINE

## 2022-08-01 PROCEDURE — 87116 MYCOBACTERIA CULTURE: CPT | Performed by: INTERNAL MEDICINE

## 2022-08-01 PROCEDURE — 87205 SMEAR GRAM STAIN: CPT | Performed by: INTERNAL MEDICINE

## 2022-08-01 PROCEDURE — 87070 CULTURE OTHR SPECIMN AEROBIC: CPT | Performed by: INTERNAL MEDICINE

## 2022-08-01 PROCEDURE — 87186 SC STD MICRODIL/AGAR DIL: CPT | Performed by: INTERNAL MEDICINE

## 2022-08-01 PROCEDURE — 87118 MYCOBACTERIC IDENTIFICATION: CPT | Mod: 59 | Performed by: INTERNAL MEDICINE

## 2022-08-04 ENCOUNTER — OFFICE VISIT (OUTPATIENT)
Dept: DERMATOLOGY | Facility: CLINIC | Age: 72
End: 2022-08-04
Payer: MEDICARE

## 2022-08-04 DIAGNOSIS — D18.01 CHERRY ANGIOMA: ICD-10-CM

## 2022-08-04 DIAGNOSIS — L73.9 FOLLICULITIS: Primary | ICD-10-CM

## 2022-08-04 DIAGNOSIS — Z85.828 PERSONAL HISTORY OF SKIN CANCER: ICD-10-CM

## 2022-08-04 DIAGNOSIS — L82.1 SK (SEBORRHEIC KERATOSIS): ICD-10-CM

## 2022-08-04 DIAGNOSIS — L73.8 SEBACEOUS GLAND HYPERPLASIA: ICD-10-CM

## 2022-08-04 DIAGNOSIS — D48.5 NEOPLASM OF UNCERTAIN BEHAVIOR OF SKIN: ICD-10-CM

## 2022-08-04 DIAGNOSIS — L90.5 SCAR: ICD-10-CM

## 2022-08-04 LAB
BACTERIA SPEC AEROBE CULT: NORMAL
BACTERIA SPEC AEROBE CULT: NORMAL
GRAM STN SPEC: NORMAL

## 2022-08-04 PROCEDURE — 1157F PR ADVANCE CARE PLAN OR EQUIV PRESENT IN MEDICAL RECORD: ICD-10-PCS | Mod: CPTII,S$GLB,, | Performed by: DERMATOLOGY

## 2022-08-04 PROCEDURE — 1159F MED LIST DOCD IN RCRD: CPT | Mod: CPTII,S$GLB,, | Performed by: DERMATOLOGY

## 2022-08-04 PROCEDURE — 1160F RVW MEDS BY RX/DR IN RCRD: CPT | Mod: CPTII,S$GLB,, | Performed by: DERMATOLOGY

## 2022-08-04 PROCEDURE — 4010F ACE/ARB THERAPY RXD/TAKEN: CPT | Mod: CPTII,S$GLB,, | Performed by: DERMATOLOGY

## 2022-08-04 PROCEDURE — 1101F PR PT FALLS ASSESS DOC 0-1 FALLS W/OUT INJ PAST YR: ICD-10-PCS | Mod: CPTII,S$GLB,, | Performed by: DERMATOLOGY

## 2022-08-04 PROCEDURE — 99213 OFFICE O/P EST LOW 20 MIN: CPT | Mod: 25,S$GLB,, | Performed by: DERMATOLOGY

## 2022-08-04 PROCEDURE — 99213 PR OFFICE/OUTPT VISIT, EST, LEVL III, 20-29 MIN: ICD-10-PCS | Mod: 25,S$GLB,, | Performed by: DERMATOLOGY

## 2022-08-04 PROCEDURE — 4010F PR ACE/ARB THEARPY RXD/TAKEN: ICD-10-PCS | Mod: CPTII,S$GLB,, | Performed by: DERMATOLOGY

## 2022-08-04 PROCEDURE — 88305 TISSUE EXAM BY PATHOLOGIST: ICD-10-PCS | Mod: 26,,, | Performed by: PATHOLOGY

## 2022-08-04 PROCEDURE — 1101F PT FALLS ASSESS-DOCD LE1/YR: CPT | Mod: CPTII,S$GLB,, | Performed by: DERMATOLOGY

## 2022-08-04 PROCEDURE — 3044F PR MOST RECENT HEMOGLOBIN A1C LEVEL <7.0%: ICD-10-PCS | Mod: CPTII,S$GLB,, | Performed by: DERMATOLOGY

## 2022-08-04 PROCEDURE — 3044F HG A1C LEVEL LT 7.0%: CPT | Mod: CPTII,S$GLB,, | Performed by: DERMATOLOGY

## 2022-08-04 PROCEDURE — 11102 TANGNTL BX SKIN SINGLE LES: CPT | Mod: S$GLB,,, | Performed by: DERMATOLOGY

## 2022-08-04 PROCEDURE — 99999 PR PBB SHADOW E&M-EST. PATIENT-LVL III: ICD-10-PCS | Mod: PBBFAC,,, | Performed by: DERMATOLOGY

## 2022-08-04 PROCEDURE — 1126F AMNT PAIN NOTED NONE PRSNT: CPT | Mod: CPTII,S$GLB,, | Performed by: DERMATOLOGY

## 2022-08-04 PROCEDURE — 1157F ADVNC CARE PLAN IN RCRD: CPT | Mod: CPTII,S$GLB,, | Performed by: DERMATOLOGY

## 2022-08-04 PROCEDURE — 1126F PR PAIN SEVERITY QUANTIFIED, NO PAIN PRESENT: ICD-10-PCS | Mod: CPTII,S$GLB,, | Performed by: DERMATOLOGY

## 2022-08-04 PROCEDURE — 3066F NEPHROPATHY DOC TX: CPT | Mod: CPTII,S$GLB,, | Performed by: DERMATOLOGY

## 2022-08-04 PROCEDURE — 1159F PR MEDICATION LIST DOCUMENTED IN MEDICAL RECORD: ICD-10-PCS | Mod: CPTII,S$GLB,, | Performed by: DERMATOLOGY

## 2022-08-04 PROCEDURE — 88305 TISSUE EXAM BY PATHOLOGIST: CPT | Performed by: PATHOLOGY

## 2022-08-04 PROCEDURE — 3288F PR FALLS RISK ASSESSMENT DOCUMENTED: ICD-10-PCS | Mod: CPTII,S$GLB,, | Performed by: DERMATOLOGY

## 2022-08-04 PROCEDURE — 3288F FALL RISK ASSESSMENT DOCD: CPT | Mod: CPTII,S$GLB,, | Performed by: DERMATOLOGY

## 2022-08-04 PROCEDURE — 3061F NEG MICROALBUMINURIA REV: CPT | Mod: CPTII,S$GLB,, | Performed by: DERMATOLOGY

## 2022-08-04 PROCEDURE — 88305 TISSUE EXAM BY PATHOLOGIST: CPT | Mod: 26,,, | Performed by: PATHOLOGY

## 2022-08-04 PROCEDURE — 1160F PR REVIEW ALL MEDS BY PRESCRIBER/CLIN PHARMACIST DOCUMENTED: ICD-10-PCS | Mod: CPTII,S$GLB,, | Performed by: DERMATOLOGY

## 2022-08-04 PROCEDURE — 3066F PR DOCUMENTATION OF TREATMENT FOR NEPHROPATHY: ICD-10-PCS | Mod: CPTII,S$GLB,, | Performed by: DERMATOLOGY

## 2022-08-04 PROCEDURE — 99999 PR PBB SHADOW E&M-EST. PATIENT-LVL III: CPT | Mod: PBBFAC,,, | Performed by: DERMATOLOGY

## 2022-08-04 PROCEDURE — 3061F PR NEG MICROALBUMINURIA RESULT DOCUMENTED/REVIEW: ICD-10-PCS | Mod: CPTII,S$GLB,, | Performed by: DERMATOLOGY

## 2022-08-04 PROCEDURE — 11102 PR TANGENTIAL BIOPSY, SKIN, SINGLE LESION: ICD-10-PCS | Mod: S$GLB,,, | Performed by: DERMATOLOGY

## 2022-08-04 RX ORDER — MUPIROCIN 20 MG/G
OINTMENT TOPICAL
Qty: 30 G | Refills: 3 | Status: SHIPPED | OUTPATIENT
Start: 2022-08-04 | End: 2022-12-09

## 2022-08-04 NOTE — PROGRESS NOTES
Subjective:       Patient ID:  Jolly Matias is a 72 y.o. female who presents for   Chief Complaint   Patient presents with    Skin Check     UBSE    Lesion     FOREHEAD     Pt here today fpr UBSE    Patient with new complaint of lesion(s)  Location: forehead  Duration: 6-7wks  Symptoms: recurring  Relieving factors/Previous treatments: neosporin        Lesion        Review of Systems   Skin: Positive for daily sunscreen use and activity-related sunscreen use. Negative for tendency to form keloidal scars.   Hematologic/Lymphatic: Does not bruise/bleed easily.        Objective:    Physical Exam   Constitutional: She appears well-developed and well-nourished. No distress.   Neurological: She is alert and oriented to person, place, and time. She is not disoriented.   Psychiatric: She has a normal mood and affect.   Skin:   Areas Examined (abnormalities noted in diagram):   Scalp / Hair Palpated and Inspected  Head / Face Inspection Performed  Neck Inspection Performed  Chest / Axilla Inspection Performed  Back Inspection Performed  RUE Inspected  LUE Inspection Performed  Nails and Digits Inspection Performed                           Diagram Legend     Erythematous scaling macule/papule c/w actinic keratosis       Vascular papule c/w angioma      Pigmented verrucoid papule/plaque c/w seborrheic keratosis      Yellow umbilicated papule c/w sebaceous hyperplasia      Irregularly shaped tan macule c/w lentigo     1-2 mm smooth white papules consistent with Milia      Movable subcutaneous cyst with punctum c/w epidermal inclusion cyst      Subcutaneous movable cyst c/w pilar cyst      Firm pink to brown papule c/w dermatofibroma      Pedunculated fleshy papule(s) c/w skin tag(s)      Evenly pigmented macule c/w junctional nevus     Mildly variegated pigmented, slightly irregular-bordered macule c/w mildly atypical nevus      Flesh colored to evenly pigmented papule c/w intradermal nevus       Pink pearly  papule/plaque c/w basal cell carcinoma      Erythematous hyperkeratotic cursted plaque c/w SCC      Surgical scar with no sign of skin cancer recurrence      Open and closed comedones      Inflammatory papules and pustules      Verrucoid papule consistent consistent with wart     Erythematous eczematous patches and plaques     Dystrophic onycholytic nail with subungual debris c/w onychomycosis     Umbilicated papule    Erythematous-base heme-crusted tan verrucoid plaque consistent with inflamed seborrheic keratosis     Erythematous Silvery Scaling Plaque c/w Psoriasis     See annotation      Assessment / Plan:      Pathology Orders:     Normal Orders This Visit    Specimen to Pathology, Dermatology     Questions:    Procedure Type: Dermatology and skin neoplasms    Number of Specimens: 1    ------------------------: -------------------------    Spec 1 Procedure: Biopsy    Spec 1 Clinical Impression: r/o BCC    Spec 1 Source: right upper forehead    Release to patient:         Folliculitis  -     mupirocin (BACTROBAN) 2 % ointment; AAA bid  Dispense: 30 g; Refill: 3    Neoplasm of uncertain behavior of skin  Shave biopsy procedure note:    Shave biopsy performed after verbal consent including risk of infection, scar, recurrence, need for additional treatment of site. Area prepped with alcohol, anesthetized with approximately 1.0cc of 1% lidocaine with epinephrine. Lesional tissue shaved with razor blade. Hemostasis achieved with application of aluminum chloride followed by hyfrecation. No complications. Dressing applied. Wound care explained.    If biopsy positive for malignancy, refer to Dr. Ac for Mohs surgery consultation. V ed and c   -     Specimen to Pathology, Dermatology    Sebaceous gland hyperplasia  This is a common condition representing benign enlargement of the sebaceous lobule. It typically occurs in adulthood. Reassurance given to patient.     SK (seborrheic keratosis)  These are benign inherited  growths without a malignant potential. Reassurance given to patient. No treatment is necessary.     Cherry angioma  These are benign vascular lesions that are inherited.  Treatment is not necessary.    Personal history of skin cancer  Scar    Area(s) of previous NMSC evaluated with no signs of recurrence.    Upper body skin examination performed today including at least 6 points as noted in physical examination. Suspicious lesions noted.    Recommend daily sun protection/avoidance and use of at least SPF 30, broad spectrum sunscreen (OTC drug).            Follow up for prn bx report.   I have personally performed a face to face diagnostic evaluation on this patient. I have reviewed the ACP note and agree with the history, exam and plan of care, except as noted.

## 2022-08-04 NOTE — PATIENT INSTRUCTIONS
Shave Biopsy Wound Care    Your doctor has performed a shave biopsy today.  A band aid and vaseline ointment has been placed over the site.  This should remain in place for NO LONGER THAN 48 hours.  It is fine to remove the bandaid after 24 hours, if the area is no longer bleeding. It is recommended that you keep the area dry (do not wet)) for the first 24 hours.  After 24 hours, wash the area with warm soap and water and apply Vaseline jelly.  Many patients prefer to use Neosporin or Bacitracin ointment.  This is acceptable; however, know that you can develop an allergy to this medication even if you have used it safely for years.  It is important to keep the area moist.  Letting it dry out and get air slows healing time, and will worsen the scar.        If you notice increasing redness, tenderness, pain, or yellow drainage at the biopsy site, please notify your doctor.  These are signs of an infection.    If your biopsy site is bleeding, apply firm pressure for 15 minutes straight.  Repeat for another 15 minutes, if it is still bleeding.   If the surgical site continues to bleed, then please contact your doctor.      For [facility] users:   You will receive your biopsy results in [facility] as soon as they are available. Please be assured that your physician/provider will review your results and will then determine what further treatment, evaluation, or planning is required. You should be contacted by your physician's/provider's office within 5 business days of receiving your results; If not, please reach out to directly. This is one more way [facility] is putting you first.     [facility] [place] [address]/ [phone] FAX/ [email or web address]

## 2022-08-08 ENCOUNTER — PATIENT OUTREACH (OUTPATIENT)
Dept: ADMINISTRATIVE | Facility: HOSPITAL | Age: 72
End: 2022-08-08
Payer: MEDICARE

## 2022-08-10 LAB
FINAL PATHOLOGIC DIAGNOSIS: NORMAL
GROSS: NORMAL
Lab: NORMAL
MICROSCOPIC EXAM: NORMAL

## 2022-08-11 ENCOUNTER — PATIENT MESSAGE (OUTPATIENT)
Dept: DERMATOLOGY | Facility: CLINIC | Age: 72
End: 2022-08-11
Payer: MEDICARE

## 2022-08-13 ENCOUNTER — PATIENT MESSAGE (OUTPATIENT)
Dept: DERMATOLOGY | Facility: CLINIC | Age: 72
End: 2022-08-13
Payer: MEDICARE

## 2022-08-13 DIAGNOSIS — I10 ESSENTIAL HYPERTENSION: ICD-10-CM

## 2022-08-13 NOTE — TELEPHONE ENCOUNTER
No new care gaps identified.  [facility] Embedded Care Gaps. Reference number: [id number]. 8/13/2022   5:56:56 AM RAFAELT

## 2022-08-14 RX ORDER — SPIRONOLACTONE 25 MG/1
TABLET ORAL
Qty: 90 TABLET | Refills: 3 | Status: SHIPPED | OUTPATIENT
Start: 2022-08-14 | End: 2023-06-30 | Stop reason: SDUPTHER

## 2022-08-14 NOTE — TELEPHONE ENCOUNTER
Refill Authorization Note   Jolly Matias  is requesting a refill authorization.  Brief Assessment and Rationale for Refill:  Approve     Medication Therapy Plan:       Medication Reconciliation Completed: No   Comments:     No Care Gaps recommended.     Note composed:3:51 AM 08/14/2022

## 2022-08-16 ENCOUNTER — TELEPHONE (OUTPATIENT)
Dept: DERMATOLOGY | Facility: CLINIC | Age: 72
End: 2022-08-16
Payer: MEDICARE

## 2022-08-17 NOTE — PROGRESS NOTES
Please call patient to schedule a Mohs consultation.     Skin, right upper forehead, shave biopsy:   -BASAL CELL CARCINOMA, SUPERFICIAL TYPE,   P[t does not respond to aldara

## 2022-08-19 DIAGNOSIS — F32.9 REACTIVE DEPRESSION: ICD-10-CM

## 2022-08-19 RX ORDER — SERTRALINE HYDROCHLORIDE 100 MG/1
100 TABLET, FILM COATED ORAL DAILY
Qty: 90 TABLET | Refills: 3 | Status: SHIPPED | OUTPATIENT
Start: 2022-08-19 | End: 2023-06-30 | Stop reason: SDUPTHER

## 2022-08-19 NOTE — TELEPHONE ENCOUNTER
No new care gaps identified.  [facility] Embedded Care Gaps. Reference number: [id number]. 8/19/2022   10:23:06 AM RAFAELT

## 2022-08-26 ENCOUNTER — TELEPHONE (OUTPATIENT)
Dept: SPORTS MEDICINE | Facility: CLINIC | Age: 72
End: 2022-08-26
Payer: MEDICARE

## 2022-08-26 ENCOUNTER — PES CALL (OUTPATIENT)
Dept: ADMINISTRATIVE | Facility: CLINIC | Age: 72
End: 2022-08-26
Payer: MEDICARE

## 2022-08-26 NOTE — TELEPHONE ENCOUNTER
Tried calling patient back no answer and no voicemail. Will need more information as to what patient needs to see Dr Arias for before scheduling.

## 2022-08-26 NOTE — TELEPHONE ENCOUNTER
----- Message from Nancy Dailey sent at 8/26/2022 12:18 PM CDT -----  Regarding: PT'S RQUESTING A CALL BACK FROM STAFF REGARDING SCHEDULING  Contact: pt  Pt's requesting a call back from staff    Confirmed contact info below:  Contact Name: Jolly Matias  Phone Number: [phone]

## 2022-08-29 ENCOUNTER — TELEPHONE (OUTPATIENT)
Dept: PULMONOLOGY | Facility: CLINIC | Age: 72
End: 2022-08-29
Payer: MEDICARE

## 2022-08-30 ENCOUNTER — PATIENT MESSAGE (OUTPATIENT)
Dept: PULMONOLOGY | Facility: CLINIC | Age: 72
End: 2022-08-30
Payer: MEDICARE

## 2022-09-02 ENCOUNTER — PATIENT MESSAGE (OUTPATIENT)
Dept: PULMONOLOGY | Facility: CLINIC | Age: 72
End: 2022-09-02
Payer: MEDICARE

## 2022-09-06 ENCOUNTER — TELEPHONE (OUTPATIENT)
Dept: PULMONOLOGY | Facility: HOSPITAL | Age: 72
End: 2022-09-06
Payer: MEDICARE

## 2022-09-06 DIAGNOSIS — A31.0 PULMONARY MYCOBACTERIUM AVIUM COMPLEX (MAC) INFECTION: Primary | ICD-10-CM

## 2022-09-06 NOTE — TELEPHONE ENCOUNTER
I have called patient twice to review results and have sent my findings via [employer].  No pulmonary appointment is necessary to review findings .  The next best step is infectious disease for treatment of MAC.

## 2022-09-07 ENCOUNTER — OFFICE VISIT (OUTPATIENT)
Dept: PODIATRY | Facility: CLINIC | Age: 72
End: 2022-09-07
Payer: MEDICARE

## 2022-09-07 VITALS
HEIGHT: 61 IN | HEART RATE: 73 BPM | DIASTOLIC BLOOD PRESSURE: 68 MMHG | BODY MASS INDEX: 27.14 KG/M2 | WEIGHT: 143.75 LBS | SYSTOLIC BLOOD PRESSURE: 125 MMHG

## 2022-09-07 DIAGNOSIS — M77.8 CAPSULITIS OF FOOT, RIGHT: Primary | ICD-10-CM

## 2022-09-07 DIAGNOSIS — M79.671 FOOT PAIN, RIGHT: ICD-10-CM

## 2022-09-07 PROCEDURE — 1126F PR PAIN SEVERITY QUANTIFIED, NO PAIN PRESENT: ICD-10-PCS | Mod: CPTII,S$GLB,, | Performed by: PODIATRIST

## 2022-09-07 PROCEDURE — 3061F PR NEG MICROALBUMINURIA RESULT DOCUMENTED/REVIEW: ICD-10-PCS | Mod: CPTII,S$GLB,, | Performed by: PODIATRIST

## 2022-09-07 PROCEDURE — 3078F DIAST BP <80 MM HG: CPT | Mod: CPTII,S$GLB,, | Performed by: PODIATRIST

## 2022-09-07 PROCEDURE — 1157F PR ADVANCE CARE PLAN OR EQUIV PRESENT IN MEDICAL RECORD: ICD-10-PCS | Mod: CPTII,S$GLB,, | Performed by: PODIATRIST

## 2022-09-07 PROCEDURE — 1159F MED LIST DOCD IN RCRD: CPT | Mod: CPTII,S$GLB,, | Performed by: PODIATRIST

## 2022-09-07 PROCEDURE — 3061F NEG MICROALBUMINURIA REV: CPT | Mod: CPTII,S$GLB,, | Performed by: PODIATRIST

## 2022-09-07 PROCEDURE — 1159F PR MEDICATION LIST DOCUMENTED IN MEDICAL RECORD: ICD-10-PCS | Mod: CPTII,S$GLB,, | Performed by: PODIATRIST

## 2022-09-07 PROCEDURE — 3066F PR DOCUMENTATION OF TREATMENT FOR NEPHROPATHY: ICD-10-PCS | Mod: CPTII,S$GLB,, | Performed by: PODIATRIST

## 2022-09-07 PROCEDURE — 3008F BODY MASS INDEX DOCD: CPT | Mod: CPTII,S$GLB,, | Performed by: PODIATRIST

## 2022-09-07 PROCEDURE — 99213 OFFICE O/P EST LOW 20 MIN: CPT | Mod: S$GLB,,, | Performed by: PODIATRIST

## 2022-09-07 PROCEDURE — 4010F PR ACE/ARB THEARPY RXD/TAKEN: ICD-10-PCS | Mod: CPTII,S$GLB,, | Performed by: PODIATRIST

## 2022-09-07 PROCEDURE — 3074F SYST BP LT 130 MM HG: CPT | Mod: CPTII,S$GLB,, | Performed by: PODIATRIST

## 2022-09-07 PROCEDURE — 3074F PR MOST RECENT SYSTOLIC BLOOD PRESSURE < 130 MM HG: ICD-10-PCS | Mod: CPTII,S$GLB,, | Performed by: PODIATRIST

## 2022-09-07 PROCEDURE — 3078F PR MOST RECENT DIASTOLIC BLOOD PRESSURE < 80 MM HG: ICD-10-PCS | Mod: CPTII,S$GLB,, | Performed by: PODIATRIST

## 2022-09-07 PROCEDURE — 3044F PR MOST RECENT HEMOGLOBIN A1C LEVEL <7.0%: ICD-10-PCS | Mod: CPTII,S$GLB,, | Performed by: PODIATRIST

## 2022-09-07 PROCEDURE — 1160F PR REVIEW ALL MEDS BY PRESCRIBER/CLIN PHARMACIST DOCUMENTED: ICD-10-PCS | Mod: CPTII,S$GLB,, | Performed by: PODIATRIST

## 2022-09-07 PROCEDURE — 1160F RVW MEDS BY RX/DR IN RCRD: CPT | Mod: CPTII,S$GLB,, | Performed by: PODIATRIST

## 2022-09-07 PROCEDURE — 3008F PR BODY MASS INDEX (BMI) DOCUMENTED: ICD-10-PCS | Mod: CPTII,S$GLB,, | Performed by: PODIATRIST

## 2022-09-07 PROCEDURE — 1126F AMNT PAIN NOTED NONE PRSNT: CPT | Mod: CPTII,S$GLB,, | Performed by: PODIATRIST

## 2022-09-07 PROCEDURE — 3066F NEPHROPATHY DOC TX: CPT | Mod: CPTII,S$GLB,, | Performed by: PODIATRIST

## 2022-09-07 PROCEDURE — 4010F ACE/ARB THERAPY RXD/TAKEN: CPT | Mod: CPTII,S$GLB,, | Performed by: PODIATRIST

## 2022-09-07 PROCEDURE — 3044F HG A1C LEVEL LT 7.0%: CPT | Mod: CPTII,S$GLB,, | Performed by: PODIATRIST

## 2022-09-07 PROCEDURE — 1157F ADVNC CARE PLAN IN RCRD: CPT | Mod: CPTII,S$GLB,, | Performed by: PODIATRIST

## 2022-09-07 PROCEDURE — 99999 PR PBB SHADOW E&M-EST. PATIENT-LVL IV: ICD-10-PCS | Mod: PBBFAC,,, | Performed by: PODIATRIST

## 2022-09-07 PROCEDURE — 99999 PR PBB SHADOW E&M-EST. PATIENT-LVL IV: CPT | Mod: PBBFAC,,, | Performed by: PODIATRIST

## 2022-09-07 PROCEDURE — 99213 PR OFFICE/OUTPT VISIT, EST, LEVL III, 20-29 MIN: ICD-10-PCS | Mod: S$GLB,,, | Performed by: PODIATRIST

## 2022-09-07 RX ORDER — MELOXICAM 15 MG/1
15 TABLET ORAL DAILY
Qty: 30 TABLET | Refills: 1 | Status: SHIPPED | OUTPATIENT
Start: 2022-09-07 | End: 2022-09-07

## 2022-09-07 NOTE — PROGRESS NOTES
"Subjective:      Patient ID: Jolly Matias is a 72 y.o. female.    Chief Complaint: Foot Pain (Right foot pain)    Jolly is a 72 y.o. female who presents to the podiatry clinic  with complaint of  right foot pain. Onset of the symptoms was several weeks ago. Precipitating event: none known. Current symptoms include: ability to bear weight, but with some pain, stiffness, and worsening symptoms after a period of activity. Aggravating factors: any weight bearing and walking. Symptoms have progressed to a point and plateaued. Patient has had prior foot problems. Evaluation to date: none. Treatment to date: none. Patients rates pain 0/10 on pain scale currently but was much worse up until 2 days ago. Wearing very firm/hard soled shoes currently and states these sometimes cause more pain.     Vitals:    09/07/22 1021   BP: 125/68   Pulse: 73   Weight: 65.2 kg (143 lb 11.8 oz)   Height: 5' 1" (1.549 m)   PainSc: 0-No pain       Review of Systems   Constitutional: Negative for chills and fever.   Cardiovascular:  Negative for chest pain, claudication and leg swelling.   Respiratory:  Negative for cough and shortness of breath.    Skin:  Negative for dry skin and nail changes.   Musculoskeletal:  Positive for myalgias and stiffness.        R forefoot pain   Gastrointestinal:  Negative for nausea and vomiting.   Neurological:  Negative for numbness and paresthesias.   Psychiatric/Behavioral:  Negative for altered mental status.          Objective:      Physical Exam  Vitals reviewed.   Constitutional:       Appearance: She is well-developed.   HENT:      Head: Normocephalic.   Cardiovascular:      Pulses:           Dorsalis pedis pulses are 2+ on the right side and 2+ on the left side.        Posterior tibial pulses are 2+ on the right side and 2+ on the left side.      Comments: DP and PT pulses are 2/4 bilaterally.        Pulmonary:      Effort: No respiratory distress.   Musculoskeletal:      Right lower leg: No edema. "      Left lower leg: No edema.      Comments: Pain with palpation of dorsal and plantar 2nd/3rd MTPJ. Semi-reducible hammertoe contractures noted to toes 2-4 R with pain on ROM.     Hypermobility noted to 1st ray b/l with near complete collapse of medial longitudinal arch b/l with loading.     Equinus contracture noted to b/l ankles with knee straight and slightly improved with knee bent.      Skin:     General: Skin is warm and dry.      Findings: No erythema.      Comments: No open lesions, lacerations or wounds noted. Nails are normal to R 1-5 and L 1-5. Interdigital spaces clean, dry and intact b/l. No erythema noted to b/l foot. Skin texture normal. Pedal hair normal     Neurological:      Mental Status: She is alert and oriented to person, place, and time.      Sensory: No sensory deficit.      Comments: Light touch, proprioception, and sharp/dull sensation are all intact bilaterally.     Psychiatric:         Behavior: Behavior normal.         Thought Content: Thought content normal.         Judgment: Judgment normal.             Assessment:       Encounter Diagnoses   Name Primary?    Capsulitis of foot, right Yes    Foot pain, right          Plan:       Jolly was seen today for foot pain.    Diagnoses and all orders for this visit:    Capsulitis of foot, right    Foot pain, right    Other orders  -     meloxicam (MOBIC) 15 MG tablet; Take 1 tablet (15 mg total) by mouth once daily.      I counseled the patient on her conditions, their implications and medical management.    Long discussion with patient regarding appropriate, supportive and comfortable shoes. Recommended SAS/[employer] style shoe brands with adequate arch supports to alleviate abnormal pressure and improve stability of foot while walking. Avoid flat shoes and barefoot walking as these will exacerbate or worsen symptoms.     Mobic 15mg once daily as needed for pain.     Metatarsal gel cushion pad dispensed and applied to affected foot for  additional cushioning of met heads.    Since pain is better, she will try above conservative care for next 2-3 weeks and return for further recommendations. Consider **    RTC 2-3 weeks, sooner PRN

## 2022-09-07 NOTE — TELEPHONE ENCOUNTER
Called and left message with Mrs. Matias to again review sputum culture results. Staff did not forward last week's message to me.   In the interim, I placed a consult with infectious disease.  This has been scheduled for tomorrow.  Dr. Cardenas to decide if treatment is necessary.  Again, have personally called to speak to patient and sent mychart messages to hopefully put her mind at ease.

## 2022-09-08 ENCOUNTER — TELEPHONE (OUTPATIENT)
Dept: PULMONOLOGY | Facility: CLINIC | Age: 72
End: 2022-09-08
Payer: MEDICARE

## 2022-09-08 ENCOUNTER — OFFICE VISIT (OUTPATIENT)
Dept: INFECTIOUS DISEASES | Facility: CLINIC | Age: 72
End: 2022-09-08
Payer: MEDICARE

## 2022-09-08 VITALS
WEIGHT: 148.13 LBS | DIASTOLIC BLOOD PRESSURE: 76 MMHG | HEART RATE: 81 BPM | HEIGHT: 61 IN | TEMPERATURE: 98 F | SYSTOLIC BLOOD PRESSURE: 137 MMHG | BODY MASS INDEX: 27.97 KG/M2

## 2022-09-08 DIAGNOSIS — A31.0 PULMONARY MYCOBACTERIUM AVIUM COMPLEX (MAC) INFECTION: Primary | ICD-10-CM

## 2022-09-08 PROCEDURE — 3078F DIAST BP <80 MM HG: CPT | Mod: CPTII,S$GLB,, | Performed by: INTERNAL MEDICINE

## 2022-09-08 PROCEDURE — 3078F PR MOST RECENT DIASTOLIC BLOOD PRESSURE < 80 MM HG: ICD-10-PCS | Mod: CPTII,S$GLB,, | Performed by: INTERNAL MEDICINE

## 2022-09-08 PROCEDURE — 1101F PT FALLS ASSESS-DOCD LE1/YR: CPT | Mod: CPTII,S$GLB,, | Performed by: INTERNAL MEDICINE

## 2022-09-08 PROCEDURE — 1159F MED LIST DOCD IN RCRD: CPT | Mod: CPTII,S$GLB,, | Performed by: INTERNAL MEDICINE

## 2022-09-08 PROCEDURE — 4010F PR ACE/ARB THEARPY RXD/TAKEN: ICD-10-PCS | Mod: CPTII,S$GLB,, | Performed by: INTERNAL MEDICINE

## 2022-09-08 PROCEDURE — 1157F ADVNC CARE PLAN IN RCRD: CPT | Mod: CPTII,S$GLB,, | Performed by: INTERNAL MEDICINE

## 2022-09-08 PROCEDURE — 1159F PR MEDICATION LIST DOCUMENTED IN MEDICAL RECORD: ICD-10-PCS | Mod: CPTII,S$GLB,, | Performed by: INTERNAL MEDICINE

## 2022-09-08 PROCEDURE — 3075F PR MOST RECENT SYSTOLIC BLOOD PRESS GE 130-139MM HG: ICD-10-PCS | Mod: CPTII,S$GLB,, | Performed by: INTERNAL MEDICINE

## 2022-09-08 PROCEDURE — 3044F PR MOST RECENT HEMOGLOBIN A1C LEVEL <7.0%: ICD-10-PCS | Mod: CPTII,S$GLB,, | Performed by: INTERNAL MEDICINE

## 2022-09-08 PROCEDURE — 99499 RISK ADDL DX/OHS AUDIT: ICD-10-PCS | Mod: S$GLB,,, | Performed by: INTERNAL MEDICINE

## 2022-09-08 PROCEDURE — 1157F PR ADVANCE CARE PLAN OR EQUIV PRESENT IN MEDICAL RECORD: ICD-10-PCS | Mod: CPTII,S$GLB,, | Performed by: INTERNAL MEDICINE

## 2022-09-08 PROCEDURE — 1101F PR PT FALLS ASSESS DOC 0-1 FALLS W/OUT INJ PAST YR: ICD-10-PCS | Mod: CPTII,S$GLB,, | Performed by: INTERNAL MEDICINE

## 2022-09-08 PROCEDURE — 4010F ACE/ARB THERAPY RXD/TAKEN: CPT | Mod: CPTII,S$GLB,, | Performed by: INTERNAL MEDICINE

## 2022-09-08 PROCEDURE — 1126F AMNT PAIN NOTED NONE PRSNT: CPT | Mod: CPTII,S$GLB,, | Performed by: INTERNAL MEDICINE

## 2022-09-08 PROCEDURE — 3008F BODY MASS INDEX DOCD: CPT | Mod: CPTII,S$GLB,, | Performed by: INTERNAL MEDICINE

## 2022-09-08 PROCEDURE — 3066F PR DOCUMENTATION OF TREATMENT FOR NEPHROPATHY: ICD-10-PCS | Mod: CPTII,S$GLB,, | Performed by: INTERNAL MEDICINE

## 2022-09-08 PROCEDURE — 99205 OFFICE O/P NEW HI 60 MIN: CPT | Mod: S$GLB,,, | Performed by: INTERNAL MEDICINE

## 2022-09-08 PROCEDURE — 3075F SYST BP GE 130 - 139MM HG: CPT | Mod: CPTII,S$GLB,, | Performed by: INTERNAL MEDICINE

## 2022-09-08 PROCEDURE — 99999 PR PBB SHADOW E&M-EST. PATIENT-LVL IV: CPT | Mod: PBBFAC,,, | Performed by: INTERNAL MEDICINE

## 2022-09-08 PROCEDURE — 3288F PR FALLS RISK ASSESSMENT DOCUMENTED: ICD-10-PCS | Mod: CPTII,S$GLB,, | Performed by: INTERNAL MEDICINE

## 2022-09-08 PROCEDURE — 99205 PR OFFICE/OUTPT VISIT, NEW, LEVL V, 60-74 MIN: ICD-10-PCS | Mod: S$GLB,,, | Performed by: INTERNAL MEDICINE

## 2022-09-08 PROCEDURE — 1126F PR PAIN SEVERITY QUANTIFIED, NO PAIN PRESENT: ICD-10-PCS | Mod: CPTII,S$GLB,, | Performed by: INTERNAL MEDICINE

## 2022-09-08 PROCEDURE — 3061F NEG MICROALBUMINURIA REV: CPT | Mod: CPTII,S$GLB,, | Performed by: INTERNAL MEDICINE

## 2022-09-08 PROCEDURE — 99499 UNLISTED E&M SERVICE: CPT | Mod: S$GLB,,, | Performed by: INTERNAL MEDICINE

## 2022-09-08 PROCEDURE — 3044F HG A1C LEVEL LT 7.0%: CPT | Mod: CPTII,S$GLB,, | Performed by: INTERNAL MEDICINE

## 2022-09-08 PROCEDURE — 3008F PR BODY MASS INDEX (BMI) DOCUMENTED: ICD-10-PCS | Mod: CPTII,S$GLB,, | Performed by: INTERNAL MEDICINE

## 2022-09-08 PROCEDURE — 3061F PR NEG MICROALBUMINURIA RESULT DOCUMENTED/REVIEW: ICD-10-PCS | Mod: CPTII,S$GLB,, | Performed by: INTERNAL MEDICINE

## 2022-09-08 PROCEDURE — 3288F FALL RISK ASSESSMENT DOCD: CPT | Mod: CPTII,S$GLB,, | Performed by: INTERNAL MEDICINE

## 2022-09-08 PROCEDURE — 99999 PR PBB SHADOW E&M-EST. PATIENT-LVL IV: ICD-10-PCS | Mod: PBBFAC,,, | Performed by: INTERNAL MEDICINE

## 2022-09-08 PROCEDURE — 3066F NEPHROPATHY DOC TX: CPT | Mod: CPTII,S$GLB,, | Performed by: INTERNAL MEDICINE

## 2022-09-08 NOTE — PROGRESS NOTES
Subjective:     Patient ID: Jolly Matias is a 72 y.o. female    Chief Complaint: MAC    HPI: 72F  Nonsmoker  Cough x years        Immunization History   Administered Date(s) Administered    COVID-19, MRNA, LN-S, PF (MODERNA FULL 0.5 ML DOSE) 2021, 2021, 2021    Influenza 2009, 2010, 2011, 2012, 10/01/2013    Influenza (FLUAD) - Quadrivalent - Adjuvanted - PF *Preferred* (65+) 2021    Influenza - High Dose - PF (65 years and older) 2014, 2015, 2016, 10/25/2017, 2018, 2019    Influenza - Intradermal - Quadrivalent - PF 10/01/2013    Influenza - Intradermal - Trivalent - PF 10/01/2013    Influenza A (H1N1) 2009 Monovalent - IM 2010    Influenza Split 2009, 2010, 2011    Pneumococcal Conjugate - 13 Valent 2015    Pneumococcal Polysaccharide - 23 Valent 2016    Tdap 2015        ROS     Past Medical History:   Diagnosis Date    Atypical ductal hyperplasia, breast     Basal cell carcinoma 2011    left forehead    Basal cell carcinoma 2015    R temporal scalp, R upper forehead, L upper forehead    Basal cell carcinoma 2015    right mid ala    BCC (basal cell carcinoma) excised     right shoulder    BCC (basal cell carcinoma) 2021    right medial shoulder    Diabetes mellitus     Diverticulitis     Fibrocystic breast     HLD (hyperlipidemia)     Hypertension     Prediabetes 10/16/2013    Skin cancer      Past Surgical History:   Procedure Laterality Date    APPENDECTOMY      bilateral breast duct excision      BREAST BIOPSY Right     Excisional bx, benign    BREAST BIOPSY Left     Excisional bx, benign     SECTION      x2    COLON SURGERY Left 2017    sigmoidectomy for diverticulitis    COLONOSCOPY N/A 2017    Procedure: COLONOSCOPY;  Surgeon: IBRAHIMA Philip MD;  Location: [address]);  Service: Endoscopy;  Laterality: N/A;    EYE SURGERY      fulgaration of  endometriosis x 2      HYSTERECTOMY      For endometriosis and DUB--age 42, ovaries in?    INCISIONAL HERNIA REPAIR  08/2017    KNEE ARTHROSCOPY W/ DEBRIDEMENT Right 7/6/2018    Procedure: ARTHROSCOPY, KNEE, WITH DEBRIDEMENT;  Surgeon: MARVA Arias MD;  Location: [facility] [address] [place] [address];  Service: Orthopedics;  Laterality: Right;    KNEE ARTHROSCOPY W/ MENISCECTOMY Right 7/6/2018    Procedure: ARTHROSCOPY, KNEE, WITH MENISCECTOMY (partial lateral and medial menisectomy, chondraplasty;  Surgeon: MARVA Arias MD;  Location: [facility] OR [address] [place] [address];  Service: Orthopedics;  Laterality: Right;    KNEE ARTHROSCOPY W/ PLICA EXCISION Right 7/6/2018    Procedure: EXCISION, PLICA, KNEE, ARTHROSCOPIC,;  Surgeon: MARVA Arias MD;  Location: [facility] [address] [place] [address];  Service: Orthopedics;  Laterality: Right;    KNEE SURGERY      LAPAROSCOPIC REPAIR OF INCISIONAL HERNIA N/A 10/29/2020    Procedure: REPAIR, HERNIA, INCISIONAL, LAPAROSCOPIC recurrent, WITH MESH;  Surgeon: Deshawn Arias MD;  Location: [facility] OR [address] [place] [address];  Service: General;  Laterality: N/A;    OOPHORECTOMY      SKIN CANCER EXCISION      BCC forehead , temple, shoulder, chest    SURGICAL REMOVAL OF LYMPH NODE Right 4/9/2021    Procedure: EXCISION, LYMPH NODE;  Surgeon: Sloan Gómez MD;  Location: [facility] [address] [place] [address];  Service: Thoracic;  Laterality: Right;  Robotic mediastinal and hilar    TONSILLECTOMY      torn retina, left      XI ROBOTIC RATS,WITH LOBECTOMY,LUNG Right 4/9/2021    Procedure: XI ROBOTIC RATS,WITH LOBECTOMY,LUNG;  Surgeon: Sloan Gómez MD;  Location: [facility] [address] [place] [address];  Service: Thoracic;  Laterality: Right;     Family History   Problem Relation Age of Onset    Skin cancer Father     Hypertension Father     Heart disease Father     Diabetes Father     Melanoma Father     Cancer Father     Skin cancer Mother     Hypertension Mother     Thyroid disease Mother     Dementia Mother     Hypertension Sister     Hyperlipidemia Sister     Anxiety disorder  "Daughter     Hypertension Son     Hyperlipidemia Son     Breast cancer Maternal Aunt     Diabetes Maternal Uncle      Social History     Tobacco Use    Smoking status: Never    Smokeless tobacco: Never   Substance Use Topics    Alcohol use: Yes     Alcohol/week: 0.0 standard drinks     Types: 1 - 2 Glasses of wine per week     Comment: 3-4x /week    Drug use: No       Objective:     Physical Exam    Data:    All data, including recent labs, radiology, and pathology, has been independently reviewed.    Labs:    No results for input(s): WBC, HGB, HCT, NA, K, CL, BUN, CREATININE, CRCL, AST, ALT, ALKPHOS, BILITOT, CRP, INR, [id number], [id number], [id number] in the last 2160 hours.    Invalid input(s): GRAN%, PLATELET     Radiology:    No results found in the last 24 hours.     Assessment:    1. Pulmonary Mycobacterium avium complex (MAC) infection  Ambulatory referral/consult to Infectious Disease           Follow up in {0-10:18497::"0"} {TIME:72654::"months"}    The total time for evaluation and management services performed on 9/8/22 was greater than *** minutes.     Manuela Mancini DO  Infectious Disease    "

## 2022-09-08 NOTE — TELEPHONE ENCOUNTER
----- Message from Daphne Jacinto MD sent at 9/8/2022  9:34 AM CDT -----  Perfect, thank you.  Couldn't talk her off the ledge to ask her to be patient.  Did not want to schedule CT until after the three month period from her last scan, will schedule it last week of September  Daphne    ----- Message -----  From: Danielle Mancini DO  Sent: 9/8/2022   9:12 AM CDT  To: Daphne Jacinto MD, [facility] Staff    Wilmer Serna, Saw Ms. Matias in clinic today. She mentioned she is supposed to have a follow up CT this month, but that it hasn't been scheduled yet. Just wanted to see if your office is abl to get that scheduled for her.    Not starting treatment for MAC at this time. Will follow up CT results, and asked her to see if she can submit more sputum for further danial Roy

## 2022-09-08 NOTE — PROGRESS NOTES
Subjective:     Patient ID: Jolly Matias is a 72 y.o. female    Chief Complaint: MAC    HPI: 72F hx lung adenocarcinoma s/p resection follows with Dr. Jacinto, presents to clinic today for evaluation of sputum cx + MAC. She underwent lobectomy in 4/2021 for adenocarcinoma and has been having surveillance CT since that time. CT done 5/2022 w/ RML opacities c/w small airway disease, recommended ongoing surveillance.    Patient endorses feeling well overall. She endorses chronic cough that has been ongoing for several years. No history of tobacco abuse. She denies any appetite loss, weight loss, fevers or chills. States cough is occasionally productive. [facility] pulmonology has ordered her a repeat CT for later this month.      Immunization History   Administered Date(s) Administered    COVID-19, MRNA, LN-S, PF (MODERNA FULL 0.5 ML DOSE) 01/13/2021, 02/12/2021, 11/22/2021    Influenza 09/08/2009, 11/01/2010, 09/30/2011, 09/27/2012, 10/01/2013    Influenza (FLUAD) - Quadrivalent - Adjuvanted - PF *Preferred* (65+) 09/24/2021    Influenza - High Dose - PF (65 years and older) 09/29/2014, 11/09/2015, 09/30/2016, 10/25/2017, 09/25/2018, 09/30/2019    Influenza - Intradermal - Quadrivalent - PF 10/01/2013    Influenza - Intradermal - Trivalent - PF 10/01/2013    Influenza A (H1N1) 2009 Monovalent - IM 03/01/2010    Influenza Split 09/08/2009, 11/01/2010, 09/30/2011    Pneumococcal Conjugate - 13 Valent 11/30/2015    Pneumococcal Polysaccharide - 23 Valent 12/02/2016    Tdap 11/30/2015        Review of Systems   Constitutional: Negative for chills, decreased appetite, fever, malaise/fatigue and night sweats.   HENT:  Negative for congestion and sore throat.    Eyes:  Negative for blurred vision, double vision, vision loss in left eye, vision loss in right eye and visual disturbance.   Cardiovascular:  Negative for chest pain, dyspnea on exertion, irregular heartbeat and leg swelling.   Respiratory:  Positive for cough and  sputum production. Negative for hemoptysis and shortness of breath.    Hematologic/Lymphatic: Negative for adenopathy. Does not bruise/bleed easily.   Skin:  Negative for rash and suspicious lesions.   Musculoskeletal:  Negative for arthritis, joint pain, muscle weakness and myalgias.   Gastrointestinal:  Negative for abdominal pain, constipation, diarrhea, heartburn, nausea and vomiting.   Genitourinary:  Negative for dysuria, flank pain, frequency and genital sores.   Neurological:  Negative for dizziness, focal weakness, numbness, paresthesias, sensory change, tremors and weakness.   Psychiatric/Behavioral:  Negative for altered mental status and depression. The patient is not nervous/anxious.    Allergic/Immunologic: Negative for environmental allergies and persistent infections.      Past Medical History:   Diagnosis Date    Atypical ductal hyperplasia, breast     Basal cell carcinoma 2011    left forehead    Basal cell carcinoma 2015    R temporal scalp, R upper forehead, L upper forehead    Basal cell carcinoma 2015    right mid ala    BCC (basal cell carcinoma) excised     right shoulder    BCC (basal cell carcinoma) 2021    right medial shoulder    Diabetes mellitus     Diverticulitis     Fibrocystic breast     HLD (hyperlipidemia)     Hypertension     Prediabetes 10/16/2013    Skin cancer      Past Surgical History:   Procedure Laterality Date    APPENDECTOMY      bilateral breast duct excision      BREAST BIOPSY Right     Excisional bx, benign    BREAST BIOPSY Left     Excisional bx, benign     SECTION      x2    COLON SURGERY Left 2017    sigmoidectomy for diverticulitis    COLONOSCOPY N/A 2017    Procedure: COLONOSCOPY;  Surgeon: IBRAHIMA Philip MD;  Location: [address]);  Service: Endoscopy;  Laterality: N/A;    EYE SURGERY      fulgaration of endometriosis x 2      HYSTERECTOMY      For endometriosis and DUB--age 42, ovaries in?    INCISIONAL HERNIA REPAIR   08/2017    KNEE ARTHROSCOPY W/ DEBRIDEMENT Right 7/6/2018    Procedure: ARTHROSCOPY, KNEE, WITH DEBRIDEMENT;  Surgeon: MARVA Arias MD;  [facility];  Service: Orthopedics;  Laterality: Right;    KNEE ARTHROSCOPY W/ MENISCECTOMY Right 7/6/2018    Procedure: ARTHROSCOPY, KNEE, WITH MENISCECTOMY (partial lateral and medial menisectomy, chondraplasty;  Surgeon: MARVA Arias MD;  [facility];  Service: Orthopedics;  Laterality: Right;    KNEE ARTHROSCOPY W/ PLICA EXCISION Right 7/6/2018    Procedure: EXCISION, PLICA, KNEE, ARTHROSCOPIC,;  Surgeon: MARVA Arias MD;  Location: [facility] OR [address] [place] [address];  Service: Orthopedics;  Laterality: Right;    KNEE SURGERY      LAPAROSCOPIC REPAIR OF INCISIONAL HERNIA N/A 10/29/2020    Procedure: REPAIR, HERNIA, INCISIONAL, LAPAROSCOPIC recurrent, WITH MESH;  Surgeon: Deshawn Arias MD;  Location: [facility] OR [address] [place] [address];  Service: General;  Laterality: N/A;    OOPHORECTOMY      SKIN CANCER EXCISION      BCC forehead , temple, shoulder, chest    SURGICAL REMOVAL OF LYMPH NODE Right 4/9/2021    Procedure: EXCISION, LYMPH NODE;  Surgeon: Sloan Gómez MD;  Location: [facility] OR [address] [place] [address];  Service: Thoracic;  Laterality: Right;  Robotic mediastinal and hilar    TONSILLECTOMY      torn retina, left      XI ROBOTIC RATS,WITH LOBECTOMY,LUNG Right 4/9/2021    Procedure: XI ROBOTIC RATS,WITH LOBECTOMY,LUNG;  Surgeon: Sloan Gómez MD;  Location: [facility] OR [address] [place] [address];  Service: Thoracic;  Laterality: Right;     Family History   Problem Relation Age of Onset    Skin cancer Father     Hypertension Father     Heart disease Father     Diabetes Father     Melanoma Father     Cancer Father     Skin cancer Mother     Hypertension Mother     Thyroid disease Mother     Dementia Mother     Hypertension Sister     Hyperlipidemia Sister     Anxiety disorder Daughter     Hypertension Son     Hyperlipidemia Son     Breast cancer Maternal Aunt     Diabetes Maternal Uncle       Social History     Tobacco Use    Smoking status: Never    Smokeless tobacco: Never   Substance Use Topics    Alcohol use: Yes     Alcohol/week: 0.0 standard drinks     Types: 1 - 2 Glasses of wine per week     Comment: 3-4x /week    Drug use: No       Objective:     Physical Exam  Constitutional:       General: She is not in acute distress.     Appearance: Normal appearance. She is well-developed. She is not ill-appearing or diaphoretic.   HENT:      Head: Normocephalic and atraumatic.      Right Ear: External ear normal.      Left Ear: External ear normal.      Nose: Nose normal.   Eyes:      General: No scleral icterus.        Right eye: No discharge.         Left eye: No discharge.      Extraocular Movements: Extraocular movements intact.      Conjunctiva/sclera: Conjunctivae normal.   Pulmonary:      Effort: Pulmonary effort is normal. No respiratory distress.      Breath sounds: No stridor.   Musculoskeletal:         General: Normal range of motion.   Skin:     Findings: No erythema or rash.   Neurological:      General: No focal deficit present.      Mental Status: She is alert and oriented to person, place, and time. Mental status is at baseline.      Cranial Nerves: No cranial nerve deficit.   Psychiatric:         Mood and Affect: Mood normal.         Behavior: Behavior normal.         Thought Content: Thought content normal.         Judgment: Judgment normal.       Data:    All data, including recent labs, radiology, and pathology, has been independently reviewed.    Labs:    No results found in the last 24 hours.    Radiology:    No results found in the last 24 hours.     Assessment:    1. Pulmonary Mycobacterium avium complex (MAC) infection  No need to start treatment at this time  Repeat CT ordered by pulmonary, message sent to office to ask them to schedule  Obtain further sputum cultures      AFB Culture & Smear    AFB Culture & Smear    AFB Culture & Smear    Will follow up on sputum cx and CT  results            Follow up in 3 months    The total time for evaluation and management services performed on 9/8/22 was greater than 60 minutes.     Manuela Cocco DO  Infectious Disease

## 2022-09-08 NOTE — TELEPHONE ENCOUNTER
I spoke with patient to schedule ct scan ordered by Dr Jacinto for 9/23/22 at 9:30am at imaging center. Appointment mailed. Patient verbalized understanding.

## 2022-09-23 ENCOUNTER — HOSPITAL ENCOUNTER (OUTPATIENT)
Dept: RADIOLOGY | Facility: HOSPITAL | Age: 72
Discharge: HOME | End: 2022-09-23
Attending: INTERNAL MEDICINE
Payer: MEDICARE

## 2022-09-23 DIAGNOSIS — R91.1 SOLITARY PULMONARY NODULE: ICD-10-CM

## 2022-09-23 PROCEDURE — 71250 CT CHEST WITHOUT CONTRAST: ICD-10-PCS | Mod: 26,,, | Performed by: RADIOLOGY

## 2022-09-23 PROCEDURE — 71250 CT THORAX DX C-: CPT | Mod: 26,,, | Performed by: RADIOLOGY

## 2022-09-23 PROCEDURE — 71250 CT THORAX DX C-: CPT | Mod: TC

## 2022-09-30 DIAGNOSIS — E78.00 PURE HYPERCHOLESTEROLEMIA: ICD-10-CM

## 2022-09-30 RX ORDER — ATORVASTATIN CALCIUM 20 MG/1
20 TABLET, FILM COATED ORAL DAILY
Qty: 90 TABLET | Refills: 2 | Status: SHIPPED | OUTPATIENT
Start: 2022-09-30 | End: 2023-06-30 | Stop reason: SDUPTHER

## 2022-09-30 NOTE — TELEPHONE ENCOUNTER
Care Due:                  Date            Visit Type   Department     Provider  --------------------------------------------------------------------------------                                EP -                              PRIMARY      MET INTERNAL  Last Visit: 05-      CARE ([facility])   MEDICINE       Debbie Venegas  Next Visit: None Scheduled  None         None Found                                                            Last  Test          Frequency    Reason                     Performed    Due Date  --------------------------------------------------------------------------------    HBA1C.......  6 months...  metFORMIN................  05- 11-    [facility] Embedded Care Gaps. Reference number: [id number]. 9/30/2022   9:22:29 AM CDT

## 2022-09-30 NOTE — TELEPHONE ENCOUNTER
Refill Decision Note   Jolly Matias  is requesting a refill authorization.  Brief Assessment and Rationale for Refill:  Approve    -Medication-Related Problems Identified: Requires labs  Medication Therapy Plan:       Medication Reconciliation Completed: No   Comments:     Provider Staff:     Action is required for this patient.   Please see care gap opportunities below in Care Due Message.     Thanks!  [facility]     Appointments      Date Provider   Last Visit   5/31/2022 Debbie Venegas DO   Next Visit   Visit date not found Debbie Venegas DO     Note composed:12:13 PM 09/30/2022           Note composed:12:13 PM 09/30/2022

## 2022-10-12 ENCOUNTER — PROCEDURE VISIT (OUTPATIENT)
Dept: DERMATOLOGY | Facility: CLINIC | Age: 72
End: 2022-10-12
Payer: MEDICARE

## 2022-10-12 VITALS
WEIGHT: 148 LBS | HEIGHT: 61 IN | HEART RATE: 69 BPM | DIASTOLIC BLOOD PRESSURE: 81 MMHG | SYSTOLIC BLOOD PRESSURE: 146 MMHG | BODY MASS INDEX: 27.94 KG/M2

## 2022-10-12 DIAGNOSIS — C44.319 BASAL CELL CARCINOMA OF RIGHT FOREHEAD: Primary | ICD-10-CM

## 2022-10-12 PROCEDURE — 17312 MOHS ADDL STAGE: CPT | Mod: S$GLB,,, | Performed by: DERMATOLOGY

## 2022-10-12 PROCEDURE — 13132 PR RECMPL WND HEAD,FAC,HAND 2.6-7.5 CM: ICD-10-PCS | Mod: S$GLB,,, | Performed by: DERMATOLOGY

## 2022-10-12 PROCEDURE — 99499 NO LOS: ICD-10-PCS | Mod: S$GLB,,, | Performed by: DERMATOLOGY

## 2022-10-12 PROCEDURE — 13132 CMPLX RPR F/C/C/M/N/AX/G/H/F: CPT | Mod: S$GLB,,, | Performed by: DERMATOLOGY

## 2022-10-12 PROCEDURE — 17312: ICD-10-PCS | Mod: S$GLB,,, | Performed by: DERMATOLOGY

## 2022-10-12 PROCEDURE — 99499 UNLISTED E&M SERVICE: CPT | Mod: S$GLB,,, | Performed by: DERMATOLOGY

## 2022-10-12 PROCEDURE — 17311: ICD-10-PCS | Mod: 51,S$GLB,, | Performed by: DERMATOLOGY

## 2022-10-12 PROCEDURE — 17311 MOHS 1 STAGE H/N/HF/G: CPT | Mod: 51,S$GLB,, | Performed by: DERMATOLOGY

## 2022-10-12 RX ORDER — CEPHALEXIN 500 MG/1
500 CAPSULE ORAL 3 TIMES DAILY
Qty: 21 CAPSULE | Refills: 0 | Status: SHIPPED | OUTPATIENT
Start: 2022-10-12 | End: 2022-10-19

## 2022-10-12 RX ORDER — OXYCODONE AND ACETAMINOPHEN 5; 325 MG/1; MG/1
1 TABLET ORAL
Qty: 10 TABLET | Refills: 0 | Status: SHIPPED | OUTPATIENT
Start: 2022-10-12 | End: 2022-12-08

## 2022-10-12 NOTE — Clinical Note
Hey- Did Mohs on her today.  Given how many times we have worked on her forehead, I think it would be worthwhile for her to treat forehead with aldara at some point in the future, just to be proactive.  Cleared BCC today after 4 stages.  Thanks!

## 2022-10-12 NOTE — PROGRESS NOTES
PROCEDURE: Mohs' Micrographic Surgery    INDICATION: Location in non-mask areas of face where maximum conservation of tumor-free tissue is needed. Biopsy-proven skin cancer of cosmetically and functionally important areas, including head, neck, genital, hand, foot, or areas known for having difficulty in healing, such as the lower anterior legs. Tumor with ill-defined borders.    REFERRING PROVIDER: Angle Mohr M.D.    CASE NUMBER:     ANESTHETIC: 5 cc 0.5% Lidocaine with Epi 1:200,000 mixed 1:1 with 0.5% Bupivacaine    SURGICAL PREP: Hibiclens    SURGEON: Franky Ac MD    ASSISTANTS: Ameena Ruano PA-C, Norma Alvarez MA, and Nel Jc, Surg Tech    PREOPERATIVE DIAGNOSIS: basal cell carcinoma- superficial    POSTOPERATIVE DIAGNOSIS: basal cell carcinoma- superficial, infiltrating    PATHOLOGIC DIAGNOSIS: basal cell carcinoma- superficial, infiltrating    HISTOLOGY OF SPECIMENS IN FIRST STAGE:   Tumor Type: Tumor seen. Superficial basal cell carcinoma: Foci of basaloid cells with peripheral palisading and focal retraction artifact arising along the dermoepidermal junction and extending into the papillary dermis.  Infiltrative basal cell carcinoma: Basaloid tumor in dermis characterized by thin elongated strands of basaloid cells infiltrating between dermal collagen bundles.   Depth of Invasion: epidermis and dermis  Perineural Invasion: No    HISTOLOGY OF SPECIMENS IN SUBSEQUENT STAGES:  Tumor Type: Tumor seen. Same as in first stage.   Depth of Invasion: epidermis and dermis  Perineural Invasion: No    STAGES OF MOHS' SURGERY PERFORMED: 4    TUMOR-FREE PLANE ACHIEVED: Yes    HEMOSTASIS: electrocoagulation     SPECIMENS: 7 (2 in stage A, 2 in stage B, 2 in stage C, and 1 in stage D)    LOCATION: right upper forehead. Location verified with Dr. Mohr's clinical photograph. Patient also verified location with hand held mirror.    INITIAL LESION SIZE: 0.4 x 0.9 cm    FINAL DEFECT SIZE: 1.3 x 1.6  cm    WOUND REPAIR/DISPOSITION: The patient tolerated Mohs' Micrographic Surgery for a basal cell carcinoma very well. When the tumor was completely removed, a repair of the surgical defect was undertaken.    PROCEDURE: Complex Linear Repair    INDICATION: Status post Mohs' Micrographic Surgery for basal cell carcinoma.    CASE NUMBER:     SURGEON: Franky Ac MD    ASSISTANTS: Ameena Ruano PA-C and Norma Alvarez MA    ANESTHETIC: 2.5 cc 2% Lidocaine with Epinephrine 1:100,000    SURGICAL PREP: Hibiclens, prepped by Ameena Ruano PA-C    LOCATION: right upper forehead    DEFECT SIZE: 1.3 x 1.6 cm    WOUND REPAIR/DISPOSITION:  After the patient's carcinoma had been completely removed with Mohs' Micrographic Surgery, a repair of the surgical defect was undertaken. The patient was returned to the operating suite where the area of right upper forehead was prepped, draped, and anesthetized in the usual sterile fashion. The wound was widely undermined in all directions. The wound was undermined to a distance at least the maximum width of the defect as measured perpendicular to the closure line along at least one entire edge of the defect, in this case 2 cm. Then, electrocoagulation was used to obtain meticulous hemostasis. 3-0 Vicryl and 4-0 Vicryl buried vertical mattress sutures were placed into the subcutaneous and dermal plane to close the wound and gary the cutaneous wound edge. Bilateral dog ears were identified and were removed by a standard Burow's triangle technique. The cutaneous wound edges were closed using interrupted 4-0 Prolene and 5-0 Prolene sutures.    The patient tolerated the procedure well.    The area was cleaned and dressed appropriately and the patient was given wound care instructions, as well as appointment for follow-up evaluation and suture removal in  12  days. Patient was placed on Percocet 5 prn postop pain and Keflex 500 mg TID x 7 days.    LENGTH OF REPAIR: 3.5 cm    Vitals:     "10/12/22 0736 10/12/22 1234   BP: (!) 156/79 (!) 146/81   BP Location: Right arm    Patient Position: Sitting    BP Method: Medium (Automatic)    Pulse: 77 69   Weight: 67.1 kg (148 lb)    Height: 5' 1" (1.549 m)          "

## 2022-10-17 ENCOUNTER — PATIENT MESSAGE (OUTPATIENT)
Dept: PULMONOLOGY | Facility: CLINIC | Age: 72
End: 2022-10-17
Payer: MEDICARE

## 2022-10-17 ENCOUNTER — PATIENT MESSAGE (OUTPATIENT)
Dept: DERMATOLOGY | Facility: CLINIC | Age: 72
End: 2022-10-17
Payer: MEDICARE

## 2022-10-18 LAB
LEFT EYE DM RETINOPATHY: POSITIVE
RIGHT EYE DM RETINOPATHY: POSITIVE

## 2022-10-20 ENCOUNTER — PATIENT MESSAGE (OUTPATIENT)
Dept: PULMONOLOGY | Facility: CLINIC | Age: 72
End: 2022-10-20
Payer: MEDICARE

## 2022-10-21 LAB — ACID FAST SUSCEPTIBILITY, SLOW GROWER: ABNORMAL

## 2022-10-22 ENCOUNTER — PATIENT MESSAGE (OUTPATIENT)
Dept: INTERNAL MEDICINE | Facility: CLINIC | Age: 72
End: 2022-10-22
Payer: MEDICARE

## 2022-10-22 DIAGNOSIS — Z12.31 ENCOUNTER FOR SCREENING MAMMOGRAM FOR MALIGNANT NEOPLASM OF BREAST: Primary | ICD-10-CM

## 2022-10-24 ENCOUNTER — OFFICE VISIT (OUTPATIENT)
Dept: DERMATOLOGY | Facility: CLINIC | Age: 72
End: 2022-10-24
Payer: MEDICARE

## 2022-10-24 DIAGNOSIS — Z09 POSTOP CHECK: Primary | ICD-10-CM

## 2022-10-24 PROCEDURE — 4010F PR ACE/ARB THEARPY RXD/TAKEN: ICD-10-PCS | Mod: CPTII,S$GLB,, | Performed by: DERMATOLOGY

## 2022-10-24 PROCEDURE — 3044F PR MOST RECENT HEMOGLOBIN A1C LEVEL <7.0%: ICD-10-PCS | Mod: CPTII,S$GLB,, | Performed by: DERMATOLOGY

## 2022-10-24 PROCEDURE — 99999 PR PBB SHADOW E&M-EST. PATIENT-LVL III: CPT | Mod: PBBFAC,,, | Performed by: DERMATOLOGY

## 2022-10-24 PROCEDURE — 3288F FALL RISK ASSESSMENT DOCD: CPT | Mod: CPTII,S$GLB,, | Performed by: DERMATOLOGY

## 2022-10-24 PROCEDURE — 1101F PR PT FALLS ASSESS DOC 0-1 FALLS W/OUT INJ PAST YR: ICD-10-PCS | Mod: CPTII,S$GLB,, | Performed by: DERMATOLOGY

## 2022-10-24 PROCEDURE — 1101F PT FALLS ASSESS-DOCD LE1/YR: CPT | Mod: CPTII,S$GLB,, | Performed by: DERMATOLOGY

## 2022-10-24 PROCEDURE — 4010F ACE/ARB THERAPY RXD/TAKEN: CPT | Mod: CPTII,S$GLB,, | Performed by: DERMATOLOGY

## 2022-10-24 PROCEDURE — 3066F NEPHROPATHY DOC TX: CPT | Mod: CPTII,S$GLB,, | Performed by: DERMATOLOGY

## 2022-10-24 PROCEDURE — 1159F MED LIST DOCD IN RCRD: CPT | Mod: CPTII,S$GLB,, | Performed by: DERMATOLOGY

## 2022-10-24 PROCEDURE — 99999 PR PBB SHADOW E&M-EST. PATIENT-LVL III: ICD-10-PCS | Mod: PBBFAC,,, | Performed by: DERMATOLOGY

## 2022-10-24 PROCEDURE — 1126F AMNT PAIN NOTED NONE PRSNT: CPT | Mod: CPTII,S$GLB,, | Performed by: DERMATOLOGY

## 2022-10-24 PROCEDURE — 1157F ADVNC CARE PLAN IN RCRD: CPT | Mod: CPTII,S$GLB,, | Performed by: DERMATOLOGY

## 2022-10-24 PROCEDURE — 3066F PR DOCUMENTATION OF TREATMENT FOR NEPHROPATHY: ICD-10-PCS | Mod: CPTII,S$GLB,, | Performed by: DERMATOLOGY

## 2022-10-24 PROCEDURE — 3288F PR FALLS RISK ASSESSMENT DOCUMENTED: ICD-10-PCS | Mod: CPTII,S$GLB,, | Performed by: DERMATOLOGY

## 2022-10-24 PROCEDURE — 99024 PR POST-OP FOLLOW-UP VISIT: ICD-10-PCS | Mod: S$GLB,,, | Performed by: DERMATOLOGY

## 2022-10-24 PROCEDURE — 99024 POSTOP FOLLOW-UP VISIT: CPT | Mod: S$GLB,,, | Performed by: DERMATOLOGY

## 2022-10-24 PROCEDURE — 3044F HG A1C LEVEL LT 7.0%: CPT | Mod: CPTII,S$GLB,, | Performed by: DERMATOLOGY

## 2022-10-24 PROCEDURE — 1157F PR ADVANCE CARE PLAN OR EQUIV PRESENT IN MEDICAL RECORD: ICD-10-PCS | Mod: CPTII,S$GLB,, | Performed by: DERMATOLOGY

## 2022-10-24 PROCEDURE — 1159F PR MEDICATION LIST DOCUMENTED IN MEDICAL RECORD: ICD-10-PCS | Mod: CPTII,S$GLB,, | Performed by: DERMATOLOGY

## 2022-10-24 PROCEDURE — 1126F PR PAIN SEVERITY QUANTIFIED, NO PAIN PRESENT: ICD-10-PCS | Mod: CPTII,S$GLB,, | Performed by: DERMATOLOGY

## 2022-10-24 PROCEDURE — 3061F NEG MICROALBUMINURIA REV: CPT | Mod: CPTII,S$GLB,, | Performed by: DERMATOLOGY

## 2022-10-24 PROCEDURE — 3061F PR NEG MICROALBUMINURIA RESULT DOCUMENTED/REVIEW: ICD-10-PCS | Mod: CPTII,S$GLB,, | Performed by: DERMATOLOGY

## 2022-10-24 NOTE — TELEPHONE ENCOUNTER
Message sent to referral coordinator to schedule  Mammogram    Pt aware she will be called to schedule

## 2022-10-24 NOTE — PROGRESS NOTES
72 y.o. female patient is here for suture removal following Mohs' surgery.    Patient reports no problems.    WOUND PE:  The R upper forehead sutures intact. Wound healing well. Good skin edges. No signs or symptoms of infection.    IMPRESSION:  Healing operative site from Mohs' surgery, BCC R upper forehead s/p Mohs with CLC, postop day #12.    PLAN:  Sutures removed today by  Amena Norris MA .   Steri-strips applied.  Keep moist with Aquaphor.  Send photo in 1 month or come in person if needed.     RTC:  In 3-6 months with Angle Mohr M.D. for skin check or sooner if new concern arises.

## 2022-11-07 ENCOUNTER — LAB VISIT (OUTPATIENT)
Dept: LAB | Facility: HOSPITAL | Age: 72
End: 2022-11-07
Attending: INTERNAL MEDICINE
Payer: MEDICARE

## 2022-11-07 ENCOUNTER — PATIENT OUTREACH (OUTPATIENT)
Dept: ADMINISTRATIVE | Facility: HOSPITAL | Age: 72
End: 2022-11-07
Payer: MEDICARE

## 2022-11-07 DIAGNOSIS — A31.0 PULMONARY MYCOBACTERIUM AVIUM COMPLEX (MAC) INFECTION: ICD-10-CM

## 2022-11-07 PROCEDURE — 87206 SMEAR FLUORESCENT/ACID STAI: CPT | Performed by: INTERNAL MEDICINE

## 2022-11-07 PROCEDURE — 87015 SPECIMEN INFECT AGNT CONCNTJ: CPT | Performed by: INTERNAL MEDICINE

## 2022-11-07 PROCEDURE — 87116 MYCOBACTERIA CULTURE: CPT | Performed by: INTERNAL MEDICINE

## 2022-11-12 ENCOUNTER — PES CALL (OUTPATIENT)
Dept: ADMINISTRATIVE | Facility: CLINIC | Age: 72
End: 2022-11-12
Payer: MEDICARE

## 2022-11-18 ENCOUNTER — PATIENT MESSAGE (OUTPATIENT)
Dept: INTERNAL MEDICINE | Facility: CLINIC | Age: 72
End: 2022-11-18
Payer: MEDICARE

## 2022-11-21 ENCOUNTER — LAB VISIT (OUTPATIENT)
Dept: LAB | Facility: HOSPITAL | Age: 72
End: 2022-11-21
Attending: INTERNAL MEDICINE
Payer: MEDICARE

## 2022-11-21 ENCOUNTER — OFFICE VISIT (OUTPATIENT)
Dept: URGENT CARE | Facility: CLINIC | Age: 72
End: 2022-11-21
Payer: MEDICARE

## 2022-11-21 VITALS
OXYGEN SATURATION: 97 % | HEART RATE: 78 BPM | DIASTOLIC BLOOD PRESSURE: 60 MMHG | RESPIRATION RATE: 20 BRPM | HEIGHT: 61 IN | SYSTOLIC BLOOD PRESSURE: 103 MMHG | BODY MASS INDEX: 27.94 KG/M2 | TEMPERATURE: 98 F | WEIGHT: 148 LBS

## 2022-11-21 DIAGNOSIS — A31.0 PULMONARY MYCOBACTERIUM AVIUM COMPLEX (MAC) INFECTION: ICD-10-CM

## 2022-11-21 DIAGNOSIS — R05.9 COUGH, UNSPECIFIED TYPE: ICD-10-CM

## 2022-11-21 DIAGNOSIS — J10.1 INFLUENZA A: ICD-10-CM

## 2022-11-21 DIAGNOSIS — J18.9 PNEUMONIA DUE TO INFECTIOUS ORGANISM, UNSPECIFIED LATERALITY, UNSPECIFIED PART OF LUNG: Primary | ICD-10-CM

## 2022-11-21 LAB
CTP QC/QA: YES
CTP QC/QA: YES
POC MOLECULAR INFLUENZA A AGN: POSITIVE
POC MOLECULAR INFLUENZA B AGN: NEGATIVE
SARS-COV-2 RDRP RESP QL NAA+PROBE: NEGATIVE

## 2022-11-21 PROCEDURE — 4010F PR ACE/ARB THEARPY RXD/TAKEN: ICD-10-PCS | Mod: CPTII,S$GLB,, | Performed by: NURSE PRACTITIONER

## 2022-11-21 PROCEDURE — 1126F PR PAIN SEVERITY QUANTIFIED, NO PAIN PRESENT: ICD-10-PCS | Mod: CPTII,S$GLB,, | Performed by: NURSE PRACTITIONER

## 2022-11-21 PROCEDURE — 3044F PR MOST RECENT HEMOGLOBIN A1C LEVEL <7.0%: ICD-10-PCS | Mod: CPTII,S$GLB,, | Performed by: NURSE PRACTITIONER

## 2022-11-21 PROCEDURE — 87116 MYCOBACTERIA CULTURE: CPT | Performed by: INTERNAL MEDICINE

## 2022-11-21 PROCEDURE — 4010F ACE/ARB THERAPY RXD/TAKEN: CPT | Mod: CPTII,S$GLB,, | Performed by: NURSE PRACTITIONER

## 2022-11-21 PROCEDURE — 71046 X-RAY EXAM CHEST 2 VIEWS: CPT | Mod: FY,S$GLB,, | Performed by: RADIOLOGY

## 2022-11-21 PROCEDURE — 87635: ICD-10-PCS | Mod: QW,S$GLB,, | Performed by: NURSE PRACTITIONER

## 2022-11-21 PROCEDURE — 1157F PR ADVANCE CARE PLAN OR EQUIV PRESENT IN MEDICAL RECORD: ICD-10-PCS | Mod: CPTII,S$GLB,, | Performed by: NURSE PRACTITIONER

## 2022-11-21 PROCEDURE — 3078F PR MOST RECENT DIASTOLIC BLOOD PRESSURE < 80 MM HG: ICD-10-PCS | Mod: CPTII,S$GLB,, | Performed by: NURSE PRACTITIONER

## 2022-11-21 PROCEDURE — 1159F MED LIST DOCD IN RCRD: CPT | Mod: CPTII,S$GLB,, | Performed by: NURSE PRACTITIONER

## 2022-11-21 PROCEDURE — 3044F HG A1C LEVEL LT 7.0%: CPT | Mod: CPTII,S$GLB,, | Performed by: NURSE PRACTITIONER

## 2022-11-21 PROCEDURE — 1126F AMNT PAIN NOTED NONE PRSNT: CPT | Mod: CPTII,S$GLB,, | Performed by: NURSE PRACTITIONER

## 2022-11-21 PROCEDURE — 71046 XR CHEST PA AND LATERAL: ICD-10-PCS | Mod: FY,S$GLB,, | Performed by: RADIOLOGY

## 2022-11-21 PROCEDURE — 1159F PR MEDICATION LIST DOCUMENTED IN MEDICAL RECORD: ICD-10-PCS | Mod: CPTII,S$GLB,, | Performed by: NURSE PRACTITIONER

## 2022-11-21 PROCEDURE — 87118 MYCOBACTERIC IDENTIFICATION: CPT | Mod: 59 | Performed by: INTERNAL MEDICINE

## 2022-11-21 PROCEDURE — 87502 POCT INFLUENZA A/B MOLECULAR: ICD-10-PCS | Mod: QW,S$GLB,, | Performed by: NURSE PRACTITIONER

## 2022-11-21 PROCEDURE — 99214 PR OFFICE/OUTPT VISIT, EST, LEVL IV, 30-39 MIN: ICD-10-PCS | Mod: S$GLB,,, | Performed by: NURSE PRACTITIONER

## 2022-11-21 PROCEDURE — 3066F PR DOCUMENTATION OF TREATMENT FOR NEPHROPATHY: ICD-10-PCS | Mod: CPTII,S$GLB,, | Performed by: NURSE PRACTITIONER

## 2022-11-21 PROCEDURE — 87635 SARS-COV-2 COVID-19 AMP PRB: CPT | Mod: QW,S$GLB,, | Performed by: NURSE PRACTITIONER

## 2022-11-21 PROCEDURE — 87015 SPECIMEN INFECT AGNT CONCNTJ: CPT | Performed by: INTERNAL MEDICINE

## 2022-11-21 PROCEDURE — 87206 SMEAR FLUORESCENT/ACID STAI: CPT | Performed by: INTERNAL MEDICINE

## 2022-11-21 PROCEDURE — 3074F SYST BP LT 130 MM HG: CPT | Mod: CPTII,S$GLB,, | Performed by: NURSE PRACTITIONER

## 2022-11-21 PROCEDURE — 99214 OFFICE O/P EST MOD 30 MIN: CPT | Mod: S$GLB,,, | Performed by: NURSE PRACTITIONER

## 2022-11-21 PROCEDURE — 1157F ADVNC CARE PLAN IN RCRD: CPT | Mod: CPTII,S$GLB,, | Performed by: NURSE PRACTITIONER

## 2022-11-21 PROCEDURE — 3074F PR MOST RECENT SYSTOLIC BLOOD PRESSURE < 130 MM HG: ICD-10-PCS | Mod: CPTII,S$GLB,, | Performed by: NURSE PRACTITIONER

## 2022-11-21 PROCEDURE — 3078F DIAST BP <80 MM HG: CPT | Mod: CPTII,S$GLB,, | Performed by: NURSE PRACTITIONER

## 2022-11-21 PROCEDURE — 3061F NEG MICROALBUMINURIA REV: CPT | Mod: CPTII,S$GLB,, | Performed by: NURSE PRACTITIONER

## 2022-11-21 PROCEDURE — 3008F PR BODY MASS INDEX (BMI) DOCUMENTED: ICD-10-PCS | Mod: CPTII,S$GLB,, | Performed by: NURSE PRACTITIONER

## 2022-11-21 PROCEDURE — 3008F BODY MASS INDEX DOCD: CPT | Mod: CPTII,S$GLB,, | Performed by: NURSE PRACTITIONER

## 2022-11-21 PROCEDURE — 1160F PR REVIEW ALL MEDS BY PRESCRIBER/CLIN PHARMACIST DOCUMENTED: ICD-10-PCS | Mod: CPTII,S$GLB,, | Performed by: NURSE PRACTITIONER

## 2022-11-21 PROCEDURE — 3066F NEPHROPATHY DOC TX: CPT | Mod: CPTII,S$GLB,, | Performed by: NURSE PRACTITIONER

## 2022-11-21 PROCEDURE — 87502 INFLUENZA DNA AMP PROBE: CPT | Mod: QW,S$GLB,, | Performed by: NURSE PRACTITIONER

## 2022-11-21 PROCEDURE — 87154 CUL TYP ID BLD PTHGN 6+ TRGT: CPT | Performed by: INTERNAL MEDICINE

## 2022-11-21 PROCEDURE — 1160F RVW MEDS BY RX/DR IN RCRD: CPT | Mod: CPTII,S$GLB,, | Performed by: NURSE PRACTITIONER

## 2022-11-21 PROCEDURE — 3061F PR NEG MICROALBUMINURIA RESULT DOCUMENTED/REVIEW: ICD-10-PCS | Mod: CPTII,S$GLB,, | Performed by: NURSE PRACTITIONER

## 2022-11-21 RX ORDER — ALBUTEROL SULFATE 90 UG/1
2 AEROSOL, METERED RESPIRATORY (INHALATION) EVERY 6 HOURS PRN
Qty: 18 G | Refills: 0 | Status: SHIPPED | OUTPATIENT
Start: 2022-11-21 | End: 2023-03-15 | Stop reason: SDUPTHER

## 2022-11-21 RX ORDER — AZITHROMYCIN 250 MG/1
TABLET, FILM COATED ORAL
Qty: 6 TABLET | Refills: 0 | Status: SHIPPED | OUTPATIENT
Start: 2022-11-21 | End: 2022-12-08

## 2022-11-21 RX ORDER — AMOXICILLIN AND CLAVULANATE POTASSIUM 875; 125 MG/1; MG/1
1 TABLET, FILM COATED ORAL EVERY 12 HOURS
Qty: 14 TABLET | Refills: 0 | Status: SHIPPED | OUTPATIENT
Start: 2022-11-21 | End: 2022-11-28

## 2022-11-21 NOTE — PROGRESS NOTES
"Subjective:       Patient ID: Jolly Matias is a 72 y.o. female.    Vitals:  height is 5' 1" (1.549 m) and weight is 67.1 kg (148 lb). Her temperature is 98 °F (36.7 °C). Her blood pressure is 103/60 and her pulse is 78. Her respiration is 20 and oxygen saturation is 97%.     Chief Complaint: Cough (Headache, SOB)    72 Yr Female presents today with cough with green mucus, SOB, headache  Symptoms started 11/17/22. Treatments at home include Mucinex, cough syrup with no relief. Took home covid test it was negative. Vaccinated for COVID. Positive COVID July 2022.   Provider note below:  This is a 72 y.o. female who presents today with a chief complaint of cough with sputum production, shortness of breath started on 11/17/2022, patient reports she did her 1st at home COVID test that came back positive and notified her primary and she sent off the Paxlovid, patient reports then she repeated the COVID test and the 2nd COVID test came back negative so she did not initiate the Paxlovid,  denies fever, body aches or chills, denies nausea, vomiting, diarrhea or abdominal pain, denies chest pain or dizziness positional lightheadedness, denies sore throat or trouble swallowing, denies loss of taste or smell, or any other symptoms        Cough  This is a new problem. The current episode started in the past 7 days (11/17/22). The problem has been gradually worsening. The problem occurs constantly. Associated symptoms include chills, headaches, postnasal drip, shortness of breath and wheezing. Pertinent negatives include no chest pain, ear congestion, ear pain, fever, nasal congestion, rash, sore throat or weight loss. She has tried OTC cough suppressant (mucinex) for the symptoms. The treatment provided mild relief. Her past medical history is significant for bronchitis. There is no history of asthma, bronchiectasis, COPD, emphysema, environmental allergies or pneumonia.     Constitution: Positive for chills. Negative for " fever.   HENT:  Positive for postnasal drip. Negative for ear pain and sore throat.    Cardiovascular:  Negative for chest pain.   Respiratory:  Positive for cough, shortness of breath and wheezing.    Skin:  Negative for rash.   Allergic/Immunologic: Negative for environmental allergies.   Neurological:  Positive for headaches.     Past Medical History:   Diagnosis Date    Atypical ductal hyperplasia, breast     Basal cell carcinoma 04/2011    left forehead    Basal cell carcinoma 04/2015    R temporal scalp, R upper forehead, L upper forehead    Basal cell carcinoma 08/2015    right mid ala    Basal cell carcinoma (BCC) of right forehead 10/12/2022    BCC (basal cell carcinoma) excised     right shoulder    BCC (basal cell carcinoma) 12/2021    right medial shoulder    Diabetes mellitus     Diverticulitis     Fibrocystic breast     HLD (hyperlipidemia)     Hypertension     Prediabetes 10/16/2013    Skin cancer        Objective:      Physical Exam   Constitutional: She is oriented to person, place, and time. She appears well-developed. She is cooperative.  Non-toxic appearance. She does not appear ill. No distress.      Comments:Patient sitting comfortably on the exam table, non toxic appearance  and answering questions appropriately, no acute distress, talking in full sentences without pause         HENT:   Head: Normocephalic and atraumatic.   Ears:   Right Ear: Hearing, tympanic membrane, external ear and ear canal normal.   Left Ear: Hearing, tympanic membrane, external ear and ear canal normal.   Nose: Nose normal. No mucosal edema, rhinorrhea or nasal deformity. No epistaxis. Right sinus exhibits no maxillary sinus tenderness and no frontal sinus tenderness. Left sinus exhibits no maxillary sinus tenderness and no frontal sinus tenderness.   Mouth/Throat: Uvula is midline, oropharynx is clear and moist and mucous membranes are normal. No trismus in the jaw. Normal dentition. No uvula swelling. No  oropharyngeal exudate, posterior oropharyngeal edema or posterior oropharyngeal erythema.   Eyes: Conjunctivae and lids are normal. No scleral icterus. Extraocular movement intact   Neck: Trachea normal and phonation normal. Neck supple. No edema present. No erythema present. No neck rigidity present.   Cardiovascular: Normal rate, regular rhythm, normal heart sounds and normal pulses.   Pulmonary/Chest: Effort normal. No stridor. No respiratory distress. She has no decreased breath sounds. She has wheezes (mild) in the right middle field. She has no rhonchi. She has no rales.   Abdominal: Normal appearance.   Musculoskeletal: Normal range of motion.         General: No deformity. Normal range of motion.   Neurological: She is alert and oriented to person, place, and time. She exhibits normal muscle tone. Coordination normal.   Skin: Skin is warm, dry, intact, not diaphoretic and not pale.   Psychiatric: Her speech is normal and behavior is normal. Judgment and thought content normal.   Nursing note and vitals reviewed.    X-Ray Chest PA And Lateral    Result Date: 11/21/2022  EXAMINATION: XR CHEST PA AND LATERAL CLINICAL HISTORY: Cough, unspecified TECHNIQUE: PA and lateral views of the chest were performed. COMPARISON: Chest CT 09/23/2022 and chest radiograph 04/12/2021 FINDINGS: Remote postoperative change of right lower lobectomy for known non-small cell lung cancer.  Subtle patchy subsegmental opacities at the right lung base, noting overall improved aeration of the right lung when compared to most recent prior CT chest and chest radiographs.  No large consolidation, pleural effusion or pneumothorax, noting chronic blunting of the right costophrenic angle consistent with pleural thickening/scarring.  Few scattered linear opacities throughout the lung bases consistent with minimal platelike scarring versus atelectasis.  Minimal biapical pleuroparenchymal scarring. Cardiomediastinal silhouette is stable noting  slight rightward shift secondary to remote postoperative change and some volume loss in the right lung.  Heart is not enlarged.  Pulmonary vasculature and hilar contours are within normal limits noting remote postoperative change of the right hilum. Osseous structures appear stable without acute process seen.     Right basilar subtle patchy subsegmental opacities which may reflect subsegmental atelectasis versus early/mild airspace disease including aspiration or pneumonia in this patient with history of cough, although overall improved aeration of the right lung when compared to most recent prior chest CT and chest radiograph.  No discrete pulmonary mass seen in this patient with remote right lower lobectomy for non-small cell lung cancer.  Short-term follow-up chest radiography after therapy is recommended to ensure resolution. Otherwise no change. Electronically signed by: Tong Cole MD Date:    11/21/2022 Time:    14:22   Results for orders placed or performed in visit on 11/21/22   POCT Influenza A/B MOLECULAR   Result Value Ref Range    POC Molecular Influenza A Ag Positive (A) Negative, Not Reported    POC Molecular Influenza B Ag Negative Negative, Not Reported     Acceptable Yes    POCT COVID-19 Rapid Screening   Result Value Ref Range    POC Rapid COVID Negative Negative     Acceptable Yes          Patient in no acute distress.  Vitals reassuring.  Discussed results/diagnosis/plan in depth with patient in clinic. Strict precautions given to patient to monitor for worsening signs and symptoms. Advised to follow up with primary.All questions answered. Strict ER precautions given. If your symptoms worsens or fail to improve you should go to the Emergency Room. Discharge and follow-up instructions given verbally/printed. Discharge and follow-up instructions discussed with the patient who expressed understanding and willingness to comply with my recommendations.Patient voiced  understanding and in agreement with current treatment plan.     Please be advised this text was dictated with TAKO software and may contain errors due to translation.    Assessment:       1. Pneumonia due to infectious organism, unspecified laterality, unspecified part of lung    2. Influenza A    3. Cough, unspecified type          Plan:         Pneumonia due to infectious organism, unspecified laterality, unspecified part of lung  -     amoxicillin-clavulanate 875-125mg (AUGMENTIN) 875-125 mg per tablet; Take 1 tablet by mouth every 12 (twelve) hours. for 7 days  Dispense: 14 tablet; Refill: 0  -     azithromycin (Z-PAULINE) 250 MG tablet; Take 2 tablets by mouth x 1 for day 1 Then take 1 tablet by mouth daily for day 2 - 5  Dispense: 6 tablet; Refill: 0    Influenza A  -     POCT Influenza A/B MOLECULAR  -     X-Ray Chest PA And Lateral; Future; Expected date: 11/21/2022    Cough, unspecified type  -     POCT Influenza A/B MOLECULAR  -     X-Ray Chest PA And Lateral; Future; Expected date: 11/21/2022  -     POCT COVID-19 Rapid Screening    Other orders  -     albuterol (VENTOLIN HFA) 90 mcg/actuation inhaler; Inhale 2 puffs into the lungs every 6 (six) hours as needed. Rescue  Dispense: 18 g; Refill: 0         Medical Decision Making:   Independently Interpreted Test(s):   I have ordered and independently interpreted X-rays - see summary below.       <> Summary of X-Ray Reading(s): Right basilar subtle patchy subsegmental opacities which may reflect subsegmental atelectasis versus early/mild airspace disease including aspiration or pneumonia in this patient with history of cough, although overall improved aeration of the right lung when compared to most recent prior chest CT and chest radiograph.  No discrete pulmonary mass seen in this patient with remote right lower lobectomy for non-small cell lung cancer.  Short-term follow-up chest radiography after therapy is recommended to ensure resolution. Otherwise no  change.  Clinical Tests:   Lab Tests: Reviewed  Radiological Study: Ordered and Reviewed  Urgent Care Management:  Patient in no acute distress.  Vitals reassuring.  On exam, patient is nontoxic appearing and afebrile.  Physical examination consistent with mild wheezing noted.  No rales or rhonchi noted.  Patient tested positive for flu.  Patient's symptoms started 5 days ago on 11/17/2022.  Tamiflu recommendation discussed with patient detail.  Today's patient day 5 of symptoms.  Chest x-ray results discussed with patient in depth. Chest X ray with with early/mild airspace disease including aspiration or pneumonia with right basilar  patchy subsegmental opacities.  Will treat for pneumonia.  Medication prescribed and over-the-counter medication discussed with patient detail.  Red flags discussed with patient in depth.  I reiterated the importance of further evaluation follow-up with primary.Patient voiced understanding and in agreement with current treatment plan.           Patient Instructions   PLEASE READ YOUR DISCHARGE INSTRUCTIONS ENTIRELY AS IT CONTAINS IMPORTANT INFORMATION.      You have been diagnosed with Influenza.     You are contagious for 24 hours after you start the [place]fu or 24 hours after your last fever, whichever happens last.    Please drink plenty of fluids.    Please get plenty of rest.    Please return here or go to the Emergency Department for any concerns or worsening of condition.    Tamiflu prescription has been discussed and if prescribed, please take to completion unless you cannot tolerate the side effects.       Take tylenol (acetominophen) for fever, chills or body aches every 4 hours. do not exceed 4000 mg/ day.    Take Motrin (Ibuprofen) every 4 hours for fever, chills, pain or inflammation.    Use an antihistmine such as claritin or zyrtec to dry you out. Use pseudoephedrine (behind the counter) to decongest (beware this can raise your blood pressure). Use mucinex (guaifenisin)  to break up mucous    Use over the counter flonase: one spray each nostril twice daily OR two sprays each nostril once daily.   If you find this dries your nose out or your nose bleeds, try using over the counter nasal saline a few minutes prior to using the flonase to moisten the lining of your nose.     Please return or see your primary care doctor if you develop new or worsening symptoms.     Please arrange follow up with your primary medical clinic as soon as possible. You must understand that you've received an Urgent Care treatment only and that you may be released before all of your medical problems are known or treated. You, the patient, will arrange for follow up as instructed. If your symptoms worsen or fail to improve you should go to the Emergency Room.  WE CANNOT RULE OUT ALL POSSIBLE CAUSES OF YOUR SYMPTOMS IN THE URGENT CARE SETTING PLEASE GO TO THE ER IF YOU FEELS YOUR CONDITION IS WORSENING OR YOU WOULD LIKE EMERGENT EVALUATION.

## 2022-11-21 NOTE — PATIENT INSTRUCTIONS
PLEASE READ YOUR DISCHARGE INSTRUCTIONS ENTIRELY AS IT CONTAINS IMPORTANT INFORMATION.      You have been diagnosed with Influenza.     You are contagious for 24 hours after you start the [place]fu or 24 hours after your last fever, whichever happens last.    Please drink plenty of fluids.    Please get plenty of rest.    Please return here or go to the Emergency Department for any concerns or worsening of condition.    Tamiflu prescription has been discussed and if prescribed, please take to completion unless you cannot tolerate the side effects.       Take tylenol (acetominophen) for fever, chills or body aches every 4 hours. do not exceed 4000 mg/ day.    Take Motrin (Ibuprofen) every 4 hours for fever, chills, pain or inflammation.    Use an antihistmine such as claritin or zyrtec to dry you out. Use pseudoephedrine (behind the counter) to decongest (beware this can raise your blood pressure). Use mucinex (guaifenisin) to break up mucous    Use over the counter flonase: one spray each nostril twice daily OR two sprays each nostril once daily.   If you find this dries your nose out or your nose bleeds, try using over the counter nasal saline a few minutes prior to using the flonase to moisten the lining of your nose.     Please return or see your primary care doctor if you develop new or worsening symptoms.     Please arrange follow up with your primary medical clinic as soon as possible. You must understand that you've received an Urgent Care treatment only and that you may be released before all of your medical problems are known or treated. You, the patient, will arrange for follow up as instructed. If your symptoms worsen or fail to improve you should go to the Emergency Room.  WE CANNOT RULE OUT ALL POSSIBLE CAUSES OF YOUR SYMPTOMS IN THE URGENT CARE SETTING PLEASE GO TO THE ER IF YOU FEELS YOUR CONDITION IS WORSENING OR YOU WOULD LIKE EMERGENT EVALUATION.

## 2022-11-22 ENCOUNTER — PATIENT MESSAGE (OUTPATIENT)
Dept: INTERNAL MEDICINE | Facility: CLINIC | Age: 72
End: 2022-11-22
Payer: MEDICARE

## 2022-11-22 DIAGNOSIS — J42 CHRONIC BRONCHITIS, UNSPECIFIED CHRONIC BRONCHITIS TYPE: Primary | ICD-10-CM

## 2022-11-22 DIAGNOSIS — J47.9 BRONCHIECTASIS WITHOUT COMPLICATION: ICD-10-CM

## 2022-11-28 ENCOUNTER — PATIENT MESSAGE (OUTPATIENT)
Dept: INTERNAL MEDICINE | Facility: CLINIC | Age: 72
End: 2022-11-28
Payer: MEDICARE

## 2022-12-02 ENCOUNTER — TELEPHONE (OUTPATIENT)
Dept: PULMONOLOGY | Facility: CLINIC | Age: 72
End: 2022-12-02
Payer: MEDICARE

## 2022-12-02 LAB
ACID FAST MOD KINY STN SPEC: ABNORMAL
MYCOBACTERIUM SPEC QL CULT: ABNORMAL

## 2022-12-08 ENCOUNTER — OFFICE VISIT (OUTPATIENT)
Dept: INTERNAL MEDICINE | Facility: CLINIC | Age: 72
End: 2022-12-08
Payer: MEDICARE

## 2022-12-08 VITALS
SYSTOLIC BLOOD PRESSURE: 110 MMHG | DIASTOLIC BLOOD PRESSURE: 68 MMHG | OXYGEN SATURATION: 97 % | BODY MASS INDEX: 27.39 KG/M2 | WEIGHT: 145.06 LBS | HEART RATE: 72 BPM | TEMPERATURE: 99 F | HEIGHT: 61 IN

## 2022-12-08 DIAGNOSIS — I10 ESSENTIAL HYPERTENSION: ICD-10-CM

## 2022-12-08 DIAGNOSIS — R10.2 PELVIC PRESSURE IN FEMALE: ICD-10-CM

## 2022-12-08 DIAGNOSIS — R05.2 SUBACUTE COUGH: Primary | ICD-10-CM

## 2022-12-08 DIAGNOSIS — N18.31 CHRONIC KIDNEY DISEASE, STAGE 3A: ICD-10-CM

## 2022-12-08 DIAGNOSIS — E11.9 TYPE 2 DIABETES MELLITUS WITHOUT RETINOPATHY: ICD-10-CM

## 2022-12-08 LAB
BILIRUB SERPL-MCNC: NORMAL MG/DL
BLOOD URINE, POC: NORMAL
CLARITY, POC UA: NORMAL
COLOR, POC UA: NORMAL
GLUCOSE UR QL STRIP: NORMAL
KETONES UR QL STRIP: NORMAL
LEUKOCYTE ESTERASE URINE, POC: NORMAL
NITRITE, POC UA: NORMAL
PH, POC UA: 5
PROTEIN, POC: NORMAL
SPECIFIC GRAVITY, POC UA: 1.01
UROBILINOGEN, POC UA: NORMAL

## 2022-12-08 PROCEDURE — 3078F PR MOST RECENT DIASTOLIC BLOOD PRESSURE < 80 MM HG: ICD-10-PCS | Mod: CPTII,S$GLB,, | Performed by: NURSE PRACTITIONER

## 2022-12-08 PROCEDURE — 99214 PR OFFICE/OUTPT VISIT, EST, LEVL IV, 30-39 MIN: ICD-10-PCS | Mod: S$GLB,,, | Performed by: NURSE PRACTITIONER

## 2022-12-08 PROCEDURE — 3044F HG A1C LEVEL LT 7.0%: CPT | Mod: CPTII,S$GLB,, | Performed by: NURSE PRACTITIONER

## 2022-12-08 PROCEDURE — 3061F NEG MICROALBUMINURIA REV: CPT | Mod: CPTII,S$GLB,, | Performed by: NURSE PRACTITIONER

## 2022-12-08 PROCEDURE — 3078F DIAST BP <80 MM HG: CPT | Mod: CPTII,S$GLB,, | Performed by: NURSE PRACTITIONER

## 2022-12-08 PROCEDURE — 81002 POCT URINE DIPSTICK WITHOUT MICROSCOPE: ICD-10-PCS | Mod: S$GLB,,, | Performed by: NURSE PRACTITIONER

## 2022-12-08 PROCEDURE — 3074F SYST BP LT 130 MM HG: CPT | Mod: CPTII,S$GLB,, | Performed by: NURSE PRACTITIONER

## 2022-12-08 PROCEDURE — 1157F ADVNC CARE PLAN IN RCRD: CPT | Mod: CPTII,S$GLB,, | Performed by: NURSE PRACTITIONER

## 2022-12-08 PROCEDURE — 1160F PR REVIEW ALL MEDS BY PRESCRIBER/CLIN PHARMACIST DOCUMENTED: ICD-10-PCS | Mod: CPTII,S$GLB,, | Performed by: NURSE PRACTITIONER

## 2022-12-08 PROCEDURE — 3008F PR BODY MASS INDEX (BMI) DOCUMENTED: ICD-10-PCS | Mod: CPTII,S$GLB,, | Performed by: NURSE PRACTITIONER

## 2022-12-08 PROCEDURE — 99999 PR PBB SHADOW E&M-EST. PATIENT-LVL IV: CPT | Mod: PBBFAC,,, | Performed by: NURSE PRACTITIONER

## 2022-12-08 PROCEDURE — 3061F PR NEG MICROALBUMINURIA RESULT DOCUMENTED/REVIEW: ICD-10-PCS | Mod: CPTII,S$GLB,, | Performed by: NURSE PRACTITIONER

## 2022-12-08 PROCEDURE — 3066F NEPHROPATHY DOC TX: CPT | Mod: CPTII,S$GLB,, | Performed by: NURSE PRACTITIONER

## 2022-12-08 PROCEDURE — 1159F MED LIST DOCD IN RCRD: CPT | Mod: CPTII,S$GLB,, | Performed by: NURSE PRACTITIONER

## 2022-12-08 PROCEDURE — 99499 UNLISTED E&M SERVICE: CPT | Mod: S$GLB,,, | Performed by: NURSE PRACTITIONER

## 2022-12-08 PROCEDURE — 99214 OFFICE O/P EST MOD 30 MIN: CPT | Mod: S$GLB,,, | Performed by: NURSE PRACTITIONER

## 2022-12-08 PROCEDURE — 3066F PR DOCUMENTATION OF TREATMENT FOR NEPHROPATHY: ICD-10-PCS | Mod: CPTII,S$GLB,, | Performed by: NURSE PRACTITIONER

## 2022-12-08 PROCEDURE — 3008F BODY MASS INDEX DOCD: CPT | Mod: CPTII,S$GLB,, | Performed by: NURSE PRACTITIONER

## 2022-12-08 PROCEDURE — 99499 RISK ADDL DX/OHS AUDIT: ICD-10-PCS | Mod: S$GLB,,, | Performed by: NURSE PRACTITIONER

## 2022-12-08 PROCEDURE — 4010F ACE/ARB THERAPY RXD/TAKEN: CPT | Mod: CPTII,S$GLB,, | Performed by: NURSE PRACTITIONER

## 2022-12-08 PROCEDURE — 99999 PR PBB SHADOW E&M-EST. PATIENT-LVL IV: ICD-10-PCS | Mod: PBBFAC,,, | Performed by: NURSE PRACTITIONER

## 2022-12-08 PROCEDURE — 81002 URINALYSIS NONAUTO W/O SCOPE: CPT | Mod: S$GLB,,, | Performed by: NURSE PRACTITIONER

## 2022-12-08 PROCEDURE — 3044F PR MOST RECENT HEMOGLOBIN A1C LEVEL <7.0%: ICD-10-PCS | Mod: CPTII,S$GLB,, | Performed by: NURSE PRACTITIONER

## 2022-12-08 PROCEDURE — 1159F PR MEDICATION LIST DOCUMENTED IN MEDICAL RECORD: ICD-10-PCS | Mod: CPTII,S$GLB,, | Performed by: NURSE PRACTITIONER

## 2022-12-08 PROCEDURE — 4010F PR ACE/ARB THEARPY RXD/TAKEN: ICD-10-PCS | Mod: CPTII,S$GLB,, | Performed by: NURSE PRACTITIONER

## 2022-12-08 PROCEDURE — 3074F PR MOST RECENT SYSTOLIC BLOOD PRESSURE < 130 MM HG: ICD-10-PCS | Mod: CPTII,S$GLB,, | Performed by: NURSE PRACTITIONER

## 2022-12-08 PROCEDURE — 1160F RVW MEDS BY RX/DR IN RCRD: CPT | Mod: CPTII,S$GLB,, | Performed by: NURSE PRACTITIONER

## 2022-12-08 PROCEDURE — 1157F PR ADVANCE CARE PLAN OR EQUIV PRESENT IN MEDICAL RECORD: ICD-10-PCS | Mod: CPTII,S$GLB,, | Performed by: NURSE PRACTITIONER

## 2022-12-08 RX ORDER — PROMETHAZINE HYDROCHLORIDE AND DEXTROMETHORPHAN HYDROBROMIDE 6.25; 15 MG/5ML; MG/5ML
5 SYRUP ORAL NIGHTLY PRN
Qty: 100 ML | Refills: 0 | Status: SHIPPED | OUTPATIENT
Start: 2022-12-08 | End: 2022-12-18

## 2022-12-08 RX ORDER — BENZONATATE 200 MG/1
200 CAPSULE ORAL 3 TIMES DAILY PRN
Qty: 30 CAPSULE | Refills: 0 | Status: SHIPPED | OUTPATIENT
Start: 2022-12-08 | End: 2022-12-18

## 2022-12-08 NOTE — PROGRESS NOTES
[facility] Primary Care Clinic Note    Chief Complaint      Chief Complaint   Patient presents with    Pneumonia     Follow up from dx on 11/21    Cough    Fatigue     History of Present Illness      Jolly Matias is a 72 y.o. female patient of Dr. Bedoya who presents today for f/u after being diagnosed with pneumonia from influenza A Was diagnosed on 11/21/22 from . Was feeling better, cough came back and a lot of fatigue. No fever, chills sob cp n/v/d  Have 2 inhaler extenders will pt to trail  C/o pelvic pressure, will get poct urinalysis  Poct urine negative       note:  presents today with cough with green mucus, SOB, headache  Symptoms started 11/17/22. Treatments at home include Mucinex, cough syrup with no relief. Took home covid test it was negative. Vaccinated for COVID. Positive COVID July 2022.   Provider note below:  This is a 72 y.o. female who presents today with a chief complaint of cough with sputum production, shortness of breath started on 11/17/2022, patient reports she did her 1st at home COVID test that came back positive and notified her primary and she sent off the Paxlovid, patient reports then she repeated the COVID test and the 2nd COVID test came back negative so she did not initiate the Paxlovid,  denies fever, body aches or chills, denies nausea, vomiting, diarrhea or abdominal pain, denies chest pain or dizziness positional lightheadedness, denies sore throat or trouble swallowing, denies loss of taste or smell, or any other       Health Maintenance   Topic Date Due    Foot Exam  02/06/2020    Hemoglobin A1c  11/27/2022    Mammogram  01/06/2023    Lipid Panel  05/27/2023    DEXA Scan  06/25/2023    Eye Exam  10/18/2023    Low Dose Statin  12/08/2023    TETANUS VACCINE  11/30/2025    Hepatitis C Screening  Completed       Past Medical History:   Diagnosis Date    Atypical ductal hyperplasia, breast     Basal cell carcinoma 04/2011    left forehead    Basal cell carcinoma 04/2015     R temporal scalp, R upper forehead, L upper forehead    Basal cell carcinoma 2015    right mid ala    Basal cell carcinoma (BCC) of right forehead 10/12/2022    BCC (basal cell carcinoma) excised     right shoulder    BCC (basal cell carcinoma) 2021    right medial shoulder    Diabetes mellitus     Diverticulitis     Fibrocystic breast     HLD (hyperlipidemia)     Hypertension     Prediabetes 10/16/2013    Skin cancer        Past Surgical History:   Procedure Laterality Date    APPENDECTOMY      bilateral breast duct excision      BREAST BIOPSY Right     Excisional bx, benign    BREAST BIOPSY Left     Excisional bx, benign     SECTION      x2    COLON SURGERY Left 2017    sigmoidectomy for diverticulitis    COLONOSCOPY N/A 2017    Procedure: COLONOSCOPY;  Surgeon: IBRAHIMA Philip MD;  Location: [facility] (4TH FLR);  Service: Endoscopy;  Laterality: N/A;    EYE SURGERY      fulgaration of endometriosis x 2      HYSTERECTOMY      For endometriosis and DUB--age 42, ovaries in?    INCISIONAL HERNIA REPAIR  2017    KNEE ARTHROSCOPY W/ DEBRIDEMENT Right 2018    Procedure: ARTHROSCOPY, KNEE, WITH DEBRIDEMENT;  Surgeon: MARVA Arias MD;  Location: [facility] OR 1ST FLR;  Service: Orthopedics;  Laterality: Right;    KNEE ARTHROSCOPY W/ MENISCECTOMY Right 2018    Procedure: ARTHROSCOPY, KNEE, WITH MENISCECTOMY (partial lateral and medial menisectomy, chondraplasty;  Surgeon: MARVA Arias MD;  Location: [facility] OR 1ST FLR;  Service: Orthopedics;  Laterality: Right;    KNEE ARTHROSCOPY W/ PLICA EXCISION Right 2018    Procedure: EXCISION, PLICA, KNEE, ARTHROSCOPIC,;  Surgeon: MARVA Arias MD;  Location: [facility] OR 1ST FLR;  Service: Orthopedics;  Laterality: Right;    KNEE SURGERY      LAPAROSCOPIC REPAIR OF INCISIONAL HERNIA N/A 10/29/2020    Procedure: REPAIR, HERNIA, INCISIONAL, LAPAROSCOPIC recurrent, WITH MESH;  Surgeon: Deshawn Arias MD;  Location: [facility] OR 2ND FLR;   Service: General;  Laterality: N/A;    OOPHORECTOMY      SKIN CANCER EXCISION      BCC forehead , temple, shoulder, chest    SURGICAL REMOVAL OF LYMPH NODE Right 4/9/2021    Procedure: EXCISION, LYMPH NODE;  Surgeon: Sloan Gómez MD;  Location: [address];  Service: Thoracic;  Laterality: Right;  Robotic mediastinal and hilar    TONSILLECTOMY      torn retina, left      XI ROBOTIC RATS,WITH LOBECTOMY,LUNG Right 4/9/2021    Procedure: XI ROBOTIC RATS,WITH LOBECTOMY,LUNG;  Surgeon: Sloan Gómez MD;  Location: [facility] OR [address] [place] [address];  Service: Thoracic;  Laterality: Right;       family history includes Anxiety disorder in her daughter; Breast cancer in her maternal aunt; Cancer in her father; Dementia in her mother; Diabetes in her father and maternal uncle; Heart disease in her father; Hyperlipidemia in her sister and son; Hypertension in her father, mother, sister, and son; Melanoma in her father; Skin cancer in her father and mother; Thyroid disease in her mother.    Social History     Tobacco Use    Smoking status: Never    Smokeless tobacco: Never   Substance Use Topics    Alcohol use: Yes     Alcohol/week: 0.0 standard drinks     Types: 1 - 2 Glasses of wine per week     Comment: 3-4x /week    Drug use: No       Review of Systems   Constitutional:  Positive for malaise/fatigue. Negative for chills and fever.   HENT:  Positive for congestion. Negative for hearing loss.    Eyes:  Negative for discharge.   Respiratory:  Positive for cough, sputum production, shortness of breath and wheezing.    Cardiovascular:  Negative for chest pain and palpitations.   Gastrointestinal:  Positive for diarrhea. Negative for blood in stool, constipation and vomiting.   Genitourinary:  Negative for dysuria and hematuria.   Musculoskeletal:  Negative for neck pain.   Neurological:  Positive for weakness. Negative for dizziness, tingling and headaches.   Endo/Heme/Allergies:  Negative for polydipsia.      Outpatient Encounter  Medications as of 12/8/2022   Medication Sig Dispense Refill    albuterol (PROVENTIL/VENTOLIN HFA) 90 mcg/actuation inhaler Inhale 2 puffs into the lungs every 6 (six) hours as needed for Wheezing or Shortness of Breath. 18 g 11    albuterol (VENTOLIN HFA) 90 mcg/actuation inhaler Inhale 2 puffs into the lungs every 6 (six) hours as needed. Rescue 18 g 0    atorvastatin (LIPITOR) 20 MG tablet Take 1 tablet (20 mg total) by mouth once daily. 90 tablet 2    flaxseed oil 1,000 mg Cap Take 1 capsule by mouth once daily.      metFORMIN (GLUCOPHAGE) 500 MG tablet Take 1 tablet (500 mg total) by mouth 2 (two) times daily with meals. 180 tablet 3    olmesartan (BENICAR) 40 MG tablet TAKE 1 TABLET(40 MG) BY MOUTH EVERY DAY 90 tablet 3    sertraline (ZOLOFT) 100 MG tablet Take 1 tablet (100 mg total) by mouth once daily. 90 tablet 3    spironolactone (ALDACTONE) 25 MG tablet TAKE 1 TABLET(25 MG) BY MOUTH EVERY DAY 90 tablet 3    vitamin D 1000 units Tab Take 185 mg by mouth once daily.      [DISCONTINUED] oxyCODONE-acetaminophen (PERCOCET) 5-325 mg per tablet Take 1 tablet by mouth every 4 to 6 hours as needed for Pain. 10 tablet 0    benzonatate (TESSALON) 200 MG capsule Take 1 capsule (200 mg total) by mouth 3 (three) times daily as needed for Cough. 30 capsule 0    clobetasoL (TEMOVATE) 0.05 % cream Apply topically 2 (two) times daily. Prn rash and itch.  Stop using steroid topical when skin is smooth and non itchy.  Do not treat dark or red coloring. (Patient taking differently: Apply topically 2 (two) times daily. Prn rash and itch.  Stop using steroid topical when skin is smooth and non itchy.  Do not treat dark or red coloring.) 60 g 0    gabapentin (NEURONTIN) 300 MG capsule Take 1 capsule (300 mg total) by mouth 3 (three) times daily. (Patient not taking: Reported on 12/8/2022) 90 capsule 11    levocetirizine (XYZAL) 5 MG tablet Take 5 mg by mouth nightly.      meloxicam (MOBIC) 15 MG tablet TAKE 1 TABLET(15 MG) BY  "MOUTH EVERY DAY (Patient not taking: Reported on 12/8/2022) 90 tablet 1    mupirocin (BACTROBAN) 2 % ointment AAA bid 30 g 3    ofloxacin (OCUFLOX) 0.3 % ophthalmic solution Place 1 drop into both eyes 4 (four) times daily.      promethazine-dextromethorphan (PROMETHAZINE-DM) 6.25-15 mg/5 mL Syrp Take 5 mLs by mouth nightly as needed (cough). 100 mL 0    rOPINIRole (REQUIP) 2 MG tablet Take 1 tablet (2 mg total) by mouth every evening. (Patient not taking: Reported on 5/31/2022) 30 tablet 11    [DISCONTINUED] azithromycin (Z-PAULINE) 250 MG tablet Take 2 tablets by mouth x 1 for day 1 Then take 1 tablet by mouth daily for day 2 - 5 6 tablet 0    [DISCONTINUED] erythromycin (ROMYCIN) ophthalmic ointment APPLY IN LEFT EYE AT BEDTIME       Facility-Administered Encounter Medications as of 12/8/2022   Medication Dose Route Frequency Provider Last Rate Last Admin    0.9%  NaCl infusion   Intravenous Continuous Deshawn Arias MD   Stopped at 04/09/21 1053    lidocaine (PF) 10 mg/ml (1%) injection 10 mg  1 mL Intradermal Once Deshawn Arias MD        midazolam (VERSED) 1 mg/mL injection 0.5 mg  0.5 mg Intravenous PRN Jhoan Kenny MD   2 mg at 10/29/20 0900        Review of patient's allergies indicates:   Allergen Reactions    Hydrocodone      Also makes pt "very sleepy"    Kiwi (actinidia chinensis) Swelling       Physical Exam      Vital Signs  Temp: 98.8 °F (37.1 °C)  Pulse: 72  SpO2: 97 %  BP: 110/68  BP Location: Right arm  Patient Position: Sitting  Height and Weight  Height: 5' 1" (154.9 cm)  Weight: 65.8 kg (145 lb 1 oz)  BSA (Calculated - sq m): 1.68 sq meters  BMI (Calculated): 27.4  Weight in (lb) to have BMI = 25: 132    Physical Exam  Vitals and nursing note reviewed.   Constitutional:       General: She is not in acute distress.     Appearance: Normal appearance. She is ill-appearing.   HENT:      Head: Normocephalic and atraumatic.      Ears:      Comments: Slight redness to bilateral ear " canals  Eyes:      Pupils: Pupils are equal, round, and reactive to light.   Cardiovascular:      Rate and Rhythm: Normal rate and regular rhythm.      Heart sounds: Normal heart sounds.   Pulmonary:      Effort: Pulmonary effort is normal. No respiratory distress.      Breath sounds: Normal breath sounds.      Comments: Decreased to right mid to lower lung  Lymphadenopathy:      Cervical: No cervical adenopathy.   Skin:     General: Skin is warm and dry.   Neurological:      Mental Status: She is alert and oriented to person, place, and time.   Psychiatric:         Mood and Affect: Mood normal.         Behavior: Behavior normal.         Thought Content: Thought content normal.         Judgment: Judgment normal.        Laboratory:  CBC:  Lab Results   Component Value Date    WBC 8.92 05/31/2022    RBC 3.89 (L) 05/31/2022    HGB 11.7 (L) 05/31/2022    HCT 36.4 (L) 05/31/2022     05/31/2022    MCV 94 05/31/2022    MCH 30.1 05/31/2022    MCHC 32.1 05/31/2022    MCHC 31.2 (L) 06/04/2021    MCHC 31.5 (L) 02/25/2021     CMP:  Lab Results   Component Value Date    GLU 93 05/27/2022    CALCIUM 9.2 05/27/2022    ALBUMIN 3.6 05/27/2022    PROT 6.1 05/27/2022     05/27/2022    K 4.8 05/27/2022    CO2 24 05/27/2022     05/27/2022    BUN 31 (H) 05/27/2022    ALKPHOS 93 05/27/2022    ALT 10 05/27/2022    AST 13 05/27/2022    BILITOT 0.4 05/27/2022    BILITOT 0.3 06/04/2021    BILITOT 0.2 11/24/2020     URINALYSIS:  Lab Results   Component Value Date    COLORU Dark Yellow 12/08/2022    CLARITYU Slightly Cloudy 12/08/2022    SPECGRAV 1.015 12/08/2022    PHUR 5 12/08/2022    PROTEINUA Negative 05/31/2022    BACTERIA Occasional 11/24/2020    NITRITE - 12/08/2022    LEUKOCYTESUR Negative 05/31/2022    UROBILINOGEN - 12/08/2022    HYALINECASTS 9 (A) 11/24/2020    HYALINECASTS 0 10/15/2018    HYALINECASTS 0 03/06/2017      LIPIDS:  Lab Results   Component Value Date    TSH 1.674 05/31/2022    TSH 1.966 06/04/2021     TSH 0.921 11/24/2020    HDL 51 05/27/2022    HDL 47 06/04/2021    HDL 36 (L) 11/24/2020    CHOL 170 05/27/2022    CHOL 144 06/04/2021    CHOL 148 11/24/2020    TRIG 167 (H) 05/27/2022    TRIG 139 06/04/2021    TRIG 149 11/24/2020    LDLCALC 85.6 05/27/2022    LDLCALC 69.2 06/04/2021    LDLCALC 82.2 11/24/2020    CHOLHDL 30.0 05/27/2022    CHOLHDL 32.6 06/04/2021    CHOLHDL 24.3 11/24/2020    NONHDLCHOL 119 05/27/2022    NONHDLCHOL 97 06/04/2021    NONHDLCHOL 112 11/24/2020    TOTALCHOLEST 3.3 05/27/2022    TOTALCHOLEST 3.1 06/04/2021    TOTALCHOLEST 4.1 11/24/2020     TSH:  Lab Results   Component Value Date    TSH 1.674 05/31/2022    TSH 1.966 06/04/2021    TSH 0.921 11/24/2020     A1C:  Lab Results   Component Value Date    HGBA1C 6.1 (H) 05/27/2022    HGBA1C 6.0 (H) 06/04/2021    HGBA1C 5.9 (H) 04/10/2021    HGBA1C 6.2 (H) 11/24/2020    HGBA1C 6.0 (H) 09/08/2020    HGBA1C 6.1 (H) 02/06/2019    HGBA1C 6.0 (H) 07/03/2018    HGBA1C 5.9 (H) 04/06/2018    HGBA1C 5.9 (H) 01/17/2018    HGBA1C 6.1 05/11/2017    HGBA1C 5.7 11/22/2016    HGBA1C 6.0 09/07/2016         Assessment/Plan     Jolly Matias is a 72 y.o.female with:    Subacute cough  -     benzonatate (TESSALON) 200 MG capsule; Take 1 capsule (200 mg total) by mouth 3 (three) times daily as needed for Cough.  Dispense: 30 capsule; Refill: 0  -     promethazine-dextromethorphan (PROMETHAZINE-DM) 6.25-15 mg/5 mL Syrp; Take 5 mLs by mouth nightly as needed (cough).  Dispense: 100 mL; Refill: 0    Essential hypertension    Chronic kidney disease, stage 3a    Type 2 diabetes mellitus without retinopathy    Pelvic pressure in female  -     POCT URINE DIPSTICK WITHOUT MICROSCOPE        Health Maintenance Due   Topic Date Due    Shingles Vaccine (1 of 2) Never done    Foot Exam  02/06/2020    COVID-19 Vaccine (4 - Booster for Moderna series) 01/17/2022    Hemoglobin A1c  11/27/2022    Mammogram  01/06/2023      Stay hydrated with water,   Wipe front to back  Monitor  symptoms  Take meds as prescribed      I spent 35 minutes on the day of this encounter for preparing for, evaluating, treating, and managing this patient.      -Continue current medications and maintain follow up with specialists.  Return to clinic as needed for any concerns   No follow-ups on file.       LASHELL PiperC  [facility]

## 2022-12-16 ENCOUNTER — TELEPHONE (OUTPATIENT)
Dept: FAMILY MEDICINE | Facility: CLINIC | Age: 72
End: 2022-12-16
Payer: MEDICARE

## 2022-12-22 NOTE — PROGRESS NOTES
"  Physical Therapy Treatment Note     Name: Jolly Matias  Clinic Number: [id number]    Therapy Diagnosis:   Encounter Diagnoses   Name Primary?    Chronic pain of both hips     Impaired gait and mobility     Decreased strength of lower extremity      Physician: Camilla Knowles DO    Visit Date: 8/10/2020    Physician Orders: PT Eval and Treat:Bilateral lateral hip pain secondary to pelvic and core instability with associated greater trochanteric bursitis. Home exercise program, pelvic and core stability exercises, and neuromuscular retraining needed.   Medical Diagnosis from Referral: M25.552 (ICD-10-CM) - Lateral pain of left hip M70.62 (ICD-10-CM) - Greater trochanteric bursitis of left hip   Evaluation Date: 7/16/2020  Authorization Period Expiration: 09/16/2020  Plan of Care Expiration: 7/16/2020 to 8/28/2020  Visit # / Visits authorized: 7/12    FOTO: 7/10  Cap Visit: 90.96  Cap Total: 2216.51 KX      Time In: 1249  Time Out: 1330  Total Billable Time: 41 minutes     Precautions: Standard; hypertension; diabetes    Subjective     Pt reports: soreness improved. Patient reports sore spots at superolateral buttock bilaterally. Patient reports pain to same areas with walking > 3 blocks.  She was compliant with home exercise program.  Response to previous treatment: decreased pain/soreness.  Functional change: soreness with prolonged standing vs pain.    Pain: 0/10  Location:  R hip and sacral area     Objective     Jolly received therapeutic exercises to develop strength, endurance, ROM, flexibility, posture and core stabilization for 41 minutes including:    Supine transverse abdominus activation (TAs): 10"x10  Double leg bridges: 3x15.   Sidelying clams:  YTB x10 B  Seated TAs: 10"x10    Standing:  Hip abduction: RTB 2x10 B  Hip extension: RTB 3x10 B  Single straight arm pull down + contralateral 6" step up: BTB x10 B  Scapular retractions: GTB 3x10  Lateral walks: 2x10 feet B    Walking for endurance: " Pt's wife, Ludin Self, called  She stated that pt is having memory issues  This has been ongoing for the past year, but is getting worse the past 2-3 months  Pt has issues remembering things  He has trouble recalling the day of the week  Also has issues with word finding  He is unable to finish sentences and becomes frustrated  Denies weakness on one side, facial droop, slurred speech, or other stroke like symptoms  Ludin Self stated that in the past, Dr Roma Brody advised pt to call the office if he had any cognitive issues because it could be a symptom of more severe carotid stenosis  Last CV was done 8/17/21  Pt is scheduled for AOIL and LOYD on 1/4/23  No OV scheduled  Routed to triage to advise  Treadmill, 1-1.2 MPH x8'    Home Exercises Provided and Patient Education Provided:    Education provided:   - Continue HEP    Written Home Exercises Provided: Not today.  Exercises were reviewed and Jolly was able to demonstrate them prior to the end of the session.  Jolly demonstrated good  understanding of the education provided.     See EMR under Patient Instructions for exercises provided 7/16/2020 and 8/3/2020.    Assessment     Improved TA activation compared to session on 8/3/2020. Initiated treadmill walking without pain. Also progressed resistance for multiple exercises without pain.     Jolly is progressing well towards her goals.   Pt prognosis is Excellent.     Pt will continue to benefit from skilled outpatient physical therapy to address the deficits listed in the problem list box on initial evaluation, provide pt/family education and to maximize pt's level of independence in the home and community environment.   Pt's spiritual, cultural and educational needs considered and pt agreeable to plan of care and goals.     Anticipated barriers to physical therapy: Chronity of pain; imaging; sedentary lifestyle    Short Term Goals (3 Weeks):  1. Pt will be compliant with HEP to supplement PT in restoring pain free function. MET 8/3/2020  2. Pt will improve impaired LE MMTs to >/= 4-/5 to improve dynamic hip support for functional tasks.  Progressing towards; not met  3. Pt will walk with mild Trendelenburg to demonstrate increased hip strength for appropriate swing mechanics. MET 8/3/2020  4. Pt will stand 20 minutes without pain to promote functional mobility.  MET 8/3/2020  Long Term Goals (6 Weeks):  1. Pt will improve FOTO score to </= 40% limited to decrease perceived limitation with mobility. Progressing towards; not met  2. Pt will improve impaired LE MMTs to >/= 4/5 to improve dynamic hip support for functional tasks.  Progressing towards; not met  3. Pt will walk without Trendelenburg to  demonstrate increased hip strength for appropriate swing mechanics.  Progressing towards; not met  4. Pt will walk 5 minutes on treadmill without pain to promote initiation to community walking program.   Progressing towards; not met    Plan     Progress walking duration.     Polly Royal, PT

## 2022-12-27 LAB
ACID FAST MOD KINY STN SPEC: NORMAL
MYCOBACTERIUM SPEC QL CULT: NORMAL

## 2023-01-09 ENCOUNTER — TELEPHONE (OUTPATIENT)
Dept: INFECTIOUS DISEASES | Facility: CLINIC | Age: 73
End: 2023-01-09
Payer: MEDICARE

## 2023-01-09 ENCOUNTER — OFFICE VISIT (OUTPATIENT)
Dept: INFECTIOUS DISEASES | Facility: CLINIC | Age: 73
End: 2023-01-09
Payer: MEDICARE

## 2023-01-09 VITALS
WEIGHT: 148.56 LBS | DIASTOLIC BLOOD PRESSURE: 65 MMHG | HEART RATE: 84 BPM | BODY MASS INDEX: 28.08 KG/M2 | TEMPERATURE: 98 F | SYSTOLIC BLOOD PRESSURE: 141 MMHG

## 2023-01-09 DIAGNOSIS — J47.0 BRONCHIECTASIS WITH ACUTE LOWER RESPIRATORY INFECTION: Primary | ICD-10-CM

## 2023-01-09 PROCEDURE — 99499 UNLISTED E&M SERVICE: CPT | Mod: S$GLB,,, | Performed by: INTERNAL MEDICINE

## 2023-01-09 PROCEDURE — 1157F PR ADVANCE CARE PLAN OR EQUIV PRESENT IN MEDICAL RECORD: ICD-10-PCS | Mod: CPTII,S$GLB,, | Performed by: INTERNAL MEDICINE

## 2023-01-09 PROCEDURE — 3077F SYST BP >= 140 MM HG: CPT | Mod: CPTII,S$GLB,, | Performed by: INTERNAL MEDICINE

## 2023-01-09 PROCEDURE — 1126F AMNT PAIN NOTED NONE PRSNT: CPT | Mod: CPTII,S$GLB,, | Performed by: INTERNAL MEDICINE

## 2023-01-09 PROCEDURE — 1126F PR PAIN SEVERITY QUANTIFIED, NO PAIN PRESENT: ICD-10-PCS | Mod: CPTII,S$GLB,, | Performed by: INTERNAL MEDICINE

## 2023-01-09 PROCEDURE — 3288F FALL RISK ASSESSMENT DOCD: CPT | Mod: CPTII,S$GLB,, | Performed by: INTERNAL MEDICINE

## 2023-01-09 PROCEDURE — 1159F MED LIST DOCD IN RCRD: CPT | Mod: CPTII,S$GLB,, | Performed by: INTERNAL MEDICINE

## 2023-01-09 PROCEDURE — 99999 PR PBB SHADOW E&M-EST. PATIENT-LVL III: ICD-10-PCS | Mod: PBBFAC,,, | Performed by: INTERNAL MEDICINE

## 2023-01-09 PROCEDURE — 3008F PR BODY MASS INDEX (BMI) DOCUMENTED: ICD-10-PCS | Mod: CPTII,S$GLB,, | Performed by: INTERNAL MEDICINE

## 2023-01-09 PROCEDURE — 3078F DIAST BP <80 MM HG: CPT | Mod: CPTII,S$GLB,, | Performed by: INTERNAL MEDICINE

## 2023-01-09 PROCEDURE — 1159F PR MEDICATION LIST DOCUMENTED IN MEDICAL RECORD: ICD-10-PCS | Mod: CPTII,S$GLB,, | Performed by: INTERNAL MEDICINE

## 2023-01-09 PROCEDURE — 1157F ADVNC CARE PLAN IN RCRD: CPT | Mod: CPTII,S$GLB,, | Performed by: INTERNAL MEDICINE

## 2023-01-09 PROCEDURE — 3008F BODY MASS INDEX DOCD: CPT | Mod: CPTII,S$GLB,, | Performed by: INTERNAL MEDICINE

## 2023-01-09 PROCEDURE — 3077F PR MOST RECENT SYSTOLIC BLOOD PRESSURE >= 140 MM HG: ICD-10-PCS | Mod: CPTII,S$GLB,, | Performed by: INTERNAL MEDICINE

## 2023-01-09 PROCEDURE — 99999 PR PBB SHADOW E&M-EST. PATIENT-LVL III: CPT | Mod: PBBFAC,,, | Performed by: INTERNAL MEDICINE

## 2023-01-09 PROCEDURE — 3288F PR FALLS RISK ASSESSMENT DOCUMENTED: ICD-10-PCS | Mod: CPTII,S$GLB,, | Performed by: INTERNAL MEDICINE

## 2023-01-09 PROCEDURE — 99215 PR OFFICE/OUTPT VISIT, EST, LEVL V, 40-54 MIN: ICD-10-PCS | Mod: S$GLB,,, | Performed by: INTERNAL MEDICINE

## 2023-01-09 PROCEDURE — 99499 RISK ADDL DX/OHS AUDIT: ICD-10-PCS | Mod: S$GLB,,, | Performed by: INTERNAL MEDICINE

## 2023-01-09 PROCEDURE — 1101F PR PT FALLS ASSESS DOC 0-1 FALLS W/OUT INJ PAST YR: ICD-10-PCS | Mod: CPTII,S$GLB,, | Performed by: INTERNAL MEDICINE

## 2023-01-09 PROCEDURE — 3078F PR MOST RECENT DIASTOLIC BLOOD PRESSURE < 80 MM HG: ICD-10-PCS | Mod: CPTII,S$GLB,, | Performed by: INTERNAL MEDICINE

## 2023-01-09 PROCEDURE — 1101F PT FALLS ASSESS-DOCD LE1/YR: CPT | Mod: CPTII,S$GLB,, | Performed by: INTERNAL MEDICINE

## 2023-01-09 PROCEDURE — 99215 OFFICE O/P EST HI 40 MIN: CPT | Mod: S$GLB,,, | Performed by: INTERNAL MEDICINE

## 2023-01-09 NOTE — PROGRESS NOTES
Subjective:     Patient ID: Jolly Matias is a 72 y.o. female    Chief Complaint: MAC    HPI: 72F hx lung adenoCA, MAC, recent influenza and pneumonia in November presents for follow up today. She states she feels like she is still recovering from having flu a couple months ago. She endorses some shortness of breath. Occasional productive cough. No fevers. She has 2 cultures positive for MAC. Most recent CT chest in September w/ stable changes.       Immunization History   Administered Date(s) Administered    COVID-19, MRNA, LN-S, PF (MODERNA FULL 0.5 ML DOSE) 01/13/2021, 02/12/2021, 11/22/2021    Influenza 09/08/2009, 11/01/2010, 09/30/2011, 09/27/2012, 10/01/2013    Influenza (FLUAD) - Quadrivalent - Adjuvanted - PF *Preferred* (65+) 09/24/2021    Influenza - High Dose - PF (65 years and older) 09/29/2014, 11/09/2015, 09/30/2016, 10/25/2017, 09/25/2018, 09/30/2019, 10/01/2022    Influenza - Intradermal - Quadrivalent - PF 10/01/2013    Influenza - Intradermal - Trivalent - PF 10/01/2013    Influenza A (H1N1) 2009 Monovalent - IM 03/01/2010    Influenza Split 09/08/2009, 11/01/2010, 09/30/2011    Pneumococcal Conjugate - 13 Valent 11/30/2015    Pneumococcal Polysaccharide - 23 Valent 12/02/2016    Tdap 11/30/2015        Review of Systems   All other systems reviewed and are negative.     Past Medical History:   Diagnosis Date    Atypical ductal hyperplasia, breast     Basal cell carcinoma 04/2011    left forehead    Basal cell carcinoma 04/2015    R temporal scalp, R upper forehead, L upper forehead    Basal cell carcinoma 08/2015    right mid ala    Basal cell carcinoma (BCC) of right forehead 10/12/2022    BCC (basal cell carcinoma) excised     right shoulder    BCC (basal cell carcinoma) 12/2021    right medial shoulder    Diabetes mellitus     Diverticulitis     Fibrocystic breast     HLD (hyperlipidemia)     Hypertension     Prediabetes 10/16/2013    Skin cancer      Past Surgical History:   Procedure  Laterality Date    APPENDECTOMY      bilateral breast duct excision      BREAST BIOPSY Right     Excisional bx, benign    BREAST BIOPSY Left     Excisional bx, benign     SECTION      x2    COLON SURGERY Left 2017    sigmoidectomy for diverticulitis    COLONOSCOPY N/A 2017    Procedure: COLONOSCOPY;  Surgeon: IBRAHIMA Philip MD;  Location: [facility] (4TH FLR);  Service: Endoscopy;  Laterality: N/A;    EYE SURGERY      fulgaration of endometriosis x 2      HYSTERECTOMY      For endometriosis and DUB--age 42, ovaries in?    INCISIONAL HERNIA REPAIR  2017    KNEE ARTHROSCOPY W/ DEBRIDEMENT Right 2018    Procedure: ARTHROSCOPY, KNEE, WITH DEBRIDEMENT;  Surgeon: MARVA Arias MD;  Location: [facility] OR 1ST FLR;  Service: Orthopedics;  Laterality: Right;    KNEE ARTHROSCOPY W/ MENISCECTOMY Right 2018    Procedure: ARTHROSCOPY, KNEE, WITH MENISCECTOMY (partial lateral and medial menisectomy, chondraplasty;  Surgeon: MARVA Arias MD;  Location: [facility] OR 1ST FLR;  Service: Orthopedics;  Laterality: Right;    KNEE ARTHROSCOPY W/ PLICA EXCISION Right 2018    Procedure: EXCISION, PLICA, KNEE, ARTHROSCOPIC,;  Surgeon: MARVA Arias MD;  Location: [facility] OR 1ST FLR;  Service: Orthopedics;  Laterality: Right;    KNEE SURGERY      LAPAROSCOPIC REPAIR OF INCISIONAL HERNIA N/A 10/29/2020    Procedure: REPAIR, HERNIA, INCISIONAL, LAPAROSCOPIC recurrent, WITH MESH;  Surgeon: Deshawn Arias MD;  Location: [facility] OR 2ND FLR;  Service: General;  Laterality: N/A;    OOPHORECTOMY      SKIN CANCER EXCISION      BCC forehead , temple, shoulder, chest    SURGICAL REMOVAL OF LYMPH NODE Right 2021    Procedure: EXCISION, LYMPH NODE;  Surgeon: Sloan Gómez MD;  Location: [facility] OR 2ND FLR;  Service: Thoracic;  Laterality: Right;  Robotic mediastinal and hilar    TONSILLECTOMY      torn retina, left      XI ROBOTIC RATS,WITH LOBECTOMY,LUNG Right 2021    Procedure: XI ROBOTIC RATS,WITH  LOBECTOMY,LUNG;  Surgeon: Sloan Gómez MD;  Location: [facility] OR [address] [place] [address];  Service: Thoracic;  Laterality: Right;     Family History   Problem Relation Age of Onset    Skin cancer Father     Hypertension Father     Heart disease Father     Diabetes Father     Melanoma Father     Cancer Father     Skin cancer Mother     Hypertension Mother     Thyroid disease Mother     Dementia Mother     Hypertension Sister     Hyperlipidemia Sister     Anxiety disorder Daughter     Hypertension Son     Hyperlipidemia Son     Breast cancer Maternal Aunt     Diabetes Maternal Uncle      Social History     Tobacco Use    Smoking status: Never    Smokeless tobacco: Never   Substance Use Topics    Alcohol use: Yes     Alcohol/week: 0.0 standard drinks     Types: 1 - 2 Glasses of wine per week     Comment: 3-4x /week    Drug use: No       Objective:     Physical Exam  Constitutional:       General: She is not in acute distress.     Appearance: Normal appearance. She is well-developed. She is not ill-appearing or diaphoretic.   HENT:      Head: Normocephalic and atraumatic.      Right Ear: External ear normal.      Left Ear: External ear normal.      Nose: Nose normal.   Eyes:      General: No scleral icterus.        Right eye: No discharge.         Left eye: No discharge.      Extraocular Movements: Extraocular movements intact.      Conjunctiva/sclera: Conjunctivae normal.   Pulmonary:      Effort: Pulmonary effort is normal. No respiratory distress.      Breath sounds: No stridor.   Musculoskeletal:         General: Normal range of motion.   Skin:     Findings: No erythema or rash.   Neurological:      General: No focal deficit present.      Mental Status: She is alert and oriented to person, place, and time. Mental status is at baseline.      Cranial Nerves: No cranial nerve deficit.   Psychiatric:         Mood and Affect: Mood normal.         Behavior: Behavior normal.         Thought Content: Thought content normal.          Judgment: Judgment normal.       Data:    All data, including recent labs, radiology, and pathology, has been independently reviewed.    Labs:    No results found in the last 24 hours.     Radiology:    No results found in the last 24 hours.     Assessment:    1. Bronchiectasis with chronic lower respiratory infection  Discussed option of starting therapy for MAC  Patient would like to hold off at this time, I feel this is a reasonable decision  Recommend optimizing airway clearance w/ aerobika, consider regular nebulizer treatments  Will see patient back in 3 months            Follow up in 3 months    The total time for evaluation and management services performed on 1/9/23 was greater than 40 minutes.     Manuela Mancini DO  Transplant Infectious Disease

## 2023-01-11 ENCOUNTER — HOSPITAL ENCOUNTER (OUTPATIENT)
Dept: RADIOLOGY | Facility: HOSPITAL | Age: 73
Discharge: HOME | End: 2023-01-11
Attending: INTERNAL MEDICINE
Payer: MEDICARE

## 2023-01-11 VITALS — WEIGHT: 149 LBS | BODY MASS INDEX: 28.13 KG/M2 | HEIGHT: 61 IN

## 2023-01-11 DIAGNOSIS — Z12.31 ENCOUNTER FOR SCREENING MAMMOGRAM FOR MALIGNANT NEOPLASM OF BREAST: ICD-10-CM

## 2023-01-11 PROCEDURE — 77067 MAMMO DIGITAL SCREENING BILAT WITH TOMO: ICD-10-PCS | Mod: 26,,, | Performed by: RADIOLOGY

## 2023-01-11 PROCEDURE — 77067 SCR MAMMO BI INCL CAD: CPT | Mod: TC

## 2023-01-11 PROCEDURE — 77067 SCR MAMMO BI INCL CAD: CPT | Mod: 26,,, | Performed by: RADIOLOGY

## 2023-01-11 PROCEDURE — 77063 BREAST TOMOSYNTHESIS BI: CPT | Mod: 26,,, | Performed by: RADIOLOGY

## 2023-01-11 PROCEDURE — 77063 MAMMO DIGITAL SCREENING BILAT WITH TOMO: ICD-10-PCS | Mod: 26,,, | Performed by: RADIOLOGY

## 2023-01-22 ENCOUNTER — PATIENT MESSAGE (OUTPATIENT)
Dept: INTERNAL MEDICINE | Facility: CLINIC | Age: 73
End: 2023-01-22
Payer: MEDICARE

## 2023-01-24 ENCOUNTER — PATIENT MESSAGE (OUTPATIENT)
Dept: DERMATOLOGY | Facility: CLINIC | Age: 73
End: 2023-01-24
Payer: MEDICARE

## 2023-01-24 ENCOUNTER — TELEPHONE (OUTPATIENT)
Dept: DERMATOLOGY | Facility: CLINIC | Age: 73
End: 2023-01-24
Payer: MEDICARE

## 2023-01-24 NOTE — TELEPHONE ENCOUNTER
Attempted to get in contact with pt in regards to r/s Jan 30 appt due to provider being out. LVM and messaged pt via the portal of changes.

## 2023-01-31 ENCOUNTER — PATIENT MESSAGE (OUTPATIENT)
Dept: INTERNAL MEDICINE | Facility: CLINIC | Age: 73
End: 2023-01-31
Payer: MEDICARE

## 2023-02-02 ENCOUNTER — OFFICE VISIT (OUTPATIENT)
Dept: DERMATOLOGY | Facility: CLINIC | Age: 73
End: 2023-02-02
Payer: MEDICARE

## 2023-02-02 DIAGNOSIS — D48.5 NEOPLASM OF UNCERTAIN BEHAVIOR OF SKIN: Primary | ICD-10-CM

## 2023-02-02 DIAGNOSIS — Z85.828 PERSONAL HISTORY OF SKIN CANCER: ICD-10-CM

## 2023-02-02 DIAGNOSIS — L73.8 SEBACEOUS GLAND HYPERPLASIA: ICD-10-CM

## 2023-02-02 DIAGNOSIS — D22.9 NEVUS: ICD-10-CM

## 2023-02-02 DIAGNOSIS — L82.1 SK (SEBORRHEIC KERATOSIS): ICD-10-CM

## 2023-02-02 DIAGNOSIS — L90.5 SCAR: ICD-10-CM

## 2023-02-02 DIAGNOSIS — L81.4 LENTIGO: ICD-10-CM

## 2023-02-02 PROCEDURE — 11103 TANGNTL BX SKIN EA SEP/ADDL: CPT | Mod: S$GLB,,, | Performed by: DERMATOLOGY

## 2023-02-02 PROCEDURE — 1126F PR PAIN SEVERITY QUANTIFIED, NO PAIN PRESENT: ICD-10-PCS | Mod: CPTII,S$GLB,, | Performed by: DERMATOLOGY

## 2023-02-02 PROCEDURE — 11102 PR TANGENTIAL BIOPSY, SKIN, SINGLE LESION: ICD-10-PCS | Mod: S$GLB,,, | Performed by: DERMATOLOGY

## 2023-02-02 PROCEDURE — 1160F RVW MEDS BY RX/DR IN RCRD: CPT | Mod: CPTII,S$GLB,, | Performed by: DERMATOLOGY

## 2023-02-02 PROCEDURE — 99213 PR OFFICE/OUTPT VISIT, EST, LEVL III, 20-29 MIN: ICD-10-PCS | Mod: 25,S$GLB,, | Performed by: DERMATOLOGY

## 2023-02-02 PROCEDURE — 88342 IMHCHEM/IMCYTCHM 1ST ANTB: CPT | Performed by: PATHOLOGY

## 2023-02-02 PROCEDURE — 88341 IMHCHEM/IMCYTCHM EA ADD ANTB: CPT | Mod: 26,,, | Performed by: PATHOLOGY

## 2023-02-02 PROCEDURE — 3288F FALL RISK ASSESSMENT DOCD: CPT | Mod: CPTII,S$GLB,, | Performed by: DERMATOLOGY

## 2023-02-02 PROCEDURE — 1101F PT FALLS ASSESS-DOCD LE1/YR: CPT | Mod: CPTII,S$GLB,, | Performed by: DERMATOLOGY

## 2023-02-02 PROCEDURE — 1126F AMNT PAIN NOTED NONE PRSNT: CPT | Mod: CPTII,S$GLB,, | Performed by: DERMATOLOGY

## 2023-02-02 PROCEDURE — 99213 OFFICE O/P EST LOW 20 MIN: CPT | Mod: 25,S$GLB,, | Performed by: DERMATOLOGY

## 2023-02-02 PROCEDURE — 1160F PR REVIEW ALL MEDS BY PRESCRIBER/CLIN PHARMACIST DOCUMENTED: ICD-10-PCS | Mod: CPTII,S$GLB,, | Performed by: DERMATOLOGY

## 2023-02-02 PROCEDURE — 88341 IMHCHEM/IMCYTCHM EA ADD ANTB: CPT | Performed by: PATHOLOGY

## 2023-02-02 PROCEDURE — 1101F PR PT FALLS ASSESS DOC 0-1 FALLS W/OUT INJ PAST YR: ICD-10-PCS | Mod: CPTII,S$GLB,, | Performed by: DERMATOLOGY

## 2023-02-02 PROCEDURE — 3288F PR FALLS RISK ASSESSMENT DOCUMENTED: ICD-10-PCS | Mod: CPTII,S$GLB,, | Performed by: DERMATOLOGY

## 2023-02-02 PROCEDURE — 88341 PR IHC OR ICC EACH ADD'L SINGLE ANTIBODY  STAINPR: ICD-10-PCS | Mod: 26,,, | Performed by: PATHOLOGY

## 2023-02-02 PROCEDURE — 1159F MED LIST DOCD IN RCRD: CPT | Mod: CPTII,S$GLB,, | Performed by: DERMATOLOGY

## 2023-02-02 PROCEDURE — 1159F PR MEDICATION LIST DOCUMENTED IN MEDICAL RECORD: ICD-10-PCS | Mod: CPTII,S$GLB,, | Performed by: DERMATOLOGY

## 2023-02-02 PROCEDURE — 99999 PR PBB SHADOW E&M-EST. PATIENT-LVL III: ICD-10-PCS | Mod: PBBFAC,,, | Performed by: DERMATOLOGY

## 2023-02-02 PROCEDURE — 88305 TISSUE EXAM BY PATHOLOGIST: ICD-10-PCS | Mod: 26,,, | Performed by: PATHOLOGY

## 2023-02-02 PROCEDURE — 1157F PR ADVANCE CARE PLAN OR EQUIV PRESENT IN MEDICAL RECORD: ICD-10-PCS | Mod: CPTII,S$GLB,, | Performed by: DERMATOLOGY

## 2023-02-02 PROCEDURE — 99999 PR PBB SHADOW E&M-EST. PATIENT-LVL III: CPT | Mod: PBBFAC,,, | Performed by: DERMATOLOGY

## 2023-02-02 PROCEDURE — 88342 IMHCHEM/IMCYTCHM 1ST ANTB: CPT | Mod: 26,,, | Performed by: PATHOLOGY

## 2023-02-02 PROCEDURE — 11103 PR TANGENTIAL BIOPSY, SKIN, EA ADDTL LESION: ICD-10-PCS | Mod: S$GLB,,, | Performed by: DERMATOLOGY

## 2023-02-02 PROCEDURE — 88305 TISSUE EXAM BY PATHOLOGIST: CPT | Mod: 26,,, | Performed by: PATHOLOGY

## 2023-02-02 PROCEDURE — 1157F ADVNC CARE PLAN IN RCRD: CPT | Mod: CPTII,S$GLB,, | Performed by: DERMATOLOGY

## 2023-02-02 PROCEDURE — 88342 CHG IMMUNOCYTOCHEMISTRY: ICD-10-PCS | Mod: 26,,, | Performed by: PATHOLOGY

## 2023-02-02 PROCEDURE — 11102 TANGNTL BX SKIN SINGLE LES: CPT | Mod: S$GLB,,, | Performed by: DERMATOLOGY

## 2023-02-02 PROCEDURE — 88305 TISSUE EXAM BY PATHOLOGIST: CPT | Performed by: PATHOLOGY

## 2023-02-02 RX ORDER — TRETINOIN 0.25 MG/G
CREAM TOPICAL
Qty: 30 G | Refills: 5 | Status: SHIPPED | OUTPATIENT
Start: 2023-02-02 | End: 2023-06-27

## 2023-02-02 NOTE — PATIENT INSTRUCTIONS
Nicotinamide/niacinamide 500 mg 2x per day     Use tretinoin to entire face nightly then moisturize

## 2023-02-02 NOTE — PROGRESS NOTES
Subjective:       Patient ID:  Jolly Matias is a 72 y.o. female who presents for   Chief Complaint   Patient presents with    Skin Check     ubse    Lesion     Forehead x 2     Pt here today for UBSE    This is a high risk patient here to check for the development of new lesions.    Patient with new complaint of lesion(s)  Location: Forehead x 2  Duration: 2wks  Symptoms: come and go. itching  Relieving factors/Previous treatments: none          Lesion    Review of Systems   Skin:  Positive for daily sunscreen use and activity-related sunscreen use. Negative for tendency to form keloidal scars.   Hematologic/Lymphatic: Does not bruise/bleed easily.      Objective:    Physical Exam   Constitutional: She appears well-developed and well-nourished. No distress.   Neurological: She is alert and oriented to person, place, and time. She is not disoriented.   Psychiatric: She has a normal mood and affect.   Skin:   Areas Examined (abnormalities noted in diagram):   Scalp / Hair Palpated and Inspected  Head / Face Inspection Performed  Neck Inspection Performed  Chest / Axilla Inspection Performed  Back Inspection Performed  RUE Inspected  LUE Inspection Performed  Nails and Digits Inspection Performed                           Diagram Legend     Erythematous scaling macule/papule c/w actinic keratosis       Vascular papule c/w angioma      Pigmented verrucoid papule/plaque c/w seborrheic keratosis      Yellow umbilicated papule c/w sebaceous hyperplasia      Irregularly shaped tan macule c/w lentigo     1-2 mm smooth white papules consistent with Milia      Movable subcutaneous cyst with punctum c/w epidermal inclusion cyst      Subcutaneous movable cyst c/w pilar cyst      Firm pink to brown papule c/w dermatofibroma      Pedunculated fleshy papule(s) c/w skin tag(s)      Evenly pigmented macule c/w junctional nevus     Mildly variegated pigmented, slightly irregular-bordered macule c/w mildly atypical nevus      Flesh  colored to evenly pigmented papule c/w intradermal nevus       Pink pearly papule/plaque c/w basal cell carcinoma      Erythematous hyperkeratotic cursted plaque c/w SCC      Surgical scar with no sign of skin cancer recurrence      Open and closed comedones      Inflammatory papules and pustules      Verrucoid papule consistent consistent with wart     Erythematous eczematous patches and plaques     Dystrophic onycholytic nail with subungual debris c/w onychomycosis     Umbilicated papule    Erythematous-base heme-crusted tan verrucoid plaque consistent with inflamed seborrheic keratosis     Erythematous Silvery Scaling Plaque c/w Psoriasis     See annotation      Assessment / Plan:      Pathology Orders:       Normal Orders This Visit    Specimen to Pathology, Dermatology     Questions:    Procedure Type: Dermatology and skin neoplasms    Number of Specimens: 2    ------------------------: -------------------------    Spec 1 Procedure: Biopsy    Spec 1 Clinical Impression: r/o BCC v other    Spec 1 Source: right lateral forehead    ------------------------: -------------------------    Spec 2 Procedure: Biopsy    Spec 2 Clinical Impression: r/o BCC v other    Spec 2 Source: left upper lateral forehead    Release to patient:           Neoplasm of uncertain behavior of skin x 2   Shave biopsy procedure note:    Shave biopsy performed after verbal consent including risk of infection, scar, recurrence, need for additional treatment of site. Area prepped with alcohol, anesthetized with approximately 1.0cc of 1% lidocaine with epinephrine. Lesional tissue shaved with razor blade. Hemostasis achieved with application of aluminum chloride followed by hyfrecation. No complications. Dressing applied. Wound care explained.  If biopsy positive for malignancy, refer to Dr. Ac for Mohs surgery consultation.   -     Specimen to Pathology, Dermatology    Sebaceous gland hyperplasia  This is a common condition representing  benign enlargement of the sebaceous lobule. It typically occurs in adulthood. Reassurance given to patient.     Lentigo  This is a benign hyperpigmented sun induced lesion. Recommend daily sun protection/avoidance and use of at least SPF 30, broad spectrum sunscreen (OTC drug) will reduce the number of new lesions. Treatment of these benign lesions are considered cosmetic.  The nature of sun-induced photo-aging and skin cancers is discussed.  Sun avoidance, protective clothing, and the use of 30-SPF sunscreens is advised. Observe closely for skin damage/changes, and call if such occurs.    Nevus  Discussed ABCDE's of nevi.  Monitor for new mole or moles that are becoming bigger, darker, irritated, or developing irregular borders. Brochure provided. Instructed patient to observe lesion(s) for changes and follow up in clinic if changes are noted. Patient to monitor skin at home for new or changing lesions.     SK (seborrheic keratosis)  These are benign inherited growths without a malignant potential. Reassurance given to patient. No treatment is necessary.     Personal history of skin cancer  -     Scar  -     tretinoin (RETIN-A) 0.025 % cream; Compound tretinoin 0.025% / niacinamide 2% cream. Apply a pea-sized amount to entire face qhs then moisturize.  Dispense: 30 g; Refill: 5  Nicotinamide/niacinamide 500 mg 2x per day     Use tretinoin to entire face nightly then moisturize     Area(s) of previous NMSC evaluated with no signs of recurrence.    Upper body skin examination performed today including at least 6 points as noted in physical examination. Suspicious lesions noted.    Recommend daily sun protection/avoidance and use of at least SPF 30, broad spectrum sunscreen (OTC drug).              Follow up in about 6 months (around 8/2/2023) for UBSE.

## 2023-02-04 ENCOUNTER — PATIENT MESSAGE (OUTPATIENT)
Dept: INTERNAL MEDICINE | Facility: CLINIC | Age: 73
End: 2023-02-04
Payer: MEDICARE

## 2023-02-04 DIAGNOSIS — Z91.89 AT HIGH RISK FOR BREAST CANCER: Primary | ICD-10-CM

## 2023-02-14 ENCOUNTER — OFFICE VISIT (OUTPATIENT)
Dept: HEMATOLOGY/ONCOLOGY | Facility: CLINIC | Age: 73
End: 2023-02-14
Payer: MEDICARE

## 2023-02-14 VITALS
SYSTOLIC BLOOD PRESSURE: 146 MMHG | OXYGEN SATURATION: 96 % | WEIGHT: 149.06 LBS | BODY MASS INDEX: 28.14 KG/M2 | RESPIRATION RATE: 18 BRPM | TEMPERATURE: 98 F | HEART RATE: 97 BPM | DIASTOLIC BLOOD PRESSURE: 73 MMHG | HEIGHT: 61 IN

## 2023-02-14 DIAGNOSIS — Z91.89 AT HIGH RISK FOR BREAST CANCER: ICD-10-CM

## 2023-02-14 DIAGNOSIS — Z80.3 FAMILY HISTORY OF BREAST CANCER: ICD-10-CM

## 2023-02-14 DIAGNOSIS — R92.30 DENSE BREAST TISSUE ON MAMMOGRAM: Primary | ICD-10-CM

## 2023-02-14 LAB
FINAL PATHOLOGIC DIAGNOSIS: NORMAL
GROSS: NORMAL
Lab: NORMAL
MICROSCOPIC EXAM: NORMAL

## 2023-02-14 PROCEDURE — 1157F ADVNC CARE PLAN IN RCRD: CPT | Mod: CPTII,S$GLB,, | Performed by: NURSE PRACTITIONER

## 2023-02-14 PROCEDURE — 1157F PR ADVANCE CARE PLAN OR EQUIV PRESENT IN MEDICAL RECORD: ICD-10-PCS | Mod: CPTII,S$GLB,, | Performed by: NURSE PRACTITIONER

## 2023-02-14 PROCEDURE — 3078F DIAST BP <80 MM HG: CPT | Mod: CPTII,S$GLB,, | Performed by: NURSE PRACTITIONER

## 2023-02-14 PROCEDURE — 3008F BODY MASS INDEX DOCD: CPT | Mod: CPTII,S$GLB,, | Performed by: NURSE PRACTITIONER

## 2023-02-14 PROCEDURE — 3077F SYST BP >= 140 MM HG: CPT | Mod: CPTII,S$GLB,, | Performed by: NURSE PRACTITIONER

## 2023-02-14 PROCEDURE — 1159F MED LIST DOCD IN RCRD: CPT | Mod: CPTII,S$GLB,, | Performed by: NURSE PRACTITIONER

## 2023-02-14 PROCEDURE — 3078F PR MOST RECENT DIASTOLIC BLOOD PRESSURE < 80 MM HG: ICD-10-PCS | Mod: CPTII,S$GLB,, | Performed by: NURSE PRACTITIONER

## 2023-02-14 PROCEDURE — 99999 PR PBB SHADOW E&M-EST. PATIENT-LVL IV: ICD-10-PCS | Mod: PBBFAC,,, | Performed by: NURSE PRACTITIONER

## 2023-02-14 PROCEDURE — 1126F AMNT PAIN NOTED NONE PRSNT: CPT | Mod: CPTII,S$GLB,, | Performed by: NURSE PRACTITIONER

## 2023-02-14 PROCEDURE — 3288F FALL RISK ASSESSMENT DOCD: CPT | Mod: CPTII,S$GLB,, | Performed by: NURSE PRACTITIONER

## 2023-02-14 PROCEDURE — 1101F PR PT FALLS ASSESS DOC 0-1 FALLS W/OUT INJ PAST YR: ICD-10-PCS | Mod: CPTII,S$GLB,, | Performed by: NURSE PRACTITIONER

## 2023-02-14 PROCEDURE — 99999 PR PBB SHADOW E&M-EST. PATIENT-LVL IV: CPT | Mod: PBBFAC,,, | Performed by: NURSE PRACTITIONER

## 2023-02-14 PROCEDURE — 1159F PR MEDICATION LIST DOCUMENTED IN MEDICAL RECORD: ICD-10-PCS | Mod: CPTII,S$GLB,, | Performed by: NURSE PRACTITIONER

## 2023-02-14 PROCEDURE — 1101F PT FALLS ASSESS-DOCD LE1/YR: CPT | Mod: CPTII,S$GLB,, | Performed by: NURSE PRACTITIONER

## 2023-02-14 PROCEDURE — 99204 PR OFFICE/OUTPT VISIT, NEW, LEVL IV, 45-59 MIN: ICD-10-PCS | Mod: S$GLB,,, | Performed by: NURSE PRACTITIONER

## 2023-02-14 PROCEDURE — 1126F PR PAIN SEVERITY QUANTIFIED, NO PAIN PRESENT: ICD-10-PCS | Mod: CPTII,S$GLB,, | Performed by: NURSE PRACTITIONER

## 2023-02-14 PROCEDURE — 3008F PR BODY MASS INDEX (BMI) DOCUMENTED: ICD-10-PCS | Mod: CPTII,S$GLB,, | Performed by: NURSE PRACTITIONER

## 2023-02-14 PROCEDURE — 3077F PR MOST RECENT SYSTOLIC BLOOD PRESSURE >= 140 MM HG: ICD-10-PCS | Mod: CPTII,S$GLB,, | Performed by: NURSE PRACTITIONER

## 2023-02-14 PROCEDURE — 99204 OFFICE O/P NEW MOD 45 MIN: CPT | Mod: S$GLB,,, | Performed by: NURSE PRACTITIONER

## 2023-02-14 PROCEDURE — 3288F PR FALLS RISK ASSESSMENT DOCUMENTED: ICD-10-PCS | Mod: CPTII,S$GLB,, | Performed by: NURSE PRACTITIONER

## 2023-02-14 NOTE — PROGRESS NOTES
"  Reason For Consultation:   Increased lifetime risk of breast cancer    Referring Provider:   [place] [address]    Records Obtained: Records of the patients history including those obtained from the referring provider were reviewed and summarized in detail.    HPI:   Jolly Matais presents for consultation of increased risk of breast cancer. She is postmenopausal. She presented for screening mammogram on 23 which was benign but revealed a Tyrer-Cuzick score of 20.94%.     Today, Feels good and no complaints.   No breast concerns.    High Risk Breast cancer specific history:  - Age: 72 y.o.   - Height:  5'1"  - Weight: 149 lbs  - Breast density per BI-RADS: Heterogeneously dense   - Age at menarche:  12  - Number of pregnancies: ; age of first live birth: 33   - History of breast feeding: No.   - Age at menopause, if applicable: pt pt, total hysterectomy around 41  -Uterus and ovaries intact: No: total hysterectomy around 41  - HRT: Yes - she took oral hrt for about 10 years, stopped more than 5 years ago, not sure the name of the medication    - Genetic testing: No  - Personal history of cancer: Yes - adenocarcinoma of the lung, basal cell carcinoma  - Previous chest radiation exposure between ages 10-30 years old: No  - Personal history of breast biopsy: Yes - one biopsy in early 30s, in  had central bilateral duct excisions d/t bleeding nipples,  left side done 05 and revealed benign breast tissue wit focal duct ectasia, right side done in  and revealed papillomatosis,  saw Dr. Cronin and Dr. Bhakta in   - Ashkenazi Islam Inheritance: No  - Family history of cancer:  maternal aunt: breast cancer diagnosed in early 70s,  in early 80s    Social History:  Tobacco use:  none  Alcohol use:  a few glasses of wine per week  Exercise regimen: started yoga, doing more now  Employment: not currently employed    SEE CALCULATED RISK " BELOW.     Past Medical   Past Medical History:   Diagnosis Date    Atypical ductal hyperplasia, breast     Basal cell carcinoma 04/2011    left forehead    Basal cell carcinoma 04/2015    R temporal scalp, R upper forehead, L upper forehead    Basal cell carcinoma 08/2015    right mid ala    Basal cell carcinoma (BCC) of right forehead 10/12/2022    BCC (basal cell carcinoma) excised     right shoulder    BCC (basal cell carcinoma) 12/2021    right medial shoulder    BCC (basal cell carcinoma) 08/2022    R upper forehead    Diabetes mellitus     Diverticulitis     Fibrocystic breast     HLD (hyperlipidemia)     Hypertension     Prediabetes 10/16/2013    Skin cancer      Patient Active Problem List   Diagnosis    Prediabetes    Chronic bronchitis    Anxiety    Bronchiectasis without complication    Pure hypercholesterolemia    Essential hypertension    Splenic artery aneurysm    Abdominal aortic atherosclerosis    History of basal cell carcinoma    Sleep apnea    Chondromalacia of right knee    Type 2 diabetes mellitus without retinopathy    Chronic hip pain, bilateral    Leg weakness, bilateral    Adenocarcinoma of right lung    Major depressive disorder, recurrent, mild    Chronic kidney disease, stage 3a     Social History   Social History     Tobacco Use    Smoking status: Never    Smokeless tobacco: Never   Substance Use Topics    Alcohol use: Yes     Alcohol/week: 0.0 standard drinks     Types: 1 - 2 Glasses of wine per week     Comment: 3-4x /week    Drug use: No     Family History  Family History   Problem Relation Age of Onset    Skin cancer Father     Hypertension Father     Heart disease Father     Diabetes Father     Melanoma Father     Cancer Father     Skin cancer Mother     Hypertension Mother     Thyroid disease Mother     Dementia Mother     Hypertension Sister     Hyperlipidemia Sister     Anxiety disorder Daughter     Hypertension Son     Hyperlipidemia Son     Breast cancer Maternal Aunt      Diabetes Maternal Uncle      Medications    Current Outpatient Medications:     albuterol (PROVENTIL/VENTOLIN HFA) 90 mcg/actuation inhaler, Inhale 2 puffs into the lungs every 6 (six) hours as needed for Wheezing or Shortness of Breath., Disp: 18 g, Rfl: 11    albuterol (VENTOLIN HFA) 90 mcg/actuation inhaler, Inhale 2 puffs into the lungs every 6 (six) hours as needed. Rescue, Disp: 18 g, Rfl: 0    atorvastatin (LIPITOR) 20 MG tablet, Take 1 tablet (20 mg total) by mouth once daily., Disp: 90 tablet, Rfl: 2    flaxseed oil 1,000 mg Cap, Take 1 capsule by mouth once daily., Disp: , Rfl:     levocetirizine (XYZAL) 5 MG tablet, Take 5 mg by mouth nightly., Disp: , Rfl:     meloxicam (MOBIC) 15 MG tablet, TAKE 1 TABLET(15 MG) BY MOUTH EVERY DAY, Disp: 90 tablet, Rfl: 1    metFORMIN (GLUCOPHAGE) 500 MG tablet, Take 1 tablet (500 mg total) by mouth 2 (two) times daily with meals., Disp: 180 tablet, Rfl: 3    ofloxacin (OCUFLOX) 0.3 % ophthalmic solution, Place 1 drop into both eyes 4 (four) times daily., Disp: , Rfl:     olmesartan (BENICAR) 40 MG tablet, TAKE 1 TABLET(40 MG) BY MOUTH EVERY DAY, Disp: 90 tablet, Rfl: 3    sertraline (ZOLOFT) 100 MG tablet, Take 1 tablet (100 mg total) by mouth once daily., Disp: 90 tablet, Rfl: 3    spironolactone (ALDACTONE) 25 MG tablet, TAKE 1 TABLET(25 MG) BY MOUTH EVERY DAY, Disp: 90 tablet, Rfl: 3    tretinoin (RETIN-A) 0.025 % cream, Compound tretinoin 0.025% / niacinamide 2% cream. Apply a pea-sized amount to entire face qhs then moisturize., Disp: 30 g, Rfl: 5    vitamin D 1000 units Tab, Take 185 mg by mouth once daily., Disp: , Rfl:     gabapentin (NEURONTIN) 300 MG capsule, Take 1 capsule (300 mg total) by mouth 3 (three) times daily. (Patient not taking: Reported on 12/8/2022), Disp: 90 capsule, Rfl: 11    rOPINIRole (REQUIP) 2 MG tablet, Take 1 tablet (2 mg total) by mouth every evening. (Patient not taking: Reported on 5/31/2022), Disp: 30 tablet, Rfl: 11  No current  "facility-administered medications for this visit.    Facility-Administered Medications Ordered in Other Visits:     0.9%  NaCl infusion, , Intravenous, Continuous, Deshawn Arias MD, Stopped at 04/09/21 1053    lidocaine (PF) 10 mg/ml (1%) injection 10 mg, 1 mL, Intradermal, Once, Deshawn Arias MD    midazolam (VERSED) 1 mg/mL injection 0.5 mg, 0.5 mg, Intravenous, PRN, Jhoan Kenny MD, 2 mg at 10/29/20 0900  Allergies  Review of patient's allergies indicates:   Allergen Reactions    Hydrocodone      Also makes pt "very sleepy"    Kiwi (actinidia chinensis) Swelling       Review of Systems       See above   Review of Systems  Review of Systems  All other systems reviewed and are negative.    Objective:      Vitals:   Vitals:    02/14/23 0940   BP: (!) 146/73   Pulse: 97   Resp: 18   Temp: 98.1 °F (36.7 °C)   TempSrc: Oral   SpO2: 96%   Weight: 67.6 kg (149 lb 0.5 oz)   Height: 5' 1" (1.549 m)     BMI: Body mass index is 28.16 kg/m².   Body surface area is 1.71 meters squared.    Physical Exam  Constitutional:       General: She is not in acute distress.     Appearance: She is well-developed. She is not diaphoretic.   HENT:      Head: Normocephalic and atraumatic.   Eyes:      General: No scleral icterus.     Conjunctiva/sclera: Conjunctivae normal.   Cardiovascular:      Rate and Rhythm: Normal rate and regular rhythm.      Pulses: Normal pulses.      Heart sounds: Normal heart sounds.   Pulmonary:      Effort: Pulmonary effort is normal. No respiratory distress.      Breath sounds: Normal breath sounds.   Chest:      Comments: No skin changes or breast masses noted bilaterally, no  lymphadenopathy noted    Abdominal:      General: There is no distension.   Musculoskeletal:         General: Normal range of motion.      Cervical back: Normal range of motion and neck supple.   Skin:     General: Skin is warm and dry.   Neurological:      Mental Status: She is alert and oriented to person, place, " and time.   Psychiatric:         Behavior: Behavior normal.     Laboratory Data: reviewed most recent   Imaging: reviewed most recent    Assessment:     1. Dense breast tissue on mammogram    2. At high risk for breast cancer    3. Family history of breast cancer        1. Increased risk of breast cancer   * Tyrer-Cuzick (TC) lifetime risk of 5.8%. We discussed that TC score will categorize your lifetime risk of being diagnosed with breast cancer. Categories are as such: Average risk <15%, Intermediate risk 15-19%, and High risk > or = to 20%. (TC scores have been >20% on multiple of her past mammograms).  Sore recalculated 3x in clinic and 5.8% obtained each time.   *The Gretchen Model for Breast Cancer risk: She has a 3.6% 5-year breast cancer risk (Compared with 2.2 % for the average 73 y.o. woman) and 8.8% Lifetime breast cancer risk (Compared with 5.4% for the average 73 y.o. woman). A woman's risk is considered low if her five-year risk of developing breast cancer is less than 1.6%; it is considered high if she scores above 1.66%. (All women who are over 60 have a score of at least 1.66 and are considered high risk, based on the Gretchen Model.)    Recommendation for women with elevated TC score:     Recommendation for women with elevated Gretchen Model.         Risk factors are categorized into 2 groups: Modifiable and Non-modifiable. Modifiable risk factors include use of hormones, alcohol, smoking, diet and exercise. Non-modifiable risk factors include breast density, genetics, chest radiation, previous pregnancies, age of first period, and age of menopause.      * For women at high risk for breast cancer, endocrine therapy can reduce the risk of invasive and/or in situ breast cancers. (tamoxifen for premenopausal or postmenopausal women and raloxifene or exemestane for postmenopausal women).     * Discussed that Tamoxifen 20 mg daily for 5 years has shown to reduce risk of breast cancer by 49% and women with ADH/ALH  or LCIS have an even more significant reduction of risk of 86%. Aromatase inhibitors for 5 years have also shown risk reduction in terms of 50-60%. At current, there is not adequate data to recommend longer courses of therapy more than 5 years for risk reduction.      * Tamoxifen has limited data in BRCA 1/2 mutation carriers but limited retrospective data is suggestive of benefit. There is retrospective data that aromatase inhibitors can reduce the risk of contralateral ER positive breast cancers in BRCA 1/2 patients who were taking AIs as adjuvant therapy. There is no data for raloxifen in the population.      * Reviewed risks of Tamoxifen side effects include hot flashes, invasive endometrial cancer in women > 49 years of age (2.3/1000 compared to 0.9/1000), cataracts, increased risk of pulmonary embolism among others.     * Reviewed Lifestyle modifications which have shown benefit:  Limit alcohol consumption to less than 1 drink per day (1 ounce liquor, 6 oz wine, 8 oz beer)  Avoid smoking.  Exercise at least 150 minutes per week of moderate intensity aerobic activity or at least 75 minutes of vigorous activity. Exercise can lower the relative risk of breast cancer by ~18-20%.  Maintain healthy weight and avoid post-menopausal weight gain. Avoid processed foods and eat more lean proteins, fruits and vegetables.                  * Discussed available resources including genetic counseling, nutrition, weight management.      * Reviewed future screening:   Semiannual (every 6 months) CBE (clinical breast exam).   Annual Breast MRI alternating with an annual MMG. if TC 20% or greater.                 Discussed with patient that there are several models available for stratifying breast cancer risk, and Greciaer-Rikkick is presently the model utilized by [facility] and is a model recommended per current NCCN guidelines.    The Gretchen Model for Breast Cancer risk estimates the absolute 5 year risk and lifetime  risk of developing breast cancer. Family history includes only first degree relatives with breast cancer, which is not enough information to estimate the risk of a patient having BRCA mutation. It also underestimates the cancer risk for patients with extensive family history. The Gretchen Model is a good predictor of risk for populations but not for individuals. It adjusts risk for race/ethnicity. It may underestimate breast cancer risk in patients with atypical hyperplasia and strong family history. The Gretchen Model was NOT designed to estimate risk for: Women with a prior diagnosis of breast cancer, lobular carcinoma in situ (LCIS), or ductal carcinoma in situ (DCIS);  Women who have received previous radiation therapy to the chest for treatment of Hodgkin lymphoma;  Women with gene mutations in BRCA1 or BRCA2, or those who are known to have certain genetic syndromes that increase risk for breast cancer; Women of age <35 or >85.    Plan:     Patient has opted out of chemoprevention but will call in the future if she wishes to pursue.   Patient elects to proceed with alternating annual mammogram and annual breast MRI along with semiannual CBEs.  Discussed that MRI screenings recommended to the age of 75.  Will get bmp to check kidney function prior to MRI in July  Patient will follow up here for one semiannual CBE in August 2023 along with annual MRI in July 2023 and will RTC here for one semiannual CBE along with annual breast mammogram in January/February 2024.  Lifestyle modifications as detailed above.   5.   Encouraged breast awareness, including monthly breast self-exams.     RTC in 6 months to see me either at [facility] or on 3rd floor..     Questions were encouraged and answered to patient's satisfaction, and patient verbalized understanding of information and agreement with the plan. Advised patient to RTC with any interval changes or concerns.      Patient is in agreement with the proposed treatment plan. All  questions were answered to the patient's satisfaction. Patient knows to call clinic for any new or worsening symptoms and if anything is needed before the next clinic visit.    ROBERT Murillo      Med Onc Chart Routing      Follow up with physician    Follow up with [facility] . August 2023   Infusion scheduling note    Injection scheduling note    Labs   Lab interval:  BMP a few days prior to MRI   Imaging MRI   July 2023   Pharmacy appointment    Other referrals        ROBERT Murillo  Hematology & Medical Oncology   [facility] [place] [address]  ph. [phone]  Fax. [phone]    Total time spent with the patient: 45 minutes, 35 minutes of face to face consultation and 10 minutes of chart review and coordination of care, on the day of the visit. This includes face to face time and non-face to face time preparing to see the patient (eg, review of tests), obtaining and/or reviewing separately obtained history, documenting clinical information in the electronic or other health record, independently interpreting resultsand communicating results to the patient/family/caregiver, or care coordination.

## 2023-02-14 NOTE — PROGRESS NOTES
Please call patient to schedule a Mohs consultation.   1. Skin, right lateral forehead, shave biopsy:   -BASAL CELL CARCINOMA, NODULAR AND INFILTRATIVE, EXTENDING TO THE DEEP BIOPSY   EDGE   T

## 2023-02-16 ENCOUNTER — PATIENT MESSAGE (OUTPATIENT)
Dept: DERMATOLOGY | Facility: CLINIC | Age: 73
End: 2023-02-16
Payer: MEDICARE

## 2023-02-16 DIAGNOSIS — E11.9 TYPE 2 DIABETES MELLITUS WITHOUT COMPLICATION: ICD-10-CM

## 2023-02-17 LAB
ACID FAST MOD KINY STN SPEC: ABNORMAL
MYCOBACTERIUM SPEC QL CULT: ABNORMAL
MYCOBACTERIUM SPEC QL CULT: ABNORMAL

## 2023-02-22 ENCOUNTER — PATIENT MESSAGE (OUTPATIENT)
Dept: ADMINISTRATIVE | Facility: HOSPITAL | Age: 73
End: 2023-02-22
Payer: MEDICARE

## 2023-02-25 ENCOUNTER — PATIENT MESSAGE (OUTPATIENT)
Dept: ADMINISTRATIVE | Facility: HOSPITAL | Age: 73
End: 2023-02-25
Payer: MEDICARE

## 2023-03-10 ENCOUNTER — PES CALL (OUTPATIENT)
Dept: ADMINISTRATIVE | Facility: CLINIC | Age: 73
End: 2023-03-10
Payer: MEDICARE

## 2023-03-15 ENCOUNTER — OFFICE VISIT (OUTPATIENT)
Dept: PRIMARY CARE CLINIC | Facility: CLINIC | Age: 73
End: 2023-03-15
Payer: MEDICARE

## 2023-03-15 VITALS
DIASTOLIC BLOOD PRESSURE: 68 MMHG | SYSTOLIC BLOOD PRESSURE: 120 MMHG | WEIGHT: 148.13 LBS | OXYGEN SATURATION: 98 % | TEMPERATURE: 98 F | BODY MASS INDEX: 27.99 KG/M2 | HEART RATE: 78 BPM

## 2023-03-15 DIAGNOSIS — J42 CHRONIC BRONCHITIS, UNSPECIFIED CHRONIC BRONCHITIS TYPE: ICD-10-CM

## 2023-03-15 DIAGNOSIS — C34.91 ADENOCARCINOMA OF RIGHT LUNG: ICD-10-CM

## 2023-03-15 DIAGNOSIS — R05.2 SUBACUTE COUGH: ICD-10-CM

## 2023-03-15 DIAGNOSIS — I10 ESSENTIAL HYPERTENSION: ICD-10-CM

## 2023-03-15 DIAGNOSIS — N18.31 TYPE 2 DIABETES MELLITUS WITH STAGE 3A CHRONIC KIDNEY DISEASE, WITHOUT LONG-TERM CURRENT USE OF INSULIN: ICD-10-CM

## 2023-03-15 DIAGNOSIS — I72.8 ANEURYSM OF OTHER SPECIFIED ARTERIES: ICD-10-CM

## 2023-03-15 DIAGNOSIS — F33.0 MAJOR DEPRESSIVE DISORDER, RECURRENT, MILD: ICD-10-CM

## 2023-03-15 DIAGNOSIS — N18.31 CHRONIC KIDNEY DISEASE, STAGE 3A: ICD-10-CM

## 2023-03-15 DIAGNOSIS — J06.9 URTI (ACUTE UPPER RESPIRATORY INFECTION): Primary | ICD-10-CM

## 2023-03-15 DIAGNOSIS — E11.22 TYPE 2 DIABETES MELLITUS WITH STAGE 3A CHRONIC KIDNEY DISEASE, WITHOUT LONG-TERM CURRENT USE OF INSULIN: ICD-10-CM

## 2023-03-15 DIAGNOSIS — J47.9 BRONCHIECTASIS WITHOUT COMPLICATION: ICD-10-CM

## 2023-03-15 DIAGNOSIS — E11.9 TYPE 2 DIABETES MELLITUS WITHOUT RETINOPATHY: ICD-10-CM

## 2023-03-15 PROCEDURE — 1157F PR ADVANCE CARE PLAN OR EQUIV PRESENT IN MEDICAL RECORD: ICD-10-PCS | Mod: CPTII,S$GLB,, | Performed by: NURSE PRACTITIONER

## 2023-03-15 PROCEDURE — 3078F DIAST BP <80 MM HG: CPT | Mod: CPTII,S$GLB,, | Performed by: NURSE PRACTITIONER

## 2023-03-15 PROCEDURE — 3074F SYST BP LT 130 MM HG: CPT | Mod: CPTII,S$GLB,, | Performed by: NURSE PRACTITIONER

## 2023-03-15 PROCEDURE — 99214 PR OFFICE/OUTPT VISIT, EST, LEVL IV, 30-39 MIN: ICD-10-PCS | Mod: S$GLB,,, | Performed by: NURSE PRACTITIONER

## 2023-03-15 PROCEDURE — 1157F ADVNC CARE PLAN IN RCRD: CPT | Mod: CPTII,S$GLB,, | Performed by: NURSE PRACTITIONER

## 2023-03-15 PROCEDURE — 4010F PR ACE/ARB THEARPY RXD/TAKEN: ICD-10-PCS | Mod: CPTII,S$GLB,, | Performed by: NURSE PRACTITIONER

## 2023-03-15 PROCEDURE — 4010F ACE/ARB THERAPY RXD/TAKEN: CPT | Mod: CPTII,S$GLB,, | Performed by: NURSE PRACTITIONER

## 2023-03-15 PROCEDURE — 3008F BODY MASS INDEX DOCD: CPT | Mod: CPTII,S$GLB,, | Performed by: NURSE PRACTITIONER

## 2023-03-15 PROCEDURE — 3078F PR MOST RECENT DIASTOLIC BLOOD PRESSURE < 80 MM HG: ICD-10-PCS | Mod: CPTII,S$GLB,, | Performed by: NURSE PRACTITIONER

## 2023-03-15 PROCEDURE — 1159F MED LIST DOCD IN RCRD: CPT | Mod: CPTII,S$GLB,, | Performed by: NURSE PRACTITIONER

## 2023-03-15 PROCEDURE — 1160F PR REVIEW ALL MEDS BY PRESCRIBER/CLIN PHARMACIST DOCUMENTED: ICD-10-PCS | Mod: CPTII,S$GLB,, | Performed by: NURSE PRACTITIONER

## 2023-03-15 PROCEDURE — 1160F RVW MEDS BY RX/DR IN RCRD: CPT | Mod: CPTII,S$GLB,, | Performed by: NURSE PRACTITIONER

## 2023-03-15 PROCEDURE — 1159F PR MEDICATION LIST DOCUMENTED IN MEDICAL RECORD: ICD-10-PCS | Mod: CPTII,S$GLB,, | Performed by: NURSE PRACTITIONER

## 2023-03-15 PROCEDURE — 99999 PR PBB SHADOW E&M-EST. PATIENT-LVL IV: ICD-10-PCS | Mod: PBBFAC,,, | Performed by: NURSE PRACTITIONER

## 2023-03-15 PROCEDURE — 3074F PR MOST RECENT SYSTOLIC BLOOD PRESSURE < 130 MM HG: ICD-10-PCS | Mod: CPTII,S$GLB,, | Performed by: NURSE PRACTITIONER

## 2023-03-15 PROCEDURE — 99214 OFFICE O/P EST MOD 30 MIN: CPT | Mod: S$GLB,,, | Performed by: NURSE PRACTITIONER

## 2023-03-15 PROCEDURE — 99999 PR PBB SHADOW E&M-EST. PATIENT-LVL IV: CPT | Mod: PBBFAC,,, | Performed by: NURSE PRACTITIONER

## 2023-03-15 PROCEDURE — 3008F PR BODY MASS INDEX (BMI) DOCUMENTED: ICD-10-PCS | Mod: CPTII,S$GLB,, | Performed by: NURSE PRACTITIONER

## 2023-03-15 RX ORDER — CEFDINIR 300 MG/1
300 CAPSULE ORAL 2 TIMES DAILY
Qty: 20 CAPSULE | Refills: 0 | Status: SHIPPED | OUTPATIENT
Start: 2023-03-15 | End: 2023-03-25

## 2023-03-15 RX ORDER — AZELASTINE 1 MG/ML
1 SPRAY, METERED NASAL 2 TIMES DAILY
Qty: 30 ML | Refills: 2 | Status: SHIPPED | OUTPATIENT
Start: 2023-03-15 | End: 2024-03-14

## 2023-03-15 RX ORDER — ALBUTEROL SULFATE 90 UG/1
2 AEROSOL, METERED RESPIRATORY (INHALATION) EVERY 6 HOURS PRN
Qty: 18 G | Refills: 0 | Status: SHIPPED | OUTPATIENT
Start: 2023-03-15 | End: 2023-07-20 | Stop reason: SDUPTHER

## 2023-03-15 NOTE — PROGRESS NOTES
[facility] Note    Chief Complaint      Chief Complaint   Patient presents with    Cough    Sore Throat    Nasal Congestion     sneezing     History of Present Illness      Jolly Matias is a 72 y.o. female patient of Dr. Bedoya with history of bronchiectasis with complications and chronic bronchitis who presents today for cough and congestion over the last 2 weeks.  Patient reports a lot of coughing with some mucus production, a lot of sneezing, sore throat is intermittent, nasal congestion, feeling short of breath at times.  No complaints of fever chills shortness of breath headache nausea or vomiting    Health Maintenance   Topic Date Due    Foot Exam  2020    Hemoglobin A1c  2022    Lipid Panel  2023    DEXA Scan  2023    Eye Exam  10/18/2023    Mammogram  2024    Low Dose Statin  03/15/2024    TETANUS VACCINE  2025    Hepatitis C Screening  Completed       Past Medical History:   Diagnosis Date    Atypical ductal hyperplasia, breast     Basal cell carcinoma 2011    left forehead    Basal cell carcinoma 2015    R temporal scalp, R upper forehead, L upper forehead    Basal cell carcinoma 2015    right mid ala    Basal cell carcinoma (BCC) of right forehead 10/12/2022    BCC (basal cell carcinoma) excised     right shoulder    BCC (basal cell carcinoma) 2021    right medial shoulder    BCC (basal cell carcinoma) 2022    R upper forehead    Diabetes mellitus     Diverticulitis     Fibrocystic breast     HLD (hyperlipidemia)     Hypertension     Prediabetes 10/16/2013    Skin cancer        Past Surgical History:   Procedure Laterality Date    APPENDECTOMY      bilateral breast duct excision      BREAST BIOPSY Right     Excisional bx, benign    BREAST BIOPSY Left     Excisional bx, benign     SECTION      x2    COLON SURGERY Left 2017    sigmoidectomy for diverticulitis    COLONOSCOPY N/A 2017    Procedure: COLONOSCOPY;   Surgeon: IBRAHIMA Philip MD;  Location: [facility] ENDO (4TH FLR);  Service: Endoscopy;  Laterality: N/A;    EYE SURGERY      fulgaration of endometriosis x 2      HYSTERECTOMY      For endometriosis and DUB--age 42, ovaries in?    INCISIONAL HERNIA REPAIR  08/2017    KNEE ARTHROSCOPY W/ DEBRIDEMENT Right 7/6/2018    Procedure: ARTHROSCOPY, KNEE, WITH DEBRIDEMENT;  Surgeon: MARVA Arias MD;  Location: [facility] OR 1ST FLR;  Service: Orthopedics;  Laterality: Right;    KNEE ARTHROSCOPY W/ MENISCECTOMY Right 7/6/2018    Procedure: ARTHROSCOPY, KNEE, WITH MENISCECTOMY (partial lateral and medial menisectomy, chondraplasty;  Surgeon: MARVA Arias MD;  Location: [facility] OR 1ST FLR;  Service: Orthopedics;  Laterality: Right;    KNEE ARTHROSCOPY W/ PLICA EXCISION Right 7/6/2018    Procedure: EXCISION, PLICA, KNEE, ARTHROSCOPIC,;  Surgeon: MARVA Arias MD;  Location: [facility] OR 1ST FLR;  Service: Orthopedics;  Laterality: Right;    KNEE SURGERY      LAPAROSCOPIC REPAIR OF INCISIONAL HERNIA N/A 10/29/2020    Procedure: REPAIR, HERNIA, INCISIONAL, LAPAROSCOPIC recurrent, WITH MESH;  Surgeon: Deshawn Arias MD;  Location: [facility] OR 2ND FLR;  Service: General;  Laterality: N/A;    OOPHORECTOMY      SKIN CANCER EXCISION      BCC forehead , temple, shoulder, chest    SURGICAL REMOVAL OF LYMPH NODE Right 4/9/2021    Procedure: EXCISION, LYMPH NODE;  Surgeon: Sloan Gómez MD;  Location: [facility] OR 2ND FLR;  Service: Thoracic;  Laterality: Right;  Robotic mediastinal and hilar    TONSILLECTOMY      torn retina, left      XI ROBOTIC RATS,WITH LOBECTOMY,LUNG Right 4/9/2021    Procedure: XI ROBOTIC RATS,WITH LOBECTOMY,LUNG;  Surgeon: Sloan Gómez MD;  Location: [facility] OR 2ND FLR;  Service: Thoracic;  Laterality: Right;       family history includes Anxiety disorder in her daughter; Breast cancer in her maternal aunt; Cancer in her father; Dementia in her mother; Diabetes in her father and maternal uncle; Heart disease in  her father; Hyperlipidemia in her sister and son; Hypertension in her father, mother, sister, and son; Melanoma in her father; Skin cancer in her father and mother; Thyroid disease in her mother.    Social History     Tobacco Use    Smoking status: Never    Smokeless tobacco: Never   Substance Use Topics    Alcohol use: Yes     Alcohol/week: 0.0 standard drinks     Types: 1 - 2 Glasses of wine per week     Comment: 3-4x /week    Drug use: No       Review of Systems   Constitutional:  Positive for malaise/fatigue. Negative for chills and fever.   HENT:  Negative for congestion and sore throat.    Eyes:  Negative for blurred vision.   Respiratory:  Positive for cough, sputum production and shortness of breath. Negative for wheezing.    Cardiovascular:  Negative for chest pain and palpitations.   Gastrointestinal:  Negative for diarrhea, nausea and vomiting.   Genitourinary:  Negative for dysuria.   Musculoskeletal:  Negative for myalgias.   Neurological:  Negative for dizziness, weakness and headaches.      Outpatient Encounter Medications as of 3/15/2023   Medication Sig Dispense Refill    atorvastatin (LIPITOR) 20 MG tablet Take 1 tablet (20 mg total) by mouth once daily. 90 tablet 2    flaxseed oil 1,000 mg Cap Take 1 capsule by mouth once daily.      levocetirizine (XYZAL) 5 MG tablet Take 5 mg by mouth nightly.      meloxicam (MOBIC) 15 MG tablet TAKE 1 TABLET(15 MG) BY MOUTH EVERY DAY 90 tablet 1    metFORMIN (GLUCOPHAGE) 500 MG tablet Take 1 tablet (500 mg total) by mouth 2 (two) times daily with meals. 180 tablet 3    ofloxacin (OCUFLOX) 0.3 % ophthalmic solution Place 1 drop into both eyes 4 (four) times daily.      olmesartan (BENICAR) 40 MG tablet TAKE 1 TABLET(40 MG) BY MOUTH EVERY DAY 90 tablet 3    sertraline (ZOLOFT) 100 MG tablet Take 1 tablet (100 mg total) by mouth once daily. 90 tablet 3    spironolactone (ALDACTONE) 25 MG tablet TAKE 1 TABLET(25 MG) BY MOUTH EVERY DAY 90 tablet 3    tretinoin  "(RETIN-A) 0.025 % cream Compound tretinoin 0.025% / niacinamide 2% cream. Apply a pea-sized amount to entire face qhs then moisturize. 30 g 5    vitamin D 1000 units Tab Take 185 mg by mouth once daily.      [DISCONTINUED] albuterol (PROVENTIL/VENTOLIN HFA) 90 mcg/actuation inhaler Inhale 2 puffs into the lungs every 6 (six) hours as needed for Wheezing or Shortness of Breath. 18 g 11    [DISCONTINUED] albuterol (VENTOLIN HFA) 90 mcg/actuation inhaler Inhale 2 puffs into the lungs every 6 (six) hours as needed. Rescue 18 g 0    albuterol (VENTOLIN HFA) 90 mcg/actuation inhaler Inhale 2 puffs into the lungs every 6 (six) hours as needed for Wheezing or Shortness of Breath (cough). Rescue 18 g 0    azelastine (ASTELIN) 137 mcg (0.1 %) nasal spray 1 spray (137 mcg total) by Nasal route 2 (two) times daily. 30 mL 2    cefdinir (OMNICEF) 300 MG capsule Take 1 capsule (300 mg total) by mouth 2 (two) times daily. for 10 days 20 capsule 0    gabapentin (NEURONTIN) 300 MG capsule Take 1 capsule (300 mg total) by mouth 3 (three) times daily. (Patient not taking: Reported on 12/8/2022) 90 capsule 11    rOPINIRole (REQUIP) 2 MG tablet Take 1 tablet (2 mg total) by mouth every evening. (Patient not taking: Reported on 5/31/2022) 30 tablet 11     Facility-Administered Encounter Medications as of 3/15/2023   Medication Dose Route Frequency Provider Last Rate Last Admin    0.9%  NaCl infusion   Intravenous Continuous Deshawn Arias MD   Stopped at 04/09/21 1053    lidocaine (PF) 10 mg/ml (1%) injection 10 mg  1 mL Intradermal Once Deshawn Arias MD        midazolam (VERSED) 1 mg/mL injection 0.5 mg  0.5 mg Intravenous PRN Jhoan Kenny MD   2 mg at 10/29/20 0900        Review of patient's allergies indicates:   Allergen Reactions    Hydrocodone      Also makes pt "very sleepy"    Kiwi (actinidia chinensis) Swelling       Physical Exam      Vital Signs  Temp: 98.1 °F (36.7 °C)  Pulse: 78  SpO2: 98 %  BP: " 120/68  Height and Weight  Weight: 67.2 kg (148 lb 2.4 oz)    Physical Exam  Vitals and nursing note reviewed.   Constitutional:       General: She is not in acute distress.     Appearance: Normal appearance. She is ill-appearing.   HENT:      Ears:      Comments: Redness to bilateral ear canals     Mouth/Throat:      Pharynx: Posterior oropharyngeal erythema present.   Cardiovascular:      Rate and Rhythm: Normal rate and regular rhythm.      Heart sounds: Normal heart sounds.   Pulmonary:      Effort: Pulmonary effort is normal. No respiratory distress.      Breath sounds: Normal breath sounds.      Comments: Bronchial irritation  Musculoskeletal:      Cervical back: Normal range of motion.   Lymphadenopathy:      Cervical: Cervical adenopathy present.   Skin:     General: Skin is warm and dry.   Neurological:      General: No focal deficit present.      Mental Status: She is alert and oriented to person, place, and time.   Psychiatric:         Mood and Affect: Mood normal.         Behavior: Behavior normal.         Thought Content: Thought content normal.         Judgment: Judgment normal.        Laboratory:  CBC:  Lab Results   Component Value Date    WBC 8.92 05/31/2022    RBC 3.89 (L) 05/31/2022    HGB 11.7 (L) 05/31/2022    HCT 36.4 (L) 05/31/2022     05/31/2022    MCV 94 05/31/2022    MCH 30.1 05/31/2022    MCHC 32.1 05/31/2022    MCHC 31.2 (L) 06/04/2021    MCHC 31.5 (L) 02/25/2021     CMP:  Lab Results   Component Value Date    GLU 93 05/27/2022    CALCIUM 9.2 05/27/2022    ALBUMIN 3.6 05/27/2022    PROT 6.1 05/27/2022     05/27/2022    K 4.8 05/27/2022    CO2 24 05/27/2022     05/27/2022    BUN 31 (H) 05/27/2022    ALKPHOS 93 05/27/2022    ALT 10 05/27/2022    AST 13 05/27/2022    BILITOT 0.4 05/27/2022    BILITOT 0.3 06/04/2021    BILITOT 0.2 11/24/2020     URINALYSIS:  Lab Results   Component Value Date    COLORU Dark Yellow 12/08/2022    CLARITYU Slightly Cloudy 12/08/2022     SPECGRAV 1.015 12/08/2022    PHUR 5 12/08/2022    PROTEINUA Negative 05/31/2022    BACTERIA Occasional 11/24/2020    NITRITE - 12/08/2022    LEUKOCYTESUR Negative 05/31/2022    UROBILINOGEN - 12/08/2022    HYALINECASTS 9 (A) 11/24/2020    HYALINECASTS 0 10/15/2018    HYALINECASTS 0 03/06/2017      LIPIDS:  Lab Results   Component Value Date    TSH 1.674 05/31/2022    TSH 1.966 06/04/2021    TSH 0.921 11/24/2020    HDL 51 05/27/2022    HDL 47 06/04/2021    HDL 36 (L) 11/24/2020    CHOL 170 05/27/2022    CHOL 144 06/04/2021    CHOL 148 11/24/2020    TRIG 167 (H) 05/27/2022    TRIG 139 06/04/2021    TRIG 149 11/24/2020    LDLCALC 85.6 05/27/2022    LDLCALC 69.2 06/04/2021    LDLCALC 82.2 11/24/2020    CHOLHDL 30.0 05/27/2022    CHOLHDL 32.6 06/04/2021    CHOLHDL 24.3 11/24/2020    NONHDLCHOL 119 05/27/2022    NONHDLCHOL 97 06/04/2021    NONHDLCHOL 112 11/24/2020    TOTALCHOLEST 3.3 05/27/2022    TOTALCHOLEST 3.1 06/04/2021    TOTALCHOLEST 4.1 11/24/2020     TSH:  Lab Results   Component Value Date    TSH 1.674 05/31/2022    TSH 1.966 06/04/2021    TSH 0.921 11/24/2020     A1C:  Lab Results   Component Value Date    HGBA1C 6.1 (H) 05/27/2022    HGBA1C 6.0 (H) 06/04/2021    HGBA1C 5.9 (H) 04/10/2021    HGBA1C 6.2 (H) 11/24/2020    HGBA1C 6.0 (H) 09/08/2020    HGBA1C 6.1 (H) 02/06/2019    HGBA1C 6.0 (H) 07/03/2018    HGBA1C 5.9 (H) 04/06/2018    HGBA1C 5.9 (H) 01/17/2018    HGBA1C 6.1 05/11/2017    HGBA1C 5.7 11/22/2016    HGBA1C 6.0 09/07/2016         Assessment/Plan     Jolly Matias is a 72 y.o.female with:    URTI (acute upper respiratory infection)  -     cefdinir (OMNICEF) 300 MG capsule; Take 1 capsule (300 mg total) by mouth 2 (two) times daily. for 10 days  Dispense: 20 capsule; Refill: 0  -     albuterol (VENTOLIN HFA) 90 mcg/actuation inhaler; Inhale 2 puffs into the lungs every 6 (six) hours as needed for Wheezing or Shortness of Breath (cough). Rescue  Dispense: 18 g; Refill: 0  -     azelastine (ASTELIN)  137 mcg (0.1 %) nasal spray; 1 spray (137 mcg total) by Nasal route 2 (two) times daily.  Dispense: 30 mL; Refill: 2    Bronchiectasis without complication  -     cefdinir (OMNICEF) 300 MG capsule; Take 1 capsule (300 mg total) by mouth 2 (two) times daily. for 10 days  Dispense: 20 capsule; Refill: 0  -     albuterol (VENTOLIN HFA) 90 mcg/actuation inhaler; Inhale 2 puffs into the lungs every 6 (six) hours as needed for Wheezing or Shortness of Breath (cough). Rescue  Dispense: 18 g; Refill: 0  -     azelastine (ASTELIN) 137 mcg (0.1 %) nasal spray; 1 spray (137 mcg total) by Nasal route 2 (two) times daily.  Dispense: 30 mL; Refill: 2    Subacute cough  -     cefdinir (OMNICEF) 300 MG capsule; Take 1 capsule (300 mg total) by mouth 2 (two) times daily. for 10 days  Dispense: 20 capsule; Refill: 0  -     albuterol (VENTOLIN HFA) 90 mcg/actuation inhaler; Inhale 2 puffs into the lungs every 6 (six) hours as needed for Wheezing or Shortness of Breath (cough). Rescue  Dispense: 18 g; Refill: 0  -     azelastine (ASTELIN) 137 mcg (0.1 %) nasal spray; 1 spray (137 mcg total) by Nasal route 2 (two) times daily.  Dispense: 30 mL; Refill: 2    Chronic bronchitis, unspecified chronic bronchitis type    Essential hypertension    Chronic kidney disease, stage 3a    Adenocarcinoma of right lung    Type 2 diabetes mellitus without retinopathy    Major depressive disorder, recurrent, mild    Aneurysm of other specified arteries    Type 2 diabetes mellitus with stage 3a chronic kidney disease, without long-term current use of insulin        Refilled inhaler for p.r.n. use  Stay hydrated with water  Monitor symptoms  Gargle with warm salt water as needed  Take meds as prescribed  Complete antibiotics    Health Maintenance Due   Topic Date Due    Shingles Vaccine (1 of 2) Never done    Foot Exam  02/06/2020    COVID-19 Vaccine (4 - Booster for Moderna series) 01/17/2022    Hemoglobin A1c  11/27/2022        I spent 35 minutes on the  day of this encounter for preparing for, evaluating, treating, and managing this patient.      -Continue current medications and maintain follow up with specialists.  Return to clinic as needed for any concerns  No follow-ups on file.       LASHELL PiperC  [facility]

## 2023-04-11 ENCOUNTER — TELEPHONE (OUTPATIENT)
Dept: ADMINISTRATIVE | Facility: CLINIC | Age: 73
End: 2023-04-11
Payer: MEDICARE

## 2023-04-11 ENCOUNTER — PATIENT MESSAGE (OUTPATIENT)
Dept: ADMINISTRATIVE | Facility: CLINIC | Age: 73
End: 2023-04-11
Payer: MEDICARE

## 2023-04-12 ENCOUNTER — PROCEDURE VISIT (OUTPATIENT)
Dept: DERMATOLOGY | Facility: CLINIC | Age: 73
End: 2023-04-12
Payer: MEDICARE

## 2023-04-12 VITALS
HEIGHT: 61 IN | BODY MASS INDEX: 27.94 KG/M2 | WEIGHT: 148 LBS | DIASTOLIC BLOOD PRESSURE: 77 MMHG | SYSTOLIC BLOOD PRESSURE: 130 MMHG | HEART RATE: 64 BPM

## 2023-04-12 DIAGNOSIS — C44.319 BASAL CELL CARCINOMA OF RIGHT FOREHEAD: Primary | ICD-10-CM

## 2023-04-12 PROCEDURE — 17311: ICD-10-PCS | Mod: S$GLB,,, | Performed by: DERMATOLOGY

## 2023-04-12 PROCEDURE — 17312: ICD-10-PCS | Mod: S$GLB,,, | Performed by: DERMATOLOGY

## 2023-04-12 PROCEDURE — 13132 CMPLX RPR F/C/C/M/N/AX/G/H/F: CPT | Mod: 51,S$GLB,, | Performed by: DERMATOLOGY

## 2023-04-12 PROCEDURE — 99499 NO LOS: ICD-10-PCS | Mod: S$GLB,,, | Performed by: DERMATOLOGY

## 2023-04-12 PROCEDURE — 17312 MOHS ADDL STAGE: CPT | Mod: S$GLB,,, | Performed by: DERMATOLOGY

## 2023-04-12 PROCEDURE — 13132 PR RECMPL WND HEAD,FAC,HAND 2.6-7.5 CM: ICD-10-PCS | Mod: 51,S$GLB,, | Performed by: DERMATOLOGY

## 2023-04-12 PROCEDURE — 99499 UNLISTED E&M SERVICE: CPT | Mod: S$GLB,,, | Performed by: DERMATOLOGY

## 2023-04-12 PROCEDURE — 17311 MOHS 1 STAGE H/N/HF/G: CPT | Mod: S$GLB,,, | Performed by: DERMATOLOGY

## 2023-04-12 RX ORDER — HYDROCODONE BITARTRATE AND ACETAMINOPHEN 5; 325 MG/1; MG/1
1 TABLET ORAL EVERY 6 HOURS PRN
Qty: 10 TABLET | Refills: 0 | Status: SHIPPED | OUTPATIENT
Start: 2023-04-12 | End: 2023-06-27

## 2023-04-12 RX ORDER — CEPHALEXIN 500 MG/1
500 CAPSULE ORAL 3 TIMES DAILY
Qty: 21 CAPSULE | Refills: 0 | Status: SHIPPED | OUTPATIENT
Start: 2023-04-12 | End: 2023-04-19

## 2023-04-12 NOTE — PROGRESS NOTES
PROCEDURE: Mohs' Micrographic Surgery    INDICATION: Location in non-mask areas of face where maximum conservation of tumor-free tissue is needed. Biopsy-proven skin cancer of cosmetically and functionally important areas, including head, neck, genital, hand, foot, or areas known for having difficulty in healing, such as the lower anterior legs. Tumor with ill-defined borders. Tumor with aggressive histopathology. Aggressive histopathology including sclerosing, morpheaform/infiltrating, micronodular, superficial multicentric, poorly differentiated, basosquamous, or perineural invasion.    REFERRING PROVIDER: Angle Mohr M.D.    CASE NUMBER:     ANESTHETIC: 3.5 cc 0.5% Bupivacaine with Epi 1:200,000 mixed 1:1 with plain 1% Lidocaine    SURGICAL PREP: Hibiclens    SURGEON: Franky Ac MD    ASSISTANTS: Ameena Ruano PA-C and Norma Alvarez MA    PREOPERATIVE DIAGNOSIS: basal cell carcinoma- nodular, infiltrating    POSTOPERATIVE DIAGNOSIS: basal cell carcinoma- nodular, sclerosing    PATHOLOGIC DIAGNOSIS: basal cell carcinoma- nodular, infiltrating, sclerosing    HISTOLOGY OF SPECIMENS IN FIRST STAGE:   Tumor Type: Tumor seen. Nodular basal cell carcinoma: Nodular tumor in dermis composed of basaloid cells exhibiting peripheral palisading and retraction artifact.  Sclerosing basal cell carcinoma: Basaloid tumor in dermis characterized by thin elongated strands of basaloid cells infiltrating between dermal collagen bundles.   Depth of Invasion: epidermis and dermis  Perineural Invasion: No    HISTOLOGY OF SPECIMENS IN SUBSEQUENT STAGES:  Tumor Type: Tumor seen. Same as in first stage.   Depth of Invasion: epidermis and dermis  Perineural Invasion: No    STAGES OF MOHS' SURGERY PERFORMED: 4    TUMOR-FREE PLANE ACHIEVED: Yes    HEMOSTASIS: electrocoagulation     SPECIMENS: 6 (2 in stage A, 2 in stage B, 1 in stage C, and 1 in stage D)    LOCATION: right lateral forehead. Location verified with Dr. Mohr's  clinical photograph. Patient also verified location with hand held mirror.    INITIAL LESION SIZE: 0.5 x 0.5 cm    FINAL DEFECT SIZE: 1.9 x 2.4 cm    WOUND REPAIR/DISPOSITION: The patient tolerated Mohs' Micrographic Surgery for a basal cell carcinoma very well. When the tumor was completely removed, a repair of the surgical defect was undertaken.    PROCEDURE: Complex Linear Repair    INDICATION: Status post Mohs' Micrographic Surgery for basal cell carcinoma.    CASE NUMBER:     SURGEON: Franky Ac MD    ASSISTANTS: Ameena Ruano PA-C, Norma Alvarez MA, and Filemon Pickard MD    ANESTHETIC: 2 cc 1% Lidocaine with Epinephrine 1:100,000    SURGICAL PREP: Hibiclens, prepped by Norma Alvarez MA    LOCATION: right lateral forehead    DEFECT SIZE: 1.9 x 2.4 cm    WOUND REPAIR/DISPOSITION:  After the patient's carcinoma had been completely removed with Mohs' Micrographic Surgery, a repair of the surgical defect was undertaken. The patient was returned to the operating suite where the area of right lateral forehead was prepped, draped, and anesthetized in the usual sterile fashion. The wound was widely undermined in all directions. The wound was undermined to a distance at least the maximum width of the defect as measured perpendicular to the closure line along at least one entire edge of the defect, in this case 2 cm. Then, electrocoagulation was used to obtain meticulous hemostasis. 4-0 Vicryl buried vertical mattress sutures were placed into the subcutaneous and dermal plane to close the wound and gary the cutaneous wound edge. Bilateral dog ears were identified and were removed by a standard Burow's triangle technique. The cutaneous wound edges were closed using interrupted 5-0 Prolene suture.    The patient tolerated the procedure well.    Disc lateral brow elevation - will come down with time. Disc that given her other scars in that area along with the surrounding precancers, a linear repair would be best  "long-term.     The area was cleaned and dressed appropriately and the patient was given wound care instructions, as well as appointment for follow-up evaluation and suture removal in 7 days. Patient was placed on Norco 5-325 mg prn postop pain and Keflex 500 mg TID x 7 days.    LENGTH OF REPAIR: 5 cm    Vitals:    04/12/23 1114 04/12/23 1451   BP: 125/63 130/77   BP Location: Left arm    Patient Position: Sitting    BP Method: Small (Automatic)    Pulse: 68 64   Weight: 67.1 kg (148 lb)    Height: 5' 1" (1.549 m)          "

## 2023-04-13 ENCOUNTER — TELEPHONE (OUTPATIENT)
Dept: ADMINISTRATIVE | Facility: CLINIC | Age: 73
End: 2023-04-13
Payer: MEDICARE

## 2023-04-13 ENCOUNTER — PES CALL (OUTPATIENT)
Dept: ADMINISTRATIVE | Facility: CLINIC | Age: 73
End: 2023-04-13
Payer: MEDICARE

## 2023-04-19 ENCOUNTER — OFFICE VISIT (OUTPATIENT)
Dept: DERMATOLOGY | Facility: CLINIC | Age: 73
End: 2023-04-19
Payer: MEDICARE

## 2023-04-19 DIAGNOSIS — Z09 POSTOP CHECK: Primary | ICD-10-CM

## 2023-04-19 PROCEDURE — 1126F PR PAIN SEVERITY QUANTIFIED, NO PAIN PRESENT: ICD-10-PCS | Mod: CPTII,S$GLB,, | Performed by: DERMATOLOGY

## 2023-04-19 PROCEDURE — 3288F PR FALLS RISK ASSESSMENT DOCUMENTED: ICD-10-PCS | Mod: CPTII,S$GLB,, | Performed by: DERMATOLOGY

## 2023-04-19 PROCEDURE — 1159F MED LIST DOCD IN RCRD: CPT | Mod: CPTII,S$GLB,, | Performed by: DERMATOLOGY

## 2023-04-19 PROCEDURE — 1101F PT FALLS ASSESS-DOCD LE1/YR: CPT | Mod: CPTII,S$GLB,, | Performed by: DERMATOLOGY

## 2023-04-19 PROCEDURE — 1157F ADVNC CARE PLAN IN RCRD: CPT | Mod: CPTII,S$GLB,, | Performed by: DERMATOLOGY

## 2023-04-19 PROCEDURE — 3288F FALL RISK ASSESSMENT DOCD: CPT | Mod: CPTII,S$GLB,, | Performed by: DERMATOLOGY

## 2023-04-19 PROCEDURE — 1157F PR ADVANCE CARE PLAN OR EQUIV PRESENT IN MEDICAL RECORD: ICD-10-PCS | Mod: CPTII,S$GLB,, | Performed by: DERMATOLOGY

## 2023-04-19 PROCEDURE — 4010F ACE/ARB THERAPY RXD/TAKEN: CPT | Mod: CPTII,S$GLB,, | Performed by: DERMATOLOGY

## 2023-04-19 PROCEDURE — 99024 POSTOP FOLLOW-UP VISIT: CPT | Mod: S$GLB,,, | Performed by: DERMATOLOGY

## 2023-04-19 PROCEDURE — 1126F AMNT PAIN NOTED NONE PRSNT: CPT | Mod: CPTII,S$GLB,, | Performed by: DERMATOLOGY

## 2023-04-19 PROCEDURE — 1101F PR PT FALLS ASSESS DOC 0-1 FALLS W/OUT INJ PAST YR: ICD-10-PCS | Mod: CPTII,S$GLB,, | Performed by: DERMATOLOGY

## 2023-04-19 PROCEDURE — 1159F PR MEDICATION LIST DOCUMENTED IN MEDICAL RECORD: ICD-10-PCS | Mod: CPTII,S$GLB,, | Performed by: DERMATOLOGY

## 2023-04-19 PROCEDURE — 99999 PR PBB SHADOW E&M-EST. PATIENT-LVL III: CPT | Mod: PBBFAC,,, | Performed by: DERMATOLOGY

## 2023-04-19 PROCEDURE — 4010F PR ACE/ARB THEARPY RXD/TAKEN: ICD-10-PCS | Mod: CPTII,S$GLB,, | Performed by: DERMATOLOGY

## 2023-04-19 PROCEDURE — 99999 PR PBB SHADOW E&M-EST. PATIENT-LVL III: ICD-10-PCS | Mod: PBBFAC,,, | Performed by: DERMATOLOGY

## 2023-04-19 PROCEDURE — 99024 PR POST-OP FOLLOW-UP VISIT: ICD-10-PCS | Mod: S$GLB,,, | Performed by: DERMATOLOGY

## 2023-04-19 NOTE — PROGRESS NOTES
72 y.o. female patient is here for suture removal following Mohs' surgery.    Patient reports no problems.    WOUND PE:  The R lateral forehead sutures intact. Wound healing well. Good skin edges. No signs or symptoms of infection.    IMPRESSION:  Healing operative site from Mohs' surgery, BCC R lateral forehead s/p Mohs with CLC, postop day #7.    PLAN:  Sutures removed today by  Amena Norris MA .   Steri-strips applied.  Continue wound care.  Keep moist with Aquaphor.    RTC:  In 3-6 months with Angle Mohr M.D. for skin check or sooner if new concern arises.

## 2023-05-05 ENCOUNTER — PATIENT MESSAGE (OUTPATIENT)
Dept: PRIMARY CARE CLINIC | Facility: CLINIC | Age: 73
End: 2023-05-05
Payer: MEDICARE

## 2023-05-17 ENCOUNTER — OFFICE VISIT (OUTPATIENT)
Dept: SURGERY | Facility: CLINIC | Age: 73
End: 2023-05-17
Payer: MEDICARE

## 2023-05-17 VITALS
BODY MASS INDEX: 27.08 KG/M2 | SYSTOLIC BLOOD PRESSURE: 139 MMHG | HEIGHT: 61 IN | HEART RATE: 79 BPM | DIASTOLIC BLOOD PRESSURE: 65 MMHG | WEIGHT: 143.44 LBS

## 2023-05-17 DIAGNOSIS — R10.9 ABDOMINAL PAIN, UNSPECIFIED ABDOMINAL LOCATION: ICD-10-CM

## 2023-05-17 DIAGNOSIS — R10.11 RUQ PAIN: Primary | ICD-10-CM

## 2023-05-17 PROCEDURE — 99214 OFFICE O/P EST MOD 30 MIN: CPT | Mod: ,,, | Performed by: SURGERY

## 2023-05-17 PROCEDURE — 1126F AMNT PAIN NOTED NONE PRSNT: CPT | Mod: CPTII,,, | Performed by: SURGERY

## 2023-05-17 PROCEDURE — 3008F PR BODY MASS INDEX (BMI) DOCUMENTED: ICD-10-PCS | Mod: CPTII,,, | Performed by: SURGERY

## 2023-05-17 PROCEDURE — 1157F ADVNC CARE PLAN IN RCRD: CPT | Mod: CPTII,,, | Performed by: SURGERY

## 2023-05-17 PROCEDURE — 99214 PR OFFICE/OUTPT VISIT, EST, LEVL IV, 30-39 MIN: ICD-10-PCS | Mod: ,,, | Performed by: SURGERY

## 2023-05-17 PROCEDURE — 1101F PT FALLS ASSESS-DOCD LE1/YR: CPT | Mod: CPTII,,, | Performed by: SURGERY

## 2023-05-17 PROCEDURE — 1159F MED LIST DOCD IN RCRD: CPT | Mod: CPTII,,, | Performed by: SURGERY

## 2023-05-17 PROCEDURE — 3008F BODY MASS INDEX DOCD: CPT | Mod: CPTII,,, | Performed by: SURGERY

## 2023-05-17 PROCEDURE — 1101F PR PT FALLS ASSESS DOC 0-1 FALLS W/OUT INJ PAST YR: ICD-10-PCS | Mod: CPTII,,, | Performed by: SURGERY

## 2023-05-17 PROCEDURE — 3288F FALL RISK ASSESSMENT DOCD: CPT | Mod: CPTII,,, | Performed by: SURGERY

## 2023-05-17 PROCEDURE — 1126F PR PAIN SEVERITY QUANTIFIED, NO PAIN PRESENT: ICD-10-PCS | Mod: CPTII,,, | Performed by: SURGERY

## 2023-05-17 PROCEDURE — 3075F PR MOST RECENT SYSTOLIC BLOOD PRESS GE 130-139MM HG: ICD-10-PCS | Mod: CPTII,,, | Performed by: SURGERY

## 2023-05-17 PROCEDURE — 1160F RVW MEDS BY RX/DR IN RCRD: CPT | Mod: CPTII,,, | Performed by: SURGERY

## 2023-05-17 PROCEDURE — 3288F PR FALLS RISK ASSESSMENT DOCUMENTED: ICD-10-PCS | Mod: CPTII,,, | Performed by: SURGERY

## 2023-05-17 PROCEDURE — 4010F ACE/ARB THERAPY RXD/TAKEN: CPT | Mod: CPTII,,, | Performed by: SURGERY

## 2023-05-17 PROCEDURE — 1159F PR MEDICATION LIST DOCUMENTED IN MEDICAL RECORD: ICD-10-PCS | Mod: CPTII,,, | Performed by: SURGERY

## 2023-05-17 PROCEDURE — 1157F PR ADVANCE CARE PLAN OR EQUIV PRESENT IN MEDICAL RECORD: ICD-10-PCS | Mod: CPTII,,, | Performed by: SURGERY

## 2023-05-17 PROCEDURE — 4010F PR ACE/ARB THEARPY RXD/TAKEN: ICD-10-PCS | Mod: CPTII,,, | Performed by: SURGERY

## 2023-05-17 PROCEDURE — 1160F PR REVIEW ALL MEDS BY PRESCRIBER/CLIN PHARMACIST DOCUMENTED: ICD-10-PCS | Mod: CPTII,,, | Performed by: SURGERY

## 2023-05-17 PROCEDURE — 3078F DIAST BP <80 MM HG: CPT | Mod: CPTII,,, | Performed by: SURGERY

## 2023-05-17 PROCEDURE — 3078F PR MOST RECENT DIASTOLIC BLOOD PRESSURE < 80 MM HG: ICD-10-PCS | Mod: CPTII,,, | Performed by: SURGERY

## 2023-05-17 PROCEDURE — 99999 PR PBB SHADOW E&M-EST. PATIENT-LVL IV: ICD-10-PCS | Mod: PBBFAC,,, | Performed by: SURGERY

## 2023-05-17 PROCEDURE — 99999 PR PBB SHADOW E&M-EST. PATIENT-LVL IV: CPT | Mod: PBBFAC,,, | Performed by: SURGERY

## 2023-05-17 PROCEDURE — 3075F SYST BP GE 130 - 139MM HG: CPT | Mod: CPTII,,, | Performed by: SURGERY

## 2023-05-17 NOTE — PROGRESS NOTES
"General Surgery Clinic  History and Physical    Subjective:     Jolly Matias is a 73 y.o. female with h/o lung cancer, DM, HTN s/p lung resection  and s/p two hernia repairs who presents to clinic for right upper quadrant discomfort and the feeling that "something is there". Sometimes she sometimes feels a sharp pain when she bends over but says that it usually feels like something is pressing against her and then she develops shortness of breath. She first noticed this about six months after her lung surgery and the discomfort has worsened over the past year and a half. Apart from discomfort, Patient has also experienced nausea but no vomiting or diarrhea. Symptoms do not worsened with meals and does not radiate anywhere. No fevers or chills.       PMH:   Past Medical History:   Diagnosis Date    Atypical ductal hyperplasia, breast     Basal cell carcinoma 2011    left forehead    Basal cell carcinoma 2015    R temporal scalp, R upper forehead, L upper forehead    Basal cell carcinoma 2015    right mid ala    Basal cell carcinoma (BCC) of right forehead 10/12/2022    BCC (basal cell carcinoma) excised     right shoulder    BCC (basal cell carcinoma) 2021    right medial shoulder    BCC (basal cell carcinoma) 2022    R upper forehead    Diabetes mellitus     Diverticulitis     Fibrocystic breast     HLD (hyperlipidemia)     Hypertension     Prediabetes 10/16/2013    Skin cancer        Past Surgical History:   Past Surgical History:   Procedure Laterality Date    APPENDECTOMY      bilateral breast duct excision      BREAST BIOPSY Right     Excisional bx, benign    BREAST BIOPSY Left     Excisional bx, benign     SECTION      x2    COLON SURGERY Left 2017    sigmoidectomy for diverticulitis    COLONOSCOPY N/A 2017    Procedure: COLONOSCOPY;  Surgeon: IBRAHIMA Philip MD;  Location: [facility] ([address] [place] [address]);  Service: Endoscopy;  Laterality: N/A;    EYE SURGERY      fulgaration of " endometriosis x 2      HYSTERECTOMY      For endometriosis and DUB--age 42, ovaries in?    INCISIONAL HERNIA REPAIR  08/2017    KNEE ARTHROSCOPY W/ DEBRIDEMENT Right 7/6/2018    Procedure: ARTHROSCOPY, KNEE, WITH DEBRIDEMENT;  Surgeon: MARVA Arias MD;  Location: [facility] OR [address] [place] [address];  Service: Orthopedics;  Laterality: Right;    KNEE ARTHROSCOPY W/ MENISCECTOMY Right 7/6/2018    Procedure: ARTHROSCOPY, KNEE, WITH MENISCECTOMY (partial lateral and medial menisectomy, chondraplasty;  Surgeon: MARVA Arias MD;  Location: [facility] OR [address] [place] [address];  Service: Orthopedics;  Laterality: Right;    KNEE ARTHROSCOPY W/ PLICA EXCISION Right 7/6/2018    Procedure: EXCISION, PLICA, KNEE, ARTHROSCOPIC,;  Surgeon: MARVA Arias MD;  Location: [facility] OR [address] [place] [address];  Service: Orthopedics;  Laterality: Right;    KNEE SURGERY      LAPAROSCOPIC REPAIR OF INCISIONAL HERNIA N/A 10/29/2020    Procedure: REPAIR, HERNIA, INCISIONAL, LAPAROSCOPIC recurrent, WITH MESH;  Surgeon: Deshawn Arias MD;  Location: [facility] OR [address] [place] [address];  Service: General;  Laterality: N/A;    OOPHORECTOMY      SKIN CANCER EXCISION      BCC forehead , temple, shoulder, chest    SURGICAL REMOVAL OF LYMPH NODE Right 4/9/2021    Procedure: EXCISION, LYMPH NODE;  Surgeon: Sloan Gómez MD;  Location: [facility] OR [address] [place] [address];  Service: Thoracic;  Laterality: Right;  Robotic mediastinal and hilar    TONSILLECTOMY      torn retina, left      XI ROBOTIC RATS,WITH LOBECTOMY,LUNG Right 4/9/2021    Procedure: XI ROBOTIC RATS,WITH LOBECTOMY,LUNG;  Surgeon: Sloan Gómez MD;  Location: [address];  Service: Thoracic;  Laterality: Right;       Social History:  Social History     Socioeconomic History    Marital status:    Tobacco Use    Smoking status: Never    Smokeless tobacco: Never   Substance and Sexual Activity    Alcohol use: Yes     Alcohol/week: 0.0 standard drinks     Types: 1 - 2 Glasses of wine per week     Comment: 3-4x /week    Drug use: No    Sexual  "activity: Yes     Partners: Male     Birth control/protection: Post-menopausal   Other Topics Concern    Are you pregnant or think you may be? No    Breast-feeding No   Social History Narrative    , walking some       Allergies:   Review of patient's allergies indicates:   Allergen Reactions    Hydrocodone      Also makes pt "very sleepy"    Kiwi (actinidia chinensis) Swelling       Medications:    Current Outpatient Medications on File Prior to Visit   Medication Sig Dispense Refill    albuterol (VENTOLIN HFA) 90 mcg/actuation inhaler Inhale 2 puffs into the lungs every 6 (six) hours as needed for Wheezing or Shortness of Breath (cough). Rescue 18 g 0    atorvastatin (LIPITOR) 20 MG tablet Take 1 tablet (20 mg total) by mouth once daily. 90 tablet 2    azelastine (ASTELIN) 137 mcg (0.1 %) nasal spray 1 spray (137 mcg total) by Nasal route 2 (two) times daily. 30 mL 2    flaxseed oil 1,000 mg Cap Take 1 capsule by mouth once daily.      HYDROcodone-acetaminophen (NORCO) 5-325 mg per tablet Take 1 tablet by mouth every 6 (six) hours as needed for Pain. 10 tablet 0    levocetirizine (XYZAL) 5 MG tablet Take 5 mg by mouth nightly.      meloxicam (MOBIC) 15 MG tablet TAKE 1 TABLET(15 MG) BY MOUTH EVERY DAY 90 tablet 1    metFORMIN (GLUCOPHAGE) 500 MG tablet Take 1 tablet (500 mg total) by mouth 2 (two) times daily with meals. 180 tablet 3    ofloxacin (OCUFLOX) 0.3 % ophthalmic solution Place 1 drop into both eyes 4 (four) times daily.      olmesartan (BENICAR) 40 MG tablet TAKE 1 TABLET(40 MG) BY MOUTH EVERY DAY 90 tablet 3    sertraline (ZOLOFT) 100 MG tablet Take 1 tablet (100 mg total) by mouth once daily. 90 tablet 3    spironolactone (ALDACTONE) 25 MG tablet TAKE 1 TABLET(25 MG) BY MOUTH EVERY DAY 90 tablet 3    tretinoin (RETIN-A) 0.025 % cream Compound tretinoin 0.025% / niacinamide 2% cream. Apply a pea-sized amount to entire face qhs then moisturize. 30 g 5    vitamin D 1000 units Tab Take 185 mg by " mouth once daily.      gabapentin (NEURONTIN) 300 MG capsule Take 1 capsule (300 mg total) by mouth 3 (three) times daily. (Patient not taking: Reported on 12/8/2022) 90 capsule 11    rOPINIRole (REQUIP) 2 MG tablet Take 1 tablet (2 mg total) by mouth every evening. (Patient not taking: Reported on 5/31/2022) 30 tablet 11     Current Facility-Administered Medications on File Prior to Visit   Medication Dose Route Frequency Provider Last Rate Last Admin    0.9%  NaCl infusion   Intravenous Continuous Deshawn Arias MD   Stopped at 04/09/21 1053    lidocaine (PF) 10 mg/ml (1%) injection 10 mg  1 mL Intradermal Once Deshawn Arias MD        midazolam (VERSED) 1 mg/mL injection 0.5 mg  0.5 mg Intravenous PRN Jhoan Kenny MD   2 mg at 10/29/20 0900         Objective:     PHYSICAL EXAM:  Vital Signs (Most Recent)  Pulse: 79 (05/17/23 0959)  BP: 139/65 (05/17/23 0959)    Review of Systems   Constitutional:  Negative for chills and fever.   HENT:  Negative for congestion and sore throat.    Respiratory:  Positive for cough and shortness of breath. Negative for hemoptysis.    Cardiovascular:  Positive for palpitations. Negative for leg swelling.   Gastrointestinal:  Positive for abdominal pain, heartburn and nausea. Negative for blood in stool, diarrhea and vomiting.  A 10+ review of systems was performed with pertinent positives and negatives noted above in the history of present illness.  Other systems were negative unless otherwise specified.    Physical Exam:  Physical Exam  Abdominal:      General: Abdomen is flat. There is no distension.      Tenderness: There is abdominal tenderness. There is no guarding.   Neurological:      Mental Status: She is alert.             Assessment:     73 y.o. female with right upper quadrant discomfort and pain on palpation.     Plan:     - CT ABd pelvis to eval for abnormality  - If CT is normal we will do a right upper quadrant US to eval gallbladder  - If normal  will refer back to thoracic since pain has been since her thoracic surgery

## 2023-05-22 ENCOUNTER — HOSPITAL ENCOUNTER (OUTPATIENT)
Dept: RADIOLOGY | Facility: HOSPITAL | Age: 73
Discharge: HOME | End: 2023-05-22
Attending: SURGERY
Payer: MEDICARE

## 2023-05-22 DIAGNOSIS — R10.11 RUQ PAIN: ICD-10-CM

## 2023-05-22 DIAGNOSIS — R10.9 ABDOMINAL PAIN, UNSPECIFIED ABDOMINAL LOCATION: ICD-10-CM

## 2023-05-22 PROCEDURE — 74176 CT ABD & PELVIS W/O CONTRAST: CPT | Mod: 26,,, | Performed by: RADIOLOGY

## 2023-05-22 PROCEDURE — 74176 CT ABDOMEN PELVIS WITHOUT CONTRAST: ICD-10-PCS | Mod: 26,,, | Performed by: RADIOLOGY

## 2023-05-22 PROCEDURE — 74176 CT ABD & PELVIS W/O CONTRAST: CPT | Mod: TC

## 2023-05-22 PROCEDURE — A9698 NON-RAD CONTRAST MATERIALNOC: HCPCS | Performed by: SURGERY

## 2023-05-22 PROCEDURE — 25500020 PHARM REV CODE 255: Performed by: SURGERY

## 2023-05-22 RX ADMIN — BARIUM SULFATE 450 ML: 20 SUSPENSION ORAL at 05:05

## 2023-05-26 LAB
CREAT SERPL-MCNC: 1.3 MG/DL (ref 0.5–1.4)
SAMPLE: NORMAL

## 2023-05-28 ENCOUNTER — PATIENT MESSAGE (OUTPATIENT)
Dept: SURGERY | Facility: CLINIC | Age: 73
End: 2023-05-28
Payer: MEDICARE

## 2023-05-28 DIAGNOSIS — R10.11 RUQ PAIN: Primary | ICD-10-CM

## 2023-05-29 ENCOUNTER — PES CALL (OUTPATIENT)
Dept: ADMINISTRATIVE | Facility: CLINIC | Age: 73
End: 2023-05-29
Payer: MEDICARE

## 2023-05-30 ENCOUNTER — PATIENT MESSAGE (OUTPATIENT)
Dept: SURGERY | Facility: CLINIC | Age: 73
End: 2023-05-30
Payer: MEDICARE

## 2023-06-07 ENCOUNTER — HOSPITAL ENCOUNTER (OUTPATIENT)
Dept: RADIOLOGY | Facility: HOSPITAL | Age: 73
Discharge: HOME | End: 2023-06-07
Attending: SURGERY
Payer: MEDICARE

## 2023-06-07 DIAGNOSIS — R10.11 RUQ PAIN: ICD-10-CM

## 2023-06-07 PROCEDURE — 76705 ECHO EXAM OF ABDOMEN: CPT | Mod: 26,,, | Performed by: RADIOLOGY

## 2023-06-07 PROCEDURE — 76705 ECHO EXAM OF ABDOMEN: CPT | Mod: TC

## 2023-06-07 PROCEDURE — 76705 US ABDOMEN LIMITED: ICD-10-PCS | Mod: 26,,, | Performed by: RADIOLOGY

## 2023-06-26 ENCOUNTER — TELEPHONE (OUTPATIENT)
Dept: FAMILY MEDICINE | Facility: CLINIC | Age: 73
End: 2023-06-26
Payer: MEDICARE

## 2023-06-26 NOTE — TELEPHONE ENCOUNTER
----- Message from Nayely Dia sent at 6/26/2023  3:30 PM CDT -----  Type:  Needs Medical Advice    Who Called: pt  Symptoms (please be specific): pt is having pressure issues needs to schedule as soon as possible - her old PCP left [facility]  She was referred to you by a pt  Would the patient rather a call back or a response via [facility]? call  Best Call Back Number: [phone]  Additional Information:

## 2023-06-27 ENCOUNTER — OFFICE VISIT (OUTPATIENT)
Dept: INTERNAL MEDICINE | Facility: CLINIC | Age: 73
End: 2023-06-27
Payer: MEDICARE

## 2023-06-27 ENCOUNTER — LAB VISIT (OUTPATIENT)
Dept: LAB | Facility: HOSPITAL | Age: 73
End: 2023-06-27
Attending: INTERNAL MEDICINE
Payer: MEDICARE

## 2023-06-27 VITALS
DIASTOLIC BLOOD PRESSURE: 58 MMHG | WEIGHT: 145.75 LBS | HEIGHT: 61 IN | SYSTOLIC BLOOD PRESSURE: 104 MMHG | OXYGEN SATURATION: 97 % | HEART RATE: 73 BPM | BODY MASS INDEX: 27.52 KG/M2

## 2023-06-27 DIAGNOSIS — N18.31 CHRONIC KIDNEY DISEASE, STAGE 3A: ICD-10-CM

## 2023-06-27 DIAGNOSIS — I10 ESSENTIAL HYPERTENSION: Primary | ICD-10-CM

## 2023-06-27 DIAGNOSIS — R73.03 PREDIABETES: ICD-10-CM

## 2023-06-27 DIAGNOSIS — I10 ESSENTIAL HYPERTENSION: ICD-10-CM

## 2023-06-27 LAB
ALBUMIN SERPL BCP-MCNC: 4.1 G/DL (ref 3.5–5.2)
ALBUMIN/CREAT UR: 4.7 UG/MG (ref 0–30)
ALP SERPL-CCNC: 102 U/L (ref 55–135)
ALT SERPL W/O P-5'-P-CCNC: 10 U/L (ref 10–44)
ANION GAP SERPL CALC-SCNC: 10 MMOL/L (ref 8–16)
AST SERPL-CCNC: 14 U/L (ref 10–40)
BILIRUB SERPL-MCNC: 0.4 MG/DL (ref 0.1–1)
BUN SERPL-MCNC: 20 MG/DL (ref 8–23)
CALCIUM SERPL-MCNC: 9.6 MG/DL (ref 8.7–10.5)
CHLORIDE SERPL-SCNC: 109 MMOL/L (ref 95–110)
CO2 SERPL-SCNC: 23 MMOL/L (ref 23–29)
CREAT SERPL-MCNC: 1.1 MG/DL (ref 0.5–1.4)
CREAT UR-MCNC: 279 MG/DL (ref 15–325)
EST. GFR  (NO RACE VARIABLE): 52.7 ML/MIN/1.73 M^2
ESTIMATED AVG GLUCOSE: 120 MG/DL (ref 68–131)
GLUCOSE SERPL-MCNC: 104 MG/DL (ref 70–110)
HBA1C MFR BLD: 5.8 % (ref 4–5.6)
MICROALBUMIN UR DL<=1MG/L-MCNC: 13 UG/ML
POTASSIUM SERPL-SCNC: 4.3 MMOL/L (ref 3.5–5.1)
PROT SERPL-MCNC: 7.1 G/DL (ref 6–8.4)
SODIUM SERPL-SCNC: 142 MMOL/L (ref 136–145)

## 2023-06-27 PROCEDURE — 3288F PR FALLS RISK ASSESSMENT DOCUMENTED: ICD-10-PCS | Mod: CPTII,S$GLB,, | Performed by: INTERNAL MEDICINE

## 2023-06-27 PROCEDURE — 80053 COMPREHEN METABOLIC PANEL: CPT | Performed by: INTERNAL MEDICINE

## 2023-06-27 PROCEDURE — 1160F PR REVIEW ALL MEDS BY PRESCRIBER/CLIN PHARMACIST DOCUMENTED: ICD-10-PCS | Mod: CPTII,S$GLB,, | Performed by: INTERNAL MEDICINE

## 2023-06-27 PROCEDURE — 1101F PT FALLS ASSESS-DOCD LE1/YR: CPT | Mod: CPTII,S$GLB,, | Performed by: INTERNAL MEDICINE

## 2023-06-27 PROCEDURE — 3008F BODY MASS INDEX DOCD: CPT | Mod: CPTII,S$GLB,, | Performed by: INTERNAL MEDICINE

## 2023-06-27 PROCEDURE — 99999 PR PBB SHADOW E&M-EST. PATIENT-LVL III: CPT | Mod: PBBFAC,,, | Performed by: INTERNAL MEDICINE

## 2023-06-27 PROCEDURE — 1159F PR MEDICATION LIST DOCUMENTED IN MEDICAL RECORD: ICD-10-PCS | Mod: CPTII,S$GLB,, | Performed by: INTERNAL MEDICINE

## 2023-06-27 PROCEDURE — 99214 PR OFFICE/OUTPT VISIT, EST, LEVL IV, 30-39 MIN: ICD-10-PCS | Mod: S$GLB,,, | Performed by: INTERNAL MEDICINE

## 2023-06-27 PROCEDURE — 1160F RVW MEDS BY RX/DR IN RCRD: CPT | Mod: CPTII,S$GLB,, | Performed by: INTERNAL MEDICINE

## 2023-06-27 PROCEDURE — 4010F PR ACE/ARB THEARPY RXD/TAKEN: ICD-10-PCS | Mod: CPTII,S$GLB,, | Performed by: INTERNAL MEDICINE

## 2023-06-27 PROCEDURE — 99999 PR PBB SHADOW E&M-EST. PATIENT-LVL III: ICD-10-PCS | Mod: PBBFAC,,, | Performed by: INTERNAL MEDICINE

## 2023-06-27 PROCEDURE — 1157F PR ADVANCE CARE PLAN OR EQUIV PRESENT IN MEDICAL RECORD: ICD-10-PCS | Mod: CPTII,S$GLB,, | Performed by: INTERNAL MEDICINE

## 2023-06-27 PROCEDURE — 83036 HEMOGLOBIN GLYCOSYLATED A1C: CPT | Performed by: INTERNAL MEDICINE

## 2023-06-27 PROCEDURE — 3078F DIAST BP <80 MM HG: CPT | Mod: CPTII,S$GLB,, | Performed by: INTERNAL MEDICINE

## 2023-06-27 PROCEDURE — 1126F AMNT PAIN NOTED NONE PRSNT: CPT | Mod: CPTII,S$GLB,, | Performed by: INTERNAL MEDICINE

## 2023-06-27 PROCEDURE — 99214 OFFICE O/P EST MOD 30 MIN: CPT | Mod: S$GLB,,, | Performed by: INTERNAL MEDICINE

## 2023-06-27 PROCEDURE — 4010F ACE/ARB THERAPY RXD/TAKEN: CPT | Mod: CPTII,S$GLB,, | Performed by: INTERNAL MEDICINE

## 2023-06-27 PROCEDURE — 36415 COLL VENOUS BLD VENIPUNCTURE: CPT | Performed by: INTERNAL MEDICINE

## 2023-06-27 PROCEDURE — 82570 ASSAY OF URINE CREATININE: CPT | Performed by: INTERNAL MEDICINE

## 2023-06-27 PROCEDURE — 1157F ADVNC CARE PLAN IN RCRD: CPT | Mod: CPTII,S$GLB,, | Performed by: INTERNAL MEDICINE

## 2023-06-27 PROCEDURE — 3074F PR MOST RECENT SYSTOLIC BLOOD PRESSURE < 130 MM HG: ICD-10-PCS | Mod: CPTII,S$GLB,, | Performed by: INTERNAL MEDICINE

## 2023-06-27 PROCEDURE — 1159F MED LIST DOCD IN RCRD: CPT | Mod: CPTII,S$GLB,, | Performed by: INTERNAL MEDICINE

## 2023-06-27 PROCEDURE — 1101F PR PT FALLS ASSESS DOC 0-1 FALLS W/OUT INJ PAST YR: ICD-10-PCS | Mod: CPTII,S$GLB,, | Performed by: INTERNAL MEDICINE

## 2023-06-27 PROCEDURE — 3288F FALL RISK ASSESSMENT DOCD: CPT | Mod: CPTII,S$GLB,, | Performed by: INTERNAL MEDICINE

## 2023-06-27 PROCEDURE — 3074F SYST BP LT 130 MM HG: CPT | Mod: CPTII,S$GLB,, | Performed by: INTERNAL MEDICINE

## 2023-06-27 PROCEDURE — 1126F PR PAIN SEVERITY QUANTIFIED, NO PAIN PRESENT: ICD-10-PCS | Mod: CPTII,S$GLB,, | Performed by: INTERNAL MEDICINE

## 2023-06-27 PROCEDURE — 3008F PR BODY MASS INDEX (BMI) DOCUMENTED: ICD-10-PCS | Mod: CPTII,S$GLB,, | Performed by: INTERNAL MEDICINE

## 2023-06-27 PROCEDURE — 3078F PR MOST RECENT DIASTOLIC BLOOD PRESSURE < 80 MM HG: ICD-10-PCS | Mod: CPTII,S$GLB,, | Performed by: INTERNAL MEDICINE

## 2023-06-27 NOTE — PROGRESS NOTES
Subjective:       Patient ID: Jolly Matias is a 73 y.o. female.    Chief Complaint:   Hypertension    HPI - for the past 2 days, she has noticed her blood pressure has been high.  She has had some dizziness and nausea/vomiting with this.  She noticed numbers in the 179-180 over 70s.  Today it is down.  She feels fine today.  Due for some diabetes health maintenance.  Her provider left, and she is reestablishing somebody else.  She is not a smoker    Pmh/meds:  Reviewed and reconciled in EPIC with patient during visit today.    Review of Systems   Constitutional:  Negative for fever.   HENT:  Negative for congestion.    Respiratory:  Negative for shortness of breath.    Cardiovascular:  Negative for chest pain.   Gastrointestinal:  Positive for nausea and vomiting.   Genitourinary:  Negative for difficulty urinating.   Musculoskeletal:  Negative for arthralgias.   Skin:  Negative for rash.   Neurological:  Positive for dizziness.   Psychiatric/Behavioral:  Negative for sleep disturbance.      Objective:      Physical Exam  Vitals reviewed.   Constitutional:       Appearance: She is well-developed.   HENT:      Head: Normocephalic and atraumatic.   Cardiovascular:      Rate and Rhythm: Normal rate and regular rhythm.      Heart sounds: Normal heart sounds. No murmur heard.    No friction rub. No gallop.   Pulmonary:      Effort: Pulmonary effort is normal. No respiratory distress.      Breath sounds: Normal breath sounds. No wheezing or rales.   Chest:      Chest wall: No tenderness.   Skin:     General: Skin is warm and dry.      Findings: No erythema.   Neurological:      General: No focal deficit present.      Mental Status: She is alert.   Psychiatric:         Mood and Affect: Mood normal.       Assessment:       1. Essential hypertension    2. Prediabetes    3. Chronic kidney disease, stage 3a        Plan:       Jolly was seen today for hypertension.    Diagnoses and all orders for this visit:    Essential  hypertension - at goal today.  Will look at some labs; watch bp going forward.  Not sure what happened  -     COMPREHENSIVE METABOLIC PANEL; Future    Prediabetes - has been at goal.  labs  -     Microalbumin/Creatinine Ratio, Urine  -     Hemoglobin A1C; Future    Chronic kidney disease, stage 3a - labs due.    -     COMPREHENSIVE METABOLIC PANEL; Future    Rtc prn    PEPE Crabtree MD MPH  Staff Internist

## 2023-06-30 ENCOUNTER — OFFICE VISIT (OUTPATIENT)
Dept: FAMILY MEDICINE | Facility: CLINIC | Age: 73
End: 2023-06-30
Payer: MEDICARE

## 2023-06-30 ENCOUNTER — TELEPHONE (OUTPATIENT)
Dept: PULMONOLOGY | Facility: CLINIC | Age: 73
End: 2023-06-30
Payer: MEDICARE

## 2023-06-30 ENCOUNTER — PATIENT MESSAGE (OUTPATIENT)
Dept: DERMATOLOGY | Facility: CLINIC | Age: 73
End: 2023-06-30
Payer: MEDICARE

## 2023-06-30 VITALS
HEART RATE: 96 BPM | BODY MASS INDEX: 27.14 KG/M2 | DIASTOLIC BLOOD PRESSURE: 64 MMHG | SYSTOLIC BLOOD PRESSURE: 118 MMHG | OXYGEN SATURATION: 96 % | WEIGHT: 143.75 LBS | HEIGHT: 61 IN

## 2023-06-30 DIAGNOSIS — E11.69 DM TYPE 2 WITH DIABETIC DYSLIPIDEMIA: ICD-10-CM

## 2023-06-30 DIAGNOSIS — F33.0 MAJOR DEPRESSIVE DISORDER, RECURRENT, MILD: ICD-10-CM

## 2023-06-30 DIAGNOSIS — Z78.0 ASYMPTOMATIC MENOPAUSE: ICD-10-CM

## 2023-06-30 DIAGNOSIS — E78.5 DM TYPE 2 WITH DIABETIC DYSLIPIDEMIA: ICD-10-CM

## 2023-06-30 DIAGNOSIS — E11.22 TYPE 2 DIABETES MELLITUS WITH STAGE 3A CHRONIC KIDNEY DISEASE, WITHOUT LONG-TERM CURRENT USE OF INSULIN: ICD-10-CM

## 2023-06-30 DIAGNOSIS — Z85.118 H/O: LUNG CANCER: ICD-10-CM

## 2023-06-30 DIAGNOSIS — N18.31 TYPE 2 DIABETES MELLITUS WITH STAGE 3A CHRONIC KIDNEY DISEASE, WITHOUT LONG-TERM CURRENT USE OF INSULIN: ICD-10-CM

## 2023-06-30 DIAGNOSIS — I10 ESSENTIAL HYPERTENSION: Primary | ICD-10-CM

## 2023-06-30 DIAGNOSIS — J42 CHRONIC BRONCHITIS, UNSPECIFIED CHRONIC BRONCHITIS TYPE: ICD-10-CM

## 2023-06-30 DIAGNOSIS — N18.31 CHRONIC KIDNEY DISEASE, STAGE 3A: ICD-10-CM

## 2023-06-30 PROCEDURE — 3044F HG A1C LEVEL LT 7.0%: CPT | Mod: CPTII,S$GLB,, | Performed by: FAMILY MEDICINE

## 2023-06-30 PROCEDURE — 1101F PR PT FALLS ASSESS DOC 0-1 FALLS W/OUT INJ PAST YR: ICD-10-PCS | Mod: CPTII,S$GLB,, | Performed by: FAMILY MEDICINE

## 2023-06-30 PROCEDURE — 1101F PT FALLS ASSESS-DOCD LE1/YR: CPT | Mod: CPTII,S$GLB,, | Performed by: FAMILY MEDICINE

## 2023-06-30 PROCEDURE — 1159F PR MEDICATION LIST DOCUMENTED IN MEDICAL RECORD: ICD-10-PCS | Mod: CPTII,S$GLB,, | Performed by: FAMILY MEDICINE

## 2023-06-30 PROCEDURE — 4010F PR ACE/ARB THEARPY RXD/TAKEN: ICD-10-PCS | Mod: CPTII,S$GLB,, | Performed by: FAMILY MEDICINE

## 2023-06-30 PROCEDURE — 3078F PR MOST RECENT DIASTOLIC BLOOD PRESSURE < 80 MM HG: ICD-10-PCS | Mod: CPTII,S$GLB,, | Performed by: FAMILY MEDICINE

## 2023-06-30 PROCEDURE — 3008F PR BODY MASS INDEX (BMI) DOCUMENTED: ICD-10-PCS | Mod: CPTII,S$GLB,, | Performed by: FAMILY MEDICINE

## 2023-06-30 PROCEDURE — 3061F NEG MICROALBUMINURIA REV: CPT | Mod: CPTII,S$GLB,, | Performed by: FAMILY MEDICINE

## 2023-06-30 PROCEDURE — 3078F DIAST BP <80 MM HG: CPT | Mod: CPTII,S$GLB,, | Performed by: FAMILY MEDICINE

## 2023-06-30 PROCEDURE — 3008F BODY MASS INDEX DOCD: CPT | Mod: CPTII,S$GLB,, | Performed by: FAMILY MEDICINE

## 2023-06-30 PROCEDURE — 3044F PR MOST RECENT HEMOGLOBIN A1C LEVEL <7.0%: ICD-10-PCS | Mod: CPTII,S$GLB,, | Performed by: FAMILY MEDICINE

## 2023-06-30 PROCEDURE — 1159F MED LIST DOCD IN RCRD: CPT | Mod: CPTII,S$GLB,, | Performed by: FAMILY MEDICINE

## 2023-06-30 PROCEDURE — 1160F PR REVIEW ALL MEDS BY PRESCRIBER/CLIN PHARMACIST DOCUMENTED: ICD-10-PCS | Mod: CPTII,S$GLB,, | Performed by: FAMILY MEDICINE

## 2023-06-30 PROCEDURE — 1157F ADVNC CARE PLAN IN RCRD: CPT | Mod: CPTII,S$GLB,, | Performed by: FAMILY MEDICINE

## 2023-06-30 PROCEDURE — 3061F PR NEG MICROALBUMINURIA RESULT DOCUMENTED/REVIEW: ICD-10-PCS | Mod: CPTII,S$GLB,, | Performed by: FAMILY MEDICINE

## 2023-06-30 PROCEDURE — 99215 OFFICE O/P EST HI 40 MIN: CPT | Mod: S$GLB,,, | Performed by: FAMILY MEDICINE

## 2023-06-30 PROCEDURE — 4010F ACE/ARB THERAPY RXD/TAKEN: CPT | Mod: CPTII,S$GLB,, | Performed by: FAMILY MEDICINE

## 2023-06-30 PROCEDURE — 1157F PR ADVANCE CARE PLAN OR EQUIV PRESENT IN MEDICAL RECORD: ICD-10-PCS | Mod: CPTII,S$GLB,, | Performed by: FAMILY MEDICINE

## 2023-06-30 PROCEDURE — 99999 PR PBB SHADOW E&M-EST. PATIENT-LVL IV: CPT | Mod: PBBFAC,,, | Performed by: FAMILY MEDICINE

## 2023-06-30 PROCEDURE — 3074F SYST BP LT 130 MM HG: CPT | Mod: CPTII,S$GLB,, | Performed by: FAMILY MEDICINE

## 2023-06-30 PROCEDURE — 3066F NEPHROPATHY DOC TX: CPT | Mod: CPTII,S$GLB,, | Performed by: FAMILY MEDICINE

## 2023-06-30 PROCEDURE — 99999 PR PBB SHADOW E&M-EST. PATIENT-LVL IV: ICD-10-PCS | Mod: PBBFAC,,, | Performed by: FAMILY MEDICINE

## 2023-06-30 PROCEDURE — 3074F PR MOST RECENT SYSTOLIC BLOOD PRESSURE < 130 MM HG: ICD-10-PCS | Mod: CPTII,S$GLB,, | Performed by: FAMILY MEDICINE

## 2023-06-30 PROCEDURE — 3288F PR FALLS RISK ASSESSMENT DOCUMENTED: ICD-10-PCS | Mod: CPTII,S$GLB,, | Performed by: FAMILY MEDICINE

## 2023-06-30 PROCEDURE — 3288F FALL RISK ASSESSMENT DOCD: CPT | Mod: CPTII,S$GLB,, | Performed by: FAMILY MEDICINE

## 2023-06-30 PROCEDURE — 1126F PR PAIN SEVERITY QUANTIFIED, NO PAIN PRESENT: ICD-10-PCS | Mod: CPTII,S$GLB,, | Performed by: FAMILY MEDICINE

## 2023-06-30 PROCEDURE — 1160F RVW MEDS BY RX/DR IN RCRD: CPT | Mod: CPTII,S$GLB,, | Performed by: FAMILY MEDICINE

## 2023-06-30 PROCEDURE — 3066F PR DOCUMENTATION OF TREATMENT FOR NEPHROPATHY: ICD-10-PCS | Mod: CPTII,S$GLB,, | Performed by: FAMILY MEDICINE

## 2023-06-30 PROCEDURE — 1126F AMNT PAIN NOTED NONE PRSNT: CPT | Mod: CPTII,S$GLB,, | Performed by: FAMILY MEDICINE

## 2023-06-30 PROCEDURE — 99215 PR OFFICE/OUTPT VISIT, EST, LEVL V, 40-54 MIN: ICD-10-PCS | Mod: S$GLB,,, | Performed by: FAMILY MEDICINE

## 2023-06-30 RX ORDER — METFORMIN HYDROCHLORIDE 500 MG/1
500 TABLET ORAL 2 TIMES DAILY WITH MEALS
Qty: 180 TABLET | Refills: 1 | Status: SHIPPED | OUTPATIENT
Start: 2023-06-30 | End: 2023-09-13

## 2023-06-30 RX ORDER — OLMESARTAN MEDOXOMIL 40 MG/1
40 TABLET ORAL DAILY
Qty: 90 TABLET | Refills: 1 | Status: SHIPPED | OUTPATIENT
Start: 2023-06-30 | End: 2024-03-08 | Stop reason: SDUPTHER

## 2023-06-30 RX ORDER — SERTRALINE HYDROCHLORIDE 100 MG/1
100 TABLET, FILM COATED ORAL DAILY
Qty: 90 TABLET | Refills: 1 | Status: SHIPPED | OUTPATIENT
Start: 2023-06-30 | End: 2023-09-05

## 2023-06-30 RX ORDER — SPIRONOLACTONE 25 MG/1
25 TABLET ORAL DAILY
Qty: 90 TABLET | Refills: 1 | Status: SHIPPED | OUTPATIENT
Start: 2023-06-30 | End: 2024-03-05

## 2023-06-30 RX ORDER — ATORVASTATIN CALCIUM 20 MG/1
20 TABLET, FILM COATED ORAL DAILY
Qty: 90 TABLET | Refills: 1 | Status: SHIPPED | OUTPATIENT
Start: 2023-06-30 | End: 2024-03-05

## 2023-06-30 NOTE — TELEPHONE ENCOUNTER
"----- Message from Anisha Eldridge sent at 6/30/2023 11:03 AM CDT -----  Good morning    Patient with  a Referral to Pulmonology from Dr Maddi Blandon.  Diagnosis "Chronic bronchitis, unspecified chronic bronchitis type [J42]  H/O: lung cancer "  Patient ep and is pending a follow up.  Can you please assist scheduling patient  Thanks    Anisha"

## 2023-06-30 NOTE — TELEPHONE ENCOUNTER
Left message on patient voicemail, informing her that I'm contacting her in regards to scheduling her appointment with Dr Jacinto. I also advised patient that if she has any questions or concerns, she may contact the office. Office number has been provided.

## 2023-06-30 NOTE — PROGRESS NOTES
Subjective:       Patient ID: Jolly Matias is a 73 y.o. female.    Chief Complaint: Establish Care, Hypertension, Hyperlipidemia, and Diabetes  74 yo female with h/o lung CA, HTN, CKD, Stage 3, hyperlipidemia and DM, Type 2 presents to [facility] and to f/u on her chronic conditions.  Hypertension  This is a chronic problem. The current episode started more than 1 year ago. The problem is unchanged. The problem is controlled. Associated symptoms include palpitations and shortness of breath. Pertinent negatives include no blurred vision, chest pain, headaches, orthopnea, peripheral edema or PND. The current treatment provides significant improvement. There are no compliance problems.    Diabetes  She presents for her follow-up diabetic visit. She has type 2 diabetes mellitus. There are no hypoglycemic associated symptoms. Pertinent negatives for hypoglycemia include no headaches. Associated symptoms include fatigue. Pertinent negatives for diabetes include no blurred vision, no chest pain, no foot paresthesias, no foot ulcerations, no polydipsia, no polyphagia, no polyuria, no visual change and no weakness. There are no hypoglycemic complications. Current diabetic treatment includes oral agent (monotherapy). She is compliant with treatment all of the time. She rarely participates in exercise. An ACE inhibitor/angiotensin II receptor blocker is being taken. Eye exam is current.   Pt has been having RUQ discomfort. Normal abd CT on 5/26/23. Abd US done 6/7/23 was negative for gallstones.  Pt is followed by Dr. Daphne Jacinto for lung CA. Had a chest CT 9/23/22. Pt s/p robotic right lower lobectomy by Dr. Sloan Gómez on 4/9/21. 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  Review of Systems   Constitutional:  Positive for fatigue.   Eyes:  Negative for blurred vision.   Respiratory:  Positive for shortness of breath.    Cardiovascular:  Positive for palpitations. Negative for chest pain,  orthopnea and PND.   Endocrine: Negative for polydipsia, polyphagia and polyuria.   Neurological:  Negative for weakness and headaches.     Objective:      Physical Exam  Vitals reviewed.   Constitutional:       General: She is not in acute distress.     Appearance: Normal appearance. She is not ill-appearing.   HENT:      Head: Normocephalic and atraumatic.      Right Ear: External ear normal.      Left Ear: External ear normal.   Eyes:      General:         Right eye: No discharge.         Left eye: No discharge.      Extraocular Movements: Extraocular movements intact.      Conjunctiva/sclera: Conjunctivae normal.   Neck:      Vascular: No carotid bruit.   Cardiovascular:      Rate and Rhythm: Normal rate and regular rhythm.      Pulses: Normal pulses.           Dorsalis pedis pulses are 2+ on the right side and 2+ on the left side.        Posterior tibial pulses are 2+ on the right side and 2+ on the left side.      Heart sounds: Normal heart sounds. No murmur heard.    No gallop.   Pulmonary:      Effort: Pulmonary effort is normal.      Breath sounds: Examination of the right-middle field reveals rhonchi. Examination of the left-middle field reveals rhonchi. Examination of the right-lower field reveals rhonchi. Examination of the left-lower field reveals rhonchi. Rhonchi present. No wheezing or rales.   Abdominal:      General: Bowel sounds are normal.      Palpations: Abdomen is soft. There is no mass.      Tenderness: There is no abdominal tenderness.   Musculoskeletal:      Cervical back: Normal range of motion and neck supple. No rigidity or tenderness.      Right lower leg: No edema.      Left lower leg: No edema.      Right foot: Normal range of motion. No deformity or bunion.      Left foot: Normal range of motion. No deformity or bunion.   Feet:      Right foot:      Protective Sensation: 10 sites tested.  10 sites sensed.      Skin integrity: Skin integrity normal.      Toenail Condition: Right  toenails are normal.      Left foot:      Protective Sensation: 10 sites tested.  10 sites sensed.      Skin integrity: Skin integrity normal.      Toenail Condition: Left toenails are normal.   Skin:     General: Skin is warm and dry.   Neurological:      Mental Status: She is alert and oriented to person, place, and time.   Psychiatric:         Mood and Affect: Mood normal.       Assessment:         See plan  Plan:       Essential hypertension: Stable  -     olmesartan (BENICAR) 40 MG tablet; Take 1 tablet (40 mg total) by mouth once daily.  Dispense: 90 tablet; Refill: 1  -     spironolactone (ALDACTONE) 25 MG tablet; Take 1 tablet (25 mg total) by mouth once daily.  Dispense: 90 tablet; Refill: 1    Chronic kidney disease, stage 3a: Stable  -     olmesartan (BENICAR) 40 MG tablet; Take 1 tablet (40 mg total) by mouth once daily.  Dispense: 90 tablet; Refill: 1    Type 2 diabetes mellitus with stage 3a chronic kidney disease, without long-term current use of insulin: Stable    DM type 2 with diabetic dyslipidemia: Stable  -     atorvastatin (LIPITOR) 20 MG tablet; Take 1 tablet (20 mg total) by mouth once daily.  Dispense: 90 tablet; Refill: 1  -     metFORMIN (GLUCOPHAGE) 500 MG tablet; Take 1 tablet (500 mg total) by mouth 2 (two) times daily with meals.  Dispense: 180 tablet; Refill: 1  -     Comprehensive Metabolic Panel; Future; Expected date: 09/30/2023  -     Hemoglobin A1C; Future; Expected date: 09/30/2023  -     Lipid Panel; Future; Expected date: 09/30/2023    Chronic bronchitis, unspecified chronic bronchitis type: Stable  -     Ambulatory referral/consult to Pulmonology; Future; Expected date: 07/07/2023    Major depressive disorder, recurrent, mild: Stable  -     sertraline (ZOLOFT) 100 MG tablet; Take 1 tablet (100 mg total) by mouth once daily.  Dispense: 90 tablet; Refill: 1    H/O: lung cancer  -     Ambulatory referral/consult to Pulmonology; Future; Expected date: 07/07/2023    Asymptomatic  menopause  -     DXA Bone Density Axial Skeleton 1 or more sites; Future; Expected date: 06/30/2023       F/U in 4 months.

## 2023-07-03 ENCOUNTER — TELEPHONE (OUTPATIENT)
Dept: ADMINISTRATIVE | Facility: OTHER | Age: 73
End: 2023-07-03
Payer: MEDICARE

## 2023-07-05 ENCOUNTER — TELEPHONE (OUTPATIENT)
Dept: PULMONOLOGY | Facility: CLINIC | Age: 73
End: 2023-07-05
Payer: MEDICARE

## 2023-07-05 NOTE — TELEPHONE ENCOUNTER
----- Message from Anisha Eldridge sent at 7/5/2023  4:42 PM CDT -----  Good afternoon  I called patient to let her know that your office is trying to reach her to schedule her appointment with Dr Jacinto.  Patient states that she checked her messages and was not able to find your number to call you back  Can you please assist her.   Thanks    Anisha

## 2023-07-05 NOTE — TELEPHONE ENCOUNTER
Patient appointment has been scheduled on 7/20/23 for 3:30 pm with Dr Jacinto. Patient verbalized that he understand and accepted the appointment.

## 2023-07-07 ENCOUNTER — HOSPITAL ENCOUNTER (OUTPATIENT)
Dept: RADIOLOGY | Facility: HOSPITAL | Age: 73
Discharge: HOME | End: 2023-07-07
Attending: FAMILY MEDICINE
Payer: MEDICARE

## 2023-07-07 DIAGNOSIS — Z78.0 ASYMPTOMATIC MENOPAUSE: ICD-10-CM

## 2023-07-07 PROCEDURE — 77080 DXA BONE DENSITY AXIAL SKELETON 1 OR MORE SITES: ICD-10-PCS | Mod: 26,,, | Performed by: RADIOLOGY

## 2023-07-07 PROCEDURE — 77080 DXA BONE DENSITY AXIAL: CPT | Mod: 26,,, | Performed by: RADIOLOGY

## 2023-07-07 PROCEDURE — 77080 DXA BONE DENSITY AXIAL: CPT | Mod: TC

## 2023-07-08 ENCOUNTER — TELEPHONE (OUTPATIENT)
Dept: SURGERY | Facility: CLINIC | Age: 73
End: 2023-07-08
Payer: MEDICARE

## 2023-07-08 NOTE — TELEPHONE ENCOUNTER
Follow up phone call placed to patient to see how she is doing and if she needs any assistance on a referral to Thoracic surgery.   No answer.  Left voice message.

## 2023-07-08 NOTE — TELEPHONE ENCOUNTER
----- Message from Yg Haile Jr., MD sent at 6/9/2023  3:26 PM CDT -----  No abnormality on US either.  Would can refer you back to thoracic surgery for any further workup.  Please let us know if you have any questions.    GUALBERTO Carrillo MD

## 2023-07-11 ENCOUNTER — PES CALL (OUTPATIENT)
Dept: ADMINISTRATIVE | Facility: CLINIC | Age: 73
End: 2023-07-11
Payer: MEDICARE

## 2023-07-12 ENCOUNTER — LAB VISIT (OUTPATIENT)
Dept: LAB | Facility: HOSPITAL | Age: 73
End: 2023-07-12
Attending: NURSE PRACTITIONER
Payer: MEDICARE

## 2023-07-12 DIAGNOSIS — Z91.89 AT HIGH RISK FOR BREAST CANCER: ICD-10-CM

## 2023-07-12 LAB
ANION GAP SERPL CALC-SCNC: 9 MMOL/L (ref 8–16)
BUN SERPL-MCNC: 26 MG/DL (ref 8–23)
CALCIUM SERPL-MCNC: 9.6 MG/DL (ref 8.7–10.5)
CHLORIDE SERPL-SCNC: 108 MMOL/L (ref 95–110)
CO2 SERPL-SCNC: 23 MMOL/L (ref 23–29)
CREAT SERPL-MCNC: 1.2 MG/DL (ref 0.5–1.4)
EST. GFR  (NO RACE VARIABLE): 47.5 ML/MIN/1.73 M^2
GLUCOSE SERPL-MCNC: 88 MG/DL (ref 70–110)
POTASSIUM SERPL-SCNC: 5 MMOL/L (ref 3.5–5.1)
SODIUM SERPL-SCNC: 140 MMOL/L (ref 136–145)

## 2023-07-12 PROCEDURE — 36415 COLL VENOUS BLD VENIPUNCTURE: CPT | Mod: PO | Performed by: NURSE PRACTITIONER

## 2023-07-12 PROCEDURE — 80048 BASIC METABOLIC PNL TOTAL CA: CPT | Performed by: NURSE PRACTITIONER

## 2023-07-13 ENCOUNTER — PATIENT MESSAGE (OUTPATIENT)
Dept: HEMATOLOGY/ONCOLOGY | Facility: CLINIC | Age: 73
End: 2023-07-13
Payer: MEDICARE

## 2023-07-13 ENCOUNTER — PATIENT MESSAGE (OUTPATIENT)
Dept: DERMATOLOGY | Facility: CLINIC | Age: 73
End: 2023-07-13
Payer: MEDICARE

## 2023-07-14 ENCOUNTER — HOSPITAL ENCOUNTER (OUTPATIENT)
Dept: RADIOLOGY | Facility: HOSPITAL | Age: 73
Discharge: HOME | End: 2023-07-14
Attending: NURSE PRACTITIONER
Payer: MEDICARE

## 2023-07-14 DIAGNOSIS — Z91.89 AT HIGH RISK FOR BREAST CANCER: ICD-10-CM

## 2023-07-14 DIAGNOSIS — Z80.3 FAMILY HISTORY OF BREAST CANCER: ICD-10-CM

## 2023-07-14 DIAGNOSIS — R92.30 DENSE BREAST TISSUE ON MAMMOGRAM: ICD-10-CM

## 2023-07-14 PROCEDURE — 77049 MRI BREAST W/WO CONTRAST, W/CAD, BILATERAL: ICD-10-PCS | Mod: 26,,, | Performed by: RADIOLOGY

## 2023-07-14 PROCEDURE — 25500020 PHARM REV CODE 255: Performed by: NURSE PRACTITIONER

## 2023-07-14 PROCEDURE — A9577 INJ MULTIHANCE: HCPCS | Performed by: NURSE PRACTITIONER

## 2023-07-14 PROCEDURE — 77049 MRI BREAST C-+ W/CAD BI: CPT | Mod: TC

## 2023-07-14 PROCEDURE — 77049 MRI BREAST C-+ W/CAD BI: CPT | Mod: 26,,, | Performed by: RADIOLOGY

## 2023-07-14 RX ADMIN — GADOBENATE DIMEGLUMINE 14 ML: 529 INJECTION, SOLUTION INTRAVENOUS at 10:07

## 2023-07-20 ENCOUNTER — OFFICE VISIT (OUTPATIENT)
Dept: PULMONOLOGY | Facility: CLINIC | Age: 73
End: 2023-07-20
Payer: MEDICARE

## 2023-07-20 VITALS
WEIGHT: 144.38 LBS | HEIGHT: 61 IN | OXYGEN SATURATION: 96 % | HEART RATE: 96 BPM | DIASTOLIC BLOOD PRESSURE: 62 MMHG | SYSTOLIC BLOOD PRESSURE: 118 MMHG | BODY MASS INDEX: 27.26 KG/M2

## 2023-07-20 DIAGNOSIS — J47.9 BRONCHIECTASIS WITHOUT COMPLICATION: Primary | ICD-10-CM

## 2023-07-20 DIAGNOSIS — C34.91 ADENOCARCINOMA OF RIGHT LUNG: ICD-10-CM

## 2023-07-20 DIAGNOSIS — R05.2 SUBACUTE COUGH: ICD-10-CM

## 2023-07-20 DIAGNOSIS — C34.91 ADENOCARCINOMA OF LUNG, STAGE 1, RIGHT: ICD-10-CM

## 2023-07-20 DIAGNOSIS — Z85.118 H/O: LUNG CANCER: ICD-10-CM

## 2023-07-20 DIAGNOSIS — J06.9 URTI (ACUTE UPPER RESPIRATORY INFECTION): ICD-10-CM

## 2023-07-20 PROCEDURE — 3074F PR MOST RECENT SYSTOLIC BLOOD PRESSURE < 130 MM HG: ICD-10-PCS | Mod: CPTII,S$GLB,, | Performed by: INTERNAL MEDICINE

## 2023-07-20 PROCEDURE — 3044F HG A1C LEVEL LT 7.0%: CPT | Mod: CPTII,S$GLB,, | Performed by: INTERNAL MEDICINE

## 2023-07-20 PROCEDURE — 3078F DIAST BP <80 MM HG: CPT | Mod: CPTII,S$GLB,, | Performed by: INTERNAL MEDICINE

## 2023-07-20 PROCEDURE — 99999 PR PBB SHADOW E&M-EST. PATIENT-LVL V: ICD-10-PCS | Mod: PBBFAC,,, | Performed by: INTERNAL MEDICINE

## 2023-07-20 PROCEDURE — 3044F PR MOST RECENT HEMOGLOBIN A1C LEVEL <7.0%: ICD-10-PCS | Mod: CPTII,S$GLB,, | Performed by: INTERNAL MEDICINE

## 2023-07-20 PROCEDURE — 1160F PR REVIEW ALL MEDS BY PRESCRIBER/CLIN PHARMACIST DOCUMENTED: ICD-10-PCS | Mod: CPTII,S$GLB,, | Performed by: INTERNAL MEDICINE

## 2023-07-20 PROCEDURE — 1159F MED LIST DOCD IN RCRD: CPT | Mod: CPTII,S$GLB,, | Performed by: INTERNAL MEDICINE

## 2023-07-20 PROCEDURE — 3066F NEPHROPATHY DOC TX: CPT | Mod: CPTII,S$GLB,, | Performed by: INTERNAL MEDICINE

## 2023-07-20 PROCEDURE — 4010F PR ACE/ARB THEARPY RXD/TAKEN: ICD-10-PCS | Mod: CPTII,S$GLB,, | Performed by: INTERNAL MEDICINE

## 2023-07-20 PROCEDURE — 4010F ACE/ARB THERAPY RXD/TAKEN: CPT | Mod: CPTII,S$GLB,, | Performed by: INTERNAL MEDICINE

## 2023-07-20 PROCEDURE — 1157F PR ADVANCE CARE PLAN OR EQUIV PRESENT IN MEDICAL RECORD: ICD-10-PCS | Mod: CPTII,S$GLB,, | Performed by: INTERNAL MEDICINE

## 2023-07-20 PROCEDURE — 3066F PR DOCUMENTATION OF TREATMENT FOR NEPHROPATHY: ICD-10-PCS | Mod: CPTII,S$GLB,, | Performed by: INTERNAL MEDICINE

## 2023-07-20 PROCEDURE — 99999 PR PBB SHADOW E&M-EST. PATIENT-LVL V: CPT | Mod: PBBFAC,,, | Performed by: INTERNAL MEDICINE

## 2023-07-20 PROCEDURE — 1126F AMNT PAIN NOTED NONE PRSNT: CPT | Mod: CPTII,S$GLB,, | Performed by: INTERNAL MEDICINE

## 2023-07-20 PROCEDURE — 99214 OFFICE O/P EST MOD 30 MIN: CPT | Mod: 25,S$GLB,, | Performed by: INTERNAL MEDICINE

## 2023-07-20 PROCEDURE — 1101F PR PT FALLS ASSESS DOC 0-1 FALLS W/OUT INJ PAST YR: ICD-10-PCS | Mod: CPTII,S$GLB,, | Performed by: INTERNAL MEDICINE

## 2023-07-20 PROCEDURE — 1157F ADVNC CARE PLAN IN RCRD: CPT | Mod: CPTII,S$GLB,, | Performed by: INTERNAL MEDICINE

## 2023-07-20 PROCEDURE — 3074F SYST BP LT 130 MM HG: CPT | Mod: CPTII,S$GLB,, | Performed by: INTERNAL MEDICINE

## 2023-07-20 PROCEDURE — 3061F NEG MICROALBUMINURIA REV: CPT | Mod: CPTII,S$GLB,, | Performed by: INTERNAL MEDICINE

## 2023-07-20 PROCEDURE — 3288F FALL RISK ASSESSMENT DOCD: CPT | Mod: CPTII,S$GLB,, | Performed by: INTERNAL MEDICINE

## 2023-07-20 PROCEDURE — 1126F PR PAIN SEVERITY QUANTIFIED, NO PAIN PRESENT: ICD-10-PCS | Mod: CPTII,S$GLB,, | Performed by: INTERNAL MEDICINE

## 2023-07-20 PROCEDURE — 1160F RVW MEDS BY RX/DR IN RCRD: CPT | Mod: CPTII,S$GLB,, | Performed by: INTERNAL MEDICINE

## 2023-07-20 PROCEDURE — 1101F PT FALLS ASSESS-DOCD LE1/YR: CPT | Mod: CPTII,S$GLB,, | Performed by: INTERNAL MEDICINE

## 2023-07-20 PROCEDURE — 3008F PR BODY MASS INDEX (BMI) DOCUMENTED: ICD-10-PCS | Mod: CPTII,S$GLB,, | Performed by: INTERNAL MEDICINE

## 2023-07-20 PROCEDURE — 3008F BODY MASS INDEX DOCD: CPT | Mod: CPTII,S$GLB,, | Performed by: INTERNAL MEDICINE

## 2023-07-20 PROCEDURE — 99214 PR OFFICE/OUTPT VISIT, EST, LEVL IV, 30-39 MIN: ICD-10-PCS | Mod: 25,S$GLB,, | Performed by: INTERNAL MEDICINE

## 2023-07-20 PROCEDURE — 3061F PR NEG MICROALBUMINURIA RESULT DOCUMENTED/REVIEW: ICD-10-PCS | Mod: CPTII,S$GLB,, | Performed by: INTERNAL MEDICINE

## 2023-07-20 PROCEDURE — 3288F PR FALLS RISK ASSESSMENT DOCUMENTED: ICD-10-PCS | Mod: CPTII,S$GLB,, | Performed by: INTERNAL MEDICINE

## 2023-07-20 PROCEDURE — 3078F PR MOST RECENT DIASTOLIC BLOOD PRESSURE < 80 MM HG: ICD-10-PCS | Mod: CPTII,S$GLB,, | Performed by: INTERNAL MEDICINE

## 2023-07-20 PROCEDURE — 1159F PR MEDICATION LIST DOCUMENTED IN MEDICAL RECORD: ICD-10-PCS | Mod: CPTII,S$GLB,, | Performed by: INTERNAL MEDICINE

## 2023-07-20 RX ORDER — ALBUTEROL SULFATE 0.83 MG/ML
2.5 SOLUTION RESPIRATORY (INHALATION) EVERY 6 HOURS PRN
Qty: 3 ML | Refills: 359 | Status: SHIPPED | OUTPATIENT
Start: 2023-07-20 | End: 2024-07-19

## 2023-07-20 RX ORDER — SODIUM CHLORIDE FOR INHALATION 3 %
4 VIAL, NEBULIZER (ML) INHALATION
Qty: 120 ML | Refills: 11 | Status: SHIPPED | OUTPATIENT
Start: 2023-07-20

## 2023-07-20 RX ORDER — ALBUTEROL SULFATE 90 UG/1
2 AEROSOL, METERED RESPIRATORY (INHALATION) EVERY 6 HOURS PRN
Qty: 18 G | Refills: 0 | Status: SHIPPED | OUTPATIENT
Start: 2023-07-20

## 2023-07-20 RX ORDER — UMECLIDINIUM BROMIDE AND VILANTEROL TRIFENATATE 62.5; 25 UG/1; UG/1
1 POWDER RESPIRATORY (INHALATION) DAILY
Qty: 1 EACH | Refills: 5 | Status: SHIPPED | OUTPATIENT
Start: 2023-07-20 | End: 2024-03-08 | Stop reason: SDUPTHER

## 2023-07-20 NOTE — PROGRESS NOTES
"Subjective:       Patient ID: Jolly Matias is a 73 y.o. female.    Chief Complaint: Lung Cancer and Bronchiectasis    73 year old status post Diagnosis Right lower lobectomy for adenocarcinoma 4/2021.  She is here for follow up.       LYMPH NODES FROM 1. LEVEL 7, 2. LEVEL 10, 3. LEVEL 11, 4. LEVEL 4R, 5. LEVEL   2R, 6. LEVEL 11 AND 7., 8., AMND 9. LEVEL 12:   NO METASTATIC CARCINOMA IDENTIFIED   10. RIGHT LOWER LOBE:   ADENOCARCINOMA IN SITU   NO MARGIN INVOLVEMENT IDENTIFIED    Patient also with a history of bronchiectasis with MAC positive.  \  Complaining today of increasing shortness of breath that is limiting activities.  Picking up grandson, etc. Sinus congestion.  No sputum production.  No fevers, chills, night sweats.  12# weight loss.  Decreased appetite and is satisfied.      Rarely uses albuterol.      Noticed increasing ANDRADE in December/January.  Denies wheezing.  No chest pain.      Does have post thoracotomy pain on the right.        Recently had URI type symptoms.      Review of Systems    Objective:      Vitals:    07/20/23 1528   BP: 118/62   BP Location: Left arm   Patient Position: Sitting   BP Method: Medium (Manual)   Pulse: 96   SpO2: 96%   Weight: 65.5 kg (144 lb 6.4 oz)   Height: 5' 1" (1.549 m)      Physical Exam   Constitutional: She is oriented to person, place, and time. She appears well-developed and well-nourished.   HENT:   Head: Normocephalic.   Cardiovascular: Normal rate and regular rhythm.   Pulmonary/Chest: Normal expansion. She has no wheezes. She has no rales.   Musculoskeletal:         General: No edema. Normal range of motion.      Cervical back: Normal range of motion and neck supple.   Lymphadenopathy: No supraclavicular adenopathy is present.     She has no cervical adenopathy.   Neurological: She is alert and oriented to person, place, and time.   Skin: Skin is warm.   Psychiatric: She has a normal mood and affect.       Personal Diagnostic Review  Post clinic visit CT of " chest with stable to improved nodules in setting of bronchiectasis and right lower lobectomy   No flowsheet data found.      Assessment:       1. Bronchiectasis without complication    2. H/O: lung cancer    3. Adenocarcinoma of lung, stage 1, right Lobectomy 4/2021    4. URTI (acute upper respiratory infection)    5. Subacute cough    6. Adenocarcinoma of right lung        Outpatient Encounter Medications as of 7/20/2023   Medication Sig Dispense Refill    albuterol (VENTOLIN HFA) 90 mcg/actuation inhaler Inhale 2 puffs into the lungs every 6 (six) hours as needed for Wheezing or Shortness of Breath (cough). Rescue 18 g 0    atorvastatin (LIPITOR) 20 MG tablet Take 1 tablet (20 mg total) by mouth once daily. 90 tablet 1    azelastine (ASTELIN) 137 mcg (0.1 %) nasal spray 1 spray (137 mcg total) by Nasal route 2 (two) times daily. 30 mL 2    flaxseed oil 1,000 mg Cap Take 1 capsule by mouth once daily.      levocetirizine (XYZAL) 5 MG tablet Take 5 mg by mouth nightly.      metFORMIN (GLUCOPHAGE) 500 MG tablet Take 1 tablet (500 mg total) by mouth 2 (two) times daily with meals. 180 tablet 1    olmesartan (BENICAR) 40 MG tablet Take 1 tablet (40 mg total) by mouth once daily. 90 tablet 1    sertraline (ZOLOFT) 100 MG tablet Take 1 tablet (100 mg total) by mouth once daily. 90 tablet 1    spironolactone (ALDACTONE) 25 MG tablet Take 1 tablet (25 mg total) by mouth once daily. 90 tablet 1    vitamin D 1000 units Tab Take 185 mg by mouth once daily.      [DISCONTINUED] albuterol (VENTOLIN HFA) 90 mcg/actuation inhaler Inhale 2 puffs into the lungs every 6 (six) hours as needed for Wheezing or Shortness of Breath (cough). Rescue 18 g 0    albuterol (PROVENTIL) 2.5 mg /3 mL (0.083 %) nebulizer solution Take 3 mLs (2.5 mg total) by nebulization every 6 (six) hours as needed for Wheezing. Rescue 3 mL 359    sodium chloride 3% 3 % nebulizer solution Take 4 mLs by nebulization as needed for Other. May substitute 120 mL 11     "umeclidinium-vilanteroL (ANORO ELLIPTA) 62.5-25 mcg/actuation DsDv Inhale 1 puff into the lungs once daily. Controller 1 each 5     No facility-administered encounter medications on file as of 7/20/2023.     Orders Placed This Encounter   Procedures    NEBULIZER FOR HOME USE     Order Specific Question:   Height:     Answer:   5' 1" (1.549 m)     Order Specific Question:   Weight:     Answer:   65.5 kg (144 lb 6.4 oz)     Order Specific Question:   Length of need (1-99 months):     Answer:   99    NEBULIZER KIT (SUPPLIES) FOR HOME USE     Order Specific Question:   Height:     Answer:   5' 1" (1.549 m)     Order Specific Question:   Weight:     Answer:   65.5 kg (144 lb 6.4 oz)     Order Specific Question:   Length of need (1-99 months):     Answer:   99     Order Specific Question:   Mask or Mouthpiece?     Answer:   Mouthpiece    CT Chest Without Contrast     Standing Status:   Future     Number of Occurrences:   1     Standing Expiration Date:   7/20/2024     Order Specific Question:   May the Radiologist modify the order per protocol to meet the clinical needs of the patient?     Answer:   Yes     Plan:     Problem List Items Addressed This Visit       Bronchiectasis without complication - Primary    Overview     Sputum cultures positive for AFB x 2.  Relatively asymptomatic.  Last sputum was positive 11/2022      Airway hygiene with chronic MAC and inflammation  Nebulize saline at least once a day, twice is ideal.  Albuterol nebulized or HFA as needed but use it to trial if improves shortness of breath.    Use the acapella (flutter device)    Start Anoro daily, once a day.           Relevant Medications    albuterol (VENTOLIN HFA) 90 mcg/actuation inhaler    Other Relevant Orders    NEBULIZER FOR HOME USE    NEBULIZER KIT (SUPPLIES) FOR HOME USE    Adenocarcinoma of right lung    Current Assessment & Plan     Status post lobectomy 4/2021  Last CT 7/2023 two years post op, [facility]ED[facility].    Needs CT every 6 months.       "    H/O: lung cancer    Relevant Orders    CT Chest Without Contrast (Completed)     Other Visit Diagnoses       Adenocarcinoma of lung, stage 1, right Lobectomy 4/2021        Relevant Orders    CT Chest Without Contrast (Completed)    URTI (acute upper respiratory infection)        Relevant Medications    albuterol (VENTOLIN HFA) 90 mcg/actuation inhaler    Subacute cough        Relevant Medications    albuterol (VENTOLIN HFA) 90 mcg/actuation inhaler

## 2023-07-20 NOTE — PATIENT INSTRUCTIONS
Airway hygiene with chronic MAC and inflammation  Nebulize saline at least once a day, twice is ideal.  Albuterol nebulized or HFA as needed but use it to trial if improves shortness of breath.    Use the acapella (flutter device)    Start Anoro daily, once a day.      Need CT for follow up.

## 2023-07-20 NOTE — ASSESSMENT & PLAN NOTE
Status post lobectomy 4/2021  Last CT 7/2023 two years post op, [facility].    Needs CT every 6 months.

## 2023-07-24 ENCOUNTER — TELEPHONE (OUTPATIENT)
Dept: PULMONOLOGY | Facility: CLINIC | Age: 73
End: 2023-07-24
Payer: MEDICARE

## 2023-07-24 NOTE — TELEPHONE ENCOUNTER
----- Message from Ale Ramirez sent at 7/24/2023  1:43 PM CDT -----  Jolly Matias calling regarding getting a call about a appt with a Dr. Roy but it is not showing in the portal nor her scheduled appt's, call back to inform, [phone]

## 2023-07-24 NOTE — TELEPHONE ENCOUNTER
Spoke with pt, informed her that I have received her message. Pt states that she thought she had a missed call from our office.

## 2023-07-25 ENCOUNTER — HOSPITAL ENCOUNTER (OUTPATIENT)
Dept: RADIOLOGY | Facility: HOSPITAL | Age: 73
Discharge: HOME | End: 2023-07-25
Attending: INTERNAL MEDICINE
Payer: MEDICARE

## 2023-07-25 DIAGNOSIS — Z85.118 H/O: LUNG CANCER: ICD-10-CM

## 2023-07-25 DIAGNOSIS — C34.91 ADENOCARCINOMA OF LUNG, STAGE 1, RIGHT: ICD-10-CM

## 2023-07-25 PROCEDURE — 71250 CT CHEST WITHOUT CONTRAST: ICD-10-PCS | Mod: 26,,, | Performed by: RADIOLOGY

## 2023-07-25 PROCEDURE — 71250 CT THORAX DX C-: CPT | Mod: TC

## 2023-07-25 PROCEDURE — 71250 CT THORAX DX C-: CPT | Mod: 26,,, | Performed by: RADIOLOGY

## 2023-08-08 ENCOUNTER — PATIENT MESSAGE (OUTPATIENT)
Dept: PULMONOLOGY | Facility: CLINIC | Age: 73
End: 2023-08-08
Payer: MEDICARE

## 2023-08-17 ENCOUNTER — OFFICE VISIT (OUTPATIENT)
Dept: HEMATOLOGY/ONCOLOGY | Facility: CLINIC | Age: 73
End: 2023-08-17
Payer: MEDICARE

## 2023-08-17 VITALS
HEART RATE: 75 BPM | HEIGHT: 61 IN | OXYGEN SATURATION: 97 % | WEIGHT: 145.5 LBS | SYSTOLIC BLOOD PRESSURE: 127 MMHG | DIASTOLIC BLOOD PRESSURE: 63 MMHG | RESPIRATION RATE: 16 BRPM | BODY MASS INDEX: 27.47 KG/M2

## 2023-08-17 DIAGNOSIS — Z91.89 AT HIGH RISK FOR BREAST CANCER: Primary | ICD-10-CM

## 2023-08-17 DIAGNOSIS — R92.30 DENSE BREAST TISSUE ON MAMMOGRAM: ICD-10-CM

## 2023-08-17 DIAGNOSIS — Z12.31 ENCOUNTER FOR SCREENING MAMMOGRAM FOR MALIGNANT NEOPLASM OF BREAST: ICD-10-CM

## 2023-08-17 DIAGNOSIS — Z80.3 FAMILY HISTORY OF BREAST CANCER: ICD-10-CM

## 2023-08-17 PROCEDURE — 4010F PR ACE/ARB THEARPY RXD/TAKEN: ICD-10-PCS | Mod: CPTII,S$GLB,, | Performed by: NURSE PRACTITIONER

## 2023-08-17 PROCEDURE — 1159F MED LIST DOCD IN RCRD: CPT | Mod: CPTII,S$GLB,, | Performed by: NURSE PRACTITIONER

## 2023-08-17 PROCEDURE — 1126F PR PAIN SEVERITY QUANTIFIED, NO PAIN PRESENT: ICD-10-PCS | Mod: CPTII,S$GLB,, | Performed by: NURSE PRACTITIONER

## 2023-08-17 PROCEDURE — 1126F AMNT PAIN NOTED NONE PRSNT: CPT | Mod: CPTII,S$GLB,, | Performed by: NURSE PRACTITIONER

## 2023-08-17 PROCEDURE — 99999 PR PBB SHADOW E&M-EST. PATIENT-LVL IV: ICD-10-PCS | Mod: PBBFAC,,, | Performed by: NURSE PRACTITIONER

## 2023-08-17 PROCEDURE — 3008F BODY MASS INDEX DOCD: CPT | Mod: CPTII,S$GLB,, | Performed by: NURSE PRACTITIONER

## 2023-08-17 PROCEDURE — 3078F DIAST BP <80 MM HG: CPT | Mod: CPTII,S$GLB,, | Performed by: NURSE PRACTITIONER

## 2023-08-17 PROCEDURE — 3008F PR BODY MASS INDEX (BMI) DOCUMENTED: ICD-10-PCS | Mod: CPTII,S$GLB,, | Performed by: NURSE PRACTITIONER

## 2023-08-17 PROCEDURE — 3044F HG A1C LEVEL LT 7.0%: CPT | Mod: CPTII,S$GLB,, | Performed by: NURSE PRACTITIONER

## 2023-08-17 PROCEDURE — 3066F PR DOCUMENTATION OF TREATMENT FOR NEPHROPATHY: ICD-10-PCS | Mod: CPTII,S$GLB,, | Performed by: NURSE PRACTITIONER

## 2023-08-17 PROCEDURE — 99214 PR OFFICE/OUTPT VISIT, EST, LEVL IV, 30-39 MIN: ICD-10-PCS | Mod: S$GLB,,, | Performed by: NURSE PRACTITIONER

## 2023-08-17 PROCEDURE — 3078F PR MOST RECENT DIASTOLIC BLOOD PRESSURE < 80 MM HG: ICD-10-PCS | Mod: CPTII,S$GLB,, | Performed by: NURSE PRACTITIONER

## 2023-08-17 PROCEDURE — 3066F NEPHROPATHY DOC TX: CPT | Mod: CPTII,S$GLB,, | Performed by: NURSE PRACTITIONER

## 2023-08-17 PROCEDURE — 3061F NEG MICROALBUMINURIA REV: CPT | Mod: CPTII,S$GLB,, | Performed by: NURSE PRACTITIONER

## 2023-08-17 PROCEDURE — 3074F SYST BP LT 130 MM HG: CPT | Mod: CPTII,S$GLB,, | Performed by: NURSE PRACTITIONER

## 2023-08-17 PROCEDURE — 3074F PR MOST RECENT SYSTOLIC BLOOD PRESSURE < 130 MM HG: ICD-10-PCS | Mod: CPTII,S$GLB,, | Performed by: NURSE PRACTITIONER

## 2023-08-17 PROCEDURE — 99214 OFFICE O/P EST MOD 30 MIN: CPT | Mod: S$GLB,,, | Performed by: NURSE PRACTITIONER

## 2023-08-17 PROCEDURE — 1157F PR ADVANCE CARE PLAN OR EQUIV PRESENT IN MEDICAL RECORD: ICD-10-PCS | Mod: CPTII,S$GLB,, | Performed by: NURSE PRACTITIONER

## 2023-08-17 PROCEDURE — 3061F PR NEG MICROALBUMINURIA RESULT DOCUMENTED/REVIEW: ICD-10-PCS | Mod: CPTII,S$GLB,, | Performed by: NURSE PRACTITIONER

## 2023-08-17 PROCEDURE — 99999 PR PBB SHADOW E&M-EST. PATIENT-LVL IV: CPT | Mod: PBBFAC,,, | Performed by: NURSE PRACTITIONER

## 2023-08-17 PROCEDURE — 1157F ADVNC CARE PLAN IN RCRD: CPT | Mod: CPTII,S$GLB,, | Performed by: NURSE PRACTITIONER

## 2023-08-17 PROCEDURE — 1159F PR MEDICATION LIST DOCUMENTED IN MEDICAL RECORD: ICD-10-PCS | Mod: CPTII,S$GLB,, | Performed by: NURSE PRACTITIONER

## 2023-08-17 PROCEDURE — 3044F PR MOST RECENT HEMOGLOBIN A1C LEVEL <7.0%: ICD-10-PCS | Mod: CPTII,S$GLB,, | Performed by: NURSE PRACTITIONER

## 2023-08-17 PROCEDURE — 4010F ACE/ARB THERAPY RXD/TAKEN: CPT | Mod: CPTII,S$GLB,, | Performed by: NURSE PRACTITIONER

## 2023-08-17 NOTE — PROGRESS NOTES
"  Reason For Consultation:   Increased lifetime risk of breast cancer    Referring Provider:   No referring provider defined for this encounter.    Records Obtained: Records of the patients history including those obtained from the referring provider were reviewed and summarized in detail.    HPI:   Jolly Matias presents for a follow up for increased risk of breast cancer. She is postmenopausal. She presented for screening mammogram on 23 which was benign but revealed a Tyrer-Cuzick score of 20.94%. Her  has colon cancer for two years now, goes to [facility] for chemo, doing well so far.      Today, Feels good and no complaints. Using new inhalers for cough nd sob, hx of lung cancer, sees Dr. Jacinto  No new breast concerns.    High Risk Breast cancer specific history:  - Age: 73 y.o.   - Height:  5'1"  - Weight: 145 lbs  Wt Readings from Last 3 Encounters:   23 1334 66 kg (145 lb 8.1 oz)   23 1528 65.5 kg (144 lb 6.4 oz)   23 1008 65.2 kg (143 lb 11.8 oz)      - Breast density per BI-RADS: Heterogeneously dense   - Age at menarche:  12  - Number of pregnancies: ; age of first live birth: 33   - History of breast feeding: No.   - Age at menopause, if applicable: pt pt, total hysterectomy around 41  -Uterus and ovaries intact: No: total hysterectomy around 41  - HRT: Yes - she took oral hrt for about 10 years, stopped more than 5 years ago, not sure the name of the medication    - Genetic testing: No  - Personal history of cancer: Yes - adenocarcinoma of the lung, basal cell carcinoma  - Previous chest radiation exposure between ages 10-30 years old: No  - Personal history of breast biopsy: Yes - one biopsy in early 30s, in  had central bilateral duct excisions d/t bleeding nipples,  left side done 05 and revealed benign breast tissue wit focal duct ectasia, right side done in  and revealed papillomatosis,  saw Dr. Cronin and Dr. Bhakta in   - Ashkenazi Yaz[employer]ism " Inheritance: No  - Family history of cancer:  maternal aunt: breast cancer diagnosed in early 70s,  in early 80s    Social History:  Tobacco use:  none  Alcohol use:  a few glasses of wine per week  Exercise regimen: started yoga, doing more now  Employment: not currently employed    SEE CALCULATED RISK BELOW.     Past Medical   Past Medical History:   Diagnosis Date    Atypical ductal hyperplasia, breast     Basal cell carcinoma 2011    left forehead    Basal cell carcinoma 2015    R temporal scalp, R upper forehead, L upper forehead    Basal cell carcinoma 2015    right mid ala    Basal cell carcinoma (BCC) of right forehead 10/12/2022    BCC (basal cell carcinoma) excised     right shoulder    BCC (basal cell carcinoma) 2021    right medial shoulder    BCC (basal cell carcinoma) 2022    R upper forehead    Diabetes mellitus     Diverticulitis     Fibrocystic breast     HLD (hyperlipidemia)     Hypertension     Prediabetes 10/16/2013    Skin cancer      Patient Active Problem List   Diagnosis    Anxiety    Bronchiectasis without complication    DM type 2 with diabetic dyslipidemia    Essential hypertension    Splenic artery aneurysm    Abdominal aortic atherosclerosis    History of basal cell carcinoma    Sleep apnea    Chondromalacia of right knee    Type 2 diabetes mellitus without retinopathy    Chronic hip pain, bilateral    Leg weakness, bilateral    Adenocarcinoma of right lung    Major depressive disorder, recurrent, mild    Chronic kidney disease, stage 3a    Type 2 diabetes mellitus with stage 3a chronic kidney disease, without long-term current use of insulin    H/O: lung cancer     Social History   Social History     Tobacco Use    Smoking status: Never    Smokeless tobacco: Never   Substance Use Topics    Alcohol use: Yes     Alcohol/week: 0.0 standard drinks of alcohol     Types: 1 - 2 Glasses of wine per week     Comment: 3-4x /week    Drug use: No     Family History  Family  History   Problem Relation Age of Onset    Skin cancer Father     Hypertension Father     Heart disease Father     Diabetes Father     Melanoma Father     Cancer Father     Skin cancer Mother     Hypertension Mother     Thyroid disease Mother     Dementia Mother     Hypertension Sister     Hyperlipidemia Sister     Anxiety disorder Daughter     Hypertension Son     Hyperlipidemia Son     Breast cancer Maternal Aunt     Diabetes Maternal Uncle      Medications    Current Outpatient Medications:     albuterol (PROVENTIL) 2.5 mg /3 mL (0.083 %) nebulizer solution, Take 3 mLs (2.5 mg total) by nebulization every 6 (six) hours as needed for Wheezing. Rescue, Disp: 3 mL, Rfl: 359    albuterol (VENTOLIN HFA) 90 mcg/actuation inhaler, Inhale 2 puffs into the lungs every 6 (six) hours as needed for Wheezing or Shortness of Breath (cough). Rescue, Disp: 18 g, Rfl: 0    atorvastatin (LIPITOR) 20 MG tablet, Take 1 tablet (20 mg total) by mouth once daily., Disp: 90 tablet, Rfl: 1    azelastine (ASTELIN) 137 mcg (0.1 %) nasal spray, 1 spray (137 mcg total) by Nasal route 2 (two) times daily., Disp: 30 mL, Rfl: 2    flaxseed oil 1,000 mg Cap, Take 1 capsule by mouth once daily., Disp: , Rfl:     levocetirizine (XYZAL) 5 MG tablet, Take 5 mg by mouth nightly., Disp: , Rfl:     metFORMIN (GLUCOPHAGE) 500 MG tablet, Take 1 tablet (500 mg total) by mouth 2 (two) times daily with meals., Disp: 180 tablet, Rfl: 1    olmesartan (BENICAR) 40 MG tablet, Take 1 tablet (40 mg total) by mouth once daily., Disp: 90 tablet, Rfl: 1    sertraline (ZOLOFT) 100 MG tablet, Take 1 tablet (100 mg total) by mouth once daily., Disp: 90 tablet, Rfl: 1    sodium chloride 3% 3 % nebulizer solution, Take 4 mLs by nebulization as needed for Other. May substitute, Disp: 120 mL, Rfl: 11    spironolactone (ALDACTONE) 25 MG tablet, Take 1 tablet (25 mg total) by mouth once daily., Disp: 90 tablet, Rfl: 1    umeclidinium-vilanteroL (ANORO ELLIPTA) 62.5-25  "mcg/actuation DsDv, Inhale 1 puff into the lungs once daily. Controller, Disp: 1 each, Rfl: 5    vitamin D 1000 units Tab, Take 185 mg by mouth once daily., Disp: , Rfl:   Allergies  Review of patient's allergies indicates:   Allergen Reactions    Hydrocodone      Also makes pt "very sleepy"    Kiwi (actinidia chinensis) Swelling       Review of Systems       See above   Review of Systems  Review of Systems   Constitutional:  Positive for unexpected weight change. Negative for appetite change.   HENT:  Negative for mouth sores.    Eyes:  Negative for visual disturbance.   Respiratory:  Positive for cough and shortness of breath.    Cardiovascular:  Negative for chest pain.   Gastrointestinal:  Negative for abdominal pain and diarrhea.   Genitourinary:  Negative for frequency.   Musculoskeletal:  Negative for back pain.   Skin:  Negative for rash.   Neurological:  Negative for headaches.   Hematological:  Negative for adenopathy.   Psychiatric/Behavioral:  The patient is nervous/anxious.      All other systems reviewed and are negative.    Objective:      Vitals:   Vitals:    08/17/23 1334   BP: 127/63   Pulse: 75   Resp: 16   SpO2: 97%   Weight: 66 kg (145 lb 8.1 oz)   Height: 5' 1" (1.549 m)       BMI: Body mass index is 27.49 kg/m².   Body surface area is 1.69 meters squared.    Physical Exam  Constitutional:       General: She is not in acute distress.     Appearance: She is well-developed. She is not diaphoretic.   HENT:      Head: Normocephalic and atraumatic.   Eyes:      General: No scleral icterus.     Conjunctiva/sclera: Conjunctivae normal.   Cardiovascular:      Rate and Rhythm: Normal rate and regular rhythm.      Pulses: Normal pulses.      Heart sounds: Normal heart sounds.   Pulmonary:      Effort: Pulmonary effort is normal. No respiratory distress.      Breath sounds: Normal breath sounds.   Chest:      Comments: No skin changes or breast masses noted bilaterally, no  lymphadenopathy " noted    Abdominal:      General: There is no distension.   Musculoskeletal:         General: Normal range of motion.      Cervical back: Normal range of motion and neck supple.   Skin:     General: Skin is warm and dry.   Neurological:      Mental Status: She is alert and oriented to person, place, and time.   Psychiatric:         Behavior: Behavior normal.       Laboratory Data: reviewed most recent   Imaging: reviewed most recent    Assessment:     1. At high risk for breast cancer    2. Dense breast tissue on mammogram    3. Family history of breast cancer    4. Encounter for screening mammogram for malignant neoplasm of breast      1. Increased risk of breast cancer   * Tyrer-Cuzick (TC) lifetime risk of 5.8%. We discussed that TC score will categorize your lifetime risk of being diagnosed with breast cancer. Categories are as such: Average risk <15%, Intermediate risk 15-19%, and High risk > or = to 20%. (TC scores have been >20% on multiple of her past mammograms).  Sore recalculated 3x in clinic and 5.8% obtained each time.   *The Gretchen Model for Breast Cancer risk: She has a 3.6% 5-year breast cancer risk (Compared with 2.2 % for the average 74 y.o. woman) and 8.8% Lifetime breast cancer risk (Compared with 5.4% for the average 74 y.o. woman). A woman's risk is considered low if her five-year risk of developing breast cancer is less than 1.6%; it is considered high if she scores above 1.66%. (All women who are over 60 have a score of at least 1.66 and are considered high risk, based on the Gretchen Model.)    Recommendation for women with elevated TC score:     Recommendation for women with elevated Gretchen Model.         Risk factors are categorized into 2 groups: Modifiable and Non-modifiable. Modifiable risk factors include use of hormones, alcohol, smoking, diet and exercise. Non-modifiable risk factors include breast density, genetics, chest radiation, previous pregnancies, age of first period, and age of  menopause.      * For women at high risk for breast cancer, endocrine therapy can reduce the risk of invasive and/or in situ breast cancers. (tamoxifen for premenopausal or postmenopausal women and raloxifene or exemestane for postmenopausal women).     * Discussed that Tamoxifen 20 mg daily for 5 years has shown to reduce risk of breast cancer by 49% and women with ADH/ALH or LCIS have an even more significant reduction of risk of 86%. Aromatase inhibitors for 5 years have also shown risk reduction in terms of 50-60%. At current, there is not adequate data to recommend longer courses of therapy more than 5 years for risk reduction.      * Tamoxifen has limited data in BRCA 1/2 mutation carriers but limited retrospective data is suggestive of benefit. There is retrospective data that aromatase inhibitors can reduce the risk of contralateral ER positive breast cancers in BRCA 1/2 patients who were taking AIs as adjuvant therapy. There is no data for raloxifen in the population.      * Reviewed risks of Tamoxifen side effects include hot flashes, invasive endometrial cancer in women > 49 years of age (2.3/1000 compared to 0.9/1000), cataracts, increased risk of pulmonary embolism among others.     * Reviewed Lifestyle modifications which have shown benefit:  Limit alcohol consumption to less than 1 drink per day (1 ounce liquor, 6 oz wine, 8 oz beer)  Avoid smoking.  Exercise at least 150 minutes per week of moderate intensity aerobic activity or at least 75 minutes of vigorous activity. Exercise can lower the relative risk of breast cancer by ~18-20%.  Maintain healthy weight and avoid post-menopausal weight gain. Avoid processed foods and eat more lean proteins, fruits and vegetables.                  * Discussed available resources including genetic counseling, nutrition, weight management.      * Reviewed future screening:   Semiannual (every 6 months) CBE (clinical breast exam).   Annual Breast MRI alternating  with an annual MMG. if TC 20% or greater.                 Discussed with patient that there are several models available for stratifying breast cancer risk, and Julienne-Caleb is presently the model utilized by [facility] and is a model recommended per current NCCN guidelines.    The Gretchen Model for Breast Cancer risk estimates the absolute 5 year risk and lifetime risk of developing breast cancer. Family history includes only first degree relatives with breast cancer, which is not enough information to estimate the risk of a patient having BRCA mutation. It also underestimates the cancer risk for patients with extensive family history. The Gretchen Model is a good predictor of risk for populations but not for individuals. It adjusts risk for race/ethnicity. It may underestimate breast cancer risk in patients with atypical hyperplasia and strong family history. The Gretchen Model was NOT designed to estimate risk for: Women with a prior diagnosis of breast cancer, lobular carcinoma in situ (LCIS), or ductal carcinoma in situ (DCIS);  Women who have received previous radiation therapy to the chest for treatment of Hodgkin lymphoma;  Women with gene mutations in BRCA1 or BRCA2, or those who are known to have certain genetic syndromes that increase risk for breast cancer; Women of age <35 or >85.    Plan:     Patient has opted out of chemoprevention but will call in the future if she wishes to pursue.   Patient elects to proceed with alternating annual mammogram and annual breast MRI along with semiannual CBEs.  Discussed that MRI screenings recommended to the age of 75.  Will get bmp to check kidney function prior to MRI in July  Patient will follow up here for one semiannual CBE in August 2024 along with annual MRI in July 2023 and will RTC here for one semiannual CBE along with annual breast mammogram in January/February 2024.  Lifestyle modifications as detailed above.   5.   Encouraged breast awareness, including  monthly breast self-exams.     RTC in 6 months to see me either at [facility] or on 3rd floor..     Questions were encouraged and answered to patient's satisfaction, and patient verbalized understanding of information and agreement with the plan. Advised patient to RTC with any interval changes or concerns.      Patient is in agreement with the proposed treatment plan. All questions were answered to the patient's satisfaction. Patient knows to call clinic for any new or worsening symptoms and if anything is needed before the next clinic visit.    ROBERT Murillo      Med Onc Chart Routing      Follow up with physician    Follow up with [facility] 6 months. February 2024   Infusion scheduling note    Injection scheduling note    Labs    Imaging Mammogram   January 2024   Pharmacy appointment    Other referrals            Kizzy Sánchez NP-C  Hematology & Medical Oncology   [address] [place] [id number]  ph. [phone]  Fax. [phone]    Total time spent with the patient: 30 minutes, 20 minutes of face to face consultation and 10 minutes of chart review and coordination of care, on the day of the visit. This includes face to face time and non-face to face time preparing to see the patient (eg, review of tests), obtaining and/or reviewing separately obtained history, documenting clinical information in the electronic or other health record, independently interpreting resultsand communicating results to the patient/family/caregiver, or care coordination.

## 2023-08-21 ENCOUNTER — OFFICE VISIT (OUTPATIENT)
Dept: DERMATOLOGY | Facility: CLINIC | Age: 73
End: 2023-08-21
Payer: MEDICARE

## 2023-08-21 DIAGNOSIS — Z85.828 PERSONAL HISTORY OF SKIN CANCER: ICD-10-CM

## 2023-08-21 DIAGNOSIS — Z85.828 HISTORY OF BASAL CELL CARCINOMA: ICD-10-CM

## 2023-08-21 DIAGNOSIS — D22.9 NEVUS: ICD-10-CM

## 2023-08-21 DIAGNOSIS — L57.0 AK (ACTINIC KERATOSIS): Primary | ICD-10-CM

## 2023-08-21 DIAGNOSIS — L82.1 SK (SEBORRHEIC KERATOSIS): ICD-10-CM

## 2023-08-21 DIAGNOSIS — D18.01 CHERRY ANGIOMA: ICD-10-CM

## 2023-08-21 DIAGNOSIS — L81.4 LENTIGO: ICD-10-CM

## 2023-08-21 PROCEDURE — 1101F PR PT FALLS ASSESS DOC 0-1 FALLS W/OUT INJ PAST YR: ICD-10-PCS | Mod: CPTII,S$GLB,, | Performed by: DERMATOLOGY

## 2023-08-21 PROCEDURE — 3061F PR NEG MICROALBUMINURIA RESULT DOCUMENTED/REVIEW: ICD-10-PCS | Mod: CPTII,S$GLB,, | Performed by: DERMATOLOGY

## 2023-08-21 PROCEDURE — 1157F ADVNC CARE PLAN IN RCRD: CPT | Mod: CPTII,S$GLB,, | Performed by: DERMATOLOGY

## 2023-08-21 PROCEDURE — 3044F HG A1C LEVEL LT 7.0%: CPT | Mod: CPTII,S$GLB,, | Performed by: DERMATOLOGY

## 2023-08-21 PROCEDURE — 17000 PR DESTRUCTION(LASER SURGERY,CRYOSURGERY,CHEMOSURGERY),PREMALIGNANT LESIONS,FIRST LESION: ICD-10-PCS | Mod: S$GLB,,, | Performed by: DERMATOLOGY

## 2023-08-21 PROCEDURE — 3061F NEG MICROALBUMINURIA REV: CPT | Mod: CPTII,S$GLB,, | Performed by: DERMATOLOGY

## 2023-08-21 PROCEDURE — 3066F PR DOCUMENTATION OF TREATMENT FOR NEPHROPATHY: ICD-10-PCS | Mod: CPTII,S$GLB,, | Performed by: DERMATOLOGY

## 2023-08-21 PROCEDURE — 1159F PR MEDICATION LIST DOCUMENTED IN MEDICAL RECORD: ICD-10-PCS | Mod: CPTII,S$GLB,, | Performed by: DERMATOLOGY

## 2023-08-21 PROCEDURE — 99999 PR PBB SHADOW E&M-EST. PATIENT-LVL III: ICD-10-PCS | Mod: PBBFAC,,, | Performed by: DERMATOLOGY

## 2023-08-21 PROCEDURE — 99213 OFFICE O/P EST LOW 20 MIN: CPT | Mod: 25,S$GLB,, | Performed by: DERMATOLOGY

## 2023-08-21 PROCEDURE — 4010F ACE/ARB THERAPY RXD/TAKEN: CPT | Mod: CPTII,S$GLB,, | Performed by: DERMATOLOGY

## 2023-08-21 PROCEDURE — 1157F PR ADVANCE CARE PLAN OR EQUIV PRESENT IN MEDICAL RECORD: ICD-10-PCS | Mod: CPTII,S$GLB,, | Performed by: DERMATOLOGY

## 2023-08-21 PROCEDURE — 99999 PR PBB SHADOW E&M-EST. PATIENT-LVL III: CPT | Mod: PBBFAC,,, | Performed by: DERMATOLOGY

## 2023-08-21 PROCEDURE — 1101F PT FALLS ASSESS-DOCD LE1/YR: CPT | Mod: CPTII,S$GLB,, | Performed by: DERMATOLOGY

## 2023-08-21 PROCEDURE — 4010F PR ACE/ARB THEARPY RXD/TAKEN: ICD-10-PCS | Mod: CPTII,S$GLB,, | Performed by: DERMATOLOGY

## 2023-08-21 PROCEDURE — 3288F FALL RISK ASSESSMENT DOCD: CPT | Mod: CPTII,S$GLB,, | Performed by: DERMATOLOGY

## 2023-08-21 PROCEDURE — 99213 PR OFFICE/OUTPT VISIT, EST, LEVL III, 20-29 MIN: ICD-10-PCS | Mod: 25,S$GLB,, | Performed by: DERMATOLOGY

## 2023-08-21 PROCEDURE — 17000 DESTRUCT PREMALG LESION: CPT | Mod: S$GLB,,, | Performed by: DERMATOLOGY

## 2023-08-21 PROCEDURE — 1126F AMNT PAIN NOTED NONE PRSNT: CPT | Mod: CPTII,S$GLB,, | Performed by: DERMATOLOGY

## 2023-08-21 PROCEDURE — 3288F PR FALLS RISK ASSESSMENT DOCUMENTED: ICD-10-PCS | Mod: CPTII,S$GLB,, | Performed by: DERMATOLOGY

## 2023-08-21 PROCEDURE — 3066F NEPHROPATHY DOC TX: CPT | Mod: CPTII,S$GLB,, | Performed by: DERMATOLOGY

## 2023-08-21 PROCEDURE — 1159F MED LIST DOCD IN RCRD: CPT | Mod: CPTII,S$GLB,, | Performed by: DERMATOLOGY

## 2023-08-21 PROCEDURE — 1126F PR PAIN SEVERITY QUANTIFIED, NO PAIN PRESENT: ICD-10-PCS | Mod: CPTII,S$GLB,, | Performed by: DERMATOLOGY

## 2023-08-21 PROCEDURE — 3044F PR MOST RECENT HEMOGLOBIN A1C LEVEL <7.0%: ICD-10-PCS | Mod: CPTII,S$GLB,, | Performed by: DERMATOLOGY

## 2023-08-21 NOTE — PROGRESS NOTES
Subjective:      Patient ID:  Jolly Matias is a 73 y.o. female who presents for   Chief Complaint   Patient presents with    Skin Check     ubse    Lesion     R temple  L temple      Pt here today for UBSE    This is a high risk patient here to check for the development of new lesions.    Patient with new complaint of lesion(s)  Location: R temple  Duration: 2mos  Symptoms: tender  Relieving factors/Previous treatments: none    Patient with new complaint of lesion(s)  Location: L temple  Duration: 2mos  Symptoms: itching  Relieving factors/Previous treatments: none                   Review of Systems   Skin:  Positive for daily sunscreen use and activity-related sunscreen use. Negative for tendency to form keloidal scars.   Hematologic/Lymphatic: Does not bruise/bleed easily.       Objective:   Physical Exam   Constitutional: She appears well-developed and well-nourished. No distress.   Neurological: She is alert and oriented to person, place, and time. She is not disoriented.   Psychiatric: She has a normal mood and affect.   Skin:   Areas Examined (abnormalities noted in diagram):   Scalp / Hair Palpated and Inspected  Head / Face Inspection Performed  Neck Inspection Performed  Chest / Axilla Inspection Performed  Back Inspection Performed  RUE Inspected  LUE Inspection Performed  Nails and Digits Inspection Performed                 Diagram Legend     Erythematous scaling macule/papule c/w actinic keratosis       Vascular papule c/w angioma      Pigmented verrucoid papule/plaque c/w seborrheic keratosis      Yellow umbilicated papule c/w sebaceous hyperplasia      Irregularly shaped tan macule c/w lentigo     1-2 mm smooth white papules consistent with Milia      Movable subcutaneous cyst with punctum c/w epidermal inclusion cyst      Subcutaneous movable cyst c/w pilar cyst      Firm pink to brown papule c/w dermatofibroma      Pedunculated fleshy papule(s) c/w skin tag(s)      Evenly pigmented macule c/w  junctional nevus     Mildly variegated pigmented, slightly irregular-bordered macule c/w mildly atypical nevus      Flesh colored to evenly pigmented papule c/w intradermal nevus       Pink pearly papule/plaque c/w basal cell carcinoma      Erythematous hyperkeratotic cursted plaque c/w SCC      Surgical scar with no sign of skin cancer recurrence      Open and closed comedones      Inflammatory papules and pustules      Verrucoid papule consistent consistent with wart     Erythematous eczematous patches and plaques     Dystrophic onycholytic nail with subungual debris c/w onychomycosis     Umbilicated papule    Erythematous-base heme-crusted tan verrucoid plaque consistent with inflamed seborrheic keratosis     Erythematous Silvery Scaling Plaque c/w Psoriasis     See annotation      Assessment / Plan:        AK (actinic keratosis)  Cryosurgery Procedure Note    Verbal consent from the patient is obtained including, but not limited to, risk of hypopigmentation/hyperpigmentation, scar, recurrence of lesion. The patient is aware of the precancerous quality and need for treatment of these lesions. Liquid nitrogen cryosurgery is applied to the 1 actinic keratoses, as detailed in the physical exam, to produce a freeze injury. The patient is aware that blisters may form and is instructed on wound care with gentle cleansing and use of vaseline ointment to keep moist until healed. The patient is supplied a handout on cryosurgery and is instructed to call if lesions do not completely resolve.    History of basal cell carcinoma  Personal history of skin cancer  Area(s) of previous NMSC evaluated with no signs of recurrence.    Upper body skin examination performed today including at least 6 points as noted in physical examination. No lesions suspicious for malignancy noted.    Recommend daily sun protection/avoidance and use of at least SPF 30, broad spectrum sunscreen (OTC drug).     SK (seborrheic keratosis)  These are  benign inherited growths without a malignant potential. Reassurance given to patient. No treatment is necessary.     Lentigo  This is a benign hyperpigmented sun induced lesion. Recommend daily sun protection/avoidance and use of at least SPF 30, broad spectrum sunscreen (OTC drug) will reduce the number of new lesions. Treatment of these benign lesions are considered cosmetic.  The nature of sun-induced photo-aging and skin cancers is discussed.  Sun avoidance, protective clothing, and the use of 30-SPF sunscreens is advised. Observe closely for skin damage/changes, and call if such occurs.    Nevus  Discussed ABCDE's of nevi.  Monitor for new mole or moles that are becoming bigger, darker, irritated, or developing irregular borders. Brochure provided. Instructed patient to observe lesion(s) for changes and follow up in clinic if changes are noted. Patient to monitor skin at home for new or changing lesions.     Cherry angioma  These are benign vascular lesions that are inherited.  Treatment is not necessary.               Follow up in about 6 months (around 2/21/2024) for UBSE.

## 2023-09-04 DIAGNOSIS — F33.0 MAJOR DEPRESSIVE DISORDER, RECURRENT, MILD: ICD-10-CM

## 2023-09-05 RX ORDER — SERTRALINE HYDROCHLORIDE 100 MG/1
100 TABLET, FILM COATED ORAL
Qty: 90 TABLET | Refills: 1 | Status: SHIPPED | OUTPATIENT
Start: 2023-09-05 | End: 2024-03-08 | Stop reason: SDUPTHER

## 2023-09-08 DIAGNOSIS — I10 ESSENTIAL HYPERTENSION: ICD-10-CM

## 2023-09-08 DIAGNOSIS — N18.31 CHRONIC KIDNEY DISEASE, STAGE 3A: ICD-10-CM

## 2023-09-08 RX ORDER — OLMESARTAN MEDOXOMIL 40 MG/1
40 TABLET ORAL
Qty: 90 TABLET | Refills: 1 | OUTPATIENT
Start: 2023-09-08

## 2023-09-13 DIAGNOSIS — E11.69 DM TYPE 2 WITH DIABETIC DYSLIPIDEMIA: ICD-10-CM

## 2023-09-13 DIAGNOSIS — E78.5 DM TYPE 2 WITH DIABETIC DYSLIPIDEMIA: ICD-10-CM

## 2023-09-13 RX ORDER — METFORMIN HYDROCHLORIDE 500 MG/1
500 TABLET ORAL 2 TIMES DAILY WITH MEALS
Qty: 180 TABLET | Refills: 1 | Status: SHIPPED | OUTPATIENT
Start: 2023-09-13 | End: 2023-10-31

## 2023-10-20 ENCOUNTER — LAB VISIT (OUTPATIENT)
Dept: LAB | Facility: HOSPITAL | Age: 73
End: 2023-10-20
Attending: FAMILY MEDICINE
Payer: MEDICARE

## 2023-10-20 DIAGNOSIS — E11.69 DM TYPE 2 WITH DIABETIC DYSLIPIDEMIA: ICD-10-CM

## 2023-10-20 DIAGNOSIS — E78.5 DM TYPE 2 WITH DIABETIC DYSLIPIDEMIA: ICD-10-CM

## 2023-10-20 LAB
ALBUMIN SERPL BCP-MCNC: 4.1 G/DL (ref 3.5–5.2)
ALP SERPL-CCNC: 103 U/L (ref 55–135)
ALT SERPL W/O P-5'-P-CCNC: 10 U/L (ref 10–44)
ANION GAP SERPL CALC-SCNC: 6 MMOL/L (ref 8–16)
AST SERPL-CCNC: 14 U/L (ref 10–40)
BILIRUB SERPL-MCNC: 0.3 MG/DL (ref 0.1–1)
BUN SERPL-MCNC: 32 MG/DL (ref 8–23)
CALCIUM SERPL-MCNC: 9.7 MG/DL (ref 8.7–10.5)
CHLORIDE SERPL-SCNC: 112 MMOL/L (ref 95–110)
CHOLEST SERPL-MCNC: 152 MG/DL (ref 120–199)
CHOLEST/HDLC SERPL: 3.6 {RATIO} (ref 2–5)
CO2 SERPL-SCNC: 23 MMOL/L (ref 23–29)
CREAT SERPL-MCNC: 1.3 MG/DL (ref 0.5–1.4)
EST. GFR  (NO RACE VARIABLE): 43.1 ML/MIN/1.73 M^2
ESTIMATED AVG GLUCOSE: 123 MG/DL (ref 68–131)
GLUCOSE SERPL-MCNC: 94 MG/DL (ref 70–110)
HBA1C MFR BLD: 5.9 % (ref 4–5.6)
HDLC SERPL-MCNC: 42 MG/DL (ref 40–75)
HDLC SERPL: 27.6 % (ref 20–50)
LDLC SERPL CALC-MCNC: 77.6 MG/DL (ref 63–159)
NONHDLC SERPL-MCNC: 110 MG/DL
POTASSIUM SERPL-SCNC: 5.1 MMOL/L (ref 3.5–5.1)
PROT SERPL-MCNC: 6.9 G/DL (ref 6–8.4)
SODIUM SERPL-SCNC: 141 MMOL/L (ref 136–145)
TRIGL SERPL-MCNC: 162 MG/DL (ref 30–150)

## 2023-10-20 PROCEDURE — 36415 COLL VENOUS BLD VENIPUNCTURE: CPT | Mod: PO | Performed by: FAMILY MEDICINE

## 2023-10-20 PROCEDURE — 80053 COMPREHEN METABOLIC PANEL: CPT | Performed by: FAMILY MEDICINE

## 2023-10-20 PROCEDURE — 80061 LIPID PANEL: CPT | Performed by: FAMILY MEDICINE

## 2023-10-20 PROCEDURE — 83036 HEMOGLOBIN GLYCOSYLATED A1C: CPT | Performed by: FAMILY MEDICINE

## 2023-10-31 ENCOUNTER — LAB VISIT (OUTPATIENT)
Dept: LAB | Facility: HOSPITAL | Age: 73
End: 2023-10-31
Attending: FAMILY MEDICINE
Payer: MEDICARE

## 2023-10-31 ENCOUNTER — OFFICE VISIT (OUTPATIENT)
Dept: FAMILY MEDICINE | Facility: CLINIC | Age: 73
End: 2023-10-31
Payer: MEDICARE

## 2023-10-31 VITALS
HEART RATE: 72 BPM | HEIGHT: 61 IN | SYSTOLIC BLOOD PRESSURE: 126 MMHG | BODY MASS INDEX: 26.73 KG/M2 | DIASTOLIC BLOOD PRESSURE: 69 MMHG | WEIGHT: 141.56 LBS | OXYGEN SATURATION: 97 %

## 2023-10-31 DIAGNOSIS — J47.9 BRONCHIECTASIS WITHOUT COMPLICATION: ICD-10-CM

## 2023-10-31 DIAGNOSIS — Z85.118 H/O: LUNG CANCER: ICD-10-CM

## 2023-10-31 DIAGNOSIS — E66.3 OVERWEIGHT WITH BODY MASS INDEX (BMI) OF 26 TO 26.9 IN ADULT: ICD-10-CM

## 2023-10-31 DIAGNOSIS — E11.69 DM TYPE 2 WITH DIABETIC DYSLIPIDEMIA: Primary | ICD-10-CM

## 2023-10-31 DIAGNOSIS — N18.32 TYPE 2 DIABETES MELLITUS WITH STAGE 3B CHRONIC KIDNEY DISEASE, WITHOUT LONG-TERM CURRENT USE OF INSULIN: ICD-10-CM

## 2023-10-31 DIAGNOSIS — I10 ESSENTIAL HYPERTENSION: ICD-10-CM

## 2023-10-31 DIAGNOSIS — E11.22 TYPE 2 DIABETES MELLITUS WITH STAGE 3B CHRONIC KIDNEY DISEASE, WITHOUT LONG-TERM CURRENT USE OF INSULIN: ICD-10-CM

## 2023-10-31 DIAGNOSIS — E78.5 DM TYPE 2 WITH DIABETIC DYSLIPIDEMIA: Primary | ICD-10-CM

## 2023-10-31 DIAGNOSIS — F33.0 MAJOR DEPRESSIVE DISORDER, RECURRENT, MILD: ICD-10-CM

## 2023-10-31 DIAGNOSIS — R30.0 DYSURIA: ICD-10-CM

## 2023-10-31 PROBLEM — N18.31 TYPE 2 DIABETES MELLITUS WITH STAGE 3A CHRONIC KIDNEY DISEASE, WITHOUT LONG-TERM CURRENT USE OF INSULIN: Status: RESOLVED | Noted: 2023-03-15 | Resolved: 2023-10-31

## 2023-10-31 LAB
BACTERIA #/AREA URNS AUTO: ABNORMAL /HPF
BILIRUB UR QL STRIP: NEGATIVE
CLARITY UR REFRACT.AUTO: ABNORMAL
COLOR UR AUTO: YELLOW
GLUCOSE UR QL STRIP: NEGATIVE
HGB UR QL STRIP: ABNORMAL
HYALINE CASTS UR QL AUTO: 9 /LPF
KETONES UR QL STRIP: NEGATIVE
LEFT EYE DM RETINOPATHY: POSITIVE
LEUKOCYTE ESTERASE UR QL STRIP: ABNORMAL
MICROSCOPIC COMMENT: ABNORMAL
NITRITE UR QL STRIP: NEGATIVE
NON-SQ EPI CELLS #/AREA URNS AUTO: 2 /HPF
PH UR STRIP: 5 [PH] (ref 5–8)
PROT UR QL STRIP: NEGATIVE
RBC #/AREA URNS AUTO: 0 /HPF (ref 0–4)
RIGHT EYE DM RETINOPATHY: POSITIVE
SP GR UR STRIP: 1.02 (ref 1–1.03)
SQUAMOUS #/AREA URNS AUTO: 1 /HPF
URN SPEC COLLECT METH UR: ABNORMAL
WBC #/AREA URNS AUTO: >100 /HPF (ref 0–5)
WBC CLUMPS UR QL AUTO: ABNORMAL

## 2023-10-31 PROCEDURE — 81001 URINALYSIS AUTO W/SCOPE: CPT | Performed by: FAMILY MEDICINE

## 2023-10-31 PROCEDURE — 99999 PR PBB SHADOW E&M-EST. PATIENT-LVL IV: ICD-10-PCS | Mod: PBBFAC,,, | Performed by: FAMILY MEDICINE

## 2023-10-31 PROCEDURE — 4010F PR ACE/ARB THEARPY RXD/TAKEN: ICD-10-PCS | Mod: CPTII,S$GLB,, | Performed by: FAMILY MEDICINE

## 2023-10-31 PROCEDURE — 99215 PR OFFICE/OUTPT VISIT, EST, LEVL V, 40-54 MIN: ICD-10-PCS | Mod: S$GLB,,, | Performed by: FAMILY MEDICINE

## 2023-10-31 PROCEDURE — 1126F AMNT PAIN NOTED NONE PRSNT: CPT | Mod: CPTII,S$GLB,, | Performed by: FAMILY MEDICINE

## 2023-10-31 PROCEDURE — 3288F FALL RISK ASSESSMENT DOCD: CPT | Mod: CPTII,S$GLB,, | Performed by: FAMILY MEDICINE

## 2023-10-31 PROCEDURE — 3044F HG A1C LEVEL LT 7.0%: CPT | Mod: CPTII,S$GLB,, | Performed by: FAMILY MEDICINE

## 2023-10-31 PROCEDURE — 3066F PR DOCUMENTATION OF TREATMENT FOR NEPHROPATHY: ICD-10-PCS | Mod: CPTII,S$GLB,, | Performed by: FAMILY MEDICINE

## 2023-10-31 PROCEDURE — 3061F PR NEG MICROALBUMINURIA RESULT DOCUMENTED/REVIEW: ICD-10-PCS | Mod: CPTII,S$GLB,, | Performed by: FAMILY MEDICINE

## 2023-10-31 PROCEDURE — 3074F PR MOST RECENT SYSTOLIC BLOOD PRESSURE < 130 MM HG: ICD-10-PCS | Mod: CPTII,S$GLB,, | Performed by: FAMILY MEDICINE

## 2023-10-31 PROCEDURE — 1157F ADVNC CARE PLAN IN RCRD: CPT | Mod: CPTII,S$GLB,, | Performed by: FAMILY MEDICINE

## 2023-10-31 PROCEDURE — 3288F PR FALLS RISK ASSESSMENT DOCUMENTED: ICD-10-PCS | Mod: CPTII,S$GLB,, | Performed by: FAMILY MEDICINE

## 2023-10-31 PROCEDURE — 3078F DIAST BP <80 MM HG: CPT | Mod: CPTII,S$GLB,, | Performed by: FAMILY MEDICINE

## 2023-10-31 PROCEDURE — 1101F PT FALLS ASSESS-DOCD LE1/YR: CPT | Mod: CPTII,S$GLB,, | Performed by: FAMILY MEDICINE

## 2023-10-31 PROCEDURE — 3078F PR MOST RECENT DIASTOLIC BLOOD PRESSURE < 80 MM HG: ICD-10-PCS | Mod: CPTII,S$GLB,, | Performed by: FAMILY MEDICINE

## 2023-10-31 PROCEDURE — 3008F PR BODY MASS INDEX (BMI) DOCUMENTED: ICD-10-PCS | Mod: CPTII,S$GLB,, | Performed by: FAMILY MEDICINE

## 2023-10-31 PROCEDURE — 3061F NEG MICROALBUMINURIA REV: CPT | Mod: CPTII,S$GLB,, | Performed by: FAMILY MEDICINE

## 2023-10-31 PROCEDURE — 1101F PR PT FALLS ASSESS DOC 0-1 FALLS W/OUT INJ PAST YR: ICD-10-PCS | Mod: CPTII,S$GLB,, | Performed by: FAMILY MEDICINE

## 2023-10-31 PROCEDURE — 3008F BODY MASS INDEX DOCD: CPT | Mod: CPTII,S$GLB,, | Performed by: FAMILY MEDICINE

## 2023-10-31 PROCEDURE — 99215 OFFICE O/P EST HI 40 MIN: CPT | Mod: S$GLB,,, | Performed by: FAMILY MEDICINE

## 2023-10-31 PROCEDURE — 3066F NEPHROPATHY DOC TX: CPT | Mod: CPTII,S$GLB,, | Performed by: FAMILY MEDICINE

## 2023-10-31 PROCEDURE — 3074F SYST BP LT 130 MM HG: CPT | Mod: CPTII,S$GLB,, | Performed by: FAMILY MEDICINE

## 2023-10-31 PROCEDURE — 4010F ACE/ARB THERAPY RXD/TAKEN: CPT | Mod: CPTII,S$GLB,, | Performed by: FAMILY MEDICINE

## 2023-10-31 PROCEDURE — 87086 URINE CULTURE/COLONY COUNT: CPT | Performed by: FAMILY MEDICINE

## 2023-10-31 PROCEDURE — 1157F PR ADVANCE CARE PLAN OR EQUIV PRESENT IN MEDICAL RECORD: ICD-10-PCS | Mod: CPTII,S$GLB,, | Performed by: FAMILY MEDICINE

## 2023-10-31 PROCEDURE — 1126F PR PAIN SEVERITY QUANTIFIED, NO PAIN PRESENT: ICD-10-PCS | Mod: CPTII,S$GLB,, | Performed by: FAMILY MEDICINE

## 2023-10-31 PROCEDURE — 3044F PR MOST RECENT HEMOGLOBIN A1C LEVEL <7.0%: ICD-10-PCS | Mod: CPTII,S$GLB,, | Performed by: FAMILY MEDICINE

## 2023-10-31 PROCEDURE — 99999 PR PBB SHADOW E&M-EST. PATIENT-LVL IV: CPT | Mod: PBBFAC,,, | Performed by: FAMILY MEDICINE

## 2023-10-31 RX ORDER — METFORMIN HYDROCHLORIDE 500 MG/1
500 TABLET ORAL
Qty: 90 TABLET | Refills: 1
Start: 2023-10-31 | End: 2024-03-08 | Stop reason: SDUPTHER

## 2023-10-31 RX ORDER — DAPAGLIFLOZIN 5 MG/1
5 TABLET, FILM COATED ORAL DAILY
Qty: 90 TABLET | Refills: 1 | Status: SHIPPED | OUTPATIENT
Start: 2023-10-31 | End: 2024-03-08 | Stop reason: SDUPTHER

## 2023-10-31 NOTE — PROGRESS NOTES
Subjective:       Patient ID: Jolly Matias is a 73 y.o. female.    Chief Complaint: Hypertension and Diabetes  74 yo female with h/o lung CA, HTN, CKD, Stage 3, hyperlipidemia and DM, Type 2 presents to establish care and to f/u on her chronic conditions. Pt is followed by Dr. Daphne Jacinto for bronchiectasis. Last visit 7/20/23.  Hypertension  This is a chronic problem. The current episode started more than 1 year ago. The problem is unchanged. The problem is controlled. Associated symptoms include palpitations. Pertinent negatives include no blurred vision, chest pain, headaches, orthopnea, peripheral edema, PND or shortness of breath. Past treatments include diuretics and angiotensin blockers. The current treatment provides significant improvement. There are no compliance problems.  Hypertensive end-organ damage includes kidney disease.   Diabetes  She presents for her follow-up diabetic visit. She has type 2 diabetes mellitus. Pertinent negatives for hypoglycemia include no headaches. Pertinent negatives for diabetes include no blurred vision and no chest pain. Current diabetic treatment includes oral agent (monotherapy). She is compliant with treatment all of the time.     Received her flu vaccine 2 weeks ago.  To receive the COVID vaccine.  Notes dysuria for the past week. No hematuria. No fever or chills. No abdominal pain  Results for orders placed or performed in visit on 10/20/23   Comprehensive Metabolic Panel   Result Value Ref Range    Sodium 141 136 - 145 mmol/L    Potassium 5.1 3.5 - 5.1 mmol/L    Chloride 112 (H) 95 - 110 mmol/L    CO2 23 23 - 29 mmol/L    Glucose 94 70 - 110 mg/dL    BUN 32 (H) 8 - 23 mg/dL    Creatinine 1.3 0.5 - 1.4 mg/dL    Calcium 9.7 8.7 - 10.5 mg/dL    Total Protein 6.9 6.0 - 8.4 g/dL    Albumin 4.1 3.5 - 5.2 g/dL    Total Bilirubin 0.3 0.1 - 1.0 mg/dL    Alkaline Phosphatase 103 55 - 135 U/L    AST 14 10 - 40 U/L    ALT 10 10 - 44 U/L    eGFR 43.1 (A) >60 mL/min/1.73 m^2     Anion Gap 6 (L) 8 - 16 mmol/L   Hemoglobin A1C   Result Value Ref Range    Hemoglobin A1C 5.9 (H) 4.0 - 5.6 %    Estimated Avg Glucose 123 68 - 131 mg/dL   Lipid Panel   Result Value Ref Range    Cholesterol 152 120 - 199 mg/dL    Triglycerides 162 (H) 30 - 150 mg/dL    HDL 42 40 - 75 mg/dL    LDL Cholesterol 77.6 63.0 - 159.0 mg/dL    HDL/Cholesterol Ratio 27.6 20.0 - 50.0 %    Total Cholesterol/HDL Ratio 3.6 2.0 - 5.0    Non-HDL Cholesterol 110 mg/dL      Review of Systems   Eyes:  Negative for blurred vision.   Respiratory:  Negative for shortness of breath.    Cardiovascular:  Positive for palpitations. Negative for chest pain, orthopnea and PND.   Neurological:  Negative for headaches.       Objective:      Physical Exam  Vitals reviewed.   Constitutional:       General: She is not in acute distress.     Appearance: Normal appearance. She is not ill-appearing.   HENT:      Head: Normocephalic and atraumatic.      Right Ear: External ear normal.      Left Ear: External ear normal.   Eyes:      General:         Right eye: No discharge.         Left eye: No discharge.      Extraocular Movements: Extraocular movements intact.      Conjunctiva/sclera: Conjunctivae normal.   Neck:      Vascular: No carotid bruit.   Cardiovascular:      Rate and Rhythm: Normal rate and regular rhythm.      Heart sounds: Normal heart sounds. No murmur heard.     No gallop.   Pulmonary:      Effort: Pulmonary effort is normal.      Breath sounds: Normal breath sounds. No wheezing or rales.   Abdominal:      General: Bowel sounds are normal.      Palpations: Abdomen is soft. There is no mass.      Tenderness: There is no abdominal tenderness.   Musculoskeletal:      Cervical back: Normal range of motion and neck supple. No rigidity or tenderness.      Right lower leg: No edema.      Left lower leg: No edema.   Skin:     General: Skin is warm and dry.   Neurological:      Mental Status: She is alert and oriented to person, place, and  time.   Psychiatric:         Mood and Affect: Mood normal.         Assessment:       See plan  Plan:       DM type 2 with diabetic dyslipidemia: Stable  -     metFORMIN (GLUCOPHAGE) 500 MG tablet; Take 1 tablet (500 mg total) by mouth daily with breakfast.  Dispense: 90 tablet; Refill: 1    Type 2 diabetes mellitus with stage 3b chronic kidney disease, without long-term current use of insulin: Stable  -     metFORMIN (GLUCOPHAGE) 500 MG tablet; Take 1 tablet (500 mg total) by mouth daily with breakfast.  Dispense: 90 tablet; Refill: 1  -     dapagliflozin propanediol (FARXIGA) 5 mg Tab tablet; Take 1 tablet (5 mg total) by mouth once daily.  Dispense: 90 tablet; Refill: 1  -     BASIC METABOLIC PANEL; Future; Expected date: 11/30/2023  -     Comprehensive Metabolic Panel; Future; Expected date: 01/31/2024  -     Hemoglobin A1C; Future; Expected date: 01/31/2024    Essential hypertension: Stable    Dysuria  -     Urinalysis, Reflex to Urine Culture Urine, Clean Catch; Future; Expected date: 10/31/2023    Major depressive disorder, recurrent, mild: Stable    Overweight with body mass index (BMI) of 26 to 26.9 in adult: Stable    Bronchiectasis without complication: Stable  - F/U with Pulmonary    H/O: lung cancer  - F/U with Pulmonary       F/U in 3 months.

## 2023-11-01 ENCOUNTER — TELEPHONE (OUTPATIENT)
Dept: FAMILY MEDICINE | Facility: CLINIC | Age: 73
End: 2023-11-01
Payer: MEDICARE

## 2023-11-01 DIAGNOSIS — N30.00 ACUTE CYSTITIS WITHOUT HEMATURIA: Primary | ICD-10-CM

## 2023-11-01 RX ORDER — SULFAMETHOXAZOLE AND TRIMETHOPRIM 800; 160 MG/1; MG/1
1 TABLET ORAL 2 TIMES DAILY
Qty: 6 TABLET | Refills: 0 | Status: SHIPPED | OUTPATIENT
Start: 2023-11-01 | End: 2023-11-04

## 2023-11-02 LAB
BACTERIA UR CULT: NORMAL
BACTERIA UR CULT: NORMAL

## 2023-11-03 ENCOUNTER — PATIENT OUTREACH (OUTPATIENT)
Dept: ADMINISTRATIVE | Facility: HOSPITAL | Age: 73
End: 2023-11-03
Payer: MEDICARE

## 2023-11-08 ENCOUNTER — PATIENT MESSAGE (OUTPATIENT)
Dept: FAMILY MEDICINE | Facility: CLINIC | Age: 73
End: 2023-11-08
Payer: MEDICARE

## 2023-11-09 ENCOUNTER — PATIENT MESSAGE (OUTPATIENT)
Dept: INTERNAL MEDICINE | Facility: CLINIC | Age: 73
End: 2023-11-09
Payer: MEDICARE

## 2023-12-06 DIAGNOSIS — E78.5 DM TYPE 2 WITH DIABETIC DYSLIPIDEMIA: ICD-10-CM

## 2023-12-06 DIAGNOSIS — E11.69 DM TYPE 2 WITH DIABETIC DYSLIPIDEMIA: ICD-10-CM

## 2023-12-06 RX ORDER — ATORVASTATIN CALCIUM 20 MG/1
20 TABLET, FILM COATED ORAL
Qty: 90 TABLET | Refills: 1 | OUTPATIENT
Start: 2023-12-06

## 2024-01-12 ENCOUNTER — HOSPITAL ENCOUNTER (OUTPATIENT)
Dept: RADIOLOGY | Facility: HOSPITAL | Age: 74
Discharge: HOME | End: 2024-01-12
Attending: NURSE PRACTITIONER
Payer: MEDICARE

## 2024-01-12 DIAGNOSIS — Z80.3 FAMILY HISTORY OF BREAST CANCER: ICD-10-CM

## 2024-01-12 DIAGNOSIS — Z12.31 ENCOUNTER FOR SCREENING MAMMOGRAM FOR MALIGNANT NEOPLASM OF BREAST: ICD-10-CM

## 2024-01-12 DIAGNOSIS — Z91.89 AT HIGH RISK FOR BREAST CANCER: ICD-10-CM

## 2024-01-12 DIAGNOSIS — R92.30 DENSE BREAST TISSUE ON MAMMOGRAM: ICD-10-CM

## 2024-01-12 PROCEDURE — 77067 SCR MAMMO BI INCL CAD: CPT | Mod: TC

## 2024-01-12 PROCEDURE — 77067 SCR MAMMO BI INCL CAD: CPT | Mod: 26,,, | Performed by: RADIOLOGY

## 2024-01-12 PROCEDURE — 77063 BREAST TOMOSYNTHESIS BI: CPT | Mod: 26,,, | Performed by: RADIOLOGY

## 2024-01-26 ENCOUNTER — TELEPHONE (OUTPATIENT)
Dept: PODIATRY | Facility: CLINIC | Age: 74
End: 2024-01-26
Payer: MEDICARE

## 2024-02-19 ENCOUNTER — OFFICE VISIT (OUTPATIENT)
Dept: DERMATOLOGY | Facility: CLINIC | Age: 74
End: 2024-02-19
Payer: MEDICARE

## 2024-02-19 DIAGNOSIS — D48.5 NEOPLASM OF UNCERTAIN BEHAVIOR OF SKIN: Primary | ICD-10-CM

## 2024-02-19 DIAGNOSIS — D18.01 CHERRY ANGIOMA: ICD-10-CM

## 2024-02-19 DIAGNOSIS — L82.1 SK (SEBORRHEIC KERATOSIS): ICD-10-CM

## 2024-02-19 DIAGNOSIS — D22.9 NEVUS: ICD-10-CM

## 2024-02-19 DIAGNOSIS — L81.4 LENTIGO: ICD-10-CM

## 2024-02-19 DIAGNOSIS — Z85.828 PERSONAL HISTORY OF SKIN CANCER: ICD-10-CM

## 2024-02-19 DIAGNOSIS — R20.2 NOTALGIA PARESTHETICA: ICD-10-CM

## 2024-02-19 DIAGNOSIS — L73.8 SEBACEOUS GLAND HYPERPLASIA: ICD-10-CM

## 2024-02-19 PROCEDURE — 88342 IMHCHEM/IMCYTCHM 1ST ANTB: CPT | Performed by: DERMATOLOGY

## 2024-02-19 PROCEDURE — 99213 OFFICE O/P EST LOW 20 MIN: CPT | Mod: 25,S$GLB,, | Performed by: DERMATOLOGY

## 2024-02-19 PROCEDURE — 99999 PR PBB SHADOW E&M-EST. PATIENT-LVL III: CPT | Mod: PBBFAC,,, | Performed by: DERMATOLOGY

## 2024-02-19 PROCEDURE — 11103 TANGNTL BX SKIN EA SEP/ADDL: CPT | Mod: S$GLB,,, | Performed by: DERMATOLOGY

## 2024-02-19 PROCEDURE — 11102 TANGNTL BX SKIN SINGLE LES: CPT | Mod: S$GLB,,, | Performed by: DERMATOLOGY

## 2024-02-19 PROCEDURE — 88342 IMHCHEM/IMCYTCHM 1ST ANTB: CPT | Mod: 26,,, | Performed by: DERMATOLOGY

## 2024-02-19 PROCEDURE — 88305 TISSUE EXAM BY PATHOLOGIST: CPT | Mod: 26,,, | Performed by: DERMATOLOGY

## 2024-02-19 PROCEDURE — 88305 TISSUE EXAM BY PATHOLOGIST: CPT | Mod: 59 | Performed by: DERMATOLOGY

## 2024-02-19 NOTE — PATIENT INSTRUCTIONS
This condition is thought to be related to a nerve under the skin  Can be triggered by stress, hot showers, and exacerbated by chronic rubbing  Cold compresses, sarna lotion or Dermeleve can soothe the itching sensation  Topical steroids may give mild relief  Occasionally oral medications that control nerve impulses can help        Shave Biopsy Wound Care    Your doctor has performed a shave biopsy today.  A band aid and vaseline ointment has been placed over the site.  This should remain in place for NO LONGER THAN 48 hours.  It is fine to remove the bandaid after 24 hours, if the area is no longer bleeding. It is recommended that you keep the area dry (do not wet)) for the first 24 hours.  After 24 hours, wash the area with warm soap and water and apply Vaseline jelly.  Many patients prefer to use Neosporin or Bacitracin ointment.  This is acceptable; however, know that you can develop an allergy to this medication even if you have used it safely for years.  It is important to keep the area moist.  Letting it dry out and get air slows healing time, and will worsen the scar.        If you notice increasing redness, tenderness, pain, or yellow drainage at the biopsy site, please notify your doctor.  These are signs of an infection.    If your biopsy site is bleeding, apply firm pressure for 15 minutes straight.  Repeat for another 15 minutes, if it is still bleeding.   If the surgical site continues to bleed, then please contact your doctor.      For [facility] users:   You will receive your biopsy results in [facility] as soon as they are available. Please be assured that your physician/provider will review your results and will then determine what further treatment, evaluation, or planning is required. You should be contacted by your physician's/provider's office within 5 business days of receiving your results; If not, please reach out to directly. This is one more way [facility] you first.     [id number]  [place] [address]/ [phone] FAX/ [email or web address]

## 2024-02-19 NOTE — PROGRESS NOTES
Subjective:      Patient ID:  Jolly Matias is a 73 y.o. female who presents for   Chief Complaint   Patient presents with    Skin Check     UBSE      Pt here today for UBSE    This is a high risk patient here to check for the development of new lesions.    Pt states itching on both shoulder blades. Sx comes and go. Tx consist of cerve lotion             Review of Systems   Skin:  Positive for itching (both shoulder blades), daily sunscreen use, activity-related sunscreen use and wears hat. Negative for rash, dry skin and recent sunburn.   Hematologic/Lymphatic: Does not bruise/bleed easily.       Objective:   Physical Exam   Constitutional: She appears well-developed and well-nourished. No distress.   Neurological: She is alert and oriented to person, place, and time. She is not disoriented.   Psychiatric: She has a normal mood and affect.   Skin:   Areas Examined (abnormalities noted in diagram):   Scalp / Hair Palpated and Inspected  Head / Face Inspection Performed  Neck Inspection Performed  Chest / Axilla Inspection Performed  Back Inspection Performed  RUE Inspected  LUE Inspection Performed  Nails and Digits Inspection Performed                       Diagram Legend     Erythematous scaling macule/papule c/w actinic keratosis       Vascular papule c/w angioma      Pigmented verrucoid papule/plaque c/w seborrheic keratosis      Yellow umbilicated papule c/w sebaceous hyperplasia      Irregularly shaped tan macule c/w lentigo     1-2 mm smooth white papules consistent with Milia      Movable subcutaneous cyst with punctum c/w epidermal inclusion cyst      Subcutaneous movable cyst c/w pilar cyst      Firm pink to brown papule c/w dermatofibroma      Pedunculated fleshy papule(s) c/w skin tag(s)      Evenly pigmented macule c/w junctional nevus     Mildly variegated pigmented, slightly irregular-bordered macule c/w mildly atypical nevus      Flesh colored to evenly pigmented papule c/w intradermal nevus        Pink pearly papule/plaque c/w basal cell carcinoma      Erythematous hyperkeratotic cursted plaque c/w SCC      Surgical scar with no sign of skin cancer recurrence      Open and closed comedones      Inflammatory papules and pustules      Verrucoid papule consistent consistent with wart     Erythematous eczematous patches and plaques     Dystrophic onycholytic nail with subungual debris c/w onychomycosis     Umbilicated papule    Erythematous-base heme-crusted tan verrucoid plaque consistent with inflamed seborrheic keratosis     Erythematous Silvery Scaling Plaque c/w Psoriasis     See annotation      Assessment / Plan:      Pathology Orders:       Normal Orders This Visit    Specimen to Pathology, Dermatology     Questions:    Procedure Type: Dermatology and skin neoplasms    Number of Specimens: 2    ------------------------: -------------------------    Spec 1 Procedure: Biopsy    Spec 1 Clinical Impression: r/o BCC    Spec 1 Source: left temple    ------------------------: -------------------------    Spec 2 Procedure: Biopsy    Spec 2 Clinical Impression: r/o BCC    Spec 2 Source: right upper forehead    Release to patient:           Neoplasm of uncertain behavior of skin x 2   Shave biopsy procedure note:    Shave biopsy performed after verbal consent including risk of infection, scar, recurrence, need for additional treatment of site. Area prepped with alcohol, anesthetized with approximately 1.0cc of 1% lidocaine with epinephrine. Lesional tissue shaved with razor blade. Hemostasis achieved with application of aluminum chloride followed by hyfrecation. No complications. Dressing applied. Wound care explained.    -     Specimen to Pathology, Dermatology    Lentigo  This is a benign hyperpigmented sun induced lesion. Recommend daily sun protection/avoidance and use of at least SPF 30, broad spectrum sunscreen (OTC drug) will reduce the number of new lesions. Treatment of these benign lesions are considered  cosmetic.  The nature of sun-induced photo-aging and skin cancers is discussed.  Sun avoidance, protective clothing, and the use of 30-SPF sunscreens is advised. Observe closely for skin damage/changes, and call if such occurs.    Nevus  Discussed ABCDE's of nevi.  Monitor for new mole or moles that are becoming bigger, darker, irritated, or developing irregular borders. Brochure provided. Instructed patient to observe lesion(s) for changes and follow up in clinic if changes are noted. Patient to monitor skin at home for new or changing lesions.     SK (seborrheic keratosis)  These are benign inherited growths without a malignant potential. Reassurance given to patient. No treatment is necessary.     Cherry angioma  These are benign vascular lesions that are inherited.  Treatment is not necessary.    Sebaceous gland hyperplasia  This is a common condition representing benign enlargement of the sebaceous lobule. It typically occurs in adulthood. Reassurance given to patient.     Personal history of skin cancer  Area(s) of previous NMSC evaluated with no signs of recurrence.    Upper body skin examination performed today including at least 6 points as noted in physical examination. Suspicious lesions noted.    Recommend daily sun protection/avoidance and use of at least SPF 30, broad spectrum sunscreen (OTC drug).     Notalgia paresthetica  This condition is thought to be related to a nerve under the skin  Can be triggered by stress, hot showers, and exacerbated by chronic rubbing  Cold compresses, sarna lotion or Dermeleve can soothe the itching sensation  Topical steroids may give mild relief  Occasionally oral medications that control nerve impulses can help            Follow up in 6 months (on 8/19/2024) for prn bx report, UBSE.

## 2024-02-21 LAB
LEFT EYE DM RETINOPATHY: POSITIVE
RIGHT EYE DM RETINOPATHY: POSITIVE

## 2024-02-26 ENCOUNTER — PATIENT OUTREACH (OUTPATIENT)
Dept: INTERNAL MEDICINE | Facility: CLINIC | Age: 74
End: 2024-02-26
Payer: MEDICARE

## 2024-02-27 ENCOUNTER — PATIENT MESSAGE (OUTPATIENT)
Dept: DERMATOLOGY | Facility: CLINIC | Age: 74
End: 2024-02-27
Payer: MEDICARE

## 2024-02-28 LAB
FINAL PATHOLOGIC DIAGNOSIS: NORMAL
GROSS: NORMAL
Lab: NORMAL
MICROSCOPIC EXAM: NORMAL

## 2024-02-29 ENCOUNTER — PATIENT MESSAGE (OUTPATIENT)
Dept: DERMATOLOGY | Facility: CLINIC | Age: 74
End: 2024-02-29
Payer: MEDICARE

## 2024-02-29 NOTE — PROGRESS NOTES
Please call patient to schedule a Mohs consultation.     Skin, left temple, shave biopsy:    - BASAL CELL CARCINOMA, SUPERFICIAL-MULTIFOCAL AND NODULAR TYPES    - Tumor involves the deep tissue edge.  The peripheral tissue edges are uninvolved by tumor.     This lesion is skin cancer. You will be contacted regarding treatment.

## 2024-03-04 ENCOUNTER — PATIENT MESSAGE (OUTPATIENT)
Dept: FAMILY MEDICINE | Facility: CLINIC | Age: 74
End: 2024-03-04
Payer: MEDICARE

## 2024-03-04 DIAGNOSIS — N18.32 TYPE 2 DIABETES MELLITUS WITH STAGE 3B CHRONIC KIDNEY DISEASE, WITHOUT LONG-TERM CURRENT USE OF INSULIN: Primary | ICD-10-CM

## 2024-03-04 DIAGNOSIS — E11.22 TYPE 2 DIABETES MELLITUS WITH STAGE 3B CHRONIC KIDNEY DISEASE, WITHOUT LONG-TERM CURRENT USE OF INSULIN: Primary | ICD-10-CM

## 2024-03-05 DIAGNOSIS — E78.5 DM TYPE 2 WITH DIABETIC DYSLIPIDEMIA: ICD-10-CM

## 2024-03-05 DIAGNOSIS — E11.69 DM TYPE 2 WITH DIABETIC DYSLIPIDEMIA: ICD-10-CM

## 2024-03-05 DIAGNOSIS — I10 ESSENTIAL HYPERTENSION: ICD-10-CM

## 2024-03-05 RX ORDER — ATORVASTATIN CALCIUM 20 MG/1
20 TABLET, FILM COATED ORAL
Qty: 90 TABLET | Refills: 2 | Status: SHIPPED | OUTPATIENT
Start: 2024-03-05

## 2024-03-05 RX ORDER — SPIRONOLACTONE 25 MG/1
25 TABLET ORAL
Qty: 90 TABLET | Refills: 2 | Status: SHIPPED | OUTPATIENT
Start: 2024-03-05

## 2024-03-05 NOTE — TELEPHONE ENCOUNTER
Care Due:                  Date            Visit Type   Department     Provider  --------------------------------------------------------------------------------                                EP -                              PRIMARY      [facility]  Last Visit: 10-      CARE ([facility])   DIGNA Blandon                               -                              [facility]  Next Visit: 03-      CARE ([facility])   [facility] [place]       Maddi Blandon                                                            Last  Test          Frequency    Reason                     Performed    Due Date  --------------------------------------------------------------------------------    HBA1C.......  6 months...  dapagliflozin, metFORMIN.  10-   04-    [facility] Embedded Care Due Messages. Reference number: [id number].   3/05/2024 5:52:33 AM CST

## 2024-03-05 NOTE — TELEPHONE ENCOUNTER
Provider Staff:  Action required for this patient    Requires labs      Please see care gap opportunities below in Care Due Message.    Thanks!  [facility]     Appointments      Date Provider   Last Visit   10/31/2023 Maddi Blandon A.M., MD   Next Visit   3/8/2024 Maddi Blandon A.M., MD     Refill Decision Note   Jolly Matias  is requesting a refill authorization.  Brief Assessment and Rationale for Refill:  Approve     Medication Therapy Plan:         Comments:     Note composed:11:55 AM 03/05/2024

## 2024-03-07 ENCOUNTER — LAB VISIT (OUTPATIENT)
Dept: LAB | Facility: HOSPITAL | Age: 74
End: 2024-03-07
Attending: FAMILY MEDICINE
Payer: MEDICARE

## 2024-03-07 DIAGNOSIS — N18.32 TYPE 2 DIABETES MELLITUS WITH STAGE 3B CHRONIC KIDNEY DISEASE, WITHOUT LONG-TERM CURRENT USE OF INSULIN: ICD-10-CM

## 2024-03-07 DIAGNOSIS — E11.22 TYPE 2 DIABETES MELLITUS WITH STAGE 3B CHRONIC KIDNEY DISEASE, WITHOUT LONG-TERM CURRENT USE OF INSULIN: ICD-10-CM

## 2024-03-07 LAB
ALBUMIN SERPL BCP-MCNC: 3.8 G/DL (ref 3.5–5.2)
ALP SERPL-CCNC: 107 U/L (ref 55–135)
ALT SERPL W/O P-5'-P-CCNC: 10 U/L (ref 10–44)
ANION GAP SERPL CALC-SCNC: 10 MMOL/L (ref 8–16)
AST SERPL-CCNC: 15 U/L (ref 10–40)
BILIRUB SERPL-MCNC: 0.4 MG/DL (ref 0.1–1)
BUN SERPL-MCNC: 25 MG/DL (ref 8–23)
CALCIUM SERPL-MCNC: 9.8 MG/DL (ref 8.7–10.5)
CHLORIDE SERPL-SCNC: 107 MMOL/L (ref 95–110)
CO2 SERPL-SCNC: 22 MMOL/L (ref 23–29)
CREAT SERPL-MCNC: 1.1 MG/DL (ref 0.5–1.4)
EST. GFR  (NO RACE VARIABLE): 52.7 ML/MIN/1.73 M^2
ESTIMATED AVG GLUCOSE: 126 MG/DL (ref 68–131)
GLUCOSE SERPL-MCNC: 87 MG/DL (ref 70–110)
HBA1C MFR BLD: 6 % (ref 4–5.6)
POTASSIUM SERPL-SCNC: 4.4 MMOL/L (ref 3.5–5.1)
PROT SERPL-MCNC: 6.8 G/DL (ref 6–8.4)
SODIUM SERPL-SCNC: 139 MMOL/L (ref 136–145)

## 2024-03-07 PROCEDURE — 80053 COMPREHEN METABOLIC PANEL: CPT | Performed by: FAMILY MEDICINE

## 2024-03-07 PROCEDURE — 36415 COLL VENOUS BLD VENIPUNCTURE: CPT | Mod: PO | Performed by: FAMILY MEDICINE

## 2024-03-07 PROCEDURE — 83036 HEMOGLOBIN GLYCOSYLATED A1C: CPT | Performed by: FAMILY MEDICINE

## 2024-03-08 ENCOUNTER — OFFICE VISIT (OUTPATIENT)
Dept: FAMILY MEDICINE | Facility: CLINIC | Age: 74
End: 2024-03-08
Payer: MEDICARE

## 2024-03-08 VITALS
DIASTOLIC BLOOD PRESSURE: 68 MMHG | OXYGEN SATURATION: 95 % | WEIGHT: 142.88 LBS | SYSTOLIC BLOOD PRESSURE: 124 MMHG | BODY MASS INDEX: 26.98 KG/M2 | HEART RATE: 78 BPM | HEIGHT: 61 IN

## 2024-03-08 DIAGNOSIS — I70.0 ABDOMINAL AORTIC ATHEROSCLEROSIS: ICD-10-CM

## 2024-03-08 DIAGNOSIS — Z85.118 H/O: LUNG CANCER: ICD-10-CM

## 2024-03-08 DIAGNOSIS — J01.40 SUBACUTE PANSINUSITIS: ICD-10-CM

## 2024-03-08 DIAGNOSIS — J47.9 BRONCHIECTASIS WITHOUT COMPLICATION: ICD-10-CM

## 2024-03-08 DIAGNOSIS — E66.3 OVERWEIGHT WITH BODY MASS INDEX (BMI) OF 26 TO 26.9 IN ADULT: ICD-10-CM

## 2024-03-08 DIAGNOSIS — N18.31 TYPE 2 DIABETES MELLITUS WITH STAGE 3A CHRONIC KIDNEY DISEASE, WITHOUT LONG-TERM CURRENT USE OF INSULIN: ICD-10-CM

## 2024-03-08 DIAGNOSIS — R05.2 SUBACUTE COUGH: ICD-10-CM

## 2024-03-08 DIAGNOSIS — E11.22 TYPE 2 DIABETES MELLITUS WITH STAGE 3A CHRONIC KIDNEY DISEASE, WITHOUT LONG-TERM CURRENT USE OF INSULIN: ICD-10-CM

## 2024-03-08 DIAGNOSIS — I10 ESSENTIAL HYPERTENSION: Primary | ICD-10-CM

## 2024-03-08 DIAGNOSIS — N18.31 CHRONIC KIDNEY DISEASE, STAGE 3A: ICD-10-CM

## 2024-03-08 DIAGNOSIS — E78.5 DM TYPE 2 WITH DIABETIC DYSLIPIDEMIA: ICD-10-CM

## 2024-03-08 DIAGNOSIS — F33.0 MAJOR DEPRESSIVE DISORDER, RECURRENT, MILD: ICD-10-CM

## 2024-03-08 DIAGNOSIS — E11.69 DM TYPE 2 WITH DIABETIC DYSLIPIDEMIA: ICD-10-CM

## 2024-03-08 PROBLEM — C34.91 ADENOCARCINOMA OF RIGHT LUNG: Status: RESOLVED | Noted: 2021-04-27 | Resolved: 2024-03-08

## 2024-03-08 PROCEDURE — 99999 PR PBB SHADOW E&M-EST. PATIENT-LVL IV: CPT | Mod: PBBFAC,,, | Performed by: FAMILY MEDICINE

## 2024-03-08 PROCEDURE — 99215 OFFICE O/P EST HI 40 MIN: CPT | Mod: S$GLB,,, | Performed by: FAMILY MEDICINE

## 2024-03-08 RX ORDER — DOXYCYCLINE 100 MG/1
100 CAPSULE ORAL EVERY 12 HOURS
Qty: 14 CAPSULE | Refills: 0 | Status: SHIPPED | OUTPATIENT
Start: 2024-03-08 | End: 2024-03-15

## 2024-03-08 RX ORDER — FLUTICASONE PROPIONATE 50 MCG
2 SPRAY, SUSPENSION (ML) NASAL DAILY
Qty: 16 G | Refills: 3 | Status: SHIPPED | OUTPATIENT
Start: 2024-03-08 | End: 2024-04-07

## 2024-03-08 RX ORDER — SERTRALINE HYDROCHLORIDE 100 MG/1
100 TABLET, FILM COATED ORAL NIGHTLY
Qty: 90 TABLET | Refills: 3 | Status: SHIPPED | OUTPATIENT
Start: 2024-03-08

## 2024-03-08 RX ORDER — UMECLIDINIUM BROMIDE AND VILANTEROL TRIFENATATE 62.5; 25 UG/1; UG/1
1 POWDER RESPIRATORY (INHALATION) DAILY
Qty: 1 EACH | Refills: 5 | Status: SHIPPED | OUTPATIENT
Start: 2024-03-08

## 2024-03-08 RX ORDER — METFORMIN HYDROCHLORIDE 500 MG/1
500 TABLET ORAL
Qty: 90 TABLET | Refills: 3 | Status: SHIPPED | OUTPATIENT
Start: 2024-03-08

## 2024-03-08 RX ORDER — OLMESARTAN MEDOXOMIL 40 MG/1
40 TABLET ORAL DAILY
Qty: 90 TABLET | Refills: 3 | Status: SHIPPED | OUTPATIENT
Start: 2024-03-08

## 2024-03-08 RX ORDER — DAPAGLIFLOZIN 5 MG/1
5 TABLET, FILM COATED ORAL DAILY
Qty: 90 TABLET | Refills: 3 | Status: SHIPPED | OUTPATIENT
Start: 2024-03-08

## 2024-03-08 NOTE — PROGRESS NOTES
Subjective:       Patient ID: Jolly Matias is a 73 y.o. female.    Chief Complaint: Hypertension, Diabetes, and Hyperlipidemia  74 yo female with h/o lung CA, HTN, CKD, Stage 3, hyperlipidemia and DM, Type 2 presents to f/u on her chronic conditions. Pt has bronchiectasis for which she is followed by Dr. Daphne Jacinto. Pt last seen by Dr. Jacinto July 2023. Pt was to have a chest CT every 6 months and f/u with Dr. Jacinto  Hypertension  This is a chronic problem. The current episode started more than 1 year ago. The problem is unchanged. The problem is controlled. Associated symptoms include shortness of breath. Pertinent negatives include no chest pain or palpitations. The current treatment provides significant improvement. There are no compliance problems.    Diabetes  She presents for her follow-up diabetic visit. She has type 2 diabetes mellitus. Pertinent negatives for diabetes include no chest pain.   Hyperlipidemia  This is a chronic problem. The current episode started more than 1 year ago. Associated symptoms include shortness of breath. Pertinent negatives include no chest pain.   Pt notes 2 weeks of cough which started with sore throat. Sore throat has resolved. Cough is productive of green sputum. Cough disturbs her sleep. No sick contacts. COVID test was negative. Pt notes coughing spells can lead to shortness of breath. No dyspnea at other times. Has chest pain with coughing.  Results for orders placed or performed in visit on 03/07/24   Comprehensive Metabolic Panel   Result Value Ref Range    Sodium 139 136 - 145 mmol/L    Potassium 4.4 3.5 - 5.1 mmol/L    Chloride 107 95 - 110 mmol/L    CO2 22 (L) 23 - 29 mmol/L    Glucose 87 70 - 110 mg/dL    BUN 25 (H) 8 - 23 mg/dL    Creatinine 1.1 0.5 - 1.4 mg/dL    Calcium 9.8 8.7 - 10.5 mg/dL    Total Protein 6.8 6.0 - 8.4 g/dL    Albumin 3.8 3.5 - 5.2 g/dL    Total Bilirubin 0.4 0.1 - 1.0 mg/dL    Alkaline Phosphatase 107 55 - 135 U/L    AST 15 10 - 40 U/L    ALT 10  10 - 44 U/L    eGFR 52.7 (A) >60 mL/min/1.73 m^2    Anion Gap 10 8 - 16 mmol/L   Hemoglobin A1C   Result Value Ref Range    Hemoglobin A1C 6.0 (H) 4.0 - 5.6 %    Estimated Avg Glucose 126 68 - 131 mg/dL     *Note: Due to a large number of results and/or encounters for the requested time period, some results have not been displayed. A complete set of results can be found in Results Review.      Review of Systems   Constitutional:  Negative for chills and fever.   HENT:  Positive for congestion, postnasal drip and sore throat. Negative for ear pain, rhinorrhea, sinus pressure and sinus pain.    Respiratory:  Positive for cough and shortness of breath. Negative for wheezing.    Cardiovascular:  Negative for chest pain, palpitations and leg swelling.   Gastrointestinal:  Negative for diarrhea, nausea and vomiting.       Objective:      Physical Exam  Vitals reviewed.   Constitutional:       General: She is not in acute distress.     Appearance: Normal appearance. She is not ill-appearing.   HENT:      Head: Normocephalic and atraumatic.      Right Ear: Tympanic membrane, ear canal and external ear normal.      Left Ear: Tympanic membrane, ear canal and external ear normal.      Nose: Congestion present.      Mouth/Throat:      Mouth: Mucous membranes are dry.      Pharynx: Oropharynx is clear. No oropharyngeal exudate or posterior oropharyngeal erythema.   Eyes:      General: No scleral icterus.        Right eye: No discharge.         Left eye: No discharge.      Extraocular Movements: Extraocular movements intact.      Conjunctiva/sclera: Conjunctivae normal.   Neck:      Vascular: No carotid bruit.   Cardiovascular:      Rate and Rhythm: Normal rate and regular rhythm.      Pulses: Normal pulses.      Heart sounds: Normal heart sounds. No murmur heard.     No gallop.   Pulmonary:      Effort: Pulmonary effort is normal.      Breath sounds: Normal breath sounds. No wheezing or rales.   Abdominal:      General: Bowel  sounds are normal.      Palpations: Abdomen is soft. There is no mass.      Tenderness: There is no abdominal tenderness.   Musculoskeletal:      Cervical back: Normal range of motion. No rigidity or tenderness.      Right lower leg: No edema.      Left lower leg: No edema.   Skin:     General: Skin is warm and dry.   Neurological:      Mental Status: She is alert and oriented to person, place, and time.   Psychiatric:         Mood and Affect: Mood normal.         Assessment:   See plan  Plan:       Essential hypertension: Stable  -     olmesartan (BENICAR) 40 MG tablet; Take 1 tablet (40 mg total) by mouth once daily.  Dispense: 90 tablet; Refill: 3  - Pt advised to avoid oral decongestants which can raise HTN.    DM type 2 with diabetic dyslipidemia: Stable  -     Hemoglobin A1C; Future; Expected date: 06/08/2024  -     Comprehensive Metabolic Panel; Future; Expected date: 06/08/2024  -     Lipid Panel; Future; Expected date: 06/08/2024  -     metFORMIN (GLUCOPHAGE) 500 MG tablet; Take 1 tablet (500 mg total) by mouth daily with breakfast.  Dispense: 90 tablet; Refill: 3    Chronic kidney disease, stage 3a: Stable  -     olmesartan (BENICAR) 40 MG tablet; Take 1 tablet (40 mg total) by mouth once daily.  Dispense: 90 tablet; Refill: 3    Type 2 diabetes mellitus with stage 3a chronic kidney disease, without long-term current use of insulin: Stable  -     dapagliflozin propanediol (FARXIGA) 5 mg Tab tablet; Take 1 tablet (5 mg total) by mouth once daily.  Dispense: 90 tablet; Refill: 3  -     metFORMIN (GLUCOPHAGE) 500 MG tablet; Take 1 tablet (500 mg total) by mouth daily with breakfast.  Dispense: 90 tablet; Refill: 3    Overweight with body mass index (BMI) of 26 to 26.9 in adult  The patient's BMI has been recorded in the chart. The patient has been provided educational materials regarding the benefits of attaining and maintaining a normal weight. We will continue to address and follow this issue during follow  up visits.     Bronchiectasis without complication: Stable  -     CT Chest Without Contrast; Future; Expected date: 03/08/2024  -     Ambulatory referral/consult to Pulmonology; Future; Expected date: 03/15/2024  -     umeclidinium-vilanteroL (ANORO ELLIPTA) 62.5-25 mcg/actuation DsDv; Inhale 1 puff into the lungs once daily. Controller  Dispense: 1 each; Refill: 5    Major depressive disorder, recurrent, mild: Stable  -     sertraline (ZOLOFT) 100 MG tablet; Take 1 tablet (100 mg total) by mouth every evening.  Dispense: 90 tablet; Refill: 3    Abdominal aortic atherosclerosis: Stable    H/O: lung cancer  -     CT Chest Without Contrast; Future; Expected date: 03/08/2024  -     Ambulatory referral/consult to Pulmonology; Future; Expected date: 03/15/2024    Subacute cough  -     CT Chest Without Contrast; Future; Expected date: 03/08/2024    Subacute pansinusitis  -     doxycycline (VIBRAMYCIN) 100 MG Cap; Take 1 capsule (100 mg total) by mouth every 12 (twelve) hours. for 7 days  Dispense: 14 capsule; Refill: 0  -     fluticasone propionate (FLONASE) 50 mcg/actuation nasal spray; 2 sprays (100 mcg total) by Each Nostril route once daily.  Dispense: 16 g; Refill: 3    F/U in 4 months. Pt advised to receive RSV and Shingles vaccines at her local pharmacy.

## 2024-03-10 ENCOUNTER — PATIENT MESSAGE (OUTPATIENT)
Dept: FAMILY MEDICINE | Facility: CLINIC | Age: 74
End: 2024-03-10
Payer: MEDICARE

## 2024-03-12 ENCOUNTER — OFFICE VISIT (OUTPATIENT)
Dept: PULMONOLOGY | Facility: CLINIC | Age: 74
End: 2024-03-12
Payer: MEDICARE

## 2024-03-12 VITALS
OXYGEN SATURATION: 96 % | BODY MASS INDEX: 26.47 KG/M2 | SYSTOLIC BLOOD PRESSURE: 130 MMHG | DIASTOLIC BLOOD PRESSURE: 62 MMHG | WEIGHT: 140.19 LBS | HEART RATE: 86 BPM | HEIGHT: 61 IN

## 2024-03-12 DIAGNOSIS — N18.31 TYPE 2 DIABETES MELLITUS WITH STAGE 3A CHRONIC KIDNEY DISEASE, WITHOUT LONG-TERM CURRENT USE OF INSULIN: ICD-10-CM

## 2024-03-12 DIAGNOSIS — E11.22 TYPE 2 DIABETES MELLITUS WITH STAGE 3A CHRONIC KIDNEY DISEASE, WITHOUT LONG-TERM CURRENT USE OF INSULIN: ICD-10-CM

## 2024-03-12 DIAGNOSIS — J47.9 BRONCHIECTASIS WITHOUT COMPLICATION: Primary | ICD-10-CM

## 2024-03-12 DIAGNOSIS — Z85.118 H/O: LUNG CANCER: ICD-10-CM

## 2024-03-12 DIAGNOSIS — N18.31 CHRONIC KIDNEY DISEASE, STAGE 3A: ICD-10-CM

## 2024-03-12 PROCEDURE — 99214 OFFICE O/P EST MOD 30 MIN: CPT | Mod: S$GLB,,, | Performed by: STUDENT IN AN ORGANIZED HEALTH CARE EDUCATION/TRAINING PROGRAM

## 2024-03-12 PROCEDURE — 99999 PR PBB SHADOW E&M-EST. PATIENT-LVL IV: CPT | Mod: PBBFAC,,, | Performed by: STUDENT IN AN ORGANIZED HEALTH CARE EDUCATION/TRAINING PROGRAM

## 2024-03-12 NOTE — PROGRESS NOTES
"Subjective:     Reason for visit: follow up for bronchiectasis    Patient ID:  Jolly Matias is a 73 y.o. 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  Progressed to nonproductive cough 2 weeks ago.  Became productive over the past week or so.  S/p course of doxycycline by PCP.  Having some scant hemoptysis with coughing paroxysms, but that seems to have resolved.  Cough worse at night, but that is improving and she is finally able to sleep without issue.      Additional Pulmonary History:  Childhood Illnesses:  None  Occupational:  Retired.  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   Constitutional:       General: She is not in acute distress.     Appearance: She is not ill-appearing, toxic-appearing or diaphoretic.   HENT:      Head: Normocephalic and atraumatic.      Nose: No rhinorrhea.      Mouth/Throat:      Mouth: Mucous membranes are moist.   Eyes:      General: No scleral icterus.     Extraocular Movements: Extraocular movements intact.   Cardiovascular:      Rate and Rhythm: Normal rate and regular rhythm.   Pulmonary:      Effort: No tachypnea, accessory muscle usage, respiratory distress or retractions.   Abdominal:      General: There is no distension.   Skin:     General: Skin is warm and dry.      Coloration: Skin is not jaundiced.      Findings: No rash.   Neurological:      General: No focal deficit present.      Mental Status: Mental status is at baseline.          Personal " Diagnostic Review and Interpretation  07/05/2023 CT chest:  Aortic atherosclerosis.  Bilateral areas of mucoid impaction and bronchiectasis; few scattered micro nodules; RML 6 mm nodule (previously 9 mm); s/p right lower lobectomy      Pertinent Studies Reviewed & Interpreted:     Pulmonary Function Tests:   03/15/2021:  FEV1 83, FVC 88, FEV1/FVC 73, [id number] 67. TLC 94, ERV 45. DLCO 82    6 Minute Walk Tests:   None    Echocardiograms:   03/23/2021: EF 65% with normal chamber; PASP 17    Other Pertinent Laboratories:  11/21/2022 [facility] +CARLIN   08/01/2022 [facility] +CARLIN (pan-sensitive)      Assessment & Plan:       Problem List Items Addressed This Visit          Pulmonary    Bronchiectasis without complication - Primary    Overview     2023 CT chest with scattered mucoid impaction in mild bronchiectasis bilaterally; stable appearing. 11/2022 and 08/2022 [facility] cultures with pansensitive CARLIN.  Asymptomatic.  Minimal response to albuterol HFA    - Continue airway hygiene with sequential use of albuterol neb followed by hypertonic saline and finally Acapella at least once a day, but ideally twice.    - recently prescribed Anoro Ellipta, however I do not see an indication at this time.  That being said, it is not harmful and if she derives benefit, she can continue  - repeat CT chest ordered and pending         Relevant Orders    Spirometry with/without bronchodilator    Lung Volumes    DLCO-Carbon Monoxide Diffusing Capacity       Renal/    Chronic kidney disease, stage 3a    Overview     Complicates every aspect of patient care.  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  Getting six-month interval CT scans for surveillance.  Given 3 year interval since resection, can transition to yearly CT scan for monitoring            Endocrine    Type 2 diabetes mellitus with stage 3a chronic kidney disease, without  long-term current use of insulin    Overview     Places patient at greater risk for complications related to their other medical problems.                 RETURN TO CLINIC IN 12 MONTHS       Portions of the record may have been created with voice-recognition software. Occasional wrong-word or sound-a-like substitutions may have occurred due to the inherent limitations of voice-recognition software. Read the chart carefully and recognize, using context, where substitutions have occurred.

## 2024-03-28 ENCOUNTER — TELEPHONE (OUTPATIENT)
Dept: DERMATOLOGY | Facility: CLINIC | Age: 74
End: 2024-03-28
Payer: MEDICARE

## 2024-03-28 NOTE — TELEPHONE ENCOUNTER
Spoke to pt. Pt verbally agreed and confirmed date and time given. Pt thanked me.  ----- Message from Alyse Alexander MA sent at 3/27/2024  5:03 PM CDT -----  Regarding: FW: Appt  Contact: [phone]    ----- Message -----  From: Edie Rankin  Sent: 3/27/2024  10:58 AM CDT  To: [facility] Staff  Subject: Appt                                             Jolly Matias calling regarding Appointment Access  (message) for # pt is calling to speak with nurse to schedule an appt with provider she developed a lesion where provider previously did a biopsy please call to advise and schedule

## 2024-04-05 ENCOUNTER — HOSPITAL ENCOUNTER (OUTPATIENT)
Dept: RADIOLOGY | Facility: HOSPITAL | Age: 74
Discharge: HOME | End: 2024-04-05
Attending: FAMILY MEDICINE
Payer: MEDICARE

## 2024-04-05 DIAGNOSIS — J47.9 BRONCHIECTASIS WITHOUT COMPLICATION: ICD-10-CM

## 2024-04-05 DIAGNOSIS — R05.2 SUBACUTE COUGH: ICD-10-CM

## 2024-04-05 DIAGNOSIS — Z85.118 H/O: LUNG CANCER: ICD-10-CM

## 2024-04-05 PROCEDURE — 71250 CT THORAX DX C-: CPT | Mod: 26,,, | Performed by: STUDENT IN AN ORGANIZED HEALTH CARE EDUCATION/TRAINING PROGRAM

## 2024-04-05 PROCEDURE — 71250 CT THORAX DX C-: CPT | Mod: TC

## 2024-04-12 ENCOUNTER — PATIENT MESSAGE (OUTPATIENT)
Dept: PULMONOLOGY | Facility: CLINIC | Age: 74
End: 2024-04-12
Payer: MEDICARE

## 2024-04-18 ENCOUNTER — PROCEDURE VISIT (OUTPATIENT)
Dept: DERMATOLOGY | Facility: CLINIC | Age: 74
End: 2024-04-18
Payer: MEDICARE

## 2024-04-18 VITALS
HEART RATE: 81 BPM | BODY MASS INDEX: 26.47 KG/M2 | WEIGHT: 140.19 LBS | HEIGHT: 61 IN | DIASTOLIC BLOOD PRESSURE: 68 MMHG | SYSTOLIC BLOOD PRESSURE: 108 MMHG

## 2024-04-18 DIAGNOSIS — C44.319 BASAL CELL CARCINOMA OF LEFT TEMPLE REGION: Primary | ICD-10-CM

## 2024-04-18 PROCEDURE — 17311 MOHS 1 STAGE H/N/HF/G: CPT | Mod: 51,S$GLB,, | Performed by: DERMATOLOGY

## 2024-04-18 PROCEDURE — 13131 CMPLX RPR F/C/C/M/N/AX/G/H/F: CPT | Mod: S$GLB,,, | Performed by: DERMATOLOGY

## 2024-04-18 PROCEDURE — 99499 UNLISTED E&M SERVICE: CPT | Mod: S$GLB,,, | Performed by: DERMATOLOGY

## 2024-04-18 NOTE — PROGRESS NOTES
PROCEDURE: Mohs' Micrographic Surgery    INDICATION: Location in mask areas of face including central face, nose, eyelids, eyebrows, lips, chin, preauricular, temple, and ear. Biopsy-proven skin cancer of cosmetically and functionally important areas, including head, neck, genital, hand, foot, or areas known for having difficulty in healing, such as the lower anterior legs. Tumor with ill-defined borders.    REFERRING PROVIDER: Angle Mohr M.D.    CASE NUMBER:     ANESTHETIC: 2 cc 0.5% Lidocaine with Epi 1:200,000 mixed 1:1 with 0.5% Bupivacaine    SURGICAL PREP: Hibiclens    SURGEON: Franky Ac MD    ASSISTANTS: Ameena Ruano PA-C and Norma Alvarez MA    PREOPERATIVE DIAGNOSIS: basal cell carcinoma- superficial, nodular    POSTOPERATIVE DIAGNOSIS: basal cell carcinoma    PATHOLOGIC DIAGNOSIS: basal cell carcinoma- superficial, nodular    HISTOLOGY OF SPECIMENS IN FIRST STAGE:   Tumor Type:  No tumor seen.    STAGES OF MOHS' SURGERY PERFORMED: 1    TUMOR-FREE PLANE ACHIEVED: Yes    HEMOSTASIS: electrocoagulation     SPECIMENS: 2     LOCATION: left temple. Location verified with Dr. Mohr's clinical photograph. Patient also verified location with hand held mirror.    INITIAL LESION SIZE: 0.4 x 0.6 cm    FINAL DEFECT SIZE: 0.8 x 0.9 cm    WOUND REPAIR/DISPOSITION: The patient tolerated Mohs' Micrographic Surgery for a basal cell carcinoma very well. When the tumor was completely removed, a repair of the surgical defect was undertaken.    PROCEDURE: Complex Linear Repair    INDICATION: Status post Mohs' Micrographic Surgery for basal cell carcinoma.    CASE NUMBER:     SURGEON: Franky Ac MD    ASSISTANTS: Ameena Ruano PA-C and Nel Jc Surg Alex    ANESTHETIC: 0.5 cc 1% Lidocaine with Epinephrine 1:100,000    SURGICAL PREP: Hibiclens, prepped by Nel Jc Surg Alex    LOCATION: left temple    DEFECT SIZE: 0.8 x 0.9 cm    WOUND REPAIR/DISPOSITION:  After the patient's carcinoma had been  "completely removed with Mohs' Micrographic Surgery, a repair of the surgical defect was undertaken. The patient was returned to the operating suite where the area of left temple was prepped, draped, and anesthetized in the usual sterile fashion. The wound was widely undermined in all directions. The wound was undermined to a distance at least the maximum width of the defect as measured perpendicular to the closure line along at least one entire edge of the defect, in this case 1.5 cm. Then, electrocoagulation was used to obtain meticulous hemostasis. 5-0 Vicryl buried vertical mattress sutures were placed into the subcutaneous and dermal plane to close the wound and gary the cutaneous wound edge. Bilateral dog ears were identified and were removed by a standard Burow's triangle technique. The cutaneous wound edges were closed using interrupted 6-0 Prolene suture.    The patient tolerated the procedure well.    The area was cleaned and dressed appropriately and the patient was given wound care instructions, as well as appointment for follow-up evaluation and suture removal in 7 days.    LENGTH OF REPAIR: 1.9 cm    Vitals:    04/18/24 0955 04/18/24 1120   BP: 99/62 108/68   BP Location: Left arm Left arm   Patient Position: Sitting Sitting   BP Method: Medium (Automatic) Medium (Automatic)   Pulse: 96 81   Weight: 63.6 kg (140 lb 3.4 oz)    Height: 5' 1" (1.549 m)          "

## 2024-04-22 ENCOUNTER — TELEPHONE (OUTPATIENT)
Dept: PULMONOLOGY | Facility: CLINIC | Age: 74
End: 2024-04-22
Payer: MEDICARE

## 2024-04-22 NOTE — TELEPHONE ENCOUNTER
LVM for patient to return a call to the office. I was calling to schedule pft's ordered by Dr Ellison per Dr Ellison. Patient to call back to discuss.

## 2024-04-22 NOTE — TELEPHONE ENCOUNTER
----- Message from Jluis Ellison MD sent at 4/21/2024  8:58 AM CDT -----  Regarding: PFTs  Can we please get her scheduled for those PFTs?  They are ordered in her chart.  Thank you    Jluis

## 2024-04-25 ENCOUNTER — OFFICE VISIT (OUTPATIENT)
Dept: DERMATOLOGY | Facility: CLINIC | Age: 74
End: 2024-04-25
Payer: MEDICARE

## 2024-04-25 DIAGNOSIS — Z09 POSTOP CHECK: Primary | ICD-10-CM

## 2024-04-25 PROCEDURE — 1126F AMNT PAIN NOTED NONE PRSNT: CPT | Mod: CPTII,S$GLB,, | Performed by: DERMATOLOGY

## 2024-04-25 PROCEDURE — 3288F FALL RISK ASSESSMENT DOCD: CPT | Mod: CPTII,S$GLB,, | Performed by: DERMATOLOGY

## 2024-04-25 PROCEDURE — 99999 PR PBB SHADOW E&M-EST. PATIENT-LVL III: CPT | Mod: PBBFAC,,, | Performed by: DERMATOLOGY

## 2024-04-25 PROCEDURE — 1157F ADVNC CARE PLAN IN RCRD: CPT | Mod: CPTII,S$GLB,, | Performed by: DERMATOLOGY

## 2024-04-25 PROCEDURE — 99024 POSTOP FOLLOW-UP VISIT: CPT | Mod: S$GLB,,, | Performed by: DERMATOLOGY

## 2024-04-25 PROCEDURE — 4010F ACE/ARB THERAPY RXD/TAKEN: CPT | Mod: CPTII,S$GLB,, | Performed by: DERMATOLOGY

## 2024-04-25 PROCEDURE — 1159F MED LIST DOCD IN RCRD: CPT | Mod: CPTII,S$GLB,, | Performed by: DERMATOLOGY

## 2024-04-25 PROCEDURE — 1101F PT FALLS ASSESS-DOCD LE1/YR: CPT | Mod: CPTII,S$GLB,, | Performed by: DERMATOLOGY

## 2024-04-25 PROCEDURE — 3044F HG A1C LEVEL LT 7.0%: CPT | Mod: CPTII,S$GLB,, | Performed by: DERMATOLOGY

## 2024-04-25 NOTE — PROGRESS NOTES
73 y.o. female patient is here for suture removal following Mohs' surgery.    Patient reports no problems.    WOUND PE:  The left temple sutures intact. Wound healing well. Good skin edges. No signs or symptoms of infection.    IMPRESSION:  Healing operative site from Mohs' surgery BCC left temple s/p Mohs with CLC, postop day #7.    PLAN:  Sutures removed today by  Lyric Briones MA . Steri-strips applied.  Continue wound care.  Keep moist with Aquaphor.    RTC:  In 3-6 months with Angle Mohr M.D. for skin check or sooner if new concern arises.

## 2024-07-02 ENCOUNTER — PATIENT MESSAGE (OUTPATIENT)
Dept: ADMINISTRATIVE | Facility: HOSPITAL | Age: 74
End: 2024-07-02
Payer: MEDICARE

## 2024-07-02 ENCOUNTER — LAB VISIT (OUTPATIENT)
Dept: LAB | Facility: HOSPITAL | Age: 74
End: 2024-07-02
Attending: FAMILY MEDICINE
Payer: MEDICARE

## 2024-07-02 DIAGNOSIS — E78.5 DM TYPE 2 WITH DIABETIC DYSLIPIDEMIA: ICD-10-CM

## 2024-07-02 DIAGNOSIS — E11.69 DM TYPE 2 WITH DIABETIC DYSLIPIDEMIA: ICD-10-CM

## 2024-07-02 LAB
ALBUMIN SERPL BCP-MCNC: 4 G/DL (ref 3.5–5.2)
ALP SERPL-CCNC: 339 U/L (ref 55–135)
ALT SERPL W/O P-5'-P-CCNC: 28 U/L (ref 10–44)
ANION GAP SERPL CALC-SCNC: 9 MMOL/L (ref 8–16)
AST SERPL-CCNC: 24 U/L (ref 10–40)
BILIRUB SERPL-MCNC: 0.4 MG/DL (ref 0.1–1)
BUN SERPL-MCNC: 28 MG/DL (ref 8–23)
CALCIUM SERPL-MCNC: 10.2 MG/DL (ref 8.7–10.5)
CHLORIDE SERPL-SCNC: 111 MMOL/L (ref 95–110)
CHOLEST SERPL-MCNC: 152 MG/DL (ref 120–199)
CHOLEST/HDLC SERPL: 3.4 {RATIO} (ref 2–5)
CO2 SERPL-SCNC: 21 MMOL/L (ref 23–29)
CREAT SERPL-MCNC: 1.2 MG/DL (ref 0.5–1.4)
EST. GFR  (NO RACE VARIABLE): 47.2 ML/MIN/1.73 M^2
ESTIMATED AVG GLUCOSE: 128 MG/DL (ref 68–131)
GLUCOSE SERPL-MCNC: 115 MG/DL (ref 70–110)
HBA1C MFR BLD: 6.1 % (ref 4–5.6)
HDLC SERPL-MCNC: 45 MG/DL (ref 40–75)
HDLC SERPL: 29.6 % (ref 20–50)
LDLC SERPL CALC-MCNC: 81.2 MG/DL (ref 63–159)
NONHDLC SERPL-MCNC: 107 MG/DL
POTASSIUM SERPL-SCNC: 4.9 MMOL/L (ref 3.5–5.1)
PROT SERPL-MCNC: 7.2 G/DL (ref 6–8.4)
SODIUM SERPL-SCNC: 141 MMOL/L (ref 136–145)
TRIGL SERPL-MCNC: 129 MG/DL (ref 30–150)

## 2024-07-02 PROCEDURE — 83036 HEMOGLOBIN GLYCOSYLATED A1C: CPT | Performed by: FAMILY MEDICINE

## 2024-07-02 PROCEDURE — 36415 COLL VENOUS BLD VENIPUNCTURE: CPT | Mod: PO | Performed by: FAMILY MEDICINE

## 2024-07-02 PROCEDURE — 80053 COMPREHEN METABOLIC PANEL: CPT | Performed by: FAMILY MEDICINE

## 2024-07-02 PROCEDURE — 80061 LIPID PANEL: CPT | Performed by: FAMILY MEDICINE

## 2024-07-03 ENCOUNTER — PATIENT OUTREACH (OUTPATIENT)
Dept: ADMINISTRATIVE | Facility: HOSPITAL | Age: 74
End: 2024-07-03
Payer: MEDICARE

## 2024-07-03 DIAGNOSIS — E11.9 TYPE 2 DIABETES MELLITUS WITHOUT RETINOPATHY: Primary | ICD-10-CM

## 2024-07-03 NOTE — PROGRESS NOTES
Population Health Chart Review & Patient Outreach Details      Additional [facility] Health Notes:               Updates Requested / Reviewed:      Updated Care Coordination Note, Care Everywhere, and Immunizations Reconciliation Completed or Queried: [place]         Health Maintenance Topics Overdue:      [facility] Score: 2     Urine Screening  Foot Exam    Shingles/Zoster Vaccine                  Health Maintenance Topic(s) Outreach Outcomes & Actions Taken:    Lab(s) - Outreach Outcomes & Actions Taken  : Overdue Lab(s) Ordered and Overdue Lab(s) Scheduled

## 2024-07-09 ENCOUNTER — OFFICE VISIT (OUTPATIENT)
Dept: FAMILY MEDICINE | Facility: CLINIC | Age: 74
End: 2024-07-09
Payer: MEDICARE

## 2024-07-09 ENCOUNTER — LAB VISIT (OUTPATIENT)
Dept: LAB | Facility: HOSPITAL | Age: 74
End: 2024-07-09
Attending: FAMILY MEDICINE
Payer: MEDICARE

## 2024-07-09 VITALS
OXYGEN SATURATION: 97 % | WEIGHT: 140.19 LBS | HEART RATE: 77 BPM | SYSTOLIC BLOOD PRESSURE: 138 MMHG | BODY MASS INDEX: 26.47 KG/M2 | HEIGHT: 61 IN | DIASTOLIC BLOOD PRESSURE: 60 MMHG

## 2024-07-09 DIAGNOSIS — N18.31 TYPE 2 DIABETES MELLITUS WITH STAGE 3A CHRONIC KIDNEY DISEASE, WITHOUT LONG-TERM CURRENT USE OF INSULIN: ICD-10-CM

## 2024-07-09 DIAGNOSIS — Z23 NEED FOR COVID-19 VACCINE: ICD-10-CM

## 2024-07-09 DIAGNOSIS — E66.3 OVERWEIGHT WITH BODY MASS INDEX (BMI) OF 26 TO 26.9 IN ADULT: ICD-10-CM

## 2024-07-09 DIAGNOSIS — E78.5 DM TYPE 2 WITH DIABETIC DYSLIPIDEMIA: Primary | ICD-10-CM

## 2024-07-09 DIAGNOSIS — F41.9 ANXIETY: Chronic | ICD-10-CM

## 2024-07-09 DIAGNOSIS — R74.8 ELEVATED ALKALINE PHOSPHATASE LEVEL: ICD-10-CM

## 2024-07-09 DIAGNOSIS — Z85.118 H/O: LUNG CANCER: ICD-10-CM

## 2024-07-09 DIAGNOSIS — E11.22 TYPE 2 DIABETES MELLITUS WITH STAGE 3A CHRONIC KIDNEY DISEASE, WITHOUT LONG-TERM CURRENT USE OF INSULIN: ICD-10-CM

## 2024-07-09 DIAGNOSIS — J47.9 BRONCHIECTASIS WITHOUT COMPLICATION: ICD-10-CM

## 2024-07-09 DIAGNOSIS — Z85.828 HISTORY OF BASAL CELL CARCINOMA: ICD-10-CM

## 2024-07-09 DIAGNOSIS — F33.0 MAJOR DEPRESSIVE DISORDER, RECURRENT, MILD: ICD-10-CM

## 2024-07-09 DIAGNOSIS — I10 ESSENTIAL HYPERTENSION: ICD-10-CM

## 2024-07-09 DIAGNOSIS — E11.69 DM TYPE 2 WITH DIABETIC DYSLIPIDEMIA: Primary | ICD-10-CM

## 2024-07-09 LAB
ALBUMIN/CREAT UR: 6.6 UG/MG (ref 0–30)
CREAT UR-MCNC: 212 MG/DL (ref 15–325)
MICROALBUMIN UR DL<=1MG/L-MCNC: 14 UG/ML

## 2024-07-09 PROCEDURE — 3078F DIAST BP <80 MM HG: CPT | Mod: CPTII,S$GLB,, | Performed by: FAMILY MEDICINE

## 2024-07-09 PROCEDURE — 1160F RVW MEDS BY RX/DR IN RCRD: CPT | Mod: CPTII,S$GLB,, | Performed by: FAMILY MEDICINE

## 2024-07-09 PROCEDURE — 91322 SARSCOV2 VAC 50 MCG/0.5ML IM: CPT | Mod: S$GLB,,, | Performed by: FAMILY MEDICINE

## 2024-07-09 PROCEDURE — 3288F FALL RISK ASSESSMENT DOCD: CPT | Mod: CPTII,S$GLB,, | Performed by: FAMILY MEDICINE

## 2024-07-09 PROCEDURE — 90480 ADMN SARSCOV2 VAC 1/ONLY CMP: CPT | Mod: S$GLB,,, | Performed by: FAMILY MEDICINE

## 2024-07-09 PROCEDURE — 1157F ADVNC CARE PLAN IN RCRD: CPT | Mod: CPTII,S$GLB,, | Performed by: FAMILY MEDICINE

## 2024-07-09 PROCEDURE — 1101F PT FALLS ASSESS-DOCD LE1/YR: CPT | Mod: CPTII,S$GLB,, | Performed by: FAMILY MEDICINE

## 2024-07-09 PROCEDURE — 99999 PR PBB SHADOW E&M-EST. PATIENT-LVL III: CPT | Mod: PBBFAC,,, | Performed by: FAMILY MEDICINE

## 2024-07-09 PROCEDURE — 99215 OFFICE O/P EST HI 40 MIN: CPT | Mod: 25,S$GLB,, | Performed by: FAMILY MEDICINE

## 2024-07-09 PROCEDURE — 3075F SYST BP GE 130 - 139MM HG: CPT | Mod: CPTII,S$GLB,, | Performed by: FAMILY MEDICINE

## 2024-07-09 PROCEDURE — 4010F ACE/ARB THERAPY RXD/TAKEN: CPT | Mod: CPTII,S$GLB,, | Performed by: FAMILY MEDICINE

## 2024-07-09 PROCEDURE — 1159F MED LIST DOCD IN RCRD: CPT | Mod: CPTII,S$GLB,, | Performed by: FAMILY MEDICINE

## 2024-07-09 PROCEDURE — 1126F AMNT PAIN NOTED NONE PRSNT: CPT | Mod: CPTII,S$GLB,, | Performed by: FAMILY MEDICINE

## 2024-07-09 PROCEDURE — 82570 ASSAY OF URINE CREATININE: CPT | Performed by: FAMILY MEDICINE

## 2024-07-09 PROCEDURE — 3044F HG A1C LEVEL LT 7.0%: CPT | Mod: CPTII,S$GLB,, | Performed by: FAMILY MEDICINE

## 2024-07-09 RX ORDER — LANOLIN ALCOHOL/MO/W.PET/CERES
100 CREAM (GRAM) TOPICAL DAILY
COMMUNITY

## 2024-07-09 RX ORDER — DAPAGLIFLOZIN 5 MG/1
5 TABLET, FILM COATED ORAL DAILY
Qty: 90 TABLET | Refills: 3 | Status: SHIPPED | OUTPATIENT
Start: 2024-07-09

## 2024-07-09 NOTE — PROGRESS NOTES
Subjective:       Patient ID: Jolly Matias is a 74 y.o. female.    Chief Complaint: Diabetes and Hypertension  75 yo female with h/o lung CA, HTN, CKD, Stage 3, hyperlipidemia, depression, anxiety and DM, Type 2 presents to f/u on her chronic conditions. Pt notes that she did not start Farxiga as prescribed on 3/8/24.  Diabetes  She presents for her follow-up diabetic visit. She has type 2 diabetes mellitus. Associated symptoms include fatigue. Pertinent negatives for diabetes include no blurred vision, no chest pain, no foot paresthesias, no foot ulcerations, no polydipsia, no polyphagia, no polyuria, no visual change and no weakness. She is compliant with treatment all of the time. She participates in exercise intermittently. An ACE inhibitor/angiotensin II receptor blocker is being taken. Eye exam is current.   Hypertension  This is a chronic problem. The current episode started more than 1 year ago. The problem is unchanged. The problem is controlled. Associated symptoms include shortness of breath. Pertinent negatives include no blurred vision, chest pain or palpitations.   Is caring for her  who is dealing with colon CA. Is doing chemotherapy.  Pt is followed by Pulmonary for bronchiectasis and needs to have pulmonary function tests. Pt notes that she has not followed up with Pulmonary due to caring for her .  Results for orders placed or performed in visit on 07/02/24   Hemoglobin A1C   Result Value Ref Range    Hemoglobin A1C 6.1 (H) 4.0 - 5.6 %    Estimated Avg Glucose 128 68 - 131 mg/dL   Comprehensive Metabolic Panel   Result Value Ref Range    Sodium 141 136 - 145 mmol/L    Potassium 4.9 3.5 - 5.1 mmol/L    Chloride 111 (H) 95 - 110 mmol/L    CO2 21 (L) 23 - 29 mmol/L    Glucose 115 (H) 70 - 110 mg/dL    BUN 28 (H) 8 - 23 mg/dL    Creatinine 1.2 0.5 - 1.4 mg/dL    Calcium 10.2 8.7 - 10.5 mg/dL    Total Protein 7.2 6.0 - 8.4 g/dL    Albumin 4.0 3.5 - 5.2 g/dL    Total Bilirubin 0.4 0.1 -  1.0 mg/dL    Alkaline Phosphatase 339 (H) 55 - 135 U/L    AST 24 10 - 40 U/L    ALT 28 10 - 44 U/L    eGFR 47.2 (A) >60 mL/min/1.73 m^2    Anion Gap 9 8 - 16 mmol/L   Lipid Panel   Result Value Ref Range    Cholesterol 152 120 - 199 mg/dL    Triglycerides 129 30 - 150 mg/dL    HDL 45 40 - 75 mg/dL    LDL Cholesterol 81.2 63.0 - 159.0 mg/dL    HDL/Cholesterol Ratio 29.6 20.0 - 50.0 %    Total Cholesterol/HDL Ratio 3.4 2.0 - 5.0    Non-HDL Cholesterol 107 mg/dL     *Note: Due to a large number of results and/or encounters for the requested time period, some results have not been displayed. A complete set of results can be found in Results Review.      Review of Systems   Constitutional:  Positive for fatigue. Negative for chills and fever.   Eyes:  Negative for blurred vision.   Respiratory:  Positive for shortness of breath.    Cardiovascular:  Negative for chest pain, palpitations and leg swelling.   Gastrointestinal:  Negative for abdominal pain, anal bleeding, blood in stool, nausea and vomiting.   Endocrine: Negative for polydipsia, polyphagia and polyuria.   Genitourinary:  Negative for dysuria and hematuria.   Neurological:  Negative for weakness.       Objective:      Physical Exam  Vitals reviewed.   Constitutional:       General: She is not in acute distress.     Appearance: Normal appearance. She is not ill-appearing.   HENT:      Head: Normocephalic and atraumatic.      Right Ear: External ear normal.      Left Ear: External ear normal.   Eyes:      General: No scleral icterus.        Right eye: No discharge.         Left eye: No discharge.      Extraocular Movements: Extraocular movements intact.      Conjunctiva/sclera: Conjunctivae normal.   Neck:      Vascular: No carotid bruit.   Cardiovascular:      Rate and Rhythm: Normal rate and regular rhythm.      Pulses: Normal pulses.           Dorsalis pedis pulses are 2+ on the right side and 2+ on the left side.        Posterior tibial pulses are 2+ on the  right side and 2+ on the left side.      Heart sounds: Normal heart sounds. No murmur heard.     No gallop.   Pulmonary:      Effort: Pulmonary effort is normal.      Breath sounds: Normal breath sounds. No wheezing or rales.   Abdominal:      General: Bowel sounds are normal.      Palpations: Abdomen is soft. There is no mass.      Tenderness: There is no abdominal tenderness.   Musculoskeletal:      Cervical back: Normal range of motion and neck supple. No rigidity or tenderness.      Right lower leg: No edema.      Left lower leg: No edema.      Right foot: Normal range of motion. No deformity or bunion.      Left foot: Normal range of motion. No deformity or bunion.   Feet:      Right foot:      Protective Sensation: 10 sites tested.  10 sites sensed.      Skin integrity: Skin integrity normal.      Toenail Condition: Right toenails are normal.      Left foot:      Protective Sensation: 10 sites tested.  10 sites sensed.      Skin integrity: Skin integrity normal.      Toenail Condition: Left toenails are normal.   Skin:     General: Skin is warm and dry.   Neurological:      Mental Status: She is alert and oriented to person, place, and time.   Psychiatric:         Mood and Affect: Mood normal.         Assessment:   See plan  Plan:       DM type 2 with diabetic dyslipidemia: Stable    Essential hypertension: Stable    Type 2 diabetes mellitus with stage 3a chronic kidney disease, without long-term current use of insulin  -    Compliance with daily Farxiga 5 mg advised.  dapagliflozin propanediol (FARXIGA) 5 mg Tab tablet; Take 1 tablet (5 mg total) by mouth once daily.  Dispense: 90 tablet; Refill: 3  -     Microalbumin/Creatinine Ratio, Urine; Future; Expected date: 07/09/2024    Overweight with body mass index (BMI) of 26 to 26.9 in adult  The patient's BMI has been recorded in the chart. The patient has been provided educational materials regarding the benefits of attaining and maintaining a normal weight.  We will continue to address and follow this issue during follow up visits.     Bronchiectasis without complication: Stable  - F/U with Pulmonary    H/O: lung cancer    History of basal cell carcinoma    Elevated alkaline phosphatase level  -     Hepatic Function Panel; Future; Expected date: 07/09/2024  -     US Abdomen Complete; Future; Expected date: 07/09/2024    Major depressive disorder, recurrent, mild Stable    Anxiety: Stable    Need for COVID-19 vaccine  -     sars-cov-2 (covid-19) (Spikevax (Moderna) (12yrs and up 2023)) 50 mcg/0.5 mL injection 0.5 mL       F/U in 4 months. Pt advised to receive Shingles vaccine at her local pharmacy.

## 2024-07-16 ENCOUNTER — PATIENT MESSAGE (OUTPATIENT)
Dept: PULMONOLOGY | Facility: CLINIC | Age: 74
End: 2024-07-16
Payer: MEDICARE

## 2024-07-18 ENCOUNTER — HOSPITAL ENCOUNTER (OUTPATIENT)
Dept: RADIOLOGY | Facility: HOSPITAL | Age: 74
Discharge: HOME | End: 2024-07-18
Attending: FAMILY MEDICINE
Payer: MEDICARE

## 2024-07-18 DIAGNOSIS — R74.8 ELEVATED ALKALINE PHOSPHATASE LEVEL: ICD-10-CM

## 2024-07-18 PROCEDURE — 76700 US EXAM ABDOM COMPLETE: CPT | Mod: TC

## 2024-07-18 PROCEDURE — 76700 US EXAM ABDOM COMPLETE: CPT | Mod: 26,,, | Performed by: RADIOLOGY

## 2024-07-23 ENCOUNTER — PATIENT MESSAGE (OUTPATIENT)
Dept: FAMILY MEDICINE | Facility: CLINIC | Age: 74
End: 2024-07-23
Payer: MEDICARE

## 2024-07-23 DIAGNOSIS — N18.31 TYPE 2 DIABETES MELLITUS WITH STAGE 3A CHRONIC KIDNEY DISEASE, WITHOUT LONG-TERM CURRENT USE OF INSULIN: Primary | ICD-10-CM

## 2024-07-23 DIAGNOSIS — E11.22 TYPE 2 DIABETES MELLITUS WITH STAGE 3A CHRONIC KIDNEY DISEASE, WITHOUT LONG-TERM CURRENT USE OF INSULIN: Primary | ICD-10-CM

## 2024-07-24 ENCOUNTER — HOSPITAL ENCOUNTER (OUTPATIENT)
Dept: PULMONOLOGY | Facility: CLINIC | Age: 74
Discharge: HOME | End: 2024-07-24
Payer: MEDICARE

## 2024-07-24 DIAGNOSIS — J47.9 BRONCHIECTASIS WITHOUT COMPLICATION: ICD-10-CM

## 2024-07-24 LAB
DLCO SINGLE BREATH LLN: 12.96
DLCO SINGLE BREATH PRE REF: 65.6 %
DLCO SINGLE BREATH REF: 18.69
DLCOC SBVA LLN: 2.61
DLCOC SBVA REF: 4.21
DLCOC SINGLE BREATH LLN: 12.96
DLCOC SINGLE BREATH REF: 18.69
DLCOCSBVAULN: 5.81
DLCOCSINGLEBREATHULN: 24.42
DLCOSINGLEBREATHULN: 24.42
DLCOSINGLEBREATHZSCORE: -1.85
DLCOVA LLN: 2.61
DLCOVA PRE REF: 86.5 %
DLCOVA PRE: 3.64 ML/(MIN*MMHG*L) (ref 2.61–5.81)
DLCOVA REF: 4.21
DLCOVAULN: 5.81
ERV LLN: -16449.46
ERV PRE REF: 138.2 %
ERV REF: 0.54
ERVULN: ABNORMAL
FEF 25 75 LLN: 0.91
FEF 25 75 PRE REF: 34.1 %
FEF 25 75 REF: 2.31
FET100 CHG: 14.9 %
FEV05 LLN: 0.63
FEV05 REF: 1.49
FEV1 CHG: 15.1 %
FEV1 FVC LLN: 64
FEV1 FVC PRE REF: 85.2 %
FEV1 FVC REF: 78
FEV1 LLN: 1.33
FEV1 PRE REF: 75.3 %
FEV1 REF: 1.86
FEV1FVCZSCORE: -1.37
FEV1ZSCORE: -1.42
FRCPLETH LLN: 1.72
FRCPLETH PREREF: 100.5 %
FRCPLETH REF: 2.55
FRCPLETHULN: 3.37
FVC CHG: 10.7 %
FVC LLN: 1.72
FVC PRE REF: 87.6 %
FVC REF: 2.41
FVCZSCORE: -0.7
IVC PRE: 2.13 L (ref 1.72–3.13)
IVC SINGLE BREATH LLN: 1.72
IVC SINGLE BREATH PRE REF: 88.3 %
IVC SINGLE BREATH REF: 2.41
IVCSINGLEBREATHULN: 3.13
LLN IC: -16448.4
PEF LLN: 3.24
PEF PRE REF: 81.3 %
PEF REF: 4.79
PHYSICIAN COMMENT: ABNORMAL
POST FEF 25 75: 0.99 L/S (ref 0.91–3.71)
POST FET 100: 7.44 SEC
POST FEV1 FVC: 69.06 % (ref 63.87–90.07)
POST FEV1: 1.61 L (ref 1.33–2.37)
POST FEV5: 1.24 L (ref 0.63–2.34)
POST FVC: 2.34 L (ref 1.72–3.13)
POST PEF: 4.02 L/S (ref 3.24–6.35)
PRE DLCO: 12.25 ML/(MIN*MMHG) (ref 12.96–24.42)
PRE ERV: 0.75 L (ref -16449.46–16450.54)
PRE FEF 25 75: 0.79 L/S (ref 0.91–3.71)
PRE FET 100: 6.48 SEC
PRE FEV05 REF: 71.6 %
PRE FEV1 FVC: 66.41 % (ref 63.87–90.07)
PRE FEV1: 1.4 L (ref 1.33–2.37)
PRE FEV5: 1.06 L (ref 0.63–2.34)
PRE FRC PL: 2.56 L (ref 1.72–3.37)
PRE FVC: 2.11 L (ref 1.72–3.13)
PRE IC: 1.52 L (ref -16448.4–16451.6)
PRE PEF: 3.9 L/S (ref 3.24–6.35)
PRE REF IC: 95.3 %
PRE RV: 1.81 L (ref 1.43–2.58)
PRE TLC: 4.08 L (ref 3.45–5.42)
RAW PRE REF: 267.3 %
RAW PRE: 8.18 CMH2O*S/L (ref 3.06–3.06)
RAW REF: 3.06
REF IC: 1.6
RV LLN: 1.43
RV PRE REF: 90.4 %
RV REF: 2
RVTLC LLN: 35
RVTLC PRE REF: 99.8 %
RVTLC PRE: 44.36 % (ref 34.87–54.05)
RVTLC REF: 44
RVTLCULN: 54
RVULN: 2.58
SGAW PRE REF: 38.6 %
SGAW PRE: 0.04 1/(CMH2O*S) (ref 0.1–0.1)
SGAW REF: 0.1
TLC LLN: 3.45
TLC PRE REF: 92.1 %
TLC REF: 4.44
TLC ULN: 5.42
TLCZSCORE: -0.59
ULN IC: ABNORMAL
VA PRE: 3.36 L (ref 4.29–4.29)
VA SINGLE BREATH LLN: 4.29
VA SINGLE BREATH PRE REF: 78.5 %
VA SINGLE BREATH REF: 4.29
VASINGLEBREATHULN: 4.29
VC LLN: 1.72
VC PRE REF: 94.3 %
VC PRE: 2.27 L (ref 1.72–3.13)
VC REF: 2.41
VC ULN: 3.13

## 2024-07-24 PROCEDURE — 94726 PLETHYSMOGRAPHY LUNG VOLUMES: CPT | Mod: S$GLB,,, | Performed by: INTERNAL MEDICINE

## 2024-07-24 PROCEDURE — 94729 DIFFUSING CAPACITY: CPT | Mod: S$GLB,,, | Performed by: INTERNAL MEDICINE

## 2024-07-24 PROCEDURE — 94060 EVALUATION OF WHEEZING: CPT | Mod: S$GLB,,, | Performed by: INTERNAL MEDICINE

## 2024-07-25 RX ORDER — DAPAGLIFLOZIN 10 MG/1
10 TABLET, FILM COATED ORAL DAILY
Qty: 90 TABLET | Refills: 3 | Status: SHIPPED | OUTPATIENT
Start: 2024-07-25

## 2024-08-09 DIAGNOSIS — N18.31 TYPE 2 DIABETES MELLITUS WITH STAGE 3A CHRONIC KIDNEY DISEASE, WITHOUT LONG-TERM CURRENT USE OF INSULIN: ICD-10-CM

## 2024-08-09 DIAGNOSIS — E11.22 TYPE 2 DIABETES MELLITUS WITH STAGE 3A CHRONIC KIDNEY DISEASE, WITHOUT LONG-TERM CURRENT USE OF INSULIN: ICD-10-CM

## 2024-08-09 RX ORDER — DAPAGLIFLOZIN 10 MG/1
10 TABLET, FILM COATED ORAL DAILY
Qty: 90 TABLET | Refills: 1 | Status: SHIPPED | OUTPATIENT
Start: 2024-08-09

## 2024-08-15 ENCOUNTER — OFFICE VISIT (OUTPATIENT)
Dept: URGENT CARE | Facility: CLINIC | Age: 74
End: 2024-08-15
Payer: MEDICARE

## 2024-08-15 ENCOUNTER — OFFICE VISIT (OUTPATIENT)
Dept: DERMATOLOGY | Facility: CLINIC | Age: 74
End: 2024-08-15
Payer: MEDICARE

## 2024-08-15 VITALS
HEIGHT: 61 IN | TEMPERATURE: 98 F | DIASTOLIC BLOOD PRESSURE: 69 MMHG | SYSTOLIC BLOOD PRESSURE: 119 MMHG | RESPIRATION RATE: 20 BRPM | WEIGHT: 140 LBS | OXYGEN SATURATION: 96 % | BODY MASS INDEX: 26.43 KG/M2 | HEART RATE: 68 BPM

## 2024-08-15 DIAGNOSIS — R05.9 COUGH, UNSPECIFIED TYPE: ICD-10-CM

## 2024-08-15 DIAGNOSIS — J22 BACTERIAL LOWER RESPIRATORY INFECTION: Primary | ICD-10-CM

## 2024-08-15 DIAGNOSIS — L81.4 LENTIGO: ICD-10-CM

## 2024-08-15 DIAGNOSIS — L82.1 SK (SEBORRHEIC KERATOSIS): ICD-10-CM

## 2024-08-15 DIAGNOSIS — R09.81 NASAL CONGESTION: ICD-10-CM

## 2024-08-15 DIAGNOSIS — L57.0 AK (ACTINIC KERATOSIS): ICD-10-CM

## 2024-08-15 DIAGNOSIS — Z85.828 HISTORY OF BASAL CELL CARCINOMA: Primary | ICD-10-CM

## 2024-08-15 DIAGNOSIS — R06.2 WHEEZING: ICD-10-CM

## 2024-08-15 DIAGNOSIS — Z85.828 PERSONAL HISTORY OF SKIN CANCER: ICD-10-CM

## 2024-08-15 DIAGNOSIS — D18.01 CHERRY ANGIOMA: ICD-10-CM

## 2024-08-15 DIAGNOSIS — D22.9 NEVUS: ICD-10-CM

## 2024-08-15 DIAGNOSIS — L73.8 SEBACEOUS GLAND HYPERPLASIA: ICD-10-CM

## 2024-08-15 DIAGNOSIS — B96.89 BACTERIAL LOWER RESPIRATORY INFECTION: Primary | ICD-10-CM

## 2024-08-15 LAB
CTP QC/QA: YES
SARS-COV-2 AG RESP QL IA.RAPID: NEGATIVE

## 2024-08-15 PROCEDURE — 99999 PR PBB SHADOW E&M-EST. PATIENT-LVL III: CPT | Mod: PBBFAC,,, | Performed by: DERMATOLOGY

## 2024-08-15 RX ORDER — IPRATROPIUM BROMIDE 0.5 MG/2.5ML
0.5 SOLUTION RESPIRATORY (INHALATION)
Status: COMPLETED | OUTPATIENT
Start: 2024-08-15 | End: 2024-08-15

## 2024-08-15 RX ORDER — ALBUTEROL SULFATE 0.83 MG/ML
2.5 SOLUTION RESPIRATORY (INHALATION)
Status: COMPLETED | OUTPATIENT
Start: 2024-08-15 | End: 2024-08-15

## 2024-08-15 RX ORDER — ALBUTEROL SULFATE 90 UG/1
1-2 INHALANT RESPIRATORY (INHALATION) EVERY 4 HOURS PRN
Qty: 18 G | Refills: 0 | Status: SHIPPED | OUTPATIENT
Start: 2024-08-15

## 2024-08-15 RX ORDER — AZITHROMYCIN 250 MG/1
TABLET, FILM COATED ORAL
Qty: 6 TABLET | Refills: 0 | Status: SHIPPED | OUTPATIENT
Start: 2024-08-15 | End: 2024-08-20

## 2024-08-15 RX ORDER — PREDNISONE 20 MG/1
20 TABLET ORAL 2 TIMES DAILY
Qty: 8 TABLET | Refills: 0 | Status: SHIPPED | OUTPATIENT
Start: 2024-08-15 | End: 2024-08-19

## 2024-08-15 RX ORDER — PROMETHAZINE HYDROCHLORIDE AND DEXTROMETHORPHAN HYDROBROMIDE 6.25; 15 MG/5ML; MG/5ML
5 SYRUP ORAL NIGHTLY PRN
Qty: 118 ML | Refills: 0 | Status: SHIPPED | OUTPATIENT
Start: 2024-08-15 | End: 2024-09-08

## 2024-08-15 RX ORDER — BENZONATATE 100 MG/1
100 CAPSULE ORAL 2 TIMES DAILY
Qty: 20 CAPSULE | Refills: 0 | Status: SHIPPED | OUTPATIENT
Start: 2024-08-15 | End: 2024-08-25

## 2024-08-15 RX ADMIN — ALBUTEROL SULFATE 2.5 MG: 0.83 SOLUTION RESPIRATORY (INHALATION) at 10:08

## 2024-08-15 RX ADMIN — IPRATROPIUM BROMIDE 0.5 MG: 0.5 SOLUTION RESPIRATORY (INHALATION) at 10:08

## 2024-08-15 NOTE — PROGRESS NOTES
"Subjective:      Patient ID: Jolly Matias is a 74 y.o. female.    Vitals:  height is 5' 1" (1.549 m) and weight is 63.5 kg (140 lb). Her oral temperature is 97.6 °F (36.4 °C). Her blood pressure is 119/69 and her pulse is 68. Her respiration is 20 and oxygen saturation is 96%.     Chief Complaint: Cough    74-year-old female presents to the clinic today with chief complaint of productive cough, rattling in her chest, wheezing, sore throat  and nasal congestion. Symptoms started one week ago and have not improved.  Patient has taken Mucinex with no relief.  Patient's pain level is a 4/10.  Denies any recent ill exposures. Denies any recent travel.  Denies history of seasonal allergies. Denies hx of asthma. Denies numbness or tingling. Denies radiation of pain. Denies fever, chills, body aches, chest pain, shortness of breath, abdominal pain, nausea, vomiting, diarrhea, or rashes.      Cough  This is a new problem. Episode onset: 6 days ago. The problem has been unchanged. The problem occurs constantly. The cough is Non-productive. Associated symptoms include nasal congestion, postnasal drip and a sore throat. Pertinent negatives include no chest pain, chills, ear pain, fever, headaches, myalgias, rash or shortness of breath. Nothing aggravates the symptoms. She has tried OTC cough suppressant for the symptoms. The treatment provided no relief. There is no history of environmental allergies.       Constitution: Negative for activity change, chills, sweating, fatigue, fever, generalized weakness and international travel in last 60 days.   HENT:  Positive for congestion, postnasal drip and sore throat. Negative for ear pain, sinus pain, sinus pressure, trouble swallowing and voice change.    Neck: Negative for neck pain.   Cardiovascular:  Negative for chest pain.   Eyes:  Negative for eye pain.   Respiratory:  Positive for cough. Negative for shortness of breath.    Gastrointestinal:  Negative for abdominal pain, " nausea, vomiting, constipation and diarrhea.   Genitourinary:  Negative for dysuria.   Musculoskeletal:  Negative for pain and muscle ache.   Skin:  Negative for rash.   Allergic/Immunologic: Negative for environmental allergies and seasonal allergies.   Neurological:  Negative for dizziness and headaches.   Psychiatric/Behavioral:  Negative for nervous/anxious. The patient is not nervous/anxious.       Objective:     Physical Exam   Constitutional: She is oriented to person, place, and time. She appears well-developed. She is cooperative.  Non-toxic appearance. She does not appear ill. No distress.   HENT:   Head: Normocephalic and atraumatic.   Ears:   Right Ear: Hearing, tympanic membrane, external ear and ear canal normal.   Left Ear: Hearing, tympanic membrane, external ear and ear canal normal.   Nose: Mucosal edema and rhinorrhea present. No purulent discharge or nasal deformity. No epistaxis. Right sinus exhibits no maxillary sinus tenderness and no frontal sinus tenderness. Left sinus exhibits no maxillary sinus tenderness and no frontal sinus tenderness.   Mouth/Throat: Uvula is midline, oropharynx is clear and moist and mucous membranes are normal. No trismus in the jaw. Normal dentition. No uvula swelling. No oropharyngeal exudate, posterior oropharyngeal edema, posterior oropharyngeal erythema, tonsillar abscesses or cobblestoning.   Eyes: Conjunctivae and lids are normal. No scleral icterus. Extraocular movement intact   Neck: Trachea normal and phonation normal. Neck supple. No edema present. No erythema present. No neck rigidity present.   Cardiovascular: Normal rate, regular rhythm, normal heart sounds and normal pulses.   Pulmonary/Chest: Effort normal. No stridor. No respiratory distress. She has no decreased breath sounds. She has wheezes. She has rhonchi. She has no rales.   Abdominal: Normal appearance.   Musculoskeletal: Normal range of motion.         General: No deformity. Normal range of  motion.   Lymphadenopathy:     She has no cervical adenopathy.   Neurological: She is alert and oriented to person, place, and time. She exhibits normal muscle tone. Coordination normal.   Skin: Skin is warm, dry, intact, not diaphoretic and not pale.   Psychiatric: Her speech is normal and behavior is normal. Judgment and thought content normal.   Nursing note and vitals reviewed.      Assessment:     1. Bacterial lower respiratory infection    2. Cough, unspecified type    3. Wheezing    4. Nasal congestion          Results for orders placed or performed in visit on 08/15/24   SARS Coronavirus 2 Antigen, POCT Manual Read   Result Value Ref Range    SARS Coronavirus 2 Antigen Negative Negative     Acceptable Yes      *Note: Due to a large number of results and/or encounters for the requested time period, some results have not been displayed. A complete set of results can be found in Results Review.     XR CHEST PA AND LATERAL    Result Date: 8/15/2024  EXAMINATION: XR CHEST PA AND LATERAL CLINICAL HISTORY: Cough, unspecified TECHNIQUE: PA and lateral views of the chest were performed. COMPARISON: 11/21/2022; 04/05/2024 FINDINGS: Postoperative changes at the right lung base again noted with mild elevation of the right hemidiaphragm.  Patchy airspace infiltration at the right lung base likely reflecting previously demonstrated interstitial abnormalities with bronchiectasis   normal size of the cardiac silhouette.  No acute osseous abnormalities     No acute findings. Electronically signed by: Viktor Cox Date:    08/15/2024 Time:    10:33    Plan:       Bacterial lower respiratory infection  -     azithromycin (Z-PAULINE) 250 MG tablet; Take 2 tablets by mouth on day 1; Take 1 tablet by mouth on days 2-5  Dispense: 6 tablet; Refill: 0  -     albuterol (VENTOLIN HFA) 90 mcg/actuation inhaler; Inhale 1-2 puffs into the lungs every 4 (four) hours as needed for Wheezing or Shortness of Breath. Rescue  Dispense:  18 g; Refill: 0  -     benzonatate (TESSALON) 100 MG capsule; Take 1 capsule (100 mg total) by mouth 2 (two) times a day. for 10 days  Dispense: 20 capsule; Refill: 0  -     promethazine-dextromethorphan (PROMETHAZINE-DM) 6.25-15 mg/5 mL Syrp; Take 5 mLs by mouth nightly as needed (as needed).  Dispense: 118 mL; Refill: 0    Cough, unspecified type  -     SARS Coronavirus 2 Antigen, POCT Manual Read  -     XR CHEST PA AND LATERAL; Future; Expected date: 08/15/2024  -     benzonatate (TESSALON) 100 MG capsule; Take 1 capsule (100 mg total) by mouth 2 (two) times a day. for 10 days  Dispense: 20 capsule; Refill: 0  -     promethazine-dextromethorphan (PROMETHAZINE-DM) 6.25-15 mg/5 mL Syrp; Take 5 mLs by mouth nightly as needed (as needed).  Dispense: 118 mL; Refill: 0    Wheezing  -     XR CHEST PA AND LATERAL; Future; Expected date: 08/15/2024  -     albuterol nebulizer solution 2.5 mg  -     ipratropium 0.02 % nebulizer solution 0.5 mg  -     albuterol (VENTOLIN HFA) 90 mcg/actuation inhaler; Inhale 1-2 puffs into the lungs every 4 (four) hours as needed for Wheezing or Shortness of Breath. Rescue  Dispense: 18 g; Refill: 0  -     predniSONE (DELTASONE) 20 MG tablet; Take 1 tablet (20 mg total) by mouth 2 (two) times daily. for 4 days  Dispense: 8 tablet; Refill: 0    Nasal congestion      We had shared decision making for patient's treatment. We discussed side effects/alternatives/benefits/risk and patient would like to proceed with treatment plan. We also discussed other OTC treatment recommendations. Patient was counseled, explained with the test results meaning, expected course, and answered all of questions. Patient can take OTC Acetaminophen (Tylenol) and/or Ibuprofen (Motrin) as needed for pain relief and/or fever relief. Continue to drink plenty of fluids. Follow up with PCP in the next 1-2 weeks as needed.  Gave patient strict ER/urgent care precautions in case symptoms worsen or if any new concerns  arise.

## 2024-08-15 NOTE — PATIENT INSTRUCTIONS
Please drink plenty of fluids.  Please get plenty of rest.  Please return here or go to the Emergency Department for any concerns or worsening of condition.  If you were prescribed antibiotics + prednisone, please take them to completion.  Recommended using albuterol inhaler at least twice today and then starting tomorrow use as needed every 4 hours. Use astelin nasal spray twice daily and take daily Claritin as directed for 3-5 days. Use tessalon perles (breakfast/lunch) during the day and promethazine-dm (dinner) at night for cough. Recommended taking vitamin D and zinc supplements for immune support.   Recommended for patient to drink hot tea with honey. Consider eating softer foods such as soup and broth for the next couple of days to prevent further throat irritation. Recommended for patient to refrain from acidic foods (such as tomatoes or caffeine) to prevent throat irritation for the next couple of days.      If you do not have Hypertension or any history of palpitations, it is ok to take over the counter Sudafed or Mucinex D or Allegra-D or Claritin-D or Zyrtec-D.  If you do take one of the above, it is ok to combine that with plain over the counter Mucinex or Allegra or Claritin or Zyrtec.  If for example you are taking Zyrtec -D, you can combine that with Mucinex, but not Mucinex-D.  If you are taking Mucinex-D, you can combine that with plain Allegra or Claritin or Zyrtec.   If you do have Hypertension or palpitations, it is safe to take Coricidin HBP for relief of sinus symptoms.  If not allergic, please take over the counter Tylenol (Acetaminophen) and/or Motrin (Ibuprofen) as directed for control of pain and/or fever.  Please follow up with your primary care doctor or specialist as needed.    If you  smoke, please stop smoking.

## 2024-08-15 NOTE — PROGRESS NOTES
Subjective:      Patient ID:  Jolly Matias is a 74 y.o. female who presents for   Chief Complaint   Patient presents with    Skin Check     UBSE     Pt here today for UBSE    This is a high risk patient here to check for the development of new lesions.    Patient with new area of concern:   Location: R temple   Previous treatments: none    Patient with new area of concern:   Location: shoulder  Previous treatments: neosporin               Review of Systems   Skin:  Positive for daily sunscreen use, activity-related sunscreen use and wears hat. Negative for recent sunburn.   Hematologic/Lymphatic: Does not bruise/bleed easily.       Objective:   Physical Exam   Constitutional: She appears well-developed and well-nourished. No distress.   Neurological: She is alert and oriented to person, place, and time. She is not disoriented.   Psychiatric: She has a normal mood and affect.   Skin:   Areas Examined (abnormalities noted in diagram):   Scalp / Hair Palpated and Inspected  Head / Face Inspection Performed  Neck Inspection Performed  Chest / Axilla Inspection Performed  Abdomen Inspection Performed  Back Inspection Performed  RUE Inspected  LUE Inspection Performed  Nails and Digits Inspection Performed                 Diagram Legend     Erythematous scaling macule/papule c/w actinic keratosis       Vascular papule c/w angioma      Pigmented verrucoid papule/plaque c/w seborrheic keratosis      Yellow umbilicated papule c/w sebaceous hyperplasia      Irregularly shaped tan macule c/w lentigo     1-2 mm smooth white papules consistent with Milia      Movable subcutaneous cyst with punctum c/w epidermal inclusion cyst      Subcutaneous movable cyst c/w pilar cyst      Firm pink to brown papule c/w dermatofibroma      Pedunculated fleshy papule(s) c/w skin tag(s)      Evenly pigmented macule c/w junctional nevus     Mildly variegated pigmented, slightly irregular-bordered macule c/w mildly atypical nevus      Flesh  colored to evenly pigmented papule c/w intradermal nevus       Pink pearly papule/plaque c/w basal cell carcinoma      Erythematous hyperkeratotic cursted plaque c/w SCC      Surgical scar with no sign of skin cancer recurrence      Open and closed comedones      Inflammatory papules and pustules      Verrucoid papule consistent consistent with wart     Erythematous eczematous patches and plaques     Dystrophic onycholytic nail with subungual debris c/w onychomycosis     Umbilicated papule    Erythematous-base heme-crusted tan verrucoid plaque consistent with inflamed seborrheic keratosis     Erythematous Silvery Scaling Plaque c/w Psoriasis     See annotation      Assessment / Plan:        AK (actinic keratosis)  Cryosurgery Procedure Note    Verbal consent from the patient is obtained including, but not limited to, risk of hypopigmentation/hyperpigmentation, scar, recurrence of lesion. The patient is aware of the precancerous quality and need for treatment of these lesions. Liquid nitrogen cryosurgery is applied to the 3 actinic keratoses, as detailed in the physical exam, to produce a freeze injury. The patient is aware that blisters may form and is instructed on wound care with gentle cleansing and use of vaseline ointment to keep moist until healed. The patient is supplied a handout on cryosurgery and is instructed to call if lesions do not completely resolve.    Sebaceous gland hyperplasia  This is a common condition representing benign enlargement of the sebaceous lobule. It typically occurs in adulthood. Reassurance given to patient.     Lentigo  This is a benign hyperpigmented sun induced lesion. Recommend daily sun protection/avoidance and use of at least SPF 30, broad spectrum sunscreen (OTC drug) will reduce the number of new lesions. Treatment of these benign lesions are considered cosmetic.  The nature of sun-induced photo-aging and skin cancers is discussed.  Sun avoidance, protective clothing, and  the use of 30-SPF sunscreens is advised. Observe closely for skin damage/changes, and call if such occurs.    Cherry angioma  These are benign vascular lesions that are inherited.  Treatment is not necessary.    Nevus  Discussed ABCDE's of nevi.  Monitor for new mole or moles that are becoming bigger, darker, irritated, or developing irregular borders. Brochure provided. Instructed patient to observe lesion(s) for changes and follow up in clinic if changes are noted. Patient to monitor skin at home for new or changing lesions.     SK (seborrheic keratosis)  These are benign inherited growths without a malignant potential. Reassurance given to patient. No treatment is necessary.     Personal history of skin cancer  History of basal cell carcinoma  Area(s) of previous NMSC evaluated with no signs of recurrence.    Upper body skin examination performed today including at least 6 points as noted in physical examination. No lesions suspicious for malignancy noted.    Recommend daily sun protection/avoidance and use of at least SPF 30, broad spectrum sunscreen (OTC drug).              Follow up in about 6 months (around 2/15/2025) for UBSE, recheck L temporal scalp .

## 2024-10-03 ENCOUNTER — PATIENT MESSAGE (OUTPATIENT)
Dept: FAMILY MEDICINE | Facility: CLINIC | Age: 74
End: 2024-10-03
Payer: MEDICARE

## 2024-10-19 ENCOUNTER — PATIENT MESSAGE (OUTPATIENT)
Dept: FAMILY MEDICINE | Facility: CLINIC | Age: 74
End: 2024-10-19
Payer: MEDICARE

## 2024-11-18 ENCOUNTER — PATIENT MESSAGE (OUTPATIENT)
Dept: FAMILY MEDICINE | Facility: CLINIC | Age: 74
End: 2024-11-18
Payer: MEDICARE

## 2024-11-18 DIAGNOSIS — N18.31 TYPE 2 DIABETES MELLITUS WITH STAGE 3A CHRONIC KIDNEY DISEASE, WITHOUT LONG-TERM CURRENT USE OF INSULIN: Primary | ICD-10-CM

## 2024-11-18 DIAGNOSIS — E11.22 TYPE 2 DIABETES MELLITUS WITH STAGE 3A CHRONIC KIDNEY DISEASE, WITHOUT LONG-TERM CURRENT USE OF INSULIN: Primary | ICD-10-CM

## 2024-11-19 ENCOUNTER — LAB VISIT (OUTPATIENT)
Dept: LAB | Facility: HOSPITAL | Age: 74
End: 2024-11-19
Attending: FAMILY MEDICINE
Payer: MEDICARE

## 2024-11-19 DIAGNOSIS — N18.31 TYPE 2 DIABETES MELLITUS WITH STAGE 3A CHRONIC KIDNEY DISEASE, WITHOUT LONG-TERM CURRENT USE OF INSULIN: ICD-10-CM

## 2024-11-19 DIAGNOSIS — E11.22 TYPE 2 DIABETES MELLITUS WITH STAGE 3A CHRONIC KIDNEY DISEASE, WITHOUT LONG-TERM CURRENT USE OF INSULIN: ICD-10-CM

## 2024-11-19 LAB
ALBUMIN SERPL BCP-MCNC: 3.8 G/DL (ref 3.5–5.2)
ALP SERPL-CCNC: 379 U/L (ref 40–150)
ALT SERPL W/O P-5'-P-CCNC: 43 U/L (ref 10–44)
ANION GAP SERPL CALC-SCNC: 9 MMOL/L (ref 8–16)
AST SERPL-CCNC: 38 U/L (ref 10–40)
BASOPHILS # BLD AUTO: 0.04 K/UL (ref 0–0.2)
BASOPHILS NFR BLD: 0.6 % (ref 0–1.9)
BILIRUB SERPL-MCNC: 0.4 MG/DL (ref 0.1–1)
BUN SERPL-MCNC: 24 MG/DL (ref 8–23)
CALCIUM SERPL-MCNC: 9.3 MG/DL (ref 8.7–10.5)
CHLORIDE SERPL-SCNC: 106 MMOL/L (ref 95–110)
CHOLEST SERPL-MCNC: 139 MG/DL (ref 120–199)
CHOLEST/HDLC SERPL: 3.1 {RATIO} (ref 2–5)
CO2 SERPL-SCNC: 22 MMOL/L (ref 23–29)
CREAT SERPL-MCNC: 1.3 MG/DL (ref 0.5–1.4)
DIFFERENTIAL METHOD BLD: ABNORMAL
EOSINOPHIL # BLD AUTO: 0.3 K/UL (ref 0–0.5)
EOSINOPHIL NFR BLD: 4.5 % (ref 0–8)
ERYTHROCYTE [DISTWIDTH] IN BLOOD BY AUTOMATED COUNT: 13.4 % (ref 11.5–14.5)
EST. GFR  (NO RACE VARIABLE): 42.9 ML/MIN/1.73 M^2
ESTIMATED AVG GLUCOSE: 120 MG/DL (ref 68–131)
GLUCOSE SERPL-MCNC: 104 MG/DL (ref 70–110)
HBA1C MFR BLD: 5.8 % (ref 4–5.6)
HCT VFR BLD AUTO: 38.3 % (ref 37–48.5)
HDLC SERPL-MCNC: 45 MG/DL (ref 40–75)
HDLC SERPL: 32.4 % (ref 20–50)
HGB BLD-MCNC: 11.9 G/DL (ref 12–16)
IMM GRANULOCYTES # BLD AUTO: 0.02 K/UL (ref 0–0.04)
IMM GRANULOCYTES NFR BLD AUTO: 0.3 % (ref 0–0.5)
LDLC SERPL CALC-MCNC: 74.6 MG/DL (ref 63–159)
LYMPHOCYTES # BLD AUTO: 0.4 K/UL (ref 1–4.8)
LYMPHOCYTES NFR BLD: 6.5 % (ref 18–48)
MCH RBC QN AUTO: 29.7 PG (ref 27–31)
MCHC RBC AUTO-ENTMCNC: 31.1 G/DL (ref 32–36)
MCV RBC AUTO: 96 FL (ref 82–98)
MONOCYTES # BLD AUTO: 0.5 K/UL (ref 0.3–1)
MONOCYTES NFR BLD: 6.8 % (ref 4–15)
NEUTROPHILS # BLD AUTO: 5.5 K/UL (ref 1.8–7.7)
NEUTROPHILS NFR BLD: 81.3 % (ref 38–73)
NONHDLC SERPL-MCNC: 94 MG/DL
NRBC BLD-RTO: 0 /100 WBC
PLATELET # BLD AUTO: 260 K/UL (ref 150–450)
PMV BLD AUTO: 10.5 FL (ref 9.2–12.9)
POTASSIUM SERPL-SCNC: 4.6 MMOL/L (ref 3.5–5.1)
PROT SERPL-MCNC: 6.9 G/DL (ref 6–8.4)
RBC # BLD AUTO: 4.01 M/UL (ref 4–5.4)
SODIUM SERPL-SCNC: 137 MMOL/L (ref 136–145)
TRIGL SERPL-MCNC: 97 MG/DL (ref 30–150)
WBC # BLD AUTO: 6.74 K/UL (ref 3.9–12.7)

## 2024-11-19 PROCEDURE — 85025 COMPLETE CBC W/AUTO DIFF WBC: CPT | Performed by: FAMILY MEDICINE

## 2024-11-19 PROCEDURE — 83036 HEMOGLOBIN GLYCOSYLATED A1C: CPT | Performed by: FAMILY MEDICINE

## 2024-11-19 PROCEDURE — 80061 LIPID PANEL: CPT | Performed by: FAMILY MEDICINE

## 2024-11-19 PROCEDURE — 80053 COMPREHEN METABOLIC PANEL: CPT | Performed by: FAMILY MEDICINE

## 2024-11-20 ENCOUNTER — PATIENT MESSAGE (OUTPATIENT)
Dept: FAMILY MEDICINE | Facility: CLINIC | Age: 74
End: 2024-11-20
Payer: MEDICARE

## 2024-11-26 ENCOUNTER — OFFICE VISIT (OUTPATIENT)
Dept: FAMILY MEDICINE | Facility: CLINIC | Age: 74
End: 2024-11-26
Payer: MEDICARE

## 2024-11-26 VITALS
HEIGHT: 61 IN | SYSTOLIC BLOOD PRESSURE: 120 MMHG | BODY MASS INDEX: 24.97 KG/M2 | DIASTOLIC BLOOD PRESSURE: 60 MMHG | HEART RATE: 83 BPM | OXYGEN SATURATION: 95 % | WEIGHT: 132.25 LBS

## 2024-11-26 DIAGNOSIS — F33.0 MAJOR DEPRESSIVE DISORDER, RECURRENT, MILD: ICD-10-CM

## 2024-11-26 DIAGNOSIS — F41.9 ANXIETY: Chronic | ICD-10-CM

## 2024-11-26 DIAGNOSIS — Z85.118 H/O: LUNG CANCER: ICD-10-CM

## 2024-11-26 DIAGNOSIS — R00.2 PALPITATIONS: ICD-10-CM

## 2024-11-26 DIAGNOSIS — E11.22 TYPE 2 DIABETES MELLITUS WITH STAGE 3A CHRONIC KIDNEY DISEASE, WITHOUT LONG-TERM CURRENT USE OF INSULIN: ICD-10-CM

## 2024-11-26 DIAGNOSIS — E11.69 DM TYPE 2 WITH DIABETIC DYSLIPIDEMIA: ICD-10-CM

## 2024-11-26 DIAGNOSIS — J47.9 BRONCHIECTASIS WITHOUT COMPLICATION: ICD-10-CM

## 2024-11-26 DIAGNOSIS — E11.22 TYPE 2 DIABETES MELLITUS WITH STAGE 3B CHRONIC KIDNEY DISEASE, WITHOUT LONG-TERM CURRENT USE OF INSULIN: ICD-10-CM

## 2024-11-26 DIAGNOSIS — N18.31 CHRONIC KIDNEY DISEASE, STAGE 3A: ICD-10-CM

## 2024-11-26 DIAGNOSIS — I10 ESSENTIAL HYPERTENSION: Primary | ICD-10-CM

## 2024-11-26 DIAGNOSIS — N18.32 TYPE 2 DIABETES MELLITUS WITH STAGE 3B CHRONIC KIDNEY DISEASE, WITHOUT LONG-TERM CURRENT USE OF INSULIN: ICD-10-CM

## 2024-11-26 DIAGNOSIS — E78.5 DM TYPE 2 WITH DIABETIC DYSLIPIDEMIA: ICD-10-CM

## 2024-11-26 DIAGNOSIS — N18.31 TYPE 2 DIABETES MELLITUS WITH STAGE 3A CHRONIC KIDNEY DISEASE, WITHOUT LONG-TERM CURRENT USE OF INSULIN: ICD-10-CM

## 2024-11-26 DIAGNOSIS — Z23 NEED FOR COVID-19 VACCINE: ICD-10-CM

## 2024-11-26 DIAGNOSIS — N18.32 STAGE 3B CHRONIC KIDNEY DISEASE: ICD-10-CM

## 2024-11-26 PROCEDURE — 99999 PR PBB SHADOW E&M-EST. PATIENT-LVL V: CPT | Mod: PBBFAC,,, | Performed by: FAMILY MEDICINE

## 2024-11-26 PROCEDURE — 93010 ELECTROCARDIOGRAM REPORT: CPT | Mod: S$GLB,,, | Performed by: INTERNAL MEDICINE

## 2024-11-26 PROCEDURE — 3044F HG A1C LEVEL LT 7.0%: CPT | Mod: CPTII,S$GLB,, | Performed by: FAMILY MEDICINE

## 2024-11-26 PROCEDURE — 1101F PT FALLS ASSESS-DOCD LE1/YR: CPT | Mod: CPTII,S$GLB,, | Performed by: FAMILY MEDICINE

## 2024-11-26 PROCEDURE — 90480 ADMN SARSCOV2 VAC 1/ONLY CMP: CPT | Mod: S$GLB,,, | Performed by: FAMILY MEDICINE

## 2024-11-26 PROCEDURE — 2023F DILAT RTA XM W/O RTNOPTHY: CPT | Mod: CPTII,S$GLB,, | Performed by: FAMILY MEDICINE

## 2024-11-26 PROCEDURE — 1160F RVW MEDS BY RX/DR IN RCRD: CPT | Mod: CPTII,S$GLB,, | Performed by: FAMILY MEDICINE

## 2024-11-26 PROCEDURE — 3061F NEG MICROALBUMINURIA REV: CPT | Mod: CPTII,S$GLB,, | Performed by: FAMILY MEDICINE

## 2024-11-26 PROCEDURE — 99215 OFFICE O/P EST HI 40 MIN: CPT | Mod: S$GLB,,, | Performed by: FAMILY MEDICINE

## 2024-11-26 PROCEDURE — 1159F MED LIST DOCD IN RCRD: CPT | Mod: CPTII,S$GLB,, | Performed by: FAMILY MEDICINE

## 2024-11-26 PROCEDURE — 1126F AMNT PAIN NOTED NONE PRSNT: CPT | Mod: CPTII,S$GLB,, | Performed by: FAMILY MEDICINE

## 2024-11-26 PROCEDURE — 3288F FALL RISK ASSESSMENT DOCD: CPT | Mod: CPTII,S$GLB,, | Performed by: FAMILY MEDICINE

## 2024-11-26 PROCEDURE — 91322 SARSCOV2 VAC 50 MCG/0.5ML IM: CPT | Mod: S$GLB,,, | Performed by: FAMILY MEDICINE

## 2024-11-26 PROCEDURE — 3066F NEPHROPATHY DOC TX: CPT | Mod: CPTII,S$GLB,, | Performed by: FAMILY MEDICINE

## 2024-11-26 PROCEDURE — 4010F ACE/ARB THERAPY RXD/TAKEN: CPT | Mod: CPTII,S$GLB,, | Performed by: FAMILY MEDICINE

## 2024-11-26 PROCEDURE — 3074F SYST BP LT 130 MM HG: CPT | Mod: CPTII,S$GLB,, | Performed by: FAMILY MEDICINE

## 2024-11-26 PROCEDURE — 93005 ELECTROCARDIOGRAM TRACING: CPT | Mod: S$GLB,,, | Performed by: FAMILY MEDICINE

## 2024-11-26 PROCEDURE — 1157F ADVNC CARE PLAN IN RCRD: CPT | Mod: CPTII,S$GLB,, | Performed by: FAMILY MEDICINE

## 2024-11-26 PROCEDURE — 3078F DIAST BP <80 MM HG: CPT | Mod: CPTII,S$GLB,, | Performed by: FAMILY MEDICINE

## 2024-11-26 RX ORDER — OLMESARTAN MEDOXOMIL 40 MG/1
40 TABLET ORAL DAILY
Qty: 90 TABLET | Refills: 3 | Status: SHIPPED | OUTPATIENT
Start: 2024-11-26

## 2024-11-26 RX ORDER — SERTRALINE HYDROCHLORIDE 100 MG/1
100 TABLET, FILM COATED ORAL NIGHTLY
Qty: 90 TABLET | Refills: 3 | Status: SHIPPED | OUTPATIENT
Start: 2024-11-26

## 2024-11-26 RX ORDER — ATORVASTATIN CALCIUM 20 MG/1
20 TABLET, FILM COATED ORAL NIGHTLY
Qty: 90 TABLET | Refills: 3 | Status: SHIPPED | OUTPATIENT
Start: 2024-11-26

## 2024-11-26 RX ORDER — UMECLIDINIUM BROMIDE AND VILANTEROL TRIFENATATE 62.5; 25 UG/1; UG/1
1 POWDER RESPIRATORY (INHALATION) DAILY
Qty: 60 EACH | Refills: 5 | Status: SHIPPED | OUTPATIENT
Start: 2024-11-26

## 2024-11-26 RX ORDER — METFORMIN HYDROCHLORIDE 500 MG/1
500 TABLET ORAL
Qty: 90 TABLET | Refills: 3 | Status: SHIPPED | OUTPATIENT
Start: 2024-11-26

## 2024-11-26 RX ORDER — SPIRONOLACTONE 25 MG/1
25 TABLET ORAL DAILY
Qty: 90 TABLET | Refills: 3 | Status: SHIPPED | OUTPATIENT
Start: 2024-11-26

## 2024-11-27 LAB
OHS QRS DURATION: 66 MS
OHS QTC CALCULATION: 417 MS

## 2024-11-27 NOTE — PROGRESS NOTES
Subjective:       Patient ID: Jolly Matias is a 74 y.o. female.    Chief Complaint: Medication Refill, Shortness of Breath, and Chest Pain    History of Present Illness    CHIEF COMPLAINT:  Jolly presents today for follow-up of depression and anxiety.    DEPRESSION AND ANXIETY:  She reports feeling depressed and anxious daily for the past month, experiencing anger rather than crying spells. She denies suicidal or homicidal ideation. She wakes up feeling anxious about the day ahead. She is currently taking sertraline 100 mg every evening and has not had any previous counseling or psychotherapy.    CARDIOVASCULAR AND RESPIRATORY SYMPTOMS:  She reports feeling her heart racing, especially when engaging in activities. She experiences shortness of breath that worsens as the day progresses, noting that she feels fine upon waking but becomes increasingly breathless throughout the day. She denies any ankle swelling. She uses an inhaler and a machine for her respiratory conditions.    GASTROINTESTINAL SYMPTOMS:  She reports experiencing nausea, which is not related to eating. She describes it as a feeling that comes over her independently of food consumption. She also notes a decreased appetite and lack of interest in food. She denies any episodes of vomiting.    MEDICAL HISTORY:  She has a history of lung cancer and bronchiectasis, with the most recent chest CT in April showing bronchiectasis primarily in the right lower lobe and middle lobe. She also has chronic kidney disease stage 3b, which is being monitored. Her diabetes is well-controlled with a hemoglobin A1C of 5.8%. She has a mild anemia with a hemoglobin level of 11.9. She denies seeing blood in her urine or stool.    MEDICATIONS:  She uses Anoro Ellipta daily for bronchiectasis and has albuterol as needed, but reports not using it for shortness of breath. For diabetes management, she takes metformin. She is not taking the prescribed dapagliflozin due to a  high copay.      ROS:  Constitutional: +loss in appetite  Respiratory: +shortness of breath  Cardiovascular: +palpitations  Gastrointestinal: +nausea, -vomiting  Genitourinary: -hematuria  Psychiatric: +depression, +anxiety          Objective:      Physical Exam    Vitals: Blood pressure: 120/60.  General: No acute distress. Well-developed. Well-nourished.  Head: Normocephalic. Atraumatic.  Eyes: EOMI.  Conjunctivae non-injected. Sclerae anicteric.  Neck: Supple. Full ROM. Non-tender. No JVD. No carotid bruits. No thyromegaly. No lymphadenopathy.  Cardiovascular: Regular rate. Regular rhythm. No murmurs. No rubs. No gallops. Normal S1, S2. Respiratory: Normal respiratory effort. Clear to auscultation bilaterally. No rales. No rhonchi. No wheezing.  Abdomen: Soft. Non-tender. Non-distended. Normal bowel sounds. Negative McBurney's. Negative Soriano's. No rebound. No guarding. No hepatosplenomegaly. No masses.  Extremities: No cyanosis. No clubbing. No upper extremity edema. No lower extremity edema.  Neurological: Alert & oriented x3.   No slurred speech. Normal gait.  Psychiatric: Depressed mood. Depressed affect. Good insight. Good judgment. No evidence of suicidal ideation. No evidence of homicidal ideation.  Skin: Warm. Dry. Intact. No rash. No ulcers. No suspicious lesions.            Assessment:         Essential hypertension  -     olmesartan (BENICAR) 40 MG tablet; Take 1 tablet (40 mg total) by mouth once daily.  Dispense: 90 tablet; Refill: 3  -     spironolactone (ALDACTONE) 25 MG tablet; Take 1 tablet (25 mg total) by mouth once daily.  Dispense: 90 tablet; Refill: 3    DM type 2 with diabetic dyslipidemia  -     Ambulatory referral/consult to Optometry; Future; Expected date: 12/03/2024  -     atorvastatin (LIPITOR) 20 MG tablet; Take 1 tablet (20 mg total) by mouth every evening.  Dispense: 90 tablet; Refill: 3  -     metFORMIN (GLUCOPHAGE) 500 MG tablet; Take 1 tablet (500 mg total) by mouth daily with  breakfast.  Dispense: 90 tablet; Refill: 3    Stage 3b chronic kidney disease  -     empagliflozin (JARDIANCE) 10 mg tablet; Take 1 tablet (10 mg total) by mouth once daily.  Dispense: 90 tablet; Refill: 3    Type 2 diabetes mellitus with stage 3b chronic kidney disease, without long-term current use of insulin  -     Ambulatory referral/consult to Optometry; Future; Expected date: 12/03/2024  -     empagliflozin (JARDIANCE) 10 mg tablet; Take 1 tablet (10 mg total) by mouth once daily.  Dispense: 90 tablet; Refill: 3  -     metFORMIN (GLUCOPHAGE) 500 MG tablet; Take 1 tablet (500 mg total) by mouth daily with breakfast.  Dispense: 90 tablet; Refill: 3  -     Comprehensive Metabolic Panel; Future; Expected date: 02/26/2025  -     Hemoglobin A1C; Future; Expected date: 02/26/2025    BMI 24.0-24.9, adult    Bronchiectasis without complication  -     umeclidinium-vilanteroL (ANORO ELLIPTA) 62.5-25 mcg/actuation DsDv; Inhale 1 puff into the lungs once daily. Controller  Dispense: 60 each; Refill: 5    H/O: lung cancer    Major depressive disorder, recurrent, mild  -     Ambulatory referral/consult to Psychiatry; Future; Expected date: 12/03/2024  -     sertraline (ZOLOFT) 100 MG tablet; Take 1 tablet (100 mg total) by mouth every evening.  Dispense: 90 tablet; Refill: 3    Anxiety  -     Ambulatory referral/consult to Psychiatry; Future; Expected date: 12/03/2024    Palpitations  -     IN OFFICE EKG 12-LEAD (to Muse)    Type 2 diabetes mellitus with stage 3a chronic kidney disease, without long-term current use of insulin    Chronic kidney disease, stage 3a    Need for COVID-19 vaccine  -     COVID-19 (Moderna) 50 mcg/0.5 mL IM vaccine (>/= 11 yo) 0.5 mL         Plan:       Assessment & Plan    DEPRESSION AND ANXIETY:  - Assessed depression and anxiety symptoms, noting daily occurrence over the past month.  - Determined current sertraline regimen (100mg nightly) is appropriate; no immediate medication changes needed.  -  Explained connection between anxiety/depression and physical symptoms like palpitations and shortness of breath.  - Continued sertraline 100mg nightly for depression.  - Referred to [facility] psychiatry team for psychotherapy and counseling.    CHRONIC KIDNEY DISEASE:  - Reviewed recent lab results: chronic kidney disease stage 3b, well-controlled diabetes (A1C 5.8%), mild anemia improved.  - Discussed importance of avoiding NSAIDs (e.g., Motrin, Aleve, Advil, ibuprofen) due to chronic kidney disease.  - Recommend using Tylenol instead of NSAIDs for pain relief.    TYPE 2 DIABETES:  - Continued metformin 500mg.    RESPIRATORY ISSUES:  - Continued Anoro Ellipta daily.  - Continued albuterol as needed.  - Jolly to use albuterol inhaler as needed for shortness of breath.    CARDIOVASCULAR CONCERNS:  - Evaluated reported palpitations and shortness of breath; considered anxiety/panic as potential cause.  - Considered EKG to rule out abnormal heart rhythms despite normal cardiac exam.  - EKG ordered.    CHOLESTEROL MANAGEMENT:  - Discussed the role of exercise in improving HDL cholesterol levels.    FOLLOW-UP:  - Follow up in a few months to reassess need for medication changes.  - Contact the office if patient and therapist together feel medication changes may be needed.        I spent a total of 40 minutes on the day of the visit.This includes face to face time and non-face to face time preparing to see the patient (eg, review of tests), obtaining and/or reviewing separately obtained history, documenting clinical information in the electronic or other health record, independently interpreting results and communicating results to the patient/family/caregiver, or care coordinator.       This note was generated with the assistance of ambient listening technology. Verbal consent was obtained by the patient and accompanying visitor(s) for the recording of patient appointment to facilitate this note. I attest to having  reviewed and edited the generated note for accuracy, though some syntax or spelling errors may persist. Please contact the author of this note for any clarification.

## 2025-02-18 ENCOUNTER — PATIENT MESSAGE (OUTPATIENT)
Dept: PODIATRY | Facility: CLINIC | Age: 75
End: 2025-02-18
Payer: MEDICARE

## 2025-02-19 ENCOUNTER — LAB VISIT (OUTPATIENT)
Dept: LAB | Facility: HOSPITAL | Age: 75
End: 2025-02-19
Attending: FAMILY MEDICINE
Payer: MEDICARE

## 2025-02-19 DIAGNOSIS — N18.32 TYPE 2 DIABETES MELLITUS WITH STAGE 3B CHRONIC KIDNEY DISEASE, WITHOUT LONG-TERM CURRENT USE OF INSULIN: ICD-10-CM

## 2025-02-19 DIAGNOSIS — E11.22 TYPE 2 DIABETES MELLITUS WITH STAGE 3B CHRONIC KIDNEY DISEASE, WITHOUT LONG-TERM CURRENT USE OF INSULIN: ICD-10-CM

## 2025-02-19 LAB
ALBUMIN SERPL BCP-MCNC: 3.9 G/DL (ref 3.5–5.2)
ALP SERPL-CCNC: 284 U/L (ref 40–150)
ALT SERPL W/O P-5'-P-CCNC: 44 U/L (ref 10–44)
ANION GAP SERPL CALC-SCNC: 7 MMOL/L (ref 8–16)
AST SERPL-CCNC: 43 U/L (ref 10–40)
BILIRUB SERPL-MCNC: 0.3 MG/DL (ref 0.1–1)
BUN SERPL-MCNC: 34 MG/DL (ref 8–23)
CALCIUM SERPL-MCNC: 9.4 MG/DL (ref 8.7–10.5)
CHLORIDE SERPL-SCNC: 112 MMOL/L (ref 95–110)
CO2 SERPL-SCNC: 20 MMOL/L (ref 23–29)
CREAT SERPL-MCNC: 1.2 MG/DL (ref 0.5–1.4)
EST. GFR  (NO RACE VARIABLE): 47.2 ML/MIN/1.73 M^2
ESTIMATED AVG GLUCOSE: 123 MG/DL (ref 68–131)
GLUCOSE SERPL-MCNC: 104 MG/DL (ref 70–110)
HBA1C MFR BLD: 5.9 % (ref 4–5.6)
POTASSIUM SERPL-SCNC: 4.5 MMOL/L (ref 3.5–5.1)
PROT SERPL-MCNC: 7.1 G/DL (ref 6–8.4)
SODIUM SERPL-SCNC: 139 MMOL/L (ref 136–145)

## 2025-02-19 PROCEDURE — 83036 HEMOGLOBIN GLYCOSYLATED A1C: CPT | Performed by: FAMILY MEDICINE

## 2025-02-19 PROCEDURE — 80053 COMPREHEN METABOLIC PANEL: CPT | Performed by: FAMILY MEDICINE

## 2025-02-24 ENCOUNTER — PATIENT MESSAGE (OUTPATIENT)
Dept: PODIATRY | Facility: CLINIC | Age: 75
End: 2025-02-24
Payer: MEDICARE

## 2025-02-24 ENCOUNTER — TELEPHONE (OUTPATIENT)
Dept: PODIATRY | Facility: CLINIC | Age: 75
End: 2025-02-24
Payer: MEDICARE

## 2025-02-24 NOTE — TELEPHONE ENCOUNTER
Spoke to pt and rescheduled appt due to provider being out of clinic. Original appt:2/24/2025. New Appt:3/31/2025 per pt request. Pt verbalized understanding.

## 2025-02-24 NOTE — TELEPHONE ENCOUNTER
----- Message from Amy sent at 2/24/2025  2:04 PM CST -----  Type:  Patient Returning CallWho Called:Ms Azevedo Who Left Message for Patient:Amy Does the patient know what this is regarding?:yes Would the patient rather a call back or a response via [employer]? Call Best Call Back Number: [phone] Information: please call back

## 2025-02-26 ENCOUNTER — OFFICE VISIT (OUTPATIENT)
Dept: FAMILY MEDICINE | Facility: CLINIC | Age: 75
End: 2025-02-26
Payer: MEDICARE

## 2025-02-26 VITALS
OXYGEN SATURATION: 97 % | BODY MASS INDEX: 24.52 KG/M2 | HEIGHT: 61 IN | HEART RATE: 84 BPM | WEIGHT: 129.88 LBS | SYSTOLIC BLOOD PRESSURE: 118 MMHG | DIASTOLIC BLOOD PRESSURE: 58 MMHG

## 2025-02-26 DIAGNOSIS — R74.8 ELEVATED ALKALINE PHOSPHATASE LEVEL: ICD-10-CM

## 2025-02-26 DIAGNOSIS — K82.4 GALLBLADDER POLYP: ICD-10-CM

## 2025-02-26 DIAGNOSIS — J47.9 BRONCHIECTASIS WITHOUT COMPLICATION: ICD-10-CM

## 2025-02-26 DIAGNOSIS — Z85.118 H/O: LUNG CANCER: ICD-10-CM

## 2025-02-26 DIAGNOSIS — I70.0 ABDOMINAL AORTIC ATHEROSCLEROSIS: ICD-10-CM

## 2025-02-26 DIAGNOSIS — E11.69 DM TYPE 2 WITH DIABETIC DYSLIPIDEMIA: ICD-10-CM

## 2025-02-26 DIAGNOSIS — I10 ESSENTIAL HYPERTENSION: ICD-10-CM

## 2025-02-26 DIAGNOSIS — N18.32 TYPE 2 DIABETES MELLITUS WITH STAGE 3B CHRONIC KIDNEY DISEASE, WITHOUT LONG-TERM CURRENT USE OF INSULIN: ICD-10-CM

## 2025-02-26 DIAGNOSIS — Z12.31 BREAST CANCER SCREENING BY MAMMOGRAM: ICD-10-CM

## 2025-02-26 DIAGNOSIS — F33.0 MAJOR DEPRESSIVE DISORDER, RECURRENT, MILD: ICD-10-CM

## 2025-02-26 DIAGNOSIS — N18.32 STAGE 3B CHRONIC KIDNEY DISEASE: ICD-10-CM

## 2025-02-26 DIAGNOSIS — Z23 NEED FOR COVID-19 VACCINE: ICD-10-CM

## 2025-02-26 DIAGNOSIS — R91.1 SOLITARY PULMONARY NODULE: Primary | ICD-10-CM

## 2025-02-26 DIAGNOSIS — E11.22 TYPE 2 DIABETES MELLITUS WITH STAGE 3B CHRONIC KIDNEY DISEASE, WITHOUT LONG-TERM CURRENT USE OF INSULIN: ICD-10-CM

## 2025-02-26 DIAGNOSIS — F43.21 GRIEF: ICD-10-CM

## 2025-02-26 DIAGNOSIS — E78.5 DM TYPE 2 WITH DIABETIC DYSLIPIDEMIA: ICD-10-CM

## 2025-02-26 PROCEDURE — 3078F DIAST BP <80 MM HG: CPT | Mod: CPTII,S$GLB,, | Performed by: FAMILY MEDICINE

## 2025-02-26 PROCEDURE — 1157F ADVNC CARE PLAN IN RCRD: CPT | Mod: CPTII,S$GLB,, | Performed by: FAMILY MEDICINE

## 2025-02-26 PROCEDURE — 99215 OFFICE O/P EST HI 40 MIN: CPT | Mod: S$GLB,,, | Performed by: FAMILY MEDICINE

## 2025-02-26 PROCEDURE — 3074F SYST BP LT 130 MM HG: CPT | Mod: CPTII,S$GLB,, | Performed by: FAMILY MEDICINE

## 2025-02-26 PROCEDURE — 3288F FALL RISK ASSESSMENT DOCD: CPT | Mod: CPTII,S$GLB,, | Performed by: FAMILY MEDICINE

## 2025-02-26 PROCEDURE — 1160F RVW MEDS BY RX/DR IN RCRD: CPT | Mod: CPTII,S$GLB,, | Performed by: FAMILY MEDICINE

## 2025-02-26 PROCEDURE — 3044F HG A1C LEVEL LT 7.0%: CPT | Mod: CPTII,S$GLB,, | Performed by: FAMILY MEDICINE

## 2025-02-26 PROCEDURE — 1125F AMNT PAIN NOTED PAIN PRSNT: CPT | Mod: CPTII,S$GLB,, | Performed by: FAMILY MEDICINE

## 2025-02-26 PROCEDURE — 90480 ADMN SARSCOV2 VAC 1/ONLY CMP: CPT | Mod: S$GLB,,, | Performed by: FAMILY MEDICINE

## 2025-02-26 PROCEDURE — 91322 SARSCOV2 VAC 50 MCG/0.5ML IM: CPT | Mod: S$GLB,,, | Performed by: FAMILY MEDICINE

## 2025-02-26 PROCEDURE — 1159F MED LIST DOCD IN RCRD: CPT | Mod: CPTII,S$GLB,, | Performed by: FAMILY MEDICINE

## 2025-02-26 PROCEDURE — 99999 PR PBB SHADOW E&M-EST. PATIENT-LVL V: CPT | Mod: PBBFAC,,, | Performed by: FAMILY MEDICINE

## 2025-02-26 PROCEDURE — 1101F PT FALLS ASSESS-DOCD LE1/YR: CPT | Mod: CPTII,S$GLB,, | Performed by: FAMILY MEDICINE

## 2025-02-26 RX ORDER — ATORVASTATIN CALCIUM 20 MG/1
20 TABLET, FILM COATED ORAL NIGHTLY
Qty: 90 TABLET | Refills: 3 | Status: SHIPPED | OUTPATIENT
Start: 2025-02-26

## 2025-02-26 RX ORDER — UMECLIDINIUM BROMIDE AND VILANTEROL TRIFENATATE 62.5; 25 UG/1; UG/1
1 POWDER RESPIRATORY (INHALATION) DAILY
Qty: 60 EACH | Refills: 5 | Status: SHIPPED | OUTPATIENT
Start: 2025-02-26

## 2025-02-26 RX ORDER — METFORMIN HYDROCHLORIDE 500 MG/1
500 TABLET ORAL
Qty: 90 TABLET | Refills: 3 | Status: SHIPPED | OUTPATIENT
Start: 2025-02-26

## 2025-02-26 RX ORDER — OLMESARTAN MEDOXOMIL 40 MG/1
40 TABLET ORAL DAILY
Qty: 90 TABLET | Refills: 3 | Status: SHIPPED | OUTPATIENT
Start: 2025-02-26

## 2025-02-26 RX ORDER — SPIRONOLACTONE 25 MG/1
25 TABLET ORAL DAILY
Qty: 90 TABLET | Refills: 3 | Status: SHIPPED | OUTPATIENT
Start: 2025-02-26

## 2025-02-26 NOTE — PROGRESS NOTES
"Subjective:       Patient ID: Jolly Matias is a 74 y.o. female.    Chief Complaint: Hypertension and Diabetes    History of Present Illness    CHIEF COMPLAINT:  Jolly presents today for follow up of diabetes, hypertension, and chronic kidney disease.    DEPRESSION:  She reports worsening depression since her 's passing in December after 43 years of marriage, with occasional episodes of self-described "pity parties." She denies suicidal or homicidal ideation. She reports sleeping well with no disturbances. She continues Sertraline 100 mg every evening.    RESPIRATORY AND CARDIOVASCULAR:  She experiences occasional shortness of breath with activities and palpitations. She is due for pulmonary follow up in March for bronchiectasis management. She takes Anoro Ellipta as needed rather than the prescribed daily regimen for bronchiectasis. She denies nocturnal dyspnea, blurry vision, or headaches.    PAIN:  She reports mid-back pain near ribs that can become severe enough to cause nausea. Also notes intermittent RUQ pain.    GENITOURINARY:  She denies blood in urine or stool, dysuria, frequency, urgency, or incontinence.    CURRENT MEDICATIONS:  She takes Olmesartan 40 mg daily and Spironolactone 25 mg for hypertension, Metformin 500 mg with breakfast and Jardiance 10 mg for diabetes management. She has not initiated Jardiance therapy due to cost barriers.      ROS:  Head: -headaches  Eyes: -vision changes  Respiratory: +shortness of breath  Gastrointestinal: +nausea  Genitourinary: -dysuria, -frequency, -urgency, -hematuria  Psychiatric: +depression, -suicidal ideation, -homicidal ideation          Objective:      Physical Exam    General: In no acute distress.  Head: Normocephalic. Non traumatic.  Eyes:  EOMs full. Conjunctivae clear.   Neck: Supple. No masses. No thyromegaly. No bruits.  Chest: Lungs clear. No rales. No rhonchi. No wheezes.  Heart: RRR. No murmurs. No rubs. No gallops.  Abdomen: Soft. No " tenderness. No masses. BS normal.  Extremities: Warm. Well perfused. No upper extremity edema. No lower extremity edema. FROM. No deformities. No joint erythema.  Neuro: No focal deficits appreciated. Good muscle tone. Normal response to visual stimuli. Normal response to auditory stimuli.  Skin: Normal. No rashes. No lesions noted.  Vitals: BLOOD PRESSURE: 118/58.            Assessment:         Solitary pulmonary nodule  -     CT Chest Without Contrast; Future; Expected date: 02/26/2025    Essential hypertension  -     olmesartan (BENICAR) 40 MG tablet; Take 1 tablet (40 mg total) by mouth once daily.  Dispense: 90 tablet; Refill: 3  -     spironolactone (ALDACTONE) 25 MG tablet; Take 1 tablet (25 mg total) by mouth once daily.  Dispense: 90 tablet; Refill: 3    DM type 2 with diabetic dyslipidemia  -     atorvastatin (LIPITOR) 20 MG tablet; Take 1 tablet (20 mg total) by mouth every evening.  Dispense: 90 tablet; Refill: 3  -     metFORMIN (GLUCOPHAGE) 500 MG tablet; Take 1 tablet (500 mg total) by mouth daily with breakfast.  Dispense: 90 tablet; Refill: 3  -     Comprehensive Metabolic Panel; Future; Expected date: 05/26/2025  -     Hemoglobin A1C; Future; Expected date: 05/26/2025    Need for COVID-19 vaccine  -     COVID-19 (Moderna) 50 mcg/0.5 mL IM vaccine (>/= 11 yo) 0.5 mL    Type 2 diabetes mellitus with stage 3b chronic kidney disease, without long-term current use of insulin  -     empagliflozin (JARDIANCE) 10 mg tablet; Take 1 tablet (10 mg total) by mouth once daily.  Dispense: 90 tablet; Refill: 3  -     metFORMIN (GLUCOPHAGE) 500 MG tablet; Take 1 tablet (500 mg total) by mouth daily with breakfast.  Dispense: 90 tablet; Refill: 3    BMI 24.0-24.9, adult    Abdominal aortic atherosclerosis    Bronchiectasis without complication  -     umeclidinium-vilanteroL (ANORO ELLIPTA) 62.5-25 mcg/actuation DsDv; Inhale 1 puff into the lungs once daily. Controller  Dispense: 60 each; Refill: 5  -     CT Chest  Without Contrast; Future; Expected date: 02/26/2025    H/O: lung cancer  -     CT Chest Without Contrast; Future; Expected date: 02/26/2025    Major depressive disorder, recurrent, mild  -     Ambulatory referral/consult to Psychiatry; Future; Expected date: 03/05/2025    Grief  -     Ambulatory referral/consult to Psychiatry; Future; Expected date: 03/05/2025    Breast cancer screening by mammogram  -     Mammo Digital Screening Bilat; Future; Expected date: 02/26/2025    Stage 3b chronic kidney disease  -     empagliflozin (JARDIANCE) 10 mg tablet; Take 1 tablet (10 mg total) by mouth once daily.  Dispense: 90 tablet; Refill: 3    Gallbladder polyp  -     CT Abdomen Without Contrast; Future; Expected date: 02/26/2025    Elevated alkaline phosphatase level  -     CT Abdomen Without Contrast; Future; Expected date: 02/26/2025         Plan:       Assessment & Plan    TYPE 2 DIABETES MELLITUS WITH OTHER SPECIFIED COMPLICATION:  - Reviewed diabetes management; HbA1c at goal (5.9%).  - Continued Metformin 500 mg with breakfast and Jardiance 10 mg daily.  - Continued Atorvastatin 20 mg daily for cholesterol management due to diabetes and history of hyperlipidemia.  - Ordered complete labs prior to next appointment in 3 months to assess diabetes control.  - Reviewed recent lab results from February 19th with the patient, including hemoglobin A1c.  - Noted the patient has not been checking blood sugars at home.    BRONCHIECTASIS WITHOUT COMPLICATION:  - Evaluated pulmonary status and bronchiectasis management.  - Changed Anoro Ellipta from as-needed to daily use.  - Ordered a chest CT   - Noted occasional shortness of breath with activities but no nocturnal dyspnea.  - Scheduled appointment with a pulmonary specialist in March for further evaluation.    STAGE 3B CHRONIC KIDNEY DISEASE:  - Evaluated chronic kidney disease; kidney function stable based on recent lab results from February 19th.  - Initiated Jardiance 10 mg  "daily for renal protection.  - Ordered complete labs prior to next appointment in 3 months to assess kidney function.  - Reviewed kidney function test results with the patient.    HYPERTENSION:  - Evaluated hypertension management; blood pressure well-controlled on current regimen (118/58 during visit).  - Continued Olmesartan 40 mg daily and Spironolactone 25 mg daily.  - Reviewed current medications with the patient.  - Jolly denies blurry vision or headaches.    DEPRESSION:  - Continued Sertraline 100 mg every evening.  - Jolly reports worsening depression, experiencing "pity parties," but denies suicidal or homicidal ideation.  - Jolly reports sleeping well.    GRIEF:  - Assessed depression and grief following 's death in December after 43 years of marriage.  - Acknowledged depressed mood and grieving as normal given recent loss of spouse.  - Educated on normalcy of grief process and potential benefit of counseling.  - Referred the patient for counseling at [facility] to help with grief and life adjustment.  - Acknowledged the significant life change and that coping with loss is a work in progress for the patient.    BACK PAIN:  - Jolly reports mid-back pain near ribs, sometimes severe enough to cause nausea.    ELEVATED LIVER ENZYMES/ RUQ pain:  - Ordered CT of abdomen.  - Noted slight elevation in liver enzyme (AST 43) from recent labs on February 19th.  - Ordered complete labs prior to next appointment to assess liver function.    PALPITATIONS:  - Jolly reports occasional palpitations, usually accompanying shortness of breath.    MAMMOGRAM SCREENING:  - Recommend continuation of mammogram screening despite patient being over 75, per patient preference.  - Mammogram ordered.    COVID VACCINATION:  - COVID vaccine administered today.    FOLLOW UP:  - Follow up in 3 months  .        I spent a total of 40 minutes on the day of the visit.This includes face to face time and non-face " to face time preparing to see the patient (eg, review of tests), obtaining and/or reviewing separately obtained history, documenting clinical information in the electronic or other health record, independently interpreting results and communicating results to the patient/family/caregiver, or care coordinator.       This note was generated with the assistance of ambient listening technology. Verbal consent was obtained by the patient and accompanying visitor(s) for the recording of patient appointment to facilitate this note. I attest to having reviewed and edited the generated note for accuracy, though some syntax or spelling errors may persist. Please contact the author of this note for any clarification.

## 2025-02-28 ENCOUNTER — HOSPITAL ENCOUNTER (OUTPATIENT)
Dept: RADIOLOGY | Facility: HOSPITAL | Age: 75
Discharge: HOME | End: 2025-02-28
Attending: FAMILY MEDICINE
Payer: MEDICARE

## 2025-02-28 DIAGNOSIS — Z12.31 BREAST CANCER SCREENING BY MAMMOGRAM: ICD-10-CM

## 2025-02-28 PROCEDURE — 77063 BREAST TOMOSYNTHESIS BI: CPT | Mod: TC

## 2025-02-28 PROCEDURE — 77063 BREAST TOMOSYNTHESIS BI: CPT | Mod: 26,,, | Performed by: RADIOLOGY

## 2025-02-28 PROCEDURE — 77067 SCR MAMMO BI INCL CAD: CPT | Mod: 26,,, | Performed by: RADIOLOGY

## 2025-03-03 ENCOUNTER — HOSPITAL ENCOUNTER (OUTPATIENT)
Dept: RADIOLOGY | Facility: HOSPITAL | Age: 75
Discharge: HOME | End: 2025-03-03
Attending: FAMILY MEDICINE
Payer: MEDICARE

## 2025-03-03 ENCOUNTER — RESULTS FOLLOW-UP (OUTPATIENT)
Dept: FAMILY MEDICINE | Facility: CLINIC | Age: 75
End: 2025-03-03

## 2025-03-03 DIAGNOSIS — J47.9 BRONCHIECTASIS WITHOUT COMPLICATION: ICD-10-CM

## 2025-03-03 DIAGNOSIS — Z85.118 H/O: LUNG CANCER: ICD-10-CM

## 2025-03-03 DIAGNOSIS — K82.4 GALLBLADDER POLYP: ICD-10-CM

## 2025-03-03 DIAGNOSIS — R91.1 SOLITARY PULMONARY NODULE: ICD-10-CM

## 2025-03-03 DIAGNOSIS — R74.8 ELEVATED ALKALINE PHOSPHATASE LEVEL: ICD-10-CM

## 2025-03-03 PROCEDURE — 71250 CT THORAX DX C-: CPT | Mod: 26,,, | Performed by: RADIOLOGY

## 2025-03-03 PROCEDURE — 71250 CT THORAX DX C-: CPT | Mod: TC

## 2025-03-03 PROCEDURE — 74150 CT ABDOMEN W/O CONTRAST: CPT | Mod: 26,,, | Performed by: RADIOLOGY

## 2025-03-03 PROCEDURE — 74150 CT ABDOMEN W/O CONTRAST: CPT | Mod: TC

## 2025-03-12 ENCOUNTER — PATIENT MESSAGE (OUTPATIENT)
Dept: FAMILY MEDICINE | Facility: CLINIC | Age: 75
End: 2025-03-12
Payer: MEDICARE

## 2025-03-17 ENCOUNTER — TELEPHONE (OUTPATIENT)
Dept: FAMILY MEDICINE | Facility: CLINIC | Age: 75
End: 2025-03-17
Payer: MEDICARE

## 2025-03-17 NOTE — TELEPHONE ENCOUNTER
LVM that Dr. Blandon's next available if Wednesday, March 26 at 8:40 am. Will that work? Await callback.

## 2025-03-24 DIAGNOSIS — Z00.00 ENCOUNTER FOR MEDICARE ANNUAL WELLNESS EXAM: ICD-10-CM

## 2025-03-26 ENCOUNTER — OFFICE VISIT (OUTPATIENT)
Dept: DERMATOLOGY | Facility: CLINIC | Age: 75
End: 2025-03-26
Payer: MEDICARE

## 2025-03-26 ENCOUNTER — OFFICE VISIT (OUTPATIENT)
Dept: FAMILY MEDICINE | Facility: CLINIC | Age: 75
End: 2025-03-26
Payer: MEDICARE

## 2025-03-26 VITALS
DIASTOLIC BLOOD PRESSURE: 64 MMHG | SYSTOLIC BLOOD PRESSURE: 122 MMHG | HEIGHT: 61 IN | WEIGHT: 131.38 LBS | HEART RATE: 73 BPM | BODY MASS INDEX: 24.8 KG/M2 | OXYGEN SATURATION: 97 %

## 2025-03-26 DIAGNOSIS — L81.4 LENTIGO: ICD-10-CM

## 2025-03-26 DIAGNOSIS — E11.69 DM TYPE 2 WITH DIABETIC DYSLIPIDEMIA: Primary | ICD-10-CM

## 2025-03-26 DIAGNOSIS — E11.22 TYPE 2 DIABETES MELLITUS WITH STAGE 3A CHRONIC KIDNEY DISEASE, WITHOUT LONG-TERM CURRENT USE OF INSULIN: ICD-10-CM

## 2025-03-26 DIAGNOSIS — N18.31 STAGE 3A CHRONIC KIDNEY DISEASE: ICD-10-CM

## 2025-03-26 DIAGNOSIS — J47.9 BRONCHIECTASIS WITHOUT COMPLICATION: ICD-10-CM

## 2025-03-26 DIAGNOSIS — E78.5 DM TYPE 2 WITH DIABETIC DYSLIPIDEMIA: Primary | ICD-10-CM

## 2025-03-26 DIAGNOSIS — L82.1 SK (SEBORRHEIC KERATOSIS): ICD-10-CM

## 2025-03-26 DIAGNOSIS — I12.9 HYPERTENSIVE KIDNEY DISEASE WITH STAGE 3A CHRONIC KIDNEY DISEASE: ICD-10-CM

## 2025-03-26 DIAGNOSIS — N18.31 TYPE 2 DIABETES MELLITUS WITH STAGE 3A CHRONIC KIDNEY DISEASE, WITHOUT LONG-TERM CURRENT USE OF INSULIN: ICD-10-CM

## 2025-03-26 DIAGNOSIS — D48.5 NEOPLASM OF UNCERTAIN BEHAVIOR OF SKIN: Primary | ICD-10-CM

## 2025-03-26 DIAGNOSIS — Z85.828 PERSONAL HISTORY OF SKIN CANCER: ICD-10-CM

## 2025-03-26 DIAGNOSIS — I70.0 ABDOMINAL AORTIC ATHEROSCLEROSIS: ICD-10-CM

## 2025-03-26 DIAGNOSIS — N18.31 HYPERTENSIVE KIDNEY DISEASE WITH STAGE 3A CHRONIC KIDNEY DISEASE: ICD-10-CM

## 2025-03-26 DIAGNOSIS — D18.01 CHERRY ANGIOMA: ICD-10-CM

## 2025-03-26 DIAGNOSIS — K57.90 DIVERTICULOSIS: ICD-10-CM

## 2025-03-26 DIAGNOSIS — I72.8 SPLENIC ARTERY ANEURYSM: ICD-10-CM

## 2025-03-26 DIAGNOSIS — D22.9 NEVUS: ICD-10-CM

## 2025-03-26 PROCEDURE — 3288F FALL RISK ASSESSMENT DOCD: CPT | Mod: CPTII,S$GLB,, | Performed by: DERMATOLOGY

## 2025-03-26 PROCEDURE — 11102 TANGNTL BX SKIN SINGLE LES: CPT | Mod: S$GLB,,, | Performed by: DERMATOLOGY

## 2025-03-26 PROCEDURE — 3078F DIAST BP <80 MM HG: CPT | Mod: CPTII,S$GLB,, | Performed by: FAMILY MEDICINE

## 2025-03-26 PROCEDURE — 3044F HG A1C LEVEL LT 7.0%: CPT | Mod: CPTII,S$GLB,, | Performed by: FAMILY MEDICINE

## 2025-03-26 PROCEDURE — 3074F SYST BP LT 130 MM HG: CPT | Mod: CPTII,S$GLB,, | Performed by: FAMILY MEDICINE

## 2025-03-26 PROCEDURE — 1126F AMNT PAIN NOTED NONE PRSNT: CPT | Mod: CPTII,S$GLB,, | Performed by: DERMATOLOGY

## 2025-03-26 PROCEDURE — 1126F AMNT PAIN NOTED NONE PRSNT: CPT | Mod: CPTII,S$GLB,, | Performed by: FAMILY MEDICINE

## 2025-03-26 PROCEDURE — 1101F PT FALLS ASSESS-DOCD LE1/YR: CPT | Mod: CPTII,S$GLB,, | Performed by: FAMILY MEDICINE

## 2025-03-26 PROCEDURE — 1157F ADVNC CARE PLAN IN RCRD: CPT | Mod: CPTII,S$GLB,, | Performed by: DERMATOLOGY

## 2025-03-26 PROCEDURE — 1159F MED LIST DOCD IN RCRD: CPT | Mod: CPTII,S$GLB,, | Performed by: DERMATOLOGY

## 2025-03-26 PROCEDURE — 99999 PR PBB SHADOW E&M-EST. PATIENT-LVL III: CPT | Mod: PBBFAC,,, | Performed by: DERMATOLOGY

## 2025-03-26 PROCEDURE — 99213 OFFICE O/P EST LOW 20 MIN: CPT | Mod: 25,S$GLB,, | Performed by: DERMATOLOGY

## 2025-03-26 PROCEDURE — G2211 COMPLEX E/M VISIT ADD ON: HCPCS | Mod: S$GLB,,, | Performed by: FAMILY MEDICINE

## 2025-03-26 PROCEDURE — 1160F RVW MEDS BY RX/DR IN RCRD: CPT | Mod: CPTII,S$GLB,, | Performed by: DERMATOLOGY

## 2025-03-26 PROCEDURE — 3044F HG A1C LEVEL LT 7.0%: CPT | Mod: CPTII,S$GLB,, | Performed by: DERMATOLOGY

## 2025-03-26 PROCEDURE — 99999 PR PBB SHADOW E&M-EST. PATIENT-LVL V: CPT | Mod: PBBFAC,,, | Performed by: FAMILY MEDICINE

## 2025-03-26 PROCEDURE — 1157F ADVNC CARE PLAN IN RCRD: CPT | Mod: CPTII,S$GLB,, | Performed by: FAMILY MEDICINE

## 2025-03-26 PROCEDURE — 3288F FALL RISK ASSESSMENT DOCD: CPT | Mod: CPTII,S$GLB,, | Performed by: FAMILY MEDICINE

## 2025-03-26 PROCEDURE — 99214 OFFICE O/P EST MOD 30 MIN: CPT | Mod: S$GLB,,, | Performed by: FAMILY MEDICINE

## 2025-03-26 PROCEDURE — 1101F PT FALLS ASSESS-DOCD LE1/YR: CPT | Mod: CPTII,S$GLB,, | Performed by: DERMATOLOGY

## 2025-03-26 NOTE — PROGRESS NOTES
Subjective:      Patient ID:  Jolly Matias is a 74 y.o. female who presents for   Chief Complaint   Patient presents with    Skin Check     ubse     Pt here today for UBSE and recheck of L temporal scalp.     This is a high risk patient here to check for the development of new lesions.    Pt has h/o BCC.    Patient with new area of concern:   Location: l leg- change in size  Previous treatments: none                    Review of Systems   Skin:  Positive for daily sunscreen use, activity-related sunscreen use and wears hat (sometimes). Negative for recent sunburn.   Hematologic/Lymphatic: Does not bruise/bleed easily.       Objective:   Physical Exam   Constitutional: She appears well-developed and well-nourished. No distress.   Neurological: She is alert and oriented to person, place, and time. She is not disoriented.   Psychiatric: She has a normal mood and affect.   Skin:   Areas Examined (abnormalities noted in diagram):   Scalp / Hair Palpated and Inspected  Head / Face Inspection Performed  Neck Inspection Performed  Chest / Axilla Inspection Performed  Abdomen Inspection Performed  Back Inspection Performed  RUE Inspected  LUE Inspection Performed  Nails and Digits Inspection Performed                       Diagram Legend     Erythematous scaling macule/papule c/w actinic keratosis       Vascular papule c/w angioma      Pigmented verrucoid papule/plaque c/w seborrheic keratosis      Yellow umbilicated papule c/w sebaceous hyperplasia      Irregularly shaped tan macule c/w lentigo     1-2 mm smooth white papules consistent with Milia      Movable subcutaneous cyst with punctum c/w epidermal inclusion cyst      Subcutaneous movable cyst c/w pilar cyst      Firm pink to brown papule c/w dermatofibroma      Pedunculated fleshy papule(s) c/w skin tag(s)      Evenly pigmented macule c/w junctional nevus     Mildly variegated pigmented, slightly irregular-bordered macule c/w mildly atypical nevus      Flesh  colored to evenly pigmented papule c/w intradermal nevus       Pink pearly papule/plaque c/w basal cell carcinoma      Erythematous hyperkeratotic cursted plaque c/w SCC      Surgical scar with no sign of skin cancer recurrence      Open and closed comedones      Inflammatory papules and pustules      Verrucoid papule consistent consistent with wart     Erythematous eczematous patches and plaques     Dystrophic onycholytic nail with subungual debris c/w onychomycosis     Umbilicated papule    Erythematous-base heme-crusted tan verrucoid plaque consistent with inflamed seborrheic keratosis     Erythematous Silvery Scaling Plaque c/w Psoriasis     See annotation      Assessment / Plan:      Pathology Orders:       Normal Orders This Visit    Specimen to Pathology, Dermatology     Questions:    Procedure Type: Dermatology and skin neoplasms    Number of Specimens: 1    ------------------------: -------------------------    Spec 1 Procedure: Shave Biopsy    Spec 1 Clinical Impression: r/o BCC v inflamed angioma v other    Spec 1 Source: left temporal scalp    Clinical Information: see EPIC    Clinical History: see EPIC    Specimen Source: Skin    Release to patient: Immediate    Send normal result to authorizing provider's In Basket if patient is active on MyChart: Yes          Neoplasm of uncertain behavior of skin  Shave biopsy procedure note:    Shave biopsy performed after verbal consent including risk of infection, scar, recurrence, need for additional treatment of site. Area prepped with alcohol, anesthetized with approximately 1.0cc of 1% lidocaine with epinephrine. Lesional tissue shaved with razor blade. Hemostasis achieved with application of aluminum chloride followed by hyfrecation. No complications. Dressing applied. Wound care explained.    -     Specimen to Pathology, Dermatology  If biopsy positive for malignancy, refer to Dr. Ac for Mohs surgery consultation.     SK (seborrheic keratosis)  These are  benign inherited growths without a malignant potential. Reassurance given to patient. No treatment is necessary.     Lentigo  This is a benign hyperpigmented sun induced lesion. Recommend daily sun protection/avoidance and use of at least SPF 30, broad spectrum sunscreen (OTC drug) will reduce the number of new lesions. Treatment of these benign lesions are considered cosmetic.  The nature of sun-induced photo-aging and skin cancers is discussed.  Sun avoidance, protective clothing, and the use of 30-SPF sunscreens is advised. Observe closely for skin damage/changes, and call if such occurs.    Cherry angioma  These are benign vascular lesions that are inherited.  Treatment is not necessary.    Nevus  Discussed ABCDE's of nevi.  Monitor for new mole or moles that are becoming bigger, darker, irritated, or developing irregular borders. Brochure provided. Instructed patient to observe lesion(s) for changes and follow up in clinic if changes are noted. Patient to monitor skin at home for new or changing lesions.     Personal history of skin cancer  Area(s) of previous NMSC evaluated with no signs of recurrence.    Upper body skin examination performed today including at least 6 points as noted in physical examination. Suspicious lesions noted.    Recommend daily sun protection/avoidance and use of at least SPF 30, broad spectrum sunscreen (OTC drug).              Follow up in about 3 months (around 6/26/2025) for recheck glabella.

## 2025-03-28 ENCOUNTER — PATIENT MESSAGE (OUTPATIENT)
Dept: PODIATRY | Facility: CLINIC | Age: 75
End: 2025-03-28
Payer: MEDICARE

## 2025-03-28 NOTE — PROGRESS NOTES
"Subjective:       Patient ID: Jolly Matias is a 74 y.o. female.    Chief Complaint: Hypertension, Diabetes, and Follow-up    History of Present Illness    CHIEF COMPLAINT:  Jolly presents today for follow-up on recent CT scans and to get a referral to nephrology.    CURRENT HEALTH STATUS:  She reports feeling "suboptimal" but denies chest pain, shortness of breath, ankle swelling, or nocturnal dyspnea.    CHRONIC KIDNEY DISEASE:  GFR was 47 on February 19th. Her kidney function fluctuates between CKD stage 3a and 3b, predominantly staying in stage 3a range.    IMAGING:  CT chest showed post-surgical changes from right lower lobe removal with bronchiectasis and mucus plugging in lung bases and right apex. Left side demonstrated less severe bronchiectasis. Abdominal CT revealed a 1cm splenic artery aneurysm and diverticulosis in the colon.    MEDICAL HISTORY:  She has a history of bronchiectasis, lung cancer status post right lower lobe resection, and previous surgery for diverticulitis with removal of approximately 10-11 inches of colon.    BACK PAIN:  She experiences intermittent back pain described as "super tender" with associated nausea during severe episodes.    CURRENT MEDICATIONS:  She takes Olmesartan 40mg for blood pressure control and kidney protection, Metformin for diabetes management, and atorvastatin 20mg for cholesterol control. Jardiance 10mg daily was prescribed but not started.    FAMILY HISTORY:  She reports family history of longevity with father passing at age 97 and mother at age 90.      ROS:  Respiratory: -shortness of breath  Cardiovascular: -chest pain, -paroxysmal nocturnal dyspnea, -lower extremity edema  Gastrointestinal: +nausea  Musculoskeletal: +back pain          Objective:      Physical Exam    General: In no acute distress.  Head: Normocephalic. Non traumatic.  Eyes:  EOMs full. Conjunctivae clear.   Neck: Supple. No masses. No thyromegaly. No bruits.  Chest: Lungs clear. No " rales. No rhonchi. No wheezes.  Heart: RRR. No murmurs. No rubs. No gallops.  Abdomen: Soft. No tenderness. No masses. BS normal.  Back: Normal curvature. No scoliosis. No tenderness.  Extremities: Warm. Well perfused. No upper extremity edema. No lower extremity edema. FROM. No deformities. No joint erythema.  Neuro: No focal deficits appreciated. Good muscle tone. Normal response to visual stimuli. Normal response to auditory stimuli.  Skin: Normal. No rashes. No lesions noted.  Vitals: BLOOD PRESSURE: 122/64.            Assessment:         DM type 2 with diabetic dyslipidemia    Type 2 diabetes mellitus with stage 3a chronic kidney disease, without long-term current use of insulin  -     Ambulatory referral/consult to Pharmacy Assistance; Future; Expected date: 04/02/2025  -     KIDNEY DISEASE EDUCATION; Future; Expected date: 03/26/2025    Hypertensive kidney disease with stage 3a chronic kidney disease  -     Ambulatory referral/consult to Pharmacy Assistance; Future; Expected date: 04/02/2025  -     KIDNEY DISEASE EDUCATION; Future; Expected date: 03/26/2025  -     Ambulatory referral/consult to Nephrology; Future; Expected date: 04/02/2025    Stage 3a chronic kidney disease  -     Ambulatory referral/consult to Nephrology; Future; Expected date: 04/02/2025    Bronchiectasis without complication    Splenic artery aneurysm    Abdominal aortic atherosclerosis    BMI 24.0-24.9, adult    Diverticulosis         Plan:       Assessment & Plan    CHRONIC KIDNEY DISEASE:  - GFR fluctuates between stage 3a and 3b, predominantly at 3a (GFR 47).  - Explained renal function measurement using GFR, stages of CKD and current status.  - Educated on importance of BP and diabetes control for kidney health.  - Explained role of certain medications (ACE inhibitors, angiotensin receptor blockers) in kidney protection.  - Discussed importance of avoiding non-steroidal anti-inflammatory drugs (NSAIDs) for pain management.  - Ordered  kidney education program (virtual or in-person).  - Referred to nephrology.    ATHEROSCLEROSIS:  - Aortic atherosclerosis present on chest CT, emphasizing importance of cholesterol management.  - Advised on heart-healthy diet and importance of cholesterol management.  - Continued Atorvastatin 20 mg daily for cholesterol management.    RESPIRATORY SYSTEM DISORDERS:  - Chest CT findings: stable bronchiectasis, mucus plugging in lung bases and right apex.    SPLENIC DISORDERS:  - Abdominal CT revealed small splenic artery aneurysm (1 cm), deemed non-concerning.    DIVERTICULAR DISEASE:  - Diverticulosis in colon CT, noting history of diverticulitis surgery.    ARTHRITIS:  - Considered arthritis as potential cause for intermittent back pain, as imaging did not reveal other explanations.    NUTRITION AND DIET MANAGEMENT:  - Jolly to follow a more plant-based diet with 5-9 servings of fruits and vegetables daily.  - Jolly to choose leaner meats (e.g., turkey, lean beef, bison) and fish when consuming animal proteins.    HYPERTENSION:  - Continued Olmesartan 40 mg daily for BP control and kidney protection.    DIABETES MELLITUS:  - Started Jardiance 10 mg daily for diabetes management and kidney protection.    FOLLOW-UP:  - Complete lab work a few days before next appointment.  - Follow up in 3 months (end of June/beginning of July).              This note was generated with the assistance of ambient listening technology. Verbal consent was obtained by the patient and accompanying visitor(s) for the recording of patient appointment to facilitate this note. I attest to having reviewed and edited the generated note for accuracy, though some syntax or spelling errors may persist. Please contact the author of this note for any clarification.

## 2025-03-31 ENCOUNTER — RESULTS FOLLOW-UP (OUTPATIENT)
Dept: DERMATOLOGY | Facility: CLINIC | Age: 75
End: 2025-03-31

## 2025-03-31 NOTE — PROGRESS NOTES
Mrs. Matias, your pathology report indicates a benign, non cancerous lesion. No further treatment is needed at this time in this location.  Please  keep your  follow up appointment in 3 months. Thank you for allowing me to care for you and take care, Dr. Mohr     Skin, left temporal scalp, shave biopsy:    - COMPATIBLE WITH IRRITATED PRURIGO NODULARIS (see comment)

## 2025-05-07 ENCOUNTER — OFFICE VISIT (OUTPATIENT)
Dept: FAMILY MEDICINE | Facility: CLINIC | Age: 75
End: 2025-05-07
Payer: MEDICARE

## 2025-05-07 VITALS
HEIGHT: 61 IN | OXYGEN SATURATION: 95 % | WEIGHT: 137.38 LBS | HEART RATE: 86 BPM | BODY MASS INDEX: 25.94 KG/M2 | SYSTOLIC BLOOD PRESSURE: 122 MMHG | DIASTOLIC BLOOD PRESSURE: 66 MMHG

## 2025-05-07 DIAGNOSIS — F33.0 MAJOR DEPRESSIVE DISORDER, RECURRENT, MILD: ICD-10-CM

## 2025-05-07 DIAGNOSIS — J47.9 BRONCHIECTASIS WITHOUT COMPLICATION: ICD-10-CM

## 2025-05-07 DIAGNOSIS — Z00.00 ENCOUNTER FOR MEDICARE ANNUAL WELLNESS EXAM: Primary | ICD-10-CM

## 2025-05-07 DIAGNOSIS — N18.31 STAGE 3A CHRONIC KIDNEY DISEASE: ICD-10-CM

## 2025-05-07 DIAGNOSIS — I70.0 ABDOMINAL AORTIC ATHEROSCLEROSIS: ICD-10-CM

## 2025-05-07 DIAGNOSIS — I10 ESSENTIAL HYPERTENSION: ICD-10-CM

## 2025-05-07 DIAGNOSIS — E11.69 DM TYPE 2 WITH DIABETIC DYSLIPIDEMIA: ICD-10-CM

## 2025-05-07 DIAGNOSIS — E78.5 DM TYPE 2 WITH DIABETIC DYSLIPIDEMIA: ICD-10-CM

## 2025-05-07 PROCEDURE — 99999 PR PBB SHADOW E&M-EST. PATIENT-LVL IV: CPT | Mod: PBBFAC,,, | Performed by: NURSE PRACTITIONER

## 2025-05-07 NOTE — PATIENT INSTRUCTIONS
Counseling and Referral of Other Preventative  (Italic type indicates deductible and co-insurance are waived)    Patient Name: Jolly Matias  Today's Date: 5/7/2025    Health Maintenance       Date Due Completion Date    Shingles Vaccine (1 of 2) Never done ---    COVID-19 Vaccine (8 - 2024-25 season) 04/23/2025 2/26/2025    DEXA Scan 07/07/2025 7/7/2023    Diabetes Urine Screening 07/09/2025 7/9/2024    Foot Exam 07/09/2025 7/9/2024    Hemoglobin A1c 08/19/2025 2/19/2025    Diabetic Eye Exam 10/29/2025 10/29/2024    Override on 1/16/2019: Done (dr. salcedo)    Override on 11/1/2017: Done    Lipid Panel 11/19/2025 11/19/2024    TETANUS VACCINE 11/30/2025 11/30/2015    High Dose Statin 05/07/2026 5/7/2025    Colorectal Cancer Screening 01/16/2027 1/16/2017        No orders of the defined types were placed in this encounter.    The following information is provided to all patients.  This information is to help you find resources for any of the problems found today that may be affecting your health:                  Living healthy guide: www.[facility].[place].gov      Understanding Diabetes: www.diabetes.org      Eating healthy: www.cdc.gov/healthyweight      [facility] home safety checklist: www.cdc.gov/steadi/patient.html      Agency on Aging: www.goea.[place].[facility]      Alcoholics anonymous (AA): www.aa.org      Physical Activity: www.edy.nih.gov/wn6ulrb      Tobacco use: www.quitwithusla.org

## 2025-05-07 NOTE — PROGRESS NOTES
"  Jolly Matias presented for a  Medicare AWV and comprehensive Health Risk Assessment today. The following components were reviewed and updated:    Medical history  Family History  Social history  Allergies and Current Medications  Health Risk Assessment  Health Maintenance  Care Team         ** See Completed Assessments for Annual Wellness Visit within the encounter summary.**         The following assessments were completed:  Living Situation  CAGE  Depression Screening  Timed Get Up and Go  Whisper Test  Cognitive Function Screening      Nutrition Screening  ADL Screening  PAQ Screening      Opioid documentation:      Patient does not have a current opioid prescription.        Vitals:    05/07/25 1037   BP: 122/66   BP Location: Left arm   Patient Position: Sitting   Pulse: 86   SpO2: 95%   Weight: 62.3 kg (137 lb 5.6 oz)   Height: 5' 1" (1.549 m)     Body mass index is 25.95 kg/m².    Physical Exam  Vitals reviewed.   Constitutional:       General: She is awake. She is not in acute distress.     Appearance: Normal appearance. She is well-developed, well-groomed and overweight.   HENT:      Head: Normocephalic.   Eyes:      General:         Right eye: No discharge.         Left eye: No discharge.   Cardiovascular:      Rate and Rhythm: Normal rate.   Pulmonary:      Effort: Pulmonary effort is normal. No respiratory distress.   Abdominal:      General: There is no distension.   Skin:     General: Skin is dry.      Coloration: Skin is not pale.   Neurological:      Mental Status: She is alert and oriented to person, place, and time.      Coordination: Coordination normal.   Psychiatric:         Attention and Perception: Attention normal.         Mood and Affect: Mood and affect normal.         Speech: Speech normal.         Behavior: Behavior normal. Behavior is cooperative.         Thought Content: Thought content normal.             Diagnoses and health risks identified today and associated " recommendations/orders:    1. Encounter for Medicare annual wellness exam  - Referral to Enhanced Annual Wellness Visit (eAWV) W+1    2. Major depressive disorder, recurrent, mild  Chronic; stable on sertraline medication. PCP previously placed referral to Psych for patient to see a therapist. Patient has been hesitant to contact on her own and would appreciate a call from someone to get this scheduled. Message sent to Psych department and received response that patient will be called to schedule. Follow up with PCP.    3. DM type 2 with diabetic dyslipidemia  Chronic; stable on metformin and atorvastatin medications. Ordered to take Jardiance but has not started this due to cost. PCP placed referral order to Pharmacy Assistance; patient awaiting more information on this. Follow up with PCP.    4. Abdominal aortic atherosclerosis  Chronic; stable on atorvastatin medication. Follow up with PCP.    5. Bronchiectasis without complication  Chronic; stable using Anoro Ellipta daily and albuterol inhaler PRN wheezing/SOB. Followed by Pulmonology.    6. Stage 3a chronic kidney disease  Chronic; stable on olmesartan medication. Follow up with PCP.    7. Essential hypertension  Chronic; stable on olmesartan and spironolactone medication. Follow up with PCP.    8. Body mass index (BMI) 25.0-25.9, adult  Eat a low salt/low fat diet and discussed importance of engaging in physical activity at least 5x/week for a minimum of 30 min/day.      Provided Jolly with a 5-10 year written screening schedule and personal prevention plan. Recommendations were developed using the USPSTF age appropriate recommendations. Education, counseling, and referrals were provided as needed. After Visit Summary  given to patient which includes a list of additional screenings/tests needed.    Follow up for your next annual wellness visit.    Faith Vásquez NP      Advance Care Planning     I offered to discuss advanced care planning, including  how to pick a person who would make decisions for you if you were unable to make them for yourself, called a health care power of , and what kind of decisions you might make such as use of life sustaining treatments such as ventilators and tube feeding when faced with a life limiting illness recorded on a living will that they will need to know. (How you want to be cared for as you near the end of your natural life)     X  Patient has advanced directives on file, they would like to make changes. I provided information on how to revoke their previous directives and make new ones.

## 2025-05-08 PROBLEM — N18.31 STAGE 3A CHRONIC KIDNEY DISEASE: Status: RESOLVED | Noted: 2022-05-31 | Resolved: 2025-05-08

## 2025-05-30 ENCOUNTER — TELEPHONE (OUTPATIENT)
Dept: PULMONOLOGY | Facility: CLINIC | Age: 75
End: 2025-05-30
Payer: MEDICARE

## 2025-05-30 ENCOUNTER — OFFICE VISIT (OUTPATIENT)
Dept: URGENT CARE | Facility: CLINIC | Age: 75
End: 2025-05-30
Payer: MEDICARE

## 2025-05-30 VITALS
TEMPERATURE: 98 F | HEIGHT: 61 IN | DIASTOLIC BLOOD PRESSURE: 71 MMHG | HEART RATE: 88 BPM | OXYGEN SATURATION: 97 % | RESPIRATION RATE: 16 BRPM | SYSTOLIC BLOOD PRESSURE: 141 MMHG | WEIGHT: 137 LBS | BODY MASS INDEX: 25.86 KG/M2

## 2025-05-30 DIAGNOSIS — J34.9 SINUS PROBLEM: ICD-10-CM

## 2025-05-30 DIAGNOSIS — J06.9 VIRAL URI WITH COUGH: Primary | ICD-10-CM

## 2025-05-30 DIAGNOSIS — R09.81 CONGESTION OF NASAL SINUS: ICD-10-CM

## 2025-05-30 DIAGNOSIS — H66.002 NON-RECURRENT ACUTE SUPPURATIVE OTITIS MEDIA OF LEFT EAR WITHOUT SPONTANEOUS RUPTURE OF TYMPANIC MEMBRANE: ICD-10-CM

## 2025-05-30 LAB
CTP QC/QA: YES
SARS-COV+SARS-COV-2 AG RESP QL IA.RAPID: NEGATIVE

## 2025-05-30 RX ORDER — FLUTICASONE PROPIONATE 50 MCG
1 SPRAY, SUSPENSION (ML) NASAL 2 TIMES DAILY
Qty: 15.8 ML | Refills: 0 | Status: SHIPPED | OUTPATIENT
Start: 2025-05-30 | End: 2025-06-29

## 2025-05-30 RX ORDER — AMOXICILLIN 875 MG/1
875 TABLET, COATED ORAL EVERY 12 HOURS
Qty: 10 TABLET | Refills: 0 | Status: SHIPPED | OUTPATIENT
Start: 2025-05-30 | End: 2025-06-04

## 2025-05-30 RX ORDER — PROMETHAZINE HYDROCHLORIDE AND DEXTROMETHORPHAN HYDROBROMIDE 6.25; 15 MG/5ML; MG/5ML
5 SYRUP ORAL NIGHTLY PRN
Qty: 180 ML | Refills: 0 | Status: SHIPPED | OUTPATIENT
Start: 2025-05-30 | End: 2025-07-05

## 2025-05-30 NOTE — PROGRESS NOTES
"Subjective:      Patient ID: Jolly Matias is a 75 y.o. female.    Vitals:  height is 5' 1" (1.549 m) and weight is 62.1 kg (137 lb). Her oral temperature is 98.2 °F (36.8 °C). Her blood pressure is 141/71 (abnormal) and her pulse is 88. Her respiration is 16 and oxygen saturation is 97%.     Chief Complaint: Sinus Problem    Pt is a 75 y.o. female with PMHx of DMT2, HTN. She is presenting with congestion, sinus pressure, HA, scratchy throat, productive cough, hoarseness. Onset of symptoms was 3 days ago. Denies any fever, chills, N/V/D, ABD pain, CP, SOB.  Pt reports using OTC Sudafed with mild relief    Sinus Problem  This is a new problem. The current episode started in the past 7 days. There has been no fever. Associated symptoms include congestion, coughing, headaches, a hoarse voice, sinus pressure and a sore throat. Pertinent negatives include no chills, ear pain, neck pain, shortness of breath or sneezing. Past treatments include oral decongestants. The treatment provided mild relief.       Constitution: Negative for activity change, appetite change, chills and fever.   HENT:  Positive for congestion, postnasal drip, sinus pressure and sore throat. Negative for ear pain and sinus pain.    Neck: Negative for neck pain.   Cardiovascular:  Negative for chest pain and sob on exertion.   Eyes:  Negative for eye trauma, eye discharge, eye itching, eye redness, photophobia and blurred vision.   Respiratory:  Positive for cough and sputum production. Negative for shortness of breath, wheezing and asthma.    Gastrointestinal:  Negative for abdominal pain, nausea, vomiting, constipation and diarrhea.   Genitourinary:  Negative for dysuria, frequency, urgency, urine decreased and hematuria.   Musculoskeletal:  Negative for pain and muscle ache.   Skin:  Negative for color change, rash and hives.   Allergic/Immunologic: Negative for seasonal allergies, asthma, hives and sneezing.   Neurological:  Positive for " headaches. Negative for dizziness, light-headedness and altered mental status.   Psychiatric/Behavioral:  Negative for altered mental status and confusion.       Objective:     Physical Exam   Constitutional: She is oriented to person, place, and time. She appears well-developed. She is cooperative.  Non-toxic appearance. She does not appear ill. No distress.      Comments:Pt sitting erect on examination table. No acute respiratory distress, no use of accessory muscles, no notice of nasal flaring.        HENT:   Head: Normocephalic and atraumatic.   Ears:   Right Ear: Hearing and external ear normal. Tympanic membrane is not erythematous. A middle ear effusion is present. no impacted cerumen  Left Ear: Hearing and external ear normal. Tympanic membrane is erythematous.  No middle ear effusion. no impacted cerumen  Nose: Congestion present. No mucosal edema, rhinorrhea or nasal deformity. No epistaxis. Right sinus exhibits no maxillary sinus tenderness and no frontal sinus tenderness. Left sinus exhibits no maxillary sinus tenderness and no frontal sinus tenderness.   Mouth/Throat: Uvula is midline, oropharynx is clear and moist and mucous membranes are normal. No trismus in the jaw. Normal dentition. No uvula swelling. No oropharyngeal exudate, posterior oropharyngeal edema or posterior oropharyngeal erythema.   Eyes: Conjunctivae and lids are normal. No scleral icterus.   Neck: Trachea normal and phonation normal. Neck supple. No edema present. No erythema present. No neck rigidity present.   Cardiovascular: Normal rate, regular rhythm, normal heart sounds and normal pulses.   Pulmonary/Chest: Effort normal and breath sounds normal. No accessory muscle usage. No apnea, no tachypnea and no bradypnea. No respiratory distress. She has no decreased breath sounds. She has no wheezes. She has no rhonchi.   Lungs clear to auscultation B/L           Comments: Lungs clear to auscultation B/L      Abdominal: Normal  appearance.   Musculoskeletal: Normal range of motion.         General: No deformity. Normal range of motion.   Neurological: She is alert and oriented to person, place, and time. She exhibits normal muscle tone. Coordination normal.   Skin: Skin is warm, dry, intact, not diaphoretic and not pale.   Psychiatric: Her speech is normal and behavior is normal. Judgment and thought content normal.   Nursing note and vitals reviewed.    Results for orders placed or performed in visit on 05/30/25   SARS Coronavirus 2 Antigen, POCT Manual Read    Collection Time: 05/30/25  9:37 AM   Result Value Ref Range    SARS Coronavirus 2 Antigen Negative Negative, Presumptive Negative     Acceptable Yes      *Note: Due to a large number of results and/or encounters for the requested time period, some results have not been displayed. A complete set of results can be found in Results Review.       Assessment:     1. Viral URI with cough    2. Sinus problem    3. Non-recurrent acute suppurative otitis media of left ear without spontaneous rupture of tympanic membrane    4. Congestion of nasal sinus        Plan:     I have reviewed the patient chart and pertinent past imaging/labs.    Viral URI with cough  -     promethazine-dextromethorphan (PROMETHAZINE-DM) 6.25-15 mg/5 mL Syrp; Take 5 mLs by mouth nightly as needed.  Dispense: 180 mL; Refill: 0    Sinus problem  -     SARS Coronavirus 2 Antigen, POCT Manual Read    Non-recurrent acute suppurative otitis media of left ear without spontaneous rupture of tympanic membrane  -     amoxicillin (AMOXIL) 875 MG tablet; Take 1 tablet (875 mg total) by mouth every 12 (twelve) hours. for 5 days  Dispense: 10 tablet; Refill: 0    Congestion of nasal sinus  -     fluticasone propionate (FLONASE) 50 mcg/actuation nasal spray; 1 spray (50 mcg total) by Each Nostril route 2 (two) times a day.  Dispense: 15.8 mL; Refill: 0

## 2025-05-30 NOTE — TELEPHONE ENCOUNTER
----- Message from Anders sent at 5/30/2025  3:15 PM CDT -----  Consult/AdvisoryName Of Caller:Jolly Contact Preference:[phone] of call: Pt called reschedule her missed appt nothing is showing avail please call to assist

## 2025-05-30 NOTE — TELEPHONE ENCOUNTER
Spoke with pt, informed her that I have received her message. I advised pt that I can schedule her appointment with Dr Ellison on 8/13/25 for 10:00am. Pt accepted appointment.

## 2025-05-30 NOTE — PATIENT INSTRUCTIONS
Ear Infection: Amoxicillin twice daily for 5 days   Fever/Pain: Alternate Tylenol and Ibuprofen as needed every 4-6 hours  Cough: Cough syrup nightly as needed  Nasal congestion/Runny nose: Nasal spray twice daily as needed  Monitor for chest pain, shortness of breath, worsening of symptoms, or fever unresponsive to medication.   Please drink plenty of fluids.  Please get plenty of rest.  Please return here or go to the Emergency Department for any concerns or worsening of condition.  If you were prescribed antibiotics, please take them to completion.  If you were given wait & see antibiotics, please wait 5-7 days before taking them, and only take them if your symptoms have worsened or not improved.  If you do begin taking the antibiotics, please take them to completion.  If you were prescribed a narcotic medication, do not drive or operate heavy equipment or machinery while taking these medications.  If you were given a steroid shot in the clinic and have also been given a prescription for a steroid such as Prednisone or a Medrol Dose Pack, please begin taking them tomorrow.  If you do not have Hypertension or any history of palpitations, it is ok to take over the counter Sudafed or Mucinex D or Allegra-D or Claritin-D or Zyrtec-D.  If you do take one of the above, it is ok to combine that with plain over the counter Mucinex or Allegra or Claritin or Zyrtec.  If for example you are taking Zyrtec -D, you can combine that with Mucinex, but not Mucinex-D.  If you are taking Mucinex-D, you can combine that with plain Allegra or Claritin or Zyrtec.   If you do have Hypertension or palpitations, it is safe to take Coricidin HBP for relief of sinus symptoms.  If not allergic, please take over the counter Tylenol (Acetaminophen) and/or Motrin (Ibuprofen) as directed for control of pain and/or fever.  Please follow up with your primary care doctor or specialist as needed.    If you  smoke, please stop smoking.

## 2025-06-13 ENCOUNTER — PATIENT MESSAGE (OUTPATIENT)
Dept: NEPHROLOGY | Facility: CLINIC | Age: 75
End: 2025-06-13
Payer: MEDICARE

## 2025-06-16 ENCOUNTER — OFFICE VISIT (OUTPATIENT)
Dept: DERMATOLOGY | Facility: CLINIC | Age: 75
End: 2025-06-16
Payer: MEDICARE

## 2025-06-16 DIAGNOSIS — L82.1 SK (SEBORRHEIC KERATOSIS): Primary | ICD-10-CM

## 2025-06-16 DIAGNOSIS — L73.9 FOLLICULITIS: ICD-10-CM

## 2025-06-16 DIAGNOSIS — L57.0 AK (ACTINIC KERATOSIS): ICD-10-CM

## 2025-06-16 DIAGNOSIS — D22.9 NEVUS: ICD-10-CM

## 2025-06-16 PROCEDURE — 99213 OFFICE O/P EST LOW 20 MIN: CPT | Mod: 25,S$GLB,, | Performed by: DERMATOLOGY

## 2025-06-16 PROCEDURE — 3288F FALL RISK ASSESSMENT DOCD: CPT | Mod: CPTII,S$GLB,, | Performed by: DERMATOLOGY

## 2025-06-16 PROCEDURE — 1101F PT FALLS ASSESS-DOCD LE1/YR: CPT | Mod: CPTII,S$GLB,, | Performed by: DERMATOLOGY

## 2025-06-16 PROCEDURE — 1160F RVW MEDS BY RX/DR IN RCRD: CPT | Mod: CPTII,S$GLB,, | Performed by: DERMATOLOGY

## 2025-06-16 PROCEDURE — 1126F AMNT PAIN NOTED NONE PRSNT: CPT | Mod: CPTII,S$GLB,, | Performed by: DERMATOLOGY

## 2025-06-16 PROCEDURE — 1159F MED LIST DOCD IN RCRD: CPT | Mod: CPTII,S$GLB,, | Performed by: DERMATOLOGY

## 2025-06-16 PROCEDURE — 99999 PR PBB SHADOW E&M-EST. PATIENT-LVL III: CPT | Mod: PBBFAC,,, | Performed by: DERMATOLOGY

## 2025-06-16 PROCEDURE — 1157F ADVNC CARE PLAN IN RCRD: CPT | Mod: CPTII,S$GLB,, | Performed by: DERMATOLOGY

## 2025-06-16 NOTE — PROGRESS NOTES
Subjective:      Patient ID:  Jolly Matias is a 75 y.o. female who presents for   Chief Complaint   Patient presents with    Follow-up     Recheck - forehead      Pt here today for f/u - recheck forehead     Final Diagnosis       Date                     Value               Ref Range           Status                03/26/2025                                                       Final             1. Skin, left temporal scalp, shave biopsy:   - COMPATIBLE WITH IRRITATED PRURIGO NODULARIS (see comment) Comment:  The lesion is broadly transected at the biopsy base.  Deeper  re-biopsy is recommended if clinical concern for malignancy persists. No cancer cells noted on histologic examination.  Your provider will  correlate this pathology report with your clinical findings.     [FORMATTING REMOVED]  ----------            Follow-up      Review of Systems   Skin:  Positive for daily sunscreen use, activity-related sunscreen use and wears hat (sometimes). Negative for recent sunburn.   Hematologic/Lymphatic: Does not bruise/bleed easily.       Objective:   Physical Exam   Constitutional: She appears well-developed and well-nourished. No distress.   Neurological: She is alert and oriented to person, place, and time. She is not disoriented.   Psychiatric: She has a normal mood and affect.   Skin:   Areas Examined (abnormalities noted in diagram):   Head / Face Inspection Performed            Diagram Legend     Erythematous scaling macule/papule c/w actinic keratosis       Vascular papule c/w angioma      Pigmented verrucoid papule/plaque c/w seborrheic keratosis      Yellow umbilicated papule c/w sebaceous hyperplasia      Irregularly shaped tan macule c/w lentigo     1-2 mm smooth white papules consistent with Milia      Movable subcutaneous cyst with punctum c/w epidermal inclusion cyst      Subcutaneous movable cyst c/w pilar cyst      Firm pink to brown papule c/w dermatofibroma      Pedunculated fleshy papule(s) c/w  skin tag(s)      Evenly pigmented macule c/w junctional nevus     Mildly variegated pigmented, slightly irregular-bordered macule c/w mildly atypical nevus      Flesh colored to evenly pigmented papule c/w intradermal nevus       Pink pearly papule/plaque c/w basal cell carcinoma      Erythematous hyperkeratotic cursted plaque c/w SCC      Surgical scar with no sign of skin cancer recurrence      Open and closed comedones      Inflammatory papules and pustules      Verrucoid papule consistent consistent with wart     Erythematous eczematous patches and plaques     Dystrophic onycholytic nail with subungual debris c/w onychomycosis     Umbilicated papule    Erythematous-base heme-crusted tan verrucoid plaque consistent with inflamed seborrheic keratosis     Erythematous Silvery Scaling Plaque c/w Psoriasis     See annotation      Assessment / Plan:        SK (seborrheic keratosis)  These are benign inherited growths without a malignant potential. Reassurance given to patient. No treatment is necessary.     AK (actinic keratosis)  Cryosurgery Procedure Note    Verbal consent from the patient is obtained including, but not limited to, risk of hypopigmentation/hyperpigmentation, scar, recurrence of lesion. The patient is aware of the precancerous quality and need for treatment of these lesions. Liquid nitrogen cryosurgery is applied to the 1 actinic keratoses, as detailed in the physical exam, to produce a freeze injury. The patient is aware that blisters may form and is instructed on wound care with gentle cleansing and use of vaseline ointment to keep moist until healed. The patient is supplied a handout on cryosurgery and is instructed to call if lesions do not completely resolve.    Nevus  Discussed ABCDE's of nevi.  Monitor for new mole or moles that are becoming bigger, darker, irritated, or developing irregular borders. Brochure provided. Instructed patient to observe lesion(s) for changes and follow up in clinic  if changes are noted. Patient to monitor skin at home for new or changing lesions.     Folliculitis/prurigo  Resolved after bx             Follow up in about 6 months (around 12/16/2025) for UBSE.

## 2025-06-25 ENCOUNTER — LAB VISIT (OUTPATIENT)
Dept: LAB | Facility: HOSPITAL | Age: 75
End: 2025-06-25
Attending: FAMILY MEDICINE
Payer: MEDICARE

## 2025-06-25 DIAGNOSIS — E78.5 DM TYPE 2 WITH DIABETIC DYSLIPIDEMIA: ICD-10-CM

## 2025-06-25 DIAGNOSIS — E11.69 DM TYPE 2 WITH DIABETIC DYSLIPIDEMIA: ICD-10-CM

## 2025-06-25 LAB
ALBUMIN SERPL BCP-MCNC: 3.9 G/DL (ref 3.5–5.2)
ALP SERPL-CCNC: 184 UNIT/L (ref 40–150)
ALT SERPL W/O P-5'-P-CCNC: 27 UNIT/L (ref 10–44)
ANION GAP (OHS): 9 MMOL/L (ref 8–16)
AST SERPL-CCNC: 27 UNIT/L (ref 11–45)
BILIRUB SERPL-MCNC: 0.2 MG/DL (ref 0.1–1)
BUN SERPL-MCNC: 32 MG/DL (ref 8–23)
CALCIUM SERPL-MCNC: 8.9 MG/DL (ref 8.7–10.5)
CHLORIDE SERPL-SCNC: 110 MMOL/L (ref 95–110)
CO2 SERPL-SCNC: 20 MMOL/L (ref 23–29)
CREAT SERPL-MCNC: 1.2 MG/DL (ref 0.5–1.4)
EAG (OHS): 117 MG/DL (ref 68–131)
GFR SERPLBLD CREATININE-BSD FMLA CKD-EPI: 47 ML/MIN/1.73/M2
GLUCOSE SERPL-MCNC: 106 MG/DL (ref 70–110)
HBA1C MFR BLD: 5.7 % (ref 4–5.6)
POTASSIUM SERPL-SCNC: 4.5 MMOL/L (ref 3.5–5.1)
PROT SERPL-MCNC: 6.7 GM/DL (ref 6–8.4)
SODIUM SERPL-SCNC: 139 MMOL/L (ref 136–145)

## 2025-06-25 PROCEDURE — 36415 COLL VENOUS BLD VENIPUNCTURE: CPT | Mod: PO

## 2025-06-25 PROCEDURE — 83036 HEMOGLOBIN GLYCOSYLATED A1C: CPT

## 2025-06-25 PROCEDURE — 80053 COMPREHEN METABOLIC PANEL: CPT

## 2025-07-02 ENCOUNTER — RESULTS FOLLOW-UP (OUTPATIENT)
Dept: FAMILY MEDICINE | Facility: CLINIC | Age: 75
End: 2025-07-02

## 2025-07-02 ENCOUNTER — OFFICE VISIT (OUTPATIENT)
Dept: FAMILY MEDICINE | Facility: CLINIC | Age: 75
End: 2025-07-02
Payer: MEDICARE

## 2025-07-02 ENCOUNTER — LAB VISIT (OUTPATIENT)
Dept: LAB | Facility: HOSPITAL | Age: 75
End: 2025-07-02
Attending: FAMILY MEDICINE
Payer: MEDICARE

## 2025-07-02 VITALS
OXYGEN SATURATION: 96 % | HEIGHT: 61 IN | BODY MASS INDEX: 26.19 KG/M2 | DIASTOLIC BLOOD PRESSURE: 72 MMHG | HEART RATE: 83 BPM | SYSTOLIC BLOOD PRESSURE: 122 MMHG | WEIGHT: 138.69 LBS

## 2025-07-02 DIAGNOSIS — I12.9 HYPERTENSIVE KIDNEY DISEASE WITH STAGE 3A CHRONIC KIDNEY DISEASE: ICD-10-CM

## 2025-07-02 DIAGNOSIS — E78.5 DM TYPE 2 WITH DIABETIC DYSLIPIDEMIA: ICD-10-CM

## 2025-07-02 DIAGNOSIS — Z85.118 H/O: LUNG CANCER: ICD-10-CM

## 2025-07-02 DIAGNOSIS — F33.0 MAJOR DEPRESSIVE DISORDER, RECURRENT, MILD: ICD-10-CM

## 2025-07-02 DIAGNOSIS — N18.31 HYPERTENSIVE KIDNEY DISEASE WITH STAGE 3A CHRONIC KIDNEY DISEASE: ICD-10-CM

## 2025-07-02 DIAGNOSIS — E11.22 TYPE 2 DIABETES MELLITUS WITH STAGE 3B CHRONIC KIDNEY DISEASE, WITHOUT LONG-TERM CURRENT USE OF INSULIN: ICD-10-CM

## 2025-07-02 DIAGNOSIS — E11.69 DM TYPE 2 WITH DIABETIC DYSLIPIDEMIA: ICD-10-CM

## 2025-07-02 DIAGNOSIS — E11.22 CONTROLLED TYPE 2 DIABETES MELLITUS WITH STAGE 3 CHRONIC KIDNEY DISEASE, WITHOUT LONG-TERM CURRENT USE OF INSULIN: ICD-10-CM

## 2025-07-02 DIAGNOSIS — Z78.0 POSTMENOPAUSAL: ICD-10-CM

## 2025-07-02 DIAGNOSIS — J47.9 BRONCHIECTASIS WITHOUT COMPLICATION: ICD-10-CM

## 2025-07-02 DIAGNOSIS — I10 ESSENTIAL HYPERTENSION: Primary | ICD-10-CM

## 2025-07-02 DIAGNOSIS — N18.32 TYPE 2 DIABETES MELLITUS WITH STAGE 3B CHRONIC KIDNEY DISEASE, WITHOUT LONG-TERM CURRENT USE OF INSULIN: ICD-10-CM

## 2025-07-02 DIAGNOSIS — N18.30 CONTROLLED TYPE 2 DIABETES MELLITUS WITH STAGE 3 CHRONIC KIDNEY DISEASE, WITHOUT LONG-TERM CURRENT USE OF INSULIN: ICD-10-CM

## 2025-07-02 LAB
ALBUMIN/CREAT UR: 8.3 UG/MG
CREAT UR-MCNC: 120 MG/DL (ref 15–325)
MICROALBUMIN UR-MCNC: 10 UG/ML (ref ?–5000)

## 2025-07-02 PROCEDURE — 99214 OFFICE O/P EST MOD 30 MIN: CPT | Mod: S$GLB,,, | Performed by: FAMILY MEDICINE

## 2025-07-02 PROCEDURE — 82043 UR ALBUMIN QUANTITATIVE: CPT

## 2025-07-02 PROCEDURE — 1160F RVW MEDS BY RX/DR IN RCRD: CPT | Mod: CPTII,S$GLB,, | Performed by: FAMILY MEDICINE

## 2025-07-02 PROCEDURE — 1101F PT FALLS ASSESS-DOCD LE1/YR: CPT | Mod: CPTII,S$GLB,, | Performed by: FAMILY MEDICINE

## 2025-07-02 PROCEDURE — 3288F FALL RISK ASSESSMENT DOCD: CPT | Mod: CPTII,S$GLB,, | Performed by: FAMILY MEDICINE

## 2025-07-02 PROCEDURE — 1126F AMNT PAIN NOTED NONE PRSNT: CPT | Mod: CPTII,S$GLB,, | Performed by: FAMILY MEDICINE

## 2025-07-02 PROCEDURE — 1157F ADVNC CARE PLAN IN RCRD: CPT | Mod: CPTII,S$GLB,, | Performed by: FAMILY MEDICINE

## 2025-07-02 PROCEDURE — 3078F DIAST BP <80 MM HG: CPT | Mod: CPTII,S$GLB,, | Performed by: FAMILY MEDICINE

## 2025-07-02 PROCEDURE — 1159F MED LIST DOCD IN RCRD: CPT | Mod: CPTII,S$GLB,, | Performed by: FAMILY MEDICINE

## 2025-07-02 PROCEDURE — 99999 PR PBB SHADOW E&M-EST. PATIENT-LVL V: CPT | Mod: PBBFAC,,, | Performed by: FAMILY MEDICINE

## 2025-07-02 PROCEDURE — 3074F SYST BP LT 130 MM HG: CPT | Mod: CPTII,S$GLB,, | Performed by: FAMILY MEDICINE

## 2025-07-02 RX ORDER — SERTRALINE HYDROCHLORIDE 100 MG/1
100 TABLET, FILM COATED ORAL NIGHTLY
Qty: 90 TABLET | Refills: 3 | Status: SHIPPED | OUTPATIENT
Start: 2025-07-02

## 2025-07-02 RX ORDER — METFORMIN HYDROCHLORIDE 500 MG/1
500 TABLET ORAL
Qty: 90 TABLET | Refills: 3 | Status: SHIPPED | OUTPATIENT
Start: 2025-07-02

## 2025-07-02 RX ORDER — DAPAGLIFLOZIN 10 MG/1
10 TABLET, FILM COATED ORAL DAILY
Qty: 90 TABLET | Refills: 3 | Status: SHIPPED | OUTPATIENT
Start: 2025-07-02

## 2025-07-02 NOTE — TELEPHONE ENCOUNTER
No care due was identified.  Health [facility] Embedded Care Due Messages. Reference number: [id number].   7/02/2025 9:12:01 AM CDT

## 2025-07-02 NOTE — PROGRESS NOTES
"Subjective:       Patient ID: Jolly Matias is a 75 y.o. female.    Chief Complaint: Hypertension    History of Present Illness    CHIEF COMPLAINT:  Jolly presents today for follow up of chronic medical conditions including diabetes, hypertension, chronic kidney disease, and depression.    HYPERTENSION:  She has not checked blood pressure at home in several months. She denies chest pain, palpitations, ankle swelling, and nocturnal dyspnea. She reports occasional shortness of breath with exertion, specifically during yard work in high temperatures. She continues olmesartan 40 mg daily and spironolactone 25 mg daily for management.    CHRONIC KIDNEY DISEASE:  She has stable chronic kidney disease stage 3A with GFR of 47. She has an upcoming virtual nephrology appointment scheduled with [facility] on July 5th.    DEPRESSION:  She reports fluctuating mood, describing periods of being mostly stable interspersed with episodes of feeling overwhelmed and experiencing depressive symptoms. She describes occasionally descending into a "dark hole" emotionally and having difficulty managing daily responsibilities due to these emotional fluctuations. She continues sertraline 100 mg for management.    DIABETES:  She has not been checking blood sugar regularly and reports never being advised to do so. Blood sugar was 106 and HbA1c was 5.7% on June 25th.    CURRENT MEDICATIONS:  She takes olmesartan 40 mg daily, spironolactone 25 mg daily, atorvastatin 20 mg daily, and sertraline 100 mg daily.    REVIEW OF SYSTEMS:  She denies dysuria, hematuria, and blood in stool.      ROS:  Respiratory: +exertional dyspnea  Cardiovascular: -chest pain, -palpitations, -paroxysmal nocturnal dyspnea, -lower extremity edema  Gastrointestinal: -bright red blood per rectum  Genitourinary: -dysuria, -hematuria  Psychiatric: +depression          Objective:      Physical Exam    General: In no acute distress.  Head: Normocephalic. Non " traumatic.  Eyes: EOMs full. Conjunctivae clear.   Neck: Supple. No masses. No thyromegaly. No bruits.  Chest: Lungs clear. No rales. No rhonchi. No wheezes.  Heart: RRR. No murmurs. No rubs. No gallops. Good strong pulses.  Abdomen: Soft. No tenderness. No masses. BS normal.  Extremities: Warm. Well perfused. No upper extremity edema. No lower extremity edema. FROM. No deformities. No joint erythema.  Neuro: No focal deficits appreciated. Good muscle tone. Normal response to visual stimuli. Normal response to auditory stimuli.  Skin: Normal. No rashes. No lesions noted.  Vitals: BLOOD PRESSURE: 122/72.            Assessment:         Essential hypertension  -     Hypertension Digital Medicine ([facility]) Enrollment Order    DM type 2 with diabetic dyslipidemia  -     Microalbumin/Creatinine Ratio, Urine; Future; Expected date: 07/02/2025  -     Lipid Panel; Future; Expected date: 01/02/2026  -     Comprehensive Metabolic Panel; Future; Expected date: 01/02/2026  -     Hemoglobin A1C; Future; Expected date: 01/02/2026    H/O: lung cancer    Bronchiectasis without complication    Major depressive disorder, recurrent, mild    Postmenopausal  -     DXA Bone Density Axial Skeleton 1 or more sites; Future; Expected date: 07/08/2025    Controlled type 2 diabetes mellitus with stage 3 chronic kidney disease, without long-term current use of insulin  -     dapagliflozin propanediol (FARXIGA) 10 mg tablet; Take 1 tablet (10 mg total) by mouth once daily.  Dispense: 90 tablet; Refill: 3  -     Ambulatory referral/consult to Pharmacy Assistance; Future; Expected date: 07/09/2025    Hypertensive kidney disease with stage 3a chronic kidney disease  -     Hypertension Digital Medicine ([facility]) Enrollment Order  -     dapagliflozin propanediol (FARXIGA) 10 mg tablet; Take 1 tablet (10 mg total) by mouth once daily.  Dispense: 90 tablet; Refill: 3         Plan:       Assessment & Plan    MAJOR DEPRESSIVE DISORDER, SINGLE EPISODE,  "MODERATE:  - Monitored patient's mood, which is reported as sporadic with occasional episodes of feeling overwhelmed and "going down a dark hole." Evaluated depressive symptoms and their impact on daily functioning.  - Continuing sertraline 100 mg daily for depression management with plan to follow up on effectiveness at next visit.    TYPE 2 DIABETES MELLITUS:  - Blood glucose well-controlled with reading of 106 on June 25th and hemoglobin A1c at 5.7%.  - Continue metformin 500 mg.  - Ordered annual urine protein screening to monitor for early signs of diabetic nephropathy.  - Recommend enrollment in digital diabetes program with wireless glucometer for improved monitoring and comprehensive diabetes management.    ESSENTIAL HYPERTENSION:  - Blood pressure well-controlled at 122/72.  - Continue current antihypertensive regimen of olmesartan 40 mg daily and spironolactone 25 mg daily.  - Recommend enrollment in digital hypertension program with wireless blood pressure monitor for home monitoring.    CHRONIC KIDNEY DISEASE, STAGE 3A:  - Stable stage 3A chronic kidney disease with glomerular filtration rate of 47.  - Prescribed dapagliflozin 10 mg tablet daily for kidney protection and submitted financial assistance application to [facility] pharmacy.  - Discussed the renoprotective benefits of SGLT2 inhibitors.  - Ordered annual urine protein screening for comprehensive CKD monitoring.  - Continue with scheduled nephrology follow-up visits.    SHORTNESS OF BREATH:  - Jolly experiences shortness of breath primarily during exertion, especially during yard work in hot weather.  - Advised to stay well-hydrated to prevent dehydration and related symptoms.  - Continue with scheduled pulmonology follow-up visits.    HYPERLIPIDEMIA:  - Lipid panel well-controlled with total cholesterol of 139 and LDL of 74 as of November last year.  - Continue atorvastatin 20 mg for cholesterol management.    BONE HEALTH:  - Previous " bone mineral density normal, due for repeat in 1-2 years.  - Ordered bone mineral density test.  - Recommend consuming 1,200 mg of calcium daily through diet or supplements.    FOLLOW-UP AND GENERAL RECOMMENDATIONS:  - Follow up in 6 months (beginning of next year).  - Expect message through patient portal to set up digital medicine programs.  - Informed about [employer] service for technical assistance with digital health devices.              This note was generated with the assistance of ambient listening technology. Verbal consent was obtained by the patient and accompanying visitor(s) for the recording of patient appointment to facilitate this note. I attest to having reviewed and edited the generated note for accuracy, though some syntax or spelling errors may persist. Please contact the author of this note for any clarification.

## 2025-07-02 NOTE — PROGRESS NOTES
Romario     Ms. Matias's case has been assigned to Alba Macario @[phone]. Provider may review progress notes by typing pharmacy patient assistance in Epic search box.      What happens next? Assigned advocate will review your patients chart and research available options.  Patient and Provider may be asked to provide specific documentation to facilitate the PAP process. Failure to provide the requested documentation will delay assistance.    Please note: epic chart must reflect a current order for the requested medication written by an [facility] provider to begin PAP process.   Please note: all requests are subject to program availability and patient eligibility verification.   Please note: each program has it's own unique eligibility criteria (e.g., income limits, insurance status medical condition, residency).Therefore eligibility is determined by the specific program being applied to not by the Pharmacy Patient Assistance Team.         Thank you,   [facility]  [address] [facility] [id number]  [place] [address]  Fax: [phone]  Email: [email or web address]

## 2025-07-03 ENCOUNTER — TELEPHONE (OUTPATIENT)
Facility: CLINIC | Age: 75
End: 2025-07-03
Payer: MEDICARE

## 2025-07-03 NOTE — TELEPHONE ENCOUNTER
Cancelling order for CKD Basic Education due to multiple attempts to contact patient following a canceled appointment. Notified provider. Please resend referral in the future if patient expresses interest in attending.    : Kidney Disease Education History   Order Specific Questions   Reason for Referral:   CKD Basic Education            CKD Stages:   CKD stages 1-3 (not covered by CMS/INS)            Request History   Action Date and Time User Details      Request Created 03/26/2025 09:14 Maddi Blandon A.M., MD Appointment Encounter, Details      Appointment Scheduled 03/26/2025 09:33 Kizzy Mao EDUCATION CLASS on 5/22/2025 at 10:00 AM (Canceled)      Request Edited 03/26/2025 09:46 Kizzy Mao        Appointment Canceled 05/22/2025 09:26 [facility], [facility] Message Reason: Other  EDUCATION CLASS on 5/22/2025 at 10:00 AM      Patient Called 06/05/2025 16:43 Anisha Eldridge Left Message: Details  patient canceled previous appt. Left message to reschedule      Deferred 06/05/2025 16:44 Anisha Eldridge WQ: [place] Ancillary Orders: [place], Deferred until 7/7/2025 0:00  Pt Cancel, patient canceled previous appt. Left message to reschedule      Patient Called 06/30/2025 13:53 Lola Hebert Left Message: Details      Patient Called 07/03/2025 13:03 Lola Hebert Left Message: Details

## 2025-07-05 ENCOUNTER — OFFICE VISIT (OUTPATIENT)
Dept: NEPHROLOGY | Facility: CLINIC | Age: 75
End: 2025-07-05
Payer: MEDICARE

## 2025-07-05 DIAGNOSIS — D63.1 ANEMIA IN STAGE 3A CHRONIC KIDNEY DISEASE: ICD-10-CM

## 2025-07-05 DIAGNOSIS — N18.31 HYPERTENSIVE KIDNEY DISEASE WITH STAGE 3A CHRONIC KIDNEY DISEASE: ICD-10-CM

## 2025-07-05 DIAGNOSIS — N18.31 STAGE 3A CHRONIC KIDNEY DISEASE: Primary | ICD-10-CM

## 2025-07-05 DIAGNOSIS — I12.9 HYPERTENSIVE KIDNEY DISEASE WITH STAGE 3A CHRONIC KIDNEY DISEASE: ICD-10-CM

## 2025-07-05 DIAGNOSIS — E11.22 CONTROLLED TYPE 2 DIABETES MELLITUS WITH STAGE 3 CHRONIC KIDNEY DISEASE, WITHOUT LONG-TERM CURRENT USE OF INSULIN: ICD-10-CM

## 2025-07-05 DIAGNOSIS — N18.31 ANEMIA IN STAGE 3A CHRONIC KIDNEY DISEASE: ICD-10-CM

## 2025-07-05 DIAGNOSIS — N18.30 CONTROLLED TYPE 2 DIABETES MELLITUS WITH STAGE 3 CHRONIC KIDNEY DISEASE, WITHOUT LONG-TERM CURRENT USE OF INSULIN: ICD-10-CM

## 2025-07-05 PROCEDURE — 98002 SYNCH AUDIO-VIDEO NEW MOD 45: CPT | Mod: 95,,, | Performed by: NURSE PRACTITIONER

## 2025-07-05 PROCEDURE — 1157F ADVNC CARE PLAN IN RCRD: CPT | Mod: CPTII,95,, | Performed by: NURSE PRACTITIONER

## 2025-07-05 NOTE — PROGRESS NOTES
Subjective:       Patient ID: Jolly Matias is a 75 y.o.  female who presents for new evaluation of CKD 3a.    The patient location is: [place]  The chief complaint leading to consultation is: CKD 3a  Visit type: audiovisual  Total time spent with patient: 15 minutes  Each patient to whom he or she provides medical services by telemedicine is:  (1) informed of the relationship between the physician and patient and the respective role of any other health care provider with respect to management of the patient; and (2) notified that he or she may decline to receive medical services by telemedicine and may withdraw from such care at any time.    HPI     Jolly Matias is a 75 year old female with HTN, T2DM, lung cancer s/p lobectomy that presents for new evaluation of CKD 3a. She was diagnosed with T2DM and HTN around 2008, both now well-controlled. The patient denies taking NSAIDs, herbal supplements, or new antibiotics, recreational drugs, recent episode of dehydration, diarrhea, nausea or vomiting. She drinks about 3-4 glasses of water per day.    Recent baseline serum creatinine 1.1-1.3.     Significant family hx includes: Maternal Uncle - ESRD on HD, unknown etiology    Last renal US: 7/18/24 , reviewed. Right kidney 8.8, the left kidney 8.7. Unremarkable.       Review of Systems   Respiratory:  Negative for shortness of breath.    Cardiovascular:  Negative for leg swelling.   Gastrointestinal:  Negative for diarrhea, nausea and vomiting.   Genitourinary:  Negative for difficulty urinating, dysuria and hematuria.   All other systems reviewed and are negative.      Objective:       Last menstrual period 01/01/1991.  Physical Exam  Vitals reviewed.   Constitutional:       General: She is not in acute distress.     Appearance: Normal appearance.   HENT:      Head: Normocephalic and atraumatic.   Eyes:      General: No scleral icterus.  Pulmonary:      Effort: Pulmonary effort is normal. No respiratory distress.    Neurological:      Mental Status: She is alert and oriented to person, place, and time.   Psychiatric:         Mood and Affect: Mood normal.         Behavior: Behavior normal.           Current Outpatient Medications   Medication Sig    albuterol (VENTOLIN HFA) 90 mcg/actuation inhaler Inhale 1-2 puffs into the lungs every 4 (four) hours as needed for Wheezing or Shortness of Breath. Rescue    atorvastatin (LIPITOR) 20 MG tablet Take 1 tablet (20 mg total) by mouth every evening.    azelastine (ASTELIN) 137 mcg (0.1 %) nasal spray 1 spray (137 mcg total) by Nasal route 2 (two) times daily.    dapagliflozin propanediol (FARXIGA) 10 mg tablet Take 1 tablet (10 mg total) by mouth once daily.    flaxseed oil 1,000 mg Cap Take 1 capsule by mouth once daily.    metFORMIN (GLUCOPHAGE) 500 MG tablet Take 1 tablet (500 mg total) by mouth daily with breakfast.    olmesartan (BENICAR) 40 MG tablet Take 1 tablet (40 mg total) by mouth once daily.    promethazine-dextromethorphan (PROMETHAZINE-DM) 6.25-15 mg/5 mL Syrp Take 5 mLs by mouth nightly as needed.    sertraline (ZOLOFT) 100 MG tablet Take 1 tablet (100 mg total) by mouth every evening.    sodium chloride 3% 3 % nebulizer solution Take 4 mLs by nebulization as needed for Other. May substitute    spironolactone (ALDACTONE) 25 MG tablet Take 1 tablet (25 mg total) by mouth once daily.    thiamine (VITAMIN B-1) 100 MG tablet Take 100 mg by mouth once daily.    umeclidinium-vilanteroL (ANORO ELLIPTA) 62.5-25 mcg/actuation DsDv Inhale 1 puff into the lungs once daily. Controller    vitamin D 1000 units Tab Take 185 mg by mouth once daily.   Last reviewed on 7/2/2025  9:15 AM by Maddi Blandon A.M., MD       Lab Results   Component Value Date    CREATININE 1.2 06/25/2025    URICACID 3.5 12/15/2014     eGFR   Date Value Ref Range Status   06/25/2025 47 (L) >60 mL/min/1.73/m2 Final     Comment:     Estimated GFR calculated using the CKD-EPI creatinine (2021) equation.  "  02/19/2025 47.2 (A) >60 mL/min/1.73 m^2 Final     No results found for: "UTPCR"  Microalbumin/Creatinine Ratio Urine   Date Value Ref Range Status   07/02/2025 8.3 <=30.0 ug/mg Final     Microalb/Creat Ratio   Date Value Ref Range Status   07/09/2024 6.6 0.0 - 30.0 ug/mg Final   06/27/2023 4.7 0.0 - 30.0 ug/mg Final   05/31/2022 9.5 0.0 - 30.0 ug/mg Final     Lab Results   Component Value Date     06/25/2025    K 4.5 06/25/2025    CO2 20 (L) 06/25/2025     06/25/2025     Lab Results   Component Value Date    CALCIUM 8.9 06/25/2025    PHOS 1.9 (L) 08/24/2017     Lab Results   Component Value Date    HGB 11.9 (L) 11/19/2024    WBC 6.74 11/19/2024    HCT 38.3 11/19/2024      Iron   Date Value Ref Range Status   09/07/2016 33 30 - 160 ug/dL Final     Ferritin   Date Value Ref Range Status   09/07/2016 290 20.0 - 300.0 ng/mL Final     Lab Results   Component Value Date    HGBA1C 5.7 (H) 06/25/2025     11/19/2024    BUN 32 (H) 06/25/2025     Lab Results   Component Value Date    LDLCALC 74.6 11/19/2024         Assessment:       1. Stage 3a chronic kidney disease    2. Hypertensive kidney disease with stage 3a chronic kidney disease    3. Controlled type 2 diabetes mellitus with stage 3 chronic kidney disease, without long-term current use of insulin    4. Anemia in stage 3a chronic kidney disease        Plan:   CKD stage 3a c eGFR 47 mL/min -  - recent baseline Cr 1.1-1.3 over the last several years. Clinically related to T2DM and HTN, now well-controlled.   - Continue ARB, SGLT2i, and MRA    UPCR Minimal albuminuria   Acid-base Bicarb 20, monitor   Secondary hyperparathyroidism Monitor PTH, Vit D, phos. Ca okay   Anemia Hgb stable in CKD   DM Controlled.    Lipid Management On statin   ESRD planning Defer     HTN - Controlled    All questions patient had were answered.  Asked if further questions. None. F/u in clinic 3 months  with labs and urine prior to next visit or sooner if needed.  ER for " emergency concerns.        Summary of Plan:  - RTC 3 months with labs for monitoring. Renal function stable at this time.

## 2025-07-07 ENCOUNTER — PATIENT MESSAGE (OUTPATIENT)
Dept: NEPHROLOGY | Facility: CLINIC | Age: 75
End: 2025-07-07
Payer: MEDICARE

## 2025-07-10 ENCOUNTER — PATIENT MESSAGE (OUTPATIENT)
Dept: NEPHROLOGY | Facility: CLINIC | Age: 75
End: 2025-07-10
Payer: MEDICARE

## 2025-07-17 ENCOUNTER — TELEPHONE (OUTPATIENT)
Dept: PHARMACY | Facility: CLINIC | Age: 75
End: 2025-07-17
Payer: MEDICARE

## 2025-07-17 NOTE — TELEPHONE ENCOUNTER
First contact attempt has been made via letter, portal message , voicemail , and mail . Welcome letter and [employer] Enrollment Packet has been sent via phone, portal message , voicemail , and mail. Follow-up will be made in approximately 5 to 10 business days.      Alba Macario  Pharmacy Patient Assistance Team

## 2025-07-18 NOTE — TELEPHONE ENCOUNTER
Ms. Matias has authorized assigned advocate Alba Macario to sign and submit the [employer] application on her behalf.  All enrollment eligibility disclosures have been relayed over the phone before signing. A copy of the signed application has been provided to the patient for their records. The complete application and supporting documentation has been submitted to the [facility] Patient Assistance Program for consideration of eligibility.     What happens next?  Please allow a minimum of 2-4 weeks to process your application.   Patient and provider may be notified of determination via letter, portal message, text or phone.      Please Note :  All requests are subject to program and medication availability and patient eligibility verification.   Each program has it's own unique eligibility criteria (e.g., income limits, insurance status medical condition, residency).Therefore eligibility is determined by the specific program being applied to not by the Pharmacy Patient Assistance Team.        Sincerely   Alba Macario  Pharmacy Patient Assistance

## 2025-07-21 ENCOUNTER — PATIENT MESSAGE (OUTPATIENT)
Dept: FAMILY MEDICINE | Facility: CLINIC | Age: 75
End: 2025-07-21
Payer: MEDICARE

## 2025-07-22 ENCOUNTER — HOSPITAL ENCOUNTER (OUTPATIENT)
Dept: RADIOLOGY | Facility: HOSPITAL | Age: 75
Discharge: HOME | End: 2025-07-22
Attending: FAMILY MEDICINE
Payer: MEDICARE

## 2025-07-22 DIAGNOSIS — Z78.0 POSTMENOPAUSAL: ICD-10-CM

## 2025-07-22 PROCEDURE — 77080 DXA BONE DENSITY AXIAL: CPT | Mod: 26,,, | Performed by: RADIOLOGY

## 2025-07-22 PROCEDURE — 77080 DXA BONE DENSITY AXIAL: CPT | Mod: TC

## 2025-08-07 ENCOUNTER — PATIENT MESSAGE (OUTPATIENT)
Dept: NEPHROLOGY | Facility: CLINIC | Age: 75
End: 2025-08-07
Payer: MEDICARE

## 2025-08-20 ENCOUNTER — PATIENT MESSAGE (OUTPATIENT)
Dept: NEPHROLOGY | Facility: CLINIC | Age: 75
End: 2025-08-20
Payer: MEDICARE

## 2025-08-28 ENCOUNTER — PATIENT MESSAGE (OUTPATIENT)
Dept: FAMILY MEDICINE | Facility: CLINIC | Age: 75
End: 2025-08-28
Payer: MEDICARE

## (undated) DEVICE — BANDAGE ACE ELASTIC 6"

## (undated) DEVICE — DRESSING XEROFORM 1X8IN

## (undated) DEVICE — PAD ABD 8X10 STERILE

## (undated) DEVICE — Device

## (undated) DEVICE — SEE MEDLINE ITEM 146417

## (undated) DEVICE — PROBE ARTHSCP EDGE ENERGY 50

## (undated) DEVICE — STRAP SECURE 5MM

## (undated) DEVICE — SEE MEDLINE ITEM 146420

## (undated) DEVICE — SUT CTD VICRYL 0 UND BR CT

## (undated) DEVICE — DRAPE ABDOMINAL TIBURON 14X11

## (undated) DEVICE — BLADE SURG CARBON STEEL SZ11

## (undated) DEVICE — DRAPE ARM DAVINCI XI

## (undated) DEVICE — PUMP COLD THERAPY

## (undated) DEVICE — COVER LIGHT HANDLE

## (undated) DEVICE — SET TRI-LUMEN FILTERED TUBE

## (undated) DEVICE — LANZA

## (undated) DEVICE — TROCAR ENDOPATH XCEL 11MM 10CM

## (undated) DEVICE — DRAPE COLUMN DAVINCI XI

## (undated) DEVICE — MARKER SKIN STND TIP BLUE BARR

## (undated) DEVICE — SYR 30CC LUER LOCK

## (undated) DEVICE — GOWN SMART IMP BREATHABLE XXLG

## (undated) DEVICE — TRAY ARTHROSCOPY 2/CS

## (undated) DEVICE — GOWN SURGICAL X-LARGE

## (undated) DEVICE — SPONGE DERMACEA 4X4IN 12PLY

## (undated) DEVICE — PAD COLD THERAPY KNEE WRAP ON

## (undated) DEVICE — SUT MCRYL PLUS 4-0 PS2 27IN

## (undated) DEVICE — WARMER DRAPE STERILE LF

## (undated) DEVICE — SEE MEDLINE ITEM 157117

## (undated) DEVICE — NDL HYPO REG 25G X 1 1/2

## (undated) DEVICE — RELOAD SUREFORM 45 2.5 WHT 6R

## (undated) DEVICE — SPONGE IV DRAIN 4X4 STERILE

## (undated) DEVICE — TAPE SILK 3IN

## (undated) DEVICE — SUT GORE-TEX CV-5 TTC-13 36

## (undated) DEVICE — ELECTRODE REM PLYHSV RETURN 9

## (undated) DEVICE — GLOVE BIOGEL PI MICRO SZ 7.5

## (undated) DEVICE — DRAPE SCOPE PILLOW WARMER

## (undated) DEVICE — TROCAR ENDOPATH XCEL 5X75MM

## (undated) DEVICE — DRESSING TELFA STRL 4X3 LF

## (undated) DEVICE — SUT ETHICON 3-0 BLK MONO PS

## (undated) DEVICE — NDL SPINAL 18GX3.5 SPINOCAN

## (undated) DEVICE — SUT MONOCYRL 4-0 PS2 UND

## (undated) DEVICE — TUBE SET SINGLE LUMEN FILTERED

## (undated) DEVICE — SYR SLIP TIP 20CC

## (undated) DEVICE — TUBING HF INSUFFLATION W/ FLTR

## (undated) DEVICE — ELECTRODE BLADE INSULATED 1 IN

## (undated) DEVICE — STRIP STERI REIN CLSR 1/2X2IN

## (undated) DEVICE — NDL 18GA X1 1/2 REG BEVEL

## (undated) DEVICE — SOL WATER STRL IRR 1000ML

## (undated) DEVICE — SUT MONOCRYL 3-0 PS-1

## (undated) DEVICE — DRAPE STERI INSTRUMENT 1018

## (undated) DEVICE — PADDING CAST SPECIALIST 6X4YD

## (undated) DEVICE — KIT PROCEDURE STER INLET CLOSU

## (undated) DEVICE — GAUZE FLUFF XXLG 36X36 2 PLY

## (undated) DEVICE — DRAPE INCISE IOBAN 2 23X17IN

## (undated) DEVICE — SEE MEDLINE ITEM 146313

## (undated) DEVICE — SEE MEDLINE ITEM 152622

## (undated) DEVICE — DRESSING TRANS 2X2 TEGADERM

## (undated) DEVICE — CLOSURE SKIN STERI STRIP 1/2X4

## (undated) DEVICE — KIT ANTIFOG

## (undated) DEVICE — SUT 0 VICRYL / UR6 (J603)

## (undated) DEVICE — DRAIN CHEST DRY SUCTION

## (undated) DEVICE — TRAY MINOR GEN SURG

## (undated) DEVICE — ADHESIVE DERMABOND ADVANCED

## (undated) DEVICE — KIT VUETIP TROCAR SWAB

## (undated) DEVICE — SOL ELECTROLUBE ANTI-STIC

## (undated) DEVICE — RELOAD SUREFORM 45 3.5 BLU 6R

## (undated) DEVICE — ADHESIVE MASTISOL VIAL 48/BX

## (undated) DEVICE — TROCAR ENDOPATH XCEL 5MM 7.5CM

## (undated) DEVICE — PORT AIRSEAL 12/120MM LPI

## (undated) DEVICE — STAPLER SUREFORM CRVD TIP 45

## (undated) DEVICE — SEE MEDLINE ITEM 157150

## (undated) DEVICE — DRAPE EMERALD 87X114.75X113

## (undated) DEVICE — SUT VICRYL 3-0 27 SH

## (undated) DEVICE — LOOP VESSEL BLUE MAXI

## (undated) DEVICE — BLADE ELECTRO EDGE INSULATED

## (undated) DEVICE — SUT GUT PL. 4-0 27 FS-2

## (undated) DEVICE — SUT 0 30IN NUROLON BLK CT-1

## (undated) DEVICE — SEAL UNIVERSAL 5MM-8MM XI

## (undated) DEVICE — TUBE SET INFLOW/OUTFLOW

## (undated) DEVICE — GAUZE SPONGE 4X4 12PLY

## (undated) DEVICE — SOL IRR NACL .9% 3000ML

## (undated) DEVICE — TOURNIQUET SB QC DP 34X4IN

## (undated) DEVICE — HEMOSTAT SURGICEL 4X8IN

## (undated) DEVICE — BINDER ABDOMINAL 9 30-45

## (undated) DEVICE — SEALER VESSEL EXTEND

## (undated) DEVICE — SEE MEDLINE ITEM 146292

## (undated) DEVICE — DRESSING SPONGE 8PLY 4X4 STRL

## (undated) DEVICE — SCISSOR 5MMX35CM DIRECT DRIVE

## (undated) DEVICE — CONTAINER SPECIMEN STRL 4OZ

## (undated) DEVICE — DRAPE PLASTIC U 60X72

## (undated) DEVICE — APPLICATOR CHLORAPREP ORN 26ML

## (undated) DEVICE — TROCAR ENDOPATH XCEL 12MM 10CM

## (undated) DEVICE — DRESSING TRANS 4X4 TEGADERM

## (undated) DEVICE — DRESSING XEROFORM FOIL PK 1X8

## (undated) DEVICE — SUT SILK 0 STRANDS 30IN BLK

## (undated) DEVICE — SUT GORE-TEX 36IN CV-0